# Patient Record
Sex: FEMALE | URBAN - METROPOLITAN AREA
[De-identification: names, ages, dates, MRNs, and addresses within clinical notes are randomized per-mention and may not be internally consistent; named-entity substitution may affect disease eponyms.]

---

## 2017-08-23 ENCOUNTER — INPATIENT (INPATIENT)
Facility: HOSPITAL | Age: 47
LOS: 5 days | Discharge: NURSING, REHAB OR LONG-TERM CARE | DRG: 314 | End: 2017-08-29
Attending: INTERNAL MEDICINE | Admitting: INTERNAL MEDICINE
Payer: COMMERCIAL

## 2017-08-23 VITALS
HEART RATE: 86 BPM | WEIGHT: 200.62 LBS | OXYGEN SATURATION: 94 % | RESPIRATION RATE: 18 BRPM | DIASTOLIC BLOOD PRESSURE: 55 MMHG | HEIGHT: 70 IN | SYSTOLIC BLOOD PRESSURE: 118 MMHG
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
## 2017-08-23 PROBLEM — Z00.00 ENCOUNTER FOR PREVENTIVE HEALTH EXAMINATION: Status: ACTIVE | Noted: 2017-08-23

## 2017-08-24 DIAGNOSIS — I10 ESSENTIAL (PRIMARY) HYPERTENSION: ICD-10-CM

## 2017-08-24 DIAGNOSIS — N18.6 END STAGE RENAL DISEASE: ICD-10-CM

## 2017-08-24 DIAGNOSIS — D64.9 ANEMIA, UNSPECIFIED: ICD-10-CM

## 2017-08-24 DIAGNOSIS — R78.81 BACTEREMIA: ICD-10-CM

## 2017-08-24 LAB
ALBUMIN SERPL ELPH-MCNC: 2.1 G/DL — LOW (ref 3.3–5)
ALP SERPL-CCNC: 285 U/L — HIGH (ref 40–120)
ALT FLD-CCNC: 25 U/L — SIGNIFICANT CHANGE UP (ref 10–45)
ANION GAP SERPL CALC-SCNC: 23 MMOL/L — HIGH (ref 5–17)
ANION GAP SERPL CALC-SCNC: 23 MMOL/L — HIGH (ref 5–17)
APTT BLD: 25.9 SEC — LOW (ref 27.5–37.4)
APTT BLD: 30.3 SEC — SIGNIFICANT CHANGE UP (ref 27.5–37.4)
AST SERPL-CCNC: 26 U/L — SIGNIFICANT CHANGE UP (ref 10–40)
BASOPHILS NFR BLD AUTO: 0.3 % — SIGNIFICANT CHANGE UP (ref 0–2)
BILIRUB SERPL-MCNC: 0.4 MG/DL — SIGNIFICANT CHANGE UP (ref 0.2–1.2)
BLD GP AB SCN SERPL QL: NEGATIVE — SIGNIFICANT CHANGE UP
BUN SERPL-MCNC: 67 MG/DL — HIGH (ref 7–23)
BUN SERPL-MCNC: 77 MG/DL — HIGH (ref 7–23)
CALCIUM SERPL-MCNC: 8.1 MG/DL — LOW (ref 8.4–10.5)
CALCIUM SERPL-MCNC: 8.2 MG/DL — LOW (ref 8.4–10.5)
CHLORIDE SERPL-SCNC: 82 MMOL/L — LOW (ref 96–108)
CHLORIDE SERPL-SCNC: 85 MMOL/L — LOW (ref 96–108)
CHOLEST SERPL-MCNC: 136 MG/DL — SIGNIFICANT CHANGE UP (ref 10–199)
CK MB CFR SERPL CALC: 1.7 NG/ML — SIGNIFICANT CHANGE UP (ref 0–6.7)
CK SERPL-CCNC: 93 U/L — SIGNIFICANT CHANGE UP (ref 25–170)
CO2 SERPL-SCNC: 18 MMOL/L — LOW (ref 22–31)
CO2 SERPL-SCNC: 19 MMOL/L — LOW (ref 22–31)
CREAT SERPL-MCNC: 8 MG/DL — HIGH (ref 0.5–1.3)
CREAT SERPL-MCNC: 9.3 MG/DL — HIGH (ref 0.5–1.3)
EOSINOPHIL NFR BLD AUTO: 0.2 % — SIGNIFICANT CHANGE UP (ref 0–6)
GLUCOSE SERPL-MCNC: 227 MG/DL — HIGH (ref 70–99)
GLUCOSE SERPL-MCNC: 267 MG/DL — HIGH (ref 70–99)
GRAM STN FLD: SIGNIFICANT CHANGE UP
HBA1C BLD-MCNC: 8.2 % — HIGH (ref 4–5.6)
HBV SURFACE AG SER-ACNC: SIGNIFICANT CHANGE UP
HCG SERPL-ACNC: 0.7 MIU/ML — SIGNIFICANT CHANGE UP
HCT VFR BLD CALC: 26 % — LOW (ref 34.5–45)
HCT VFR BLD CALC: 26.6 % — LOW (ref 34.5–45)
HCV AB S/CO SERPL IA: 0.35 S/CO — SIGNIFICANT CHANGE UP
HCV AB SERPL-IMP: SIGNIFICANT CHANGE UP
HDLC SERPL-MCNC: 5 MG/DL — LOW (ref 40–125)
HGB BLD-MCNC: 8.5 G/DL — LOW (ref 11.5–15.5)
HGB BLD-MCNC: 8.7 G/DL — LOW (ref 11.5–15.5)
INR BLD: 1.43 — HIGH (ref 0.88–1.16)
INR BLD: 1.44 — HIGH (ref 0.88–1.16)
LACTATE SERPL-SCNC: 1.1 MMOL/L — SIGNIFICANT CHANGE UP (ref 0.5–2)
LACTATE SERPL-SCNC: 1.2 MMOL/L — SIGNIFICANT CHANGE UP (ref 0.5–2)
LIPID PNL WITH DIRECT LDL SERPL: -10 MG/DL — SIGNIFICANT CHANGE UP
LYMPHOCYTES # BLD AUTO: 5.5 % — LOW (ref 13–44)
MAGNESIUM SERPL-MCNC: 2.2 MG/DL — SIGNIFICANT CHANGE UP (ref 1.6–2.6)
MAGNESIUM SERPL-MCNC: 2.4 MG/DL — SIGNIFICANT CHANGE UP (ref 1.6–2.6)
MCHC RBC-ENTMCNC: 25.6 PG — LOW (ref 27–34)
MCHC RBC-ENTMCNC: 25.8 PG — LOW (ref 27–34)
MCHC RBC-ENTMCNC: 32.7 G/DL — SIGNIFICANT CHANGE UP (ref 32–36)
MCHC RBC-ENTMCNC: 32.7 G/DL — SIGNIFICANT CHANGE UP (ref 32–36)
MCV RBC AUTO: 78.3 FL — LOW (ref 80–100)
MCV RBC AUTO: 78.9 FL — LOW (ref 80–100)
METHOD TYPE: SIGNIFICANT CHANGE UP
MONOCYTES NFR BLD AUTO: 9.5 % — SIGNIFICANT CHANGE UP (ref 2–14)
MRSA SPEC QL CULT: SIGNIFICANT CHANGE UP
NEUTROPHILS NFR BLD AUTO: 84.5 % — HIGH (ref 43–77)
NT-PROBNP SERPL-SCNC: HIGH PG/ML (ref 0–300)
PHOSPHATE SERPL-MCNC: 7 MG/DL — HIGH (ref 2.5–4.5)
PLATELET # BLD AUTO: 186 K/UL — SIGNIFICANT CHANGE UP (ref 150–400)
PLATELET # BLD AUTO: 210 K/UL — SIGNIFICANT CHANGE UP (ref 150–400)
POTASSIUM SERPL-MCNC: 3.5 MMOL/L — SIGNIFICANT CHANGE UP (ref 3.5–5.3)
POTASSIUM SERPL-MCNC: 3.8 MMOL/L — SIGNIFICANT CHANGE UP (ref 3.5–5.3)
POTASSIUM SERPL-SCNC: 3.5 MMOL/L — SIGNIFICANT CHANGE UP (ref 3.5–5.3)
POTASSIUM SERPL-SCNC: 3.8 MMOL/L — SIGNIFICANT CHANGE UP (ref 3.5–5.3)
PROT SERPL-MCNC: 6.9 G/DL — SIGNIFICANT CHANGE UP (ref 6–8.3)
PROTHROM AB SERPL-ACNC: 16 SEC — HIGH (ref 9.8–12.7)
PROTHROM AB SERPL-ACNC: 16.1 SEC — HIGH (ref 9.8–12.7)
RBC # BLD: 3.32 M/UL — LOW (ref 3.8–5.2)
RBC # BLD: 3.37 M/UL — LOW (ref 3.8–5.2)
RBC # FLD: 18.1 % — HIGH (ref 10.3–16.9)
RBC # FLD: 18.2 % — HIGH (ref 10.3–16.9)
RH IG SCN BLD-IMP: NEGATIVE — SIGNIFICANT CHANGE UP
SODIUM SERPL-SCNC: 123 MMOL/L — LOW (ref 135–145)
SODIUM SERPL-SCNC: 127 MMOL/L — LOW (ref 135–145)
TOTAL CHOLESTEROL/HDL RATIO MEASUREMENT: 27.2 RATIO — HIGH (ref 3.3–7.1)
TRIGL SERPL-MCNC: 707 MG/DL — HIGH (ref 10–149)
TROPONIN T SERPL-MCNC: 0.14 NG/ML — CRITICAL HIGH (ref 0–0.01)
TSH SERPL-MCNC: 1.25 UIU/ML — SIGNIFICANT CHANGE UP (ref 0.35–4.94)
WBC # BLD: 18.1 K/UL — HIGH (ref 3.8–10.5)
WBC # BLD: 19.6 K/UL — HIGH (ref 3.8–10.5)
WBC # FLD AUTO: 18.1 K/UL — HIGH (ref 3.8–10.5)
WBC # FLD AUTO: 19.6 K/UL — HIGH (ref 3.8–10.5)

## 2017-08-24 PROCEDURE — 93880 EXTRACRANIAL BILAT STUDY: CPT | Mod: 26

## 2017-08-24 PROCEDURE — 71010: CPT | Mod: 26

## 2017-08-24 PROCEDURE — 99223 1ST HOSP IP/OBS HIGH 75: CPT

## 2017-08-24 PROCEDURE — 93010 ELECTROCARDIOGRAM REPORT: CPT

## 2017-08-24 PROCEDURE — 99223 1ST HOSP IP/OBS HIGH 75: CPT | Mod: GC

## 2017-08-24 PROCEDURE — 36500 INSERTION OF CATHETER VEIN: CPT

## 2017-08-24 PROCEDURE — 70496 CT ANGIOGRAPHY HEAD: CPT | Mod: 26

## 2017-08-24 PROCEDURE — 75573 CT HRT C+ STRUX CGEN HRT DS: CPT | Mod: 26

## 2017-08-24 PROCEDURE — 70498 CT ANGIOGRAPHY NECK: CPT | Mod: 26

## 2017-08-24 PROCEDURE — 93306 TTE W/DOPPLER COMPLETE: CPT | Mod: 26

## 2017-08-24 PROCEDURE — 70450 CT HEAD/BRAIN W/O DYE: CPT | Mod: 26,59

## 2017-08-24 RX ORDER — PANTOPRAZOLE SODIUM 20 MG/1
40 TABLET, DELAYED RELEASE ORAL
Qty: 0 | Refills: 0 | Status: DISCONTINUED | OUTPATIENT
Start: 2017-08-24 | End: 2017-08-29

## 2017-08-24 RX ORDER — INSULIN LISPRO 100/ML
VIAL (ML) SUBCUTANEOUS EVERY 6 HOURS
Qty: 0 | Refills: 0 | Status: DISCONTINUED | OUTPATIENT
Start: 2017-08-24 | End: 2017-08-28

## 2017-08-24 RX ORDER — DEXTROSE 50 % IN WATER 50 %
12.5 SYRINGE (ML) INTRAVENOUS ONCE
Qty: 0 | Refills: 0 | Status: DISCONTINUED | OUTPATIENT
Start: 2017-08-24 | End: 2017-08-28

## 2017-08-24 RX ORDER — VALSARTAN 80 MG/1
320 TABLET ORAL DAILY
Qty: 0 | Refills: 0 | Status: DISCONTINUED | OUTPATIENT
Start: 2017-08-24 | End: 2017-08-29

## 2017-08-24 RX ORDER — GLUCAGON INJECTION, SOLUTION 0.5 MG/.1ML
1 INJECTION, SOLUTION SUBCUTANEOUS ONCE
Qty: 0 | Refills: 0 | Status: DISCONTINUED | OUTPATIENT
Start: 2017-08-24 | End: 2017-08-28

## 2017-08-24 RX ORDER — INSULIN GLARGINE 100 [IU]/ML
12 INJECTION, SOLUTION SUBCUTANEOUS AT BEDTIME
Qty: 0 | Refills: 0 | Status: DISCONTINUED | OUTPATIENT
Start: 2017-08-24 | End: 2017-08-25

## 2017-08-24 RX ORDER — VANCOMYCIN HCL 1 G
1000 VIAL (EA) INTRAVENOUS ONCE
Qty: 0 | Refills: 0 | Status: COMPLETED | OUTPATIENT
Start: 2017-08-25 | End: 2017-08-25

## 2017-08-24 RX ORDER — ASPIRIN/CALCIUM CARB/MAGNESIUM 324 MG
81 TABLET ORAL DAILY
Qty: 0 | Refills: 0 | Status: DISCONTINUED | OUTPATIENT
Start: 2017-08-24 | End: 2017-08-29

## 2017-08-24 RX ORDER — INSULIN GLARGINE 100 [IU]/ML
10 INJECTION, SOLUTION SUBCUTANEOUS AT BEDTIME
Qty: 0 | Refills: 0 | Status: DISCONTINUED | OUTPATIENT
Start: 2017-08-24 | End: 2017-08-24

## 2017-08-24 RX ORDER — METOPROLOL TARTRATE 50 MG
50 TABLET ORAL EVERY 8 HOURS
Qty: 0 | Refills: 0 | Status: DISCONTINUED | OUTPATIENT
Start: 2017-08-24 | End: 2017-08-24

## 2017-08-24 RX ORDER — DEXTROSE 50 % IN WATER 50 %
25 SYRINGE (ML) INTRAVENOUS ONCE
Qty: 0 | Refills: 0 | Status: DISCONTINUED | OUTPATIENT
Start: 2017-08-24 | End: 2017-08-28

## 2017-08-24 RX ORDER — INSULIN LISPRO 100/ML
5 VIAL (ML) SUBCUTANEOUS
Qty: 0 | Refills: 0 | Status: DISCONTINUED | OUTPATIENT
Start: 2017-08-24 | End: 2017-08-28

## 2017-08-24 RX ORDER — METOPROLOL TARTRATE 50 MG
50 TABLET ORAL EVERY 6 HOURS
Qty: 0 | Refills: 0 | Status: DISCONTINUED | OUTPATIENT
Start: 2017-08-24 | End: 2017-08-29

## 2017-08-24 RX ORDER — MORPHINE SULFATE 50 MG/1
2 CAPSULE, EXTENDED RELEASE ORAL ONCE
Qty: 0 | Refills: 0 | Status: DISCONTINUED | OUTPATIENT
Start: 2017-08-24 | End: 2017-08-24

## 2017-08-24 RX ORDER — HEPARIN SODIUM 5000 [USP'U]/ML
1000 INJECTION INTRAVENOUS; SUBCUTANEOUS
Qty: 25000 | Refills: 0 | Status: DISCONTINUED | OUTPATIENT
Start: 2017-08-24 | End: 2017-08-24

## 2017-08-24 RX ORDER — AZTREONAM 2 G
VIAL (EA) INJECTION
Qty: 0 | Refills: 0 | Status: DISCONTINUED | OUTPATIENT
Start: 2017-08-24 | End: 2017-08-24

## 2017-08-24 RX ORDER — SODIUM CHLORIDE 9 MG/ML
1000 INJECTION, SOLUTION INTRAVENOUS
Qty: 0 | Refills: 0 | Status: DISCONTINUED | OUTPATIENT
Start: 2017-08-24 | End: 2017-08-28

## 2017-08-24 RX ORDER — SODIUM CHLORIDE 9 MG/ML
3 INJECTION INTRAMUSCULAR; INTRAVENOUS; SUBCUTANEOUS EVERY 8 HOURS
Qty: 0 | Refills: 0 | Status: DISCONTINUED | OUTPATIENT
Start: 2017-08-24 | End: 2017-08-29

## 2017-08-24 RX ORDER — ACETAMINOPHEN 500 MG
650 TABLET ORAL EVERY 6 HOURS
Qty: 0 | Refills: 0 | Status: DISCONTINUED | OUTPATIENT
Start: 2017-08-24 | End: 2017-08-29

## 2017-08-24 RX ORDER — ATORVASTATIN CALCIUM 80 MG/1
40 TABLET, FILM COATED ORAL AT BEDTIME
Qty: 0 | Refills: 0 | Status: DISCONTINUED | OUTPATIENT
Start: 2017-08-24 | End: 2017-08-29

## 2017-08-24 RX ORDER — HEPARIN SODIUM 5000 [USP'U]/ML
1000 INJECTION INTRAVENOUS; SUBCUTANEOUS
Qty: 25000 | Refills: 0 | Status: DISCONTINUED | OUTPATIENT
Start: 2017-08-24 | End: 2017-08-25

## 2017-08-24 RX ORDER — DEXTROSE 50 % IN WATER 50 %
1 SYRINGE (ML) INTRAVENOUS ONCE
Qty: 0 | Refills: 0 | Status: DISCONTINUED | OUTPATIENT
Start: 2017-08-24 | End: 2017-08-28

## 2017-08-24 RX ORDER — AZTREONAM 2 G
500 VIAL (EA) INJECTION EVERY 8 HOURS
Qty: 0 | Refills: 0 | Status: DISCONTINUED | OUTPATIENT
Start: 2017-08-24 | End: 2017-08-24

## 2017-08-24 RX ORDER — HEPARIN SODIUM 5000 [USP'U]/ML
5000 INJECTION INTRAVENOUS; SUBCUTANEOUS EVERY 8 HOURS
Qty: 0 | Refills: 0 | Status: DISCONTINUED | OUTPATIENT
Start: 2017-08-24 | End: 2017-08-24

## 2017-08-24 RX ADMIN — Medication 650 MILLIGRAM(S): at 23:30

## 2017-08-24 RX ADMIN — SODIUM CHLORIDE 3 MILLILITER(S): 9 INJECTION INTRAMUSCULAR; INTRAVENOUS; SUBCUTANEOUS at 06:54

## 2017-08-24 RX ADMIN — INSULIN GLARGINE 12 UNIT(S): 100 INJECTION, SOLUTION SUBCUTANEOUS at 22:58

## 2017-08-24 RX ADMIN — MORPHINE SULFATE 2 MILLIGRAM(S): 50 CAPSULE, EXTENDED RELEASE ORAL at 09:49

## 2017-08-24 RX ADMIN — Medication 6: at 17:21

## 2017-08-24 RX ADMIN — SODIUM CHLORIDE 3 MILLILITER(S): 9 INJECTION INTRAMUSCULAR; INTRAVENOUS; SUBCUTANEOUS at 22:18

## 2017-08-24 RX ADMIN — Medication 50 MILLIGRAM(S): at 15:26

## 2017-08-24 RX ADMIN — Medication 81 MILLIGRAM(S): at 15:26

## 2017-08-24 RX ADMIN — MORPHINE SULFATE 2 MILLIGRAM(S): 50 CAPSULE, EXTENDED RELEASE ORAL at 08:59

## 2017-08-24 RX ADMIN — Medication 50 MILLIGRAM(S): at 23:00

## 2017-08-24 RX ADMIN — VALSARTAN 320 MILLIGRAM(S): 80 TABLET ORAL at 06:54

## 2017-08-24 RX ADMIN — PANTOPRAZOLE SODIUM 40 MILLIGRAM(S): 20 TABLET, DELAYED RELEASE ORAL at 06:54

## 2017-08-24 RX ADMIN — ATORVASTATIN CALCIUM 40 MILLIGRAM(S): 80 TABLET, FILM COATED ORAL at 22:58

## 2017-08-24 RX ADMIN — SODIUM CHLORIDE 3 MILLILITER(S): 9 INJECTION INTRAMUSCULAR; INTRAVENOUS; SUBCUTANEOUS at 15:29

## 2017-08-24 RX ADMIN — Medication 50 MILLIGRAM(S): at 03:22

## 2017-08-24 RX ADMIN — Medication 4: at 06:53

## 2017-08-24 RX ADMIN — HEPARIN SODIUM 5000 UNIT(S): 5000 INJECTION INTRAVENOUS; SUBCUTANEOUS at 06:54

## 2017-08-24 RX ADMIN — Medication 4: at 02:21

## 2017-08-24 RX ADMIN — Medication 50 MILLIGRAM(S): at 06:54

## 2017-08-24 RX ADMIN — Medication 650 MILLIGRAM(S): at 22:58

## 2017-08-24 NOTE — PROGRESS NOTE ADULT - ASSESSMENT
47y Female with history of HTN, HLD, DM2, ESRD on HD (T/R/S via R femoral HD cath placed on 8/22/17 at OSH) and chronic L foot OM who developed sudden onset lethargy and confusion which prompted her presentation to [facility] on 8/20/17.  Further workup revealed MRSA bacteremia with TTE/ALCIDES showing normal LV size and function, mild MR/TR, moderate pericardial effusion without tamponade and mobile echodensity in the IVC/SVC/RA and aneurysmal dilatation of the interatrial septum with mobile density attached.  R anterior chest wall was removed.  She was subsequently transferred to [facility] on 8/23/17 under the care of Dr. Gee for further evaluation.      Neurovascular: No delirium, pain well controlled on current regimen.  -Lethargic on exam, suspected acute neurologic event.  CT Head on admission suspicious for evolving MCA territory infarction, no hemorrhagic event.  Repeat CTA Head today with no mention of acute CVA, no evidence of mycotic aneurysm or AV malformation.    -Neuro consulted, Dr. Luna.  Per her recommendation, repeat CT Head non-con tomorrow to r/o hemorrhagic conversion of possible embolic event.   -Avoid narcotics.   -C/w PRNs for pain control    Respiratory: 94% on RA  -CXR in AM  -Encourage IS 10x/hour while awake; C+DB; Ambulation  -Monitor SpO2 for respiratory status    Cardiovascular: HD Stable, HR controlled. Hx of HTN, HLD  -C/w Lopressor 50mg q8h, Valsartan 320mg QD, and ASA 81mg QD  -Monitor HR/BP/Tele    GI: Stable, tolerating PO  -GI PPX: Pepcid / Protonix  -    /Renal: BUN/Cr: ___ ; No urinary issues  -Trend AM Cr  -Monitor I/O    ID: Afebrile, Asymptomatic  -No acute issues at this time, continue to monitor for SIRS    Endo: A1C: ___ ; TSH: ____  -C/w ISS for glycemic control  -Trend FS      Heme: H/H Stable  -DVT PPX: HSQ 5000/7500 q8h / q12h and SCDs  -CBC, chem in AM    Dispo: Home when medically ready. 47y Female with history of HTN, HLD, DM2, ESRD on HD (T/R/S via R femoral HD cath placed on 8/22/17 at OSH) and chronic L foot OM who developed sudden onset lethargy and confusion which prompted her presentation to [facility] on 8/20/17.  Further workup revealed MRSA bacteremia with TTE/ALCIDES showing normal LV size and function, mild MR/TR, moderate pericardial effusion without tamponade and mobile echodensity in the IVC/SVC/RA and aneurysmal dilatation of the interatrial septum with mobile density attached.  R anterior chest wall was removed.  She was subsequently transferred to [facility] on 8/23/17 under the care of Dr. Gee for further evaluation.      Neurovascular: No delirium, pain well controlled on current regimen.  -Lethargic on exam, suspected acute neurologic event.  CT Head on admission suspicious for evolving MCA territory infarction, no hemorrhagic event.  Repeat CTA Head today with no mention of acute CVA, no evidence of mycotic aneurysm or AV malformation.    -Neuro consulted, Dr. Luna.  Per her recommendation, repeat CT Head non-con tomorrow to r/o hemorrhagic conversion of possible embolic event.   -Avoid narcotics.   -C/w PRNs for pain control    Respiratory: 94% on RA  -CXR in AM  -Encourage IS 10x/hour while awake; C+DB; Ambulation  -Monitor SpO2 for respiratory status    Cardiovascular: HD Stable, HR controlled. Hx of HTN, HLD; Admitted for RV IE vs. RV Thrombus  -C/w Lopressor 50mg q8h, Valsartan 320mg QD, and ASA 81mg QD  -CT Heart today shows Large thrombus extending from SVG to RA and traverses across the TV, parts appear lobulated and attached to superior aspect of interatrial septum.   -Carotids done, no HD stenosis.   -Possible surgical intervention this admission.   -Monitor HR/BP/Tele    GI: Stable, tolerating PO  -GI PPX: Protonix    /Renal: BUN/Cr: 67/8.00; ESRD on HD (T/R/S via R HD femoral catheter)  -Renal consulted, HD today removing 2L. Continue to appreciate recs.   -Trend AM Cr  -Monitor I/O    ID: Tmax 37.8C, admitted for MRSA Bacteremia  -Dr. Kingston following for ID.  Started on Vancomycin  -Repeat BCx sent today, currently positive for GPC in clusters, f/u specificity and sensitivities.   -Vanco trough prior to 3rd dose.   -WCC 19.6, continue to trend    Endo: A1C: 8.2; TSH: 1.251; hx of DM2  -Endo consult tomorrow  -FS elevated, increased Lantus to 12u, Started 5u premeal, and c/w ISS for glycemic control.  -Trend FS      Heme: H/H Stable  -DVT PPX: Hep gtt at 10u and SCDs  -PTT at 0200, titrate Hep gtt accordingly.   -CBC, chem in AM    Dispo: Home when medically ready.

## 2017-08-24 NOTE — CONSULT NOTE ADULT - ASSESSMENT
This is 47 year old female with PMH stated above. She is on HD recently started 2 months ago. LAst HD on 8/22. We will do HD today for fluid optimization

## 2017-08-24 NOTE — CONSULT NOTE ADULT - SUBJECTIVE AND OBJECTIVE BOX
Vascular Neurology Consultation    HPI:  Unable to obtain detailed medical information from patient and OSH documentation.     Patient is a 47 year old female with history of HTN, HLD, DM II, ESRD on HD (TTS. Last HD unknown. Receives HD via Right Femoral HD catheter placed on 08/22/2017 at [facility] and chronic left foot OM who became lethargic and confused. Patient presented to [facility] on 08/20/2017 with the previously stated complaints and was admitted for evaluation and work up. No mention of neurological work up / imaging. MRSA bacteremia discovered. HD catheter removed and new one inserted. ABX.. TTE / ALCIDES showing Normal LV size and function, no AI or AS, mild MR, mild TR, moderate pericardial effusion without tamponade, mobile echodensity in the tip of the IVC / SVC / right atrium, and aneurysmal interatrial septum with mobile density attached. Patient transferred to [facility] under the care of Dr. Gee for further evaluation and treatment / MGMT.     CT head performed at [facility] showing evolving R MCA ischemic infarct. Therefore, stroke neurology consulted. Pt reports she has had RUE weakness since last Thursday. Has had LLE weakness due to chronic left foot osteomyelitis. Denies headache, nausea, vomiting, slurred speech, recent vision changes, or numbness.     PMH/PSH:  HTN  HLD  DM II  ESRD on HD  Chronic left foot osteomyelitis     Social history:   No ETOH, Non-Smoker, No illicit drug use   Lives at home. Walks with walker.     Family history: no pertinent family history     Review of Systems:  Constitutional: No fever, weight loss or fatigue  Eyes: No eye pain or discharge. Blurry vision, pt states is baseline  ENMT:  No difficulty hearing, tinnitus, vertigo; No sinus or throat pain  Neck: No pain or stiffness  Respiratory: No cough, wheezing, chills or hemoptysis  Cardiovascular: No chest pain, palpitations, shortness of breath, dizziness or leg swelling  Gastrointestinal: No abdominal pain. No nausea, vomiting or hematemesis; No diarrhea or constipation. Nohematochezia.  Genitourinary: No dysuria, frequency, hematuria or incontinence  Neurological: As per HPI  Skin: No itching, burning, rashes or lesions   Endocrine: No heat or cold intolerance; No hair loss  Musculoskeletal: No swelling; No muscle, back or extremity pain  Psychiatric: No depression, anxiety, mood swings or difficulty sleeping  Heme/Lymph: No easy bruising or bleeding gums    MEDICATIONS  (STANDING):  sodium chloride 0.9% lock flush 3 milliLiter(s) IV Push every 8 hours  pantoprazole    Tablet 40 milliGRAM(s) Oral before breakfast  aspirin enteric coated 81 milliGRAM(s) Oral daily  valsartan 320 milliGRAM(s) Oral daily  atorvastatin 40 milliGRAM(s) Oral at bedtime  insulin glargine Injectable (LANTUS) 10 Unit(s) SubCutaneous at bedtime  insulin lispro (HumaLOG) corrective regimen sliding scale   SubCutaneous every 6 hours  metoprolol 50 milliGRAM(s) Oral every 6 hours  heparin  Infusion 1000 Unit(s)/Hr (10 mL/Hr) IV Continuous <Continuous>    MEDICATIONS  (PRN):  dextrose Gel 1 Dose(s) Oral once PRN Blood Glucose LESS THAN 70 milliGRAM(s)/deciliter  glucagon  Injectable 1 milliGRAM(s) IntraMuscular once PRN Glucose LESS THAN 70 milligrams/deciliter  acetaminophen   Tablet. 650 milliGRAM(s) Oral every 6 hours PRN Mild Pain (1 - 3)      Allergies  penicillin (Unknown)  Sular (Unknown)      Vital Signs Last 24 Hrs  T(C): 35.9 (24 Aug 2017 17:50), Max: 37.8 (23 Aug 2017 22:55)  T(F): 96.6 (24 Aug 2017 17:50), Max: 100.1 (23 Aug 2017 22:55)  HR: 77 (24 Aug 2017 17:50) (77 - 90)  BP: 145/76 (24 Aug 2017 17:50) (118/55 - 154/66)  BP(mean): 108 (24 Aug 2017 15:23) (76 - 108)  RR: 18 (24 Aug 2017 17:50) (18 - 18)  SpO2: 94% (24 Aug 2017 17:50) (94% - 96%)    Gen: Sitting up in bed, calm, cooperative, in NAD   Neuro:  Mental status: Awake, alert and oriented x3.  Naming, repetition and comprehension intact.  Attention/concentration intact.  No dysarthria, no aphasia.     Cranial nerves: Pupils equally round and reactive to light, 3 mm, visual fields full, no nystagmus, no ptosis, extraocular muscles intact, V1 through V3 intact bilaterally and symmetric, face symmetric, hearing intact to finger rub, palate elevation symmetric, tongue was midline, sternocleidomastoid/shoulder shrug strength bilaterally 5/5.    Motor: No pronator drift of upper extremities. RUE 4/5 bicep, 4-/5 tricep, 4-/5 deltoid. LUE 4+/5.  strength moderately strong bilaterally. Unable to test RLE because pt was receiving dialysis at the time and access was in R femoral. LLE 3/5, limited due to pain from chronic left foot osteomyelitis   Sensation: Intact and symmetric to light touch.  No neglect.   Coordination: No dysmetria on finger-to-nose. Unable to perform heel to shin.  No clumsiness.  Reflexes: 2+ in upper and lower extremities. Mute toes bilaterally.   Gait: deferred at this time     NIHSS: 2    Labs:                        8.7    18.1  )-----------( 210      ( 24 Aug 2017 18:18 )             26.6     08-24    123<L>  |  82<L>  |  77<H>  ----------------------------<  267<H>  3.8   |  18<L>  |  9.30<H>    Ca    8.2<L>      24 Aug 2017 18:18  Phos  7.0     08-24  Mg     2.4     08-24    TPro  6.9  /  Alb  2.1<L>  /  TBili  0.4  /  DBili  x   /  AST  26  /  ALT  25  /  AlkPhos  285<H>  08-24    PT/INR - ( 24 Aug 2017 18:18 )   PT: 16.0 sec;   INR: 1.43     PTT - ( 24 Aug 2017 18:18 )  PTT:30.3 sec    Cholesterol, Serum: 136 mg/dL (08-24 @ 01:12)  HDL Cholesterol, Serum: 5 mg/dL (08-24 @ 01:12)  Triglycerides, Serum: 707 mg/dL (08-24 @ 01:12)  LDL -10    Hemoglobin A1C, Whole Blood: 8.2 % (08-24 @ 01:11)    Thyroid Stimulating Hormone, Serum: 1.251 uIU/mL (08-24 @ 01:12)      Radiology and Additional Studies:  < from: CT Head No Cont (08.24.17 @ 02:49) >  IMPRESSION: Findings concerning for evolving right MCA territory   infarction. No intracranial hemorrhage. Suggest follow-up and correlation   with MRI.    < from: CT Angio Head w/ IV Cont (08.24.17 @ 11:52) >    IMPRESSION:-  1.  No aneurysm or arteriovenous malformation.   2.  Fine calcifications at the bifurcations of both common carotid   arteries, bilateral vertebral arteries and carotid siphon, otherwise   unremarkable study.    < from: CT Heart Congenital w/ IV Cont (08.24.17 @ 12:34) >  Impression:   1. Motion precludes adequate assessment of coronary arteries.   2. The left main and ostium of the LAD appear to be normal.   3. Calcific plaque noted in proximal LAD and LCX.   4. Remaining portions of the coronary arteries cannot be interpreted due   to motion.   5.  Large thrombus noted extending from the SVC tothe Right atrium, and   traverses across the tricuspid valve. Parts of the thrombus appear to be   lobulated and attached to the superior aspect of the interatrial septum.   6. Please see separate radiology report for non-coronary findings.      Assessment & Plan:  47 year old female with PMH of HTN, HLD, DM II, ESRD on HD, chronic left foot osteomyelitis, presented to [facility] on 8/20/17 with lethargy, confusion, and generalized weakness. MRSA bacteremia discovered. 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     -reviewed CT head images with stroke attending Dr. Luna, R MCA territory stroke evident   -however, neurological exam with more right sided weakness than left side   -CTA head/neck also reviewed with Dr. Luna, who felt there is a questionable R distal M2 segment occlusion   -would repeat CT head w/o contrast tomorrow morning to assess severity of stroke   -likely stroke etiology is from infective endocarditis and LV thrombus  -although there is risk for hemorrhagic conversion of stroke, still agree with anticoagulation at this time given high risk for further embolic strokes  -if acute change in neurological status, would repeat CT head w/o contrast stat to r/o bleed   -would aim for SBP < 160 to decrease risk of hemorrhagic conversion Vascular Neurology Consultation    HPI:  Unable to obtain detailed medical information from patient and OSH documentation.     Patient is a 47 year old female with history of HTN, HLD, DM II, ESRD on HD (TTS. Last HD unknown. Receives HD via Right Femoral HD catheter placed on 08/22/2017 at [facility] and chronic left foot OM who became lethargic and confused. Patient presented to [facility] on 08/20/2017 with the previously stated complaints and was admitted for evaluation and work up. No mention of neurological work up / imaging. MRSA bacteremia discovered. HD catheter removed and new one inserted. ABX.. TTE / ALCIDES showing Normal LV size and function, no AI or AS, mild MR, mild TR, moderate pericardial effusion without tamponade, mobile echodensity in the tip of the IVC / SVC / right atrium, and aneurysmal interatrial septum with mobile density attached. Patient transferred to [facility] under the care of Dr. Gee for further evaluation and treatment / MGMT.     CT head performed at [facility] showing evolving R MCA ischemic infarct. Therefore, stroke neurology consulted. Pt reports she has had RUE weakness since last Thursday. Has had LLE weakness due to chronic left foot osteomyelitis. Denies headache, nausea, vomiting, slurred speech, recent vision changes, or numbness.     PMH/PSH:  HTN  HLD  DM II  ESRD on HD  Chronic left foot osteomyelitis     Social history:   No ETOH, Non-Smoker, No illicit drug use   Lives at home. Walks with walker.     Family history: no pertinent family history     Review of Systems:  Constitutional: No fever, weight loss or fatigue  Eyes: No eye pain or discharge. Blurry vision, pt states is baseline  ENMT:  No difficulty hearing, tinnitus, vertigo; No sinus or throat pain  Neck: No pain or stiffness  Respiratory: No cough, wheezing, chills or hemoptysis  Cardiovascular: No chest pain, palpitations, shortness of breath, dizziness or leg swelling  Gastrointestinal: No abdominal pain. No nausea, vomiting or hematemesis; No diarrhea or constipation. Nohematochezia.  Genitourinary: No dysuria, frequency, hematuria or incontinence  Neurological: As per HPI  Skin: No itching, burning, rashes or lesions   Endocrine: No heat or cold intolerance; No hair loss  Musculoskeletal: No swelling; No muscle, back or extremity pain  Psychiatric: No depression, anxiety, mood swings or difficulty sleeping  Heme/Lymph: No easy bruising or bleeding gums    MEDICATIONS  (STANDING):  sodium chloride 0.9% lock flush 3 milliLiter(s) IV Push every 8 hours  pantoprazole    Tablet 40 milliGRAM(s) Oral before breakfast  aspirin enteric coated 81 milliGRAM(s) Oral daily  valsartan 320 milliGRAM(s) Oral daily  atorvastatin 40 milliGRAM(s) Oral at bedtime  insulin glargine Injectable (LANTUS) 10 Unit(s) SubCutaneous at bedtime  insulin lispro (HumaLOG) corrective regimen sliding scale   SubCutaneous every 6 hours  metoprolol 50 milliGRAM(s) Oral every 6 hours  heparin  Infusion 1000 Unit(s)/Hr (10 mL/Hr) IV Continuous <Continuous>    MEDICATIONS  (PRN):  dextrose Gel 1 Dose(s) Oral once PRN Blood Glucose LESS THAN 70 milliGRAM(s)/deciliter  glucagon  Injectable 1 milliGRAM(s) IntraMuscular once PRN Glucose LESS THAN 70 milligrams/deciliter  acetaminophen   Tablet. 650 milliGRAM(s) Oral every 6 hours PRN Mild Pain (1 - 3)      Allergies  penicillin (Unknown)  Sular (Unknown)      Vital Signs Last 24 Hrs  T(C): 35.9 (24 Aug 2017 17:50), Max: 37.8 (23 Aug 2017 22:55)  T(F): 96.6 (24 Aug 2017 17:50), Max: 100.1 (23 Aug 2017 22:55)  HR: 77 (24 Aug 2017 17:50) (77 - 90)  BP: 145/76 (24 Aug 2017 17:50) (118/55 - 154/66)  BP(mean): 108 (24 Aug 2017 15:23) (76 - 108)  RR: 18 (24 Aug 2017 17:50) (18 - 18)  SpO2: 94% (24 Aug 2017 17:50) (94% - 96%)    Gen: Sitting up in bed, calm, cooperative, in NAD   Neuro:  Mental status: Awake, alert and oriented x3.  Naming, repetition and comprehension intact.  Attention/concentration intact.  No dysarthria, no aphasia.     Cranial nerves: Pupils equally round and reactive to light, 3 mm, visual fields full, no nystagmus, no ptosis, extraocular muscles intact, V1 through V3 intact bilaterally and symmetric, face symmetric, hearing intact to finger rub, palate elevation symmetric, tongue was midline, sternocleidomastoid/shoulder shrug strength bilaterally 5/5.    Motor: No pronator drift of upper extremities. RUE 4/5 bicep, 4-/5 tricep, 4-/5 deltoid. LUE 4+/5.  strength moderately strong bilaterally. Unable to test RLE because pt was receiving dialysis at the time and access was in R femoral. LLE 3/5, limited due to pain from chronic left foot osteomyelitis   Sensation: Intact and symmetric to light touch.  No neglect.   Coordination: No dysmetria on finger-to-nose. Unable to perform heel to shin.  No clumsiness.  Reflexes: 2+ in upper and lower extremities. Mute toes bilaterally.   Gait: deferred at this time     NIHSS: 2    Labs:                        8.7    18.1  )-----------( 210      ( 24 Aug 2017 18:18 )             26.6     08-24    123<L>  |  82<L>  |  77<H>  ----------------------------<  267<H>  3.8   |  18<L>  |  9.30<H>    Ca    8.2<L>      24 Aug 2017 18:18  Phos  7.0     08-24  Mg     2.4     08-24    TPro  6.9  /  Alb  2.1<L>  /  TBili  0.4  /  DBili  x   /  AST  26  /  ALT  25  /  AlkPhos  285<H>  08-24    PT/INR - ( 24 Aug 2017 18:18 )   PT: 16.0 sec;   INR: 1.43     PTT - ( 24 Aug 2017 18:18 )  PTT:30.3 sec    Cholesterol, Serum: 136 mg/dL (08-24 @ 01:12)  HDL Cholesterol, Serum: 5 mg/dL (08-24 @ 01:12)  Triglycerides, Serum: 707 mg/dL (08-24 @ 01:12)  LDL -10    Hemoglobin A1C, Whole Blood: 8.2 % (08-24 @ 01:11)    Thyroid Stimulating Hormone, Serum: 1.251 uIU/mL (08-24 @ 01:12)      Radiology and Additional Studies:  CT Head No Cont (08.24.17 @ 02:49) >  IMPRESSION: Findings concerning for evolving right MCA territory   infarction. No intracranial hemorrhage. Suggest follow-up and correlation   with MRI.    CT Angio Head w/ IV Cont (08.24.17 @ 11:52) >    IMPRESSION:-  1.  No aneurysm or arteriovenous malformation.   2.  Fine calcifications at the bifurcations of both common carotid   arteries, bilateral vertebral arteries and carotid siphon, otherwise   unremarkable study.    CT Heart Congenital w/ IV Cont (08.24.17 @ 12:34) >  Impression:   1. Motion precludes adequate assessment of coronary arteries.   2. The left main and ostium of the LAD appear to be normal.   3. Calcific plaque noted in proximal LAD and LCX.   4. Remaining portions of the coronary arteries cannot be interpreted due   to motion.   5.  Large thrombus noted extending from the SVC tothe Right atrium, and   traverses across the tricuspid valve. Parts of the thrombus appear to be   lobulated and attached to the superior aspect of the interatrial septum.   6. Please see separate radiology report for non-coronary findings.      Assessment & Plan:  47 year old female with PMH of HTN, HLD, DM II, ESRD on HD, chronic left foot osteomyelitis, presented to [facility] on 8/20/17 with lethargy, confusion, and generalized weakness. MRSA bacteremia discovered. Also found to have infective endocarditis vs RV thrombus.     -reviewed CT head images with stroke attending Dr. Luna, R MCA territory stroke evident   -however, neurological exam with more right sided weakness than left side   -CTA head/neck also reviewed with Dr. Luna, who felt there is a questionable R distal M2 segment occlusion   -would repeat CT head w/o contrast tomorrow morning to assess severity of stroke   -likely stroke etiology is from infective endocarditis and RV thrombus  -although there is risk for hemorrhagic conversion of stroke, still agree with anticoagulation at this time given high risk for further embolic strokes  -if acute change in neurological status, would repeat CT head w/o contrast stat to r/o bleed   -would aim for SBP < 160 to decrease risk of hemorrhagic conversion

## 2017-08-24 NOTE — CONSULT NOTE ADULT - SUBJECTIVE AND OBJECTIVE BOX
Patient is a 47 year old female with history of HTN, HLD, DM II, ESRD on HD (TTS. Last HD unknown. Receives HD via Right Femoral HD catheter placed on 08/22/2017 at [facility] and chronic left foot OM. The patient was transferred yesterday from [facility] where she was found to be bacteremic - MRSA and was transferred here for further treatment for possible endocarditis versus thrombus on the left side of the heart. Was found to have right CVA. The renal service is activated for the need of HD. The patient declared that she was recently started HD at [facility] for 2 months, AV fistula constructed on the left arm 1 month ago. She used to have permacath which was removed at [place] most likely after she was found to be bacteremic a new femoral line was placed on the right side - according to her last HD om tuesday this week. Does not endorse any shortness of breath, no chest pain or fever      Patient is a 47y Female admitted for     PAST MEDICAL & SURGICAL HISTORY:      MEDICATIONS  (STANDING):  sodium chloride 0.9% lock flush 3 milliLiter(s) IV Push every 8 hours  pantoprazole    Tablet 40 milliGRAM(s) Oral before breakfast  aspirin enteric coated 81 milliGRAM(s) Oral daily  valsartan 320 milliGRAM(s) Oral daily  metoprolol 50 milliGRAM(s) Oral every 8 hours  atorvastatin 40 milliGRAM(s) Oral at bedtime  insulin glargine Injectable (LANTUS) 10 Unit(s) SubCutaneous at bedtime  insulin lispro (HumaLOG) corrective regimen sliding scale   SubCutaneous every 6 hours  dextrose 5%. 1000 milliLiter(s) (50 mL/Hr) IV Continuous <Continuous>  dextrose 50% Injectable 12.5 Gram(s) IV Push once  dextrose 50% Injectable 25 Gram(s) IV Push once  dextrose 50% Injectable 25 Gram(s) IV Push once  heparin  Infusion 1000 Unit(s)/Hr (10 mL/Hr) IV Continuous <Continuous>    MEDICATIONS  (PRN):  dextrose Gel 1 Dose(s) Oral once PRN Blood Glucose LESS THAN 70 milliGRAM(s)/deciliter  glucagon  Injectable 1 milliGRAM(s) IntraMuscular once PRN Glucose LESS THAN 70 milligrams/deciliter  acetaminophen   Tablet. 650 milliGRAM(s) Oral every 6 hours PRN Mild Pain (1 - 3)      Allergies    penicillin (Unknown)  Sular (Unknown)    Intolerances        SOCIAL HISTORY:    FAMILY HISTORY:      T(C): , Max: 37.8 (08-23-17 @ 22:55)  T(F): , Max: 100.1 (08-23-17 @ 22:55)  HR: 88 (08-24-17 @ 10:21)  BP: 139/61 (08-24-17 @ 10:21)  BP(mean): 94 (08-24-17 @ 10:21)  RR: 18 (08-24-17 @ 10:21)  SpO2: 96% (08-24-17 @ 10:21)  Wt(kg): --    08-23 @ 07:01  -  08-24 @ 07:00  --------------------------------------------------------  IN: 50 mL / OUT: 0 mL / NET: 50 mL      Height (cm): 177.8 (08-24 @ 01:41)  Weight (kg): 91 (08-24 @ 01:41)  BMI (kg/m2): 28.8 (08-24 @ 01:41)  BSA (m2): 2.09 (08-24 @ 01:41)      PHYSICAL exam"   Alert and oriented  No JVD   Normal air entry in the lungs, no wheezing, no crackles, no rales   RRR, normal s1/s2, no murmurs, rubs or gallops   Abdomen - soft, non-tender, non-distended, normal bowel sounds   extremities - mild peripheral edema   HAs a triple lumen in the right femoral side, permacath removed       LABS:                        8.5    19.6  )-----------( 186      ( 24 Aug 2017 01:11 )             26.0     08-24    127<L>  |  85<L>  |  67<H>  ----------------------------<  227<H>  3.5   |  19<L>  |  8.00<H>    Ca    8.1<L>      24 Aug 2017 01:12  Phos  7.0     08-24  Mg     2.2     08-24    TPro  6.9  /  Alb  2.1<L>  /  TBili  0.4  /  DBili  x   /  AST  26  /  ALT  25  /  AlkPhos  285<H>  08-24    Cholesterol, Serum: 136 mg/dL [10 - 199] (08-24 @ 01:12)  Creatine Kinase, Serum: 93 U/L [25 - 170] (08-24 @ 01:12)    PT/INR - ( 24 Aug 2017 01:11 )   PT: 16.1 sec;   INR: 1.44          PTT - ( 24 Aug 2017 01:11 )  PTT:25.9 sec          RADIOLOGY & ADDITIONAL STUDIES:  < from: Xray Chest 1 View AP-PORTABLE IMMEDIATE (08.24.17 @ 01:36) >    INTERPRETATION:  Chest X-Ray dated 8/24/2017 1:36 AM    Indication: Right atrial thrombus. Bacteremia..    Comparison studies: None.    An AP portable view of the chest is taken at suboptimal inspiration.   Heart size is difficult to assess due to the poor inspiration. No   consolidation is visible.      IMPRESSION:  Grossly clear.    < end of copied text > Patient is a 47 year old female with history of HTN, HLD, DM II, ESRD on HD (TTS. Last HD unknown. Receives HD via Right Femoral HD catheter placed on 08/22/2017 at [facility] and chronic left foot OM. The patient was transferred yesterday from [facility] where she was found to be bacteremic - MRSA and was transferred here for further treatment for possible endocarditis versus thrombus on the left side of the heart. Was found to have right CVA. The renal service is activated for the need of HD. The patient declared that she was recently started HD at [facility] for 2 months, AV fistula constructed on the left arm 1 month ago. She used to have permacath which was removed at [place] most likely after she was found to be bacteremic a new femoral line was placed on the right side - according to her last HD om tuesday this week. Does not endorse any shortness of breath, no chest pain or fever       PAST MEDICAL & SURGICAL HISTORY:      MEDICATIONS  (STANDING):  sodium chloride 0.9% lock flush 3 milliLiter(s) IV Push every 8 hours  pantoprazole    Tablet 40 milliGRAM(s) Oral before breakfast  aspirin enteric coated 81 milliGRAM(s) Oral daily  valsartan 320 milliGRAM(s) Oral daily  metoprolol 50 milliGRAM(s) Oral every 8 hours  atorvastatin 40 milliGRAM(s) Oral at bedtime  insulin glargine Injectable (LANTUS) 10 Unit(s) SubCutaneous at bedtime  insulin lispro (HumaLOG) corrective regimen sliding scale   SubCutaneous every 6 hours  dextrose 5%. 1000 milliLiter(s) (50 mL/Hr) IV Continuous <Continuous>  dextrose 50% Injectable 12.5 Gram(s) IV Push once  dextrose 50% Injectable 25 Gram(s) IV Push once  dextrose 50% Injectable 25 Gram(s) IV Push once  heparin  Infusion 1000 Unit(s)/Hr (10 mL/Hr) IV Continuous <Continuous>    MEDICATIONS  (PRN):  dextrose Gel 1 Dose(s) Oral once PRN Blood Glucose LESS THAN 70 milliGRAM(s)/deciliter  glucagon  Injectable 1 milliGRAM(s) IntraMuscular once PRN Glucose LESS THAN 70 milligrams/deciliter  acetaminophen   Tablet. 650 milliGRAM(s) Oral every 6 hours PRN Mild Pain (1 - 3)      Allergies    penicillin (Unknown)  Sular (Unknown)    Intolerances        SOCIAL HISTORY:    FAMILY HISTORY:      T(C): , Max: 37.8 (08-23-17 @ 22:55)  T(F): , Max: 100.1 (08-23-17 @ 22:55)  HR: 88 (08-24-17 @ 10:21)  BP: 139/61 (08-24-17 @ 10:21)  BP(mean): 94 (08-24-17 @ 10:21)  RR: 18 (08-24-17 @ 10:21)  SpO2: 96% (08-24-17 @ 10:21)  Wt(kg): --    08-23 @ 07:01  -  08-24 @ 07:00  --------------------------------------------------------  IN: 50 mL / OUT: 0 mL / NET: 50 mL      Height (cm): 177.8 (08-24 @ 01:41)  Weight (kg): 91 (08-24 @ 01:41)  BMI (kg/m2): 28.8 (08-24 @ 01:41)  BSA (m2): 2.09 (08-24 @ 01:41)      PHYSICAL exam"   Alert and oriented  No JVD   Normal air entry in the lungs, no wheezing, no crackles, no rales   RRR, normal s1/s2, no murmurs, rubs or gallops   Abdomen - soft, non-tender, non-distended, normal bowel sounds   extremities - mild peripheral edema   HAs a triple lumen in the right femoral side, permacath removed       LABS:                        8.5    19.6  )-----------( 186      ( 24 Aug 2017 01:11 )             26.0     08-24    127<L>  |  85<L>  |  67<H>  ----------------------------<  227<H>  3.5   |  19<L>  |  8.00<H>    Ca    8.1<L>      24 Aug 2017 01:12  Phos  7.0     08-24  Mg     2.2     08-24    TPro  6.9  /  Alb  2.1<L>  /  TBili  0.4  /  DBili  x   /  AST  26  /  ALT  25  /  AlkPhos  285<H>  08-24    Cholesterol, Serum: 136 mg/dL [10 - 199] (08-24 @ 01:12)  Creatine Kinase, Serum: 93 U/L [25 - 170] (08-24 @ 01:12)    PT/INR - ( 24 Aug 2017 01:11 )   PT: 16.1 sec;   INR: 1.44          PTT - ( 24 Aug 2017 01:11 )  PTT:25.9 sec          RADIOLOGY & ADDITIONAL STUDIES:  < from: Xray Chest 1 View AP-PORTABLE IMMEDIATE (08.24.17 @ 01:36) >    INTERPRETATION:  Chest X-Ray dated 8/24/2017 1:36 AM    Indication: Right atrial thrombus. Bacteremia..    Comparison studies: None.    An AP portable view of the chest is taken at suboptimal inspiration.   Heart size is difficult to assess due to the poor inspiration. No   consolidation is visible.      IMPRESSION:  Grossly clear.    < end of copied text >

## 2017-08-24 NOTE — H&P ADULT - ASSESSMENT
Patient is a 47 year old female with history of HTN, HLD, DM II, ESRD on HD (TTS. Last HD unknown. Receives HD via Right Femoral HD catheter placed on 08/22/2017 at OSH [facility] and chronic left foot OM who became lethargic and confused. Patient presented to [facility] on 08/20/2017 with the previously stated complaints and was admitted for evaluation and work up. No mention of neurological work up / imaging. MRSA bacteremia discovered. HD catheter removed and new one inserted. ABX.. TTE / ALCIDES showing Normal LV size and function, no AI or AS, mild MR, mild TR, moderate pericardial effusion without tamponade, mobile echodensity in the tip of the IVC / SVC / right atrium, and aneurysmal interatrial septum with mobile density attached. Patient transferred to [facility] under the care of Dr. Gee for further evaluation and treatment / MGMT.     Neurovascular: No delirium. Pain well controlled with current regimen.  -CT head.  -PRN's.  -US Carotids.    Cardiovascular: Hemodynamically stable. HR controlled. IE vs. LV thrombus.   -BP. HR. EKG. TTE. Cardiac Panel. Lipid Panel. BNP.   -ASA. ARB. BBlocker. Statin. Heparin drip.  -Pre-OHS evaluation.   -See ID.    Respiratory: 02 Sat = 98% on RA.  -If on oxygen wean to RA from for O2 Sat > 93%.  -Encourage C+DB and Use of IS 10x / hr while awake.  -CXR.  -PFT's.    GI: Stable.  -NPO after MN.  -PPX.    Renal / : ESRD on HD.  -Needs renal consult.   -Monitor renal function.  -Monitor I/O's.    Endocrine: DM.  -Basal / Bolus when PO diet / SSI  -Endocrine consult.    -A1c.  -TSH.    Hematologic:  -CBC.  -Coagulation Panel.  -T/Sx2.    ID: MRSA bacteremia. Chronic OM. IE?  -ABX. PanCx. ID consult needed. Medical records from OSH needed.   -Temperature.  -CBC.  -Observe for SIRS/Sepsis Syndrome.    Prophylaxis:  -SCD's    Disposition:  -9L for frequent monitoring.

## 2017-08-24 NOTE — H&P ADULT - NSHPPHYSICALEXAM_GEN_ALL_CORE
Appearance: Normal	  HEENT:   Normal oral mucosa, PERRL, EOMI	  Neck: Supple, - JVD; Carotid Bruit   Cardiovascular: Normal S1 S2. No JVD. + murmurs.  Respiratory: Lungs clear to auscultation B/L.	  Gastrointestinal:  Soft, + BS.	  Skin: LLE chronic skin changes. Patient states OM.  Extremities: Normal range of motion, weak. LE warm. RLE with doppler pulses / LLE warm with out pulses.   Vascular: Peripheral pulses palpable 2+ bilaterally.  Neurologic: Non-focal.  Psychiatry: A & O x 3, Lethargic.

## 2017-08-24 NOTE — PROGRESS NOTE ADULT - SUBJECTIVE AND OBJECTIVE BOX
Patient discussed on morning rounds with Dr. Gee     Operation / Date: Admitted on 8/24/17 for RV IE vs. RV Thrombus +MRSA Bacteremia on 8/23/17    SUBJECTIVE ASSESSMENT:  47y Female seen at bedside this AM. No acute events overnight, pt offers no acute complaints at this time. Denies HA, AMS, CP, palpitations, SOB, n/v/d.     Vital Signs Last 24 Hrs  T(C): 35.9 (24 Aug 2017 17:50), Max: 37.8 (23 Aug 2017 22:55)  T(F): 96.6 (24 Aug 2017 17:50), Max: 100.1 (23 Aug 2017 22:55)  HR: 77 (24 Aug 2017 17:50) (77 - 90)  BP: 145/76 (24 Aug 2017 17:50) (118/55 - 154/66)  BP(mean): 108 (24 Aug 2017 15:23) (76 - 108)  RR: 18 (24 Aug 2017 17:50) (18 - 18)  SpO2: 94% (24 Aug 2017 17:50) (94% - 96%)  I&O's Detail    23 Aug 2017 07:01  -  24 Aug 2017 07:00  --------------------------------------------------------  IN:    IV PiggyBack: 50 mL  Total IN: 50 mL    OUT:  Total OUT: 0 mL    Total NET: 50 mL      CHEST TUBE:  No.   LOLLY DRAIN: No.  EPICARDIAL WIRES: No.  TIE DOWNS: No.  COOK: No.    PHYSICAL EXAM:    General: Resting in bed, no acute distress.     Neurological: AAOx3, no AMS or focal deficits.  Appears lethargic.      Cardiovascular: RRR, S1/S2, +Murmur at RUSB    Respiratory: No acute distress on RA.  CTA b/l, no w/r/r.     Gastrointestinal: ND, NBS, non-TTP.    Extremities: LLE with chronic skin changes and violaceous discoloration along posteriomedial margin.  Large area of ulceration along medial L ankle, no purulence or active bleeding.     Vascular: Pulses 2+ in b/l Radial.  Diminished b/l LE pulses.     Incision Sites: N/A    LABS:                        8.7    18.1  )-----------( 210      ( 24 Aug 2017 18:18 )             26.6       COUMADIN:  No.  PT/INR - ( 24 Aug 2017 18:18 )   PT: 16.0 sec;   INR: 1.43          PTT - ( 24 Aug 2017 18:18 )  PTT:30.3 sec    08-24    123<L>  |  82<L>  |  77<H>  ----------------------------<  267<H>  3.8   |  18<L>  |  9.30<H>    Ca    8.2<L>      24 Aug 2017 18:18  Phos  7.0     08-24  Mg     2.4     08-24    TPro  6.9  /  Alb  2.1<L>  /  TBili  0.4  /  DBili  x   /  AST  26  /  ALT  25  /  AlkPhos  285<H>  08-24    MEDICATIONS  (STANDING):  sodium chloride 0.9% lock flush 3 milliLiter(s) IV Push every 8 hours  pantoprazole    Tablet 40 milliGRAM(s) Oral before breakfast  aspirin enteric coated 81 milliGRAM(s) Oral daily  valsartan 320 milliGRAM(s) Oral daily  atorvastatin 40 milliGRAM(s) Oral at bedtime  insulin glargine Injectable (LANTUS) 10 Unit(s) SubCutaneous at bedtime  insulin lispro (HumaLOG) corrective regimen sliding scale   SubCutaneous every 6 hours  dextrose 5%. 1000 milliLiter(s) (50 mL/Hr) IV Continuous <Continuous>  dextrose 50% Injectable 12.5 Gram(s) IV Push once  dextrose 50% Injectable 25 Gram(s) IV Push once  dextrose 50% Injectable 25 Gram(s) IV Push once  metoprolol 50 milliGRAM(s) Oral every 6 hours  heparin  Infusion 1000 Unit(s)/Hr (10 mL/Hr) IV Continuous <Continuous>    MEDICATIONS  (PRN):  dextrose Gel 1 Dose(s) Oral once PRN Blood Glucose LESS THAN 70 milliGRAM(s)/deciliter  glucagon  Injectable 1 milliGRAM(s) IntraMuscular once PRN Glucose LESS THAN 70 milligrams/deciliter  acetaminophen   Tablet. 650 milliGRAM(s) Oral every 6 hours PRN Mild Pain (1 - 3)        RADIOLOGY & ADDITIONAL TESTS:  < from: Xray Chest 1 View AP-PORTABLE IMMEDIATE (08.24.17 @ 01:36) >    An AP portable view of the chest is taken at suboptimal inspiration.   Heart size is difficult to assess due to the poor inspiration. No   consolidation is visible.      IMPRESSION:  Grossly clear.    < end of copied text >    < from: CT Heart Congenital w/ IV Cont (08.24.17 @ 12:34) >  Impression:   1. Motion precludes adequate assessment of coronary arteries.   2. The left main and ostium of the LAD appear to be normal.   3. Calcific plaque noted in proximal LAD and LCX.   4. Remaining portions of the coronary arteries cannot be interpreted due   to motion.   5.  Large thrombus noted extending from the SVC tothe Right atrium, and   traverses across the tricuspid valve. Parts of the thrombus appear to be   lobulated and attached to the superior aspect of the interatrial septum.   6. Please see separate radiology report for non-coronary findings.      < end of copied text >    < from: CT Angio Head w/ IV Cont (08.24.17 @ 11:52) >  1.  No aneurysm or arteriovenous malformation.     2.  Fine calcifications at the bifurcations of both common carotid   arteries, bilateral vertebral arteries and carotid siphon, otherwise   unremarkable study.    < end of copied text >    < from: Echocardiogram w/ Bubble and Doppler (08.24.17 @ 14:42) >  Limited TTE images obtained to assess for a PFO.Injection with agitated   saline   revealed no evidence of right to left shunting.    < end of copied text >    < from: Echocardiogram (08.24.17 @ 12:05) >  Interpretation Summary  The left ventricle is mildly dilated.The left ventricular wall motion is   normal.The left ventricular ejection fraction is 65%.The right ventricle   is   normal in size and function.The left atrial size isnormal.Normal right   atrial   size.Structurally normal aortic valve.There is trace aortic   regurgitation.Structurally normal mitral valve.There is trace mitral   regurgitation.Thickened tricuspid valve leaflets. Irregular, shaggy   appearing   massseen on the atrial side of the tricuspid valve, most consistent with   vegetation. Also, a large linear independently mobile mass is seen in the   right atrium, protrucing through the tricuspid valve in diastole and may   also   be part of the vegetation vs thrombus.  There is mild tricuspid   regurgitation.There is severe pulmonary hypertension.The pulmonary artery   systolic pressure is estimated to be 60 mmHg.Structurally normal pulmonic   valve.No pulmonic regurgitation noted.A small pericardial effusion noted.    < end of copied text >    < from: US Duplex Carotid Arteries Complete, Bilateral (08.24.17 @ 13:59) >    IMPRESSION:  No evidence of hemodynamically significant stenosis in the visualized   internal carotid and common carotid arteries..    Elevated velocity in the left ECA consistent with moderate stenosis.    < end of copied text >

## 2017-08-24 NOTE — CONSULT NOTE ADULT - PROBLEM SELECTOR RECOMMENDATION 9
- we will do HD today for fluid optimization   - she has a femoral line placed in the right groin   - electrolytes - potassium on the lower side   - hyponatremia - most likely from fluid overload   - phsopahte 7.0  - please start on calcium acetate 1 tbal. three times per day  - please put her on renal diet

## 2017-08-24 NOTE — H&P ADULT - NSHPREVIEWOFSYSTEMS_GEN_ALL_CORE
CONSTITUTIONAL: No fevers / chills, sweats, fatigue, weight loss, weight gain  NEUROLOGICAL: No parathesias, seizures, syncope  EYES: + blurry vision (Baseline.), discharge, pain, loss of vision  ENMT: No difficulty hearing, vertigo, dysphagia, epistaxis, recent dental work  CV: No chest pain, palpitations, GRACE, orthopnea  RESPIRATORY:  No wheezing, SOB, cough / sputum, hemoptysis  GI: No current nausea, vomiting, diarrhea, constipation, melena  : No hematuria, dysuria, urgency, incontinence  MUSCULOSKELETAL: Walks with walker. Weak.  SKIN/BREAST: No itching, hair loss, masses  PSYCHIATRY: No depression, anxiety, suicidal ideation  HEMATOLOGY:  No bruising easily, enlarged lymph nodes, tender lymph nodes  ENDOCRINE: No cold intolerance, heat intolerance, polydipsia

## 2017-08-24 NOTE — PROCEDURE NOTE - NSPROCDETAILS_GEN_ALL_CORE
ultrasound guidance/sterile dressing applied/sterile technique, catheter placed/guidewire recovered/lumen(s) aspirated and flushed

## 2017-08-24 NOTE — PROGRESS NOTE ADULT - SUBJECTIVE AND OBJECTIVE BOX
Patient was seen and evaluated on dialysis.   Patient is tolerating the procedure well.   HR: 86 (08-24-17 @ 15:23)  BP: 154/66 (08-24-17 @ 15:23) pusle 65, /67  Continue dialysis:   Dialyzer:  180        QB:      400  QD: 500   Goal UF 2 kg over 3 Hours

## 2017-08-24 NOTE — H&P ADULT - HISTORY OF PRESENT ILLNESS
Unable to obtain detailed medical information from patient and OSH documentation.     Patient is a 47 year old female with history of HTN, HLD, DM II, ESRD on HD (TTS. Last HD unknown. Receives HD via Right Femoral HD catheter placed on 08/22/2017 at [facility] and chronic left foot OM who became lethargic and confused. Patient presented to [facility] on 08/20/2017 with the previously stated complaints and was admitted for evaluation and work up. No mention of neurological work up / imaging. MRSA bacteremia discovered. HD catheter removed and new one inserted. ABX.. TTE / ALCIDES showing Normal LV size and function, no AI or AS, mild MR, mild TR, moderate pericardial effusion without tamponade, mobile echodensity in the tip of the IVC / SVC / right atrium, and aneurysmal interatrial septum with mobile density attached. Patient transferred to [facility] under the care of Dr. Gee for further evaluation and treatment / MGMT.     PMH/PSH:  See HPI    Allergies:  Sulfur Drugs / PCN    SH:  No ETOH, Non-Smoker, No IVRDU  Lives at home. Walks with walker.     FH:  N/A    Home Medications:  Unable to recall.

## 2017-08-25 LAB
ALBUMIN SERPL ELPH-MCNC: 2 G/DL — LOW (ref 3.3–5)
ALP SERPL-CCNC: 294 U/L — HIGH (ref 40–120)
ALT FLD-CCNC: 17 U/L — SIGNIFICANT CHANGE UP (ref 10–45)
ANION GAP SERPL CALC-SCNC: 22 MMOL/L — HIGH (ref 5–17)
APTT BLD: 31.7 SEC — SIGNIFICANT CHANGE UP (ref 27.5–37.4)
APTT BLD: 36.4 SEC — SIGNIFICANT CHANGE UP (ref 27.5–37.4)
APTT BLD: 36.9 SEC — SIGNIFICANT CHANGE UP (ref 27.5–37.4)
APTT BLD: 40.8 SEC — HIGH (ref 27.5–37.4)
AST SERPL-CCNC: 17 U/L — SIGNIFICANT CHANGE UP (ref 10–40)
BILIRUB SERPL-MCNC: 0.4 MG/DL — SIGNIFICANT CHANGE UP (ref 0.2–1.2)
BUN SERPL-MCNC: 42 MG/DL — HIGH (ref 7–23)
CALCIUM SERPL-MCNC: 8.6 MG/DL — SIGNIFICANT CHANGE UP (ref 8.4–10.5)
CHLORIDE SERPL-SCNC: 87 MMOL/L — LOW (ref 96–108)
CO2 SERPL-SCNC: 21 MMOL/L — LOW (ref 22–31)
CREAT SERPL-MCNC: 6.3 MG/DL — HIGH (ref 0.5–1.3)
GLUCOSE SERPL-MCNC: 150 MG/DL — HIGH (ref 70–99)
GRAM STN FLD: SIGNIFICANT CHANGE UP
GRAM STN FLD: SIGNIFICANT CHANGE UP
HBV CORE AB SER-ACNC: SIGNIFICANT CHANGE UP
HBV SURFACE AB SER-ACNC: REACTIVE — SIGNIFICANT CHANGE UP
HCT VFR BLD CALC: 25.4 % — LOW (ref 34.5–45)
HCT VFR BLD CALC: 26.2 % — LOW (ref 34.5–45)
HGB BLD-MCNC: 8.2 G/DL — LOW (ref 11.5–15.5)
HGB BLD-MCNC: 8.5 G/DL — LOW (ref 11.5–15.5)
INR BLD: 1.52 — HIGH (ref 0.88–1.16)
INR BLD: 1.55 — HIGH (ref 0.88–1.16)
MAGNESIUM SERPL-MCNC: 2.3 MG/DL — SIGNIFICANT CHANGE UP (ref 1.6–2.6)
MCHC RBC-ENTMCNC: 25.6 PG — LOW (ref 27–34)
MCHC RBC-ENTMCNC: 25.7 PG — LOW (ref 27–34)
MCHC RBC-ENTMCNC: 32.3 G/DL — SIGNIFICANT CHANGE UP (ref 32–36)
MCHC RBC-ENTMCNC: 32.4 G/DL — SIGNIFICANT CHANGE UP (ref 32–36)
MCV RBC AUTO: 79.2 FL — LOW (ref 80–100)
MCV RBC AUTO: 79.4 FL — LOW (ref 80–100)
PHOSPHATE SERPL-MCNC: 7.6 MG/DL — HIGH (ref 2.5–4.5)
PLATELET # BLD AUTO: 205 K/UL — SIGNIFICANT CHANGE UP (ref 150–400)
PLATELET # BLD AUTO: 219 K/UL — SIGNIFICANT CHANGE UP (ref 150–400)
POTASSIUM SERPL-MCNC: 3.3 MMOL/L — LOW (ref 3.5–5.3)
POTASSIUM SERPL-SCNC: 3.3 MMOL/L — LOW (ref 3.5–5.3)
PROT SERPL-MCNC: 7.2 G/DL — SIGNIFICANT CHANGE UP (ref 6–8.3)
PROTHROM AB SERPL-ACNC: 17 SEC — HIGH (ref 9.8–12.7)
PROTHROM AB SERPL-ACNC: 17.4 SEC — HIGH (ref 9.8–12.7)
RBC # BLD: 3.2 M/UL — LOW (ref 3.8–5.2)
RBC # BLD: 3.31 M/UL — LOW (ref 3.8–5.2)
RBC # FLD: 17.9 % — HIGH (ref 10.3–16.9)
RBC # FLD: 18.1 % — HIGH (ref 10.3–16.9)
SODIUM SERPL-SCNC: 130 MMOL/L — LOW (ref 135–145)
VANCOMYCIN TROUGH SERPL-MCNC: 26.9 UG/ML — CRITICAL HIGH (ref 10–20)
WBC # BLD: 21.5 K/UL — HIGH (ref 3.8–10.5)
WBC # BLD: 21.7 K/UL — HIGH (ref 3.8–10.5)
WBC # FLD AUTO: 21.5 K/UL — HIGH (ref 3.8–10.5)
WBC # FLD AUTO: 21.7 K/UL — HIGH (ref 3.8–10.5)

## 2017-08-25 PROCEDURE — 99233 SBSQ HOSP IP/OBS HIGH 50: CPT

## 2017-08-25 PROCEDURE — 94010 BREATHING CAPACITY TEST: CPT | Mod: 26

## 2017-08-25 PROCEDURE — 99233 SBSQ HOSP IP/OBS HIGH 50: CPT | Mod: GC

## 2017-08-25 PROCEDURE — 71010: CPT | Mod: 26

## 2017-08-25 PROCEDURE — 70450 CT HEAD/BRAIN W/O DYE: CPT | Mod: 26

## 2017-08-25 PROCEDURE — 93010 ELECTROCARDIOGRAM REPORT: CPT

## 2017-08-25 RX ORDER — INSULIN GLARGINE 100 [IU]/ML
18 INJECTION, SOLUTION SUBCUTANEOUS AT BEDTIME
Qty: 0 | Refills: 0 | Status: DISCONTINUED | OUTPATIENT
Start: 2017-08-25 | End: 2017-08-26

## 2017-08-25 RX ORDER — HEPARIN SODIUM 5000 [USP'U]/ML
1400 INJECTION INTRAVENOUS; SUBCUTANEOUS
Qty: 25000 | Refills: 0 | Status: DISCONTINUED | OUTPATIENT
Start: 2017-08-25 | End: 2017-08-26

## 2017-08-25 RX ORDER — METOPROLOL TARTRATE 50 MG
12.5 TABLET ORAL ONCE
Qty: 0 | Refills: 0 | Status: COMPLETED | OUTPATIENT
Start: 2017-08-25 | End: 2017-08-25

## 2017-08-25 RX ORDER — HEPARIN SODIUM 5000 [USP'U]/ML
1200 INJECTION INTRAVENOUS; SUBCUTANEOUS
Qty: 25000 | Refills: 0 | Status: DISCONTINUED | OUTPATIENT
Start: 2017-08-25 | End: 2017-08-25

## 2017-08-25 RX ADMIN — SODIUM CHLORIDE 3 MILLILITER(S): 9 INJECTION INTRAMUSCULAR; INTRAVENOUS; SUBCUTANEOUS at 05:14

## 2017-08-25 RX ADMIN — Medication 250 MILLIGRAM(S): at 05:14

## 2017-08-25 RX ADMIN — Medication 81 MILLIGRAM(S): at 12:00

## 2017-08-25 RX ADMIN — SODIUM CHLORIDE 3 MILLILITER(S): 9 INJECTION INTRAMUSCULAR; INTRAVENOUS; SUBCUTANEOUS at 14:06

## 2017-08-25 RX ADMIN — Medication 6: at 11:59

## 2017-08-25 RX ADMIN — Medication 50 MILLIGRAM(S): at 12:00

## 2017-08-25 RX ADMIN — Medication 5 UNIT(S): at 17:42

## 2017-08-25 RX ADMIN — Medication 650 MILLIGRAM(S): at 07:00

## 2017-08-25 RX ADMIN — Medication 12.5 MILLIGRAM(S): at 21:36

## 2017-08-25 RX ADMIN — Medication 5 UNIT(S): at 11:59

## 2017-08-25 RX ADMIN — Medication 5 UNIT(S): at 07:00

## 2017-08-25 RX ADMIN — Medication 650 MILLIGRAM(S): at 06:23

## 2017-08-25 RX ADMIN — HEPARIN SODIUM 12 UNIT(S)/HR: 5000 INJECTION INTRAVENOUS; SUBCUTANEOUS at 11:58

## 2017-08-25 RX ADMIN — INSULIN GLARGINE 18 UNIT(S): 100 INJECTION, SOLUTION SUBCUTANEOUS at 21:36

## 2017-08-25 RX ADMIN — SODIUM CHLORIDE 3 MILLILITER(S): 9 INJECTION INTRAMUSCULAR; INTRAVENOUS; SUBCUTANEOUS at 21:37

## 2017-08-25 RX ADMIN — Medication 650 MILLIGRAM(S): at 21:00

## 2017-08-25 RX ADMIN — Medication 650 MILLIGRAM(S): at 20:11

## 2017-08-25 RX ADMIN — Medication 10: at 17:42

## 2017-08-25 RX ADMIN — HEPARIN SODIUM 14 UNIT(S)/HR: 5000 INJECTION INTRAVENOUS; SUBCUTANEOUS at 19:47

## 2017-08-25 RX ADMIN — ATORVASTATIN CALCIUM 40 MILLIGRAM(S): 80 TABLET, FILM COATED ORAL at 21:36

## 2017-08-25 RX ADMIN — Medication 8: at 21:36

## 2017-08-25 RX ADMIN — PANTOPRAZOLE SODIUM 40 MILLIGRAM(S): 20 TABLET, DELAYED RELEASE ORAL at 06:23

## 2017-08-25 RX ADMIN — Medication 50 MILLIGRAM(S): at 17:42

## 2017-08-25 RX ADMIN — Medication 50 MILLIGRAM(S): at 06:23

## 2017-08-25 RX ADMIN — VALSARTAN 320 MILLIGRAM(S): 80 TABLET ORAL at 06:23

## 2017-08-25 NOTE — PROGRESS NOTE ADULT - ATTENDING COMMENTS
tolerated dialysis well yesterday.  BP, volume status acceptable- defer dialysis today.  Still with diffuse muscle aches- should check CPK.  Will arrange for repeat dialysis tomorrow tolerated dialysis well yesterday.  Nephrologically stable today; anticipate next HD tomorrow

## 2017-08-25 NOTE — PROGRESS NOTE ADULT - ATTENDING COMMENTS
Patient seen and examined with NP.  Agree with above.  No large right MCA infarct, though still appears to have loss of insular ribbon on right side.  Doesn't match with her symptoms.  Probably toxic encephalopathy related to endocarditis.    Plan as per primary team

## 2017-08-25 NOTE — PROGRESS NOTE ADULT - ASSESSMENT
This is 47 year old female with PMH stated above. She is on HD recently started 2 months ago. LAst HD on 8/22. We will do HD today for fluid optimization This is 47 year old female with PMH stated above. She is on HD recently started 2 months ago. LAst HD on 8/22. Yesterday she was dialyzed with 2.1 liters off.

## 2017-08-25 NOTE — PROGRESS NOTE ADULT - SUBJECTIVE AND OBJECTIVE BOX
Objective and Subjective   The patient is sitting in his chair. Does not endorse any complains today - no shortness of breath, no chest pain. she was dialyzed yesterday - with UF 2.1 liters    Patient is a 47 year old female with history of HTN, HLD, DM II, ESRD on HD (TTS. Last HD unknown. Receives HD via Right Femoral HD catheter placed on 08/22/2017 at [facility] and chronic left foot OM. 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 Dialyzed yesterday with 2.1 liters off. No need for HD for today         Patient seen and examined at bedside.     sodium chloride 0.9% lock flush 3 milliLiter(s) every 8 hours  pantoprazole    Tablet 40 milliGRAM(s) before breakfast  aspirin enteric coated 81 milliGRAM(s) daily  valsartan 320 milliGRAM(s) daily  atorvastatin 40 milliGRAM(s) at bedtime  insulin lispro (HumaLOG) corrective regimen sliding scale   every 6 hours  dextrose 5%. 1000 milliLiter(s) <Continuous>  dextrose Gel 1 Dose(s) once PRN  dextrose 50% Injectable 12.5 Gram(s) once  dextrose 50% Injectable 25 Gram(s) once  dextrose 50% Injectable 25 Gram(s) once  glucagon  Injectable 1 milliGRAM(s) once PRN  acetaminophen   Tablet. 650 milliGRAM(s) every 6 hours PRN  metoprolol 50 milliGRAM(s) every 6 hours  insulin glargine Injectable (LANTUS) 12 Unit(s) at bedtime  insulin lispro Injectable (HumaLOG) 5 Unit(s) three times a day before meals  heparin  Infusion 1200 Unit(s)/Hr <Continuous>      Allergies    penicillin (Unknown)  Sular (Unknown)    Intolerances        T(C): , Max: 36.5 (08-24-17 @ 17:05)  T(F): , Max: 97.7 (08-24-17 @ 17:05)  HR: 82 (08-25-17 @ 12:04)  BP: 159/72 (08-25-17 @ 12:04)  BP(mean): 96 (08-25-17 @ 12:04)  RR: 18 (08-25-17 @ 12:04)  SpO2: 96% (08-25-17 @ 12:04)  Wt(kg): --    08-24 @ 07:01 - 08-25 @ 07:00  --------------------------------------------------------  IN:    heparin Infusion: 80 mL    IV PiggyBack: 250 mL    Oral Fluid: 100 mL  Total IN: 430 mL    OUT:    Other: 2000 mL  Total OUT: 2000 mL    Total NET: -1570 mL      08-25 @ 07:01 - 08-25 @ 14:38  --------------------------------------------------------  IN:    heparin Infusion: 10 mL  Total IN: 10 mL    OUT:  Total OUT: 0 mL    Total NET: 10 mL        PHYSICAL exam  Alert and oriented  No JVD   Normal air entry in the lungs, no wheezing, no crackles, no rales   RRR, normal s1/s2, no murmurs, rubs or gallops   Abdomen - soft, non-tender, non-distended, normal bowel sounds   extremities - mild peripheral edema   HAs a triple lumen in the right femoral side, permacath removed           LABS:                        8.2    21.7  )-----------( 219      ( 25 Aug 2017 06:09 )             25.4     08-25    130<L>  |  87<L>  |  42<H>  ----------------------------<  150<H>  3.3<L>   |  21<L>  |  6.30<H>    Ca    8.6      25 Aug 2017 06:07  Phos  7.6     08-25  Mg     2.3     08-25    TPro  7.2  /  Alb  2.0<L>  /  TBili  0.4  /  DBili  x   /  AST  17  /  ALT  17  /  AlkPhos  294<H>  08-25      PT/INR - ( 25 Aug 2017 06:03 )   PT: 17.0 sec;   INR: 1.52          PTT - ( 25 Aug 2017 06:03 )  PTT:36.9 sec          RADIOLOGY & ADDITIONAL STUDIES:

## 2017-08-25 NOTE — PROGRESS NOTE ADULT - ASSESSMENT
47F with PMH HTN, HLD, DM2, ESRD on HD (T/R/S via R femoral HD cath placed on 8/22/17 at OSH) and chronic L foot OM who developed sudden onset lethargy and confusion which prompted her presentation to [facility] on 8/20/17.  Further workup revealed MRSA bacteremia with echo showing moderate pericardial effusion without tamponade and mobile echodensity in the IVC/SVC/RA and aneurysmal dilatation of the interatrial septum with mobile density attached. She was subsequently transferred to [facility] on 8/23/17 under the care of Dr. Gee for further evaluation. Structural CT of heart showing calcified LAD/ LCx, large thrombus in SVC-RA. Pt undergoing further workup, stable on tele floor on heparin and ABX.     #Neuro:   - weakness/ pain  - CT head x 2 showing no stroke or hemorrhage. cont close monitoring as pt on heparin gtt  - appreciate neuro consult     #Cards:  - cont metoprolol, valsartan, ASA, statin  - SVC/ RA thrombus: cont heparin gtt, increased today for goal PTT 50-80  - ?endocarditis with +MRSA bacteremia: cont IV ABX, changed to vancomycin this AM. Will get vanc level today as pt on HD. Surgical plan pending further workup, cont daily blood cultures  - may need cardiac cath preop due to CT heart showing calcified LAD/ LCx disease     #Renal: cont HD per renal team  - permacath removed 2/2 bacteremia, has shiley in place in groin  - will need new access    #Endo  - h/o DM, on insulin protocol, monitor FBG    #PPX   - GI, PT/OT if tolerates    #Discharge: pending surgical vs medical plans

## 2017-08-25 NOTE — PROGRESS NOTE ADULT - SUBJECTIVE AND OBJECTIVE BOX
Stroke Neurology Follow-Up Visit    Pt seen and examined.   Pt continues to report RUE weakness as well as bilateral lower extremity weakness. States LLE weakness is secondary to chronic left foot osteomyelitis. However, RLE weakness is possibly acute from last week.   Denies headache, nausea, vomiting, slurred speech, recent vision changes, or numbness.     Exam:  T(F): 96.5 (08-25-17 @ 14:16), Max: 97.6 (08-24-17 @ 21:00)  HR: 82 (08-25-17 @ 12:04) (74 - 96)  BP: 159/72 (08-25-17 @ 12:04) (121/67 - 159/72)  RR: 18 (08-25-17 @ 12:04) (17 - 22)  SpO2: 96% (08-25-17 @ 12:04) (94% - 96%)    Gen: Lying in bed, calm, cooperative, in NAD   Neuro:  Mental status: Awake, alert and oriented x3.  Naming, repetition and comprehension intact.  Attention/concentration intact.  No dysarthria, no aphasia.     Cranial nerves: Pupils equally round and reactive to light, 3 mm, visual fields full, no nystagmus, no ptosis, extraocular muscles intact, V1 through V3 intact bilaterally and symmetric, face symmetric, hearing intact to finger rub, palate elevation symmetric, tongue was midline, sternocleidomastoid/shoulder shrug strength bilaterally 5/5.    Motor: Poor effort on exam. Mild RUE drift. RUE 4/5 bicep, 4-/5 tricep, 4-/5 deltoid. LUE 4+/5.  strength moderately strong bilaterally. RLE grossly 2/5. LLE 2/5, limited due to pain from chronic left foot osteomyelitis   Sensation: Intact and symmetric to light touch.  No neglect.   Coordination: No dysmetria on finger-to-nose. Unable to perform heel to shin due to lower extremity weakness.  No clumsiness.  Reflexes: Mute toes bilaterally.   Gait: deferred at this time     MEDICATIONS  (STANDING):  sodium chloride 0.9% lock flush 3 milliLiter(s) IV Push every 8 hours  pantoprazole    Tablet 40 milliGRAM(s) Oral before breakfast  aspirin enteric coated 81 milliGRAM(s) Oral daily  valsartan 320 milliGRAM(s) Oral daily  atorvastatin 40 milliGRAM(s) Oral at bedtime  insulin lispro (HumaLOG) corrective regimen sliding scale   SubCutaneous every 6 hours  dextrose 5%. 1000 milliLiter(s) (50 mL/Hr) IV Continuous <Continuous>  dextrose 50% Injectable 12.5 Gram(s) IV Push once  dextrose 50% Injectable 25 Gram(s) IV Push once  dextrose 50% Injectable 25 Gram(s) IV Push once  metoprolol 50 milliGRAM(s) Oral every 6 hours  insulin glargine Injectable (LANTUS) 12 Unit(s) SubCutaneous at bedtime  insulin lispro Injectable (HumaLOG) 5 Unit(s) SubCutaneous three times a day before meals  heparin  Infusion 1200 Unit(s)/Hr (12 mL/Hr) IV Continuous <Continuous>    MEDICATIONS  (PRN):  dextrose Gel 1 Dose(s) Oral once PRN Blood Glucose LESS THAN 70 milliGRAM(s)/deciliter  glucagon  Injectable 1 milliGRAM(s) IntraMuscular once PRN Glucose LESS THAN 70 milligrams/deciliter  acetaminophen   Tablet. 650 milliGRAM(s) Oral every 6 hours PRN Mild Pain (1 - 3)      Labs:                        8.2    21.7  )-----------( 219      ( 25 Aug 2017 06:09 )             25.4     08-25    130<L>  |  87<L>  |  42<H>  ----------------------------<  150<H>  3.3<L>   |  21<L>  |  6.30<H>    Ca    8.6      25 Aug 2017 06:07  Phos  7.6     08-25  Mg     2.3     08-25    TPro  7.2  /  Alb  2.0<L>  /  TBili  0.4  /  DBili  x   /  AST  17  /  ALT  17  /  AlkPhos  294<H>  08-25    LIVER FUNCTIONS - ( 25 Aug 2017 06:07 )  Alb: 2.0 g/dL / Pro: 7.2 g/dL / ALK PHOS: 294 U/L / ALT: 17 U/L / AST: 17 U/L / GGT: x           PT/INR - ( 25 Aug 2017 06:03 )   PT: 17.0 sec;   INR: 1.52     PTT - ( 25 Aug 2017 16:56 )  PTT:31.7 sec    Cholesterol, Serum: 136 mg/dL (08-24 @ 01:12)  HDL Cholesterol, Serum: 5 mg/dL (08-24 @ 01:12)  Triglycerides, Serum: 707 mg/dL (08-24 @ 01:12)  LDL -10    Hemoglobin A1C, Whole Blood: 8.2 % (08-24 @ 01:11)    Thyroid Stimulating Hormone, Serum: 1.251 uIU/mL (08-24 @ 01:12)      Radiology and Additional Studies:  CT Head No Cont (08.24.17 @ 02:49) >  IMPRESSION: Findings concerning for evolving right MCA territory   infarction. No intracranial hemorrhage. Suggest follow-up and correlation   with MRI.    CT Angio Head w/ IV Cont (08.24.17 @ 11:52) >    IMPRESSION:-  1.  No aneurysm or arteriovenous malformation.   2.  Fine calcifications at the bifurcations of both common carotid   arteries, bilateral vertebral arteries and carotid siphon, otherwise   unremarkable study.    CT Heart Congenital w/ IV Cont (08.24.17 @ 12:34) >  Impression:   1. Motion precludes adequate assessment of coronary arteries.   2. The left main and ostium of the LAD appear to be normal.   3. Calcific plaque noted in proximal LAD and LCX.   4. Remaining portions of the coronary arteries cannot be interpreted due   to motion.   5.  Large thrombus noted extending from the SVC tothe Right atrium, and   traverses across the tricuspid valve. Parts of the thrombus appear to be   lobulated and attached to the superior aspect of the interatrial septum.   6. Please see separate radiology report for non-coronary findings.      < from: CT Head No Cont (08.25.17 @ 09:03) >  IMPRESSION:-  1.  No significant interval change.  2.  No intracranial hemorrhage or mass.   3.  A small hypodense lesion in the left posterior putamen.     Assessment & Plan:  47 year old female with PMH of HTN, HLD, DM II, ESRD on HD, chronic left foot osteomyelitis, presented to [facility] on 8/20/17 with lethargy, confusion, and generalized weakness. Workup revealed MRSA bacteremia with echo showing moderate pericardial effusion without tamponade and mobile echodensity in the IVC/SVC/RA and aneurysmal dilatation of the interatrial septum with mobile density attached. Was then transferred to [facility] on 8/23/17 for further management.      -repeat CT head performed today, images reviewed by stroke attending, Dr. Luna - yesterday's area of evolving R MCA territory stroke no longer evident on today's scan, it is likely that was artifact on 8/24's scan; however, there is still some loss of R insular ribbon which may be suggestive of a small stroke   -although R hemispheric stroke would not be compatible with pt's R sided arm/leg weakness   -would not  at this time regardless, given pt already on anticoagulation   -stroke is still a risk given RV thrombus and infective endocarditis, so would continue with anticoagulation with Heparin gtt at this time   -if acute change in neurological status, would repeat CT head w/o contrast stat to r/o bleed

## 2017-08-25 NOTE — DIETITIAN INITIAL EVALUATION ADULT. - ADHERENCE
reports compliance, but does not elaborate on diet and refuses diet edu despite encouragement; A1c 8.2%

## 2017-08-25 NOTE — DIETITIAN INITIAL EVALUATION ADULT. - OTHER INFO
48y/o F admitted for RV IE vs. RV Thrombus. MRSA  Bacteremia; on contact precautions. Pt seen resting in bed. She reports a decreased appetite now and for the past week PTA. No wt changes noted; # (stable). Breakfast tray noted at bedside with <25% consumed. Denies N/V/D/C, pain, and mechanical issues with po intake. Last BM today. Pt endorses diet compliance PTA for DM, ESRD, and heart healthy, but does not elaborate on diet and refuses diet education despite encouragement. A1c noted at 8.2%. Encouraged intake and recommend Nepro BID to help provide adequate needs. RD to follow per policy and re-attempt education as appropriate.

## 2017-08-25 NOTE — DIETITIAN INITIAL EVALUATION ADULT. - ENERGY NEEDS
IBW 68.2Kg  %%  BMI 28.8    Utilized IBW to calculate needs, pt >120% of IBW. Adjusted for HD. Fluids 500-1000ml + urine output or per team.

## 2017-08-25 NOTE — PROGRESS NOTE ADULT - SUBJECTIVE AND OBJECTIVE BOX
Patient discussed on morning rounds with Dr. Escamilla      Reason for admission: Bactermia w/ RV thrombus vs endocarditis     SUBJECTIVE ASSESSMENT:  Mental status improved today, but pt remains lethargic and weak. C/o pain all over body, more in neck and left shoulder. Stiffness in legs, unable to walk or stand. Repeat CT head done this AM, no hemorrhage or new CVA noted.     Vital Signs Last 24 Hrs  T(C): 35.8 (25 Aug 2017 09:26), Max: 36.5 (24 Aug 2017 17:05)  T(F): 96.5 (25 Aug 2017 09:26), Max: 97.7 (24 Aug 2017 17:05)  HR: 82 (25 Aug 2017 12:04) (74 - 96)  BP: 159/72 (25 Aug 2017 12:04) (121/67 - 159/72)  BP(mean): 96 (25 Aug 2017 12:04) (79 - 108)  RR: 18 (25 Aug 2017 12:04) (17 - 22)  SpO2: 96% (25 Aug 2017 12:04) (94% - 96%)  I&O's Detail    24 Aug 2017 07:01  -  25 Aug 2017 07:00  --------------------------------------------------------  IN:    heparin Infusion: 80 mL    IV PiggyBack: 250 mL    Oral Fluid: 100 mL  Total IN: 430 mL    OUT:    Other: 2000 mL  Total OUT: 2000 mL    Total NET: -1570 mL      25 Aug 2017 07:01  -  25 Aug 2017 12:51  --------------------------------------------------------  IN:    heparin Infusion: 10 mL  Total IN: 10 mL    OUT:  Total OUT: 0 mL    Total NET: 10 mL          CHEST TUBE:  No.  LOLLY DRAIN:  No.  EPICARDIAL WIRES: No.  TIE DOWNS: No.  COOK: No.    PHYSICAL EXAM:    General: NAD, laying flat in bed, appears ill     Neurological: +pain in shoulder, diffuse in rest of body. strength in upper and lower extremities equal b/l, facial nerves in tact.     Cardiovascular: RRR    Respiratory: CTA b/l, unlabored on room air    Gastrointestinal: soft, NTND    Extremities: WWP, L foot w/ midline scar. b/l groin lines intact       Incision Sites: no incisions    LABS:                        8.2    21.7  )-----------( 219      ( 25 Aug 2017 06:09 )             25.4       COUMADIN:  Yes/No. REASON: .    PT/INR - ( 25 Aug 2017 06:03 )   PT: 17.0 sec;   INR: 1.52          PTT - ( 25 Aug 2017 06:03 )  PTT:36.9 sec    08-25    130<L>  |  87<L>  |  42<H>  ----------------------------<  150<H>  3.3<L>   |  21<L>  |  6.30<H>    Ca    8.6      25 Aug 2017 06:07  Phos  7.6     08-25  Mg     2.3     08-25    TPro  7.2  /  Alb  2.0<L>  /  TBili  0.4  /  DBili  x   /  AST  17  /  ALT  17  /  AlkPhos  294<H>  08-25          MEDICATIONS  (STANDING):  sodium chloride 0.9% lock flush 3 milliLiter(s) IV Push every 8 hours  pantoprazole    Tablet 40 milliGRAM(s) Oral before breakfast  aspirin enteric coated 81 milliGRAM(s) Oral daily  valsartan 320 milliGRAM(s) Oral daily  atorvastatin 40 milliGRAM(s) Oral at bedtime  insulin lispro (HumaLOG) corrective regimen sliding scale   SubCutaneous every 6 hours  dextrose 5%. 1000 milliLiter(s) (50 mL/Hr) IV Continuous <Continuous>  dextrose 50% Injectable 12.5 Gram(s) IV Push once  dextrose 50% Injectable 25 Gram(s) IV Push once  dextrose 50% Injectable 25 Gram(s) IV Push once  metoprolol 50 milliGRAM(s) Oral every 6 hours  insulin glargine Injectable (LANTUS) 12 Unit(s) SubCutaneous at bedtime  insulin lispro Injectable (HumaLOG) 5 Unit(s) SubCutaneous three times a day before meals  heparin  Infusion 1200 Unit(s)/Hr (12 mL/Hr) IV Continuous <Continuous>    MEDICATIONS  (PRN):  dextrose Gel 1 Dose(s) Oral once PRN Blood Glucose LESS THAN 70 milliGRAM(s)/deciliter  glucagon  Injectable 1 milliGRAM(s) IntraMuscular once PRN Glucose LESS THAN 70 milligrams/deciliter  acetaminophen   Tablet. 650 milliGRAM(s) Oral every 6 hours PRN Mild Pain (1 - 3)        RADIOLOGY & ADDITIONAL TESTS:     < from: CT Head No Cont (08.25.17 @ 09:03) >  IMPRESSION:-    1.  No significant interval change.    2.  No intracranial hemorrhage or mass.     3.  A small hypodense lesion in the left posterior putamen.     < end of copied text >

## 2017-08-25 NOTE — DIETITIAN INITIAL EVALUATION ADULT. - NUTRITION INTERVENTION
Meals and Snack/Collaboration and Referral of Nutrition Care Meals and Snack/Medical Food Supplements

## 2017-08-25 NOTE — DIETITIAN INITIAL EVALUATION ADULT. - DIET TYPE
consistent carbohydrate (no snacks)/renal replacement pts:no protein restr,no conc K & phos, low sodium/DASH/TLC (sodium and cholesterol restricted diet)

## 2017-08-26 LAB
-  CEFAZOLIN: SIGNIFICANT CHANGE UP
-  CEFAZOLIN: SIGNIFICANT CHANGE UP
-  CLINDAMYCIN: SIGNIFICANT CHANGE UP
-  CLINDAMYCIN: SIGNIFICANT CHANGE UP
-  ERYTHROMYCIN: SIGNIFICANT CHANGE UP
-  ERYTHROMYCIN: SIGNIFICANT CHANGE UP
-  LINEZOLID: SIGNIFICANT CHANGE UP
-  LINEZOLID: SIGNIFICANT CHANGE UP
-  OXACILLIN: SIGNIFICANT CHANGE UP
-  OXACILLIN: SIGNIFICANT CHANGE UP
-  PENICILLIN: SIGNIFICANT CHANGE UP
-  PENICILLIN: SIGNIFICANT CHANGE UP
-  RIFAMPIN: SIGNIFICANT CHANGE UP
-  RIFAMPIN: SIGNIFICANT CHANGE UP
-  TETRACYCLINE: SIGNIFICANT CHANGE UP
-  TETRACYCLINE: SIGNIFICANT CHANGE UP
-  TRIMETHOPRIM/SULFAMETHOXAZOLE: SIGNIFICANT CHANGE UP
-  TRIMETHOPRIM/SULFAMETHOXAZOLE: SIGNIFICANT CHANGE UP
-  VANCOMYCIN: SIGNIFICANT CHANGE UP
ANION GAP SERPL CALC-SCNC: 16 MMOL/L — SIGNIFICANT CHANGE UP (ref 5–17)
ANION GAP SERPL CALC-SCNC: 23 MMOL/L — HIGH (ref 5–17)
APTT BLD: 42.2 SEC — HIGH (ref 27.5–37.4)
APTT BLD: 48.3 SEC — HIGH (ref 27.5–37.4)
BLD GP AB SCN SERPL QL: NEGATIVE — SIGNIFICANT CHANGE UP
BUN SERPL-MCNC: 22 MG/DL — SIGNIFICANT CHANGE UP (ref 7–23)
BUN SERPL-MCNC: 55 MG/DL — HIGH (ref 7–23)
CALCIUM SERPL-MCNC: 7.6 MG/DL — LOW (ref 8.4–10.5)
CALCIUM SERPL-MCNC: 8 MG/DL — LOW (ref 8.4–10.5)
CHLORIDE SERPL-SCNC: 82 MMOL/L — LOW (ref 96–108)
CHLORIDE SERPL-SCNC: 90 MMOL/L — LOW (ref 96–108)
CO2 SERPL-SCNC: 19 MMOL/L — LOW (ref 22–31)
CO2 SERPL-SCNC: 23 MMOL/L — SIGNIFICANT CHANGE UP (ref 22–31)
CREAT SERPL-MCNC: 4.2 MG/DL — HIGH (ref 0.5–1.3)
CREAT SERPL-MCNC: 7.9 MG/DL — HIGH (ref 0.5–1.3)
CULTURE RESULTS: SIGNIFICANT CHANGE UP
GLUCOSE SERPL-MCNC: 183 MG/DL — HIGH (ref 70–99)
GLUCOSE SERPL-MCNC: 313 MG/DL — HIGH (ref 70–99)
GRAM STN FLD: SIGNIFICANT CHANGE UP
GRAM STN FLD: SIGNIFICANT CHANGE UP
HCT VFR BLD CALC: 24.8 % — LOW (ref 34.5–45)
HCT VFR BLD CALC: 27.3 % — LOW (ref 34.5–45)
HGB BLD-MCNC: 7.9 G/DL — LOW (ref 11.5–15.5)
HGB BLD-MCNC: 8.9 G/DL — LOW (ref 11.5–15.5)
INR BLD: 1.37 — HIGH (ref 0.88–1.16)
MAGNESIUM SERPL-MCNC: 2.3 MG/DL — SIGNIFICANT CHANGE UP (ref 1.6–2.6)
MCHC RBC-ENTMCNC: 25.1 PG — LOW (ref 27–34)
MCHC RBC-ENTMCNC: 26.2 PG — LOW (ref 27–34)
MCHC RBC-ENTMCNC: 31.9 G/DL — LOW (ref 32–36)
MCHC RBC-ENTMCNC: 32.6 G/DL — SIGNIFICANT CHANGE UP (ref 32–36)
MCV RBC AUTO: 78.7 FL — LOW (ref 80–100)
MCV RBC AUTO: 80.3 FL — SIGNIFICANT CHANGE UP (ref 80–100)
METHOD TYPE: SIGNIFICANT CHANGE UP
ORGANISM # SPEC MICROSCOPIC CNT: SIGNIFICANT CHANGE UP
PHOSPHATE SERPL-MCNC: 7.5 MG/DL — HIGH (ref 2.5–4.5)
PLATELET # BLD AUTO: 209 K/UL — SIGNIFICANT CHANGE UP (ref 150–400)
PLATELET # BLD AUTO: 230 K/UL — SIGNIFICANT CHANGE UP (ref 150–400)
POTASSIUM SERPL-MCNC: 3.3 MMOL/L — LOW (ref 3.5–5.3)
POTASSIUM SERPL-MCNC: 3.5 MMOL/L — SIGNIFICANT CHANGE UP (ref 3.5–5.3)
POTASSIUM SERPL-SCNC: 3.3 MMOL/L — LOW (ref 3.5–5.3)
POTASSIUM SERPL-SCNC: 3.5 MMOL/L — SIGNIFICANT CHANGE UP (ref 3.5–5.3)
PROTHROM AB SERPL-ACNC: 15.3 SEC — HIGH (ref 9.8–12.7)
RBC # BLD: 3.15 M/UL — LOW (ref 3.8–5.2)
RBC # BLD: 3.4 M/UL — LOW (ref 3.8–5.2)
RBC # FLD: 17.6 % — HIGH (ref 10.3–16.9)
RBC # FLD: 18.4 % — HIGH (ref 10.3–16.9)
RH IG SCN BLD-IMP: NEGATIVE — SIGNIFICANT CHANGE UP
SODIUM SERPL-SCNC: 124 MMOL/L — LOW (ref 135–145)
SODIUM SERPL-SCNC: 129 MMOL/L — LOW (ref 135–145)
SPECIMEN SOURCE: SIGNIFICANT CHANGE UP
VANCOMYCIN TROUGH SERPL-MCNC: 18.3 UG/ML — SIGNIFICANT CHANGE UP (ref 10–20)
VANCOMYCIN TROUGH SERPL-MCNC: 20.5 UG/ML — HIGH (ref 10–20)
WBC # BLD: 24.5 K/UL — HIGH (ref 3.8–10.5)
WBC # BLD: 26.8 K/UL — HIGH (ref 3.8–10.5)
WBC # FLD AUTO: 24.5 K/UL — HIGH (ref 3.8–10.5)
WBC # FLD AUTO: 26.8 K/UL — HIGH (ref 3.8–10.5)

## 2017-08-26 PROCEDURE — 99223 1ST HOSP IP/OBS HIGH 75: CPT | Mod: GC

## 2017-08-26 PROCEDURE — 99233 SBSQ HOSP IP/OBS HIGH 50: CPT

## 2017-08-26 RX ORDER — VANCOMYCIN HCL 1 G
1000 VIAL (EA) INTRAVENOUS ONCE
Qty: 0 | Refills: 0 | Status: DISCONTINUED | OUTPATIENT
Start: 2017-08-26 | End: 2017-08-26

## 2017-08-26 RX ORDER — HEPARIN SODIUM 5000 [USP'U]/ML
1500 INJECTION INTRAVENOUS; SUBCUTANEOUS
Qty: 25000 | Refills: 0 | Status: DISCONTINUED | OUTPATIENT
Start: 2017-08-26 | End: 2017-08-26

## 2017-08-26 RX ORDER — VANCOMYCIN HCL 1 G
750 VIAL (EA) INTRAVENOUS ONCE
Qty: 0 | Refills: 0 | Status: COMPLETED | OUTPATIENT
Start: 2017-08-26 | End: 2017-08-26

## 2017-08-26 RX ORDER — HEPARIN SODIUM 5000 [USP'U]/ML
1600 INJECTION INTRAVENOUS; SUBCUTANEOUS
Qty: 25000 | Refills: 0 | Status: DISCONTINUED | OUTPATIENT
Start: 2017-08-26 | End: 2017-08-27

## 2017-08-26 RX ORDER — INSULIN GLARGINE 100 [IU]/ML
23 INJECTION, SOLUTION SUBCUTANEOUS AT BEDTIME
Qty: 0 | Refills: 0 | Status: DISCONTINUED | OUTPATIENT
Start: 2017-08-26 | End: 2017-08-28

## 2017-08-26 RX ORDER — ACETAMINOPHEN 500 MG
1000 TABLET ORAL ONCE
Qty: 0 | Refills: 0 | Status: COMPLETED | OUTPATIENT
Start: 2017-08-26 | End: 2017-08-26

## 2017-08-26 RX ADMIN — INSULIN GLARGINE 23 UNIT(S): 100 INJECTION, SOLUTION SUBCUTANEOUS at 22:40

## 2017-08-26 RX ADMIN — HEPARIN SODIUM 1500 UNIT(S)/HR: 5000 INJECTION INTRAVENOUS; SUBCUTANEOUS at 01:55

## 2017-08-26 RX ADMIN — Medication 5 UNIT(S): at 15:21

## 2017-08-26 RX ADMIN — Medication 81 MILLIGRAM(S): at 15:22

## 2017-08-26 RX ADMIN — Medication 50 MILLIGRAM(S): at 15:22

## 2017-08-26 RX ADMIN — Medication 250 MILLIGRAM(S): at 15:22

## 2017-08-26 RX ADMIN — Medication 2: at 15:22

## 2017-08-26 RX ADMIN — Medication 8: at 08:31

## 2017-08-26 RX ADMIN — Medication 5 UNIT(S): at 08:30

## 2017-08-26 RX ADMIN — Medication 50 MILLIGRAM(S): at 00:18

## 2017-08-26 RX ADMIN — Medication 2: at 17:39

## 2017-08-26 RX ADMIN — Medication 650 MILLIGRAM(S): at 13:29

## 2017-08-26 RX ADMIN — VALSARTAN 320 MILLIGRAM(S): 80 TABLET ORAL at 08:30

## 2017-08-26 RX ADMIN — Medication 5 UNIT(S): at 17:39

## 2017-08-26 RX ADMIN — Medication 50 MILLIGRAM(S): at 22:43

## 2017-08-26 RX ADMIN — Medication 400 MILLIGRAM(S): at 06:10

## 2017-08-26 RX ADMIN — HEPARIN SODIUM 16 UNIT(S)/HR: 5000 INJECTION INTRAVENOUS; SUBCUTANEOUS at 19:36

## 2017-08-26 RX ADMIN — Medication 650 MILLIGRAM(S): at 12:56

## 2017-08-26 RX ADMIN — Medication 50 MILLIGRAM(S): at 06:10

## 2017-08-26 RX ADMIN — SODIUM CHLORIDE 3 MILLILITER(S): 9 INJECTION INTRAMUSCULAR; INTRAVENOUS; SUBCUTANEOUS at 22:40

## 2017-08-26 RX ADMIN — SODIUM CHLORIDE 3 MILLILITER(S): 9 INJECTION INTRAMUSCULAR; INTRAVENOUS; SUBCUTANEOUS at 15:09

## 2017-08-26 RX ADMIN — SODIUM CHLORIDE 3 MILLILITER(S): 9 INJECTION INTRAMUSCULAR; INTRAVENOUS; SUBCUTANEOUS at 06:10

## 2017-08-26 RX ADMIN — ATORVASTATIN CALCIUM 40 MILLIGRAM(S): 80 TABLET, FILM COATED ORAL at 22:40

## 2017-08-26 RX ADMIN — PANTOPRAZOLE SODIUM 40 MILLIGRAM(S): 20 TABLET, DELAYED RELEASE ORAL at 06:10

## 2017-08-26 NOTE — PROGRESS NOTE ADULT - ASSESSMENT
This is a 47yFemale w/ PMHx ESRD on HD (TThS) with right permacath removed due to MRSA bacteremia and IVC thrombus extending to right atrium. New shiley placed on right femoral vein at [facility] and TLC placed on admission for venous access.    #NEURO: no acute issues  -continue to monitor mental status     #CVS: IVC thrombus extending to right atrium with no valvular involvement  -continue to monitor HR/BP  -c/w heparin gtt  -c/w statin, valsartan, lopressor for HLD, HTN    #PULM: pulmonary congestion  -encourage I/S use and ambulation  -CXR showed pulmonary congestion, negative for acute infiltrates  -f/u CXR tomorrow AM    #GI:  -c/w bowel regimen  -c/w GI PPX: protonix  -c/w diabetic diet    #: ESRD  -continue to monitor renal function and I/Os  -HD today  -appreciate nephrology recommendations    #ENDO: DM2  -hga1c: 8.2%  -TSH:1.2  -blood gulose in 200-300's increased lantus from 18 to 23U qhs  -continue to monitor glucose levels    #HEME: anemia  -c/w DVT ppx: SCDs  -transfused 1U PRBC for symptomatic anemia, f/u post transfusion CBC  -c/w heparin gtt, subtherapeutic this AM, increased and will follow up PTT    #ID: MRSA bacteremia  -febrile tmax 101 this morning, leukocytosis uptrending  -c/w vancomycin pending vancomycin bi-levels pre and post HD today, will redose vanc  -Dr. Kingston following for ID and will appreciate recs  -repeat blood cultures today    Disposition: transfer to medicine vs icu for medical management

## 2017-08-26 NOTE — CONSULT NOTE ADULT - SUBJECTIVE AND OBJECTIVE BOX
47 year old female with history of HTN, HLD, DM II, ESRD on HD T Th, S ICU CONSULT    47 year old female with history of HTN, HLD, DM II, ESRD on HD (T, Th, S), ICU team being consulted for MRSA for patient to be transferred under medicine telemetry. Patient         PMHx -   PSx -   Meds -   Allergies -   FHx -   Sx -       PHYSICAL EXAM   Vital Signs Last 24 Hrs  T(C): 36.5 (26 Aug 2017 10:16), Max: 38.3 (26 Aug 2017 05:27)  T(F): 97.7 (26 Aug 2017 10:16), Max: 101 (26 Aug 2017 05:27)  HR: 83 (26 Aug 2017 10:00) (80 - 99)  BP: 150/62 (26 Aug 2017 09:01) (112/52 - 168/65)  BP(mean): 86 (26 Aug 2017 09:01) (77 - 99)  RR: 18 (26 Aug 2017 10:00) (16 - 20)  SpO2: 96% (26 Aug 2017 10:00) (93% - 96%)      General -   HEENT -   CV -   Resp -   Abdomen -   Extremities -   Skin -       LABS                        7.9    24.5  )-----------( 230      ( 26 Aug 2017 07:15 )             24.8     08-26    124<L>  |  82<L>  |  55<H>  ----------------------------<  313<H>  3.5   |  19<L>  |  7.90<H>    Ca    7.6<L>      26 Aug 2017 07:15  Phos  7.5     08-26  Mg     2.3     08-26    TPro  7.2  /  Alb  2.0<L>  /  TBili  0.4  /  DBili  x   /  AST  17  /  ALT  17  /  AlkPhos  294<H>  08-25    PT/INR - ( 26 Aug 2017 07:15 )   PT: 15.3 sec;   INR: 1.37          PTT - ( 26 Aug 2017 07:15 )  PTT:42.2 sec            IMAGING   CXR -   EKG - ICU CONSULT    47 year old female with history of HTN, HLD, DM II, ESRD on HD (T, Th, S), ICU team being consulted for MRSA for patient to be transferred under medicine telemetry. Patient was initially transferred from OS ([facility]) under CT surgery service at [facility]     PAST MEDICAL & SURGICAL HISTORY:  Hypertension  ESRD  DM2  HLD    MEDICATIONS  (STANDING):  sodium chloride 0.9% lock flush 3 milliLiter(s) IV Push every 8 hours  pantoprazole    Tablet 40 milliGRAM(s) Oral before breakfast  aspirin enteric coated 81 milliGRAM(s) Oral daily  valsartan 320 milliGRAM(s) Oral daily  atorvastatin 40 milliGRAM(s) Oral at bedtime  insulin lispro (HumaLOG) corrective regimen sliding scale   SubCutaneous every 6 hours  dextrose 5%. 1000 milliLiter(s) (50 mL/Hr) IV Continuous <Continuous>  dextrose 50% Injectable 12.5 Gram(s) IV Push once  dextrose 50% Injectable 25 Gram(s) IV Push once  dextrose 50% Injectable 25 Gram(s) IV Push once  metoprolol 50 milliGRAM(s) Oral every 6 hours  insulin lispro Injectable (HumaLOG) 5 Unit(s) SubCutaneous three times a day before meals  heparin  Infusion. 1500 Unit(s)/Hr (15 mL/Hr) IV Continuous <Continuous>  insulin glargine Injectable (LANTUS) 23 Unit(s) SubCutaneous at bedtime    MEDICATIONS  (PRN):  dextrose Gel 1 Dose(s) Oral once PRN Blood Glucose LESS THAN 70 milliGRAM(s)/deciliter  glucagon  Injectable 1 milliGRAM(s) IntraMuscular once PRN Glucose LESS THAN 70 milligrams/deciliter  acetaminophen   Tablet. 650 milliGRAM(s) Oral every 6 hours PRN Mild Pain (1 - 3)    Allergies    penicillin (Unknown)  Sular (Unknown)    Intolerances      PHYSICAL EXAM   Vital Signs Last 24 Hrs  T(C): 36.5 (26 Aug 2017 10:16), Max: 38.3 (26 Aug 2017 05:27)  T(F): 97.7 (26 Aug 2017 10:16), Max: 101 (26 Aug 2017 05:27)  HR: 83 (26 Aug 2017 10:00) (80 - 99)  BP: 150/62 (26 Aug 2017 09:01) (112/52 - 168/65)  BP(mean): 86 (26 Aug 2017 09:01) (77 - 99)  RR: 18 (26 Aug 2017 10:00) (16 - 20)  SpO2: 96% (26 Aug 2017 10:00) (93% - 96%)      General -   HEENT -   CV -   Resp -   Abdomen -   Extremities -   Skin -       LABS                        7.9    24.5  )-----------( 230      ( 26 Aug 2017 07:15 )             24.8     08-26    124<L>  |  82<L>  |  55<H>  ----------------------------<  313<H>  3.5   |  19<L>  |  7.90<H>    Ca    7.6<L>      26 Aug 2017 07:15  Phos  7.5     08-26  Mg     2.3     08-26    TPro  7.2  /  Alb  2.0<L>  /  TBili  0.4  /  DBili  x   /  AST  17  /  ALT  17  /  AlkPhos  294<H>  08-25    PT/INR - ( 26 Aug 2017 07:15 )   PT: 15.3 sec;   INR: 1.37          PTT - ( 26 Aug 2017 07:15 )  PTT:42.2 sec            IMAGING   CXR -   EKG - ICU CONSULT    47 year old female with history of HTN, HLD, DM II, ESRD on HD (T, Th, S), ICU team being consulted for MRSA for patient to be transferred under medicine telemetry. Patient was initially transferred from OS ([facility]) under CT surgery service at [facility] for endocarditis of tricuspid valve and thrombus in right atrium. Other hospital course complication include MRSA bacteremia on Vancomycin     PAST MEDICAL & SURGICAL HISTORY:  Hypertension  ESRD  DM2  HLD    MEDICATIONS  (STANDING):  sodium chloride 0.9% lock flush 3 milliLiter(s) IV Push every 8 hours  pantoprazole    Tablet 40 milliGRAM(s) Oral before breakfast  aspirin enteric coated 81 milliGRAM(s) Oral daily  valsartan 320 milliGRAM(s) Oral daily  atorvastatin 40 milliGRAM(s) Oral at bedtime  insulin lispro (HumaLOG) corrective regimen sliding scale   SubCutaneous every 6 hours  dextrose 5%. 1000 milliLiter(s) (50 mL/Hr) IV Continuous <Continuous>  dextrose 50% Injectable 12.5 Gram(s) IV Push once  dextrose 50% Injectable 25 Gram(s) IV Push once  dextrose 50% Injectable 25 Gram(s) IV Push once  metoprolol 50 milliGRAM(s) Oral every 6 hours  insulin lispro Injectable (HumaLOG) 5 Unit(s) SubCutaneous three times a day before meals  heparin  Infusion. 1500 Unit(s)/Hr (15 mL/Hr) IV Continuous <Continuous>  insulin glargine Injectable (LANTUS) 23 Unit(s) SubCutaneous at bedtime    MEDICATIONS  (PRN):  dextrose Gel 1 Dose(s) Oral once PRN Blood Glucose LESS THAN 70 milliGRAM(s)/deciliter  glucagon  Injectable 1 milliGRAM(s) IntraMuscular once PRN Glucose LESS THAN 70 milligrams/deciliter  acetaminophen   Tablet. 650 milliGRAM(s) Oral every 6 hours PRN Mild Pain (1 - 3)    Allergies    penicillin (Unknown)  Sular (Unknown)    Intolerances      PHYSICAL EXAM   Vital Signs Last 24 Hrs  T(C): 36.5 (26 Aug 2017 10:16), Max: 38.3 (26 Aug 2017 05:27)  T(F): 97.7 (26 Aug 2017 10:16), Max: 101 (26 Aug 2017 05:27)  HR: 83 (26 Aug 2017 10:00) (80 - 99)  BP: 150/62 (26 Aug 2017 09:01) (112/52 - 168/65)  BP(mean): 86 (26 Aug 2017 09:01) (77 - 99)  RR: 18 (26 Aug 2017 10:00) (16 - 20)  SpO2: 96% (26 Aug 2017 10:00) (93% - 96%)      General -   HEENT -   CV -   Resp -   Abdomen -   Extremities -   Skin -       LABS                        7.9    24.5  )-----------( 230      ( 26 Aug 2017 07:15 )             24.8     08-26    124<L>  |  82<L>  |  55<H>  ----------------------------<  313<H>  3.5   |  19<L>  |  7.90<H>    Ca    7.6<L>      26 Aug 2017 07:15  Phos  7.5     08-26  Mg     2.3     08-26    TPro  7.2  /  Alb  2.0<L>  /  TBili  0.4  /  DBili  x   /  AST  17  /  ALT  17  /  AlkPhos  294<H>  08-25    PT/INR - ( 26 Aug 2017 07:15 )   PT: 15.3 sec;   INR: 1.37          PTT - ( 26 Aug 2017 07:15 )  PTT:42.2 sec            IMAGING   CXR -   EKG - ICU CONSULT    47 year old female with history of HTN, HLD, DM II, ESRD on HD (T, Th, S), and ?chronic left osteomyelitis, ICU team being consulted for MRSA for patient to be transferred under medicine telemetry. Patient was initially transferred from OSH ([facility]) under CT surgery service at [facility] for endocarditis of tricuspid valve and thrombus in right atrium. Other hospital course complication include MRSA bacteremia now on Vancomycin, point of entry likely HD catheter in right IJ, that has been removed and patient has a right femoral HD cath. TTE / ALCIDES showing Normal LV size and function, no AI or AS, mild MR, mild TR, moderate pericardial effusion without tamponade, mobile echodensity in the tip of the IVC / SVC / right atrium, and aneurysmal interatrial septum with mobile density attached.  As CT surgery plans no intervention at this time, patient will be transferred to medicine/Tele for further management.   Patient seen at bedside during hemodialysis session and only complains of being cold and subjective chills. No HA or vision changes. No cough, chest pain or SOB.     PAST MEDICAL & SURGICAL HISTORY:  Hypertension  ESRD  DM2  HLD    MEDICATIONS  (STANDING):  sodium chloride 0.9% lock flush 3 milliLiter(s) IV Push every 8 hours  pantoprazole    Tablet 40 milliGRAM(s) Oral before breakfast  aspirin enteric coated 81 milliGRAM(s) Oral daily  valsartan 320 milliGRAM(s) Oral daily  atorvastatin 40 milliGRAM(s) Oral at bedtime  insulin lispro (HumaLOG) corrective regimen sliding scale   SubCutaneous every 6 hours  dextrose 5%. 1000 milliLiter(s) (50 mL/Hr) IV Continuous <Continuous>  dextrose 50% Injectable 12.5 Gram(s) IV Push once  dextrose 50% Injectable 25 Gram(s) IV Push once  dextrose 50% Injectable 25 Gram(s) IV Push once  metoprolol 50 milliGRAM(s) Oral every 6 hours  insulin lispro Injectable (HumaLOG) 5 Unit(s) SubCutaneous three times a day before meals  heparin  Infusion. 1500 Unit(s)/Hr (15 mL/Hr) IV Continuous <Continuous>  insulin glargine Injectable (LANTUS) 23 Unit(s) SubCutaneous at bedtime    MEDICATIONS  (PRN):  dextrose Gel 1 Dose(s) Oral once PRN Blood Glucose LESS THAN 70 milliGRAM(s)/deciliter  glucagon  Injectable 1 milliGRAM(s) IntraMuscular once PRN Glucose LESS THAN 70 milligrams/deciliter  acetaminophen   Tablet. 650 milliGRAM(s) Oral every 6 hours PRN Mild Pain (1 - 3)    Allergies    penicillin (Unknown)  Sular (Unknown)    Intolerances      PHYSICAL EXAM   Vital Signs Last 24 Hrs  T(C): 36.5 (26 Aug 2017 10:16), Max: 38.3 (26 Aug 2017 05:27)  T(F): 97.7 (26 Aug 2017 10:16), Max: 101 (26 Aug 2017 05:27)  HR: 83 (26 Aug 2017 10:00) (80 - 99)  BP: 150/62 (26 Aug 2017 09:01) (112/52 - 168/65)  BP(mean): 86 (26 Aug 2017 09:01) (77 - 99)  RR: 18 (26 Aug 2017 10:00) (16 - 20)  SpO2: 96% (26 Aug 2017 10:00) (93% - 96%)      General - Undergoing hemodialysis, NAD, lying in bed  HEENT - PERRLA, normal sclera  CV - Mildly tachycardic, regular, no murmur appreciated  Resp - bibasilar crackles, no wheezing   Abdomen - Soft, nontender, nondistended  Extremities - early venous stasis changes  Skin - No rash      LABS                        7.9    24.5  )-----------( 230      ( 26 Aug 2017 07:15 )             24.8     08-26    124<L>  |  82<L>  |  55<H>  ----------------------------<  313<H>  3.5   |  19<L>  |  7.90<H>    Ca    7.6<L>      26 Aug 2017 07:15  Phos  7.5     08-26  Mg     2.3     08-26    TPro  7.2  /  Alb  2.0<L>  /  TBili  0.4  /  DBili  x   /  AST  17  /  ALT  17  /  AlkPhos  294<H>  08-25    PT/INR - ( 26 Aug 2017 07:15 )   PT: 15.3 sec;   INR: 1.37          PTT - ( 26 Aug 2017 07:15 )  PTT:42.2 sec      IMAGING     CXR -  8/25  Impression: Limited inspiration. Prominent bronchovascular markings.   Congestive change cannot be excluded    EKG - 8/25  Diagnosis Line Normal sinus rhythm  Incomplete left bundle branch block  Prolonged QT

## 2017-08-26 NOTE — PROVIDER CONTACT NOTE (CHANGE IN STATUS NOTIFICATION) - BACKGROUND
Pt was admitted for + blood culture,with DM, ESRD, on HD 3x a week, with femoral catheter as an access for HD.

## 2017-08-26 NOTE — PROGRESS NOTE ADULT - SUBJECTIVE AND OBJECTIVE BOX
Patient discussed on morning rounds with Dr. Gee    SUBJECTIVE ASSESSMENT:  47y Female with MRSA bacteremia from positive blood cultures since 8/24 c/o weakness since her admission. pt states she can barely walk and feels weak. pt was recently febrile this morning and treated with tylenol. no acute issues overnight. pt denies SOB, dizziness, N/V/C/D, abdominal pain, chest pain.        Vital Signs Last 24 Hrs  T(C): 36.5 (26 Aug 2017 10:16), Max: 38.3 (26 Aug 2017 05:27)  T(F): 97.7 (26 Aug 2017 10:16), Max: 101 (26 Aug 2017 05:27)  HR: 83 (26 Aug 2017 10:00) (80 - 99)  BP: 150/62 (26 Aug 2017 09:01) (112/52 - 168/65)  BP(mean): 86 (26 Aug 2017 09:01) (77 - 99)  RR: 18 (26 Aug 2017 10:00) (16 - 20)  SpO2: 96% (26 Aug 2017 10:00) (93% - 96%)  I&O's Detail    25 Aug 2017 07:01  -  26 Aug 2017 07:00  --------------------------------------------------------  IN:    heparin Infusion: 10 mL    heparin Infusion: 56 mL  Total IN: 66 mL    OUT:  Total OUT: 0 mL    Total NET: 66 mL          CHEST TUBE:  No  LOLLY DRAIN:  No.  EPICARDIAL WIRES: No.  TIE DOWNS: No.  COOK: No.    PHYSICAL EXAM:    General: tired appearing female in NAD    Neurological: A&OX3, no focal deficits    Cardiovascular: RRR, normal S1S2, no murmur appreciated    Respiratory: CTAB, no wheezes/rales/rhonchi    Gastrointestinal: soft, NT/ND    Extremities: 5/5 strength LE, sensation intact, WWP    Vascular: 2+ dorsalis pedis b/l,     Incision Sites: +L femoral TLC, +R shiley catheter    LABS:                        7.9    24.5  )-----------( 230      ( 26 Aug 2017 07:15 )             24.8       COUMADIN:  No    PT/INR - ( 26 Aug 2017 07:15 )   PT: 15.3 sec;   INR: 1.37          PTT - ( 26 Aug 2017 07:15 )  PTT:42.2 sec    08-26    124<L>  |  82<L>  |  55<H>  ----------------------------<  313<H>  3.5   |  19<L>  |  7.90<H>    Ca    7.6<L>      26 Aug 2017 07:15  Phos  7.5     08-26  Mg     2.3     08-26    TPro  7.2  /  Alb  2.0<L>  /  TBili  0.4  /  DBili  x   /  AST  17  /  ALT  17  /  AlkPhos  294<H>  08-25          MEDICATIONS  (STANDING):  sodium chloride 0.9% lock flush 3 milliLiter(s) IV Push every 8 hours  pantoprazole    Tablet 40 milliGRAM(s) Oral before breakfast  aspirin enteric coated 81 milliGRAM(s) Oral daily  valsartan 320 milliGRAM(s) Oral daily  atorvastatin 40 milliGRAM(s) Oral at bedtime  insulin lispro (HumaLOG) corrective regimen sliding scale   SubCutaneous every 6 hours  dextrose 5%. 1000 milliLiter(s) (50 mL/Hr) IV Continuous <Continuous>  dextrose 50% Injectable 12.5 Gram(s) IV Push once  dextrose 50% Injectable 25 Gram(s) IV Push once  dextrose 50% Injectable 25 Gram(s) IV Push once  metoprolol 50 milliGRAM(s) Oral every 6 hours  insulin lispro Injectable (HumaLOG) 5 Unit(s) SubCutaneous three times a day before meals  insulin glargine Injectable (LANTUS) 18 Unit(s) SubCutaneous at bedtime  heparin  Infusion. 1500 Unit(s)/Hr (15 mL/Hr) IV Continuous <Continuous>    MEDICATIONS  (PRN):  dextrose Gel 1 Dose(s) Oral once PRN Blood Glucose LESS THAN 70 milliGRAM(s)/deciliter  glucagon  Injectable 1 milliGRAM(s) IntraMuscular once PRN Glucose LESS THAN 70 milligrams/deciliter  acetaminophen   Tablet. 650 milliGRAM(s) Oral every 6 hours PRN Mild Pain (1 - 3)        RADIOLOGY & ADDITIONAL TESTS:  < from: Xray Chest 1 View AP-PORTABLE IMMEDIATE (08.25.17 @ 20:53) >  EXAM:  XR CHEST 1 VIEW PORT IMMEDIATE                          PROCEDURE DATE:  08/25/2017    INTERPRETATION:  Clinical history: Fever    Limited inspiration. Prominent bronchovascular markings. Congestive   change cannot be excluded. Cardiomegaly .Degenerative changes thoracic   spine.    Impression: Limited inspiration. Prominent bronchovascular markings.   Congestive change cannot be excluded    < end of copied text >

## 2017-08-26 NOTE — PROGRESS NOTE ADULT - ATTENDING COMMENTS
Patient examined, fellow's hx and PE reviewed and confirmed. I find dialyzed today. Hyponatremia. A/P reviewed and confirmed. Continue dialysis. Fluid restrict. See full note

## 2017-08-26 NOTE — PROGRESS NOTE ADULT - ASSESSMENT
This is 47 year old female with PMH stated above. She is on HD recently started 2 months ago. LAst HD on 8/24 - 2.2 liters. we will do Hd today

## 2017-08-26 NOTE — PROVIDER CONTACT NOTE (CHANGE IN STATUS NOTIFICATION) - ASSESSMENT
Pt alert, denies any cp, sob, denies palpitation, dizziness. Pt alert, denies any cp, sob, denies palpitation, dizziness. O2 sat 99%.

## 2017-08-26 NOTE — CONSULT NOTE ADULT - ASSESSMENT
47 year old female with history of HTN, HLD, DM II, ESRD on HD (T, Th, S), ?left foot osteomyelitis transferred from OSH, has MRSA bacteremia 2/2 HD cath (right IJ HD cath removed and new r. femoral HD cath in place), also with mobile echodensity IVC/CVC/right atrium representing thrombus vs vegetations now heparin gtt, and tricuspid valve vegetations, no plan for any intervention by CT surgery, pt now being transferred to medicine/telemetry for further management.    # MRSA bacteremia with vegetations in tricuspid valve: Last blood culture on 8/25 positive. No plan for any intervention by CT surgery.  - Transfer to [facility] for further management.  - Continue with vancomycin, being dosed by level (pt on hemodialysis).  - Monitor blood culture  - Consider ID consult.  - 47 year old female with history of HTN, HLD, DM II, ESRD on HD (T, Th, S), ?left foot osteomyelitis transferred from OSH, has MRSA bacteremia 2/2 HD cath (right IJ HD cath removed and new r. femoral HD cath in place), also with mobile echodensity IVC/CVC/right atrium representing thrombus vs vegetations now heparin gtt, and tricuspid valve vegetations, no plan for any intervention by CT surgery, pt now being transferred to medicine/telemetry for further management.    # MRSA bacteremia with vegetations in tricuspid valve: Last blood culture on 8/25 still positive. No plan for any intervention by CT surgery.  - Transfer to [facility] for further management.  - Continue with vancomycin, being dosed by level (pt on hemodialysis).   - Consider ID consult, (pt listed to have PCN allergy).  - Repeat blood culture until cleared. 47 year old female with history of HTN, HLD, DM II, ESRD on HD (T, Th, S), ?left foot osteomyelitis transferred from OSH, has MRSA bacteremia 2/2 HD cath (right IJ HD cath removed and new r. femoral HD cath in place), also with mobile echodensity IVC/SVC/right atrium representing thrombus vs vegetations now heparin gtt, and tricuspid valve vegetations, no plan for any intervention by CT surgery, pt now being transferred to medicine/telemetry for further management.    # MRSA bacteremia with vegetations in tricuspid valve: Last blood culture on 8/25 still positive. No plan for any intervention by CT surgery.  - Transfer to [facility] for further management.  - Continue with vancomycin, being dosed by level (pt on hemodialysis).   - Consider ID consult, (pt listed to have PCN allergy).  - Repeat blood culture until cleared.    # Thrombus in right atrium and in the tip of the IVC / SVC: 47 year old female with history of HTN, HLD, DM II, ESRD on HD (T, Th, S), ?left foot osteomyelitis transferred from OSH, has MRSA bacteremia 2/2 HD cath (right IJ HD cath removed and new r. femoral HD cath in place), also with mobile echodensity IVC/SVC/right atrium representing thrombus vs vegetations now heparin gtt, and tricuspid valve vegetations, no plan for any intervention by CT surgery, pt now being transferred to medicine/telemetry for further management.    # MRSA bacteremia with vegetations in tricuspid valve: Last blood culture on 8/25 still positive. No plan for any intervention by CT surgery.  - Transfer to [facility] for further management.  - Continue with vancomycin, being dosed by level (pt on hemodialysis).   - Consider ID consult, (pt listed to have PCN allergy).  - Repeat blood culture until cleared.    # Thrombus in right atrium and in the tip of the IVC / SVC: Likely provoked by right IJ HD catheter. 47 year old female with history of HTN, HLD, DM II, ESRD on HD (T, Th, S), ?left foot osteomyelitis transferred from OSH, has MRSA bacteremia 2/2 HD cath (right IJ HD cath removed and new r. femoral HD cath in place), also with mobile echodensity IVC/SVC/right atrium representing thrombus vs vegetations now heparin gtt, and tricuspid valve vegetations, no plan for any intervention by CT surgery, pt now being transferred to medicine/telemetry for further management.    # MRSA bacteremia with vegetations in tricuspid valve: Last blood culture on 8/25 still positive. No plan for any intervention by CT surgery.  - Transfer to [facility] for further management.  - Continue with vancomycin, being dosed by level (pt on hemodialysis).   - Consider ID consult, (pt listed to have PCN allergy).  - Repeat blood culture until cleared.    # Thrombus in right atrium and in the tip of the IVC / SVC: Likely provoked by right IJ HD catheter, that has been removed.  - Continue with heparin gtt, and adjust drip as needed. Goal PTT 60 - 80    Accepted  for transfer to [facility] for continuing medical management

## 2017-08-26 NOTE — PROGRESS NOTE ADULT - SUBJECTIVE AND OBJECTIVE BOX
Patient was seen and evaluated on dialysis.   Patient is tolerating the procedure well.   HR: 83 (08-26-17 @ 10:00)  BP: 150/62 (08-26-17 @ 09:01) pusle 65, /67  Continue dialysis:   Dialyzer:  180        QB: 400        QD: 500  Goal UF 2 liters  over 3 Hours

## 2017-08-27 DIAGNOSIS — R10.13 EPIGASTRIC PAIN: ICD-10-CM

## 2017-08-27 DIAGNOSIS — R41.82 ALTERED MENTAL STATUS, UNSPECIFIED: ICD-10-CM

## 2017-08-27 DIAGNOSIS — Z29.9 ENCOUNTER FOR PROPHYLACTIC MEASURES, UNSPECIFIED: ICD-10-CM

## 2017-08-27 DIAGNOSIS — I82.90 ACUTE EMBOLISM AND THROMBOSIS OF UNSPECIFIED VEIN: ICD-10-CM

## 2017-08-27 DIAGNOSIS — E11.9 TYPE 2 DIABETES MELLITUS WITHOUT COMPLICATIONS: ICD-10-CM

## 2017-08-27 DIAGNOSIS — E78.5 HYPERLIPIDEMIA, UNSPECIFIED: ICD-10-CM

## 2017-08-27 DIAGNOSIS — R53.1 WEAKNESS: ICD-10-CM

## 2017-08-27 DIAGNOSIS — I33.0 ACUTE AND SUBACUTE INFECTIVE ENDOCARDITIS: ICD-10-CM

## 2017-08-27 DIAGNOSIS — R63.8 OTHER SYMPTOMS AND SIGNS CONCERNING FOOD AND FLUID INTAKE: ICD-10-CM

## 2017-08-27 LAB
ALBUMIN SERPL ELPH-MCNC: 2 G/DL — LOW (ref 3.3–5)
ALBUMIN SERPL ELPH-MCNC: 2.2 G/DL — LOW (ref 3.3–5)
ALP SERPL-CCNC: 346 U/L — HIGH (ref 40–120)
ALP SERPL-CCNC: 411 U/L — HIGH (ref 40–120)
ALT FLD-CCNC: 13 U/L — SIGNIFICANT CHANGE UP (ref 10–45)
ALT FLD-CCNC: 14 U/L — SIGNIFICANT CHANGE UP (ref 10–45)
ANION GAP SERPL CALC-SCNC: 19 MMOL/L — HIGH (ref 5–17)
ANION GAP SERPL CALC-SCNC: 19 MMOL/L — HIGH (ref 5–17)
APTT BLD: 41.9 SEC — HIGH (ref 27.5–37.4)
APTT BLD: 52.3 SEC — HIGH (ref 27.5–37.4)
APTT BLD: 52.6 SEC — HIGH (ref 27.5–37.4)
APTT BLD: 54.8 SEC — HIGH (ref 27.5–37.4)
APTT BLD: 55.5 SEC — HIGH (ref 27.5–37.4)
AST SERPL-CCNC: 22 U/L — SIGNIFICANT CHANGE UP (ref 10–40)
AST SERPL-CCNC: 23 U/L — SIGNIFICANT CHANGE UP (ref 10–40)
BASE EXCESS BLDA CALC-SCNC: -1.4 MMOL/L — SIGNIFICANT CHANGE UP (ref -2–3)
BASOPHILS NFR BLD AUTO: 0.2 % — SIGNIFICANT CHANGE UP (ref 0–2)
BILIRUB SERPL-MCNC: 0.6 MG/DL — SIGNIFICANT CHANGE UP (ref 0.2–1.2)
BILIRUB SERPL-MCNC: 0.6 MG/DL — SIGNIFICANT CHANGE UP (ref 0.2–1.2)
BUN SERPL-MCNC: 29 MG/DL — HIGH (ref 7–23)
BUN SERPL-MCNC: 35 MG/DL — HIGH (ref 7–23)
BUN SERPL-MCNC: 36 MG/DL — HIGH (ref 7–23)
CALCIUM SERPL-MCNC: 8 MG/DL — LOW (ref 8.4–10.5)
CALCIUM SERPL-MCNC: 8.3 MG/DL — LOW (ref 8.4–10.5)
CALCIUM SERPL-MCNC: 8.6 MG/DL — SIGNIFICANT CHANGE UP (ref 8.4–10.5)
CHLORIDE SERPL-SCNC: 84 MMOL/L — LOW (ref 96–108)
CHLORIDE SERPL-SCNC: 85 MMOL/L — LOW (ref 96–108)
CHLORIDE SERPL-SCNC: 86 MMOL/L — LOW (ref 96–108)
CK MB CFR SERPL CALC: 1.4 NG/ML — SIGNIFICANT CHANGE UP (ref 0–6.7)
CK SERPL-CCNC: 56 U/L — SIGNIFICANT CHANGE UP (ref 25–170)
CO2 SERPL-SCNC: 20 MMOL/L — LOW (ref 22–31)
CO2 SERPL-SCNC: 21 MMOL/L — LOW (ref 22–31)
CO2 SERPL-SCNC: 21 MMOL/L — LOW (ref 22–31)
CREAT SERPL-MCNC: 5 MG/DL — HIGH (ref 0.5–1.3)
CREAT SERPL-MCNC: 5.5 MG/DL — HIGH (ref 0.5–1.3)
CREAT SERPL-MCNC: 5.7 MG/DL — HIGH (ref 0.5–1.3)
CULTURE RESULTS: SIGNIFICANT CHANGE UP
EOSINOPHIL NFR BLD AUTO: 0.1 % — SIGNIFICANT CHANGE UP (ref 0–6)
GAS PNL BLDA: SIGNIFICANT CHANGE UP
GLUCOSE SERPL-MCNC: 247 MG/DL — HIGH (ref 70–99)
GLUCOSE SERPL-MCNC: 252 MG/DL — HIGH (ref 70–99)
GLUCOSE SERPL-MCNC: 276 MG/DL — HIGH (ref 70–99)
GRAM STN FLD: SIGNIFICANT CHANGE UP
HCO3 BLDA-SCNC: 23 MMOL/L — SIGNIFICANT CHANGE UP (ref 21–28)
HCT VFR BLD CALC: 26.7 % — LOW (ref 34.5–45)
HGB BLD-MCNC: 8.6 G/DL — LOW (ref 11.5–15.5)
INR BLD: 1.44 — HIGH (ref 0.88–1.16)
LACTATE SERPL-SCNC: 1.3 MMOL/L — SIGNIFICANT CHANGE UP (ref 0.5–2)
LACTATE SERPL-SCNC: 2.6 MMOL/L — HIGH (ref 0.5–2)
LIDOCAIN IGE QN: 39 U/L — SIGNIFICANT CHANGE UP (ref 7–60)
LYMPHOCYTES # BLD AUTO: 3.3 % — LOW (ref 13–44)
MAGNESIUM SERPL-MCNC: 2 MG/DL — SIGNIFICANT CHANGE UP (ref 1.6–2.6)
MCHC RBC-ENTMCNC: 26.1 PG — LOW (ref 27–34)
MCHC RBC-ENTMCNC: 32.2 G/DL — SIGNIFICANT CHANGE UP (ref 32–36)
MCV RBC AUTO: 80.9 FL — SIGNIFICANT CHANGE UP (ref 80–100)
MONOCYTES NFR BLD AUTO: 8.3 % — SIGNIFICANT CHANGE UP (ref 2–14)
NEUTROPHILS NFR BLD AUTO: 88.1 % — HIGH (ref 43–77)
ORGANISM # SPEC MICROSCOPIC CNT: SIGNIFICANT CHANGE UP
PCO2 BLDA: 37 MMHG — SIGNIFICANT CHANGE UP (ref 32–45)
PH BLDA: 7.41 — SIGNIFICANT CHANGE UP (ref 7.35–7.45)
PHOSPHATE SERPL-MCNC: 5.8 MG/DL — HIGH (ref 2.5–4.5)
PLATELET # BLD AUTO: 180 K/UL — SIGNIFICANT CHANGE UP (ref 150–400)
PO2 BLDA: 38 MMHG — CRITICAL LOW (ref 83–108)
POTASSIUM SERPL-MCNC: 3.3 MMOL/L — LOW (ref 3.5–5.3)
POTASSIUM SERPL-MCNC: 3.6 MMOL/L — SIGNIFICANT CHANGE UP (ref 3.5–5.3)
POTASSIUM SERPL-MCNC: 3.8 MMOL/L — SIGNIFICANT CHANGE UP (ref 3.5–5.3)
POTASSIUM SERPL-SCNC: 3.3 MMOL/L — LOW (ref 3.5–5.3)
POTASSIUM SERPL-SCNC: 3.6 MMOL/L — SIGNIFICANT CHANGE UP (ref 3.5–5.3)
POTASSIUM SERPL-SCNC: 3.8 MMOL/L — SIGNIFICANT CHANGE UP (ref 3.5–5.3)
PROT SERPL-MCNC: 7.3 G/DL — SIGNIFICANT CHANGE UP (ref 6–8.3)
PROT SERPL-MCNC: 8.4 G/DL — HIGH (ref 6–8.3)
PROTHROM AB SERPL-ACNC: 16.1 SEC — HIGH (ref 9.8–12.7)
RBC # BLD: 3.3 M/UL — LOW (ref 3.8–5.2)
RBC # FLD: 17.3 % — HIGH (ref 10.3–16.9)
SAO2 % BLDA: 68 % — LOW (ref 95–100)
SODIUM SERPL-SCNC: 123 MMOL/L — LOW (ref 135–145)
SODIUM SERPL-SCNC: 125 MMOL/L — LOW (ref 135–145)
SODIUM SERPL-SCNC: 126 MMOL/L — LOW (ref 135–145)
SPECIMEN SOURCE: SIGNIFICANT CHANGE UP
TROPONIN T SERPL-MCNC: 0.09 NG/ML — CRITICAL HIGH (ref 0–0.01)
TROPONIN T SERPL-MCNC: 0.11 NG/ML — CRITICAL HIGH (ref 0–0.01)
WBC # BLD: 24.2 K/UL — HIGH (ref 3.8–10.5)
WBC # FLD AUTO: 24.2 K/UL — HIGH (ref 3.8–10.5)

## 2017-08-27 PROCEDURE — 70450 CT HEAD/BRAIN W/O DYE: CPT | Mod: 26

## 2017-08-27 PROCEDURE — 71010: CPT | Mod: 26

## 2017-08-27 PROCEDURE — 99233 SBSQ HOSP IP/OBS HIGH 50: CPT | Mod: GC

## 2017-08-27 PROCEDURE — 99233 SBSQ HOSP IP/OBS HIGH 50: CPT

## 2017-08-27 RX ORDER — HEPARIN SODIUM 5000 [USP'U]/ML
INJECTION INTRAVENOUS; SUBCUTANEOUS
Qty: 25000 | Refills: 0 | Status: DISCONTINUED | OUTPATIENT
Start: 2017-08-27 | End: 2017-08-28

## 2017-08-27 RX ORDER — LABETALOL HCL 100 MG
100 TABLET ORAL ONCE
Qty: 0 | Refills: 0 | Status: COMPLETED | OUTPATIENT
Start: 2017-08-27 | End: 2017-08-27

## 2017-08-27 RX ORDER — LABETALOL HCL 100 MG
10 TABLET ORAL ONCE
Qty: 0 | Refills: 0 | Status: COMPLETED | OUTPATIENT
Start: 2017-08-27 | End: 2017-08-27

## 2017-08-27 RX ORDER — METOPROLOL TARTRATE 50 MG
25 TABLET ORAL ONCE
Qty: 0 | Refills: 0 | Status: DISCONTINUED | OUTPATIENT
Start: 2017-08-27 | End: 2017-08-27

## 2017-08-27 RX ORDER — SODIUM CHLORIDE 9 MG/ML
250 INJECTION INTRAMUSCULAR; INTRAVENOUS; SUBCUTANEOUS ONCE
Qty: 0 | Refills: 0 | Status: COMPLETED | OUTPATIENT
Start: 2017-08-27 | End: 2017-08-27

## 2017-08-27 RX ORDER — LABETALOL HCL 100 MG
100 TABLET ORAL ONCE
Qty: 0 | Refills: 0 | Status: DISCONTINUED | OUTPATIENT
Start: 2017-08-27 | End: 2017-08-27

## 2017-08-27 RX ORDER — ADENOSINE 3 MG/ML
6 INJECTION INTRAVENOUS ONCE
Qty: 0 | Refills: 0 | Status: COMPLETED | OUTPATIENT
Start: 2017-08-27 | End: 2017-08-27

## 2017-08-27 RX ORDER — POTASSIUM CHLORIDE 20 MEQ
20 PACKET (EA) ORAL ONCE
Qty: 0 | Refills: 0 | Status: COMPLETED | OUTPATIENT
Start: 2017-08-27 | End: 2017-08-27

## 2017-08-27 RX ORDER — ACETAMINOPHEN 500 MG
650 TABLET ORAL ONCE
Qty: 0 | Refills: 0 | Status: COMPLETED | OUTPATIENT
Start: 2017-08-27 | End: 2017-08-27

## 2017-08-27 RX ADMIN — SODIUM CHLORIDE 3000 MILLILITER(S): 9 INJECTION INTRAMUSCULAR; INTRAVENOUS; SUBCUTANEOUS at 16:29

## 2017-08-27 RX ADMIN — Medication 10 MILLIGRAM(S): at 16:21

## 2017-08-27 RX ADMIN — SODIUM CHLORIDE 3 MILLILITER(S): 9 INJECTION INTRAMUSCULAR; INTRAVENOUS; SUBCUTANEOUS at 06:22

## 2017-08-27 RX ADMIN — Medication 100 MILLIGRAM(S): at 17:17

## 2017-08-27 RX ADMIN — Medication 6: at 12:00

## 2017-08-27 RX ADMIN — Medication 100 MILLIEQUIVALENT(S): at 16:29

## 2017-08-27 RX ADMIN — SODIUM CHLORIDE 3 MILLILITER(S): 9 INJECTION INTRAMUSCULAR; INTRAVENOUS; SUBCUTANEOUS at 14:00

## 2017-08-27 RX ADMIN — ATORVASTATIN CALCIUM 40 MILLIGRAM(S): 80 TABLET, FILM COATED ORAL at 21:28

## 2017-08-27 RX ADMIN — Medication 6: at 17:17

## 2017-08-27 RX ADMIN — INSULIN GLARGINE 23 UNIT(S): 100 INJECTION, SOLUTION SUBCUTANEOUS at 21:28

## 2017-08-27 RX ADMIN — Medication 5 UNIT(S): at 07:18

## 2017-08-27 RX ADMIN — Medication 650 MILLIGRAM(S): at 16:30

## 2017-08-27 RX ADMIN — Medication 650 MILLIGRAM(S): at 11:56

## 2017-08-27 RX ADMIN — HEPARIN SODIUM 16 UNIT(S)/HR: 5000 INJECTION INTRAVENOUS; SUBCUTANEOUS at 09:24

## 2017-08-27 RX ADMIN — Medication 81 MILLIGRAM(S): at 11:56

## 2017-08-27 RX ADMIN — PANTOPRAZOLE SODIUM 40 MILLIGRAM(S): 20 TABLET, DELAYED RELEASE ORAL at 06:35

## 2017-08-27 RX ADMIN — Medication 5 UNIT(S): at 17:18

## 2017-08-27 RX ADMIN — Medication 50 MILLIGRAM(S): at 11:56

## 2017-08-27 RX ADMIN — Medication 50 MILLIGRAM(S): at 06:26

## 2017-08-27 RX ADMIN — Medication 5 UNIT(S): at 12:00

## 2017-08-27 RX ADMIN — Medication 4: at 00:23

## 2017-08-27 RX ADMIN — Medication 4: at 06:35

## 2017-08-27 RX ADMIN — SODIUM CHLORIDE 3 MILLILITER(S): 9 INJECTION INTRAMUSCULAR; INTRAVENOUS; SUBCUTANEOUS at 21:33

## 2017-08-27 RX ADMIN — HEPARIN SODIUM 1700 UNIT(S)/HR: 5000 INJECTION INTRAVENOUS; SUBCUTANEOUS at 13:59

## 2017-08-27 RX ADMIN — VALSARTAN 320 MILLIGRAM(S): 80 TABLET ORAL at 07:16

## 2017-08-27 RX ADMIN — Medication 50 MILLIGRAM(S): at 19:05

## 2017-08-27 NOTE — PROGRESS NOTE ADULT - ATTENDING COMMENTS
Patient examined, fellow's hx and PE reviewed and confirmed. I find stable on dialysis, hyponatremia stable. A/P reviewed and confirmed. Continue HD. Follow SNa.  See full note

## 2017-08-27 NOTE — PROGRESS NOTE ADULT - PROBLEM SELECTOR PLAN 5
Patient has baseline hypertension, takes 20 U lantus twice daily, as well as 30 U humalog premeal 3 times daily.  Finger sticks while hospitalized have ranged mid 100s to low 300s.    -- C/w insulin sliding scale   --

## 2017-08-27 NOTE — PROGRESS NOTE ADULT - ASSESSMENT
Ms. Hale is a 47 year-old female with a PMH of HTN, HLD, T2DM, ESRD (on HD T, Th, S), chronic L foot osteomyelitis, who presented to OSH for sepsis 2/2 Permacath infection, transferred to CT surgery service at [facility] with MRSA bacteremia 2/2 tricuspid endocarditis, found to have IVC/SVC/RA thrombus, and worsening motor weakness, deemed not to currently be a surgical candidate, transferred to [facility] for continued medical management.

## 2017-08-27 NOTE — CHART NOTE - NSCHARTNOTEFT_GEN_A_CORE
Housestaff was called to patient's bedside by nursing as the patient was noted to be rigoring with a HR of 130.  Upon assessment, the patient was complaining of feeling very cold, asking for blankets.  She was mentating well, perfusing well.  , /100s.  , rectal temp 100.0.  Satting 100% on 5L NC.  Labs were drawn, EKG done which showed MRSA bacteremia 2/2 an infected HD catheter was transferred from OSH when found to have a postive ALCIDES for tricuspid vegetation.  Patient was transferred to the medicine service after a R atrial thrombus w/ extension into the IVC/SVC was noted, ruling her out as a surgical candidate.  She is on vancomycin dosed w/ HD.  At approximately 2pm on 8/27, she developed SVT w/ HR in 140-150 and was symptomatic (rigors).  RRT was called and she was given labetolol x2 which brought down her HR and blood pressure.  After the event, she was noted to be lethargic, not answering questions appropriately, and altered.  Stroke code was called to evaluate. Housestaff was called to patient's bedside by nursing as the patient was noted to be rigoring with a HR of 130.  Upon assessment, the patient was complaining of feeling very cold, asking for blankets.  She was mentating well, perfusing well.  , /100s.  , rectal temp 100.0.  Satting 100% on 5L NC.  Labs were drawn, EKG done which showed MRSA bacteremia 2/2 an infected HD catheter was transferred from OSH when found to have a postive ALCIDES for tricuspid vegetation.  Patient was transferred to the medicine service after a R atrial thrombus w/ extension into the IVC/SVC was noted, ruling her out as a surgical candidate.  She is on vancomycin dosed w/ HD.  At approximately 2pm on 8/27, she developed SVT w/ HR in 140-150 and was symptomatic (rigors).  RRT was called and she was given labetolol x2 which brought down her HR and blood pressure.  After the event, she was noted to be lethargic, not answering questions appropriately, and altered.  Stroke code was called to evaluate.  patient had SVT with hypertension most likely related to her sepsis. Patient had altered mental statusbut was still able to follow commands. Stroke code was called and will follow Housestaff was called to patient's bedside by nursing as the patient was noted to be rigoring with a HR of 130.  Upon assessment, the patient was complaining of feeling very cold, asking for blankets.  She was mentating well, perfusing well.  , /100s.  , rectal temp 100.0.  Satting 100% on 5L NC.  Labs were drawn, EKG done which showed MRSA bacteremia 2/2 an infected HD catheter was transferred from OSH when found to have a postive ALCIDES for tricuspid vegetation.  Patient was transferred to the medicine service after a R atrial thrombus w/ extension into the IVC/SVC was noted, ruling her out as a surgical candidate.  She is on vancomycin dosed w/ HD.  At approximately 2pm on 8/27, she developed SVT w/ HR in 140-150 and was symptomatic (rigors).  RRT was called and she was given labetolol x2 which brought down her HR and blood pressure.  After the event, she was noted to be lethargic, not answering questions appropriately, and altered.  Stroke code was called to evaluate.  patient had SVT with hypertension most likely related to her sepsis. Patient had altered mental status but was still able to follow commands. Stroke code was called. CT head negative for acute infarction or bleed. Patient's mental status improved after head CT. Persistently tachycardic and given 100 mg labetalol PO once MS improved, heart rate came down to 96.

## 2017-08-27 NOTE — PROGRESS NOTE ADULT - SUBJECTIVE AND OBJECTIVE BOX
[facility] TRANSFER ACCEPT NOTE    HOSPITAL COURSE:  Ms. Hale is a 47 year-old female with a PMH of HTN, HLD, T2DM, ESRD (on HD T, Th, S), chronic L foot osteomyelitis, who presented to OS ([facility]) with four days of increasing malaise, confusion, nausea, and vomiting, in the setting of two missed dialysis sessions.  She was found to be septic, with the likely source being her Permacath.  At OSH, her Permacath was removed and a R femoral Shiley was placed.  She was dialyzed twice.  She was found to have MRSA bacteremia, and she was followed by nephrology and infectious disease.  ALCIDES demonstrated a 'floating vegetation' and she was transferred to [facility] for further management and intervention.        47 year old female with history of HTN, HLD, DM II, ESRD on HD (T, Th, S), and ?chronic left osteomyelitis, ICU team being consulted for MRSA for patient to be transferred under medicine telemetry. Patient was initially transferred from OS ([facility]) under CT surgery service at [facility] for endocarditis of tricuspid valve and thrombus in right atrium. Other hospital course complication include MRSA bacteremia now on Vancomycin, point of entry likely HD catheter in right IJ, that has been removed and patient has a right femoral HD cath. TTE / ALCIDES showing Normal LV size and function, no AI or AS, mild MR, mild TR, moderate pericardial effusion without tamponade, mobile echodensity in the tip of the IVC / SVC / right atrium, and aneurysmal interatrial septum with mobile density attached.  As CT surgery plans no intervention at this time, patient will be transferred to medicine/Tele for further management.   Patient seen at bedside during hemodialysis session and only complains of being cold and subjective chills. No HA or vision changes. No cough, chest pain or SOB.         SUBJECTIVE / INTERVAL HPI: Patient seen and examined at bedside.     VITAL SIGNS:  Vital Signs Last 24 Hrs  T(C): 36.9 (26 Aug 2017 22:30), Max: 39.1 (26 Aug 2017 16:57)  T(F): 98.5 (26 Aug 2017 22:30), Max: 102.4 (26 Aug 2017 16:57)  HR: 104 (27 Aug 2017 00:35) (83 - 113)  BP: 168/75 (27 Aug 2017 00:35) (150/62 - 183/75)  BP(mean): 109 (27 Aug 2017 00:35) (86 - 110)  RR: 20 (27 Aug 2017 00:35) (18 - 20)  SpO2: 100% (27 Aug 2017 00:35) (93% - 100%)    PHYSICAL EXAM:    General: WDWN  HEENT: NC/AT; PERRL, anicteric sclera; MMM  Neck: supple  Cardiovascular: +S1/S2; RRR  Respiratory: CTA B/L; no W/R/R  Gastrointestinal: soft, NT/ND; +BSx4  Extremities: WWP; no edema, clubbing or cyanosis  Vascular: 2+ radial, DP/PT pulses B/L  Neurological: AAOx3; no focal deficits    MEDICATIONS:  MEDICATIONS  (STANDING):  sodium chloride 0.9% lock flush 3 milliLiter(s) IV Push every 8 hours  pantoprazole    Tablet 40 milliGRAM(s) Oral before breakfast  aspirin enteric coated 81 milliGRAM(s) Oral daily  valsartan 320 milliGRAM(s) Oral daily  atorvastatin 40 milliGRAM(s) Oral at bedtime  insulin lispro (HumaLOG) corrective regimen sliding scale   SubCutaneous every 6 hours  dextrose 5%. 1000 milliLiter(s) (50 mL/Hr) IV Continuous <Continuous>  dextrose 50% Injectable 12.5 Gram(s) IV Push once  dextrose 50% Injectable 25 Gram(s) IV Push once  dextrose 50% Injectable 25 Gram(s) IV Push once  metoprolol 50 milliGRAM(s) Oral every 6 hours  insulin lispro Injectable (HumaLOG) 5 Unit(s) SubCutaneous three times a day before meals  insulin glargine Injectable (LANTUS) 23 Unit(s) SubCutaneous at bedtime  heparin  Infusion 1600 Unit(s)/Hr (16 mL/Hr) IV Continuous <Continuous>    MEDICATIONS  (PRN):  dextrose Gel 1 Dose(s) Oral once PRN Blood Glucose LESS THAN 70 milliGRAM(s)/deciliter  glucagon  Injectable 1 milliGRAM(s) IntraMuscular once PRN Glucose LESS THAN 70 milligrams/deciliter  acetaminophen   Tablet. 650 milliGRAM(s) Oral every 6 hours PRN Mild Pain (1 - 3)      ALLERGIES:  Allergies    penicillin (Unknown)  Sular (Unknown)    Intolerances        LABS:                        8.9    26.8  )-----------( 209      ( 26 Aug 2017 16:57 )             27.3     08-26    129<L>  |  90<L>  |  22  ----------------------------<  183<H>  3.3<L>   |  23  |  4.20<H>    Ca    8.0<L>      26 Aug 2017 18:51  Phos  7.5     08-26  Mg     2.3     08-26    TPro  7.2  /  Alb  2.0<L>  /  TBili  0.4  /  DBili  x   /  AST  17  /  ALT  17  /  AlkPhos  294<H>  08-25    PT/INR - ( 26 Aug 2017 07:15 )   PT: 15.3 sec;   INR: 1.37          PTT - ( 26 Aug 2017 18:51 )  PTT:48.3 sec    CAPILLARY BLOOD GLUCOSE  206 (26 Aug 2017 22:30)          RADIOLOGY & ADDITIONAL TESTS: Reviewed.    ASSESSMENT:    PLAN: [facility]ER ACCEPT NOTE    HOSPITAL COURSE:  Ms. Hale is a 47 year-old female with a PMH of HTN, HLD, T2DM, ESRD (on HD T, Th, S), chronic L foot osteomyelitis, who presented to OSH ([facility]) with four days of increasing malaise, confusion, nausea, and vomiting, in the setting of two missed dialysis sessions.  She was found to be septic, with the likely source being her Permacath in her R IJ.  At OSH, her Permacath was removed and a R femoral Shiley was placed.  She was dialyzed twice.  She was found to have MRSA bacteremia, and she was followed by nephrology and infectious disease.  ALCIDES demonstrated a 'floating vegetation' and she was transferred to [facility] for further management and intervention.      Patient was admitted to [facility] under CT surgery service for endocarditis of tricuspid valve. She was continued on vancomycin while valve replacement was considered.  Of note, the patient was also found on admission to [facility] to have mobile echodensity in the tip of the IVC / SVC / right atrium, and aneurysmal interatrial septum with mobile density attached.  The patient was started on a heparin drip for the thrombus.     The patient continued to complain of weakness and malaise at [facility], and was found to have profound weakness of her RLE, LLE, and moderate weakness of her b/l UE.  Vascular neurology was consulted.  An initial CT scan demonstrated possible R-MCA infarct, but repeat scan the next day did not corroborate, suggesting artifact.  Neurology recommended continuing with anticoagulation in setting of large thrombus, but workup did not ultimately suggest vascular cause of the patient's weakness.       CT surgery ultimately planned no surgical intervention and recommended medical management.  The patient was transferred to [facility] for continued management.      SUBJECTIVE / INTERVAL HPI: Patient seen and examined at bedside.     VITAL SIGNS:  Vital Signs Last 24 Hrs  T(C): 36.9 (26 Aug 2017 22:30), Max: 39.1 (26 Aug 2017 16:57)  T(F): 98.5 (26 Aug 2017 22:30), Max: 102.4 (26 Aug 2017 16:57)  HR: 104 (27 Aug 2017 00:35) (83 - 113)  BP: 168/75 (27 Aug 2017 00:35) (150/62 - 183/75)  BP(mean): 109 (27 Aug 2017 00:35) (86 - 110)  RR: 20 (27 Aug 2017 00:35) (18 - 20)  SpO2: 100% (27 Aug 2017 00:35) (93% - 100%)    PHYSICAL EXAM:    General: WDWN  HEENT: NC/AT; PERRL, anicteric sclera; MMM  Neck: supple  Cardiovascular: +S1/S2; RRR  Respiratory: CTA B/L; no W/R/R  Gastrointestinal: soft, NT/ND; +BSx4  Extremities: WWP; no edema, clubbing or cyanosis  Vascular: 2+ radial, DP/PT pulses B/L  Neurological: AAOx3; no focal deficits    MEDICATIONS:  MEDICATIONS  (STANDING):  sodium chloride 0.9% lock flush 3 milliLiter(s) IV Push every 8 hours  pantoprazole    Tablet 40 milliGRAM(s) Oral before breakfast  aspirin enteric coated 81 milliGRAM(s) Oral daily  valsartan 320 milliGRAM(s) Oral daily  atorvastatin 40 milliGRAM(s) Oral at bedtime  insulin lispro (HumaLOG) corrective regimen sliding scale   SubCutaneous every 6 hours  dextrose 5%. 1000 milliLiter(s) (50 mL/Hr) IV Continuous <Continuous>  dextrose 50% Injectable 12.5 Gram(s) IV Push once  dextrose 50% Injectable 25 Gram(s) IV Push once  dextrose 50% Injectable 25 Gram(s) IV Push once  metoprolol 50 milliGRAM(s) Oral every 6 hours  insulin lispro Injectable (HumaLOG) 5 Unit(s) SubCutaneous three times a day before meals  insulin glargine Injectable (LANTUS) 23 Unit(s) SubCutaneous at bedtime  heparin  Infusion 1600 Unit(s)/Hr (16 mL/Hr) IV Continuous <Continuous>    MEDICATIONS  (PRN):  dextrose Gel 1 Dose(s) Oral once PRN Blood Glucose LESS THAN 70 milliGRAM(s)/deciliter  glucagon  Injectable 1 milliGRAM(s) IntraMuscular once PRN Glucose LESS THAN 70 milligrams/deciliter  acetaminophen   Tablet. 650 milliGRAM(s) Oral every 6 hours PRN Mild Pain (1 - 3)      ALLERGIES:  Allergies    penicillin (Unknown)  Sular (Unknown)    Intolerances        LABS:                        8.9    26.8  )-----------( 209      ( 26 Aug 2017 16:57 )             27.3     08-26    129<L>  |  90<L>  |  22  ----------------------------<  183<H>  3.3<L>   |  23  |  4.20<H>    Ca    8.0<L>      26 Aug 2017 18:51  Phos  7.5     08-26  Mg     2.3     08-26    TPro  7.2  /  Alb  2.0<L>  /  TBili  0.4  /  DBili  x   /  AST  17  /  ALT  17  /  AlkPhos  294<H>  08-25    PT/INR - ( 26 Aug 2017 07:15 )   PT: 15.3 sec;   INR: 1.37          PTT - ( 26 Aug 2017 18:51 )  PTT:48.3 sec    CAPILLARY BLOOD GLUCOSE  206 (26 Aug 2017 22:30)          RADIOLOGY & ADDITIONAL TESTS: Reviewed.    ASSESSMENT:    PLAN: [facility]ER ACCEPT NOTE    HOSPITAL COURSE:  Ms. Hale is a 47 year-old female with a PMH of HTN, HLD, T2DM, ESRD (on HD T, Th, S), chronic L foot osteomyelitis, who presented to OSH ([facility]) with four days of increasing malaise, confusion, nausea, and vomiting, in the setting of two missed dialysis sessions.  She was found to be septic, with the likely source being her Permacath in her R IJ.  At OSH, her Permacath was removed and a R femoral Shiley was placed.  She was dialyzed twice.  She was found to have MRSA bacteremia, and she was followed by nephrology and infectious disease.  ALCIDES demonstrated a 'floating vegetation' and she was transferred to [facility] for further management and intervention.      Patient was admitted to [facility] under CT surgery service for endocarditis of tricuspid valve. She was continued on vancomycin while valve replacement was considered.  Of note, the patient was also found on admission to [facility] to have mobile echodensity in the tip of the IVC / SVC / right atrium, and aneurysmal interatrial septum with mobile density attached.  The patient was started on a heparin drip for the thrombus.     The patient continued to complain of weakness and malaise at [facility], and was found to have profound weakness of her RLE, LLE, and moderate weakness of her b/l UE.  Vascular neurology was consulted.  An initial CT scan demonstrated possible R-MCA infarct, but repeat scan the next day did not corroborate, suggesting artifact.  Neurology recommended continuing with anticoagulation in setting of large thrombus, but workup did not ultimately suggest vascular cause of the patient's weakness.       CT surgery ultimately planned no surgical intervention and recommended medical management.  The patient was transferred to [facility] for continued management.      SUBJECTIVE / INTERVAL HPI: Patient seen and examined at bedside. She currently complains mostly of weakness, feeling unwell, and epigastric pain.  She states that her weakness is profound in her lower extremities and moderate in her upper extremities.  She states that she is unable to move her R leg at all.  She cannot rotate her trunk while lying in bed.  Her epigastric pain is of two days' duration, is described as sharp, is always present, but worse with activities that increase intraabdominal pressure, such as coughing, and it does not radiate.  She does not endorse a history of reflux.     On ROS, the patient also admits to feeling feverish, having chills and sweats, photophobia, some shortness of breath, nausea, and constipation. She denies cough, vomiting, diarrhea, dysuria, paresthesias.      VITAL SIGNS:  Vital Signs Last 24 Hrs  T(C): 36.9 (26 Aug 2017 22:30), Max: 39.1 (26 Aug 2017 16:57)  T(F): 98.5 (26 Aug 2017 22:30), Max: 102.4 (26 Aug 2017 16:57)  HR: 104 (27 Aug 2017 00:35) (83 - 113)  BP: 168/75 (27 Aug 2017 00:35) (150/62 - 183/75)  BP(mean): 109 (27 Aug 2017 00:35) (86 - 110)  RR: 20 (27 Aug 2017 00:35) (18 - 20)  SpO2: 100% (27 Aug 2017 00:35) (93% - 100%)    PHYSICAL EXAM:    General: WDWN  HEENT: NC/AT; PERRL, anicteric sclera; MMM  Neck: supple  Cardiovascular: +S1/S2; RRR  Respiratory: CTA B/L; no W/R/R  Gastrointestinal: soft, NT/ND; +BSx4  Extremities: WWP; no edema, clubbing or cyanosis  Vascular: 2+ radial, DP/PT pulses B/L  Neurological: AAOx3; no focal deficits    MEDICATIONS:  MEDICATIONS  (STANDING):  sodium chloride 0.9% lock flush 3 milliLiter(s) IV Push every 8 hours  pantoprazole    Tablet 40 milliGRAM(s) Oral before breakfast  aspirin enteric coated 81 milliGRAM(s) Oral daily  valsartan 320 milliGRAM(s) Oral daily  atorvastatin 40 milliGRAM(s) Oral at bedtime  insulin lispro (HumaLOG) corrective regimen sliding scale   SubCutaneous every 6 hours  dextrose 5%. 1000 milliLiter(s) (50 mL/Hr) IV Continuous <Continuous>  dextrose 50% Injectable 12.5 Gram(s) IV Push once  dextrose 50% Injectable 25 Gram(s) IV Push once  dextrose 50% Injectable 25 Gram(s) IV Push once  metoprolol 50 milliGRAM(s) Oral every 6 hours  insulin lispro Injectable (HumaLOG) 5 Unit(s) SubCutaneous three times a day before meals  insulin glargine Injectable (LANTUS) 23 Unit(s) SubCutaneous at bedtime  heparin  Infusion 1600 Unit(s)/Hr (16 mL/Hr) IV Continuous <Continuous>    MEDICATIONS  (PRN):  dextrose Gel 1 Dose(s) Oral once PRN Blood Glucose LESS THAN 70 milliGRAM(s)/deciliter  glucagon  Injectable 1 milliGRAM(s) IntraMuscular once PRN Glucose LESS THAN 70 milligrams/deciliter  acetaminophen   Tablet. 650 milliGRAM(s) Oral every 6 hours PRN Mild Pain (1 - 3)      ALLERGIES:  Allergies    penicillin (Unknown)  Sular (Unknown)    Intolerances        LABS:                        8.9    26.8  )-----------( 209      ( 26 Aug 2017 16:57 )             27.3     08-26    129<L>  |  90<L>  |  22  ----------------------------<  183<H>  3.3<L>   |  23  |  4.20<H>    Ca    8.0<L>      26 Aug 2017 18:51  Phos  7.5     08-26  Mg     2.3     08-26    TPro  7.2  /  Alb  2.0<L>  /  TBili  0.4  /  DBili  x   /  AST  17  /  ALT  17  /  AlkPhos  294<H>  08-25    PT/INR - ( 26 Aug 2017 07:15 )   PT: 15.3 sec;   INR: 1.37          PTT - ( 26 Aug 2017 18:51 )  PTT:48.3 sec    CAPILLARY BLOOD GLUCOSE  206 (26 Aug 2017 22:30)          RADIOLOGY & ADDITIONAL TESTS: Reviewed.    ASSESSMENT:    PLAN: [facility] TRAN[facility]ER ACCEPT NOTE    HOSPITAL COURSE:  Ms. Hale is a 47 year-old female with a PMH of HTN, HLD, T2DM, ESRD (on HD T, Th, S), chronic L foot osteomyelitis, who presented to OSH ([facility]) with four days of increasing malaise, confusion, nausea, and vomiting, in the setting of two missed dialysis sessions.  She was found to be septic, with the likely source being her Permacath in her R IJ.  At OSH, her Permacath was removed and a R femoral Shiley was placed.  She was dialyzed twice.  She was found to have MRSA bacteremia, and she was followed by nephrology and infectious disease.  ALCIDES demonstrated a 'floating vegetation' and she was transferred to [facility] for further management and intervention.      Patient was admitted to [facility] under CT surgery service for endocarditis of tricuspid valve. She was continued on vancomycin while valve replacement was considered.  Of note, the patient was also found on admission to [facility] to have mobile echodensity in the tip of the IVC / SVC / right atrium, and aneurysmal interatrial septum with mobile density attached.  The patient was started on a heparin drip for the thrombus.     The patient continued to complain of weakness and malaise at [facility], and was found to have profound weakness of her RLE, LLE, and moderate weakness of her b/l UE.  Vascular neurology was consulted.  An initial CT scan demonstrated possible R-MCA infarct, but repeat scan the next day did not corroborate, suggesting artifact.  Neurology recommended continuing with anticoagulation in setting of large thrombus, but workup did not ultimately suggest vascular cause of the patient's weakness.       CT surgery ultimately planned no surgical intervention and recommended medical management.  The patient was transferred to [facility] for continued management.      SUBJECTIVE / INTERVAL HPI: Patient seen and examined at bedside. She currently complains mostly of weakness, feeling unwell, and epigastric pain.  She states that her weakness is profound in her lower extremities and moderate in her upper extremities.  She states that she is unable to move her R leg at all.  She cannot rotate her trunk while lying in bed.  Her epigastric pain is of two days' duration, is described as sharp, is always present, but worse with activities that increase intraabdominal pressure, such as coughing, and it does not radiate.  She does not endorse a history of reflux.     On ROS, the patient also admits to feeling feverish, having chills and sweats, photophobia, some shortness of breath, nausea, and constipation. She denies cough, vomiting, diarrhea, dysuria, paresthesias.      VITAL SIGNS:  Vital Signs Last 24 Hrs  T(C): 36.9 (26 Aug 2017 22:30), Max: 39.1 (26 Aug 2017 16:57)  T(F): 98.5 (26 Aug 2017 22:30), Max: 102.4 (26 Aug 2017 16:57)  HR: 104 (27 Aug 2017 00:35) (83 - 113)  BP: 168/75 (27 Aug 2017 00:35) (150/62 - 183/75)  BP(mean): 109 (27 Aug 2017 00:35) (86 - 110)  RR: 20 (27 Aug 2017 00:35) (18 - 20)  SpO2: 100% (27 Aug 2017 00:35) (93% - 100%)    PHYSICAL EXAM:    General: Tired, slow to respond and provides one-word answers, well-developed, well-nourished, no acute distress, lying in bed.  HEENT: NC/AT; PERRL, anicteric sclera; MMM  Neck: supple  Cardiovascular: Tachycardic rate.  Regular rhythm, no murmurs, gallops, or rubs  Respiratory: Poor inspiratory effort, fair air movement.  Lungs clear to auscultation bilaterally, no wheezes, crackles, rhonchi  Gastrointestinal: Hypoactive bowel sounds.  Soft.  Moderately distended.  Tender to deep palpation in the epigastric/RUQ, no tenderness elsewhere.  Unable to appreciate hepatomegaly.    Extremities: UE WWP, no edema. LE warm, with chronic vascular skin changes distally b/l.  Left foot demonstrates chronic open, dry, nonpurulent linear chasm in the medial aspect, at the site of her chronic osteomyelitis.    Neurological:  --MSE: slow to respond, but alert & oriented  --CN2-12 grossly intact  --Motor - normal bulk and tone throughout.  UE 4/5 b/l.  LLE 3/5, RLE 1/5.  --Sensory - intact to LT in UE & LE  --Reflexes, cerebellum, gait not assessed.    MEDICATIONS:  MEDICATIONS  (STANDING):  sodium chloride 0.9% lock flush 3 milliLiter(s) IV Push every 8 hours  pantoprazole    Tablet 40 milliGRAM(s) Oral before breakfast  aspirin enteric coated 81 milliGRAM(s) Oral daily  valsartan 320 milliGRAM(s) Oral daily  atorvastatin 40 milliGRAM(s) Oral at bedtime  insulin lispro (HumaLOG) corrective regimen sliding scale   SubCutaneous every 6 hours  dextrose 5%. 1000 milliLiter(s) (50 mL/Hr) IV Continuous <Continuous>  dextrose 50% Injectable 12.5 Gram(s) IV Push once  dextrose 50% Injectable 25 Gram(s) IV Push once  dextrose 50% Injectable 25 Gram(s) IV Push once  metoprolol 50 milliGRAM(s) Oral every 6 hours  insulin lispro Injectable (HumaLOG) 5 Unit(s) SubCutaneous three times a day before meals  insulin glargine Injectable (LANTUS) 23 Unit(s) SubCutaneous at bedtime  heparin  Infusion 1600 Unit(s)/Hr (16 mL/Hr) IV Continuous <Continuous>    MEDICATIONS  (PRN):  dextrose Gel 1 Dose(s) Oral once PRN Blood Glucose LESS THAN 70 milliGRAM(s)/deciliter  glucagon  Injectable 1 milliGRAM(s) IntraMuscular once PRN Glucose LESS THAN 70 milligrams/deciliter  acetaminophen   Tablet. 650 milliGRAM(s) Oral every 6 hours PRN Mild Pain (1 - 3)      ALLERGIES:  Allergies    penicillin (Unknown)  Sular (Unknown)    Intolerances        LABS:                        8.9    26.8  )-----------( 209      ( 26 Aug 2017 16:57 )             27.3     08-26    129<L>  |  90<L>  |  22  ----------------------------<  183<H>  3.3<L>   |  23  |  4.20<H>    Ca    8.0<L>      26 Aug 2017 18:51  Phos  7.5     08-26  Mg     2.3     08-26    TPro  7.2  /  Alb  2.0<L>  /  TBili  0.4  /  DBili  x   /  AST  17  /  ALT  17  /  AlkPhos  294<H>  08-25    PT/INR - ( 26 Aug 2017 07:15 )   PT: 15.3 sec;   INR: 1.37          PTT - ( 26 Aug 2017 18:51 )  PTT:48.3 sec    CAPILLARY BLOOD GLUCOSE  206 (26 Aug 2017 22:30)

## 2017-08-27 NOTE — PROGRESS NOTE ADULT - ASSESSMENT
This is 47 year old female with PMH stated above. She is on HD recently started 2 months ago. LAst HD on 8/26 - 1.4 liters.

## 2017-08-27 NOTE — CONSULT NOTE ADULT - PROBLEM SELECTOR RECOMMENDATION 9
NIHSS 3, no acute findings on CT head.  Unlikely CVA/TIA event.  Discussed w/ structural heart -- patient does not have PFO, so unlikely to have embolic stroke.   - Can continue w/ heparin gtt given severity of thrombus  - No further intervention necessary at this time

## 2017-08-27 NOTE — PROGRESS NOTE ADULT - SUBJECTIVE AND OBJECTIVE BOX
Objective and Subjective   The patient is in her chair. Does not endorse any complains today - no shortness of breath, no chest pain. Does not have any nausea, vomiting or confusion. Admits for increased fluid intake     Patient is a 47 year old female with history of HTN, HLD, DM II, ESRD on HD (TTS. Last HD unknown. Receives HD via Right Femoral HD catheter placed on 08/22/2017 at [facility] and chronic left foot OM. 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 Dialyzed on 8/26  with 1.3 liters off.          sodium chloride 0.9% lock flush 3 milliLiter(s) every 8 hours  pantoprazole    Tablet 40 milliGRAM(s) before breakfast  aspirin enteric coated 81 milliGRAM(s) daily  valsartan 320 milliGRAM(s) daily  atorvastatin 40 milliGRAM(s) at bedtime  insulin lispro (HumaLOG) corrective regimen sliding scale   every 6 hours  dextrose 5%. 1000 milliLiter(s) <Continuous>  dextrose Gel 1 Dose(s) once PRN  dextrose 50% Injectable 12.5 Gram(s) once  dextrose 50% Injectable 25 Gram(s) once  dextrose 50% Injectable 25 Gram(s) once  glucagon  Injectable 1 milliGRAM(s) once PRN  acetaminophen   Tablet. 650 milliGRAM(s) every 6 hours PRN  metoprolol 50 milliGRAM(s) every 6 hours  insulin lispro Injectable (HumaLOG) 5 Unit(s) three times a day before meals  insulin glargine Injectable (LANTUS) 23 Unit(s) at bedtime  heparin  Infusion 1600 Unit(s)/Hr <Continuous>      Allergies    penicillin (Unknown)  Sular (Unknown)    Intolerances        T(C): , Max: 39.1 (08-26-17 @ 16:57)  T(F): , Max: 102.4 (08-26-17 @ 16:57)  HR: 96 (08-27-17 @ 09:09)  BP: 164/77 (08-27-17 @ 09:09)  BP(mean): 111 (08-27-17 @ 09:09)  RR: 19 (08-27-17 @ 09:09)  SpO2: 98% (08-27-17 @ 09:09)  Wt(kg): --    08-26 @ 07:01 - 08-27 @ 07:00  --------------------------------------------------------  IN:    heparin Infusion: 80 mL    Other: 1200 mL    Packed Red Blood Cells: 400 mL  Total IN: 1680 mL    OUT:    Other: 2500 mL  Total OUT: 2500 mL    Total NET: -820 mL      08-27 @ 07:01 - 08-27 @ 10:37  --------------------------------------------------------  IN:  Total IN: 0 mL    OUT:    Voided: 100 mL  Total OUT: 100 mL    Total NET: -100 mL          PHYSICAL exam  Alert and oriented  No JVD   Normal air entry in the lungs, no wheezing, no crackles, no rales   RRR, normal s1/s2, no murmurs, rubs or gallops   Abdomen - soft, non-tender, non-distended, normal bowel sounds   extremities - mild peripheral edema   HAs a triple lumen in the right femoral side  Patient seen and examined at bedside.       LABS:                        8.6    24.2  )-----------( 180      ( 27 Aug 2017 05:40 )             26.7     08-27    123<L>  |  84<L>  |  29<H>  ----------------------------<  247<H>  3.6   |  20<L>  |  5.00<H>    Ca    8.0<L>      27 Aug 2017 05:40  Phos  5.8     08-27  Mg     2.0     08-27    TPro  7.3  /  Alb  2.0<L>  /  TBili  0.6  /  DBili  x   /  AST  22  /  ALT  13  /  AlkPhos  346<H>  08-27      PT/INR - ( 26 Aug 2017 07:15 )   PT: 15.3 sec;   INR: 1.37          PTT - ( 27 Aug 2017 05:40 )  PTT:52.6 sec          RADIOLOGY & ADDITIONAL STUDIES:    < from: Xray Chest 1 View AP-PORTABLE IMMEDIATE (08.25.17 @ 20:53) >                   INTERPRETATION:  Clinical history: Fever    Limited inspiration. Prominent bronchovascular markings. Congestive   change cannot be excluded. Cardiomegaly .Degenerative changes thoracic   spine.    Impression: Limited inspiration. Prominent bronchovascular markings.   Congestive change cannot be excluded          < end of copied text >

## 2017-08-27 NOTE — CONSULT NOTE ADULT - SUBJECTIVE AND OBJECTIVE BOX
**STROKE CODE CONSULT NOTE**    Last known well time/Time of onset of symptoms:    HPI:  47F w/ MRSA bacteremia 2/2 an infected HD catheter was transferred from OSH when found to have a postive ALCIDES for tricuspid vegetation.  Patient was transferred to the medicine service after a R atrial thrombus w/ extension into the IVC/SVC was noted, ruling her out as a surgical candidate.  She is on vancomycin dosed w/ HD.  At approximately 2pm on 8/27, she developed SVT w/ HR in 140-150 and was symptomatic (rigors).  RRT was called and she was given labetolol x2 which brought down her HR and blood pressure.  After the event, she was noted to be lethargic, not answering questions appropriately, and altered.  Stroke code was called to evaluate.    PAST MEDICAL & SURGICAL HISTORY:  ESRD on HD  MRSA bacteremia  Type II diabetes  HLD  HTN    FAMILY HISTORY:  Unknown    SOCIAL HISTORY:  Smoking Cesation: N/A    ROS:  Unable to obtain full ROS as patient was not answering questions.  Complained of being cold.  Denied pain.    MEDICATIONS  (STANDING):  sodium chloride 0.9% lock flush 3 milliLiter(s) IV Push every 8 hours  pantoprazole    Tablet 40 milliGRAM(s) Oral before breakfast  aspirin enteric coated 81 milliGRAM(s) Oral daily  valsartan 320 milliGRAM(s) Oral daily  atorvastatin 40 milliGRAM(s) Oral at bedtime  insulin lispro (HumaLOG) corrective regimen sliding scale   SubCutaneous every 6 hours  dextrose 5%. 1000 milliLiter(s) (50 mL/Hr) IV Continuous <Continuous>  dextrose 50% Injectable 12.5 Gram(s) IV Push once  dextrose 50% Injectable 25 Gram(s) IV Push once  dextrose 50% Injectable 25 Gram(s) IV Push once  metoprolol 50 milliGRAM(s) Oral every 6 hours  insulin lispro Injectable (HumaLOG) 5 Unit(s) SubCutaneous three times a day before meals  insulin glargine Injectable (LANTUS) 23 Unit(s) SubCutaneous at bedtime  heparin  Infusion.  Unit(s)/Hr (17 mL/Hr) IV Continuous <Continuous>  acetaminophen   Tablet 650 milliGRAM(s) Oral once  labetalol Injectable 10 milliGRAM(s) IV Push once  potassium chloride  20 mEq/100 mL IVPB 20 milliEquivalent(s) IV Intermittent once  sodium chloride 0.9% Bolus 250 milliLiter(s) IV Bolus once    MEDICATIONS  (PRN):  dextrose Gel 1 Dose(s) Oral once PRN Blood Glucose LESS THAN 70 milliGRAM(s)/deciliter  glucagon  Injectable 1 milliGRAM(s) IntraMuscular once PRN Glucose LESS THAN 70 milligrams/deciliter  acetaminophen   Tablet. 650 milliGRAM(s) Oral every 6 hours PRN Mild Pain (1 - 3)      Allergies    penicillin (Unknown)  Sular (Unknown)    Intolerances        Vital Signs Last 24 Hrs  T(C): 36.9 (27 Aug 2017 15:03), Max: 39.1 (26 Aug 2017 16:57)  T(F): 98.4 (27 Aug 2017 15:03), Max: 102.4 (26 Aug 2017 16:57)  HR: 108 (27 Aug 2017 16:12) (96 - 108)  BP: 171/76 (27 Aug 2017 16:12) (164/77 - 191/84)  BP(mean): 109 (27 Aug 2017 16:12) (109 - 120)  RR: 20 (27 Aug 2017 16:12) (17 - 20)  SpO2: 100% (27 Aug 2017 16:12) (92% - 100%)    PHYSICAL EXAM:  Constitutional: WDWN; NAD  Cardiovascular: RRR, no appreciable murmurs; no carotid bruits  Neurologic:  Mental status: Awake, alert and oriented x3.  Recent and remote memory intact.  Naming, repetition and comprehension intact.  Attention/concentration intact.  No dysarthria, no aphasia.  Fund of knowledge appropriate.    Cranial nerves: Fundoscopic exam demonstrated no abnormalities, pupils equally round and reactive to light, visual fields full, no nystagmus, extraocular muscles intact, V1 through V3 intact bilaterally and symmetric, face symmetric, hearing intact to finger rub, palate elevation symmetric, tongue was midline, sternocleidomastoid/shoulder shrug strength bilaterally 5/5.    Motor:  Normal bulk and tone, strength 5/5 in LUE, 3-4/5 in RUE and B/L LE.   strength 5/5.  Sensation: Intact to light touch, proprioception, vibration, temperature, pinprick.  No neglect.   Coordination: No dysmetria on finger-to-nose and heel-to-shin.  No clumsiness.  Reflexes: 2+ in upper and lower extremities, absent Babinski bilaterally  Gait: Narrow and steady. No ataxia.  Romberg negative    NIHSS: 3    Fingerstick Blood Glucose: CAPILLARY BLOOD GLUCOSE  298 (27 Aug 2017 16:29)     LABS:                      8.6    24.2  )-----------( 180      ( 27 Aug 2017 05:40 )             26.7     08-27    125<L>  |  85<L>  |  36<H>  ----------------------------<  276<H>  3.8   |  21<L>  |  5.70<H>    Ca    8.6      27 Aug 2017 15:30  Phos  5.8     08-27  Mg     2.0     08-27    TPro  8.4<H>  /  Alb  2.2<L>  /  TBili  0.6  /  DBili  x   /  AST  23  /  ALT  14  /  AlkPhos  411<H>  08-27    PT/INR - ( 27 Aug 2017 15:30 )   PT: 16.1 sec;   INR: 1.44          PTT - ( 27 Aug 2017 15:30 )  PTT:55.5 sec  CARDIAC MARKERS ( 27 Aug 2017 15:30 )  x     / 0.11 ng/mL / 56 U/L / x     / 1.4 ng/mL    RADIOLOGY & ADDITIONAL STUDIES:  CT Head:   < from: CT Brain Stroke Protocol (08.27.17 @ 16:08) >  IMPRESSION:    No acute intracranial hemorrhage, mass effect, or CT evidence of acute   transcortical infarction.    The study was performed at 3:57 PM and the above findings were discussed   with Dr. Casiano at 4:19 PM.    < end of copied text >    IV-tPA (Y/N): N                          Bolus time:  Reason IV-tPAnot given: On heparin gtt, unlikely an acute ischemic stroke

## 2017-08-27 NOTE — CONSULT NOTE ADULT - ASSESSMENT
47F w/ MRSA bacteremia (w/ tricuspid vegetation) 2/2 an infected HD catheter and extensive RA thrombus presented w/ AMS after episode of SVT.

## 2017-08-27 NOTE — PROGRESS NOTE ADULT - ATTENDING COMMENTS
Patient seen and examined with house-staff during bedside rounds.  Resident note read, including vitals, physical findings, laboratory data, and radiological reports.   Revisions included below.  Direct personal management at bed side and extensive interpretation of the data.  Plan was outlined and discussed in details with the housestaff.  Decision making of high complexity  the patient is hemodynamically stable. Repeat blood culture still positive She e is scheduled for hemodialysis and we'll discuss electrolytes abnormality with nephrology.  Continue antibiotic and anticoagulation.  Follow with infectious disease

## 2017-08-28 LAB
ANION GAP SERPL CALC-SCNC: 17 MMOL/L — SIGNIFICANT CHANGE UP (ref 5–17)
APTT BLD: 38.9 SEC — HIGH (ref 27.5–37.4)
APTT BLD: 49 SEC — HIGH (ref 27.5–37.4)
APTT BLD: 49.6 SEC — HIGH (ref 27.5–37.4)
APTT BLD: 51.6 SEC — HIGH (ref 27.5–37.4)
BUN SERPL-MCNC: 46 MG/DL — HIGH (ref 7–23)
CALCIUM SERPL-MCNC: 8.1 MG/DL — LOW (ref 8.4–10.5)
CHLORIDE SERPL-SCNC: 86 MMOL/L — LOW (ref 96–108)
CO2 SERPL-SCNC: 20 MMOL/L — LOW (ref 22–31)
CREAT SERPL-MCNC: 6.4 MG/DL — HIGH (ref 0.5–1.3)
CULTURE RESULTS: SIGNIFICANT CHANGE UP
GLUCOSE SERPL-MCNC: 311 MG/DL — HIGH (ref 70–99)
GRAM STN FLD: SIGNIFICANT CHANGE UP
HCT VFR BLD CALC: 23.6 % — LOW (ref 34.5–45)
HGB BLD-MCNC: 7.6 G/DL — LOW (ref 11.5–15.5)
MAGNESIUM SERPL-MCNC: 2.1 MG/DL — SIGNIFICANT CHANGE UP (ref 1.6–2.6)
MCHC RBC-ENTMCNC: 25.8 PG — LOW (ref 27–34)
MCHC RBC-ENTMCNC: 32.2 G/DL — SIGNIFICANT CHANGE UP (ref 32–36)
MCV RBC AUTO: 80 FL — SIGNIFICANT CHANGE UP (ref 80–100)
PHOSPHATE SERPL-MCNC: 7.6 MG/DL — HIGH (ref 2.5–4.5)
PLATELET # BLD AUTO: 194 K/UL — SIGNIFICANT CHANGE UP (ref 150–400)
POTASSIUM SERPL-MCNC: 3.8 MMOL/L — SIGNIFICANT CHANGE UP (ref 3.5–5.3)
POTASSIUM SERPL-SCNC: 3.8 MMOL/L — SIGNIFICANT CHANGE UP (ref 3.5–5.3)
RBC # BLD: 2.95 M/UL — LOW (ref 3.8–5.2)
RBC # FLD: 17.9 % — HIGH (ref 10.3–16.9)
SODIUM SERPL-SCNC: 123 MMOL/L — LOW (ref 135–145)
SPECIMEN SOURCE: SIGNIFICANT CHANGE UP
VANCOMYCIN FLD-MCNC: 26 UG/ML
VANCOMYCIN TROUGH SERPL-MCNC: 25.8 UG/ML — CRITICAL HIGH (ref 10–20)
WBC # BLD: 24.5 K/UL — HIGH (ref 3.8–10.5)
WBC # FLD AUTO: 24.5 K/UL — HIGH (ref 3.8–10.5)

## 2017-08-28 PROCEDURE — 90935 HEMODIALYSIS ONE EVALUATION: CPT | Mod: GC

## 2017-08-28 PROCEDURE — 93010 ELECTROCARDIOGRAM REPORT: CPT

## 2017-08-28 PROCEDURE — 99222 1ST HOSP IP/OBS MODERATE 55: CPT | Mod: GC

## 2017-08-28 PROCEDURE — 99233 SBSQ HOSP IP/OBS HIGH 50: CPT | Mod: GC

## 2017-08-28 PROCEDURE — 71010: CPT | Mod: 26

## 2017-08-28 PROCEDURE — 99232 SBSQ HOSP IP/OBS MODERATE 35: CPT

## 2017-08-28 RX ORDER — LINEZOLID 600 MG/300ML
INJECTION, SOLUTION INTRAVENOUS
Qty: 0 | Refills: 0 | Status: DISCONTINUED | OUTPATIENT
Start: 2017-08-28 | End: 2017-08-29

## 2017-08-28 RX ORDER — GLUCAGON INJECTION, SOLUTION 0.5 MG/.1ML
1 INJECTION, SOLUTION SUBCUTANEOUS ONCE
Qty: 0 | Refills: 0 | Status: DISCONTINUED | OUTPATIENT
Start: 2017-08-28 | End: 2017-08-29

## 2017-08-28 RX ORDER — CALCIUM ACETATE 667 MG
667 TABLET ORAL
Qty: 0 | Refills: 0 | Status: DISCONTINUED | OUTPATIENT
Start: 2017-08-28 | End: 2017-08-29

## 2017-08-28 RX ORDER — HEPARIN SODIUM 5000 [USP'U]/ML
2000 INJECTION INTRAVENOUS; SUBCUTANEOUS
Qty: 25000 | Refills: 0 | Status: DISCONTINUED | OUTPATIENT
Start: 2017-08-28 | End: 2017-08-28

## 2017-08-28 RX ORDER — SODIUM CHLORIDE 9 MG/ML
1000 INJECTION, SOLUTION INTRAVENOUS
Qty: 0 | Refills: 0 | Status: DISCONTINUED | OUTPATIENT
Start: 2017-08-28 | End: 2017-08-29

## 2017-08-28 RX ORDER — DEXTROSE 50 % IN WATER 50 %
12.5 SYRINGE (ML) INTRAVENOUS ONCE
Qty: 0 | Refills: 0 | Status: DISCONTINUED | OUTPATIENT
Start: 2017-08-28 | End: 2017-08-29

## 2017-08-28 RX ORDER — INSULIN GLARGINE 100 [IU]/ML
30 INJECTION, SOLUTION SUBCUTANEOUS AT BEDTIME
Qty: 0 | Refills: 0 | Status: DISCONTINUED | OUTPATIENT
Start: 2017-08-28 | End: 2017-08-29

## 2017-08-28 RX ORDER — LINEZOLID 600 MG/300ML
600 INJECTION, SOLUTION INTRAVENOUS ONCE
Qty: 0 | Refills: 0 | Status: COMPLETED | OUTPATIENT
Start: 2017-08-28 | End: 2017-08-28

## 2017-08-28 RX ORDER — HEPARIN SODIUM 5000 [USP'U]/ML
1900 INJECTION INTRAVENOUS; SUBCUTANEOUS
Qty: 25000 | Refills: 0 | Status: DISCONTINUED | OUTPATIENT
Start: 2017-08-28 | End: 2017-08-28

## 2017-08-28 RX ORDER — DEXTROSE 50 % IN WATER 50 %
25 SYRINGE (ML) INTRAVENOUS ONCE
Qty: 0 | Refills: 0 | Status: DISCONTINUED | OUTPATIENT
Start: 2017-08-28 | End: 2017-08-29

## 2017-08-28 RX ORDER — LINEZOLID 600 MG/300ML
600 INJECTION, SOLUTION INTRAVENOUS EVERY 12 HOURS
Qty: 0 | Refills: 0 | Status: DISCONTINUED | OUTPATIENT
Start: 2017-08-29 | End: 2017-08-29

## 2017-08-28 RX ORDER — HEPARIN SODIUM 5000 [USP'U]/ML
2100 INJECTION INTRAVENOUS; SUBCUTANEOUS
Qty: 25000 | Refills: 0 | Status: DISCONTINUED | OUTPATIENT
Start: 2017-08-28 | End: 2017-08-29

## 2017-08-28 RX ORDER — SODIUM CHLORIDE 9 MG/ML
250 INJECTION INTRAMUSCULAR; INTRAVENOUS; SUBCUTANEOUS ONCE
Qty: 0 | Refills: 0 | Status: COMPLETED | OUTPATIENT
Start: 2017-08-28 | End: 2017-08-28

## 2017-08-28 RX ORDER — INSULIN LISPRO 100/ML
10 VIAL (ML) SUBCUTANEOUS
Qty: 0 | Refills: 0 | Status: DISCONTINUED | OUTPATIENT
Start: 2017-08-28 | End: 2017-08-29

## 2017-08-28 RX ORDER — DEXTROSE 50 % IN WATER 50 %
1 SYRINGE (ML) INTRAVENOUS ONCE
Qty: 0 | Refills: 0 | Status: DISCONTINUED | OUTPATIENT
Start: 2017-08-28 | End: 2017-08-29

## 2017-08-28 RX ORDER — ACETAMINOPHEN 500 MG
650 TABLET ORAL EVERY 6 HOURS
Qty: 0 | Refills: 0 | Status: DISCONTINUED | OUTPATIENT
Start: 2017-08-28 | End: 2017-08-29

## 2017-08-28 RX ORDER — INSULIN LISPRO 100/ML
VIAL (ML) SUBCUTANEOUS
Qty: 0 | Refills: 0 | Status: DISCONTINUED | OUTPATIENT
Start: 2017-08-28 | End: 2017-08-29

## 2017-08-28 RX ADMIN — HEPARIN SODIUM 20 UNIT(S)/HR: 5000 INJECTION INTRAVENOUS; SUBCUTANEOUS at 13:44

## 2017-08-28 RX ADMIN — SODIUM CHLORIDE 1000 MILLILITER(S): 9 INJECTION INTRAMUSCULAR; INTRAVENOUS; SUBCUTANEOUS at 23:53

## 2017-08-28 RX ADMIN — Medication 5 UNIT(S): at 11:58

## 2017-08-28 RX ADMIN — Medication 650 MILLIGRAM(S): at 23:53

## 2017-08-28 RX ADMIN — SODIUM CHLORIDE 3 MILLILITER(S): 9 INJECTION INTRAMUSCULAR; INTRAVENOUS; SUBCUTANEOUS at 21:50

## 2017-08-28 RX ADMIN — HEPARIN SODIUM 1900 UNIT(S)/HR: 5000 INJECTION INTRAVENOUS; SUBCUTANEOUS at 00:34

## 2017-08-28 RX ADMIN — SODIUM CHLORIDE 3 MILLILITER(S): 9 INJECTION INTRAMUSCULAR; INTRAVENOUS; SUBCUTANEOUS at 07:43

## 2017-08-28 RX ADMIN — Medication 50 MILLIGRAM(S): at 20:03

## 2017-08-28 RX ADMIN — SODIUM CHLORIDE 3 MILLILITER(S): 9 INJECTION INTRAMUSCULAR; INTRAVENOUS; SUBCUTANEOUS at 14:00

## 2017-08-28 RX ADMIN — Medication 650 MILLIGRAM(S): at 02:34

## 2017-08-28 RX ADMIN — Medication 8: at 07:42

## 2017-08-28 RX ADMIN — HEPARIN SODIUM 19 UNIT(S)/HR: 5000 INJECTION INTRAVENOUS; SUBCUTANEOUS at 12:04

## 2017-08-28 RX ADMIN — Medication 667 MILLIGRAM(S): at 12:51

## 2017-08-28 RX ADMIN — Medication 50 MILLIGRAM(S): at 07:41

## 2017-08-28 RX ADMIN — VALSARTAN 320 MILLIGRAM(S): 80 TABLET ORAL at 07:42

## 2017-08-28 RX ADMIN — Medication 8: at 00:33

## 2017-08-28 RX ADMIN — Medication 2: at 21:49

## 2017-08-28 RX ADMIN — Medication 50 MILLIGRAM(S): at 12:04

## 2017-08-28 RX ADMIN — HEPARIN SODIUM 21 UNIT(S)/HR: 5000 INJECTION INTRAVENOUS; SUBCUTANEOUS at 21:50

## 2017-08-28 RX ADMIN — Medication 650 MILLIGRAM(S): at 13:22

## 2017-08-28 RX ADMIN — Medication 6: at 12:04

## 2017-08-28 RX ADMIN — PANTOPRAZOLE SODIUM 40 MILLIGRAM(S): 20 TABLET, DELAYED RELEASE ORAL at 07:42

## 2017-08-28 RX ADMIN — Medication 650 MILLIGRAM(S): at 04:10

## 2017-08-28 RX ADMIN — INSULIN GLARGINE 30 UNIT(S): 100 INJECTION, SOLUTION SUBCUTANEOUS at 21:49

## 2017-08-28 RX ADMIN — Medication 81 MILLIGRAM(S): at 12:04

## 2017-08-28 RX ADMIN — ATORVASTATIN CALCIUM 40 MILLIGRAM(S): 80 TABLET, FILM COATED ORAL at 21:48

## 2017-08-28 RX ADMIN — Medication 5 UNIT(S): at 07:44

## 2017-08-28 RX ADMIN — LINEZOLID 300 MILLIGRAM(S): 600 INJECTION, SOLUTION INTRAVENOUS at 12:26

## 2017-08-28 RX ADMIN — Medication 50 MILLIGRAM(S): at 00:33

## 2017-08-28 RX ADMIN — HEPARIN SODIUM 20 UNIT(S)/HR: 5000 INJECTION INTRAVENOUS; SUBCUTANEOUS at 14:50

## 2017-08-28 NOTE — CONSULT NOTE ADULT - SUBJECTIVE AND OBJECTIVE BOX
HPI: 46 yo F w/ PMHx of HTN, HLD, T2DM, ESRD (on HD T, Th, S), chronic L foot osteomyelitis who presented to OSH  with four days of increasing malaise, confusion, nausea, and vomiting. She was found to be septic, with the likely source being her Permacath in her R IJ.  At OSH, her Permacath was removed and a R femoral Shiley was placed. Later cultures confirmed MRSA bacteremia.  A ALCIDES demonstrated a vegetation and she was transferred to [facility] for further management and intervention.  She was continued on vancomycin while valve replacement was considered.  During H admission, patient was also found have mobile echodensity in the tip of the IVC / SVC / right atrium, and aneurysmal interatrial septum with mobile density attached and was started on heparin gtt for the thrombus. CT surgery ultimately planned no surgical intervention 2/2 comorbidities and recommended medical management.  Today, the pt has no complaints.  She specifically denies fevers, rigors, chills, nausea, vomiting, SOB, coughing, abdominal pain, diarrhea, dysuria and L foot pain.     [OBJECTIVE]:    VITAL SIGNS:  T(F): 100.2 (08-28-17 @ 13:39), Max: 101.9 (08-27-17 @ 17:18)  HR: 94 (08-28-17 @ 12:03) (80 - 96)  BP: 179/79 (08-28-17 @ 12:03) (128/61 - 179/79)  BP(mean): 113 (08-28-17 @ 12:03) (88 - 113)  RR: 18 (08-28-17 @ 12:03) (16 - 18)  SpO2: 99% (08-28-17 @ 12:03) (95% - 100%)  Wt(kg): --  CVP(cm H2O): --      08-27 @ 07:01  -  08-28 @ 07:00  --------------------------------------------------------  IN: 702 mL / OUT: 400 mL / NET: 302 mL    08-28 @ 07:01  -  08-28 @ 17:07  --------------------------------------------------------  IN: 155 mL / OUT: 250 mL / NET: -95 mL      CAPILLARY BLOOD GLUCOSE  290 (28 Aug 2017 16:25)    PHYSICAL EXAM:    General: A&Ox 3; NAD  Head: NC/AT  Eyes: PERRL; EOMI; anicteric sclera  Neck: Supple; no JVD  Respiratory: CTA B/L; no wheezes/crackles/rales  Cardiovascular: Regular rhythm/rate; S1/S2; no gallops or murmurs auscultated  Gastrointestinal: Soft; NTND w/out rebound tenderness or guarding  Extremities: Left foot demonstrates medial chronic open, dry, nonpurulent linear chasm in the medial aspect, NTTP, no fluctuance  Skin: No rashes    MEDICATIONS:  MEDICATIONS  (STANDING):  sodium chloride 0.9% lock flush 3 milliLiter(s) IV Push every 8 hours  pantoprazole    Tablet 40 milliGRAM(s) Oral before breakfast  aspirin enteric coated 81 milliGRAM(s) Oral daily  valsartan 320 milliGRAM(s) Oral daily  atorvastatin 40 milliGRAM(s) Oral at bedtime  metoprolol 50 milliGRAM(s) Oral every 6 hours  calcium acetate 667 milliGRAM(s) Oral three times a day with meals  linezolid  IVPB   IV Intermittent   heparin  Infusion 2000 Unit(s)/Hr (20 mL/Hr) IV Continuous <Continuous>  insulin glargine Injectable (LANTUS) 30 Unit(s) SubCutaneous at bedtime  insulin lispro Injectable (HumaLOG) 10 Unit(s) SubCutaneous three times a day before meals  insulin lispro (HumaLOG) corrective regimen sliding scale   SubCutaneous Before meals and at bedtime  dextrose 5%. 1000 milliLiter(s) (50 mL/Hr) IV Continuous <Continuous>  dextrose 50% Injectable 12.5 Gram(s) IV Push once  dextrose 50% Injectable 25 Gram(s) IV Push once  dextrose 50% Injectable 25 Gram(s) IV Push once    MEDICATIONS  (PRN):  acetaminophen   Tablet. 650 milliGRAM(s) Oral every 6 hours PRN Mild Pain (1 - 3)  acetaminophen   Tablet 650 milliGRAM(s) Oral every 6 hours PRN For Temp greater than 38 C (100.4 F)  dextrose Gel 1 Dose(s) Oral once PRN Blood Glucose LESS THAN 70 milliGRAM(s)/deciliter  glucagon  Injectable 1 milliGRAM(s) IntraMuscular once PRN Glucose LESS THAN 70 milligrams/deciliter      ALLERGIES:  Allergies    penicillin (Unknown)  Sular (Unknown)    Intolerances        LABS:                        7.6    24.5  )-----------( 194      ( 28 Aug 2017 06:32 )             23.6     08-28    123<L>  |  86<L>  |  46<H>  ----------------------------<  311<H>  3.8   |  20<L>  |  6.40<H>    Ca    8.1<L>      28 Aug 2017 06:29  Phos  7.6     08-28  Mg     2.1     08-28    TPro  8.4<H>  /  Alb  2.2<L>  /  TBili  0.6  /  DBili  x   /  AST  23  /  ALT  14  /  AlkPhos  411<H>  08-27    PT/INR - ( 27 Aug 2017 15:30 )   PT: 16.1 sec;   INR: 1.44          PTT - ( 28 Aug 2017 12:35 )  PTT:49.0 sec    Microbiology:   Culture - Blood (08.27.17 @ 14:54)    Gram Stain:   Growth in aerobic bottle: Gram Positive Cocci in Clusters  Result called to and read back by_ Gina Saunders RN (7 [facility])  08/28/2017 09:15    Specimen Source: .Blood Blood    Culture Results:   Culture in progress    Culture - Blood (08.27.17 @ 14:54)    Gram Stain:   Growth in aerobic bottle: Gram Positive Cocci in Clusters  Result called to and read back by_ Gina Saunders RN (7 [facility])  08/28/2017 09:15    Specimen Source: .Blood Blood    Culture Results:   Culture in progress    Culture - Blood (08.26.17 @ 13:23)    Gram Stain:   Growth in aerobic bottle: Gram Positive Cocci in Clusters  Result called to and read back by_ DEIRDRE Segal RN (7 [facility]) on  08/27/2017  08:20 am    Specimen Source: .Blood Blood    Culture Results:   Growth in aerobic bottle: Staphylococcus aureus presumptive Methicillin  resistant  Confirmation to follow within 24 hours  Floor previously notified.    Culture - Blood in AM (08.26.17 @ 13:21)    Gram Stain:   Growth in aerobic bottle: Gram Positive Cocci in Clusters  Result called to and read back by_ DEIRDRE Segal RN (7 [facility]) on  08/27/2017  08:20 am    Specimen Source: .Blood Blood-Peripheral    Culture Results:   Growth in aerobic bottle: Staphylococcus aureus presumptive Methicillin  resistant  Confirmation to follow within 24 hours  Floor previously notified.    Culture - Blood (08.24.17 @ 03:21)    -  Cefazolin: R >16    -  Linezolid: S 4    -  Oxacillin: R >2    -  Penicillin: R >8    -  Trimethoprim/Sulfamethoxazole: S <=0.5/9.5    -  Clindamycin: R >4    -  RIF- Rifampin: S <=1    -  Vancomycin: S 1.5    -  Vancomycin: S 2    -  Erythromycin: R >4    -  Tetra/Doxy: S <=4    Gram Stain:   Growth in aerobic bottle: Gram Positive Cocci in Clusters  Result called to and read back by_ AMISH Brasher (9 [facility]) RN on  08/24/2017  14:12 pm  Growth in anaerobic bottle: Gram Positive Cocci in Clusters  Result called to and read back by_ MARC Payan RN(9 [facility]) on  08/26/2017  09:36 am    Specimen Source: .Blood Blood-Peripheral    Organism: Methicillin resistant Staphylococcus aureus    Organism: Methicillin resistant Staphylococcus aureus    Culture Results:   Growth in aerobic and anaerobic bottles: Methicillin resistant  Staphylococcus aureus  Result called to and read back by_ MARC Payan RN ([facility]) on  08/25/2017  10:15am    Organism Identification: Methicillin resistant Staphylococcus aureus  Methicillin resistant Staphylococcus aureus    Method Type: ETEST    Method Type: AUTUMN    RADIOLOGY & ADDITIONAL TESTS: Reviewed. HPI: 48 yo F w/ PMHx of HTN, HLD, T2DM, ESRD (on HD T, Th, S), chronic L foot osteomyelitis who presented to OSH  with four days of increasing malaise, confusion, nausea, and vomiting. She was found to be septic, with the likely source being her Permacath in her R IJ.  At OSH, her Permacath was removed and a R femoral Shiley was placed. Later cultures confirmed MRSA bacteremia.  A ALCIDES demonstrated a vegetation and she was transferred to [facility] for further management and intervention.  She was continued on vancomycin while valve replacement was considered.  During H admission, patient was also found have mobile echodensity in the tip of the IVC / SVC / right atrium, and aneurysmal interatrial septum with mobile density attached and was started on heparin gtt for the thrombus. CT surgery ultimately planned no surgical intervention 2/2 comorbidities and recommended medical management.  Today, the pt has no complaints.  She specifically denies fevers, rigors, chills, nausea, vomiting, SOB, coughing, abdominal pain, diarrhea, dysuria and L foot pain.     [OBJECTIVE]:    VITAL SIGNS:  T(F): 100.2 (08-28-17 @ 13:39), Max: 101.9 (08-27-17 @ 17:18)  HR: 94 (08-28-17 @ 12:03) (80 - 96)  BP: 179/79 (08-28-17 @ 12:03) (128/61 - 179/79)  BP(mean): 113 (08-28-17 @ 12:03) (88 - 113)  RR: 18 (08-28-17 @ 12:03) (16 - 18)  SpO2: 99% (08-28-17 @ 12:03) (95% - 100%)  Wt(kg): --  CVP(cm H2O): --      08-27 @ 07:01  -  08-28 @ 07:00  --------------------------------------------------------  IN: 702 mL / OUT: 400 mL / NET: 302 mL    08-28 @ 07:01  -  08-28 @ 17:07  --------------------------------------------------------  IN: 155 mL / OUT: 250 mL / NET: -95 mL      CAPILLARY BLOOD GLUCOSE  290 (28 Aug 2017 16:25)    PHYSICAL EXAM:    General: A&Ox 3; NAD  Head: NC/AT  Eyes: PERRL; EOMI; anicteric sclera  Neck: Supple; no JVD  Respiratory: CTA B/L; no wheezes/crackles/rales  Cardiovascular: Regular rhythm/rate; S1/S2; no gallops or murmurs auscultated  Gastrointestinal: Soft; NTND w/out rebound tenderness or guarding  Extremities: Left foot demonstrates medial chronic open, dry, nonpurulent linear chasm in the medial aspect, NTTP, no fluctuance  Skin: No rashes    MEDICATIONS:  MEDICATIONS  (STANDING):  sodium chloride 0.9% lock flush 3 milliLiter(s) IV Push every 8 hours  pantoprazole    Tablet 40 milliGRAM(s) Oral before breakfast  aspirin enteric coated 81 milliGRAM(s) Oral daily  valsartan 320 milliGRAM(s) Oral daily  atorvastatin 40 milliGRAM(s) Oral at bedtime  metoprolol 50 milliGRAM(s) Oral every 6 hours  calcium acetate 667 milliGRAM(s) Oral three times a day with meals  linezolid  IVPB   IV Intermittent   heparin  Infusion 2000 Unit(s)/Hr (20 mL/Hr) IV Continuous <Continuous>  insulin glargine Injectable (LANTUS) 30 Unit(s) SubCutaneous at bedtime  insulin lispro Injectable (HumaLOG) 10 Unit(s) SubCutaneous three times a day before meals  insulin lispro (HumaLOG) corrective regimen sliding scale   SubCutaneous Before meals and at bedtime  dextrose 5%. 1000 milliLiter(s) (50 mL/Hr) IV Continuous <Continuous>  dextrose 50% Injectable 12.5 Gram(s) IV Push once  dextrose 50% Injectable 25 Gram(s) IV Push once  dextrose 50% Injectable 25 Gram(s) IV Push once    MEDICATIONS  (PRN):  acetaminophen   Tablet. 650 milliGRAM(s) Oral every 6 hours PRN Mild Pain (1 - 3)  acetaminophen   Tablet 650 milliGRAM(s) Oral every 6 hours PRN For Temp greater than 38 C (100.4 F)  dextrose Gel 1 Dose(s) Oral once PRN Blood Glucose LESS THAN 70 milliGRAM(s)/deciliter  glucagon  Injectable 1 milliGRAM(s) IntraMuscular once PRN Glucose LESS THAN 70 milligrams/deciliter      ALLERGIES:  Allergies    penicillin (Unknown)  sulfa (Unknown)    Intolerances        LABS:                        7.6    24.5  )-----------( 194      ( 28 Aug 2017 06:32 )             23.6     08-28    123<L>  |  86<L>  |  46<H>  ----------------------------<  311<H>  3.8   |  20<L>  |  6.40<H>    Ca    8.1<L>      28 Aug 2017 06:29  Phos  7.6     08-28  Mg     2.1     08-28    TPro  8.4<H>  /  Alb  2.2<L>  /  TBili  0.6  /  DBili  x   /  AST  23  /  ALT  14  /  AlkPhos  411<H>  08-27    PT/INR - ( 27 Aug 2017 15:30 )   PT: 16.1 sec;   INR: 1.44          PTT - ( 28 Aug 2017 12:35 )  PTT:49.0 sec    Microbiology:   Culture - Blood (08.27.17 @ 14:54)    Gram Stain:   Growth in aerobic bottle: Gram Positive Cocci in Clusters  Result called to and read back by_ Gina Saunders RN (7 [facility])  08/28/2017 09:15    Specimen Source: .Blood Blood    Culture Results:   Culture in progress    Culture - Blood (08.27.17 @ 14:54)    Gram Stain:   Growth in aerobic bottle: Gram Positive Cocci in Clusters  Result called to and read back by_ Gina Saunders RN (7 [facility])  08/28/2017 09:15    Specimen Source: .Blood Blood    Culture Results:   Culture in progress    Culture - Blood (08.26.17 @ 13:23)    Gram Stain:   Growth in aerobic bottle: Gram Positive Cocci in Clusters  Result called to and read back by_ DEIRDRE Segal RN (7 [facility]) on  08/27/2017  08:20 am    Specimen Source: .Blood Blood    Culture Results:   Growth in aerobic bottle: Staphylococcus aureus presumptive Methicillin  resistant  Confirmation to follow within 24 hours  Floor previously notified.    Culture - Blood in AM (08.26.17 @ 13:21)    Gram Stain:   Growth in aerobic bottle: Gram Positive Cocci in Clusters  Result called to and read back by_ DEIRDRE Segal RN (7 [facility]) on  08/27/2017  08:20 am    Specimen Source: .Blood Blood-Peripheral    Culture Results:   Growth in aerobic bottle: Staphylococcus aureus presumptive Methicillin  resistant  Confirmation to follow within 24 hours  Floor previously notified.    Culture - Blood (08.24.17 @ 03:21)    -  Cefazolin: R >16    -  Linezolid: S 4    -  Oxacillin: R >2    -  Penicillin: R >8    -  Trimethoprim/Sulfamethoxazole: S <=0.5/9.5    -  Clindamycin: R >4    -  RIF- Rifampin: S <=1    -  Vancomycin: S 1.5    -  Vancomycin: S 2    -  Erythromycin: R >4    -  Tetra/Doxy: S <=4    Gram Stain:   Growth in aerobic bottle: Gram Positive Cocci in Clusters  Result called to and read back by_ AMISH Brasher (9 [facility]) RN on  08/24/2017  14:12 pm  Growth in anaerobic bottle: Gram Positive Cocci in Clusters  Result called to and read back by_ MARC Payan RN(9 [facility]) on  08/26/2017  09:36 am    Specimen Source: .Blood Blood-Peripheral    Organism: Methicillin resistant Staphylococcus aureus    Organism: Methicillin resistant Staphylococcus aureus    Culture Results:   Growth in aerobic and anaerobic bottles: Methicillin resistant  Staphylococcus aureus  Result called to and read back by_ MARC Payan RN ([facility]) on  08/25/2017  10:15am    Organism Identification: Methicillin resistant Staphylococcus aureus  Methicillin resistant Staphylococcus aureus    Method Type: ETEST    Method Type: AUTUMN    RADIOLOGY & ADDITIONAL TESTS: Reviewed.

## 2017-08-28 NOTE — CONSULT NOTE ADULT - CONSULT REASON
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
Assessment of Tachyarrhythmia
ESRD
Right MCA stroke on CT head
Stroke Code
left lower leg ulcer, tro source of bacteremia
MRSA

## 2017-08-28 NOTE — CONSULT NOTE ADULT - ASSESSMENT
A/P: 48 yo female with persistent MRSA bacteremia with possible tricuspid endocarditis and RA Clot now with atrial arrythmia during an rapid response event.     Tachyarrythmia: Likely atrial tachycardia due to "lead-up" to a fast heart rate. Pt also has an inverted P-wave in the rhythm strips prior to the event suggesting an ectopic atrial rhythm. Less likely are the possibility of an atypical AVNRT vs atrial flutter. Given the lead-up to the tachycardia on telemetry, less likely to be flutter. This arrythmia episode was likely due to ongoing hemodynamic changes due to sepsis as pt was febrile and rigoring at the time of the event. Would continue with telemetry monitoring to observe for further arrythmia. Pt was on Metoprolol at home--she believes this was for blood pressure control. Would continue it here given risk of tachycardia from withdrawal of beta blockade. Consider Gallium Scan of the right Foot + CTA of the right leg to see if source of infection is still the right foot. F/U with ID regarding ABx course for the bacteremia.    RA Clot: Continue with anticoagulation with heparin gtt. Please obtain the ALCIDES results from outside hospital if possible vs get ALCIDES done here to better characterize the RA clot before making final decisions on anticoagulation.     ?Tricuspid Vegetation: Please obtain ALCIDES results to better characterize the potential vegetation vs more RA clot and any potential abscess formation around the valve. Per progress note, CT surgery does not recommend surgical intervention at this time.    Case discussed with Dr Long.

## 2017-08-28 NOTE — CONSULT NOTE ADULT - ASSESSMENT
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     Source of infection is unlikely due to the chronic left leg ulcer. (no sign of inflammation or pus)      Cont antibiotic - vancomycin  cont medical plan.  plan discuss with chief resident  vascular will sign off  call for any questions  thanks

## 2017-08-28 NOTE — PROGRESS NOTE ADULT - PROBLEM SELECTOR PLAN 7
Patient has baseline hyperlipidemia.  Triglycerides in 700s.  -- C/w atorvastatin 40 mg PO qhs
Patient has baseline hyperlipidemia.  Triglycerides in 700s.  -- C/w atorvastatin 40 mg PO qhs

## 2017-08-28 NOTE — PROGRESS NOTE ADULT - SUBJECTIVE AND OBJECTIVE BOX
Objective and Subjective   The patient is in her bed. Does not endorse any complains today. No shortness of breath, no chest pain. Yesterday the sodium is on the lower side she was on fluid restrictions for a couple of hours and the sodium trended up. Also yesterday had an episode of SVT to 140's and /110. It broke down 110 and than to normal rate. She was alter and Ct was done without any significant changes compared to the prior one        Patient is a 47 year old female with history of HTN, HLD, DM II, ESRD on HD (TTS. Last HD unknown. Receives HD via Right Femoral HD catheter placed on 08/22/2017 at [facility] and chronic left foot OM. 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 we will do HD todaty - 8/28       Patient seen and examined at bedside.     sodium chloride 0.9% lock flush 3 milliLiter(s) every 8 hours  pantoprazole    Tablet 40 milliGRAM(s) before breakfast  aspirin enteric coated 81 milliGRAM(s) daily  valsartan 320 milliGRAM(s) daily  atorvastatin 40 milliGRAM(s) at bedtime  insulin lispro (HumaLOG) corrective regimen sliding scale   every 6 hours  dextrose 5%. 1000 milliLiter(s) <Continuous>  dextrose Gel 1 Dose(s) once PRN  dextrose 50% Injectable 12.5 Gram(s) once  dextrose 50% Injectable 25 Gram(s) once  dextrose 50% Injectable 25 Gram(s) once  glucagon  Injectable 1 milliGRAM(s) once PRN  acetaminophen   Tablet. 650 milliGRAM(s) every 6 hours PRN  metoprolol 50 milliGRAM(s) every 6 hours  insulin lispro Injectable (HumaLOG) 5 Unit(s) three times a day before meals  insulin glargine Injectable (LANTUS) 23 Unit(s) at bedtime  heparin  Infusion 1900 Unit(s)/Hr <Continuous>      Allergies    penicillin (Unknown)  Sular (Unknown)    Intolerances        T(C): , Max: 38.8 (08-27-17 @ 17:18)  T(F): , Max: 101.9 (08-27-17 @ 17:18)  HR: 82 (08-28-17 @ 08:16)  BP: 148/64 (08-28-17 @ 08:16)  BP(mean): 95 (08-28-17 @ 08:16)  RR: 16 (08-28-17 @ 08:16)  SpO2: 95% (08-28-17 @ 08:16)  Wt(kg): --    08-27 @ 07:01  -  08-28 @ 07:00  --------------------------------------------------------  IN:    heparin  Infusion.: 306 mL    heparin Infusion: 96 mL    Oral Fluid: 300 mL  Total IN: 702 mL    OUT:    Voided: 400 mL  Total OUT: 400 mL    Total NET: 302 mL      PHYSICAL exam  Alert and oriented  No JVD   Normal air entry in the lungs, no wheezing, no crackles, no rales   RRR, normal s1/s2, no murmurs, rubs or gallops   Abdomen - soft, non-tender, non-distended, normal bowel sounds   extremities - mild peripheral edema   HAs a triple lumen in the right femoral side      LABS:                        7.6    24.5  )-----------( 194      ( 28 Aug 2017 06:32 )             23.6     08-28    123<L>  |  86<L>  |  46<H>  ----------------------------<  311<H>  3.8   |  20<L>  |  6.40<H>    Ca    8.1<L>      28 Aug 2017 06:29  Phos  7.6     08-28  Mg     2.1     08-28    TPro  8.4<H>  /  Alb  2.2<L>  /  TBili  0.6  /  DBili  x   /  AST  23  /  ALT  14  /  AlkPhos  411<H>  08-27      PT/INR - ( 27 Aug 2017 15:30 )   PT: 16.1 sec;   INR: 1.44          PTT - ( 28 Aug 2017 06:32 )  PTT:38.9 sec          RADIOLOGY & ADDITIONAL STUDIES:

## 2017-08-28 NOTE — CONSULT NOTE ADULT - SUBJECTIVE AND OBJECTIVE BOX
48 yo female with ESRD on Dialysis without significant Cardiac History presenting with MRSA bacteremia and possible tricuspid valve endocarditis, initially on CT Surgery service, now under Medicine after decision was made not to pursue surgical intervention for the possible valve vegetation. She had a Rapid Response event on 8/27 at 3 PM for fever, rigors, altered mentation and tachycardia to 130s. Tachycardia was treated with Labetalol 10mg IV x2 and 100mg PO as there was concern for Supraventricular Tachycardia. Subsequently pt was in sinus tachycardia which abated after ~1 hour from start of event. Currently she is alert, oriented and in no acute distress. She is currently in sinus rhythm on monitor with HR in the 90s. During event, pt appears to have had a ectopic interatrial rhythm that had a lead up and accelerated to a rate of 140s.    Pt reports that she has a 2 year hx of Osteomyelitis in Left Foot with MRSA bacteremia s/p long term tx with Vancomycin and 4 surgical procedures, although unclear what the procedures were, albeit none were bone debridements. Patient states that she is followed by a podiatrist as outpt but has been told repeatedly that she needs to have amputation of her left foot as infection unlikely to be cured by medications. She denies being febrile or any hospitalizations with sepsis over the last year. She presented to [facility] with fever and rigors 1 day prior to admission and then was transferred to our hospital for possible surgical management of tricuspid endocarditis. On TTE here pt has a possible vegetation on the Tricuspid Valve and a RA clot that traverses the valve during diastole. Pt had an HD catheter placed on her right IJ 2.5 months ago and 1 month ago it was replaced due to clotting. This catheter was removed at outside hospital and replaced with a Right Femoral arterial line. Also had a ALCIDES at [facility] but results were not available at time of consult.    ICU Vital Signs Last 24 Hrs  T(C): 37.9 (28 Aug 2017 13:39), Max: 38.8 (27 Aug 2017 17:18)  T(F): 100.2 (28 Aug 2017 13:39), Max: 101.9 (27 Aug 2017 17:18)  HR: 94 (28 Aug 2017 12:03) (80 - 108)  BP: 179/79 (28 Aug 2017 12:03) (128/61 - 179/79)  BP(mean): 113 (28 Aug 2017 12:03) (88 - 113)  ABP: --  ABP(mean): --  RR: 18 (28 Aug 2017 12:03) (16 - 20)  SpO2: 99% (28 Aug 2017 12:03) (95% - 100%)    Gen: Awake, alert, cooperative. She appears anxious about her condition and worried that she will lose her foot  HEENT: QUENTIN  Chest: crackles at the bases  CVS: S1, S2 of normal intensity; No distinct murmur appreciated. RRR,   Abd: Soft, NT, ND, BS present; Surgical scars are visible  Ext: Trace edema b/l    Labs:                        7.6    24.5  )-----------( 194      ( 28 Aug 2017 06:32 )             23.6     08-28    123<L>  |  86<L>  |  46<H>  ----------------------------<  311<H>  3.8   |  20<L>  |  6.40<H>    Ca    8.1<L>      28 Aug 2017 06:29  Phos  7.6     08-28  Mg     2.1     08-28    TPro  8.4<H>  /  Alb  2.2<L>  /  TBili  0.6  /  DBili  x   /  AST  23  /  ALT  14  /  AlkPhos  411<H>  08-27    < from: Echocardiogram (08.24.17 @ 12:05) >  The left ventricle is mildly dilated. The left ventricular wall motion is normal. The left ventricular ejection fraction is 65%.The right ventricle is normal in size and function. The left atrial size is normal. Normal right  atrial size. Structurally normal aortic valve. There is trace aortic regurgitation. Structurally normal mitral valve. There is trace mitral regurgitation. Thickened tricuspid valve leaflets. Irregular, shaggy appearing mass on the atrial side of the tricuspid valve, most consistent with vegetation. Also, a large linear independently mobile mass is seen in the right atrium, protruding through the tricuspid valve in diastole and may also be part of the vegetation vs thrombus.  There is mild tricuspid regurgitation. There is severe pulmonary hypertension. The pulmonary artery systolic pressure is estimated to be 60 mmHg. Structurally normal pulmonic valve.    < from: CT Heart Congenital w/ IV Cont (08.24.17 @ 12:34) >  Evaluation of the lung parenchyma demonstrates multiple bilateral pulmonary nodules with representative lesions within the right upper lobe measuring 8 mm and within the atelectatic left lower lobe measuring1.8 cm. Coronary evaluation was not interpretable due to motion.

## 2017-08-28 NOTE — PROGRESS NOTE ADULT - ATTENDING COMMENTS
Patient seen and examined with NP.  Agree with above.  Patient is neurologically better than yesterday.  Her mental status is essentially back to previous baseline.  No major stroke or TIA.  Treat endocarditis as per primary team

## 2017-08-28 NOTE — PROGRESS NOTE ADULT - ATTENDING COMMENTS
seen and evaluated while on dialysis   tolerating the procedure well  continue full treatment as prescribed

## 2017-08-28 NOTE — PROGRESS NOTE ADULT - ASSESSMENT
Ms. 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  Rapid response called on 8/27 for episode of SVT w/'s and stroke code called after AMS, patient now back to baseline with no stroke.

## 2017-08-28 NOTE — CONSULT NOTE ADULT - ATTENDING COMMENTS
Patient seen and examined with NP.  Agree with above.  Patient's neurological exam doesn't correlate with possible early changes on CT Head.  Would repeat CT Head in AM to confirm stroke.  Agree with anticoagulation for PTT 50-80 but repeat CT Head if she has altered mental status or new neurological deficit.
Briefly, 46 yo female with DM, ESRD who was on HD via R chest PC, who presented to OSH with fever, chills, malaise, found to have high-grade MRSA BSI s/p removal of PC on 8/20. She also has extensive septic thrombus in the IVC, SVC, RA and likely tricuspid valve IE. The fact that she is persistently bacteremic raises two concerns:  1. source control has not been addressed--the extensive thrombi in her great veins and RA are most definitely infected with MRSA--would revisit role of thrombectomy/thrombolysis with vascular/CT surgery  2. high-grade bacteremia despite being on appropriately dosed vancomycin raises the concern for hetero-resistant vancomycin-intermediate staph aureus (hVISA)--vanco AUTUMN =2 (1.5 by E test) of bcx isolate from 8/24; the micro lab is unable to perform confirmatory testing for hVISA  - f/u surveillance bcx--would check daily for now until (if) clears  - vascular/CT surgery re-evaluation for thrombectomy/thrombolysis  - d/c vancomycin and start linezolid 600mg IV q12h
Patient seen and examined with house-staff during bedside rounds.  Resident note read, including vitals, physical findings, laboratory data, and radiological reports.   Revisions included below.  Direct personal management at bed side and extensive interpretation of the data.  Plan was outlined and discussed in details with the housestaff.  Decision making of high complexity  I discussed case with the resident.  Continue her treatment for endocarditis and thromboembolic disease.  patient is on IV antibiotic  and IV heparin. Follow with nephrology regarding dialysis
for dialysis today.

## 2017-08-28 NOTE — PROGRESS NOTE ADULT - SUBJECTIVE AND OBJECTIVE BOX
Patient was seen and evaluated on dialysis.   Patient is tolerating the procedure well.   HR: 86 (08-28-17 @ 16:55)  BP: 121/58 (08-28-17 @ 16:55) pusle 65, /67  Continue dialysis:   Dialyzer:  180     QB: 400        QD: 500   Goal UF 2 liters over 4 Hours

## 2017-08-28 NOTE — CONSULT NOTE ADULT - ASSESSMENT
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    1. Discontinue vancomycin, start linezolid 600 mg IV q 12 h  2. Will f/u surveillance cultures, will likely positive as source control cannot be established  3. Will f/u HVISA from 8/24 cultures

## 2017-08-28 NOTE — PROGRESS NOTE ADULT - SUBJECTIVE AND OBJECTIVE BOX
O/N Events:Lactate fell to <2, troponin downtrending.  8 PM and 2 AM PTT therapeutic, ordered for 5:30  Subjective: Patient seen and examined at bedside. Complaining of right hip pain worse with movement, limiting ROM. Denies nausea/vomiting, abdominal pain, CP, palpitations.     VITALS  Vital Signs Last 24 Hrs  T(C): 37.1 (28 Aug 2017 09:05), Max: 38.8 (27 Aug 2017 17:18)  T(F): 98.7 (28 Aug 2017 09:05), Max: 101.9 (27 Aug 2017 17:18)  HR: 94 (28 Aug 2017 12:03) (80 - 108)  BP: 179/79 (28 Aug 2017 12:03) (128/61 - 179/79)  BP(mean): 113 (28 Aug 2017 12:03) (88 - 113)  RR: 18 (28 Aug 2017 12:03) (16 - 20)  SpO2: 99% (28 Aug 2017 12:03) (95% - 100%)    I&O's Summary    27 Aug 2017 07:01  -  28 Aug 2017 07:00  --------------------------------------------------------  IN: 702 mL / OUT: 400 mL / NET: 302 mL        CAPILLARY BLOOD GLUCOSE  270 (28 Aug 2017 11:46)  325 (28 Aug 2017 07:12)  304 (27 Aug 2017 21:19)  273 (27 Aug 2017 16:45)  298 (27 Aug 2017 16:29)  298 (27 Aug 2017 15:02)          PHYSICAL EXAM  General: A&Ox 3; NAD  Head: NC/AT;   Eyes: PERRL; EOMI; anicteric sclera  Neck: Supple; no JVD  Respiratory: CTA B/L; no wheezes/crackles/rales auscultated w/ good air movement  Cardiovascular: Regular rhythm/rate; S1/S2; no gallops or murmurs auscultated  Gastrointestinal: Soft; NTND w/out rebound tenderness or guarding; Surgical scars from cholecystectomy, oophorectomy, appendectomy visible. bowel sounds normal  Extremities: trace b/l edema   Neurological:  CNII-XII grossly intact; decreased strength RLE as per patient 2/2 hip pain.     MEDICATIONS  (STANDING):  sodium chloride 0.9% lock flush 3 milliLiter(s) IV Push every 8 hours  pantoprazole    Tablet 40 milliGRAM(s) Oral before breakfast  aspirin enteric coated 81 milliGRAM(s) Oral daily  valsartan 320 milliGRAM(s) Oral daily  atorvastatin 40 milliGRAM(s) Oral at bedtime  insulin lispro (HumaLOG) corrective regimen sliding scale   SubCutaneous every 6 hours  dextrose 5%. 1000 milliLiter(s) (50 mL/Hr) IV Continuous <Continuous>  dextrose 50% Injectable 12.5 Gram(s) IV Push once  dextrose 50% Injectable 25 Gram(s) IV Push once  dextrose 50% Injectable 25 Gram(s) IV Push once  metoprolol 50 milliGRAM(s) Oral every 6 hours  insulin lispro Injectable (HumaLOG) 5 Unit(s) SubCutaneous three times a day before meals  insulin glargine Injectable (LANTUS) 23 Unit(s) SubCutaneous at bedtime  heparin  Infusion 1900 Unit(s)/Hr (19 mL/Hr) IV Continuous <Continuous>  calcium acetate 667 milliGRAM(s) Oral three times a day with meals  linezolid  IVPB   IV Intermittent     MEDICATIONS  (PRN):  dextrose Gel 1 Dose(s) Oral once PRN Blood Glucose LESS THAN 70 milliGRAM(s)/deciliter  glucagon  Injectable 1 milliGRAM(s) IntraMuscular once PRN Glucose LESS THAN 70 milligrams/deciliter  acetaminophen   Tablet. 650 milliGRAM(s) Oral every 6 hours PRN Mild Pain (1 - 3)  acetaminophen   Tablet 650 milliGRAM(s) Oral every 6 hours PRN For Temp greater than 38 C (100.4 F)      LABS                        7.6    24.5  )-----------( 194      ( 28 Aug 2017 06:32 )             23.6     08-28    123<L>  |  86<L>  |  46<H>  ----------------------------<  311<H>  3.8   |  20<L>  |  6.40<H>    Ca    8.1<L>      28 Aug 2017 06:29  Phos  7.6     08-28  Mg     2.1     08-28    TPro  8.4<H>  /  Alb  2.2<L>  /  TBili  0.6  /  DBili  x   /  AST  23  /  ALT  14  /  AlkPhos  411<H>  08-27    LIVER FUNCTIONS - ( 27 Aug 2017 15:30 )  Alb: 2.2 g/dL / Pro: 8.4 g/dL / ALK PHOS: 411 U/L / ALT: 14 U/L / AST: 23 U/L / GGT: x           PT/INR - ( 27 Aug 2017 15:30 )   PT: 16.1 sec;   INR: 1.44          PTT - ( 28 Aug 2017 12:35 )  PTT:49.0 sec    CARDIAC MARKERS ( 27 Aug 2017 22:42 )  x     / 0.09 ng/mL / x     / x     / x      CARDIAC MARKERS ( 27 Aug 2017 15:30 )  x     / 0.11 ng/mL / 56 U/L / x     / 1.4 ng/mL        IMAGING/EKG/ETC: Reviewed

## 2017-08-28 NOTE — PROGRESS NOTE ADULT - SUBJECTIVE AND OBJECTIVE BOX
Patient discussed on morning rounds with Dr. Gee    Echocardiogram was reviewed by Dr. Gee. He is aware of the tricuspid valve vegetation and septic emboli to the pulmonary vessels. Given patient co-morbidities, Dr. Gee would like to attempt medical management of endocarditis with antibiotics at this time. Her persistent bacteremia is also noted. On echocardiogram, the clot appears to be old blood and after consultation with Dr. Tay an angiovac for clot evaluation is not indicated.     Vanessa Gomez PA-C

## 2017-08-28 NOTE — PROGRESS NOTE ADULT - ATTENDING COMMENTS
Pt remains with positive blood cultures and no plans for intervention by CTS. will obtain ALCIDES from [place] and ID consult called. Ct chest/Abd/Pelvis ordered to r/o embolic disease or any foci of infection and ortho f/u for prior osteo foot. continue abx per ID and repeat cutures today. Renal to evaluate for HD. continue anticoagulation and f/u PTT. Pt remains with positive blood cultures and no plans for intervention by CTS. will obtain ALCIDES from [place] and ID consult called. Ct chest/Abd/Pelvis ordered to r/o embolic disease or any foci of infection and ortho f/u for prior osteo foot. continue abx per ID and repeat cutures today. Renal to evaluate for HD. continue anticoagulation and f/u PTT.  I spoke with Dr. Gee regarding  tricuspid veg and persistent bacteremia . He feels this may be related to lung lesions which she had on presentation.  He does not feel pt is a surgical candidate due to risks of procedure and pt comorbidities. Will f/u with ID and case management plans to continue care at [place]. will copy chart in entirety in anticipation of  transfer back to [place] .

## 2017-08-28 NOTE — PROGRESS NOTE ADULT - PROBLEM SELECTOR PLAN 8
Patient endorses epigastric abdominal pain on physical exam.  In setting of hypertriglyceridemia, consider triglyceride-induced pancreatitis.  -- F/u lipase
Patient endorses epigastric abdominal pain on physical exam.  In setting of hypertriglyceridemia, consider triglyceride-induced pancreatitis.  -- F/u lipase

## 2017-08-28 NOTE — PROVIDER CONTACT NOTE (CRITICAL VALUE NOTIFICATION) - TEST AND RESULT REPORTED:
Vancomycin random 26
Gram positive cocci and clusters in aerobic bottle from 8/26 and gram positive cocci and clusters in anaerobic bottle 8/25

## 2017-08-28 NOTE — CONSULT NOTE ADULT - SUBJECTIVE AND OBJECTIVE BOX
46 yo F w/ PMHx of HTN, HLD, T2DM, ESRD (on HD T, Th, S), chronic L foot osteomyelitis who presented to OSH  with four days of increasing malaise, confusion, nausea, and vomiting. She was found to be septic, with the likely source being her Permacath in her R IJ.  At OSH, her Permacath was removed and a R femoral Shiley was placed. Later cultures confirmed MRSA bacteremia.  A ALCIDES demonstrated a vegetation and she was transferred to [facility] for further management and intervention.  She was continued on vancomycin while valve replacement was considered.  During H admission, patient was also found have mobile echodensity in the tip of the IVC / SVC / right atrium, and aneurysmal interatrial septum with mobile density attached and was started on heparin gtt for the thrombus. CT surgery ultimately planned no surgical intervention 2/2 comorbidities and recommended medical management.  Today, the pt has no complaints.  She specifically denies fevers, rigors, chills, nausea, vomiting, SOB, coughing, abdominal pain, diarrhea, dysuria and L foot pain.        Physical exam of the left lower limb    linear ulcer extending from the medial malleolus up to the space in between first and second toe.  dry and no pus/discharge  not erythematous  no sign of infection, non tender.  biphasic DP and PT signal.      aspirin enteric coated 81 milliGRAM(s) Oral daily  valsartan 320 milliGRAM(s) Oral daily  metoprolol 50 milliGRAM(s) Oral every 6 hours  linezolid  IVPB   IV Intermittent   heparin  Infusion 2000 Unit(s)/Hr IV Continuous <Continuous>      Vital Signs Last 24 Hrs  T(C): 37.9 (28 Aug 2017 13:39), Max: 37.9 (28 Aug 2017 13:39)  T(F): 100.2 (28 Aug 2017 13:39), Max: 100.2 (28 Aug 2017 13:39)  HR: 86 (28 Aug 2017 16:55) (80 - 96)  BP: 121/58 (28 Aug 2017 16:55) (121/58 - 179/79)  BP(mean): 113 (28 Aug 2017 12:03) (88 - 113)  RR: 19 (28 Aug 2017 16:55) (16 - 19)  SpO2: 92% (28 Aug 2017 16:55) (92% - 100%)  I&O's Detail    27 Aug 2017 07:01  -  28 Aug 2017 07:00  --------------------------------------------------------  IN:    heparin  Infusion.: 306 mL    heparin Infusion: 96 mL    Oral Fluid: 300 mL  Total IN: 702 mL    OUT:    Voided: 400 mL  Total OUT: 400 mL    Total NET: 302 mL      28 Aug 2017 07:01  -  28 Aug 2017 17:32  --------------------------------------------------------  IN:    heparin Infusion: 155 mL  Total IN: 155 mL    OUT:    Voided: 250 mL  Total OUT: 250 mL    Total NET: -95 mL      LABS:                        7.6    24.5  )-----------( 194      ( 28 Aug 2017 06:32 )             23.6     08-28    123<L>  |  86<L>  |  46<H>  ----------------------------<  311<H>  3.8   |  20<L>  |  6.40<H>    Ca    8.1<L>      28 Aug 2017 06:29  Phos  7.6     08-28  Mg     2.1     08-28    TPro  8.4<H>  /  Alb  2.2<L>  /  TBili  0.6  /  DBili  x   /  AST  23  /  ALT  14  /  AlkPhos  411<H>  08-27    PT/INR - ( 27 Aug 2017 15:30 )   PT: 16.1 sec;   INR: 1.44          PTT - ( 28 Aug 2017 12:35 )  PTT:49.0 sec      RADIOLOGY & ADDITIONAL STUDIES:

## 2017-08-28 NOTE — PROGRESS NOTE ADULT - PROBLEM SELECTOR PLAN 9
#Fluids - Free water restrict   #Electrolytes - Patient is on dialysis, continue to monitor chemistries  #Diet - Renal restrictions diet
#Fluids - Patient tolerating PO, no IVF currently  #Electrolytes - Patient is on dialysis, continue to monitor chemistries  #Diet - Renal restrictions diet

## 2017-08-28 NOTE — PROGRESS NOTE ADULT - SUBJECTIVE AND OBJECTIVE BOX
Stroke Neurology Follow-Up Visit    Stroke code called yesterday afternoon for altered mental status, was noted to be lethargic and not answering questions appropriately. RRT was called prior for SVT w/ HR in 140's-150's, along with rigors, s/p Labetalol x2.     Pt seen and examined this morning.   Reports feeling better compared to yesterday. Limited recollection of RRT and stroke code yesterday, just remembers feeling "very cold" throughout the event.   Continues to have bilateral lower extremity weakness (right worse than left).   Currently denies headache, nausea, vomiting, slurred speech, difficulty word finding, new weakness, or new numbness.     Exam:  T(F): 98.7 (08-28-17 @ 09:05), Max: 101.9 (08-27-17 @ 17:18)  HR: 82 (08-28-17 @ 08:16) (80 - 108)  BP: 148/64 (08-28-17 @ 08:16) (128/61 - 171/76)  RR: 16 (08-28-17 @ 08:16) (16 - 20)  SpO2: 95% (08-28-17 @ 08:16) (95% - 100%)    General: lying in bed, calm, cooperative, in NAD   Neurologic:  Mental status: Awake, alert and oriented x3 to self, hospital, and August.  Naming and repetition intact.  Attention/concentration intact.  No dysarthria, no aphasia.      Cranial nerves: Pupils equally round and reactive to light, 3 mm, visual fields full, no nystagmus, no ptosis, extraocular muscles intact, V1 through V3 intact bilaterally and symmetric, face symmetric, hearing intact to finger rub, palate elevation symmetric, tongue was midline, sternocleidomastoid/shoulder shrug strength bilaterally 5/5.    Motor: No upper extremity drift. RUE 4/5 bicep, 4/5 tricep, 4-/5 deltoid. LUE 4+/5.  strength moderately strong bilaterally. RLE grossly 2/5. LLE grossly 3/5.   Sensation: Intact and symmetric to light touch.  No neglect.   Coordination: No dysmetria on finger-to-nose.  No clumsiness.  Reflexes: Mute toes bilaterally.   Gait: deferred at this time     MEDICATIONS  (STANDING):  sodium chloride 0.9% lock flush 3 milliLiter(s) IV Push every 8 hours  pantoprazole    Tablet 40 milliGRAM(s) Oral before breakfast  aspirin enteric coated 81 milliGRAM(s) Oral daily  valsartan 320 milliGRAM(s) Oral daily  atorvastatin 40 milliGRAM(s) Oral at bedtime  insulin lispro (HumaLOG) corrective regimen sliding scale   SubCutaneous every 6 hours  metoprolol 50 milliGRAM(s) Oral every 6 hours  insulin lispro Injectable (HumaLOG) 5 Unit(s) SubCutaneous three times a day before meals  insulin glargine Injectable (LANTUS) 23 Unit(s) SubCutaneous at bedtime  heparin  Infusion 1900 Unit(s)/Hr (19 mL/Hr) IV Continuous <Continuous>    MEDICATIONS  (PRN):  dextrose Gel 1 Dose(s) Oral once PRN Blood Glucose LESS THAN 70 milliGRAM(s)/deciliter  glucagon  Injectable 1 milliGRAM(s) IntraMuscular once PRN Glucose LESS THAN 70 milligrams/deciliter  acetaminophen   Tablet. 650 milliGRAM(s) Oral every 6 hours PRN Mild Pain (1 - 3)      Labs:                        7.6    24.5  )-----------( 194      ( 28 Aug 2017 06:32 )             23.6     08-28    123<L>  |  86<L>  |  46<H>  ----------------------------<  311<H>  3.8   |  20<L>  |  6.40<H>    Ca    8.1<L>      28 Aug 2017 06:29  Phos  7.6     08-28  Mg     2.1     08-28    TPro  8.4<H>  /  Alb  2.2<L>  /  TBili  0.6  /  DBili  x   /  AST  23  /  ALT  14  /  AlkPhos  411<H>  08-27    LIVER FUNCTIONS - ( 27 Aug 2017 15:30 )  Alb: 2.2 g/dL / Pro: 8.4 g/dL / ALK PHOS: 411 U/L / ALT: 14 U/L / AST: 23 U/L / GGT: x           PT/INR - ( 27 Aug 2017 15:30 )   PT: 16.1 sec;   INR: 1.44     PTT - ( 28 Aug 2017 06:32 )  PTT:38.9 sec    Cholesterol, Serum: 136 mg/dL  HDL Cholesterol, Serum: 5 mg/dL  Triglycerides, Serum: 707 mg/dL  Thyroid Stimulating Hormone, Serum: 1.251 uIU/mL  Hemoglobin A1C, Whole Blood: 8.2 %      Radiology:  CT Head No Cont (08.24.17 @ 02:49) >  IMPRESSION: Findings concerning for evolving right MCA territory   infarction. No intracranial hemorrhage. Suggest follow-up and correlation   with MRI.    CT Angio Head w/ IV Cont (08.24.17 @ 11:52) >  IMPRESSION:-  1.  No aneurysm or arteriovenous malformation.   2.  Fine calcifications at the bifurcations of both common carotid   arteries, bilateral vertebral arteries and carotid siphon, otherwise   unremarkable study.    CT Heart Congenital w/ IV Cont (08.24.17 @ 12:34) >  Impression:   1. Motion precludes adequate assessment of coronary arteries.   2. The left main and ostium of the LAD appear to be normal.   3. Calcific plaque noted in proximal LAD and LCX.   4. Remaining portions of the coronary arteries cannot be interpreted due   to motion.   5.  Large thrombus noted extending from the SVC tothe Right atrium, and   traverses across the tricuspid valve. Parts of the thrombus appear to be   lobulated and attached to the superior aspect of the interatrial septum.   6. Please see separate radiology report for non-coronary findings.      < from: CT Head No Cont (08.25.17 @ 09:03) >  IMPRESSION:-  1.  No significant interval change.  2.  No intracranial hemorrhage or mass.   3.  A small hypodense lesion in the left posterior putamen.     < from: CT Brain Stroke Protocol (08.27.17 @ 16:08) >  IMPRESSION:    No acute intracranial hemorrhage, mass effect, or CT evidence of acute   transcortical infarction.    Assessment & Plan:  47 year old female with PMH of HTN, HLD, DM II, ESRD on HD, chronic left foot osteomyelitis, presented initially to [facility] on 8/20/17 with lethargy, confusion, and generalized weakness. Workup revealed MRSA bacteremia with echo showing moderate pericardial effusion without tamponade and mobile echodensity in the IVC/SVC/RA and aneurysmal dilatation of the interatrial septum with mobile density attached. Was then transferred to [facility] on 8/23/17 for further management.      -repeat CT head performed yesterday during stroke code; again noted to have left external capsule and posterior left putamen ischemic infarct, likely chronic. No hemorrhage. No acute changes compared to prior scan.   -neurologically stable today, mental status appears back to baseline; likely yesterday's episode of AMS secondary to SVT and sepsis   -MRI contraindicated 2/2 to pacemaker   -no further imaging warranted at this time   -stroke is still a risk given RV thrombus and infective endocarditis, so would continue with anticoagulation with Heparin gtt

## 2017-08-28 NOTE — CONSULT NOTE ADULT - CONSULT REQUESTED DATE/TIME
26-Aug-2017 12:49
24-Aug-2017 14:53
24-Aug-2017 17:30
27-Aug-2017 16:36
28-Aug-2017 15:48
28-Aug-2017 17:22
28-Aug-2017

## 2017-08-28 NOTE — PROGRESS NOTE ADULT - PROBLEM SELECTOR PLAN 6
Patient has baseline hypertension.  SBP while hospitalized has been 160s - 180s.    -- C/w metoprolol 50 mg PO q6h  -- C/w valsartan 320 mg PO daily   -- Monitor vitals regularly
Patient has baseline hypertension.  SBP while hospitalized has been 160s - 180s.    -- C/w metoprolol 50 mg PO q6h  -- C/w valsartan 320 mg PO daily   -- Monitor vitals regularly

## 2017-08-28 NOTE — PROGRESS NOTE ADULT - ASSESSMENT
This is 47 year old female with PMH stated above. She is on HD recently started 2 months ago. We will do HD today

## 2017-08-28 NOTE — PROGRESS NOTE ADULT - PROBLEM SELECTOR PLAN 10
#DVT prophylaxis - patient on heparin gtt  #Code status - Full code  #Disposition - Pending further workup, currently on 7 [place]
#DVT prophylaxis - patient on heparin gtt  #Code status - Full code  #Disposition - Pending further workup, currently on 7 Lachman

## 2017-08-28 NOTE — PROGRESS NOTE ADULT - PROBLEM SELECTOR PLAN 5
Patient has baseline hypertension, takes 20 U lantus twice daily, as well as 30 U humalog premeal 3 times daily.  Finger sticks while hospitalized have ranged mid 100s to low 300s.    -- C/w insulin sliding scale   --lantus 23 units beditme  -- humalog 5 units subq tid w/meals Patient has baseline hypertension, takes 20 U lantus twice daily, as well as 30 U humalog premeal 3 times daily.  Finger sticks while hospitalized have  been elevated, changes insulin regimen today to reflect required coverage over past 24 hours. Last three , 270, 325 requiring 6 units, 8 units. 8 units.   -- C/w insulin sliding scale   --lantus increased to 30 units bedtime  -- humalog increased to 10 units subq tid w/meals

## 2017-08-29 ENCOUNTER — TRANSCRIPTION ENCOUNTER (OUTPATIENT)
Age: 47
End: 2017-08-29

## 2017-08-29 VITALS
RESPIRATION RATE: 19 BRPM | SYSTOLIC BLOOD PRESSURE: 127 MMHG | HEART RATE: 88 BPM | OXYGEN SATURATION: 93 % | DIASTOLIC BLOOD PRESSURE: 60 MMHG

## 2017-08-29 LAB
-  CEFAZOLIN: SIGNIFICANT CHANGE UP
-  CEFAZOLIN: SIGNIFICANT CHANGE UP
-  CLINDAMYCIN: SIGNIFICANT CHANGE UP
-  CLINDAMYCIN: SIGNIFICANT CHANGE UP
-  ERYTHROMYCIN: SIGNIFICANT CHANGE UP
-  ERYTHROMYCIN: SIGNIFICANT CHANGE UP
-  LINEZOLID: SIGNIFICANT CHANGE UP
-  LINEZOLID: SIGNIFICANT CHANGE UP
-  OXACILLIN: SIGNIFICANT CHANGE UP
-  OXACILLIN: SIGNIFICANT CHANGE UP
-  PENICILLIN: SIGNIFICANT CHANGE UP
-  PENICILLIN: SIGNIFICANT CHANGE UP
-  RIFAMPIN: SIGNIFICANT CHANGE UP
-  RIFAMPIN: SIGNIFICANT CHANGE UP
-  TETRACYCLINE: SIGNIFICANT CHANGE UP
-  TETRACYCLINE: SIGNIFICANT CHANGE UP
-  TRIMETHOPRIM/SULFAMETHOXAZOLE: SIGNIFICANT CHANGE UP
-  TRIMETHOPRIM/SULFAMETHOXAZOLE: SIGNIFICANT CHANGE UP
-  VANCOMYCIN: SIGNIFICANT CHANGE UP
-  VANCOMYCIN: SIGNIFICANT CHANGE UP
ANION GAP SERPL CALC-SCNC: 16 MMOL/L — SIGNIFICANT CHANGE UP (ref 5–17)
APTT BLD: 37.9 SEC — HIGH (ref 27.5–37.4)
APTT BLD: 69.9 SEC — HIGH (ref 27.5–37.4)
BUN SERPL-MCNC: 22 MG/DL — SIGNIFICANT CHANGE UP (ref 7–23)
CALCIUM SERPL-MCNC: 7.7 MG/DL — LOW (ref 8.4–10.5)
CHLORIDE SERPL-SCNC: 90 MMOL/L — LOW (ref 96–108)
CO2 SERPL-SCNC: 22 MMOL/L — SIGNIFICANT CHANGE UP (ref 22–31)
CREAT SERPL-MCNC: 3.8 MG/DL — HIGH (ref 0.5–1.3)
GLUCOSE SERPL-MCNC: 183 MG/DL — HIGH (ref 70–99)
GRAM STN FLD: SIGNIFICANT CHANGE UP
GRAM STN FLD: SIGNIFICANT CHANGE UP
HCT VFR BLD CALC: 23.7 % — LOW (ref 34.5–45)
HGB BLD-MCNC: 7.6 G/DL — LOW (ref 11.5–15.5)
MAGNESIUM SERPL-MCNC: 1.8 MG/DL — SIGNIFICANT CHANGE UP (ref 1.6–2.6)
MCHC RBC-ENTMCNC: 26 PG — LOW (ref 27–34)
MCHC RBC-ENTMCNC: 32.1 G/DL — SIGNIFICANT CHANGE UP (ref 32–36)
MCV RBC AUTO: 81.2 FL — SIGNIFICANT CHANGE UP (ref 80–100)
METHOD TYPE: SIGNIFICANT CHANGE UP
PLATELET # BLD AUTO: 195 K/UL — SIGNIFICANT CHANGE UP (ref 150–400)
POTASSIUM SERPL-MCNC: 3.6 MMOL/L — SIGNIFICANT CHANGE UP (ref 3.5–5.3)
POTASSIUM SERPL-SCNC: 3.6 MMOL/L — SIGNIFICANT CHANGE UP (ref 3.5–5.3)
RBC # BLD: 2.92 M/UL — LOW (ref 3.8–5.2)
RBC # FLD: 17.1 % — HIGH (ref 10.3–16.9)
SODIUM SERPL-SCNC: 128 MMOL/L — LOW (ref 135–145)
WBC # BLD: 39.1 K/UL — HIGH (ref 3.8–10.5)
WBC # FLD AUTO: 39.1 K/UL — HIGH (ref 3.8–10.5)

## 2017-08-29 PROCEDURE — 82553 CREATINE MB FRACTION: CPT

## 2017-08-29 PROCEDURE — 70498 CT ANGIOGRAPHY NECK: CPT

## 2017-08-29 PROCEDURE — 71045 X-RAY EXAM CHEST 1 VIEW: CPT

## 2017-08-29 PROCEDURE — 87186 SC STD MICRODIL/AGAR DIL: CPT

## 2017-08-29 PROCEDURE — 83036 HEMOGLOBIN GLYCOSYLATED A1C: CPT

## 2017-08-29 PROCEDURE — 36430 TRANSFUSION BLD/BLD COMPNT: CPT

## 2017-08-29 PROCEDURE — 86900 BLOOD TYPING SEROLOGIC ABO: CPT

## 2017-08-29 PROCEDURE — 86803 HEPATITIS C AB TEST: CPT

## 2017-08-29 PROCEDURE — 94150 VITAL CAPACITY TEST: CPT

## 2017-08-29 PROCEDURE — 70450 CT HEAD/BRAIN W/O DYE: CPT

## 2017-08-29 PROCEDURE — 80053 COMPREHEN METABOLIC PANEL: CPT

## 2017-08-29 PROCEDURE — 84702 CHORIONIC GONADOTROPIN TEST: CPT

## 2017-08-29 PROCEDURE — 85025 COMPLETE CBC W/AUTO DIFF WBC: CPT

## 2017-08-29 PROCEDURE — P9016: CPT

## 2017-08-29 PROCEDURE — 84443 ASSAY THYROID STIM HORMONE: CPT

## 2017-08-29 PROCEDURE — 84100 ASSAY OF PHOSPHORUS: CPT

## 2017-08-29 PROCEDURE — 86706 HEP B SURFACE ANTIBODY: CPT

## 2017-08-29 PROCEDURE — 86704 HEP B CORE ANTIBODY TOTAL: CPT

## 2017-08-29 PROCEDURE — 75573 CT HRT C+ STRUX CGEN HRT DS: CPT

## 2017-08-29 PROCEDURE — 86923 COMPATIBILITY TEST ELECTRIC: CPT

## 2017-08-29 PROCEDURE — 82803 BLOOD GASES ANY COMBINATION: CPT

## 2017-08-29 PROCEDURE — 80061 LIPID PANEL: CPT

## 2017-08-29 PROCEDURE — 87040 BLOOD CULTURE FOR BACTERIA: CPT

## 2017-08-29 PROCEDURE — 93005 ELECTROCARDIOGRAM TRACING: CPT

## 2017-08-29 PROCEDURE — 93880 EXTRACRANIAL BILAT STUDY: CPT

## 2017-08-29 PROCEDURE — 80048 BASIC METABOLIC PNL TOTAL CA: CPT

## 2017-08-29 PROCEDURE — 82550 ASSAY OF CK (CPK): CPT

## 2017-08-29 PROCEDURE — 86901 BLOOD TYPING SEROLOGIC RH(D): CPT

## 2017-08-29 PROCEDURE — 87150 DNA/RNA AMPLIFIED PROBE: CPT

## 2017-08-29 PROCEDURE — 87340 HEPATITIS B SURFACE AG IA: CPT

## 2017-08-29 PROCEDURE — 90935 HEMODIALYSIS ONE EVALUATION: CPT | Mod: GC

## 2017-08-29 PROCEDURE — 83880 ASSAY OF NATRIURETIC PEPTIDE: CPT

## 2017-08-29 PROCEDURE — 90935 HEMODIALYSIS ONE EVALUATION: CPT

## 2017-08-29 PROCEDURE — 83690 ASSAY OF LIPASE: CPT

## 2017-08-29 PROCEDURE — 70496 CT ANGIOGRAPHY HEAD: CPT

## 2017-08-29 PROCEDURE — 85027 COMPLETE CBC AUTOMATED: CPT

## 2017-08-29 PROCEDURE — 83605 ASSAY OF LACTIC ACID: CPT

## 2017-08-29 PROCEDURE — 36415 COLL VENOUS BLD VENIPUNCTURE: CPT

## 2017-08-29 PROCEDURE — 85730 THROMBOPLASTIN TIME PARTIAL: CPT

## 2017-08-29 PROCEDURE — 86850 RBC ANTIBODY SCREEN: CPT

## 2017-08-29 PROCEDURE — 99232 SBSQ HOSP IP/OBS MODERATE 35: CPT | Mod: GC

## 2017-08-29 PROCEDURE — 84484 ASSAY OF TROPONIN QUANT: CPT

## 2017-08-29 PROCEDURE — 93306 TTE W/DOPPLER COMPLETE: CPT

## 2017-08-29 PROCEDURE — 83735 ASSAY OF MAGNESIUM: CPT

## 2017-08-29 PROCEDURE — 85610 PROTHROMBIN TIME: CPT

## 2017-08-29 PROCEDURE — 80202 ASSAY OF VANCOMYCIN: CPT

## 2017-08-29 RX ORDER — AZTREONAM 2 G
500 VIAL (EA) INJECTION
Qty: 0 | Refills: 0 | COMMUNITY

## 2017-08-29 RX ORDER — PANTOPRAZOLE SODIUM 20 MG/1
1 TABLET, DELAYED RELEASE ORAL
Qty: 0 | Refills: 0 | COMMUNITY
Start: 2017-08-29

## 2017-08-29 RX ORDER — VANCOMYCIN HCL 1 G
1 VIAL (EA) INTRAVENOUS
Qty: 0 | Refills: 0 | COMMUNITY

## 2017-08-29 RX ORDER — CEFTAROLINE FOSAMIL 600 MG/20ML
200 POWDER, FOR SOLUTION INTRAVENOUS EVERY 12 HOURS
Qty: 0 | Refills: 0 | Status: DISCONTINUED | OUTPATIENT
Start: 2017-08-29 | End: 2017-08-29

## 2017-08-29 RX ORDER — HEPARIN SODIUM 5000 [USP'U]/ML
2100 INJECTION INTRAVENOUS; SUBCUTANEOUS
Qty: 25000 | Refills: 0 | Status: DISCONTINUED | OUTPATIENT
Start: 2017-08-29 | End: 2017-08-29

## 2017-08-29 RX ORDER — SODIUM CHLORIDE 9 MG/ML
250 INJECTION INTRAMUSCULAR; INTRAVENOUS; SUBCUTANEOUS ONCE
Qty: 0 | Refills: 0 | Status: COMPLETED | OUTPATIENT
Start: 2017-08-29 | End: 2017-08-29

## 2017-08-29 RX ORDER — LINEZOLID 600 MG/300ML
0 INJECTION, SOLUTION INTRAVENOUS
Qty: 0 | Refills: 0 | COMMUNITY
Start: 2017-08-29

## 2017-08-29 RX ORDER — CALCIUM ACETATE 667 MG
0 TABLET ORAL
Qty: 0 | Refills: 0 | COMMUNITY
Start: 2017-08-29

## 2017-08-29 RX ORDER — ACETAMINOPHEN 500 MG
2 TABLET ORAL
Qty: 0 | Refills: 0 | COMMUNITY
Start: 2017-08-29

## 2017-08-29 RX ORDER — ACETAMINOPHEN 500 MG
650 TABLET ORAL ONCE
Qty: 0 | Refills: 0 | Status: COMPLETED | OUTPATIENT
Start: 2017-08-29 | End: 2017-08-29

## 2017-08-29 RX ORDER — GENTAMICIN SULFATE 40 MG/ML
200 VIAL (ML) INJECTION
Qty: 0 | Refills: 0 | COMMUNITY

## 2017-08-29 RX ORDER — CEFTAROLINE FOSAMIL 600 MG/20ML
0 POWDER, FOR SOLUTION INTRAVENOUS
Qty: 0 | Refills: 0 | COMMUNITY
Start: 2017-08-29

## 2017-08-29 RX ORDER — NYSTATIN 500MM UNIT
1 POWDER (EA) MISCELLANEOUS
Qty: 0 | Refills: 0 | COMMUNITY

## 2017-08-29 RX ORDER — HEPARIN SODIUM 5000 [USP'U]/ML
0 INJECTION INTRAVENOUS; SUBCUTANEOUS
Qty: 0 | Refills: 0 | COMMUNITY
Start: 2017-08-29

## 2017-08-29 RX ADMIN — Medication 10 UNIT(S): at 16:38

## 2017-08-29 RX ADMIN — Medication 50 MILLIGRAM(S): at 18:35

## 2017-08-29 RX ADMIN — HEPARIN SODIUM 21 UNIT(S)/HR: 5000 INJECTION INTRAVENOUS; SUBCUTANEOUS at 18:35

## 2017-08-29 RX ADMIN — HEPARIN SODIUM 21 UNIT(S)/HR: 5000 INJECTION INTRAVENOUS; SUBCUTANEOUS at 03:29

## 2017-08-29 RX ADMIN — LINEZOLID 300 MILLIGRAM(S): 600 INJECTION, SOLUTION INTRAVENOUS at 06:08

## 2017-08-29 RX ADMIN — Medication 50 MILLIGRAM(S): at 01:02

## 2017-08-29 RX ADMIN — Medication 81 MILLIGRAM(S): at 11:44

## 2017-08-29 RX ADMIN — LINEZOLID 300 MILLIGRAM(S): 600 INJECTION, SOLUTION INTRAVENOUS at 17:23

## 2017-08-29 RX ADMIN — Medication 10 UNIT(S): at 11:45

## 2017-08-29 RX ADMIN — Medication 2: at 16:38

## 2017-08-29 RX ADMIN — Medication 667 MILLIGRAM(S): at 11:44

## 2017-08-29 RX ADMIN — Medication 667 MILLIGRAM(S): at 18:35

## 2017-08-29 RX ADMIN — Medication 2: at 06:08

## 2017-08-29 RX ADMIN — SODIUM CHLORIDE 1000 MILLILITER(S): 9 INJECTION INTRAMUSCULAR; INTRAVENOUS; SUBCUTANEOUS at 01:03

## 2017-08-29 RX ADMIN — Medication 650 MILLIGRAM(S): at 11:10

## 2017-08-29 RX ADMIN — CEFTAROLINE FOSAMIL 50 MILLIGRAM(S): 600 POWDER, FOR SOLUTION INTRAVENOUS at 18:35

## 2017-08-29 RX ADMIN — Medication 50 MILLIGRAM(S): at 11:45

## 2017-08-29 RX ADMIN — Medication 650 MILLIGRAM(S): at 16:53

## 2017-08-29 RX ADMIN — Medication 650 MILLIGRAM(S): at 16:23

## 2017-08-29 RX ADMIN — Medication 650 MILLIGRAM(S): at 10:13

## 2017-08-29 RX ADMIN — SODIUM CHLORIDE 3 MILLILITER(S): 9 INJECTION INTRAMUSCULAR; INTRAVENOUS; SUBCUTANEOUS at 06:08

## 2017-08-29 RX ADMIN — Medication 6: at 11:44

## 2017-08-29 RX ADMIN — SODIUM CHLORIDE 3 MILLILITER(S): 9 INJECTION INTRAMUSCULAR; INTRAVENOUS; SUBCUTANEOUS at 13:54

## 2017-08-29 NOTE — DISCHARGE NOTE ADULT - ADDITIONAL INSTRUCTIONS
You will be transferred back to [facility] for continued medical management. You will be transferred back to [facility] for continued medical management as per your request.

## 2017-08-29 NOTE — DISCHARGE NOTE ADULT - SECONDARY DIAGNOSIS.
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

## 2017-08-29 NOTE — PROGRESS NOTE ADULT - PROBLEM SELECTOR PLAN 1
- she was dialyzed on 8/24  with 2.1 liters off   - nHD today - 8/26   - electrolytes - potassium on the lower side 3.3  - hyponatremia - most likely from fluid overload   - phsopahte 7.6  - please start on calcium acetate 1 tbal. three times per day  - please place the patient on renal diet
- she was dialyzed on 8/24  with 2.1 liters off   - today - 8/28 planned fro HD for 2 liters off   - we will do With 4K bath   - hyponatremia - most likely from fluid overload - encourage fluid restrictions   - phsopahte 7.6  - please start on calcium acetate 1 tbal. three times per day  - please place the patient on renal diet
- she was dialyzed on 8/26  with1.3 liters off   - electrolytes - potassium on the lower side 3.6  - hyponatremia - most likely from fluid overload -123 mmol/l - no symptoms, fluid restriction over the day and to repeat the sodium   - phsopahte 5.8  - please start on calcium acetate 1 tbal. three times per day  - please place the patient on renal diet
- she was dialyzed on 8/28 with 2.5 liters off   - today - 8/29 planned fro HD for 2 liters off   - we will do With 4K bath   - hyponatremia - most likely from fluid overload - encourage fluid restrictions   - phsopahte 7.6  - please start on calcium acetate 1 tbal. three times per day  - please place the patient on renal diet
Patient originally found to be bacteremic secondary to Permacath infection.  Subsequently found to have MRSA-positive blood cultures.  TTE/ALCIDES demonstrate vegetation on tricuspid valve: "Thickened tricuspid valve leaflets. Irregular, shaggy appearing mass seen on the atrial side of the tricuspid valve, most consistent with vegetation."  -- CT surgery with no intervention planned   -- ID recs linezolid (good lung penetration) given possible septic emboli to the lungs (8/28-). D/dwayne vancomycin (8/28)  -- CT abd/pelvis w/IV contrast necessary to assess for septic emboli.  -- CT chest to assess for septic emboli   -- Infectious disease following, continue to appreciate recs.  --Cardiology consult to assess for involvement of cardiac conduction system or development of abscess in the setting of episode of SVT 8/27. F/u recs  -ALCIDES
Patient originally found to be bacteremic secondary to Permacath infection.  Subsequently found to have MRSA-positive blood cultures.  TTE/ALCIDES demonstrate vegetation on tricuspid valve: "Thickened tricuspid valve leaflets. Irregular, shaggy appearing mass seen on the atrial side of the tricuspid valve, most consistent with vegetation."  -- CT surgery continues to follow, appreciate recommendations  -- C/w vancomycin: as patient is on HD, get level and dose appropriately at each dialysis, Tues, Thurs, Sat.  -- Infectious disease, Dr. Kingston, continues to follow, appreciate recommendations
- she was dialyzed yesterday with 2.1 liters off   - no need of HD for today   - electrolytes - potassium on the lower side 3.3  - hyponatremia - most likely from fluid overload   - phsopahte 7.6  - please start on calcium acetate 1 tbal. three times per day  - please place the patient on renal diet

## 2017-08-29 NOTE — PROGRESS NOTE ADULT - ASSESSMENT
48 yo F w/ PMH of HTN, HLD, T2DM, ESRD (on HD T, Th, S), chronic L foot osteomyelitis, who presented to OSH for sepsis 2/2 Permacath infection s/p removal on 8/20, transferred to [facility] with MRSA bacteremia 2/2 tricuspid endocarditis, also w/ extensive septic thrombus in the IVC, SVC, RA and likely tricuspid valve IE, not a surgical candidate.     1. C/w linezolid 600 mg IV q 12 h  2. Will f/u surveillance cultures, will likely positive as source control cannot be established 48 yo F w/ PMH of HTN, HLD, T2DM, ESRD (on HD T, Th, S), chronic L foot osteomyelitis, who presented to OSH for sepsis 2/2 Permacath infection s/p removal on 8/20, transferred to [facility] with MRSA bacteremia 2/2 tricuspid endocarditis, also w/ extensive septic thrombus in the IVC, SVC, RA and likely tricuspid valve IE, not a surgical candidate.     1. C/w linezolid 600 mg IV q 12 h  2. Will f/u surveillance cultures, will likely positive as source control cannot be established  3. If continues to spike fevers, will consider adding Ceftaroline for bacteriocidal effect 48 yo F w/ PMH of HTN, HLD, T2DM, ESRD (on HD T, Th, S), chronic L foot osteomyelitis, who presented to OSH for sepsis 2/2 Permacath infection s/p removal on 8/20, transferred to [facility] with MRSA bacteremia 2/2 tricuspid endocarditis, also w/ extensive septic thrombus in the IVC, SVC, RA and likely tricuspid valve IE, not a surgical candidate.     1. C/w linezolid 600 mg IV q 12 h  2. Will f/u surveillance cultures, will likely positive as source control cannot be established  3. If continues to spike fevers, will consider adding Ceftaroline for bactericidal effect

## 2017-08-29 NOTE — DISCHARGE NOTE ADULT - HOSPITAL COURSE
Patient transferred from [facility] to cardiothoracic surgery service 9[facility] on 8/23. Was started on vancomycin with dialysis dosing (8/23-8/28) and a L femoral TLC placed. CT heart was performed, showed: calcific plaque in pLAD and LCx.  Large thrombus extending from SVC to RA, traverses across Tricuspid valve.  (Parts of thrombus apprear lobulated and attached to superior aspect of interatrial septum). Echo showed: No evidence of R to L shunting via bubble doppler; EF 65%; Thickened tricuspid leaflets with mass seen on atrial side concerning for vegetatino. Mild TR, severe pulm HTN with PA pressure 60mmHg and small pericardial effusion. Blood cultures were drawn which came back positive for MRSA and patient was started on heparin drip for her intracardiac thrombus. Patient was transferred to the medical step down unit for further management and closer monitoring. Patient was continued on heparin gtt, and treated with vancomycin for persistent MRSA bacteremia. On 8/27 patient went into SVT w/ HR to 140-150, was noted to be rigoring.  Was febrile and, tylenol administered.  .  Labetolol 10IVP x2 given.  Noted to be altered, stroke code called.  No acute hemorrhage/ischemia.  Returned to baseline. Cardio was consulted for this episode and thought to be 2/2 to intracardiac infection. No further arrythmias developed.  As per ID, discontinued vanc and started linezolid on 8/28. CT surgery contacted, no intervention planned. Vascular consulted for LLE chronic osteomyelitis, no intervention. Switched abx from vanc to linezolid on 8/28 as per ID recs. Patient’s blood cultures have been persistently positive for MRSA and still needs source control. Patient would benefit from gallium scan to show foci of infection. Possible sources now include new L femoral central line vs R femoral HD cath vs chronic left foot oste vs thrombophlebitis. Patient with evidence of pulm nodules on CT, likely 2/2 septic emboli.  Patient received HD throughout her hospital course as necessary, last 8/28 concluding at 8:45 PM after 2.5 L were taken off.  Will be transferred to [facility] for further medical management and source control. Patient transferred from [facility] to cardiothoracic surgery service [facility] on 8/23. Was started on vancomycin with dialysis dosing (8/23-8/28) and a L femoral TLC placed. CT heart was performed, showed: calcific plaque in pLAD and LCx.  Large thrombus extending from SVC to RA, traverses across Tricuspid valve.  (Parts of thrombus apprear lobulated and attached to superior aspect of interatrial septum). Echo showed: No evidence of R to L shunting via bubble doppler; EF 65%; Thickened tricuspid leaflets with mass seen on atrial side concerning for vegetatino. Mild TR, severe pulm HTN with PA pressure 60mmHg and small pericardial effusion. Blood cultures were drawn which came back positive for MRSA and patient was started on heparin drip for her intracardiac thrombus. Patient was transferred to the medical step down unit for further management and closer monitoring. Patient was continued on heparin gtt, and treated with vancomycin for persistent MRSA bacteremia. On 8/27 patient went into SVT w/ HR to 140-150, was noted to be rigoring.  Was febrile and, tylenol administered.  .  Labetolol 10IVP x2 given.  Noted to be altered, stroke code called.  No acute hemorrhage/ischemia.  Returned to baseline. Cardio was consulted for this episode and thought to be 2/2 to intracardiac infection. No further arrythmias developed.  As per ID, discontinued vanc and started linezolid on 8/28. CT surgery contacted, no intervention planned. Vascular consulted for LLE chronic osteomyelitis, no intervention. Switched abx from vanc to linezolid on 8/28 as per ID recs. ID recommended adding on ceftaraline for enhanced MRSA coverage on 8/29. Patient’s blood cultures have been persistently positive for MRSA and still needs source control. Patient would benefit from gallium scan to show foci of infection. Possible sources now includ L femoral central line (removed 8/29 before transport) vs R femoral HD cath vs chronic left foot osteo vs thrombophlebitis. Patient with evidence of pulm nodules on CT, likely 2/2 septic emboli.  Patient received HD throughout her hospital course as necessary, last 8/29.  Patient uncomfortable at [facility] since it is so far from her home and difficult for her family members to commute for visits. Requested transfer back [facility], will undergo ambulance transport back to [place] for further medical management and source control as per patient's request.

## 2017-08-29 NOTE — DISCHARGE NOTE ADULT - CARE PLAN
Principal Discharge DX:	Bacteremia  Goal:	Continue care at [facility].  Instructions for follow-up, activity and diet:	You were transferred to [facility] after it was discovered that you had an infected vegetation in your heart and a large clot in your heart. The plan was for you to be evaluated by our surgical team. However, after careful evaluation it was decided that surgical intervention is not in your best interest at this time. Instead, you were transferred to the internal medicine service for medical management of your infection. While under the medical service, you continued to grow MRSA bacteria in your blood cultures despite appropriate antibiotic therapy. It is our thought that your primary source of infection needs to be isolated and removed. We recommend that you be transferred back to [facility] to be closer to your family while you receive continuous medical care since no surgical intervention is required.  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  Instructions for follow-up, activity and diet:	You were transferred to [facility] after it was discovered that you had an infected vegetation in your heart and a large clot in your heart. The plan was for you to be evaluated by our surgical team. However, after careful evaluation it was decided that surgical intervention is not in your best interest at this time. Instead, you were transferred to the internal medicine service for medical management of your infection. While under the medical service, you continued to grow MRSA bacteria in your blood cultures despite appropriate antibiotic therapy. It is our thought that your primary source of infection needs to be isolated and removed. We recommend that you be transferred back to [facility] to be closer to your family while you receive continuous medical care since no surgical intervention is required.  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  Instructions for follow-up, activity and diet:	You were transferred to [facility] after it was discovered that you had an infected vegetation in your heart and a large clot in your heart. The plan was for you to be evaluated by our surgical team. However, after careful evaluation it was decided that surgical intervention is not in your best interest at this time. Instead, you were transferred to the internal medicine service for medical management of your infection. While under the medical service, you continued to grow MRSA bacteria in your blood cultures despite appropriate antibiotic therapy. It is our thought that your primary source of infection needs to be isolated and removed. We recommend that you be transferred back to [facility] to be closer to your family while you receive continuous medical care since no surgical intervention is required.  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  Instructions for follow-up, activity and diet:	You were transferred to [facility] after it was discovered that you had an infected vegetation in your heart and a large clot in your heart. The plan was for you to be evaluated by our surgical team. However, after careful evaluation it was decided that surgical intervention is not in your best interest at this time. Instead, you were transferred to the internal medicine service for medical management of your infection. While under the medical service, you continued to grow MRSA bacteria in your blood cultures despite appropriate antibiotic therapy. It is our thought that your primary source of infection needs to be isolated and removed. We recommend that you be transferred back to [facility] to be closer to your family while you receive continuous medical care since no surgical intervention is required.  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

## 2017-08-29 NOTE — DISCHARGE NOTE ADULT - PROVIDER TOKENS
FREE:[LAST:[[facility]],FIRST:[[facility]],PHONE:[(   )    -],FAX:[(   )    -]]

## 2017-08-29 NOTE — PROGRESS NOTE ADULT - PROBLEM SELECTOR PLAN 2
- not a good candidate for EPO recent stroke
Patient found to have a large thrombus extending in SVC, IVC, and RA.  Echo states there is  "a large linear independently mobile mass is seen in the right atrium, protruding through the tricuspid valve in diastole and may also be part of the vegetation vs thrombus."  Etiology of thrombus may have been patient's prior R IJ catheter.  -- On heparin gtt at 20 cc/hr  -- Follow PTT and adjust rate according to nomogram, goal of 50 - 80.  -- Consider vascular consult for possible recs.
Patient found to have a large thrombus extending in SVC, IVC, and RA.  Echo states there is  "a large linear independently mobile mass is seen in the right atrium, protruding through the tricuspid valve in diastole and may also be part of the vegetation vs thrombus."  Etiology of thrombus may have been patient's prior R IJ catheter.  -- On heparin gtt at 16 cc/hr  -- Follow PTT and adjust rate according to nomogram, goal of 50 - 80.
- not a good candidate for EPO recent stroke

## 2017-08-29 NOTE — PROGRESS NOTE ADULT - PROBLEM SELECTOR PLAN 3
- MRSA   - last blood cultures from 8/25 positive   - continue with vancomycin
- MRSA   - last blood cultures from 8/27 - positive   - continue with vancomycin
- MRSA   - last blood cultures from 8/27 - positive   - on linezolid today
- MRSA endocarditis   - not a surgical candidate   - last blood cultures from 8/26 positive   - continue with vancomycin
Etiology of patient's weakness unclear.  Neurology following. LE weakness on R appears to be a fairly acute event, as pt. endorses only previously having LLE weakness from her osteomyelitis.  Stroke is high on differential, though repeat CT did not corroborate evidence of initial possible R-MCA, which was inconsistent with symptoms anyway.  Other possible etiology is patietnt's renal dysfunction, missed dialysis sessions, though with resumption of dialysis symptoms have not improved.    -- Neurology following, does not believe this is neurologic in etiology   -- C/w dialysis, Leonela Bowman, Sat.    -- Trend neuro exam
- MRSA   - last blood cultures from 8/24 positive   - continue with vancomycin
Etiology of patient's weakness unclear.  Neurology following. LE weakness on R appears to be a fairly acute event, as pt. endorses only previously having LLE weakness from her osteomyelitis.  Stroke is high on differential, though repeat CT did not corroborate evidence of initial possible R-MCA, which was inconsistent with symptoms anyway.  Other possible etiology is patietnt's renal dysfunction, missed dialysis sessions, though with resumption of dialysis symptoms have not improved.    -- Neurology continues to follow, appreciate recommendations   -- C/w dialysis, Leonela Bowman, Sat.    -- Trend neuro exam

## 2017-08-29 NOTE — DISCHARGE NOTE ADULT - CONDITIONS AT DISCHARGE
Patient stable for discharge as was requesting transfer back to [facility]. No plan per CTS for surgery at this time and plan of care ie antibiotics and further diagnostic w/u ie Gallium could be performed at [facility].

## 2017-08-29 NOTE — PROGRESS NOTE ADULT - ATTENDING COMMENTS
Refractory high-grade MRSA bacteremia with extensive septic thrombi in SVC, IVC, RA and likely tricuspid valve IE. Per my discussion with CTS, patient is not a candidate for thrombectomy. Failed vancomycin. Continue linezolid 600mg IV q12h and add ceftaroline 200mg IV q12h (renally adjusted dose) (off-label) as salvage combination therapy for refractory MRSA BSI. Continue to check daily surveillance bcx until clears.

## 2017-08-29 NOTE — DISCHARGE NOTE ADULT - MEDICATION SUMMARY - MEDICATIONS TO STOP TAKING
I will STOP taking the medications listed below when I get home from the hospital:    aztreonam 1 g/50 mL intravenous solution  -- 500 milligram(s) intravenous every 8 hours    vancomycin 1 g intravenous injection  -- 1 gram(s) intravenous prn after HD    gentamicin 100 mg/100 mL-NaCl 0.9% intravenous solution  -- 200 milligram(s) intravenous once a day after HD only.

## 2017-08-29 NOTE — PROGRESS NOTE ADULT - SUBJECTIVE AND OBJECTIVE BOX
INTERVAL HPI/OVERNIGHT EVENTS: Pt spiked to an oral T of 103 overnight.  While febrile, pts BP was 225/95 w/ HR in 130's.  Fever, BP and HR improved w/ tylenol and 500 cc bolus.  Pt reports rigoring during episode.     SUBJECTIVE: Patient seen and examined at bedside. Pt says she is very tired this morning. Presently, denies nausea, vomiting, subjective fevers, chills, diarrhea and dysuria.     [OBJECTIVE]:    VITAL SIGNS:  T(F): 97.5 (08-29-17 @ 09:49), Max: 103 (08-29-17 @ 00:15)  HR: 96 (08-29-17 @ 08:47) (84 - 136)  BP: 127/60 (08-29-17 @ 08:47) (120/52 - 225/95)  BP(mean): 86 (08-29-17 @ 08:47) (75 - 138)  RR: 18 (08-29-17 @ 08:47) (18 - 20)  SpO2: 96% (08-29-17 @ 08:47) (91% - 100%)  Wt(kg): --  CVP(cm H2O): --      08-28 @ 07:01  -  08-29 @ 07:00  --------------------------------------------------------  IN: 1386 mL / OUT: 2750 mL / NET: -1364 mL      CAPILLARY BLOOD GLUCOSE  255 (29 Aug 2017 11:47)      PHYSICAL EXAM:    General: A&Ox 3; NAD  Head: NC/AT  Eyes: PERRL; EOMI; anicteric sclera  Neck: Supple; no JVD  Respiratory: CTA B/L; no wheezes/crackles/rales  Cardiovascular: Regular rhythm/rate; S1/S2; no gallops or murmurs auscultated  Gastrointestinal: Soft; NTND w/out rebound tenderness or guarding  Extremities: Left foot demonstrates medial chronic open, dry, nonpurulent linear chasm in the medial aspect, NTTP, no fluctuance  Skin: No rashes    MEDICATIONS:  MEDICATIONS  (STANDING):  sodium chloride 0.9% lock flush 3 milliLiter(s) IV Push every 8 hours  pantoprazole    Tablet 40 milliGRAM(s) Oral before breakfast  aspirin enteric coated 81 milliGRAM(s) Oral daily  valsartan 320 milliGRAM(s) Oral daily  atorvastatin 40 milliGRAM(s) Oral at bedtime  metoprolol 50 milliGRAM(s) Oral every 6 hours  calcium acetate 667 milliGRAM(s) Oral three times a day with meals  linezolid  IVPB   IV Intermittent   linezolid  IVPB 600 milliGRAM(s) IV Intermittent every 12 hours  insulin glargine Injectable (LANTUS) 30 Unit(s) SubCutaneous at bedtime  insulin lispro Injectable (HumaLOG) 10 Unit(s) SubCutaneous three times a day before meals  insulin lispro (HumaLOG) corrective regimen sliding scale   SubCutaneous Before meals and at bedtime  dextrose 5%. 1000 milliLiter(s) (50 mL/Hr) IV Continuous <Continuous>  dextrose 50% Injectable 12.5 Gram(s) IV Push once  dextrose 50% Injectable 25 Gram(s) IV Push once  dextrose 50% Injectable 25 Gram(s) IV Push once  heparin  Infusion 2100 Unit(s)/Hr (21 mL/Hr) IV Continuous <Continuous>    MEDICATIONS  (PRN):  acetaminophen   Tablet. 650 milliGRAM(s) Oral every 6 hours PRN Mild Pain (1 - 3)  acetaminophen   Tablet 650 milliGRAM(s) Oral every 6 hours PRN For Temp greater than 38 C (100.4 F)  dextrose Gel 1 Dose(s) Oral once PRN Blood Glucose LESS THAN 70 milliGRAM(s)/deciliter  glucagon  Injectable 1 milliGRAM(s) IntraMuscular once PRN Glucose LESS THAN 70 milligrams/deciliter      ALLERGIES:  Allergies    penicillin (Unknown)  Sular (Unknown)    Intolerances        LABS:                        7.6    39.1  )-----------( 195      ( 29 Aug 2017 06:06 )             23.7     08-29    128<L>  |  90<L>  |  22  ----------------------------<  183<H>  3.6   |  22  |  3.80<H>    Ca    7.7<L>      29 Aug 2017 06:06  Phos  7.6     08-28  Mg     1.8     08-29    TPro  8.4<H>  /  Alb  2.2<L>  /  TBili  0.6  /  DBili  x   /  AST  23  /  ALT  14  /  AlkPhos  411<H>  08-27    PT/INR - ( 27 Aug 2017 15:30 )   PT: 16.1 sec;   INR: 1.44          PTT - ( 29 Aug 2017 10:53 )  PTT:69.9 sec    Microbiology:   Culture - Blood (08.28.17 @ 19:38)    Specimen Source: .Blood Blood    Culture Results:   No growth at 12 hours    Culture - Blood (08.27.17 @ 14:54)    Gram Stain:   Growth in aerobic bottle: Gram Positive Cocci in Clusters  Result called to and read back by_ Gina Saunders RN (7 [facility])  08/28/2017 09:15    Specimen Source: .Blood Blood    Culture Results:   Culture in progress    Culture - Blood (08.27.17 @ 14:54)    Gram Stain:   Growth in aerobic bottle: Gram Positive Cocci in Clusters  Result called to and read back by_ Gina Saunders RN (7 [facility])  08/28/2017 09:15    Specimen Source: .Blood Blood    Culture Results:   Culture in progress    Culture - Blood (08.24.17 @ 03:21)    -  Cefazolin: R >16    -  Linezolid: S 4    -  Oxacillin: R >2    -  Penicillin: R >8    -  Trimethoprim/Sulfamethoxazole: S <=0.5/9.5    -  Clindamycin: R >4    -  RIF- Rifampin: S <=1    -  Vancomycin: S 1.5    -  Vancomycin: S 2    -  Erythromycin: R >4    -  Tetra/Doxy: S <=4    Gram Stain:   Growth in aerobic bottle: Gram Positive Cocci in Clusters  Result called to and read back by_ AMISH Brasher (9 [facility]) RN on  08/24/2017  14:12 pm  Growth in anaerobic bottle: Gram Positive Cocci in Clusters  Result called to and read back by_ MARC Payan RN(9 [facility]) on  08/26/2017  09:36 am    Specimen Source: .Blood Blood-Peripheral    Organism: Methicillin resistant Staphylococcus aureus    Organism: Methicillin resistant Staphylococcus aureus    Culture Results:   Growth in aerobic and anaerobic bottles: Methicillin resistant  Staphylococcus aureus  Result called to and read back by_ MARC Payan RN (9[facility]) on  08/25/2017  10:15am    Organism Identification: Methicillin resistant Staphylococcus aureus  Methicillin resistant Staphylococcus aureus    Method Type: ETEST    Method Type: AUTUMN    RADIOLOGY & ADDITIONAL TESTS: Reviewed.

## 2017-08-29 NOTE — DISCHARGE NOTE ADULT - CARE PROVIDER_API CALL
[facility], [facility]  Phone: (   )    -  Fax: (   )    -

## 2017-08-29 NOTE — PROGRESS NOTE ADULT - ASSESSMENT
This is 47 year old female with PMH stated above. She is on HD recently started 2 months ago. We will do HD today   Please consider removing the catheter after the HD

## 2017-08-29 NOTE — PROGRESS NOTE ADULT - PROBLEM SELECTOR PROBLEM 1
ESRD (end stage renal disease)
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

## 2017-08-29 NOTE — PROGRESS NOTE ADULT - PROBLEM SELECTOR PROBLEM 4
Hypertension
ESRD (end stage renal disease)
Hypertension
ESRD (end stage renal disease)

## 2017-08-29 NOTE — PROGRESS NOTE ADULT - PROVIDER SPECIALTY LIST ADULT
CT Surgery
Infectious Disease
Internal Medicine
Nephrology
Neurology
Neurology
Internal Medicine

## 2017-08-29 NOTE — DISCHARGE NOTE ADULT - PATIENT PORTAL LINK FT
“You can access the [facility] Patient Portal, offered by [facility], by registering with the following website: [email or web address]”

## 2017-08-29 NOTE — PROGRESS NOTE ADULT - SUBJECTIVE AND OBJECTIVE BOX
Objective and Subjective   The patient is in her bed. Does not endorse any complains today. She was dialyzed yesterday with 2.5 liters off. Today no chest pain, no shortness of breath, no fever       Patient is a 47 year old female with history of HTN, HLD, DM II, ESRD on HD (TTS. Last HD unknown. Receives HD via Right Femoral HD catheter placed on 08/22/2017 at [facility] and chronic left foot OM. 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 LAst hD done on 7/28 - 2.5 luiters off     Patient seen and examined at bedside.     sodium chloride 0.9% lock flush 3 milliLiter(s) every 8 hours  pantoprazole    Tablet 40 milliGRAM(s) before breakfast  aspirin enteric coated 81 milliGRAM(s) daily  valsartan 320 milliGRAM(s) daily  atorvastatin 40 milliGRAM(s) at bedtime  acetaminophen   Tablet. 650 milliGRAM(s) every 6 hours PRN  metoprolol 50 milliGRAM(s) every 6 hours  calcium acetate 667 milliGRAM(s) three times a day with meals  linezolid  IVPB     linezolid  IVPB 600 milliGRAM(s) every 12 hours  acetaminophen   Tablet 650 milliGRAM(s) every 6 hours PRN  insulin glargine Injectable (LANTUS) 30 Unit(s) at bedtime  insulin lispro Injectable (HumaLOG) 10 Unit(s) three times a day before meals  insulin lispro (HumaLOG) corrective regimen sliding scale   Before meals and at bedtime  dextrose 5%. 1000 milliLiter(s) <Continuous>  dextrose Gel 1 Dose(s) once PRN  dextrose 50% Injectable 12.5 Gram(s) once  dextrose 50% Injectable 25 Gram(s) once  dextrose 50% Injectable 25 Gram(s) once  glucagon  Injectable 1 milliGRAM(s) once PRN  heparin  Infusion 2100 Unit(s)/Hr <Continuous>      Allergies    penicillin (Unknown)  Sular (Unknown)    Intolerances        T(C): , Max: 39.4 (08-29-17 @ 00:15)  T(F): , Max: 103 (08-29-17 @ 00:15)  HR: 86 (08-29-17 @ 13:00)  BP: 129/31 (08-29-17 @ 13:00)  BP(mean): 88 (08-29-17 @ 13:00)  RR: 20 (08-29-17 @ 13:00)  SpO2: 93% (08-29-17 @ 13:00)  Wt(kg): --    08-28 @ 07:01  -  08-29 @ 07:00  --------------------------------------------------------  IN:    heparin Infusion: 235 mL    heparin Infusion: 231 mL    IV PiggyBack: 300 mL    Oral Fluid: 120 mL    Sodium Chloride 0.9% IV Bolus: 500 mL  Total IN: 1386 mL    OUT:    Other: 2500 mL    Voided: 250 mL  Total OUT: 2750 mL    Total NET: -1364 mL          PHYSICAL exam  Alert and oriented  No JVD   Normal air entry in the lungs, no wheezing, no crackles, no rales   RRR, normal s1/s2, no murmurs, rubs or gallops   Abdomen - soft, non-tender, non-distended, normal bowel sounds   extremities - mild peripheral edema   HAs a triple lumen in the right femoral side        LABS:                        7.6    39.1  )-----------( 195      ( 29 Aug 2017 06:06 )             23.7     08-29    128<L>  |  90<L>  |  22  ----------------------------<  183<H>  3.6   |  22  |  3.80<H>    Ca    7.7<L>      29 Aug 2017 06:06  Phos  7.6     08-28  Mg     1.8     08-29    TPro  8.4<H>  /  Alb  2.2<L>  /  TBili  0.6  /  DBili  x   /  AST  23  /  ALT  14  /  AlkPhos  411<H>  08-27      PT/INR - ( 27 Aug 2017 15:30 )   PT: 16.1 sec;   INR: 1.44          PTT - ( 29 Aug 2017 10:53 )  PTT:69.9 sec          RADIOLOGY & ADDITIONAL STUDIES:

## 2017-08-29 NOTE — DISCHARGE NOTE ADULT - PLAN OF CARE
Continue care at [facility]. You were transferred to [facility] after it was discovered that you had an infected vegetation in your heart and a large clot in your heart. The plan was for you to be evaluated by our surgical team. However, after careful evaluation it was decided that surgical intervention is not in your best interest at this time. Instead, you were transferred to the internal medicine service for medical management of your infection. While under the medical service, you continued to grow MRSA bacteria in your blood cultures despite appropriate antibiotic therapy. It is our thought that your primary source of infection needs to be isolated and removed. We recommend that you be transferred back to [facility] to be closer to your family while you receive continuous medical care since no surgical intervention is required.

## 2017-08-29 NOTE — PROGRESS NOTE ADULT - SUBJECTIVE AND OBJECTIVE BOX
Patient was seen and evaluated on dialysis.   Patient is tolerating the procedure well.   HR: 83 (08-29-17 @ 13:45)  BP: 125/60 (08-29-17 @ 13:45) pusle 65, /67  Continue dialysis:   Dialyzer:  180        QB:  400      QD: 500  Goal UF 2 kg over 3 Hours

## 2017-08-29 NOTE — DISCHARGE NOTE ADULT - MEDICATION SUMMARY - MEDICATIONS TO TAKE
I will START or STAY ON the medications listed below when I get home from the hospital:    aspirin 81 mg oral tablet  -- 1 tab(s) by mouth once a day  -- Indication: For Essential hypertension    acetaminophen 325 mg oral tablet  -- 2 tab(s) by mouth every 6 hours, As needed, Mild Pain (1 - 3)  -- Indication: For Bacteremia    acetaminophen 325 mg oral tablet  -- 2 tab(s) by mouth every 6 hours, As needed, For Temp greater than 38 C (100.4 F)  -- Indication: For Bacteremia    valsartan 320 mg oral tablet  -- 1 tab(s) by mouth once a day  -- Indication: For Hypertension    heparin  -- heparin drip at a rate of 21 cc/hour   -- Indication: For RA THROMBUS    gabapentin 300 mg oral tablet  -- 300 milligram(s) by mouth once a day (at bedtime)  -- Indication: For left lower extremit osteomyelitis     insulin glargine 100 units/mL subcutaneous solution  -- 30 unit(s) subcutaneous once a day (at bedtime)  -- Indication: For Diabetes    insulin lispro 100 units/mL subcutaneous solution  -- 15 unit(s) subcutaneous 3 times a day (before meals)  -- Indication: For Diabetes    simvastatin 40 mg oral tablet  -- 1 tab(s) by mouth once a day (at bedtime)  -- Indication: For Hyperlipidemia    metoprolol tartrate 100 mg oral tablet  -- 1 tab(s) by mouth 2 times a day  -- Indication: For Hypertension    ceftaroline  --   ceftaraline 200 mg IV q12h  -- Indication: For 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

## 2017-08-29 NOTE — PROGRESS NOTE ADULT - ATTENDING COMMENTS
for dialysis today.  Blood cultures remain positive.  If still not clearing bacteremia within next day or so- will need to dc femoral vein catheter and place in and out FVC (femoral0 with each dialysis  Aseptic appearing- and overall looks and feels better than she did on admission

## 2017-08-29 NOTE — PROGRESS NOTE ADULT - PROBLEM SELECTOR PLAN 4
- she is on Metoprolol 50 mg every 6 hours   valsartan 320 mg daily
- she is on Metoprolol 50 mg every 6 hours   valsartan 320 mg daily  - consider amlodipine 5 mg with option to go to 10 mg if the BP is not controlled
- she is on Metoprolol 50 mg every 6 hours   valsartan 320 mg daily  - consider amlodipine 5 mg with option to go to 10 mg if the BP is not controlled
- she is on Metoprolol 50 mg every 6 hours   valsartan 320 mg daily  - please consider adding amlodipine 5 mg to her BP medications
Patient has ESRD, on HD Tues, Thurs, Sat.  Presented to OSH after missing two dialysis sessions, has resumed her regular schedule while inpatient.  Patient states she still makes urine twice daily at baseline.  Nephrology is following  -- Nephrology continues to follow, appreciate recommendations.  -- For HD 8/28. Will get CT w/IV contrast, nephrology states this is ok   -- Follow BMPs.  -- Hyponatremic 123, free water restriction.
- she is on Metoprolol 50 mg every 6 hours   valsartan 320 mg daily
Patient has ESRD, on HD Tues, Thurs, Sat.  Presented to OSH after missing two dialysis sessions, has resumed her regular schedule while inpatient.  Patient states she still makes urine twice daily at baseline.  Nephrology is following  -- Nephrology continues to follow, appreciate recommendations.  -- Follow BMPs.

## 2017-08-30 LAB
CULTURE RESULTS: SIGNIFICANT CHANGE UP
ORGANISM # SPEC MICROSCOPIC CNT: SIGNIFICANT CHANGE UP
SPECIMEN SOURCE: SIGNIFICANT CHANGE UP

## 2017-08-31 LAB
-  CEFAZOLIN: SIGNIFICANT CHANGE UP
-  CLINDAMYCIN: SIGNIFICANT CHANGE UP
-  ERYTHROMYCIN: SIGNIFICANT CHANGE UP
-  LINEZOLID: SIGNIFICANT CHANGE UP
-  OXACILLIN: SIGNIFICANT CHANGE UP
-  PENICILLIN: SIGNIFICANT CHANGE UP
-  RIFAMPIN: SIGNIFICANT CHANGE UP
-  TETRACYCLINE: SIGNIFICANT CHANGE UP
-  TRIMETHOPRIM/SULFAMETHOXAZOLE: SIGNIFICANT CHANGE UP
-  VANCOMYCIN: SIGNIFICANT CHANGE UP
-  VANCOMYCIN: SIGNIFICANT CHANGE UP
CULTURE RESULTS: SIGNIFICANT CHANGE UP
CULTURE RESULTS: SIGNIFICANT CHANGE UP
METHOD TYPE: SIGNIFICANT CHANGE UP
METHOD TYPE: SIGNIFICANT CHANGE UP
SPECIMEN SOURCE: SIGNIFICANT CHANGE UP
SPECIMEN SOURCE: SIGNIFICANT CHANGE UP

## 2017-09-01 LAB — GRAM STN FLD: SIGNIFICANT CHANGE UP

## 2017-09-12 DIAGNOSIS — Z79.4 LONG TERM (CURRENT) USE OF INSULIN: ICD-10-CM

## 2017-09-12 DIAGNOSIS — I07.1 RHEUMATIC TRICUSPID INSUFFICIENCY: ICD-10-CM

## 2017-09-12 DIAGNOSIS — D64.9 ANEMIA, UNSPECIFIED: ICD-10-CM

## 2017-09-12 DIAGNOSIS — R10.13 EPIGASTRIC PAIN: ICD-10-CM

## 2017-09-12 DIAGNOSIS — I76 SEPTIC ARTERIAL EMBOLISM: ICD-10-CM

## 2017-09-12 DIAGNOSIS — I31.3 PERICARDIAL EFFUSION (NONINFLAMMATORY): ICD-10-CM

## 2017-09-12 DIAGNOSIS — I26.90 SEPTIC PULMONARY EMBOLISM WITHOUT ACUTE COR PULMONALE: ICD-10-CM

## 2017-09-12 DIAGNOSIS — E78.2 MIXED HYPERLIPIDEMIA: ICD-10-CM

## 2017-09-12 DIAGNOSIS — I27.2 OTHER SECONDARY PULMONARY HYPERTENSION: ICD-10-CM

## 2017-09-12 DIAGNOSIS — Z79.82 LONG TERM (CURRENT) USE OF ASPIRIN: ICD-10-CM

## 2017-09-12 DIAGNOSIS — N18.6 END STAGE RENAL DISEASE: ICD-10-CM

## 2017-09-12 DIAGNOSIS — Z99.2 DEPENDENCE ON RENAL DIALYSIS: ICD-10-CM

## 2017-09-12 DIAGNOSIS — T82.7XXA INFECTION AND INFLAMMATORY REACTION DUE TO OTHER CARDIAC AND VASCULAR DEVICES, IMPLANTS AND GRAFTS, INITIAL ENCOUNTER: ICD-10-CM

## 2017-09-12 DIAGNOSIS — I82.210 ACUTE EMBOLISM AND THROMBOSIS OF SUPERIOR VENA CAVA: ICD-10-CM

## 2017-09-12 DIAGNOSIS — E11.22 TYPE 2 DIABETES MELLITUS WITH DIABETIC CHRONIC KIDNEY DISEASE: ICD-10-CM

## 2017-09-12 DIAGNOSIS — R41.82 ALTERED MENTAL STATUS, UNSPECIFIED: ICD-10-CM

## 2017-09-12 DIAGNOSIS — I12.0 HYPERTENSIVE CHRONIC KIDNEY DISEASE WITH STAGE 5 CHRONIC KIDNEY DISEASE OR END STAGE RENAL DISEASE: ICD-10-CM

## 2017-09-12 DIAGNOSIS — I07.9 RHEUMATIC TRICUSPID VALVE DISEASE, UNSPECIFIED: ICD-10-CM

## 2017-09-12 DIAGNOSIS — I47.1 SUPRAVENTRICULAR TACHYCARDIA: ICD-10-CM

## 2017-09-12 DIAGNOSIS — Z88.2 ALLERGY STATUS TO SULFONAMIDES: ICD-10-CM

## 2017-09-12 DIAGNOSIS — Z88.0 ALLERGY STATUS TO PENICILLIN: ICD-10-CM

## 2017-09-12 DIAGNOSIS — M86.672 OTHER CHRONIC OSTEOMYELITIS, LEFT ANKLE AND FOOT: ICD-10-CM

## 2017-09-12 DIAGNOSIS — A41.02 SEPSIS DUE TO METHICILLIN RESISTANT STAPHYLOCOCCUS AUREUS: ICD-10-CM

## 2017-09-13 ENCOUNTER — INPATIENT (INPATIENT)
Facility: HOSPITAL | Age: 47
LOS: 26 days | Discharge: HOME HEALTH | DRG: 216 | End: 2017-10-10
Attending: THORACIC SURGERY (CARDIOTHORACIC VASCULAR SURGERY) | Admitting: THORACIC SURGERY (CARDIOTHORACIC VASCULAR SURGERY)
Payer: COMMERCIAL

## 2017-09-13 VITALS
HEART RATE: 112 BPM | OXYGEN SATURATION: 98 % | DIASTOLIC BLOOD PRESSURE: 72 MMHG | SYSTOLIC BLOOD PRESSURE: 158 MMHG | RESPIRATION RATE: 20 BRPM | WEIGHT: 191.8 LBS | HEIGHT: 70 IN | TEMPERATURE: 102 F

## 2017-09-13 LAB
ALBUMIN SERPL ELPH-MCNC: 1.9 G/DL — LOW (ref 3.3–5)
ALP SERPL-CCNC: 264 U/L — HIGH (ref 40–120)
ALT FLD-CCNC: <5 U/L — LOW (ref 10–45)
ANION GAP SERPL CALC-SCNC: 15 MMOL/L — SIGNIFICANT CHANGE UP (ref 5–17)
APTT BLD: 44.8 SEC — HIGH (ref 27.5–37.4)
AST SERPL-CCNC: 8 U/L — LOW (ref 10–40)
BASOPHILS NFR BLD AUTO: 0.3 % — SIGNIFICANT CHANGE UP (ref 0–2)
BILIRUB SERPL-MCNC: 0.4 MG/DL — SIGNIFICANT CHANGE UP (ref 0.2–1.2)
BUN SERPL-MCNC: 22 MG/DL — SIGNIFICANT CHANGE UP (ref 7–23)
CALCIUM SERPL-MCNC: 8.2 MG/DL — LOW (ref 8.4–10.5)
CHLORIDE SERPL-SCNC: 89 MMOL/L — LOW (ref 96–108)
CHOLEST SERPL-MCNC: 70 MG/DL — SIGNIFICANT CHANGE UP (ref 10–199)
CK MB CFR SERPL CALC: <1 NG/ML — SIGNIFICANT CHANGE UP (ref 0–6.7)
CK SERPL-CCNC: 15 U/L — LOW (ref 25–170)
CO2 SERPL-SCNC: 23 MMOL/L — SIGNIFICANT CHANGE UP (ref 22–31)
CREAT SERPL-MCNC: 3.6 MG/DL — HIGH (ref 0.5–1.3)
EOSINOPHIL NFR BLD AUTO: 0.9 % — SIGNIFICANT CHANGE UP (ref 0–6)
GLUCOSE SERPL-MCNC: 234 MG/DL — HIGH (ref 70–99)
HBA1C BLD-MCNC: 8.2 % — HIGH (ref 4–5.6)
HCT VFR BLD CALC: 26.3 % — LOW (ref 34.5–45)
HDLC SERPL-MCNC: 12 MG/DL — LOW (ref 40–125)
HGB BLD-MCNC: 7.9 G/DL — LOW (ref 11.5–15.5)
INR BLD: 5.62 — CRITICAL HIGH (ref 0.88–1.16)
LIPID PNL WITH DIRECT LDL SERPL: 30 MG/DL — SIGNIFICANT CHANGE UP
LYMPHOCYTES # BLD AUTO: 5 % — LOW (ref 13–44)
MAGNESIUM SERPL-MCNC: 1.8 MG/DL — SIGNIFICANT CHANGE UP (ref 1.6–2.6)
MCHC RBC-ENTMCNC: 25.6 PG — LOW (ref 27–34)
MCHC RBC-ENTMCNC: 30 G/DL — LOW (ref 32–36)
MCV RBC AUTO: 85.1 FL — SIGNIFICANT CHANGE UP (ref 80–100)
MONOCYTES NFR BLD AUTO: 6.5 % — SIGNIFICANT CHANGE UP (ref 2–14)
NEUTROPHILS NFR BLD AUTO: 87.3 % — HIGH (ref 43–77)
NT-PROBNP SERPL-SCNC: 8143 PG/ML — HIGH (ref 0–300)
PHOSPHATE SERPL-MCNC: 4.2 MG/DL — SIGNIFICANT CHANGE UP (ref 2.5–4.5)
PLATELET # BLD AUTO: 295 K/UL — SIGNIFICANT CHANGE UP (ref 150–400)
POTASSIUM SERPL-MCNC: 4.4 MMOL/L — SIGNIFICANT CHANGE UP (ref 3.5–5.3)
POTASSIUM SERPL-SCNC: 4.4 MMOL/L — SIGNIFICANT CHANGE UP (ref 3.5–5.3)
PROT SERPL-MCNC: 7.8 G/DL — SIGNIFICANT CHANGE UP (ref 6–8.3)
PROTHROM AB SERPL-ACNC: 64.6 SEC — HIGH (ref 9.8–12.7)
RBC # BLD: 3.09 M/UL — LOW (ref 3.8–5.2)
RBC # FLD: 16.5 % — SIGNIFICANT CHANGE UP (ref 10.3–16.9)
SODIUM SERPL-SCNC: 127 MMOL/L — LOW (ref 135–145)
TOTAL CHOLESTEROL/HDL RATIO MEASUREMENT: 5.8 RATIO — SIGNIFICANT CHANGE UP (ref 3.3–7.1)
TRIGL SERPL-MCNC: 140 MG/DL — SIGNIFICANT CHANGE UP (ref 10–149)
TROPONIN T SERPL-MCNC: 0.14 NG/ML — CRITICAL HIGH (ref 0–0.01)
WBC # BLD: 17.9 K/UL — HIGH (ref 3.8–10.5)
WBC # FLD AUTO: 17.9 K/UL — HIGH (ref 3.8–10.5)

## 2017-09-13 PROCEDURE — 71010: CPT | Mod: 26

## 2017-09-13 PROCEDURE — 99223 1ST HOSP IP/OBS HIGH 75: CPT

## 2017-09-13 RX ORDER — IMIPENEM AND CILASTATIN 250; 250 MG/100ML; MG/100ML
500 INJECTION, POWDER, FOR SOLUTION INTRAVENOUS EVERY 12 HOURS
Qty: 0 | Refills: 0 | Status: DISCONTINUED | OUTPATIENT
Start: 2017-09-13 | End: 2017-09-14

## 2017-09-13 RX ORDER — DEXTROSE 50 % IN WATER 50 %
25 SYRINGE (ML) INTRAVENOUS ONCE
Qty: 0 | Refills: 0 | Status: DISCONTINUED | OUTPATIENT
Start: 2017-09-13 | End: 2017-10-02

## 2017-09-13 RX ORDER — GABAPENTIN 400 MG/1
600 CAPSULE ORAL DAILY
Qty: 0 | Refills: 0 | Status: DISCONTINUED | OUTPATIENT
Start: 2017-09-13 | End: 2017-09-16

## 2017-09-13 RX ORDER — GLUCAGON INJECTION, SOLUTION 0.5 MG/.1ML
1 INJECTION, SOLUTION SUBCUTANEOUS ONCE
Qty: 0 | Refills: 0 | Status: DISCONTINUED | OUTPATIENT
Start: 2017-09-13 | End: 2017-10-02

## 2017-09-13 RX ORDER — DAPTOMYCIN 500 MG/10ML
1000 INJECTION, POWDER, LYOPHILIZED, FOR SOLUTION INTRAVENOUS
Qty: 0 | Refills: 0 | Status: DISCONTINUED | OUTPATIENT
Start: 2017-09-13 | End: 2017-09-14

## 2017-09-13 RX ORDER — SODIUM CHLORIDE 9 MG/ML
3 INJECTION INTRAMUSCULAR; INTRAVENOUS; SUBCUTANEOUS EVERY 8 HOURS
Qty: 0 | Refills: 0 | Status: DISCONTINUED | OUTPATIENT
Start: 2017-09-13 | End: 2017-10-09

## 2017-09-13 RX ORDER — SODIUM CHLORIDE 9 MG/ML
1000 INJECTION, SOLUTION INTRAVENOUS
Qty: 0 | Refills: 0 | Status: DISCONTINUED | OUTPATIENT
Start: 2017-09-13 | End: 2017-10-02

## 2017-09-13 RX ORDER — PANTOPRAZOLE SODIUM 20 MG/1
40 TABLET, DELAYED RELEASE ORAL
Qty: 0 | Refills: 0 | Status: DISCONTINUED | OUTPATIENT
Start: 2017-09-13 | End: 2017-10-02

## 2017-09-13 RX ORDER — AMLODIPINE BESYLATE 2.5 MG/1
2.5 TABLET ORAL DAILY
Qty: 0 | Refills: 0 | Status: DISCONTINUED | OUTPATIENT
Start: 2017-09-13 | End: 2017-09-15

## 2017-09-13 RX ORDER — DEXTROSE 50 % IN WATER 50 %
12.5 SYRINGE (ML) INTRAVENOUS ONCE
Qty: 0 | Refills: 0 | Status: DISCONTINUED | OUTPATIENT
Start: 2017-09-13 | End: 2017-10-02

## 2017-09-13 RX ORDER — INSULIN GLARGINE 100 [IU]/ML
25 INJECTION, SOLUTION SUBCUTANEOUS AT BEDTIME
Qty: 0 | Refills: 0 | Status: DISCONTINUED | OUTPATIENT
Start: 2017-09-13 | End: 2017-09-14

## 2017-09-13 RX ORDER — INSULIN LISPRO 100/ML
7 VIAL (ML) SUBCUTANEOUS
Qty: 0 | Refills: 0 | Status: DISCONTINUED | OUTPATIENT
Start: 2017-09-13 | End: 2017-09-15

## 2017-09-13 RX ORDER — ASPIRIN/CALCIUM CARB/MAGNESIUM 324 MG
81 TABLET ORAL DAILY
Qty: 0 | Refills: 0 | Status: DISCONTINUED | OUTPATIENT
Start: 2017-09-13 | End: 2017-10-10

## 2017-09-13 RX ORDER — POLYETHYLENE GLYCOL 3350 17 G/17G
17 POWDER, FOR SOLUTION ORAL DAILY
Qty: 0 | Refills: 0 | Status: DISCONTINUED | OUTPATIENT
Start: 2017-09-13 | End: 2017-10-10

## 2017-09-13 RX ORDER — VALSARTAN 80 MG/1
160 TABLET ORAL DAILY
Qty: 0 | Refills: 0 | Status: DISCONTINUED | OUTPATIENT
Start: 2017-09-13 | End: 2017-09-23

## 2017-09-13 RX ORDER — METOPROLOL TARTRATE 50 MG
50 TABLET ORAL EVERY 8 HOURS
Qty: 0 | Refills: 0 | Status: DISCONTINUED | OUTPATIENT
Start: 2017-09-13 | End: 2017-09-16

## 2017-09-13 RX ORDER — DEXTROSE 50 % IN WATER 50 %
1 SYRINGE (ML) INTRAVENOUS ONCE
Qty: 0 | Refills: 0 | Status: DISCONTINUED | OUTPATIENT
Start: 2017-09-13 | End: 2017-10-02

## 2017-09-13 RX ORDER — SIMVASTATIN 20 MG/1
20 TABLET, FILM COATED ORAL AT BEDTIME
Qty: 0 | Refills: 0 | Status: DISCONTINUED | OUTPATIENT
Start: 2017-09-13 | End: 2017-10-10

## 2017-09-13 RX ORDER — INSULIN LISPRO 100/ML
VIAL (ML) SUBCUTANEOUS
Qty: 0 | Refills: 0 | Status: DISCONTINUED | OUTPATIENT
Start: 2017-09-13 | End: 2017-10-02

## 2017-09-13 RX ADMIN — SODIUM CHLORIDE 3 MILLILITER(S): 9 INJECTION INTRAMUSCULAR; INTRAVENOUS; SUBCUTANEOUS at 22:46

## 2017-09-13 NOTE — PATIENT PROFILE ADULT. - LANGUAGE ASSISTANCE NEEDED
patient can understand english/No-Patient/Caregiver offered and refused free interpretation services.

## 2017-09-13 NOTE — H&P ADULT - NSHPPHYSICALEXAM_GEN_ALL_CORE
Appearance: Normal	  HEENT:   Normal oral mucosa, PERRL, EOMI	  Neck: Supple, - JVD; Carotid Bruit   Cardiovascular: Normal S1 S2. No JVD. + murmur.  Respiratory: Lungs clear to auscultation B/L.	  Gastrointestinal:  Soft, + BS.	  Skin: LLE chronic skin changes. Patient states OM.  Extremities: Normal range of motion, weak. LE warm. RLE with doppler pulses / LLE warm with out pulses.   Vascular: Peripheral pulses palpable 2+ bilaterally.  Neurologic: Non-focal.  Psychiatry: A & O x 3.

## 2017-09-13 NOTE — H&P ADULT - HISTORY OF PRESENT ILLNESS
Patient is a 47 year old female with history of HTN, HLD, DM II, ESRD on HD (MWF. Last HD 09/13/2017. Receives HD via Right Femoral HD catheter placed on 09/13/2017 at [facility] and chronic left foot OM who is being transferred to [facility] from [facility] with known TV IE (MRSA – Bacteremia.) who has failed medical MGMT with aggressive antibiotics (Last Blood Cultures < 24 Hours = MRSA. TTE / ALCIDES completed 09/12/2017 and 09/13/2017 with questionable worsening / enlarging TV vegetation.). Patient here at [facility] to be evaluated for surgical intervention. Of note patient was seen, managed, and evaluated for surgical intervention in 08/2017 with Dr. Gee. At that time the decision to pursue medical therapy was made. Patient with limited to no information from [facility] and is a poor historian.     Studies (Recent.)  CT Head without Contrast on 08/24/2017: Findings concerning for evolving right MCA territory   infarction. No intracranial hemorrhage.    CTA Head and Neck on 08/24/2017: No aneurysm or arteriovenous malformation. Fine calcifications at the bifurcations of both common carotid  arteries, bilateral vertebral arteries and carotid siphon, otherwise unremarkable study.    CT Cardiac Structure on 08/24/2017: Large intraluminal thrombus within the SVC extending into the right  Atrium. Findings of right heart failure. Bilateral pulmonary nodules associated with mediastinal and hilar adenopathy, likely metastatic. Moderate partially visualized splenomegaly. Possible thoracic osseous blastic metastasis. Small-to-moderate pericardial effusion.    TTE on 08/24/2017: The left ventricle is mildly dilated. The left ventricular wall motion is normal. The left ventricular ejection fraction is 65%.The right ventricle is normal in size and function. The left atrial size is normal. Normal right atrial size. Structurally normal aortic valve. There is trace aortic regurgitation. Structurally normal mitral valve. There is trace mitral regurgitation. Thickened tricuspid valve leaflets. Irregular, shaggy appearing mass seen on the atrial side of the tricuspid valve, most consistent with vegetation. Also, a large linear independently mobile mass is seen in the right atrium, protruding through the tricuspid valve in diastole and may also be part of the vegetation vs. thrombus.  There is mild tricuspid regurgitation. There is severe pulmonary hypertension. The pulmonary artery systolic pressure is estimated to be 60 mmHg. Structurally normal pulmonic valve. No pulmonic regurgitation noted. A small pericardial effusion noted. Bubble study showing no evidence of right to left shunting.    US Duplex Carotids on 08/24/2017: No evidence of hemodynamically significant stenosis in the visualized  internal carotid and common carotid arteries. Elevated velocity in the left ECA consistent with moderate stenosis.    CT Head without Contrast on 08/25/2017: No significant interval change. No intracranial hemorrhage or mass. A small hypodense lesion in the left posterior putamen.    CT Brain Stroke on 08/27/2017:   No acute intracranial hemorrhage, mass effect, or CT evidence of acute transcortical infarction.    CXR on 08/28/2017: Possible evolving consolidation medial aspect right lung base.    EKG on 08/28/2017: Diagnosis Line Unusual P axis, possible ectopic atrial rhythm. Low voltage QRS.    Right IJ TLC placed 09/12/2017 as per Patient.     PMH/PSH:  See HPI    Allergies:  Sulfur Drugs / PCN    SH:  No ETOH, Non-Smoker, No IVRDU  Lives at home. Walks with walker.     FH:  N/A    Medications:  See below.

## 2017-09-13 NOTE — H&P ADULT - ASSESSMENT
Patient is a 47 year old female with history of HTN, HLD, DM II, ESRD on HD (MWF. Last HD 09/13/2017. Receives HD via Right Femoral HD catheter placed on 09/13/2017 at OSH - [facility].), and chronic left foot OM who is being transferred to [facility] from [facility] with known TV IE (MRSA – Bacteremia.) who has failed medical MGMT with aggressive antibiotics (Last Blood Cultures < 24 Hours = MRSA. TTE / ALCIDES completed 09/12/2017 and 09/13/2017 with questionable worsening / enlarging TV vegetation.). Patient here at [facility] to be evaluated for surgical intervention. Of note patient was seen, managed, and evaluated for surgical intervention in 08/2017 with Dr. Gee. At that time the decision to pursue medical therapy was made. Patient with limited to no information from [facility] and is a poor historian.    Neurovascular: No delirium. Pain well controlled with current regimen.  -PRN's.    Cardiovascular: Hemodynamically stable. HR controlled. TV IE see ID Section.   -BP. HR. EKG. Cardiac Panel. Lipid Panel. BNP.       Respiratory: 02 Sat = 98% on RA.  -If on oxygen wean to RA from for O2 Sat > 93%.  -Encourage C+DB and Use of IS 10x / hr while awake.  -CXR.    GI: Stable.  -PPX.  -PO Diet.    Renal / : ESRD on HD.  -Renal contacted. Will see patient in AM.   -Monitor renal function.  -Monitor I/O's.    Endocrine: DM.     -A1c.  -TSH.  -Basal Bolus SSI.    Hematologic:  -CBC.  -Coagulation Panel.  -T/Sx2.    ID: TV IE. MRSA Bacteremia. Failing aggressive antibiotic treatment.).  -PanCulture.  -C/W Daptomycin and  Imipenem/Cilastatin for now. ID needs to be consulted. Will f/u recommendations.   -Evaluation for surgical intervention.   -TTE/ALCIDES CD from OSH in chart to be reviewed.   -Temperature.  -CBC.  -Observe for SIRS/Sepsis Syndrome.    Prophylaxis:  -DVT prophylaxis.  -SCD's    Disposition:  -9L for frequent monitoring. Patient is a 47 year old female with history of HTN, HLD, DM II, ESRD on HD (MWF. Last HD 09/13/2017. Receives HD via Right Femoral HD catheter placed on 09/13/2017 at OSH - [facility].), and chronic left foot OM who is being transferred to [facility] from [facility] with known TV IE (MRSA – Bacteremia.) who has failed medical MGMT with aggressive antibiotics (Last Blood Cultures < 24 Hours = MRSA. TTE / ALCIDES completed 09/12/2017 and 09/13/2017 with questionable worsening / enlarging TV vegetation.). Patient here at [facility] to be evaluated for surgical intervention. Of note patient was seen, managed, and evaluated for surgical intervention in 08/2017 with Dr. Gee. At that time the decision to pursue medical therapy was made. Patient with limited to no information from [facility] and is a poor historian.    Neurovascular: No delirium. Pain well controlled with current regimen.  -PRN's.    Cardiovascular: Hemodynamically stable. HR controlled. TV IE see ID Section.   -BP. HR. EKG. Cardiac Panel. Lipid Panel. BNP.       Respiratory: 02 Sat = 98% on RA.  -If on oxygen wean to RA from for O2 Sat > 93%.  -Encourage C+DB and Use of IS 10x / hr while awake.  -CXR.    GI: Stable.  -PPX.  -PO Diet.    Renal / : ESRD on HD.  -Renal contacted. Will see patient in AM.   -Monitor renal function.  -Monitor I/O's.    Endocrine: DM.     -A1c.  -TSH.  -Basal Bolus SSI.    Hematologic:  -CBC.  -Coagulation Panel.  -T/Sx2.    ID: TV IE. MRSA Bacteremia. Failing aggressive antibiotic treatment.).  -PanCulture.  -C/W Rifampin, Daptomycin and  Imipenem/Cilastatin for now. ID needs to be consulted. Will f/u recommendations.   -Evaluation for surgical intervention.   -TTE/ALCIDES CD from OSH in chart to be reviewed.   -Temperature.  -CBC.  -Observe for SIRS/Sepsis Syndrome.    Prophylaxis:  -DVT prophylaxis.  -SCD's    Disposition:  -9L for frequent monitoring. Patient is a 47 year old female with history of HTN, HLD, DM II, ESRD on HD (MWF. Last HD 09/13/2017. Receives HD via Right Femoral HD catheter placed on 09/13/2017 at OSH - [facility].), and chronic left foot OM who is being transferred to [facility] from [facility] with known TV IE (MRSA – Bacteremia.) who has failed medical MGMT with aggressive antibiotics (Last Blood Cultures < 24 Hours = MRSA. TTE / ALCIDES completed 09/12/2017 and 09/13/2017 with questionable worsening / enlarging TV vegetation.). Patient here at [facility] to be evaluated for surgical intervention. Of note patient was seen, managed, and evaluated for surgical intervention in 08/2017 with Dr. Gee. At that time the decision to pursue medical therapy was made. Patient with limited to no information from [facility] and is a poor historian.    Neurovascular: No delirium. Pain well controlled with current regimen.  -PRN's.    Cardiovascular: Hemodynamically stable. HR controlled. TV IE see ID Section.  SVC/IVC Thrombus (Will consider AC pending coagulation panel.).   -BP. HR. EKG. Cardiac Panel. Lipid Panel. BNP.       Respiratory: 02 Sat = 98% on RA.  -If on oxygen wean to RA from for O2 Sat > 93%.  -Encourage C+DB and Use of IS 10x / hr while awake.  -CXR.    GI: Stable.  -PPX.  -PO Diet.    Renal / : ESRD on HD.  -Renal contacted. Will see patient in AM.   -Monitor renal function.  -Monitor I/O's.    Endocrine: DM.     -A1c.  -TSH.  -Basal Bolus SSI.    Hematologic:  -CBC.  -Coagulation Panel.  -T/Sx2.    ID: TV IE. MRSA Bacteremia. Failing aggressive antibiotic treatment.).  -PanCulture.  -C/W Rifampin, Daptomycin and  Imipenem/Cilastatin for now. ID needs to be consulted. Will f/u recommendations.   -Evaluation for surgical intervention.   -TTE/ALCIDES CD from OSH in chart to be reviewed.   -Temperature.  -CBC.  -Observe for SIRS/Sepsis Syndrome.    Prophylaxis:  -DVT prophylaxis.  -SCD's    Disposition:  -9L for frequent monitoring. Patient is a 47 year old female with history of HTN, HLD, DM II, ESRD on HD (MWF. Last HD 09/13/2017. Receives HD via Right Femoral HD catheter placed on 09/13/2017 at OSH - [facility].), and chronic left foot OM who is being transferred to [facility] from [facility] with known TV IE (MRSA – Bacteremia.) who has failed medical MGMT with aggressive antibiotics (Last Blood Cultures < 24 Hours = MRSA. TTE / ALCIDES completed 09/12/2017 and 09/13/2017 with questionable worsening / enlarging TV vegetation.). Patient here at [facility] to be evaluated for surgical intervention. Of note patient was seen, managed, and evaluated for surgical intervention in 08/2017 with Dr. Gee. At that time the decision to pursue medical therapy was made. Patient with limited to no information from [facility] and is a poor historian.    Neurovascular: No delirium. Pain well controlled with current regimen.  -PRN's.    Cardiovascular: Hemodynamically stable. HR controlled. TV IE see ID Section.  SVC/IVC Thrombus (Will consider AC pending coagulation panel.).   -BP. HR. EKG. Cardiac Panel. Lipid Panel. BNP.       Respiratory: 02 Sat = 98% on RA.  -If on oxygen wean to RA from for O2 Sat > 93%.  -Encourage C+DB and Use of IS 10x / hr while awake.  -CXR.    GI: Stable.  -PPX.  -PO Diet.    Renal / : ESRD on HD.  -Renal contacted. Will see patient in AM.   -Monitor renal function.  -Monitor I/O's.    Endocrine: DM.     -A1c.  -TSH.  -Basal Bolus SSI.    Hematologic:  -CBC.  -Coagulation Panel.  -T/Sx2.    ID: TV IE. MRSA Bacteremia. Failing aggressive antibiotic treatment.). Cause ?; HD Catheter? LE OM? Other?  -PanCulture.  -C/W Rifampin, Daptomycin and  Imipenem/Cilastatin for now. ID needs to be consulted. Will f/u recommendations.   -Evaluation for surgical intervention.   -TTE/ALCIDES CD from OSH in chart to be reviewed.   -Temperature.  -CBC.  -Observe for SIRS/Sepsis Syndrome.  -Consider Vascular Surgery Consult for OM of LLE.     Prophylaxis:  -DVT prophylaxis.  -SCD's    Disposition:  -9L for frequent monitoring.

## 2017-09-14 DIAGNOSIS — N18.6 END STAGE RENAL DISEASE: ICD-10-CM

## 2017-09-14 DIAGNOSIS — I38 ENDOCARDITIS, VALVE UNSPECIFIED: ICD-10-CM

## 2017-09-14 DIAGNOSIS — D64.9 ANEMIA, UNSPECIFIED: ICD-10-CM

## 2017-09-14 DIAGNOSIS — I10 ESSENTIAL (PRIMARY) HYPERTENSION: ICD-10-CM

## 2017-09-14 LAB
ALBUMIN SERPL ELPH-MCNC: 1.6 G/DL — LOW (ref 3.3–5)
ALP SERPL-CCNC: 236 U/L — HIGH (ref 40–120)
ALT FLD-CCNC: <5 U/L — LOW (ref 10–45)
ANION GAP SERPL CALC-SCNC: 13 MMOL/L — SIGNIFICANT CHANGE UP (ref 5–17)
APTT BLD: 36.1 SEC — SIGNIFICANT CHANGE UP (ref 27.5–37.4)
APTT BLD: 42.5 SEC — HIGH (ref 27.5–37.4)
APTT BLD: 51.9 SEC — HIGH (ref 27.5–37.4)
AST SERPL-CCNC: 7 U/L — LOW (ref 10–40)
BILIRUB SERPL-MCNC: 0.3 MG/DL — SIGNIFICANT CHANGE UP (ref 0.2–1.2)
BLD GP AB SCN SERPL QL: NEGATIVE — SIGNIFICANT CHANGE UP
BUN SERPL-MCNC: 25 MG/DL — HIGH (ref 7–23)
CALCIUM SERPL-MCNC: 8.4 MG/DL — SIGNIFICANT CHANGE UP (ref 8.4–10.5)
CHLORIDE SERPL-SCNC: 89 MMOL/L — LOW (ref 96–108)
CK MB CFR SERPL CALC: <1 NG/ML — SIGNIFICANT CHANGE UP (ref 0–6.7)
CK SERPL-CCNC: 13 U/L — LOW (ref 25–170)
CO2 SERPL-SCNC: 24 MMOL/L — SIGNIFICANT CHANGE UP (ref 22–31)
CREAT SERPL-MCNC: 4.1 MG/DL — HIGH (ref 0.5–1.3)
GLUCOSE SERPL-MCNC: 228 MG/DL — HIGH (ref 70–99)
HCT VFR BLD CALC: 25.5 % — LOW (ref 34.5–45)
HCT VFR BLD CALC: 27.4 % — LOW (ref 34.5–45)
HGB BLD-MCNC: 7.6 G/DL — LOW (ref 11.5–15.5)
HGB BLD-MCNC: 8.3 G/DL — LOW (ref 11.5–15.5)
INR BLD: 2.29 — HIGH (ref 0.88–1.16)
INR BLD: 3.68 — HIGH (ref 0.88–1.16)
INR BLD: 7 — CRITICAL HIGH (ref 0.88–1.16)
MAGNESIUM SERPL-MCNC: 2 MG/DL — SIGNIFICANT CHANGE UP (ref 1.6–2.6)
MCHC RBC-ENTMCNC: 25.4 PG — LOW (ref 27–34)
MCHC RBC-ENTMCNC: 25.8 PG — LOW (ref 27–34)
MCHC RBC-ENTMCNC: 29.8 G/DL — LOW (ref 32–36)
MCHC RBC-ENTMCNC: 30.3 G/DL — LOW (ref 32–36)
MCV RBC AUTO: 85.1 FL — SIGNIFICANT CHANGE UP (ref 80–100)
MCV RBC AUTO: 85.3 FL — SIGNIFICANT CHANGE UP (ref 80–100)
PHOSPHATE SERPL-MCNC: 5.1 MG/DL — HIGH (ref 2.5–4.5)
PLATELET # BLD AUTO: 290 K/UL — SIGNIFICANT CHANGE UP (ref 150–400)
PLATELET # BLD AUTO: 306 K/UL — SIGNIFICANT CHANGE UP (ref 150–400)
POTASSIUM SERPL-MCNC: 4.3 MMOL/L — SIGNIFICANT CHANGE UP (ref 3.5–5.3)
POTASSIUM SERPL-SCNC: 4.3 MMOL/L — SIGNIFICANT CHANGE UP (ref 3.5–5.3)
PROT SERPL-MCNC: 7.4 G/DL — SIGNIFICANT CHANGE UP (ref 6–8.3)
PROTHROM AB SERPL-ACNC: 25.9 SEC — HIGH (ref 9.8–12.7)
PROTHROM AB SERPL-ACNC: 41.9 SEC — HIGH (ref 9.8–12.7)
PROTHROM AB SERPL-ACNC: 80.9 SEC — HIGH (ref 9.8–12.7)
RBC # BLD: 2.99 M/UL — LOW (ref 3.8–5.2)
RBC # BLD: 3.22 M/UL — LOW (ref 3.8–5.2)
RBC # FLD: 16.6 % — SIGNIFICANT CHANGE UP (ref 10.3–16.9)
RBC # FLD: 16.6 % — SIGNIFICANT CHANGE UP (ref 10.3–16.9)
RH IG SCN BLD-IMP: NEGATIVE — SIGNIFICANT CHANGE UP
SODIUM SERPL-SCNC: 126 MMOL/L — LOW (ref 135–145)
TROPONIN T SERPL-MCNC: 0.12 NG/ML — CRITICAL HIGH (ref 0–0.01)
TROPONIN T SERPL-MCNC: 0.15 NG/ML — CRITICAL HIGH (ref 0–0.01)
TSH SERPL-MCNC: 2.45 UIU/ML — SIGNIFICANT CHANGE UP (ref 0.35–4.94)
WBC # BLD: 18 K/UL — HIGH (ref 3.8–10.5)
WBC # BLD: 18.1 K/UL — HIGH (ref 3.8–10.5)
WBC # FLD AUTO: 18 K/UL — HIGH (ref 3.8–10.5)
WBC # FLD AUTO: 18.1 K/UL — HIGH (ref 3.8–10.5)

## 2017-09-14 PROCEDURE — 93010 ELECTROCARDIOGRAM REPORT: CPT

## 2017-09-14 PROCEDURE — 73630 X-RAY EXAM OF FOOT: CPT | Mod: 26,LT

## 2017-09-14 PROCEDURE — 71250 CT THORAX DX C-: CPT | Mod: 26

## 2017-09-14 PROCEDURE — 99222 1ST HOSP IP/OBS MODERATE 55: CPT | Mod: GC

## 2017-09-14 RX ORDER — HEPARIN SODIUM 5000 [USP'U]/ML
1000 INJECTION INTRAVENOUS; SUBCUTANEOUS
Qty: 25000 | Refills: 0 | Status: DISCONTINUED | OUTPATIENT
Start: 2017-09-14 | End: 2017-09-14

## 2017-09-14 RX ORDER — PHYTONADIONE (VIT K1) 5 MG
5 TABLET ORAL ONCE
Qty: 0 | Refills: 0 | Status: COMPLETED | OUTPATIENT
Start: 2017-09-14 | End: 2017-09-14

## 2017-09-14 RX ORDER — DAPTOMYCIN 500 MG/10ML
700 INJECTION, POWDER, LYOPHILIZED, FOR SOLUTION INTRAVENOUS
Qty: 0 | Refills: 0 | Status: DISCONTINUED | OUTPATIENT
Start: 2017-09-16 | End: 2017-10-10

## 2017-09-14 RX ORDER — ERYTHROPOIETIN 10000 [IU]/ML
10000 INJECTION, SOLUTION INTRAVENOUS; SUBCUTANEOUS ONCE
Qty: 0 | Refills: 0 | Status: COMPLETED | OUTPATIENT
Start: 2017-09-14 | End: 2017-09-14

## 2017-09-14 RX ORDER — IMIPENEM AND CILASTATIN 250; 250 MG/100ML; MG/100ML
500 INJECTION, POWDER, FOR SOLUTION INTRAVENOUS ONCE
Qty: 0 | Refills: 0 | Status: COMPLETED | OUTPATIENT
Start: 2017-09-15 | End: 2017-09-15

## 2017-09-14 RX ORDER — IMIPENEM AND CILASTATIN 250; 250 MG/100ML; MG/100ML
500 INJECTION, POWDER, FOR SOLUTION INTRAVENOUS EVERY 12 HOURS
Qty: 0 | Refills: 0 | Status: COMPLETED | OUTPATIENT
Start: 2017-09-14 | End: 2017-09-14

## 2017-09-14 RX ORDER — DAPTOMYCIN 500 MG/10ML
1000 INJECTION, POWDER, LYOPHILIZED, FOR SOLUTION INTRAVENOUS ONCE
Qty: 0 | Refills: 0 | Status: COMPLETED | OUTPATIENT
Start: 2017-09-14 | End: 2017-09-14

## 2017-09-14 RX ORDER — HEPARIN SODIUM 5000 [USP'U]/ML
1000 INJECTION INTRAVENOUS; SUBCUTANEOUS
Qty: 25000 | Refills: 0 | Status: DISCONTINUED | OUTPATIENT
Start: 2017-09-14 | End: 2017-09-15

## 2017-09-14 RX ORDER — INSULIN GLARGINE 100 [IU]/ML
15 INJECTION, SOLUTION SUBCUTANEOUS AT BEDTIME
Qty: 0 | Refills: 0 | Status: DISCONTINUED | OUTPATIENT
Start: 2017-09-14 | End: 2017-09-15

## 2017-09-14 RX ORDER — IMIPENEM AND CILASTATIN 250; 250 MG/100ML; MG/100ML
500 INJECTION, POWDER, FOR SOLUTION INTRAVENOUS EVERY 12 HOURS
Qty: 0 | Refills: 0 | Status: DISCONTINUED | OUTPATIENT
Start: 2017-09-15 | End: 2017-09-15

## 2017-09-14 RX ORDER — ACETAMINOPHEN 500 MG
650 TABLET ORAL EVERY 6 HOURS
Qty: 0 | Refills: 0 | Status: DISCONTINUED | OUTPATIENT
Start: 2017-09-14 | End: 2017-10-10

## 2017-09-14 RX ADMIN — Medication 7 UNIT(S): at 07:06

## 2017-09-14 RX ADMIN — Medication 50 MILLIGRAM(S): at 18:05

## 2017-09-14 RX ADMIN — AMLODIPINE BESYLATE 2.5 MILLIGRAM(S): 2.5 TABLET ORAL at 05:57

## 2017-09-14 RX ADMIN — Medication 2: at 12:08

## 2017-09-14 RX ADMIN — GABAPENTIN 600 MILLIGRAM(S): 400 CAPSULE ORAL at 12:05

## 2017-09-14 RX ADMIN — INSULIN GLARGINE 15 UNIT(S): 100 INJECTION, SOLUTION SUBCUTANEOUS at 21:38

## 2017-09-14 RX ADMIN — POLYETHYLENE GLYCOL 3350 17 GRAM(S): 17 POWDER, FOR SOLUTION ORAL at 12:09

## 2017-09-14 RX ADMIN — Medication 101 MILLIGRAM(S): at 18:07

## 2017-09-14 RX ADMIN — Medication 650 MILLIGRAM(S): at 02:00

## 2017-09-14 RX ADMIN — SODIUM CHLORIDE 3 MILLILITER(S): 9 INJECTION INTRAMUSCULAR; INTRAVENOUS; SUBCUTANEOUS at 21:34

## 2017-09-14 RX ADMIN — Medication 650 MILLIGRAM(S): at 01:00

## 2017-09-14 RX ADMIN — Medication 81 MILLIGRAM(S): at 12:09

## 2017-09-14 RX ADMIN — Medication: at 00:59

## 2017-09-14 RX ADMIN — Medication 7 UNIT(S): at 12:07

## 2017-09-14 RX ADMIN — PANTOPRAZOLE SODIUM 40 MILLIGRAM(S): 20 TABLET, DELAYED RELEASE ORAL at 05:57

## 2017-09-14 RX ADMIN — Medication 50 MILLIGRAM(S): at 07:06

## 2017-09-14 RX ADMIN — SODIUM CHLORIDE 3 MILLILITER(S): 9 INJECTION INTRAMUSCULAR; INTRAVENOUS; SUBCUTANEOUS at 05:58

## 2017-09-14 RX ADMIN — IMIPENEM AND CILASTATIN 100 MILLIGRAM(S): 250; 250 INJECTION, POWDER, FOR SOLUTION INTRAVENOUS at 05:57

## 2017-09-14 RX ADMIN — Medication 4: at 07:06

## 2017-09-14 RX ADMIN — ERYTHROPOIETIN 10000 UNIT(S): 10000 INJECTION, SOLUTION INTRAVENOUS; SUBCUTANEOUS at 14:42

## 2017-09-14 RX ADMIN — IMIPENEM AND CILASTATIN 100 MILLIGRAM(S): 250; 250 INJECTION, POWDER, FOR SOLUTION INTRAVENOUS at 18:07

## 2017-09-14 RX ADMIN — Medication 6: at 21:38

## 2017-09-14 RX ADMIN — VALSARTAN 160 MILLIGRAM(S): 80 TABLET ORAL at 05:57

## 2017-09-14 RX ADMIN — SIMVASTATIN 20 MILLIGRAM(S): 20 TABLET, FILM COATED ORAL at 21:38

## 2017-09-14 RX ADMIN — Medication 7 UNIT(S): at 18:07

## 2017-09-14 RX ADMIN — DAPTOMYCIN 140 MILLIGRAM(S): 500 INJECTION, POWDER, LYOPHILIZED, FOR SOLUTION INTRAVENOUS at 18:06

## 2017-09-14 RX ADMIN — SODIUM CHLORIDE 3 MILLILITER(S): 9 INJECTION INTRAMUSCULAR; INTRAVENOUS; SUBCUTANEOUS at 12:10

## 2017-09-14 RX ADMIN — Medication 50 MILLIGRAM(S): at 00:59

## 2017-09-14 NOTE — CONSULT NOTE ADULT - PROBLEM SELECTOR RECOMMENDATION 2
- hemoglobin - 7.6   - most likely due to the infection and underlying Renal failure   - we will do EPO with HD today

## 2017-09-14 NOTE — CONSULT NOTE ADULT - PROBLEM SELECTOR RECOMMENDATION 9
- her usual scheduled is m,W, F   - she was dialyzed yesterday - 9/13 at [facility]   - the femoral line exchange on 9/13   - has an av fistula on the left   - we will do HD for fluid status optimization today   - sodium 126  most likely fluid related ( we will do UF today)  - potassium - 4.3   - phosphate acceptable   - calcium acceptable   - renal diet   - in and outs please

## 2017-09-14 NOTE — CONSULT NOTE ADULT - PROBLEM SELECTOR RECOMMENDATION 3
- please obtain blood culture ( last one from outside facility - documented to be positive for Staph. aureus - MRSA)  - on daptomycin   - if the the vancomycin is the drug of choice please follow the level

## 2017-09-14 NOTE — DIETITIAN INITIAL EVALUATION ADULT. - ENERGY NEEDS
ABW (9/13) 87KG, IBW: 68.2kg  %IBW:  128% BMI:  27.5; IBW utilized for needs 2/2 pt> 120% of IBW; needs adjusted for HD tx, unintentional wt loss/ skin break; Fluid needs per MD

## 2017-09-14 NOTE — PROGRESS NOTE ADULT - SUBJECTIVE AND OBJECTIVE BOX
Patient was seen and evaluated on dialysis.   Patient is tolerating the procedure well.   HR: 94 (09-14-17 @ 14:15)  BP: 149/64 (09-14-17 @ 14:15) pusle 65, /67  Continue dialysis:   Dialyzer:  180      QB:  400      QD: 500  Goal UF 1 kg  over 3 Hours

## 2017-09-14 NOTE — CONSULT NOTE ADULT - SUBJECTIVE AND OBJECTIVE BOX
HPI: 48 yo F w/ PMH of ESRD and DM2 transferred from [place] for evaluation of valvular surgery in the setting of persistent bacteremia. Patient has been diabetic for 15 years. She was on Levemir 25 units BID with HUmalog 30 units w/ meals. She has been regularly checking her FS but when she does check them its usually in the high 100s. She has had diabetic retinopathy, neuropathy and her ESRD is likely 2/2 diabetes. She has had osteomyelitis of her L foot. She is not consistent with her meals. She does not eat 3 meals per day. She eats rice and bread and does not always maintain a diabetic diet. She has not been eating well in the hospital. She will pick and choose between her food. She claims to not have eaten today.       PMH & Surgical Hx:  Hypertension  Endocarditis  Anemia  End stage renal disease  DM2      FH:  DM: none    SH:  No ETOH, Non-Smoker, No IVRDU  Lives at home. Walks with walker.       Current Meds:  sodium chloride 0.9% lock flush 3 milliLiter(s) IV Push every 8 hours  aspirin enteric coated 81 milliGRAM(s) Oral daily  metoprolol 50 milliGRAM(s) Oral every 8 hours  amLODIPine   Tablet 2.5 milliGRAM(s) Oral daily  valsartan 160 milliGRAM(s) Oral daily  simvastatin 20 milliGRAM(s) Oral at bedtime  insulin lispro Injectable (HumaLOG) 7 Unit(s) SubCutaneous three times a day before meals  insulin lispro (HumaLOG) corrective regimen sliding scale   SubCutaneous Before meals and at bedtime  dextrose 5%. 1000 milliLiter(s) IV Continuous <Continuous>  dextrose Gel 1 Dose(s) Oral once PRN  dextrose 50% Injectable 12.5 Gram(s) IV Push once  dextrose 50% Injectable 25 Gram(s) IV Push once  dextrose 50% Injectable 25 Gram(s) IV Push once  glucagon  Injectable 1 milliGRAM(s) IntraMuscular once PRN  polyethylene glycol 3350 17 Gram(s) Oral daily  pantoprazole    Tablet 40 milliGRAM(s) Oral before breakfast  gabapentin 600 milliGRAM(s) Oral daily  rifampin 150 milliGRAM(s) Oral daily  imipenem/cilastatin  IVPB 500 milliGRAM(s) IV Intermittent every 12 hours  acetaminophen    Suspension. 650 milliGRAM(s) Oral every 6 hours PRN  heparin  Infusion 1000 Unit(s)/Hr IV Continuous <Continuous>  insulin glargine Injectable (LANTUS) 15 Unit(s) SubCutaneous at bedtime      Allergies:  penicillin (Unknown)  Sular (Unknown)      ROS:  Denies the following except as indicated.    General: weight loss/weight gain, decreased appetite, fatigue  Eyes: Blurry vision, double vision, visual changes  ENT: Throat pain, changes in voice,   CV: palpitations, SOB, CP, cough  GI: NVD, difficulty swallowing, abdominal pain  : polyuria, dysuria  Endo: abnormal menses, temperature intolerance, decreased libido  MSK: weakness, joint pain  Skin: rash, dryness, diaphoresis  Heme: Easy bruising,bleeding  Neuro: HA, dizziness, lightheadedness, numbness tingling  Psych: Anxiety, Depression    Vital Signs Last 24 Hrs  T(C): 36.5 (14 Sep 2017 17:15), Max: 38.8 (13 Sep 2017 20:00)  T(F): 97.7 (14 Sep 2017 17:15), Max: 101.9 (13 Sep 2017 20:00)  HR: 100 (14 Sep 2017 17:15) (84 - 112)  BP: 168/83 (14 Sep 2017 17:15) (95/58 - 168/83)  BP(mean): 93 (14 Sep 2017 13:25) (68 - 102)  RR: 18 (14 Sep 2017 17:15) (17 - 20)  SpO2: 97% (14 Sep 2017 17:15) (94% - 99%)  Height (cm): 177.8 (09-13 @ 20:00)  Weight (kg): 87 (09-13 @ 20:00)  BMI (kg/m2): 27.5 (09-13 @ 20:00)      Constitutional: wn/wd in NAD.   HEENT: NCAT, MMM, OP clear, EOMI, , no proptosis or lid retraction  Respiratory: poor BS at lower bases  Cardiovascular: regular rhythm, normal S1 and S2, no audible murmurs, no peripheral edema  GI: soft, NT/ND, no masses/HSM appreciated.  Skin: no visible rashes/lesions  Psychiatric: AAO x 3, normal affect/mood.  Ext: radial pulses intact, DP pulses intact, extremities warm, no cyanosis, clubbing or edema, L foot with old scar and poor healing      LABS:                        7.6    18.1  )-----------( 290      ( 14 Sep 2017 05:03 )             25.5     09-14    126<L>  |  89<L>  |  25<H>  ----------------------------<  228<H>  4.3   |  24  |  4.10<H>    Ca    8.4      14 Sep 2017 05:01  Phos  5.1     09-14  Mg     2.0     09-14    TPro  7.4  /  Alb  1.6<L>  /  TBili  0.3  /  DBili  x   /  AST  7<L>  /  ALT  <5<L>  /  AlkPhos  236<H>  09-14    PT/INR - ( 14 Sep 2017 14:33 )   PT: 41.9 sec;   INR: 3.68          PTT - ( 14 Sep 2017 14:33 )  PTT:42.5 sec    Hemoglobin A1C, Whole Blood: 8.2 (09-13 @ 22:51)  Hemoglobin A1C, Whole Blood: 8.2 (08-24 @ 01:11)    Thyroid Stimulating Hormone, Serum: 2.447 (09-13 @ 22:51)  Thyroid Stimulating Hormone, Serum: 1.251 (08-24 @ 01:12)      RADIOLOGY & ADDITIONAL STUDIES:    CAPILLARY BLOOD GLUCOSE  144 (14 Sep 2017 17:09)  263 (14 Sep 2017 11:36)  226 (14 Sep 2017 05:13)  243 (14 Sep 2017 01:00)  183 (13 Sep 2017 20:00)      A/P:47y Female with DM2 admitted with persistent MRSA bacteremia with 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    1.  DM 2-uncontrolled, complicated- Patient has a very poor diabetic history and has multiple diabetic complications. Her A1C is likely higher given that patient is on procrit and has high cell turnover. Patient was not controlled at home and was on higher doses on insulin at home however based on her FS today likely does not require as much insulin. She did not receive the lantus last night which explains the hyperglycemia today however it is not as high as expected given she was not any basal insulin. Being in ESRD also prolongs the effects of insulin as the kidney plays a role in clearance and gluconeogenesis. Patient likely will be NPO tonight and david for cath and will need less insulin then she has been on. Therefore:   Please start lantus 15 units at night.  Please continue lispro 7 units before each meal.  Please continue lispro moderate dose sliding scale four times daily with meals and at bedtime    Pt's fingerstick glucose goal is 120-180     Will continue to monitor     Pt can follow up at discharge with [facility] by calling  to make an appointment.   Will discuss case with Dr. Ross  and update primary team

## 2017-09-14 NOTE — DIETITIAN INITIAL EVALUATION ADULT. - NS AS NUTRI INTERV ED CONTENT3
PT WAS NOT VERY RECEPIVE TO EDU AT THIS TIME; WOULD REINFORCE WHEN PT IS WILLING AND MORE RECEPTIVE/Nutrition relationship to health/disease/Recommended modifications/Purpose of the nutrition education

## 2017-09-14 NOTE — CONSULT NOTE ADULT - SUBJECTIVE AND OBJECTIVE BOX
Vascular Attending:        HPI:  Patient is a 47 year old female with history of HTN, HLD, DM II, ESRD on HD (MWF last HD 09/13/2017. Receives HD via Right Femoral HD catheter placed on 09/13/2017 at [facility] (previously dialyzed via Right chest permcath), and chronic left foot OM (3 years, previous 4 surgeries) who was transferred to [facility] from [facility] with known TV IE (MRSA – Bacteremia.) who has failed medical management with aggressive antibiotics (Last Blood Cultures < 24 Hours = MRSA. TTE / ALCIDES completed 09/12/2017 and 09/13/2017 with questionable worsening / enlarging TV vegetation.). Patient here at [facility] to be evaluated for surgical intervention. Of note patient was seen, managed, and evaluated for surgical intervention in 08/2017 with Dr. Gee. At that time the decision to pursue medical therapy was made.  Vascular surgery consulted for left foot evaluation as the source. Patient states she has originally developed osteomyelitis 3 years ago and since has been treated with Vanco/Dapto for 1.5 years. Since then she has been off ABX for 1/1.5 years and receives weekly wound care by her podiatrist at [facility] with good wound healing and no pain as per patient. Denies claudication/rest pain symptoms, denies pain in ankle.     Of note, patient recently admitted for same diagnoses and vascular evaluation was performed on 8/28/17 where the source of the foot for bacteremia was ruled out. At the time source of infection was believed to be the perm catheter which at that time was replaced.     PMH/PSH:  HTN, HLD, T2DM, ESRD (on HD T, Th, S), chronic L foot osteomyelitis (ankle surgery x4)    SH:  No ETOH, Non-Smoker, No IVRDU  Lives at home. Walks with walker/wheelchair    REVIEW OF SYSTEMS    See HPI	    MEDICATIONS  (STANDING):  sodium chloride 0.9% lock flush 3 milliLiter(s) IV Push every 8 hours  aspirin enteric coated 81 milliGRAM(s) Oral daily  metoprolol 50 milliGRAM(s) Oral every 8 hours  amLODIPine   Tablet 2.5 milliGRAM(s) Oral daily  valsartan 160 milliGRAM(s) Oral daily  simvastatin 20 milliGRAM(s) Oral at bedtime  insulin glargine Injectable (LANTUS) 25 Unit(s) SubCutaneous at bedtime  insulin lispro Injectable (HumaLOG) 7 Unit(s) SubCutaneous three times a day before meals  insulin lispro (HumaLOG) corrective regimen sliding scale   SubCutaneous Before meals and at bedtime  dextrose 5%. 1000 milliLiter(s) (50 mL/Hr) IV Continuous <Continuous>  dextrose 50% Injectable 12.5 Gram(s) IV Push once  dextrose 50% Injectable 25 Gram(s) IV Push once  dextrose 50% Injectable 25 Gram(s) IV Push once  imipenem/cilastatin  IVPB 500 milliGRAM(s) IV Intermittent every 12 hours  polyethylene glycol 3350 17 Gram(s) Oral daily  pantoprazole    Tablet 40 milliGRAM(s) Oral before breakfast  gabapentin 600 milliGRAM(s) Oral daily  rifampin 150 milliGRAM(s) Oral daily  DAPTOmycin IVPB 1000 milliGRAM(s) IV Intermittent <User Schedule>  imipenem/cilastatin  IVPB 500 milliGRAM(s) IV Intermittent every 12 hours  epoetin fransisca Injectable 98596 Unit(s) IV Push once    MEDICATIONS  (PRN):  dextrose Gel 1 Dose(s) Oral once PRN Blood Glucose LESS THAN 70 milliGRAM(s)/deciliter  glucagon  Injectable 1 milliGRAM(s) IntraMuscular once PRN Glucose LESS THAN 70 milligrams/deciliter  acetaminophen    Suspension. 650 milliGRAM(s) Oral every 6 hours PRN Mild Pain (1 - 3)      Allergies  penicillin (Unknown)  Sular (Unknown)        Vital Signs Last 24 Hrs  T(C): 36.1 (14 Sep 2017 10:22), Max: 38.8 (13 Sep 2017 20:00)  T(F): 96.9 (14 Sep 2017 10:22), Max: 101.9 (13 Sep 2017 20:00)  HR: 90 (14 Sep 2017 09:00) (84 - 112)  BP: 127/65 (14 Sep 2017 09:00) (95/58 - 158/72)  BP(mean): 81 (14 Sep 2017 09:00) (68 - 102)  RR: 18 (14 Sep 2017 09:00) (18 - 20)  SpO2: 97% (14 Sep 2017 09:00) (95% - 99%)    PHYSICAL EXAM:      Constitutional: AAOx3, NAD    Respiratory: CTA BL    Cardiovascular: RRR S1 S2    Extremities:  +linear superficial fissure over scar extending from medial malleolus to mid dorsum of the foot. No surrounding erythema/edema/warmth, no drainage, no tracking, no fluctuance.   Foot warm 1+DP/PT pulses. Motors/sensory at baseline with mild decrease in ROM in the ankle (chronically). Calf soft, nontender.       LABS:                        7.6    18.1  )-----------( 290      ( 14 Sep 2017 05:03 )             25.5     09-14    126<L>  |  89<L>  |  25<H>  ----------------------------<  228<H>  4.3   |  24  |  4.10<H>    Ca    8.4      14 Sep 2017 05:01  Phos  5.1     09-14  Mg     2.0     09-14    TPro  7.4  /  Alb  1.6<L>  /  TBili  0.3  /  DBili  x   /  AST  7<L>  /  ALT  <5<L>  /  AlkPhos  236<H>  09-14    PT/INR - ( 14 Sep 2017 05:01 )   PT: 80.9 sec;   INR: 7.00          PTT - ( 14 Sep 2017 05:01 )  PTT:51.9 sec

## 2017-09-14 NOTE — DIETITIAN INITIAL EVALUATION ADULT. - ETIOLOGY
rt pt unable to meet increased needs per reduces persistent appetite RT pt is not compliant with diet restrictions appropriate for her medical status

## 2017-09-14 NOTE — PROGRESS NOTE ADULT - ASSESSMENT
A/P: 47 year old female with PMHx HTN, HLD, DM2, ESRD on HD (MWF, last HD 9/13/17, Right Femoral HD catheter placed on 09/13/2017 at [facility] and chronic left foot OM who was transferred to [facility] from [facility] on 9/13/17 with known TV IE (MRSA – Bacteremia.) who has failed medical management with aggressive antibiotics (Last Blood Cultures < 24 Hours + MRSA. ALCIDES on 9/12/17 and 9/13/17 with enlarging TV vegetation.). Patient here at [facility] to be evaluated for surgical intervention. Of note patient was seen, managed, and evaluated for surgical intervention in 08/2017 with Dr. Gee. At that time the decision to pursue medical therapy was made. Today patient received 2U FFP, prior to HD, 1 UPRBC while receiving HD.  CT chest non ordered to r/o septic emboli.  Cardiac cath planned for tomorrow with Dr Arredondo.  Vascular consulted for chronic L foot OM, recs to continue abx.  Renal consulted for ESRD and HD. Abx recs pending Dr. Kingston.       Neurovascular: No delirium.   -Continue tylenol 650mg PO q6 PRN pain, gabapentin 600mg PO daily    Cardiovascular: Hemodynamically stable. HR controlled.  -Endocarditis with MRSA bacteremia- continue imipenm/cislastatin 500mg IV q12, Daptomycin 1000mg q1wk, and rifampin 150mg PO daily.    -RA thrombus- INR 7.00- holding coumadin   - +troponin 0.15, f/u troponin   -Cath tomorrow with Dr. Arredondo   -Hx HTN- Continue metoprolol 50mg PO q8, valsartan 160mg PO daily, and amlodipine 2.5mg PO daily  -Hx HLD- continue simvastatin 20mg QHS  -monitor HR/BP    Respiratory: 02 Sat = 99% on RA.  -Encourage ambulation and Use of IS 10x / hr while awake.  -CXR: +infiltrates in RLL  -CT scan noncon today after HD to r/o septic emboli  -f/u AM CXR    GI: Stable.  -continue pantoprazole 40mg PO daily for GI PPX.  -Continue PO Diet.    Renal / : BUN/Cr 25/4.10  -ESRD on HD (MWF)- HD today, renal following   -Monitor I/O's.    Endocrine:  A1C 8.2 TSH 2.447  -Hx DM2- continue Lantus 25U sq QHS, Humalog 7U sq before meals, and ISS  -, 226, montior FS    Hematologic: H&H 7.6/25.5 INR 7.00  -Holding coumadin due to elevated INR  -2 FFP prior HD, and 1 UPRBC while getting HD  -f/u PT INR prior to HD  -monitor H&H      ID: afebrile, WBC 18.1  -Endocarditis with MRSA bacteremia- continue imipenm/cislastatin 500mg IV q12, Daptomycin 1000mg q1wk, and rifampin 150mg PO daily.    -Daptomyxin x 1 dose post HD   -Pending recs from Dr. Kingston  -Vascular consulted for L foot chronic OM- recs to continue Abx  -Observe for SIRS/Sepsis Syndrome.    Prophylaxis:  -DVT prophylaxis with 5000 SubQ Heparin q8h.  -SCD's    Disposition:  -OR next week A/P: 47 year old female with PMHx HTN, HLD, DM2, ESRD on HD (MWF, last HD 9/13/17, Right Femoral HD catheter), and chronic left foot OM who was transferred to [facility] from [facility] on 9/13/17 with known TV IE (MRSA Bacteremia.) who has failed medical management with aggressive antibiotics. Patient transferred to [facility] to be evaluated for surgical intervention. Of note patient was seen, managed, and evaluated for surgical intervention in 08/2017 with Dr. Gee. At that time the decision to pursue medical therapy was made. Today patient received 2U FFP, prior to HD, 1 UPRBC while receiving HD.  CT chest non ordered to r/o septic emboli.  Cardiac cath planned for tomorrow with Dr Arredondo.  Vascular consulted for chronic L foot OM, recs to continue abx.  Renal consulted for ESRD and HD. Abx recs pending Dr. Kingston.       Neurovascular: No delirium.   -Last admission CT Head noncon 8/24/17- findings concerning evolving right MCA infarct.  CT head non con 8/25/17: No significant interval change. A small hypodense lesion in the left posterior putamen. CT head non con 8/27/17: No acute intracranial hemorrhage, mass effect, or CT evidence of acute transcortical infarction. As per Dr. Escamilla, no CT head as of now.  -Continue tylenol 650mg PO q6 PRN pain, gabapentin 600mg PO daily    Cardiovascular: Hemodynamically stable. HR controlled.  -Endocarditis with MRSA bacteremia- continue imipenim/cislastatin 500mg IV q12, Daptomycin 1000mg q1wk, and rifampin 150mg PO daily.    - TTE / ALCIDES completed 09/12/2017 and 09/13/2017 with questionable worsening / enlarging TV vegetation  -RA thrombus- INR 7.00- holding coumadin   - +troponin 0.15, f/u troponin   -Cath tomorrow with Dr. Arredondo   -Hx HTN- Continue metoprolol 50mg PO q8, valsartan 160mg PO daily, and amlodipine 2.5mg PO daily  -Hx HLD- continue simvastatin 20mg QHS  -monitor HR/BP    Respiratory: 02 Sat = 99% on RA.  -Encourage ambulation and Use of IS 10x / hr while awake.  -CXR: +infiltrates in RLL  -CT scan noncon today after HD to r/o septic emboli  -f/u AM CXR    GI: Stable.  -continue pantoprazole 40mg PO daily for GI PPX.  -Continue PO Diet.    Renal / : BUN/Cr 25/4.10  -ESRD on HD (MWF)- HD today, renal following   -Right Femoral HD catheter placed on 09/13/2017 at [facility]  -Monitor I/O's.    Endocrine:  A1C 8.2 TSH 2.447  -Hx DM2- continue Lantus 25U sq QHS, Humalog 7U sq before meals, and ISS  -, 226, montior FS    Hematologic: H&H 7.6/25.5 INR 7.00  -Holding coumadin due to elevated INR  -2 FFP prior HD, and 1 UPRBC while getting HD  -f/u PT INR prior to HD  -monitor H&H      ID: afebrile, WBC 18.1  -Endocarditis with MRSA bacteremia- continue imipenm/cislastatin 500mg IV q12, Daptomycin 1000mg q1wk, and rifampin 150mg PO daily.    -Daptomyxin x 1 dose post HD   -Blood cx 9/14/17 no growth at 12 hours- f/u blood cx in AM  -Pending recs from Dr. Kingston  -Vascular consulted for L foot chronic OM- recs to continue Abx  -Observe for SIRS/Sepsis Syndrome.    Prophylaxis:  -DVT prophylaxis with 5000 SubQ Heparin q8h.  -SCD's    Disposition:  -OR next week A/P: 47 year old female with PMHx HTN, HLD, DM2, ESRD on HD (MWF, last HD 9/13/17, Right Femoral HD catheter), and chronic left foot OM who was transferred to [facility] from [facility] on 9/13/17 with known TV IE (MRSA Bacteremia.) who has failed medical management with aggressive antibiotics. Patient transferred to [facility] to be evaluated for surgical intervention. Of note patient was seen, managed, and evaluated for surgical intervention in 08/2017 with Dr. Gee. At that time the decision to pursue medical therapy was made. Today patient received 2U FFP, prior to HD, 1 UPRBC while receiving HD.  CT chest non ordered to r/o septic emboli.  Cardiac cath planned for tomorrow with Dr Arredondo.  Vascular consulted for chronic L foot OM, recs to continue abx.  Renal consulted for ESRD and HD. Abx recs pending Dr. Kingston.       Neurovascular: No delirium.   -Last admission CT Head noncon 8/24/17- findings concerning evolving right MCA infarct.  CT head non con 8/25/17: No significant interval change. A small hypodense lesion in the left posterior putamen. CT head non con 8/27/17: No acute intracranial hemorrhage, mass effect, or CT evidence of acute transcortical infarction. Neurology was following, and did not believe there was any acute infarct at that time.  As per Dr. Ecsamilla, no CT head as of now.  -Continue tylenol 650mg PO q6 PRN pain, gabapentin 600mg PO daily    Cardiovascular: Hemodynamically stable. HR controlled.  -Endocarditis with MRSA bacteremia- continue imipenim/cislastatin 500mg IV q12, Daptomycin 1000mg q1wk, and rifampin 150mg PO daily.    - TTE / ALCIDES completed 09/12/2017 and 09/13/2017 with questionable worsening / enlarging TV vegetation  -RA thrombus- INR 7.00- holding coumadin   - +troponin 0.15, f/u troponin   -Cath tomorrow with Dr. Arredondo   -Hx HTN- Continue metoprolol 50mg PO q8, valsartan 160mg PO daily, and amlodipine 2.5mg PO daily  -Hx HLD- continue simvastatin 20mg QHS  -monitor HR/BP    Respiratory: 02 Sat = 99% on RA.  -Encourage ambulation and Use of IS 10x / hr while awake.  -CXR: +infiltrates in RLL  -CT scan noncon today after HD to r/o septic emboli  -f/u AM CXR    GI: Stable.  -continue pantoprazole 40mg PO daily for GI PPX.  -Continue PO Diet.    Renal / : BUN/Cr 25/4.10  -ESRD on HD (MWF)- HD today, renal following   -Right Femoral HD catheter placed on 09/13/2017 at [facility]  -Monitor I/O's.    Endocrine:  A1C 8.2 TSH 2.447  -Hx DM2- continue Lantus 25U sq QHS, Humalog 7U sq before meals, and ISS  -, 226, montior FS    Hematologic: H&H 7.6/25.5 INR 7.00  -Holding coumadin due to elevated INR  -2 FFP prior HD, and 1 UPRBC while getting HD  -f/u PT INR prior to HD  -monitor H&H      ID: afebrile, WBC 18.1  -Endocarditis with MRSA bacteremia- continue imipenm/cislastatin 500mg IV q12, Daptomycin 1000mg q1wk, and rifampin 150mg PO daily.    -Daptomyxin x 1 dose post HD   -Blood cx 9/14/17 no growth at 12 hours- f/u blood cx in AM  -Pending recs from Dr. Kingston  -Vascular consulted for L foot chronic OM- recs to continue Abx  -Observe for SIRS/Sepsis Syndrome.    Prophylaxis:  -DVT prophylaxis with 5000 SubQ Heparin q8h.  -SCD's    Disposition:  -OR next week A/P: 47 year old female with PMHx HTN, HLD, DM2, ESRD on HD (MWF, last HD 9/13/17, Right Femoral HD catheter), and chronic left foot OM who was transferred to [facility] from [facility] on 9/13/17 with known TV IE (MRSA Bacteremia.) who has failed medical management with aggressive antibiotics. Patient transferred to [facility] to be evaluated for surgical intervention. Of note patient was seen, managed, and evaluated for surgical intervention in 08/2017 with Dr. Gee. At that time the decision to pursue medical therapy was made. Today patient received 2U FFP, prior to HD, 1 UPRBC while receiving HD.  CT chest non ordered to r/o septic emboli.  Cardiac cath planned for tomorrow with Dr Arredondo.  Vascular consulted for chronic L foot OM, recs to continue abx.  Renal consulted for ESRD and HD. Abx recs pending Dr. Kingston.       Neurovascular: No delirium.   -Last admission CT Head noncon 8/24/17- findings concerning evolving right MCA infarct.  CT head non con 8/25/17: No significant interval change. A small hypodense lesion in the left posterior putamen. CT head non con 8/27/17: No acute intracranial hemorrhage, mass effect, or CT evidence of acute transcortical infarction. Neurology was following, and did not believe there was any acute infarct at that time.    -Continue tylenol 650mg PO q6 PRN pain,  -Pt c/o LLE numbness and tingling since [facility] admission- continue  gabapentin 600mg PO daily, will try to get records from [facility]    Cardiovascular: Hemodynamically stable. HR controlled.  -Endocarditis with MRSA bacteremia- continue imipenim/cislastatin 500mg IV q12, Daptomycin 1000mg q1wk, and rifampin 150mg PO daily.    - TTE / ALCIDES completed 09/12/2017 and 09/13/2017 with worsening / enlarging TV vegetation  -RA thrombus- INR 7.00- holding heparin  - +troponin 0.15, f/u troponin 0.12.  -Cath tomorrow with Dr. Arredondo   -Hx HTN- Continue metoprolol 50mg PO q8, valsartan 160mg PO daily, and amlodipine 2.5mg PO daily  -Hx HLD- continue simvastatin 20mg QHS  -monitor HR/BP    Respiratory: 02 Sat = 99% on RA.  -Encourage ambulation and Use of IS 10x / hr while awake.  -CXR: +infiltrates in RLL  -CT scan noncon today after HD to r/o septic emboli  -f/u AM CXR    GI: Stable.  -continue pantoprazole 40mg PO daily for GI PPX.  -Continue PO Diet.  -NPO at midnight    Renal / : BUN/Cr 25/4.10  -ESRD on HD (MWF)- HD today, renal following   -Right Femoral HD catheter placed on 09/13/2017 at [facility]  -Monitor I/O's.    Endocrine:  A1C 8.2 TSH 2.447  -Hx DM2- Endo consulted, patient did not get lantus last night, will give lantus tonight and observe FS tomorrow to adjust. Continue Lantus 25U sq QHS, Humalog 7U sq before meals, and ISS  -, 226, montior FS    Hematologic: H&H 7.6/25.5 INR 7.00, f/u INR after 3.68  -Heparin gtt 10cc/hr and VitK 5mg IV s/p HD, f/u PTT 4 hours after heparin to reassess if more FFP needed prior to cath tomorrow  -2 FFP prior HD, and 1 UPRBC while getting HD  -monitor H&H      ID: afebrile, WBC 18.1  -Endocarditis with MRSA bacteremia- continue imipenm/cislastatin 500mg IV q12, Daptomycin 1000mg q1wk, and rifampin 150mg PO daily.    -Daptomyxin x 1 dose post HD   -Blood cx 9/14/17 no growth at 12 hours- f/u blood cx in AM  -Pending recs from Dr. Kingston  -Vascular consulted for L foot chronic OM- recs to continue Abx  -Observe for SIRS/Sepsis Syndrome.    Prophylaxis:  -hep gtt  -SCD's    Disposition:  -OR next week A/P: 47 year old female with PMHx HTN, HLD, DM2, ESRD on HD (MWF, last HD 9/13/17, Right Femoral HD catheter), and chronic left foot OM who was transferred to [facility] from [facility] on 9/13/17 with known TV IE (MRSA Bacteremia.) who has failed medical management with aggressive antibiotics. Patient transferred to [facility] to be evaluated for surgical intervention. Of note patient was seen, managed, and evaluated for surgical intervention in 08/2017 with Dr. Gee. At that time the decision to pursue medical therapy was made. Today patient received 2U FFP, prior to HD, 1 UPRBC while receiving HD.  CT chest non ordered to r/o septic emboli.  Cardiac cath planned for tomorrow with Dr Arredondo.  Vascular consulted for chronic L foot OM, recs to continue abx.  Renal consulted for ESRD and HD. Abx recs pending Dr. Kingston.       Neurovascular: No delirium.   -Last admission CT Head noncon 8/24/17- findings concerning evolving right MCA infarct.  CT head non con 8/25/17: No significant interval change. A small hypodense lesion in the left posterior putamen. CT head non con 8/27/17: No acute intracranial hemorrhage, mass effect, or CT evidence of acute transcortical infarction. Neurology was following, and did not believe there was any acute infarct at that time.    -Continue tylenol 650mg PO q6 PRN pain,  -Pt c/o LLE numbness and tingling since [facility] admission- continue  gabapentin 600mg PO daily, will try to get records from [facility]    Cardiovascular: Hemodynamically stable. HR controlled.  -Endocarditis with MRSA bacteremia- continue imipenim/cislastatin 500mg IV q12, Daptomycin 1000mg q1wk, and rifampin 150mg PO daily.    - TTE / ALCIDES completed 09/12/2017 and 09/13/2017 with worsening / enlarging TV vegetation  -RA thrombus- INR 7.00- on heparin  - +troponin 0.15, f/u troponin 0.12.  -Cath tomorrow with Dr. Arredondo   -Hx HTN- Continue metoprolol 50mg PO q8, valsartan 160mg PO daily, and amlodipine 2.5mg PO daily  -Hx HLD- continue simvastatin 20mg QHS  -monitor HR/BP    Respiratory: 02 Sat = 99% on RA.  -Encourage ambulation and Use of IS 10x / hr while awake.  -CXR: +infiltrates in RLL  -CT scan noncon today after HD to r/o septic emboli  -f/u AM CXR    GI: Stable.  -continue pantoprazole 40mg PO daily for GI PPX.  -Continue PO Diet.  -NPO at midnight    Renal / : BUN/Cr 25/4.10  -ESRD on HD (MWF)- HD today, renal following   -Right Femoral HD catheter placed on 09/13/2017 at [facility]  -Monitor I/O's.    Endocrine:  A1C 8.2 TSH 2.447  -Hx DM2- Endo consulted, patient did not get lantus last night, will give lantus tonight and observe FS tomorrow to adjust. Continue Lantus 25U sq QHS, Humalog 7U sq before meals, and ISS  -, 226, montior FS    Hematologic: H&H 7.6/25.5 INR 7.00, f/u INR after 3.68  -Heparin gtt 10cc/hr and VitK 5mg IV s/p HD, f/u PTT 4 hours after heparin to reassess if more FFP needed prior to cath tomorrow  -2 FFP prior HD, and 1 UPRBC while getting HD  -monitor H&H      ID: afebrile, WBC 18.1  -Endocarditis with MRSA bacteremia- continue imipenm/cislastatin 500mg IV q12, Daptomycin 1000mg q1wk, and rifampin 150mg PO daily.    -Daptomyxin x 1 dose post HD   -Blood cx 9/14/17 no growth at 12 hours- f/u blood cx in AM  -Pending recs from Dr. Kingston  -Vascular consulted for L foot chronic OM- recs to continue Abx  -Observe for SIRS/Sepsis Syndrome.    Prophylaxis:  -hep gtt  -SCD's    Disposition:  -OR next week A/P: 47 year old female with PMHx HTN, HLD, DM2, ESRD on HD (MWF, last HD 9/13/17, Right Femoral HD catheter), and chronic left foot OM who was transferred to [facility] from [facility] on 9/13/17 with known TV IE (MRSA Bacteremia.) who has failed medical management with aggressive antibiotics. Patient transferred to [facility] to be evaluated for surgical intervention. Of note patient was seen, managed, and evaluated for surgical intervention in 08/2017 with Dr. Gee. At that time the decision to pursue medical therapy was made. Today patient received 2U FFP, prior to HD, 1 UPRBC while receiving HD.  CT chest non ordered to r/o septic emboli.  Cardiac cath planned for tomorrow with Dr Arredondo.  Vascular consulted for chronic L foot OM, recs to continue abx.  Renal consulted for ESRD and HD. Abx recs pending Dr. Kingston.       Neurovascular: No delirium.   -Last admission CT Head noncon 8/24/17- findings concerning evolving right MCA infarct.  CT head non con 8/25/17: No significant interval change. A small hypodense lesion in the left posterior putamen. CT head non con 8/27/17: No acute intracranial hemorrhage, mass effect, or CT evidence of acute transcortical infarction. Neurology was following, and did not believe there was any acute infarct at that time.    -Continue tylenol 650mg PO q6 PRN pain,  -Pt c/o LLE numbness and tingling since [facility] admission- continue  gabapentin 600mg PO daily, will try to get records from [facility]    Cardiovascular: Hemodynamically stable. HR controlled.  -Endocarditis with MRSA bacteremia- continue imipenim/cislastatin 500mg IV q12, Daptomycin 1000mg q1wk, and rifampin 150mg PO daily.    - TTE / ALCIDES completed 09/12/2017 and 09/13/2017 with worsening / enlarging TV vegetation  -RA thrombus- INR 7.00- on heparin  - +troponin 0.15, f/u troponin 0.12.  -Cath tomorrow with Dr. Arredondo   -Hx HTN- Continue metoprolol 50mg PO q8, valsartan 160mg PO daily, and amlodipine 2.5mg PO daily  -Hx HLD- continue simvastatin 20mg QHS  -monitor HR/BP    Respiratory: 02 Sat = 99% on RA.  -Encourage ambulation and Use of IS 10x / hr while awake.  -CXR: +infiltrates in RLL  -CT scan noncon today after HD to r/o septic emboli  -f/u AM CXR    GI: Stable.  -continue pantoprazole 40mg PO daily for GI PPX.  -Continue PO Diet.  -NPO at midnight    Renal / : BUN/Cr 25/4.10  -ESRD on HD (MWF)- HD today, renal following   -Right Femoral HD catheter placed on 09/13/2017 at [facility]  -Monitor I/O's.    Endocrine:  A1C 8.2 TSH 2.447  -Hx DM2- Endo consulted, patient did not get lantus last night, will give lantus tonight and observe FS tomorrow to adjust. Continue Lantus 15U sq QHS, Humalog 7U sq before meals, and ISS  -, 226, montior FS    Hematologic: H&H 7.6/25.5 INR 7.00, f/u INR 3.68  -Heparin gtt 10cc/hr and VitK 5mg IV s/p HD, f/u PTT 4 hours after heparin to reassess if more FFP needed prior to cath tomorrow  -2 FFP prior HD, and 1 UPRBC while getting HD  -monitor H&H      ID: afebrile, WBC 18.1  -Endocarditis with MRSA bacteremia- continue imipenm/cislastatin 500mg IV q12, Daptomycin 1000mg q1wk, and rifampin 150mg PO daily.    -Daptomyxin x 1 dose post HD per   -Blood cx 9/14/17 no growth at 12 hours- f/u blood cx in AM  -Pending recs from Dr. Kingston  -Vascular consulted for L foot chronic OM- recs to continue Abx  -Observe for SIRS/Sepsis Syndrome.    Prophylaxis:  -hep gtt  -SCD's    Disposition:  -OR next week

## 2017-09-14 NOTE — DIETITIAN INITIAL EVALUATION ADULT. - NS AS NUTRI DX KNOWLEDGE BELIEFS2
Limited adherence to nutrition - related recommendations/Food - and nutrition - related knowledge deficit

## 2017-09-14 NOTE — DIETITIAN INITIAL EVALUATION ADULT. - SIGNS/SYMPTOMS
AEB pt w/ significant/unintentional 48# (9%) wt loss x 1 mo; PU stage 2 (L buttock and sacrum) AEB pt reports not following Renal restrictions and A1C (9/13) 8.2

## 2017-09-14 NOTE — CONSULT NOTE ADULT - SUBJECTIVE AND OBJECTIVE BOX
Patient is a 47 year old female with history of HTN, HLD, DM II, ESRD on HD (MWF. Last HD 09/13/2017. Receives HD via Right Femoral HD catheter placed on 09/13/2017 at [facility] the patient is transferred from [facility] for possible cardiothoracic surgical intervention for infective endocarditis She was recently 2 weeks ago here again for evaluation and treatment for that. At that moment the medical treatment is chosen. No improvement in her medical condition in the past 2 weeks and she was transferred back here. The renal service is activated again for the need for HD. Her last HD session yesterday at [facility] - according to the chart the right femoral was exchange yesterday, she consistently had catheter in the right groin for the past 3 weeks exchange intermittently. Today she does not have any shortness of breath, no chest pain, no fever, occasional chills. She has been on HD for 3 months - [facility]. Most likely her kidney failure is due to diabetic nephropathy      Patient is a 47y Female admitted for     PAST MEDICAL & SURGICAL HISTORY:      MEDICATIONS  (STANDING):  sodium chloride 0.9% lock flush 3 milliLiter(s) IV Push every 8 hours  aspirin enteric coated 81 milliGRAM(s) Oral daily  metoprolol 50 milliGRAM(s) Oral every 8 hours  amLODIPine   Tablet 2.5 milliGRAM(s) Oral daily  valsartan 160 milliGRAM(s) Oral daily  simvastatin 20 milliGRAM(s) Oral at bedtime  insulin glargine Injectable (LANTUS) 25 Unit(s) SubCutaneous at bedtime  insulin lispro Injectable (HumaLOG) 7 Unit(s) SubCutaneous three times a day before meals  insulin lispro (HumaLOG) corrective regimen sliding scale   SubCutaneous Before meals and at bedtime  dextrose 5%. 1000 milliLiter(s) (50 mL/Hr) IV Continuous <Continuous>  dextrose 50% Injectable 12.5 Gram(s) IV Push once  dextrose 50% Injectable 25 Gram(s) IV Push once  dextrose 50% Injectable 25 Gram(s) IV Push once  imipenem/cilastatin  IVPB 500 milliGRAM(s) IV Intermittent every 12 hours  polyethylene glycol 3350 17 Gram(s) Oral daily  pantoprazole    Tablet 40 milliGRAM(s) Oral before breakfast  gabapentin 600 milliGRAM(s) Oral daily  rifampin 150 milliGRAM(s) Oral daily  DAPTOmycin IVPB 1000 milliGRAM(s) IV Intermittent <User Schedule>  imipenem/cilastatin  IVPB 500 milliGRAM(s) IV Intermittent every 12 hours  epoetin fransisca Injectable 04898 Unit(s) IV Push once    MEDICATIONS  (PRN):  dextrose Gel 1 Dose(s) Oral once PRN Blood Glucose LESS THAN 70 milliGRAM(s)/deciliter  glucagon  Injectable 1 milliGRAM(s) IntraMuscular once PRN Glucose LESS THAN 70 milligrams/deciliter  acetaminophen    Suspension. 650 milliGRAM(s) Oral every 6 hours PRN Mild Pain (1 - 3)      Allergies    penicillin (Unknown)  Sular (Unknown)    Intolerances        SOCIAL HISTORY:    FAMILY HISTORY:      T(C): , Max: 38.8 (09-13-17 @ 20:00)  T(F): , Max: 101.9 (09-13-17 @ 20:00)  HR: 90 (09-14-17 @ 09:00)  BP: 127/65 (09-14-17 @ 09:00)  BP(mean): 81 (09-14-17 @ 09:00)  RR: 18 (09-14-17 @ 09:00)  SpO2: 97% (09-14-17 @ 09:00)  Wt(kg): --    09-13 @ 07:01  -  09-14 @ 07:00  --------------------------------------------------------  IN: 150 mL / OUT: 100 mL / NET: 50 mL      Height (cm): 177.8 (09-13 @ 20:00)  Weight (kg): 87 (09-13 @ 20:00)  BMI (kg/m2): 27.5 (09-13 @ 20:00)  BSA (m2): 2.05 (09-13 @ 20:00)    Physical exam:   Alert and oriented  No JVD  No respiratory distress   Normal air entry in the lungs, no wheezing, no crackles, no rales   RRR, normal s1/s2, no mumurs, rubs or gallops  Abdomen - soft, non-tender, non-distended, normal bowel sounds   Extremities - mild peripheral edema   HAs a line in the right groin   Has a central line in the left IJ     LABS:                        7.6    18.1  )-----------( 290      ( 14 Sep 2017 05:03 )             25.5     09-14    126<L>  |  89<L>  |  25<H>  ----------------------------<  228<H>  4.3   |  24  |  4.10<H>    Ca    8.4      14 Sep 2017 05:01  Phos  5.1     09-14  Mg     2.0     09-14    TPro  7.4  /  Alb  1.6<L>  /  TBili  0.3  /  DBili  x   /  AST  7<L>  /  ALT  <5<L>  /  AlkPhos  236<H>  09-14    Creatine Kinase, Serum: 13 U/L <L> [25 - 170] (09-14 @ 05:01)  Hemoglobin A1C, Whole Blood: 8.2 % <H> [4.0 - 5.6] (09-13 @ 22:51)    PT/INR - ( 14 Sep 2017 05:01 )   PT: 80.9 sec;   INR: 7.00          PTT - ( 14 Sep 2017 05:01 )  PTT:51.9 sec          RADIOLOGY & ADDITIONAL STUDIES:    < from: Xray Chest 1 View AP-PORTABLE IMMEDIATE (09.13.17 @ 22:03) >  CLINICAL INFORMATION:  Follow Up..    PRIORS: Chest radiograph dated 8/28/2017.    FINDINGS: Single frontal view of the chest demonstrates interval   placement of a right internal jugular venous catheter with tip in the   atriocaval junction. There is been interval development of patchy areas   of consolidation in the right lower lobe suspicious for an infiltrative   process. No pneumothorax. No large pleural effusions. Heart size is   stable. The visualized osseous structures are intact.    Impression: Interval development of patchy areas of consolidation in the   right lower lobe suspicious for an infiltrative process.    < end of copied text >

## 2017-09-14 NOTE — PROGRESS NOTE ADULT - SUBJECTIVE AND OBJECTIVE BOX
Patient discussed on morning rounds with Dr. Escamilla    Operation / Date: TV IE, MRSA bacteremia admitted 9/13/17    SUBJECTIVE ASSESSMENT:  47y Female seen and examined, sitting in chair.  Patient reports feeling a little weak for the past few weeks. She also reports that she has not ambulated since being in the hospital. She is tolerating PO diet,    She denies fever, dizziness, chest pain/pressure, shortness of breath, abdominal pain, N/V/D/C,        Vital Signs Last 24 Hrs  T(C): 35.9 (14 Sep 2017 14:01), Max: 38.8 (13 Sep 2017 20:00)  T(F): 96.6 (14 Sep 2017 14:01), Max: 101.9 (13 Sep 2017 20:00)  HR: 90 (14 Sep 2017 09:00) (84 - 112)  BP: 127/65 (14 Sep 2017 09:00) (95/58 - 158/72)  BP(mean): 81 (14 Sep 2017 09:00) (68 - 102)  RR: 18 (14 Sep 2017 09:00) (18 - 20)  SpO2: 97% (14 Sep 2017 09:00) (95% - 99%)  I&O's Detail    13 Sep 2017 07:01  -  14 Sep 2017 07:00  --------------------------------------------------------  IN:    IV PiggyBack: 100 mL    Oral Fluid: 50 mL  Total IN: 150 mL    OUT:    Voided: 100 mL  Total OUT: 100 mL    Total NET: 50 mL          CHEST TUBE:  Yes/No. AIR LEAKS: Yes/No. Suction / H2O SEAL.   LOLLY DRAIN:  Yes/No.  EPICARDIAL WIRES: Yes/No.  TIE DOWNS: Yes/No.  COOK: Yes/No.    PHYSICAL EXAM:    General:     Neurological:    Cardiovascular:    Respiratory:    Gastrointestinal:    Extremities:    Vascular:    Incision Sites:    LABS:                        7.6    18.1  )-----------( 290      ( 14 Sep 2017 05:03 )             25.5       COUMADIN:  Yes/No. REASON: .    PT/INR - ( 14 Sep 2017 05:01 )   PT: 80.9 sec;   INR: 7.00          PTT - ( 14 Sep 2017 05:01 )  PTT:51.9 sec    09-14    126<L>  |  89<L>  |  25<H>  ----------------------------<  228<H>  4.3   |  24  |  4.10<H>    Ca    8.4      14 Sep 2017 05:01  Phos  5.1     09-14  Mg     2.0     09-14    TPro  7.4  /  Alb  1.6<L>  /  TBili  0.3  /  DBili  x   /  AST  7<L>  /  ALT  <5<L>  /  AlkPhos  236<H>  09-14          MEDICATIONS  (STANDING):  sodium chloride 0.9% lock flush 3 milliLiter(s) IV Push every 8 hours  aspirin enteric coated 81 milliGRAM(s) Oral daily  metoprolol 50 milliGRAM(s) Oral every 8 hours  amLODIPine   Tablet 2.5 milliGRAM(s) Oral daily  valsartan 160 milliGRAM(s) Oral daily  simvastatin 20 milliGRAM(s) Oral at bedtime  insulin glargine Injectable (LANTUS) 25 Unit(s) SubCutaneous at bedtime  insulin lispro Injectable (HumaLOG) 7 Unit(s) SubCutaneous three times a day before meals  insulin lispro (HumaLOG) corrective regimen sliding scale   SubCutaneous Before meals and at bedtime  dextrose 5%. 1000 milliLiter(s) (50 mL/Hr) IV Continuous <Continuous>  dextrose 50% Injectable 12.5 Gram(s) IV Push once  dextrose 50% Injectable 25 Gram(s) IV Push once  dextrose 50% Injectable 25 Gram(s) IV Push once  imipenem/cilastatin  IVPB 500 milliGRAM(s) IV Intermittent every 12 hours  polyethylene glycol 3350 17 Gram(s) Oral daily  pantoprazole    Tablet 40 milliGRAM(s) Oral before breakfast  gabapentin 600 milliGRAM(s) Oral daily  rifampin 150 milliGRAM(s) Oral daily  DAPTOmycin IVPB 1000 milliGRAM(s) IV Intermittent <User Schedule>  imipenem/cilastatin  IVPB 500 milliGRAM(s) IV Intermittent every 12 hours  epoetin fransisca Injectable 83533 Unit(s) IV Push once    MEDICATIONS  (PRN):  dextrose Gel 1 Dose(s) Oral once PRN Blood Glucose LESS THAN 70 milliGRAM(s)/deciliter  glucagon  Injectable 1 milliGRAM(s) IntraMuscular once PRN Glucose LESS THAN 70 milligrams/deciliter  acetaminophen    Suspension. 650 milliGRAM(s) Oral every 6 hours PRN Mild Pain (1 - 3)        RADIOLOGY & ADDITIONAL TESTS: Patient discussed on morning rounds with Dr. Escamilla    Operation / Date: TV IE, MRSA bacteremia admitted 9/13/17    SUBJECTIVE ASSESSMENT:  47y Female seen and examined, sitting in chair.  Patient reports feeling a little weak for the past few weeks. She also reports that she has not ambulated since being in the hospital. She is tolerating PO diet and having normal BMs.  She denies fever, dizziness, chest pain/pressure, shortness of breath, abdominal pain, N/V/D/C,        Vital Signs Last 24 Hrs  T(C): 35.9 (14 Sep 2017 14:01), Max: 38.8 (13 Sep 2017 20:00)  T(F): 96.6 (14 Sep 2017 14:01), Max: 101.9 (13 Sep 2017 20:00)  HR: 90 (14 Sep 2017 09:00) (84 - 112)  BP: 127/65 (14 Sep 2017 09:00) (95/58 - 158/72)  BP(mean): 81 (14 Sep 2017 09:00) (68 - 102)  RR: 18 (14 Sep 2017 09:00) (18 - 20)  SpO2: 97% (14 Sep 2017 09:00) (95% - 99%)  I&O's Detail    13 Sep 2017 07:01  -  14 Sep 2017 07:00  --------------------------------------------------------  IN:    IV PiggyBack: 100 mL    Oral Fluid: 50 mL  Total IN: 150 mL    OUT:    Voided: 100 mL  Total OUT: 100 mL    Total NET: 50 mL          CHEST TUBE:  No.  LOLLY DRAIN:  No.  EPICARDIAL WIRES: No.  TIE DOWNS: No.  COOK: No.    PHYSICAL EXAM:    General: Laying comfortably in bed, no acute distress    Neurological: awake alert and oriented, no neurologic deficit     Cardiovascular: S1S2, RRR, no murmurs heard at this time    Respiratory: +crackles bilateral bases, no wheeze/rhonchi    Gastrointestinal: +BS, soft nontender nondistended    Extremities: Warm and well perfused, no edema, no calf tenderness    Vascular: 2+ R DP and 1+ L DP, 2+ radial pulses bilaterally     Incision Sites: L foot: + ~4 in fissure on L medial ankle, no pain, tenderness, drainage. R femoral HD cath- CDI. R IJ- CDI    LABS:                        7.6    18.1  )-----------( 290      ( 14 Sep 2017 05:03 )             25.5       COUMADIN:  holding coumadin for INR 7.00    PT/INR - ( 14 Sep 2017 05:01 )   PT: 80.9 sec;   INR: 7.00          PTT - ( 14 Sep 2017 05:01 )  PTT:51.9 sec    09-14    126<L>  |  89<L>  |  25<H>  ----------------------------<  228<H>  4.3   |  24  |  4.10<H>    Ca    8.4      14 Sep 2017 05:01  Phos  5.1     09-14  Mg     2.0     09-14    TPro  7.4  /  Alb  1.6<L>  /  TBili  0.3  /  DBili  x   /  AST  7<L>  /  ALT  <5<L>  /  AlkPhos  236<H>  09-14          MEDICATIONS  (STANDING):  sodium chloride 0.9% lock flush 3 milliLiter(s) IV Push every 8 hours  aspirin enteric coated 81 milliGRAM(s) Oral daily  metoprolol 50 milliGRAM(s) Oral every 8 hours  amLODIPine   Tablet 2.5 milliGRAM(s) Oral daily  valsartan 160 milliGRAM(s) Oral daily  simvastatin 20 milliGRAM(s) Oral at bedtime  insulin glargine Injectable (LANTUS) 25 Unit(s) SubCutaneous at bedtime  insulin lispro Injectable (HumaLOG) 7 Unit(s) SubCutaneous three times a day before meals  insulin lispro (HumaLOG) corrective regimen sliding scale   SubCutaneous Before meals and at bedtime  dextrose 5%. 1000 milliLiter(s) (50 mL/Hr) IV Continuous <Continuous>  dextrose 50% Injectable 12.5 Gram(s) IV Push once  dextrose 50% Injectable 25 Gram(s) IV Push once  dextrose 50% Injectable 25 Gram(s) IV Push once  imipenem/cilastatin  IVPB 500 milliGRAM(s) IV Intermittent every 12 hours  polyethylene glycol 3350 17 Gram(s) Oral daily  pantoprazole    Tablet 40 milliGRAM(s) Oral before breakfast  gabapentin 600 milliGRAM(s) Oral daily  rifampin 150 milliGRAM(s) Oral daily  DAPTOmycin IVPB 1000 milliGRAM(s) IV Intermittent <User Schedule>  imipenem/cilastatin  IVPB 500 milliGRAM(s) IV Intermittent every 12 hours  epoetin fransisca Injectable 24586 Unit(s) IV Push once    MEDICATIONS  (PRN):  dextrose Gel 1 Dose(s) Oral once PRN Blood Glucose LESS THAN 70 milliGRAM(s)/deciliter  glucagon  Injectable 1 milliGRAM(s) IntraMuscular once PRN Glucose LESS THAN 70 milligrams/deciliter  acetaminophen    Suspension. 650 milliGRAM(s) Oral every 6 hours PRN Mild Pain (1 - 3)        RADIOLOGY & ADDITIONAL TESTS:  CXR < from: Xray Chest 1 View AP-PORTABLE IMMEDIATE (09.13.17 @ 22:03) >   Single frontal view of the chest demonstrates interval   placement of a right internal jugular venous catheter with tip in the   atriocaval junction. There is been interval development of patchy areas   of consolidation in the right lower lobe suspicious for an infiltrative   process. No pneumothorax. No large pleural effusions. Heart size is   stable. The visualized osseous structures are intact.    Impression: Interval development of patchy areas of consolidation in the   right lower lobe suspicious for an infiltrative process.    < end of copied text > Patient discussed on morning rounds with Dr. Francine RIVERA IE, MRSA bacteremia admitted 9/13/17    SUBJECTIVE ASSESSMENT:  47y Female seen and examined, sitting in chair.  Patient reports feeling a little weak for the past few weeks. She also reports that she has not ambulated since being in the hospital. She is tolerating PO diet and having normal BMs.  She denies fever, dizziness, chest pain/pressure, shortness of breath, abdominal pain, N/V/D/C,        Vital Signs Last 24 Hrs  T(C): 35.9 (14 Sep 2017 14:01), Max: 38.8 (13 Sep 2017 20:00)  T(F): 96.6 (14 Sep 2017 14:01), Max: 101.9 (13 Sep 2017 20:00)  HR: 90 (14 Sep 2017 09:00) (84 - 112)  BP: 127/65 (14 Sep 2017 09:00) (95/58 - 158/72)  BP(mean): 81 (14 Sep 2017 09:00) (68 - 102)  RR: 18 (14 Sep 2017 09:00) (18 - 20)  SpO2: 97% (14 Sep 2017 09:00) (95% - 99%)  I&O's Detail    13 Sep 2017 07:01  -  14 Sep 2017 07:00  --------------------------------------------------------  IN:    IV PiggyBack: 100 mL    Oral Fluid: 50 mL  Total IN: 150 mL    OUT:    Voided: 100 mL  Total OUT: 100 mL    Total NET: 50 mL          CHEST TUBE:  No.  LOLLY DRAIN:  No.  EPICARDIAL WIRES: No.  TIE DOWNS: No.  COOK: No.    PHYSICAL EXAM:    General: Laying comfortably in bed, no acute distress    Neurological: awake alert and oriented, no neurologic deficit     Cardiovascular: S1S2, RRR, no murmurs heard at this time    Respiratory: +crackles bilateral bases, no wheeze/rhonchi    Gastrointestinal: +BS, soft nontender nondistended    Extremities: Warm and well perfused, no edema, no calf tenderness    Vascular: 2+ R DP and 1+ L DP, 2+ radial pulses bilaterally     Incision Sites: L foot: + fissure on L medial ankle, no pain, tenderness, drainage. R femoral HD cath- CDI. R IJ- CDI    LABS:                        7.6    18.1  )-----------( 290      ( 14 Sep 2017 05:03 )             25.5       COUMADIN:  No. Was taking heparin at [facility]. Holding due to INR of 7    PT/INR - ( 14 Sep 2017 05:01 )   PT: 80.9 sec;   INR: 7.00          PTT - ( 14 Sep 2017 05:01 )  PTT:51.9 sec    09-14    126<L>  |  89<L>  |  25<H>  ----------------------------<  228<H>  4.3   |  24  |  4.10<H>    Ca    8.4      14 Sep 2017 05:01  Phos  5.1     09-14  Mg     2.0     09-14    TPro  7.4  /  Alb  1.6<L>  /  TBili  0.3  /  DBili  x   /  AST  7<L>  /  ALT  <5<L>  /  AlkPhos  236<H>  09-14          MEDICATIONS  (STANDING):  sodium chloride 0.9% lock flush 3 milliLiter(s) IV Push every 8 hours  aspirin enteric coated 81 milliGRAM(s) Oral daily  metoprolol 50 milliGRAM(s) Oral every 8 hours  amLODIPine   Tablet 2.5 milliGRAM(s) Oral daily  valsartan 160 milliGRAM(s) Oral daily  simvastatin 20 milliGRAM(s) Oral at bedtime  insulin glargine Injectable (LANTUS) 25 Unit(s) SubCutaneous at bedtime  insulin lispro Injectable (HumaLOG) 7 Unit(s) SubCutaneous three times a day before meals  insulin lispro (HumaLOG) corrective regimen sliding scale   SubCutaneous Before meals and at bedtime  dextrose 5%. 1000 milliLiter(s) (50 mL/Hr) IV Continuous <Continuous>  dextrose 50% Injectable 12.5 Gram(s) IV Push once  dextrose 50% Injectable 25 Gram(s) IV Push once  dextrose 50% Injectable 25 Gram(s) IV Push once  imipenem/cilastatin  IVPB 500 milliGRAM(s) IV Intermittent every 12 hours  polyethylene glycol 3350 17 Gram(s) Oral daily  pantoprazole    Tablet 40 milliGRAM(s) Oral before breakfast  gabapentin 600 milliGRAM(s) Oral daily  rifampin 150 milliGRAM(s) Oral daily  DAPTOmycin IVPB 1000 milliGRAM(s) IV Intermittent <User Schedule>  imipenem/cilastatin  IVPB 500 milliGRAM(s) IV Intermittent every 12 hours  epoetin fransisca Injectable 02841 Unit(s) IV Push once    MEDICATIONS  (PRN):  dextrose Gel 1 Dose(s) Oral once PRN Blood Glucose LESS THAN 70 milliGRAM(s)/deciliter  glucagon  Injectable 1 milliGRAM(s) IntraMuscular once PRN Glucose LESS THAN 70 milligrams/deciliter  acetaminophen    Suspension. 650 milliGRAM(s) Oral every 6 hours PRN Mild Pain (1 - 3)        RADIOLOGY & ADDITIONAL TESTS:  CXR < from: Xray Chest 1 View AP-PORTABLE IMMEDIATE (09.13.17 @ 22:03) >   Single frontal view of the chest demonstrates interval   placement of a right internal jugular venous catheter with tip in the   atriocaval junction. There is been interval development of patchy areas   of consolidation in the right lower lobe suspicious for an infiltrative   process. No pneumothorax. No large pleural effusions. Heart size is   stable. The visualized osseous structures are intact.    Impression: Interval development of patchy areas of consolidation in the   right lower lobe suspicious for an infiltrative process.    < end of copied text > Patient discussed on morning rounds with Dr. Francine RIVERA IE, MRSA bacteremia admitted 9/13/17    SUBJECTIVE ASSESSMENT:  47y Female seen and examined, sitting in chair.  Patient reports feeling a little weak for the past few weeks. She also reports that she has not ambulated since being in the hospital. She is tolerating PO diet and having normal BMs.  She denies fever, dizziness, chest pain/pressure, shortness of breath, abdominal pain, N/V/D/C,        Vital Signs Last 24 Hrs  T(C): 35.9 (14 Sep 2017 14:01), Max: 38.8 (13 Sep 2017 20:00)  T(F): 96.6 (14 Sep 2017 14:01), Max: 101.9 (13 Sep 2017 20:00)  HR: 90 (14 Sep 2017 09:00) (84 - 112)  BP: 127/65 (14 Sep 2017 09:00) (95/58 - 158/72)  BP(mean): 81 (14 Sep 2017 09:00) (68 - 102)  RR: 18 (14 Sep 2017 09:00) (18 - 20)  SpO2: 97% (14 Sep 2017 09:00) (95% - 99%)  I&O's Detail    13 Sep 2017 07:01  -  14 Sep 2017 07:00  --------------------------------------------------------  IN:    IV PiggyBack: 100 mL    Oral Fluid: 50 mL  Total IN: 150 mL    OUT:    Voided: 100 mL  Total OUT: 100 mL    Total NET: 50 mL          CHEST TUBE:  No.  LOLLY DRAIN:  No.  EPICARDIAL WIRES: No.  TIE DOWNS: No.  COOK: No.    PHYSICAL EXAM:    General: Laying comfortably in bed, no acute distress    Neurological: awake alert and oriented, CN grossly intact, +LUE weakness 4/5. RUE 5/5. B/L LE 5/5    Cardiovascular: S1S2, RRR, no murmurs heard at this time    Respiratory: +crackles bilateral bases, no wheeze/rhonchi    Gastrointestinal: +BS, soft nontender nondistended    Extremities: Warm and well perfused, no edema, no calf tenderness    Vascular: 2+ R DP and 1+ L DP, 2+ radial pulses bilaterally     Incision Sites: L foot: + fissure on L medial ankle, no pain, tenderness, drainage. R femoral HD cath- CDI. R IJ- CDI    LABS:                        7.6    18.1  )-----------( 290      ( 14 Sep 2017 05:03 )             25.5       COUMADIN:  No. Was taking heparin at [facility]. Holding due to INR of 7    PT/INR - ( 14 Sep 2017 05:01 )   PT: 80.9 sec;   INR: 7.00          PTT - ( 14 Sep 2017 05:01 )  PTT:51.9 sec    09-14    126<L>  |  89<L>  |  25<H>  ----------------------------<  228<H>  4.3   |  24  |  4.10<H>    Ca    8.4      14 Sep 2017 05:01  Phos  5.1     09-14  Mg     2.0     09-14    TPro  7.4  /  Alb  1.6<L>  /  TBili  0.3  /  DBili  x   /  AST  7<L>  /  ALT  <5<L>  /  AlkPhos  236<H>  09-14          MEDICATIONS  (STANDING):  sodium chloride 0.9% lock flush 3 milliLiter(s) IV Push every 8 hours  aspirin enteric coated 81 milliGRAM(s) Oral daily  metoprolol 50 milliGRAM(s) Oral every 8 hours  amLODIPine   Tablet 2.5 milliGRAM(s) Oral daily  valsartan 160 milliGRAM(s) Oral daily  simvastatin 20 milliGRAM(s) Oral at bedtime  insulin glargine Injectable (LANTUS) 25 Unit(s) SubCutaneous at bedtime  insulin lispro Injectable (HumaLOG) 7 Unit(s) SubCutaneous three times a day before meals  insulin lispro (HumaLOG) corrective regimen sliding scale   SubCutaneous Before meals and at bedtime  dextrose 5%. 1000 milliLiter(s) (50 mL/Hr) IV Continuous <Continuous>  dextrose 50% Injectable 12.5 Gram(s) IV Push once  dextrose 50% Injectable 25 Gram(s) IV Push once  dextrose 50% Injectable 25 Gram(s) IV Push once  imipenem/cilastatin  IVPB 500 milliGRAM(s) IV Intermittent every 12 hours  polyethylene glycol 3350 17 Gram(s) Oral daily  pantoprazole    Tablet 40 milliGRAM(s) Oral before breakfast  gabapentin 600 milliGRAM(s) Oral daily  rifampin 150 milliGRAM(s) Oral daily  DAPTOmycin IVPB 1000 milliGRAM(s) IV Intermittent <User Schedule>  imipenem/cilastatin  IVPB 500 milliGRAM(s) IV Intermittent every 12 hours  epoetin fransisca Injectable 93213 Unit(s) IV Push once    MEDICATIONS  (PRN):  dextrose Gel 1 Dose(s) Oral once PRN Blood Glucose LESS THAN 70 milliGRAM(s)/deciliter  glucagon  Injectable 1 milliGRAM(s) IntraMuscular once PRN Glucose LESS THAN 70 milligrams/deciliter  acetaminophen    Suspension. 650 milliGRAM(s) Oral every 6 hours PRN Mild Pain (1 - 3)        RADIOLOGY & ADDITIONAL TESTS:  CXR < from: Xray Chest 1 View AP-PORTABLE IMMEDIATE (09.13.17 @ 22:03) >   Single frontal view of the chest demonstrates interval   placement of a right internal jugular venous catheter with tip in the   atriocaval junction. There is been interval development of patchy areas   of consolidation in the right lower lobe suspicious for an infiltrative   process. No pneumothorax. No large pleural effusions. Heart size is   stable. The visualized osseous structures are intact.    Impression: Interval development of patchy areas of consolidation in the   right lower lobe suspicious for an infiltrative process.    < end of copied text > Patient discussed on morning rounds with Dr. Francine RIVERA IE, MRSA bacteremia admitted 9/13/17    SUBJECTIVE ASSESSMENT:  47y Female seen and examined, sitting in chair.  Patient reports feeling a little weak for the past few weeks. She also reports that she has not ambulated since being in the hospital. She is tolerating PO diet and having normal BMs.  She denies fever, dizziness, chest pain/pressure, shortness of breath, abdominal pain, N/V/D/C,        Vital Signs Last 24 Hrs  T(C): 35.9 (14 Sep 2017 14:01), Max: 38.8 (13 Sep 2017 20:00)  T(F): 96.6 (14 Sep 2017 14:01), Max: 101.9 (13 Sep 2017 20:00)  HR: 90 (14 Sep 2017 09:00) (84 - 112)  BP: 127/65 (14 Sep 2017 09:00) (95/58 - 158/72)  BP(mean): 81 (14 Sep 2017 09:00) (68 - 102)  RR: 18 (14 Sep 2017 09:00) (18 - 20)  SpO2: 97% (14 Sep 2017 09:00) (95% - 99%)  I&O's Detail    13 Sep 2017 07:01  -  14 Sep 2017 07:00  --------------------------------------------------------  IN:    IV PiggyBack: 100 mL    Oral Fluid: 50 mL  Total IN: 150 mL    OUT:    Voided: 100 mL  Total OUT: 100 mL    Total NET: 50 mL          CHEST TUBE:  No.  LOLLY DRAIN:  No.  EPICARDIAL WIRES: No.  TIE DOWNS: No.  COOK: No.    PHYSICAL EXAM:    General: Laying comfortably in bed, no acute distress    Neurological: awake alert and oriented, CN grossly intact, +LUE weakness 4/5. RUE 5/5. B/L LE 5/5    Cardiovascular: S1S2, RRR, no murmurs heard at this time    Respiratory: +crackles bilateral bases, no wheeze/rhonchi    Gastrointestinal: +BS, soft nontender nondistended    Extremities: Warm and well perfused, no edema, no calf tenderness    Vascular: 2+ R DP and 1+ L DP, 2+ radial pulses bilaterally     Incision Sites: L foot: + chronic fissure on L medial ankle, no pain, tenderness, drainage. R femoral HD cath- CDI. R IJ- CDI    LABS:                        7.6    18.1  )-----------( 290      ( 14 Sep 2017 05:03 )             25.5       COUMADIN:  No. Was taking heparin at [facility]. Holding due to INR of 7    PT/INR - ( 14 Sep 2017 05:01 )   PT: 80.9 sec;   INR: 7.00          PTT - ( 14 Sep 2017 05:01 )  PTT:51.9 sec    09-14    126<L>  |  89<L>  |  25<H>  ----------------------------<  228<H>  4.3   |  24  |  4.10<H>    Ca    8.4      14 Sep 2017 05:01  Phos  5.1     09-14  Mg     2.0     09-14    TPro  7.4  /  Alb  1.6<L>  /  TBili  0.3  /  DBili  x   /  AST  7<L>  /  ALT  <5<L>  /  AlkPhos  236<H>  09-14          MEDICATIONS  (STANDING):  sodium chloride 0.9% lock flush 3 milliLiter(s) IV Push every 8 hours  aspirin enteric coated 81 milliGRAM(s) Oral daily  metoprolol 50 milliGRAM(s) Oral every 8 hours  amLODIPine   Tablet 2.5 milliGRAM(s) Oral daily  valsartan 160 milliGRAM(s) Oral daily  simvastatin 20 milliGRAM(s) Oral at bedtime  insulin glargine Injectable (LANTUS) 25 Unit(s) SubCutaneous at bedtime  insulin lispro Injectable (HumaLOG) 7 Unit(s) SubCutaneous three times a day before meals  insulin lispro (HumaLOG) corrective regimen sliding scale   SubCutaneous Before meals and at bedtime  dextrose 5%. 1000 milliLiter(s) (50 mL/Hr) IV Continuous <Continuous>  dextrose 50% Injectable 12.5 Gram(s) IV Push once  dextrose 50% Injectable 25 Gram(s) IV Push once  dextrose 50% Injectable 25 Gram(s) IV Push once  imipenem/cilastatin  IVPB 500 milliGRAM(s) IV Intermittent every 12 hours  polyethylene glycol 3350 17 Gram(s) Oral daily  pantoprazole    Tablet 40 milliGRAM(s) Oral before breakfast  gabapentin 600 milliGRAM(s) Oral daily  rifampin 150 milliGRAM(s) Oral daily  DAPTOmycin IVPB 1000 milliGRAM(s) IV Intermittent <User Schedule>  imipenem/cilastatin  IVPB 500 milliGRAM(s) IV Intermittent every 12 hours  epoetin fransisca Injectable 93803 Unit(s) IV Push once    MEDICATIONS  (PRN):  dextrose Gel 1 Dose(s) Oral once PRN Blood Glucose LESS THAN 70 milliGRAM(s)/deciliter  glucagon  Injectable 1 milliGRAM(s) IntraMuscular once PRN Glucose LESS THAN 70 milligrams/deciliter  acetaminophen    Suspension. 650 milliGRAM(s) Oral every 6 hours PRN Mild Pain (1 - 3)        RADIOLOGY & ADDITIONAL TESTS:  CXR < from: Xray Chest 1 View AP-PORTABLE IMMEDIATE (09.13.17 @ 22:03) >   Single frontal view of the chest demonstrates interval   placement of a right internal jugular venous catheter with tip in the   atriocaval junction. There is been interval development of patchy areas   of consolidation in the right lower lobe suspicious for an infiltrative   process. No pneumothorax. No large pleural effusions. Heart size is   stable. The visualized osseous structures are intact.    Impression: Interval development of patchy areas of consolidation in the   right lower lobe suspicious for an infiltrative process.    < end of copied text >

## 2017-09-14 NOTE — DIETITIAN INITIAL EVALUATION ADULT. - DIET TYPE
DASH/TLC (sodium and cholesterol restricted diet)/renal replacement pts:no protein restr,no conc K & phos, low sodium/consistent carbohydrate renal with no snacks

## 2017-09-14 NOTE — CONSULT NOTE ADULT - ATTENDING COMMENTS
Pt seen on rounds with Dr. Yoon this afternoon.  Long history of insulin-requiring DM though pt says that she has type 2 disease.  Glycemic control is even poorer than the A1c level would suggest, since the A1c is artifactually lowered by Procrit therapy usually given to ESRD pts.  Some of hyperglycemia today is due to lack of Lantus insulin last night, but achieving control will be complicated by pt's poor PO intake.  Will start back on Lantus tonight but cautiously, and will hold off increasing her pre-meal dose.  Should also order enteral supplements (Nepro) but I have warned pt that these need to be taken at mealtime (when her Humalog is given), and only if she eats less than half her hospital tray.  DM has been complicated by significant retinopathy (pt has likely had vitreous hemorrhage and vitrectomy in the past) and by peripheral neuropathy (appears to have Charcot deformity L ankle (and previous osteo/surgeries in that area)

## 2017-09-14 NOTE — CONSULT NOTE ADULT - ASSESSMENT
This is 47 year old female with PMH stated above. The renal service is activated for the need of HD. She will be dialyzed today for fluid optimization after receiving FFP.

## 2017-09-14 NOTE — DIETITIAN INITIAL EVALUATION ADULT. - NS AS NUTRI INTERV MEALS SNACK
Other (specify)/REC: CST CHO/Renal restrictions; encourage po intake w/ meals and snacks; provide pt's food preferences.

## 2017-09-14 NOTE — DIETITIAN INITIAL EVALUATION ADULT. - OTHER INFO
47 year old female with history of HTN, HLD, DM II, ESRD on HD (MWF. Last HD 09/13/2017) Transferred to [facility] from [facility] with known TV IE (MRSA – Bacteremia.) who has failed medical MGMT with aggressive antibiotics; here to be evaluated for surgical intervention. Pt denies N/V/D/C; last BM yesterday; w/ NKFA; pt reports decreased appetite and unintentional wt loss of 48# x 1 mo; not really following diet restrictions for HD tx and DM2; curent po intake <50% w/ meals; pt is encouraged to make meal/food choices to have a better appetite/po intake also rec to consume Nepro TID pt agrees; id/w MD; also her mom brings homemade food; education on current diet restrictions provided (A1C 8.2); Pt did nopt seem very receptive to edu; Skin: PU stage 2 to sacrum and L buttock; Wound at L foot.

## 2017-09-14 NOTE — CONSULT NOTE ADULT - ASSESSMENT
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   Left foot chronic OM, s/p 1.5 yr treatment, currently wound healing well with no signs of infection, not likely to be the cause of bacteremia.  -Recommend continuing IV ABX and follow up on ID recs  -Daily dry gauze over left foot  -If cannot rule out other sources of infection, can consider Gallium scan     Case d/w chief resident on call

## 2017-09-15 LAB
ANION GAP SERPL CALC-SCNC: 12 MMOL/L — SIGNIFICANT CHANGE UP (ref 5–17)
APTT BLD: 33.2 SEC — SIGNIFICANT CHANGE UP (ref 27.5–37.4)
APTT BLD: 35.6 SEC — SIGNIFICANT CHANGE UP (ref 27.5–37.4)
BUN SERPL-MCNC: 16 MG/DL — SIGNIFICANT CHANGE UP (ref 7–23)
CALCIUM SERPL-MCNC: 8.3 MG/DL — LOW (ref 8.4–10.5)
CHLORIDE SERPL-SCNC: 91 MMOL/L — LOW (ref 96–108)
CO2 SERPL-SCNC: 26 MMOL/L — SIGNIFICANT CHANGE UP (ref 22–31)
CREAT SERPL-MCNC: 3.08 MG/DL — HIGH (ref 0.5–1.3)
GLUCOSE SERPL-MCNC: 227 MG/DL — HIGH (ref 70–99)
HCT VFR BLD CALC: 25.9 % — LOW (ref 34.5–45)
HGB BLD-MCNC: 8 G/DL — LOW (ref 11.5–15.5)
INR BLD: 1.5 — HIGH (ref 0.88–1.16)
INR BLD: 1.58 — HIGH (ref 0.88–1.16)
MAGNESIUM SERPL-MCNC: 1.9 MG/DL — SIGNIFICANT CHANGE UP (ref 1.6–2.6)
MCHC RBC-ENTMCNC: 26.1 PG — LOW (ref 27–34)
MCHC RBC-ENTMCNC: 30.9 G/DL — LOW (ref 32–36)
MCV RBC AUTO: 84.6 FL — SIGNIFICANT CHANGE UP (ref 80–100)
PLATELET # BLD AUTO: 327 K/UL — SIGNIFICANT CHANGE UP (ref 150–400)
POTASSIUM SERPL-MCNC: 3.9 MMOL/L — SIGNIFICANT CHANGE UP (ref 3.5–5.3)
POTASSIUM SERPL-SCNC: 3.9 MMOL/L — SIGNIFICANT CHANGE UP (ref 3.5–5.3)
PROTHROM AB SERPL-ACNC: 16.8 SEC — HIGH (ref 9.8–12.7)
PROTHROM AB SERPL-ACNC: 17.7 SEC — HIGH (ref 9.8–12.7)
RBC # BLD: 3.06 M/UL — LOW (ref 3.8–5.2)
RBC # FLD: 16.8 % — SIGNIFICANT CHANGE UP (ref 10.3–16.9)
SODIUM SERPL-SCNC: 129 MMOL/L — LOW (ref 135–145)
WBC # BLD: 16.3 K/UL — HIGH (ref 3.8–10.5)
WBC # FLD AUTO: 16.3 K/UL — HIGH (ref 3.8–10.5)

## 2017-09-15 PROCEDURE — 93458 L HRT ARTERY/VENTRICLE ANGIO: CPT | Mod: 26

## 2017-09-15 PROCEDURE — 78807: CPT | Mod: 26

## 2017-09-15 PROCEDURE — 99232 SBSQ HOSP IP/OBS MODERATE 35: CPT | Mod: GC

## 2017-09-15 PROCEDURE — 78320: CPT | Mod: 26

## 2017-09-15 PROCEDURE — 71010: CPT | Mod: 26

## 2017-09-15 PROCEDURE — 93571 IV DOP VEL&/PRESS C FLO 1ST: CPT | Mod: 26,RC

## 2017-09-15 PROCEDURE — 99232 SBSQ HOSP IP/OBS MODERATE 35: CPT

## 2017-09-15 RX ORDER — HEPARIN SODIUM 5000 [USP'U]/ML
1400 INJECTION INTRAVENOUS; SUBCUTANEOUS
Qty: 25000 | Refills: 0 | Status: DISCONTINUED | OUTPATIENT
Start: 2017-09-15 | End: 2017-09-15

## 2017-09-15 RX ORDER — ERYTHROPOIETIN 10000 [IU]/ML
10000 INJECTION, SOLUTION INTRAVENOUS; SUBCUTANEOUS ONCE
Qty: 0 | Refills: 0 | Status: COMPLETED | OUTPATIENT
Start: 2017-09-15 | End: 2017-09-15

## 2017-09-15 RX ORDER — AMLODIPINE BESYLATE 2.5 MG/1
10 TABLET ORAL DAILY
Qty: 0 | Refills: 0 | Status: DISCONTINUED | OUTPATIENT
Start: 2017-09-15 | End: 2017-10-03

## 2017-09-15 RX ORDER — INSULIN DETEMIR 100/ML (3)
25 INSULIN PEN (ML) SUBCUTANEOUS AT BEDTIME
Qty: 0 | Refills: 0 | Status: DISCONTINUED | OUTPATIENT
Start: 2017-09-15 | End: 2017-09-15

## 2017-09-15 RX ORDER — INSULIN LISPRO 100/ML
10 VIAL (ML) SUBCUTANEOUS
Qty: 0 | Refills: 0 | Status: DISCONTINUED | OUTPATIENT
Start: 2017-09-15 | End: 2017-09-16

## 2017-09-15 RX ORDER — DAPTOMYCIN 500 MG/10ML
700 INJECTION, POWDER, LYOPHILIZED, FOR SOLUTION INTRAVENOUS ONCE
Qty: 0 | Refills: 0 | Status: COMPLETED | OUTPATIENT
Start: 2017-09-15 | End: 2017-09-16

## 2017-09-15 RX ORDER — CEFTAROLINE FOSAMIL 600 MG/20ML
200 POWDER, FOR SOLUTION INTRAVENOUS EVERY 12 HOURS
Qty: 0 | Refills: 0 | Status: DISCONTINUED | OUTPATIENT
Start: 2017-09-15 | End: 2017-10-01

## 2017-09-15 RX ORDER — HEPARIN SODIUM 5000 [USP'U]/ML
1400 INJECTION INTRAVENOUS; SUBCUTANEOUS
Qty: 25000 | Refills: 0 | Status: DISCONTINUED | OUTPATIENT
Start: 2017-09-15 | End: 2017-09-16

## 2017-09-15 RX ORDER — HEPARIN SODIUM 5000 [USP'U]/ML
14 INJECTION INTRAVENOUS; SUBCUTANEOUS
Qty: 25000 | Refills: 0 | Status: DISCONTINUED | OUTPATIENT
Start: 2017-09-15 | End: 2017-09-15

## 2017-09-15 RX ORDER — ONDANSETRON 8 MG/1
4 TABLET, FILM COATED ORAL ONCE
Qty: 0 | Refills: 0 | Status: COMPLETED | OUTPATIENT
Start: 2017-09-15 | End: 2017-09-15

## 2017-09-15 RX ORDER — INSULIN GLARGINE 100 [IU]/ML
25 INJECTION, SOLUTION SUBCUTANEOUS AT BEDTIME
Qty: 0 | Refills: 0 | Status: DISCONTINUED | OUTPATIENT
Start: 2017-09-15 | End: 2017-09-16

## 2017-09-15 RX ORDER — AMLODIPINE BESYLATE 2.5 MG/1
10 TABLET ORAL ONCE
Qty: 0 | Refills: 0 | Status: COMPLETED | OUTPATIENT
Start: 2017-09-15 | End: 2017-09-15

## 2017-09-15 RX ADMIN — AMLODIPINE BESYLATE 10 MILLIGRAM(S): 2.5 TABLET ORAL at 22:14

## 2017-09-15 RX ADMIN — AMLODIPINE BESYLATE 2.5 MILLIGRAM(S): 2.5 TABLET ORAL at 05:33

## 2017-09-15 RX ADMIN — Medication 50 MILLIGRAM(S): at 23:08

## 2017-09-15 RX ADMIN — Medication 10 UNIT(S): at 17:37

## 2017-09-15 RX ADMIN — ERYTHROPOIETIN 10000 UNIT(S): 10000 INJECTION, SOLUTION INTRAVENOUS; SUBCUTANEOUS at 19:12

## 2017-09-15 RX ADMIN — Medication 50 MILLIGRAM(S): at 00:24

## 2017-09-15 RX ADMIN — INSULIN GLARGINE 25 UNIT(S): 100 INJECTION, SOLUTION SUBCUTANEOUS at 22:14

## 2017-09-15 RX ADMIN — Medication 10 UNIT(S): at 15:01

## 2017-09-15 RX ADMIN — SODIUM CHLORIDE 3 MILLILITER(S): 9 INJECTION INTRAMUSCULAR; INTRAVENOUS; SUBCUTANEOUS at 22:14

## 2017-09-15 RX ADMIN — PANTOPRAZOLE SODIUM 40 MILLIGRAM(S): 20 TABLET, DELAYED RELEASE ORAL at 07:29

## 2017-09-15 RX ADMIN — ONDANSETRON 4 MILLIGRAM(S): 8 TABLET, FILM COATED ORAL at 06:26

## 2017-09-15 RX ADMIN — SODIUM CHLORIDE 3 MILLILITER(S): 9 INJECTION INTRAMUSCULAR; INTRAVENOUS; SUBCUTANEOUS at 15:01

## 2017-09-15 RX ADMIN — SIMVASTATIN 20 MILLIGRAM(S): 20 TABLET, FILM COATED ORAL at 22:14

## 2017-09-15 RX ADMIN — Medication 50 MILLIGRAM(S): at 17:35

## 2017-09-15 RX ADMIN — IMIPENEM AND CILASTATIN 100 MILLIGRAM(S): 250; 250 INJECTION, POWDER, FOR SOLUTION INTRAVENOUS at 05:38

## 2017-09-15 RX ADMIN — CEFTAROLINE FOSAMIL 50 MILLIGRAM(S): 600 POWDER, FOR SOLUTION INTRAVENOUS at 15:00

## 2017-09-15 RX ADMIN — Medication 81 MILLIGRAM(S): at 09:40

## 2017-09-15 RX ADMIN — VALSARTAN 160 MILLIGRAM(S): 80 TABLET ORAL at 05:34

## 2017-09-15 RX ADMIN — Medication 4: at 17:32

## 2017-09-15 RX ADMIN — Medication 4: at 12:12

## 2017-09-15 RX ADMIN — SODIUM CHLORIDE 3 MILLILITER(S): 9 INJECTION INTRAMUSCULAR; INTRAVENOUS; SUBCUTANEOUS at 05:27

## 2017-09-15 RX ADMIN — GABAPENTIN 600 MILLIGRAM(S): 400 CAPSULE ORAL at 14:59

## 2017-09-15 RX ADMIN — Medication 50 MILLIGRAM(S): at 07:30

## 2017-09-15 NOTE — PROGRESS NOTE ADULT - ASSESSMENT
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 Left foot chronic OM, s/p 1.5 yr treatment, currently wound healing well with no signs of infection, not likely to be the cause of bacteremia.    -Recommend continuing IV ABX as per ID recs  -Daily dry gauze over left foot  -Gallium scan ordered, will follow up on result

## 2017-09-15 NOTE — PROGRESS NOTE ADULT - SUBJECTIVE AND OBJECTIVE BOX
Interventional Cardiology PA Precath Note      Pt w  Pt is 47 y o f usual risk factors, ESRD on HD came initially with syncope was found in ALCIDES enlarging TV vegetation , on CT found large intraluminal thrombus with SVC extending into the right atrium, has MRSA bacteremia on contact isolation on Dapto, Imipenem and Rifampin, had low grade fever overnight blood culture neg  cleared by ID Dr Kingston now temp is 99 WBC count 16 RLL septic emboli on CXR, chronic osteo on left foot anticipating diagnostic left cardiac cath prior to excision of atrial thrombus and tricuspid vegetation. PT on ASA/Hep. Pt is precathed/consented.    OF note: had stroke 3 months ago, neurologically stable.    INR was 7, received yesterday 7 unuts FFP and one unit of blood 5 vit K given.  And INR is 1.5 today  hgb 8.0  renal is following, trop 1.1 , ECG: NSR @85 bpm with incomplete LBBB        ALL: NKDA, NKFA. Denies shellfish/Contrast dye allergy.  SocHX: Denies EtoH/TOB/IVDU  FHx:   MEDS:   sodium chloride 0.9% lock flush 3 milliLiter(s) IV Push every 8 hours  aspirin enteric coated 81 milliGRAM(s) Oral daily  metoprolol 50 milliGRAM(s) Oral every 8 hours  amLODIPine   Tablet 2.5 milliGRAM(s) Oral daily  valsartan 160 milliGRAM(s) Oral daily  simvastatin 20 milliGRAM(s) Oral at bedtime  insulin lispro Injectable (HumaLOG) 7 Unit(s) SubCutaneous three times a day before meals  insulin lispro (HumaLOG) corrective regimen sliding scale   SubCutaneous Before meals and at bedtime  dextrose 5%. 1000 milliLiter(s) IV Continuous <Continuous>  dextrose Gel 1 Dose(s) Oral once PRN  dextrose 50% Injectable 12.5 Gram(s) IV Push once  dextrose 50% Injectable 25 Gram(s) IV Push once  dextrose 50% Injectable 25 Gram(s) IV Push once  glucagon  Injectable 1 milliGRAM(s) IntraMuscular once PRN  polyethylene glycol 3350 17 Gram(s) Oral daily  pantoprazole    Tablet 40 milliGRAM(s) Oral before breakfast  gabapentin 600 milliGRAM(s) Oral daily  rifampin 150 milliGRAM(s) Oral daily  acetaminophen    Suspension. 650 milliGRAM(s) Oral every 6 hours PRN  heparin  Infusion 1000 Unit(s)/Hr IV Continuous <Continuous>  insulin glargine Injectable (LANTUS) 15 Unit(s) SubCutaneous at bedtime  epoetin fransisca Injectable 84121 Unit(s) IV Push once    T(C): 37.3 (09-15-17 @ 05:00), Max: 38.1 (09-15-17 @ 00:45)  HR: 95 (09-15-17 @ 05:00) (90 - 103)  BP: 169/84 (09-15-17 @ 05:00) (127/65 - 175/78)  RR: 18 (09-15-17 @ 05:00) (16 - 20)  SpO2: 94% (09-15-17 @ 05:00) (93% - 99%)  Wt(kg): --  HEENT: NCAT, EOMI, PERRLA  NECK: No JVD, No carotid bruits B/L, +2 Carotid pulses B/L  PULM:  CTA B/L No W/R/R  CARD: RRR, +S1, +S2, No M/R/G  ABD: ND, +BS, NT, no masses    RECTAL: Guaiac negative/positive   NEURO: A & O x 3, right sided weakness   Extremities:  +linear superficial fissure over scar extending from medial malleolus to mid dorsum of the foot. No surrounding erythema/edema/warmth, no drainage, no tracking, no fluctuance.   Foot warm 1+DP/PT pulses. Motors/sensory at baseline with mild decrease in ROM in the ankle (chronically). Calf soft, nontender.     PULSES:	B	    R	      FEM         	                                            DP                          PT  Right		   1+             +1                                 No     Bruit	+1                      +1  Left		    +1              +1                               No     Bruit            +1                     +1                                8.0    16.3  )-----------( 327      ( 15 Sep 2017 05:46 )             25.9     09-15    129<L>  |  91<L>  |  16  ----------------------------<  227<H>  3.9   |  26  |  3.08<H>    Ca    8.3<L>      15 Sep 2017 05:46  Phos  5.1     09-14  Mg     1.9     09-15    TPro  7.4  /  Alb  1.6<L>  /  TBili  0.3  /  DBili  x   /  AST  7<L>  /  ALT  <5<L>  /  AlkPhos  236<H>  09-14    CARDIAC MARKERS ( 14 Sep 2017 14:33 )  x     / 0.12 ng/mL / x     / x     / x      CARDIAC MARKERS ( 14 Sep 2017 05:01 )  x     / 0.15 ng/mL / 13 U/L / x     / <1.0 ng/mL  CARDIAC MARKERS ( 13 Sep 2017 22:51 )  x     / 0.14 ng/mL / 15 U/L / x     / <1.0 ng/mL          EKG:					  ASA _III_				Mallampati class: _III________	              A/P:          Risks & benefits of procedure and sedation and risks and benefits for the alternative therapy have been explained to the patient in layman’s terms including but not limited to: allergic reaction, bleeding, infection, arrhythmia, respiratory compromise, renal and vascular compromise, limb damage, MI, CVA, emergent CABG/Vascular Surgery and death. Informed consent obtained and in chart. Interventional Cardiology PA Precath Note    47 y o f usual risk factors, ESRD on HD came initially with syncope was found in ALCIDES enlarging TV vegetation , on CT found large intraluminal thrombus with SVC extending into the right atrium, has MRSA bacteremia on contact isolation on Dapto, Imipenem and Rifampin, had low grade fever overnight blood culture neg  cleared by ID Dr Kingston now temp is 99 WBC count 16 RLL septic emboli on CXR, chronic osteo on left foot anticipating diagnostic left cardiac cath prior to excision of atrial thrombus and tricuspid vegetation. PT on ASA/Hep. Pt is precathed/consented.    OF note: had stroke 3 months ago, neurologically stable.    INR was 7, received yesterday 7 unuts FFP and one unit of blood 5 vit K given.  And INR is 1.5 today  hgb 8.0  renal is following, trop 1.1 , ECG: NSR @85 bpm with incomplete LBBB        ALL: NKDA, NKFA. Denies shellfish/Contrast dye allergy.  SocHX: Denies EtoH/TOB/IVDU  FHx:   MEDS:   sodium chloride 0.9% lock flush 3 milliLiter(s) IV Push every 8 hours  aspirin enteric coated 81 milliGRAM(s) Oral daily  metoprolol 50 milliGRAM(s) Oral every 8 hours  amLODIPine   Tablet 2.5 milliGRAM(s) Oral daily  valsartan 160 milliGRAM(s) Oral daily  simvastatin 20 milliGRAM(s) Oral at bedtime  insulin lispro Injectable (HumaLOG) 7 Unit(s) SubCutaneous three times a day before meals  insulin lispro (HumaLOG) corrective regimen sliding scale   SubCutaneous Before meals and at bedtime  dextrose 5%. 1000 milliLiter(s) IV Continuous <Continuous>  dextrose Gel 1 Dose(s) Oral once PRN  dextrose 50% Injectable 12.5 Gram(s) IV Push once  dextrose 50% Injectable 25 Gram(s) IV Push once  dextrose 50% Injectable 25 Gram(s) IV Push once  glucagon  Injectable 1 milliGRAM(s) IntraMuscular once PRN  polyethylene glycol 3350 17 Gram(s) Oral daily  pantoprazole    Tablet 40 milliGRAM(s) Oral before breakfast  gabapentin 600 milliGRAM(s) Oral daily  rifampin 150 milliGRAM(s) Oral daily  acetaminophen    Suspension. 650 milliGRAM(s) Oral every 6 hours PRN  heparin  Infusion 1000 Unit(s)/Hr IV Continuous <Continuous>  insulin glargine Injectable (LANTUS) 15 Unit(s) SubCutaneous at bedtime  epoetin fransisca Injectable 75196 Unit(s) IV Push once    T(C): 37.3 (09-15-17 @ 05:00), Max: 38.1 (09-15-17 @ 00:45)  HR: 95 (09-15-17 @ 05:00) (90 - 103)  BP: 169/84 (09-15-17 @ 05:00) (127/65 - 175/78)  RR: 18 (09-15-17 @ 05:00) (16 - 20)  SpO2: 94% (09-15-17 @ 05:00) (93% - 99%)  Wt(kg): --  HEENT: NCAT, EOMI, PERRLA  NECK: No JVD, No carotid bruits B/L, +2 Carotid pulses B/L  PULM:  CTA B/L No W/R/R  CARD: RRR, +S1, +S2, No M/R/G  ABD: ND, +BS, NT, no masses    RECTAL: Guaiac negative/positive   NEURO: A & O x 3, right sided weakness   Extremities:  +linear superficial fissure over scar extending from medial malleolus to mid dorsum of the foot. No surrounding erythema/edema/warmth, no drainage, no tracking, no fluctuance.   Foot warm 1+DP/PT pulses. Motors/sensory at baseline with mild decrease in ROM in the ankle (chronically). Calf soft, nontender.     PULSES:	B	    R	      FEM         	                                            DP                          PT  Right		   1+             +1                                 No     Bruit	+1                      +1  Left		    +1              +1                               No     Bruit            +1                     +1                                8.0    16.3  )-----------( 327      ( 15 Sep 2017 05:46 )             25.9     09-15    129<L>  |  91<L>  |  16  ----------------------------<  227<H>  3.9   |  26  |  3.08<H>    Ca    8.3<L>      15 Sep 2017 05:46  Phos  5.1     09-14  Mg     1.9     09-15    TPro  7.4  /  Alb  1.6<L>  /  TBili  0.3  /  DBili  x   /  AST  7<L>  /  ALT  <5<L>  /  AlkPhos  236<H>  09-14    CARDIAC MARKERS ( 14 Sep 2017 14:33 )  x     / 0.12 ng/mL / x     / x     / x      CARDIAC MARKERS ( 14 Sep 2017 05:01 )  x     / 0.15 ng/mL / 13 U/L / x     / <1.0 ng/mL  CARDIAC MARKERS ( 13 Sep 2017 22:51 )  x     / 0.14 ng/mL / 15 U/L / x     / <1.0 ng/mL          EKG:					  ASA _III_				Mallampati class: _III________	              A/P:          Risks & benefits of procedure and sedation and risks and benefits for the alternative therapy have been explained to the patient in layman’s terms including but not limited to: allergic reaction, bleeding, infection, arrhythmia, respiratory compromise, renal and vascular compromise, limb damage, MI, CVA, emergent CABG/Vascular Surgery and death. Informed consent obtained and in chart.

## 2017-09-15 NOTE — PROGRESS NOTE ADULT - SUBJECTIVE AND OBJECTIVE BOX
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 (13 Sep 2017 22:57)    Patient seen and examined at bedside. Patient has not been eating today due to dialysis and Gallium scan.       Pt reports the following symptoms:    CONSTITUTIONAL:  Negative fever or chills, feels well, good appetite  EYES:  Negative  blurry vision or double vision  CARDIOVASCULAR:  Negative for chest pain or palpitations  RESPIRATORY:  Negative for cough, wheezing, or SOB   GASTROINTESTINAL:  Negative for nausea, vomiting, diarrhea, constipation, or abdominal pain  GENITOURINARY:  Negative frequency, urgency or dysuria  NEUROLOGIC:  No headache, confusion, dizziness, lightheadedness    MEDICATIONS  (STANDING):  sodium chloride 0.9% lock flush 3 milliLiter(s) IV Push every 8 hours  aspirin enteric coated 81 milliGRAM(s) Oral daily  metoprolol 50 milliGRAM(s) Oral every 8 hours  amLODIPine   Tablet 2.5 milliGRAM(s) Oral daily  valsartan 160 milliGRAM(s) Oral daily  simvastatin 20 milliGRAM(s) Oral at bedtime  insulin lispro Injectable (HumaLOG) 7 Unit(s) SubCutaneous three times a day before meals  insulin lispro (HumaLOG) corrective regimen sliding scale   SubCutaneous Before meals and at bedtime  dextrose 5%. 1000 milliLiter(s) (50 mL/Hr) IV Continuous <Continuous>  dextrose 50% Injectable 12.5 Gram(s) IV Push once  dextrose 50% Injectable 25 Gram(s) IV Push once  dextrose 50% Injectable 25 Gram(s) IV Push once  polyethylene glycol 3350 17 Gram(s) Oral daily  pantoprazole    Tablet 40 milliGRAM(s) Oral before breakfast  gabapentin 600 milliGRAM(s) Oral daily  insulin glargine Injectable (LANTUS) 15 Unit(s) SubCutaneous at bedtime  epoetin fransisca Injectable 30531 Unit(s) IV Push once  rifampin 600 milliGRAM(s) Oral daily  heparin  Infusion 14 Unit(s)/Hr (0.14 mL/Hr) IV Continuous <Continuous>  Ceftaroline Fosamil IVPB 200 milliGRAM(s) IV Intermittent every 12 hours    MEDICATIONS  (PRN):  dextrose Gel 1 Dose(s) Oral once PRN Blood Glucose LESS THAN 70 milliGRAM(s)/deciliter  glucagon  Injectable 1 milliGRAM(s) IntraMuscular once PRN Glucose LESS THAN 70 milligrams/deciliter  acetaminophen    Suspension. 650 milliGRAM(s) Oral every 6 hours PRN Mild Pain (1 - 3)      PHYSICAL EXAM  Vital Signs Last 24 Hrs  T(C): 37 (15 Sep 2017 09:53), Max: 38.1 (15 Sep 2017 00:45)  T(F): 98.6 (15 Sep 2017 09:53), Max: 100.5 (15 Sep 2017 00:45)  HR: 92 (15 Sep 2017 09:00) (92 - 103)  BP: 165/79 (15 Sep 2017 09:00) (149/64 - 175/78)  BP(mean): 106 (15 Sep 2017 09:00) (93 - 122)  RR: 16 (15 Sep 2017 09:00) (16 - 20)  SpO2: 92% (15 Sep 2017 09:00) (92% - 99%)    Constitutional: wn/wd in NAD.   HEENT: NCAT, MMM, OP clear, EOMI, , no proptosis or lid retraction  Respiratory: poor BS at lower bases  Cardiovascular: regular rhythm, normal S1 and S2, no audible murmurs, no peripheral edema  GI: soft, NT/ND, no masses/HSM appreciated.  Skin: no visible rashes/lesions  Psychiatric: AAO x 3, normal affect/mood.  Ext: radial pulses intact, DP pulses intact, extremities warm, no cyanosis, clubbing or edema, L foot with old scar and poor healing    LABS:                        8.0    16.3  )-----------( 327      ( 15 Sep 2017 05:46 )             25.9     09-15    129<L>  |  91<L>  |  16  ----------------------------<  227<H>  3.9   |  26  |  3.08<H>    Ca    8.3<L>      15 Sep 2017 05:46  Phos  5.1     09-14  Mg     1.9     09-15    TPro  7.4  /  Alb  1.6<L>  /  TBili  0.3  /  DBili  x   /  AST  7<L>  /  ALT  <5<L>  /  AlkPhos  236<H>  09-14    PT/INR - ( 15 Sep 2017 05:46 )   PT: 17.7 sec;   INR: 1.58          PTT - ( 15 Sep 2017 05:46 )  PTT:35.6 sec    Thyroid Stimulating Hormone, Serum: 2.447 uIU/mL (09-13 @ 22:51)  Thyroid Stimulating Hormone, Serum: 1.251 uIU/mL (08-24 @ 01:12)      HbA1C: 8.2 % (09-13 @ 22:51)  8.2 % (08-24 @ 01:11)    CAPILLARY BLOOD GLUCOSE  211 (15 Sep 2017 11:56)  216 (15 Sep 2017 05:00)  283 (14 Sep 2017 21:01)  144 (14 Sep 2017 17:09)    A/P:47y Female with DM2 admitted with persistent MRSA bacteremia with 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    1.  DM 2-uncontrolled, complicated- Patient has a very poor diabetic history and has multiple diabetic complications. Her A1C is likely higher given that patient is on procrit and has high cell turnover. Please inc to lantus 20 units at night.  Please inc lispro to 10 units before each meal.  Please continue lispro moderate dose sliding scale four times daily with meals and at bedtime    Pt's fingerstick glucose goal is 120-180     Will continue to monitor     Pt can follow up at discharge with [facility] by calling  to make an appointment.   Will discuss case with Dr. Ross  and update primary team INTERVAL HPI/OVERNIGHT EVENTS:    Patient is a 47y old  Female who presents with a chief complaint of TV IE / MRSA Bacteremia. (13 Sep 2017 22:57)    Patient seen and examined at bedside. Patient has not been eating today due to dialysis and Gallium scan.       Pt reports the following symptoms:    CONSTITUTIONAL:  Negative fever or chills, feels well, good appetite  EYES:  Negative  blurry vision or double vision  CARDIOVASCULAR:  Negative for chest pain or palpitations  RESPIRATORY:  Negative for cough, wheezing, or SOB   GASTROINTESTINAL:  Negative for nausea, vomiting, diarrhea, constipation, or abdominal pain  GENITOURINARY:  Negative frequency, urgency or dysuria  NEUROLOGIC:  No headache, confusion, dizziness, lightheadedness    MEDICATIONS  (STANDING):  sodium chloride 0.9% lock flush 3 milliLiter(s) IV Push every 8 hours  aspirin enteric coated 81 milliGRAM(s) Oral daily  metoprolol 50 milliGRAM(s) Oral every 8 hours  amLODIPine   Tablet 2.5 milliGRAM(s) Oral daily  valsartan 160 milliGRAM(s) Oral daily  simvastatin 20 milliGRAM(s) Oral at bedtime  insulin lispro Injectable (HumaLOG) 7 Unit(s) SubCutaneous three times a day before meals  insulin lispro (HumaLOG) corrective regimen sliding scale   SubCutaneous Before meals and at bedtime  dextrose 5%. 1000 milliLiter(s) (50 mL/Hr) IV Continuous <Continuous>  dextrose 50% Injectable 12.5 Gram(s) IV Push once  dextrose 50% Injectable 25 Gram(s) IV Push once  dextrose 50% Injectable 25 Gram(s) IV Push once  polyethylene glycol 3350 17 Gram(s) Oral daily  pantoprazole    Tablet 40 milliGRAM(s) Oral before breakfast  gabapentin 600 milliGRAM(s) Oral daily  insulin glargine Injectable (LANTUS) 15 Unit(s) SubCutaneous at bedtime  epoetin fransisca Injectable 22435 Unit(s) IV Push once  rifampin 600 milliGRAM(s) Oral daily  heparin  Infusion 14 Unit(s)/Hr (0.14 mL/Hr) IV Continuous <Continuous>  Ceftaroline Fosamil IVPB 200 milliGRAM(s) IV Intermittent every 12 hours    MEDICATIONS  (PRN):  dextrose Gel 1 Dose(s) Oral once PRN Blood Glucose LESS THAN 70 milliGRAM(s)/deciliter  glucagon  Injectable 1 milliGRAM(s) IntraMuscular once PRN Glucose LESS THAN 70 milligrams/deciliter  acetaminophen    Suspension. 650 milliGRAM(s) Oral every 6 hours PRN Mild Pain (1 - 3)      PHYSICAL EXAM  Vital Signs Last 24 Hrs  T(C): 37 (15 Sep 2017 09:53), Max: 38.1 (15 Sep 2017 00:45)  T(F): 98.6 (15 Sep 2017 09:53), Max: 100.5 (15 Sep 2017 00:45)  HR: 92 (15 Sep 2017 09:00) (92 - 103)  BP: 165/79 (15 Sep 2017 09:00) (149/64 - 175/78)  BP(mean): 106 (15 Sep 2017 09:00) (93 - 122)  RR: 16 (15 Sep 2017 09:00) (16 - 20)  SpO2: 92% (15 Sep 2017 09:00) (92% - 99%)    Constitutional: wn/wd in NAD.   HEENT: NCAT, MMM, OP clear, EOMI, , no proptosis or lid retraction  Respiratory: poor BS at lower bases  Cardiovascular: regular rhythm, normal S1 and S2, no audible murmurs, no peripheral edema  GI: soft, NT/ND, no masses/HSM appreciated.  Skin: no visible rashes/lesions  Psychiatric: AAO x 3, normal affect/mood.  Ext: radial pulses intact, DP pulses intact, extremities warm, no cyanosis, clubbing or edema, L foot with old scar and poor healing    LABS:                        8.0    16.3  )-----------( 327      ( 15 Sep 2017 05:46 )             25.9     09-15    129<L>  |  91<L>  |  16  ----------------------------<  227<H>  3.9   |  26  |  3.08<H>    Ca    8.3<L>      15 Sep 2017 05:46  Phos  5.1     09-14  Mg     1.9     09-15    TPro  7.4  /  Alb  1.6<L>  /  TBili  0.3  /  DBili  x   /  AST  7<L>  /  ALT  <5<L>  /  AlkPhos  236<H>  09-14    PT/INR - ( 15 Sep 2017 05:46 )   PT: 17.7 sec;   INR: 1.58          PTT - ( 15 Sep 2017 05:46 )  PTT:35.6 sec    Thyroid Stimulating Hormone, Serum: 2.447 uIU/mL (09-13 @ 22:51)  Thyroid Stimulating Hormone, Serum: 1.251 uIU/mL (08-24 @ 01:12)      HbA1C: 8.2 % (09-13 @ 22:51)  8.2 % (08-24 @ 01:11)    CAPILLARY BLOOD GLUCOSE  211 (15 Sep 2017 11:56)  216 (15 Sep 2017 05:00)  283 (14 Sep 2017 21:01)  144 (14 Sep 2017 17:09)    A/P:47y Female with DM2 admitted with persistent MRSA bacteremia with 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    1.  DM 2-uncontrolled, complicated- Patient has a very poor diabetic history and has multiple diabetic complications. Her A1C is likely higher given that patient is on procrit and has high cell turnover. Patient has been counseled on her poor diet choices. Her BF is going to bring her dinner and she has been taking apple juice on and off. She will likely be difficult to control while inpatient.  Please inc to lantus 25 units at night.  Please inc lispro to 10 units before each meal.  Please continue lispro moderate dose sliding scale four times daily with meals and at bedtime    Pt's fingerstick glucose goal is 120-180     Will continue to monitor     Pt can follow up at discharge with [facility] by calling  to make an appointment.   Will discuss case with Dr. Ross  and update primary team

## 2017-09-15 NOTE — PROGRESS NOTE ADULT - ASSESSMENT
47F with PMH HTN, HLD, DM2, ESRD on HD, and chronic left foot OM who was recently admitted for surgical evaluation of tricuspid valve endocarditis (attempted to treat with ABX therapy) c/b MCA infarct and septic emboli, with large IVC/ RA thrombus (on heparin gtt) who is transferred back to [facility] from [facility] on 9/13 for re-evaluation due to failed medical management. At time of transferred     Today patient received 2U FFP, prior to HD, 1 UPRBC while receiving HD.  CT chest non ordered to r/o septic emboli.  Cardiac cath planned for tomorrow with Dr Arredondo.  Vascular consulted for chronic L foot OM, recs to continue abx.  Renal consulted for ESRD and HD. Abx recs pending Dr. Kingston. 47F with PMH HTN, HLD, DM2, ESRD on HD, and chronic left foot OM who was recently admitted for surgical evaluation of tricuspid valve endocarditis (attempted to treat with ABX therapy) c/b MCA infarct and septic emboli, with large SVC/ RA thrombus (on heparin gtt) who is transferred back to [facility] from [facility] on 9/13 for re-evaluation due to failed medical management. At time of transfer, pt noted to have supra therapeutic INR, treated with FFP and now back on heparin gtt. Preop workup showing 75% m-dRCA disease. Ortho/ Vascular teams consulted for chronic osteomyelitis and possible surgical intervention. Stable on 9L.     #Neuro: h/o ?MCA CVA  - head CT on 8/24 showing MCA infarct, but repeat on 8/25 inconclusive, possibly artifact. Head CT after stroke code also not showing acute infarct. Pt has been on heparin gtt w/o issue >2 weeks. Cont frequent neuro checks     #Cards:   - TV endocarditis with septic emboli and SVC/ RA clot: Dr. Kingston following, cont rifampin, daptomycin. switched to ceftaroline for better lung coverage on 9/15. cont heparin gtt, increased for low PTT  - cont ASA, metoprolol, amlodipine (increased for HTN), statin  - CC on 9/15 showing m-dRCA 75% stenosis, on appropriate medical therapy    #Osteomyelitis  - s/p multiple surgeries in the past     #DM: A1c 8.2  - appreciate endocrine consult, cont to adjust insulin pending FBG 47F with PMH HTN, HLD, DM2, ESRD on HD, and chronic left foot OM who was recently admitted for surgical evaluation of tricuspid valve endocarditis (attempted to treat with ABX therapy) c/b MCA infarct and septic emboli, with large SVC/ RA thrombus (on heparin gtt) who is transferred back to [facility] from [facility] on 9/13 for re-evaluation due to failed medical management. At time of transfer, pt noted to have supra therapeutic INR, treated with FFP and now back on heparin gtt. Preop workup showing 75% m-dRCA disease. Ortho/ Vascular teams consulted for chronic osteomyelitis and possible surgical intervention. Stable on 9L.     #Neuro: h/o ?MCA CVA  - head CT on 8/24 showing MCA infarct, but repeat on 8/25 inconclusive, possibly artifact. Head CT after stroke code also not showing acute infarct. Pt has been on heparin gtt w/o issue >2 weeks. Cont frequent neuro checks     #Cards:   - TV endocarditis with septic emboli and SVC/ RA clot: Dr. Kingston following, cont rifampin, daptomycin. switched to ceftaroline for better lung coverage on 9/15. cont heparin gtt, increased for low PTT. blood cultures negative during this admission, last + 8/28  - cont ASA, metoprolol, amlodipine (increased for HTN), valsartan, statin  - CC on 9/15 showing m-dRCA 75% stenosis, on appropriate medical therapy    #Osteomyelitis  - s/p multiple surgeries in the past, ortho/ vascular consulted for possible surgical intervention. Gallium scan pending today    #Pulm:  - SVC/ RA clot on heparin gtt  - CT chest showing possible septic emboli, lung nodules     #DM: A1c 8.2  - appreciate endocrine consult, cont to adjust insulin pending FBG    #Renal: ESRD on HD  - has AV fistula, will ask vascular if mature to start using  - groin line in place 2/2 infected permacath    #Psych consult for depression    #dispo: OR for TVR, CABGx1 next week pending decision from ortho/ vascular team   - hold valsartan 24-48h prior to surgery   - GI PPX

## 2017-09-15 NOTE — PROGRESS NOTE ADULT - PROBLEM SELECTOR PLAN 2
hemoglobin - 8.0  - most likely due to the infection and underlying Renal failure   - we will do EPO with HD today.

## 2017-09-15 NOTE — PROGRESS NOTE ADULT - SUBJECTIVE AND OBJECTIVE BOX
Pt well known to me.  Admitted with complicated MRSA bacteremia, Right sided endocarditis.  Pt with persistent endocarditis with septic emboli to lungs and possible bone involvement.  Pt currently being evaluated for surgical intervention.  Plan is for LHC to day for pre-op optimization.  Pt with low grade temp overnight.  OK to proceed with LHC from ID perspective.

## 2017-09-15 NOTE — PROGRESS NOTE ADULT - SUBJECTIVE AND OBJECTIVE BOX
Pt had a low grade temp with Tmax 100.5 overnight, otherwise no acute events. Pt is without complaints this morning. Scheduled for diagnostic [facility] today.     rifampin 600  Ceftaroline Fosamil IVPB 200  aspirin enteric coated 81  metoprolol 50  valsartan 160  rifampin 600  Ceftaroline Fosamil IVPB 200  amLODIPine   Tablet 10  heparin  Infusion 1400      Allergies    penicillin (Unknown)  Sular (Unknown)    Intolerances        Vital Signs Last 24 Hrs  T(C): 37 (15 Sep 2017 14:50), Max: 38.1 (15 Sep 2017 00:45)  T(F): 98.6 (15 Sep 2017 14:50), Max: 100.5 (15 Sep 2017 00:45)  HR: 98 (15 Sep 2017 14:50) (92 - 103)  BP: 169/79 (15 Sep 2017 14:50) (162/74 - 175/78)  BP(mean): 115 (15 Sep 2017 14:50) (102 - 122)  RR: 14 (15 Sep 2017 14:50) (14 - 20)  SpO2: 94% (15 Sep 2017 14:50) (92% - 99%)  I&O's Summary    14 Sep 2017 07:01  -  15 Sep 2017 07:00  --------------------------------------------------------  IN: 1376 mL / OUT: 1900 mL / NET: -524 mL    15 Sep 2017 07:01  -  15 Sep 2017 16:50  --------------------------------------------------------  IN: 148 mL / OUT: 0 mL / NET: 148 mL        Physical Exam:  - NAD, alert, interactive, comfortable, non-toxic   - non-labored breathing  - Extremities: +linear superficial fissure over scar extending from medial malleolus to mid dorsum of the foot. No surrounding erythema/edema/warmth, no drainage, no tracking, no fluctuance. Foot warm 1+DP/PT pulses. Motors/sensory at baseline with mild decrease in ROM in the ankle (chronically). Calf soft, nontender.       LABS:                        8.0    16.3  )-----------( 327      ( 15 Sep 2017 05:46 )             25.9     09-15    129<L>  |  91<L>  |  16  ----------------------------<  227<H>  3.9   |  26  |  3.08<H>    Ca    8.3<L>      15 Sep 2017 05:46  Phos  5.1     09-14  Mg     1.9     09-15    TPro  7.4  /  Alb  1.6<L>  /  TBili  0.3  /  DBili  x   /  AST  7<L>  /  ALT  <5<L>  /  AlkPhos  236<H>  09-14    PT/INR - ( 15 Sep 2017 05:46 )   PT: 17.7 sec;   INR: 1.58          PTT - ( 15 Sep 2017 05:46 )  PTT:35.6 sec    Radiology and Additional Studies: Gallium scan pending

## 2017-09-15 NOTE — PROGRESS NOTE ADULT - SUBJECTIVE AND OBJECTIVE BOX
Patient discussed on morning rounds with Dr. Escamilla    Operation / Date: preop evaluation for tricuspid valve endocarditis    SUBJECTIVE ASSESSMENT:  No acute events overnight. Pt denies any SOB/ chest pain/ fever/ chills. Tmax 100.5 this AM.   kept NPO o/n for cardiac cath this AM, also for gallium scan of foot today.       Vital Signs Last 24 Hrs  T(C): 37 (15 Sep 2017 09:53), Max: 38.1 (15 Sep 2017 00:45)  T(F): 98.6 (15 Sep 2017 09:53), Max: 100.5 (15 Sep 2017 00:45)  HR: 92 (15 Sep 2017 09:00) (92 - 103)  BP: 165/79 (15 Sep 2017 09:00) (149/64 - 175/78)  BP(mean): 106 (15 Sep 2017 09:00) (93 - 122)  RR: 16 (15 Sep 2017 09:00) (16 - 20)  SpO2: 92% (15 Sep 2017 09:00) (92% - 99%)  I&O's Detail    14 Sep 2017 07:01  -  15 Sep 2017 07:00  --------------------------------------------------------  IN:    FFP (Fresh Frozen Plasma): 250 mL    heparin Infusion: 146 mL    IV PiggyBack: 350 mL    Oral Fluid: 630 mL  Total IN: 1376 mL    OUT:    Other: 1800 mL    Voided: 100 mL  Total OUT: 1900 mL    Total NET: -524 mL      15 Sep 2017 07:01  -  15 Sep 2017 12:13  --------------------------------------------------------  IN:    heparin Infusion: 12 mL  Total IN: 12 mL    OUT:  Total OUT: 0 mL    Total NET: 12 mL          CHEST TUBE:  No  EPICARDIAL WIRES: No.  TIE DOWNS: No.  COOK: No.    PHYSICAL EXAM:    General: NAD, laying flat in bed, appears chronically ill    Neurological: AAOx3, no pain    Cardiovascular: RRR    Respiratory: CTA b/l, unlabored on room air while laying flat     Gastrointestinal: soft, NTND, +BS. Obese    Extremities: WWP. L foot with healed scar, multiple incisions and chronic vasculitis noted    Vascular: +DP/PT pulses b/l     Incision Sites: no chest incisions    LABS:                        8.0    16.3  )-----------( 327      ( 15 Sep 2017 05:46 )             25.9       COUMADIN:  Yes/No. REASON: .    PT/INR - ( 15 Sep 2017 05:46 )   PT: 17.7 sec;   INR: 1.58          PTT - ( 15 Sep 2017 05:46 )  PTT:35.6 sec    09-15    129<L>  |  91<L>  |  16  ----------------------------<  227<H>  3.9   |  26  |  3.08<H>    Ca    8.3<L>      15 Sep 2017 05:46  Phos  5.1     09-14  Mg     1.9     09-15    TPro  7.4  /  Alb  1.6<L>  /  TBili  0.3  /  DBili  x   /  AST  7<L>  /  ALT  <5<L>  /  AlkPhos  236<H>  09-14          MEDICATIONS  (STANDING):  sodium chloride 0.9% lock flush 3 milliLiter(s) IV Push every 8 hours  aspirin enteric coated 81 milliGRAM(s) Oral daily  metoprolol 50 milliGRAM(s) Oral every 8 hours  amLODIPine   Tablet 2.5 milliGRAM(s) Oral daily  valsartan 160 milliGRAM(s) Oral daily  simvastatin 20 milliGRAM(s) Oral at bedtime  insulin lispro Injectable (HumaLOG) 7 Unit(s) SubCutaneous three times a day before meals  insulin lispro (HumaLOG) corrective regimen sliding scale   SubCutaneous Before meals and at bedtime  dextrose 5%. 1000 milliLiter(s) (50 mL/Hr) IV Continuous <Continuous>  dextrose 50% Injectable 12.5 Gram(s) IV Push once  dextrose 50% Injectable 25 Gram(s) IV Push once  dextrose 50% Injectable 25 Gram(s) IV Push once  polyethylene glycol 3350 17 Gram(s) Oral daily  pantoprazole    Tablet 40 milliGRAM(s) Oral before breakfast  gabapentin 600 milliGRAM(s) Oral daily  insulin glargine Injectable (LANTUS) 15 Unit(s) SubCutaneous at bedtime  epoetin fransisca Injectable 61392 Unit(s) IV Push once  rifampin 600 milliGRAM(s) Oral daily  heparin  Infusion 14 Unit(s)/Hr (0.14 mL/Hr) IV Continuous <Continuous>  Ceftaroline Fosamil IVPB 200 milliGRAM(s) IV Intermittent every 12 hours    MEDICATIONS  (PRN):  dextrose Gel 1 Dose(s) Oral once PRN Blood Glucose LESS THAN 70 milliGRAM(s)/deciliter  glucagon  Injectable 1 milliGRAM(s) IntraMuscular once PRN Glucose LESS THAN 70 milligrams/deciliter  acetaminophen    Suspension. 650 milliGRAM(s) Oral every 6 hours PRN Mild Pain (1 - 3)        RADIOLOGY & ADDITIONAL TESTS:    < from: Xray Chest 1 View AP -PORTABLE-Routine (09.15.17 @ 07:38) >    INTERPRETATION:  CLINICAL INDICATION: 47-year-old with endocarditis.      FINDINGS: Portable view of the chest is compared to 9/13/2017 and  demonstrates a right-sided central venous catheter with the tip at the   cavoatrial junction. There is top normal heart size. No interval change   in patchy alveolar consistent masslike consolidation within the right   upper lobe and right lower lobe with a discrete nodule within the right   lower lobe measuring 3.2 cm. Persistent small layering left pleural   effusion. No interval change in mild central pulmonary edema.      < from: Xray Foot AP + Lateral + Oblique, Left (09.14.17 @ 15:23) >  INTERPRETATION:  X-rays of the LEFT FOOT dated 9/14/2017 3:23 PM    Indication: End-stage renal disease. Diabetes. Chronic osteomyelitis left   foot.    Comparison studies: None.    3 views of the left foot are submitted. These views demonstrate   postsurgical changes with amputation of the proximal end of the second   metatarsal. The proximal end of the first metatarsal is also absent, most  likely secondary to surgical resection. There is extensive sclerosis in   the hindfoot and midfoot, likely secondary to the patient's known chronic   osteomyelitis and neuropathic joint. On the lateral view, marked pes   planus is evident. The talus appears to be impacted into the calcaneus.   Soft tissue swelling is evident. An ankle effusion is noted.       < from: CT Chest No Cont (09.14.17 @ 22:52) >  IMPRESSION:  1.  Several peripheral as well as bronchovascular areas of consolidation   and demonstrating air bronchograms identified bilaterally. Abbreviated   differential includes multifocal pneumonia however in view of the history   differential diagnosis must extend to include septic emboli. Atypical   pneumonic etiologies in view the bronchovascular distribution must   include organizing pneumonia. Short interval follow-up to demonstrate   resolution is recommended.  2.  Small pericardial effusion. Small bilateral pleural effusions.  3.  Moderate mediastinal lymphadenopathy measuring up to 1.4 cm.  4.  Several discrete solid nodules are identified measuring up to 8 mm.   According to the 2017 Fleischner Society guidelines, in a low-risk   individual a follow-up CT at 3 -- 6 months is recommended. Subsequently,   a CT at 18 -- 24 months can be considered.

## 2017-09-15 NOTE — PROGRESS NOTE ADULT - SUBJECTIVE AND OBJECTIVE BOX
Patient was seen and evaluated on dialysis.   Patient is tolerating the procedure well.   HR: 98 (09-15-17 @ 14:50)  BP: 169/79 (09-15-17 @ 14:50) pusle 65, /67  Continue dialysis:   Dialyzer:  180     QB: 400       QD: 500   Goal UF 2.5 over  180 minutes

## 2017-09-15 NOTE — CONSULT NOTE ADULT - SUBJECTIVE AND OBJECTIVE BOX
Orthopedic PA       HPI:  Patient is a 47 year old female with history of HTN, HLD, DM II, ESRD on HD (MWF. Last HD 09/13/2017 and scheduled for one today 09/15/2017. Receives HD via Right Femoral HD catheter placed on 09/13/2017 at [facility].), and chronic left foot OM who is being transferred to [facility] from [facility] with known TV IE (MRSA – Bacteremia.) who has failed medical MGMT with aggressive antibiotics (Last Blood Cultures < 24 Hours = MRSA. TTE / ALCIDES completed 09/12/2017 and 09/13/2017 with questionable worsening / enlarging TV vegetation.). Patient here at [facility] to be evaluated for surgical intervention. Of note patient was seen, managed, and evaluated for surgical intervention in 08/2017 with Dr. eGe. At that time the decision to pursue medical therapy was made. She is to scheduled for a cardiac catherization tomorrow to evaluated her vegetative growth at the tricuspid valve.    Orthopedically, the patient notes history of multiple surgeries (4?) to her left foot secondary to infections at [facility], the last being approximately 3 years ago.  She also being treated with IV Abx Vanco/Dapto for 1.5 years, the last treatment being 1.5 years ago.  She has weekly wound care to her left foot at [facility] by a podiatrist.  She uses a wheelchair or walker for outdoors and does not use ambulatory aids indoors.  Currently, she states her left foot "feels fine" and has no pain.    Studies (Recent.)  CXR on 08/28/2017: Possible evolving consolidation medial aspect right lung base.    PMH/PSH:  See HPI    Allergies:  Sulfur Drugs / PCN    SH:  No ETOH, Non-Smoker, No IVRDU  Lives at home. Walks with walker.     FH:  N/A    Medications:        PAST MEDICAL & SURGICAL HISTORY:      Vital Signs Last 24 Hrs  T(C): 37.3 (15 Sep 2017 05:00), Max: 38.1 (15 Sep 2017 00:45)  T(F): 99.1 (15 Sep 2017 05:00), Max: 100.5 (15 Sep 2017 00:45)  HR: 95 (15 Sep 2017 05:00) (90 - 103)  BP: 169/84 (15 Sep 2017 05:00) (127/65 - 175/78)  BP(mean): 119 (15 Sep 2017 05:00) (81 - 122)  RR: 18 (15 Sep 2017 05:00) (16 - 20)  SpO2: 94% (15 Sep 2017 05:00) (93% - 99%)    PHYSICAL EXAM:      Orthopedic:    Bialteral lower extremities  both SCD's and protective bunny boots    Left foot no obvious deformity  Pes planus  Left foot cool, no erythema  Non tender to squeeze/ palpation  callus plantar surface at metatarsal heads but skin intact  Medial foot with non draining wound but areas of scant weeping; no pus/no odor/ no erythema       LABS:                        8.0    16.3  )-----------( 327      ( 15 Sep 2017 05:46 )             25.9     09-15    129<L>  |  91<L>  |  16  ----------------------------<  227<H>  3.9   |  26  |  3.08<H>    Ca    8.3<L>      15 Sep 2017 05:46  Phos  5.1     09-14  Mg     1.9     09-15    TPro  7.4  /  Alb  1.6<L>  /  TBili  0.3  /  DBili  x   /  AST  7<L>  /  ALT  <5<L>  /  AlkPhos  236<H>  09-14    PT/INR - ( 15 Sep 2017 05:46 )   PT: 17.7 sec;   INR: 1.58          PTT - ( 15 Sep 2017 05:46 )  PTT:35.6 sec      RADIOLOGY & ADDITIONAL STUDIES    Left Foot Radiograph 09/14/2017  Severe sclerosis left mid and hind foot  Collapse of subtalar joint  Probable surgical resection of proximal 1st and 2nd metatarsal    IMPRESSION/PLAN:    Charcot left foot rule out osteomyelitis  SPECT CT Scan with Gallium when possible considering medical and cardiothoracic issues  Dr. Bee will speak with Dr Goldstein(nuclear medicine) to discuss results once available

## 2017-09-15 NOTE — PROGRESS NOTE ADULT - ATTENDING COMMENTS
Pt seen with Dr. Yoon on rounds this afternoon.  Elevated glucoses with pt NPO for much of the last 12 hours indicates need for increased Lantus.  Was previously on doses as high as 40 units but would be reluctant to go back to this dose immediately. Suspect that pre-meal Humalog will be very difficult to regulate due to pt's fairly regular intake of food brought in from outside, and her tendency to drink juice between meals.

## 2017-09-15 NOTE — PROGRESS NOTE ADULT - SUBJECTIVE AND OBJECTIVE BOX
Interventional Cardiology Radial band Removal Note    s/p Heparin 			    Pt reports some tenderness at radial site.  VSS.    Right Radial access site Radar Hemoband in place, _no____ hematoma, _no__bleed  Radial pulse: +1 (baseline); tenderness above radial access to minimal touch    Hemostasis achieved with manual release of hemoband.    __no__  Vasovagal reaction.    Meds given: none    Right Radial access site  _no____ hematoma, _no_____ bleed  Radial pulse: 1+    A/P:  s/p Dx Coronary Angiogram  -	continue to monitor  -	OOB as tolerated if ok by primary team  -          Tylenol PRN for pain  -	Post Procedure Instructions given  -          Call [phone] if any access site issues.

## 2017-09-15 NOTE — PROGRESS NOTE ADULT - SUBJECTIVE AND OBJECTIVE BOX
Objective and subjective   The patient stable overnight. This morning does not endorse any complains, no fever, no chest pain, no shortness of breath.  Yesterday was dialyzed no compliactions during HD.     Patient is a 47 year old female with history of HTN, HLD, DM II, ESRD on HD (MWF. Last HD 09/13/2017. Receives HD via Right Femoral HD catheter placed on 09/13/2017 at [facility] 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 The renal follows the case for the need of HD. Last HD done on 9/14 - 1.8 liters off       Patient seen and examined at bedside.     sodium chloride 0.9% lock flush 3 milliLiter(s) every 8 hours  aspirin enteric coated 81 milliGRAM(s) daily  metoprolol 50 milliGRAM(s) every 8 hours  amLODIPine   Tablet 2.5 milliGRAM(s) daily  valsartan 160 milliGRAM(s) daily  simvastatin 20 milliGRAM(s) at bedtime  insulin lispro Injectable (HumaLOG) 7 Unit(s) three times a day before meals  insulin lispro (HumaLOG) corrective regimen sliding scale   Before meals and at bedtime  dextrose 5%. 1000 milliLiter(s) <Continuous>  dextrose Gel 1 Dose(s) once PRN  dextrose 50% Injectable 12.5 Gram(s) once  dextrose 50% Injectable 25 Gram(s) once  dextrose 50% Injectable 25 Gram(s) once  glucagon  Injectable 1 milliGRAM(s) once PRN  polyethylene glycol 3350 17 Gram(s) daily  pantoprazole    Tablet 40 milliGRAM(s) before breakfast  gabapentin 600 milliGRAM(s) daily  rifampin 150 milliGRAM(s) daily  acetaminophen    Suspension. 650 milliGRAM(s) every 6 hours PRN  insulin glargine Injectable (LANTUS) 15 Unit(s) at bedtime  epoetin fransisca Injectable 73587 Unit(s) once      Allergies    penicillin (Unknown)  Sular (Unknown)    Intolerances        T(C): , Max: 38.1 (09-15-17 @ 00:45)  T(F): , Max: 100.5 (09-15-17 @ 00:45)  HR: 95 (09-15-17 @ 05:00)  BP: 169/84 (09-15-17 @ 05:00)  BP(mean): 119 (09-15-17 @ 05:00)  RR: 18 (09-15-17 @ 05:00)  SpO2: 94% (09-15-17 @ 05:00)  Wt(kg): --    09-14 @ 07:01  -  09-15 @ 07:00  --------------------------------------------------------  IN:    FFP (Fresh Frozen Plasma): 250 mL    heparin Infusion: 146 mL    IV PiggyBack: 350 mL    Oral Fluid: 630 mL  Total IN: 1376 mL    OUT:    Other: 1800 mL    Voided: 100 mL  Total OUT: 1900 mL    Total NET: -524 mL      09-15 @ 07:01  -  09-15 @ 09:22  --------------------------------------------------------  IN:    heparin Infusion: 12 mL  Total IN: 12 mL    OUT:  Total OUT: 0 mL    Total NET: 12 mL      Physical exam:   Alert and oriented  No JVD  No respiratory distress   Normal air entry in the lungs, no wheezing, no crackles, no rales   RRR, normal s1/s2, no mumurs, rubs or gallops  Abdomen - soft, non-tender, non-distended, normal bowel sounds   Extremities - mild peripheral edema   HAs a line in the right groin   Has a central line in the left IJ       LABS:                        8.0    16.3  )-----------( 327      ( 15 Sep 2017 05:46 )             25.9     09-15    129<L>  |  91<L>  |  16  ----------------------------<  227<H>  3.9   |  26  |  3.08<H>    Ca    8.3<L>      15 Sep 2017 05:46  Phos  5.1     09-14  Mg     1.9     09-15    TPro  7.4  /  Alb  1.6<L>  /  TBili  0.3  /  DBili  x   /  AST  7<L>  /  ALT  <5<L>  /  AlkPhos  236<H>  09-14      PT/INR - ( 15 Sep 2017 05:46 )   PT: 17.7 sec;   INR: 1.58          PTT - ( 15 Sep 2017 05:46 )  PTT:35.6 sec          RADIOLOGY & ADDITIONAL STUDIES:    < from: Xray Chest 1 View AP -PORTABLE-Routine (09.15.17 @ 07:38) >  INTERPRETATION:  CLINICAL INDICATION: 47-year-old with endocarditis.      FINDINGS: Portable view of the chest is compared to 9/13/2017 and  demonstrates a right-sided central venous catheter with the tip at the   cavoatrial junction. There is top normal heart size. No interval change   in patchy alveolar consistent masslike consolidation within the right   upper lobe and right lower lobe with a discrete nodule within the right   lower lobe measuring 3.2 cm. Persistent small layering left pleural   effusion. No interval change in mild central pulmonary edema.            "Thank you for the opportunity to participate in the care of this   patient."    < end of copied text >

## 2017-09-15 NOTE — PROGRESS NOTE ADULT - ASSESSMENT
This is 47 year old female with PMH stated above. The renal service is activated for the need of HD. She was dialyzed yesterday with 1.8 liters off

## 2017-09-15 NOTE — PROGRESS NOTE ADULT - SUBJECTIVE AND OBJECTIVE BOX
Reason for consult : right sided endocarditis     46yo with h/o ESRD on HD via right subclavian HD cath, admitted last month (8/20/2017) with AMS, fevers diagnosed with MRSA bacteremia.  Echo for endocarditis workup showed SVC/RV mobile thrombus.  She was transferred to [facility] on 8/24 for surgical evaluation.  At that time workup was consistent with catheter related infected thrombus plan was for medial management with anticoagulation and antibiotics.      Pt was treated with  linezolid initially which was transitioned to Daptomycin for vanc intermediate MRSA(VISA) at [place].  She has been in [facility] since discharge from here 8/28 for management of infection while awaiting cultures to clear.  From notes she has been treated with high dose daptomycin.  Despite appropriate antibiotic  pt has been having persistent fevers and leukocytosis.  Recent ECHO (9/12) showed worsening TV disease/vegetation and progression of septic emboli to lungs.  She was re-admitted to [facility] for surgical evaluation  TV endocarditis for concern of medical failure    Problems  1. MRSA bacteremia/Right sided endocarditis  2. ESRD on HD (right Fem HD [facility] on 9/13)  3. chronic osteo of left foot    Plan Reason for consult : right sided endocarditis     46yo with h/o ESRD on HD via right subclavian HD cath, admitted last month (8/20/2017) with AMS, fevers diagnosed with MRSA bacteremia.  Echo for endocarditis workup showed SVC/RV mobile thrombus.  She was transferred to [facility] on 8/24 for surgical evaluation.  At that time workup was consistent with catheter related infected thrombus plan was for medial management with anticoagulation and antibiotics.      Pt was treated with  linezolid initially which was transitioned to Daptomycin for vanc intermediate MRSA(VISA) at [facility].  She has been in [facility] since discharge from here 8/28 for management of infection while awaiting cultures to clear.  From notes she has been treated with high dose daptomycin.  Despite appropriate antibiotic  pt has been having persistent fevers and leukocytosis.  Recent ECHO (9/12) showed worsening TV disease/vegetation and progression of septic emboli to lungs.  She was re-admitted to [facility] for surgical evaluation  TV endocarditis for concern of medical failure.  Of note called [facility] microlab, pt's last blood culture was drawn on 9/8 which grew GPC in cluster today (time to positivity several days).      Problems  1. MRSA bacteremia/Right sided endocarditis  2. ESRD on HD (right Fem HD [facility] on 9/13)  3. Chronic osteo of left foot    Plan   -- patient being evaluated for surgical intervention for endocarditis.  Plan is for continued medical management.    -- continue daptomycin 8mg/kg Q48 hours and add ceftaroline for lung coverage (pt with multiple pulmonary septic emboli)  -- SVC/RA thrombus was so large it is not surprising she took so long to clear her bacteremia.    -- will follow up repeat blood cultures.  -- ideally it would be prudent to have catheter free day if lines are not needed  -- continue to follow blood cultures to document clearance    will follow along.

## 2017-09-15 NOTE — PROGRESS NOTE ADULT - ATTENDING COMMENTS
Patient examined, fellow's hx and PE reviewed and confirmed. I find stable on dialysis. Afebrile. A/P reviewed and confirmed. Continue dialysis, antibiotics. See full note

## 2017-09-16 DIAGNOSIS — E87.1 HYPO-OSMOLALITY AND HYPONATREMIA: ICD-10-CM

## 2017-09-16 LAB
ANION GAP SERPL CALC-SCNC: 14 MMOL/L — SIGNIFICANT CHANGE UP (ref 5–17)
APTT BLD: 39.9 SEC — HIGH (ref 27.5–37.4)
BLD GP AB SCN SERPL QL: NEGATIVE — SIGNIFICANT CHANGE UP
BUN SERPL-MCNC: 17 MG/DL — SIGNIFICANT CHANGE UP (ref 7–23)
CALCIUM SERPL-MCNC: 8.4 MG/DL — SIGNIFICANT CHANGE UP (ref 8.4–10.5)
CHLORIDE SERPL-SCNC: 90 MMOL/L — LOW (ref 96–108)
CK SERPL-CCNC: 15 U/L — LOW (ref 25–170)
CO2 SERPL-SCNC: 26 MMOL/L — SIGNIFICANT CHANGE UP (ref 22–31)
CREAT SERPL-MCNC: 2.73 MG/DL — HIGH (ref 0.5–1.3)
GLUCOSE SERPL-MCNC: 264 MG/DL — HIGH (ref 70–99)
HCT VFR BLD CALC: 27.8 % — LOW (ref 34.5–45)
HGB BLD-MCNC: 8.3 G/DL — LOW (ref 11.5–15.5)
INR BLD: 1.61 — HIGH (ref 0.88–1.16)
MCHC RBC-ENTMCNC: 25.5 PG — LOW (ref 27–34)
MCHC RBC-ENTMCNC: 29.9 G/DL — LOW (ref 32–36)
MCV RBC AUTO: 85.5 FL — SIGNIFICANT CHANGE UP (ref 80–100)
PLATELET # BLD AUTO: 314 K/UL — SIGNIFICANT CHANGE UP (ref 150–400)
POTASSIUM SERPL-MCNC: 3.8 MMOL/L — SIGNIFICANT CHANGE UP (ref 3.5–5.3)
POTASSIUM SERPL-SCNC: 3.8 MMOL/L — SIGNIFICANT CHANGE UP (ref 3.5–5.3)
PROTHROM AB SERPL-ACNC: 18 SEC — HIGH (ref 9.8–12.7)
RBC # BLD: 3.25 M/UL — LOW (ref 3.8–5.2)
RBC # FLD: 17 % — HIGH (ref 10.3–16.9)
RH IG SCN BLD-IMP: NEGATIVE — SIGNIFICANT CHANGE UP
SODIUM SERPL-SCNC: 130 MMOL/L — LOW (ref 135–145)
WBC # BLD: 15.6 K/UL — HIGH (ref 3.8–10.5)
WBC # FLD AUTO: 15.6 K/UL — HIGH (ref 3.8–10.5)

## 2017-09-16 PROCEDURE — 99291 CRITICAL CARE FIRST HOUR: CPT | Mod: 25

## 2017-09-16 PROCEDURE — 76937 US GUIDE VASCULAR ACCESS: CPT | Mod: 26

## 2017-09-16 PROCEDURE — 99221 1ST HOSP IP/OBS SF/LOW 40: CPT

## 2017-09-16 PROCEDURE — 71010: CPT | Mod: 26

## 2017-09-16 PROCEDURE — 99292 CRITICAL CARE ADDL 30 MIN: CPT | Mod: 25

## 2017-09-16 PROCEDURE — 99232 SBSQ HOSP IP/OBS MODERATE 35: CPT | Mod: GC

## 2017-09-16 PROCEDURE — 36556 INSERT NON-TUNNEL CV CATH: CPT

## 2017-09-16 RX ORDER — GABAPENTIN 400 MG/1
100 CAPSULE ORAL
Qty: 0 | Refills: 0 | Status: DISCONTINUED | OUTPATIENT
Start: 2017-09-16 | End: 2017-10-05

## 2017-09-16 RX ORDER — MIDAZOLAM HYDROCHLORIDE 1 MG/ML
4 INJECTION, SOLUTION INTRAMUSCULAR; INTRAVENOUS ONCE
Qty: 0 | Refills: 0 | Status: DISCONTINUED | OUTPATIENT
Start: 2017-09-16 | End: 2017-09-16

## 2017-09-16 RX ORDER — INSULIN LISPRO 100/ML
13 VIAL (ML) SUBCUTANEOUS
Qty: 0 | Refills: 0 | Status: DISCONTINUED | OUTPATIENT
Start: 2017-09-16 | End: 2017-09-23

## 2017-09-16 RX ORDER — METOPROLOL TARTRATE 50 MG
50 TABLET ORAL EVERY 6 HOURS
Qty: 0 | Refills: 0 | Status: DISCONTINUED | OUTPATIENT
Start: 2017-09-16 | End: 2017-09-23

## 2017-09-16 RX ORDER — INSULIN GLARGINE 100 [IU]/ML
25 INJECTION, SOLUTION SUBCUTANEOUS AT BEDTIME
Qty: 0 | Refills: 0 | Status: DISCONTINUED | OUTPATIENT
Start: 2017-09-16 | End: 2017-09-18

## 2017-09-16 RX ORDER — LIDOCAINE HCL 20 MG/ML
50 VIAL (ML) INJECTION ONCE
Qty: 0 | Refills: 0 | Status: COMPLETED | OUTPATIENT
Start: 2017-09-16 | End: 2017-09-16

## 2017-09-16 RX ORDER — FENTANYL CITRATE 50 UG/ML
100 INJECTION INTRAVENOUS ONCE
Qty: 0 | Refills: 0 | Status: DISCONTINUED | OUTPATIENT
Start: 2017-09-16 | End: 2017-09-16

## 2017-09-16 RX ORDER — HEPARIN SODIUM 5000 [USP'U]/ML
1600 INJECTION INTRAVENOUS; SUBCUTANEOUS
Qty: 25000 | Refills: 0 | Status: DISCONTINUED | OUTPATIENT
Start: 2017-09-16 | End: 2017-09-16

## 2017-09-16 RX ORDER — GABAPENTIN 400 MG/1
300 CAPSULE ORAL AT BEDTIME
Qty: 0 | Refills: 0 | Status: DISCONTINUED | OUTPATIENT
Start: 2017-09-16 | End: 2017-10-10

## 2017-09-16 RX ORDER — INSULIN GLARGINE 100 [IU]/ML
35 INJECTION, SOLUTION SUBCUTANEOUS AT BEDTIME
Qty: 0 | Refills: 0 | Status: DISCONTINUED | OUTPATIENT
Start: 2017-09-16 | End: 2017-09-16

## 2017-09-16 RX ORDER — HEPARIN SODIUM 5000 [USP'U]/ML
INJECTION INTRAVENOUS; SUBCUTANEOUS
Qty: 25000 | Refills: 0 | Status: DISCONTINUED | OUTPATIENT
Start: 2017-09-16 | End: 2017-09-17

## 2017-09-16 RX ADMIN — GABAPENTIN 600 MILLIGRAM(S): 400 CAPSULE ORAL at 11:54

## 2017-09-16 RX ADMIN — CEFTAROLINE FOSAMIL 50 MILLIGRAM(S): 600 POWDER, FOR SOLUTION INTRAVENOUS at 05:30

## 2017-09-16 RX ADMIN — Medication 50 MILLIGRAM(S): at 18:55

## 2017-09-16 RX ADMIN — Medication 13 UNIT(S): at 18:55

## 2017-09-16 RX ADMIN — CEFTAROLINE FOSAMIL 50 MILLIGRAM(S): 600 POWDER, FOR SOLUTION INTRAVENOUS at 22:26

## 2017-09-16 RX ADMIN — Medication 10 UNIT(S): at 11:52

## 2017-09-16 RX ADMIN — SIMVASTATIN 20 MILLIGRAM(S): 20 TABLET, FILM COATED ORAL at 22:28

## 2017-09-16 RX ADMIN — Medication 650 MILLIGRAM(S): at 12:24

## 2017-09-16 RX ADMIN — FENTANYL CITRATE 100 MICROGRAM(S): 50 INJECTION INTRAVENOUS at 18:38

## 2017-09-16 RX ADMIN — AMLODIPINE BESYLATE 10 MILLIGRAM(S): 2.5 TABLET ORAL at 06:13

## 2017-09-16 RX ADMIN — Medication 4: at 06:13

## 2017-09-16 RX ADMIN — Medication 50 MILLIGRAM(S): at 23:14

## 2017-09-16 RX ADMIN — Medication 2: at 22:27

## 2017-09-16 RX ADMIN — Medication 81 MILLIGRAM(S): at 11:54

## 2017-09-16 RX ADMIN — Medication 4: at 18:59

## 2017-09-16 RX ADMIN — SODIUM CHLORIDE 3 MILLILITER(S): 9 INJECTION INTRAMUSCULAR; INTRAVENOUS; SUBCUTANEOUS at 13:16

## 2017-09-16 RX ADMIN — DAPTOMYCIN 128 MILLIGRAM(S): 500 INJECTION, POWDER, LYOPHILIZED, FOR SOLUTION INTRAVENOUS at 10:51

## 2017-09-16 RX ADMIN — Medication 8: at 11:54

## 2017-09-16 RX ADMIN — Medication 50 MILLIGRAM(S): at 11:54

## 2017-09-16 RX ADMIN — SODIUM CHLORIDE 3 MILLILITER(S): 9 INJECTION INTRAMUSCULAR; INTRAVENOUS; SUBCUTANEOUS at 07:52

## 2017-09-16 RX ADMIN — INSULIN GLARGINE 25 UNIT(S): 100 INJECTION, SOLUTION SUBCUTANEOUS at 22:28

## 2017-09-16 RX ADMIN — Medication 50 MILLILITER(S): at 18:41

## 2017-09-16 RX ADMIN — Medication 10 UNIT(S): at 06:14

## 2017-09-16 RX ADMIN — GABAPENTIN 300 MILLIGRAM(S): 400 CAPSULE ORAL at 22:28

## 2017-09-16 RX ADMIN — DAPTOMYCIN 128 MILLIGRAM(S): 500 INJECTION, POWDER, LYOPHILIZED, FOR SOLUTION INTRAVENOUS at 01:16

## 2017-09-16 RX ADMIN — SODIUM CHLORIDE 3 MILLILITER(S): 9 INJECTION INTRAMUSCULAR; INTRAVENOUS; SUBCUTANEOUS at 22:39

## 2017-09-16 RX ADMIN — HEPARIN SODIUM 1600 UNIT(S)/HR: 5000 INJECTION INTRAVENOUS; SUBCUTANEOUS at 23:54

## 2017-09-16 RX ADMIN — MIDAZOLAM HYDROCHLORIDE 4 MILLIGRAM(S): 1 INJECTION, SOLUTION INTRAMUSCULAR; INTRAVENOUS at 18:35

## 2017-09-16 RX ADMIN — VALSARTAN 160 MILLIGRAM(S): 80 TABLET ORAL at 06:13

## 2017-09-16 RX ADMIN — Medication 50 MILLIGRAM(S): at 07:52

## 2017-09-16 RX ADMIN — PANTOPRAZOLE SODIUM 40 MILLIGRAM(S): 20 TABLET, DELAYED RELEASE ORAL at 06:13

## 2017-09-16 NOTE — BEHAVIORAL HEALTH ASSESSMENT NOTE - DESCRIPTION
MRSA, stroke, hypertension, hyperlipidemia, diabetes, end-stage renal disease; endocarditis and L foot osteomyelitis

## 2017-09-16 NOTE — PROGRESS NOTE ADULT - PROBLEM SELECTOR PLAN 3
- under treatment with Daptomycin as per primary team hemoglobin - 8.3 stable at present likely due to CKD/ESRD.  - we will do EPO with HD treatment

## 2017-09-16 NOTE — PROGRESS NOTE ADULT - PROBLEM SELECTOR PLAN 2
hemoglobin - 8.3 stable at present likely due to CKD/ESRD.  - we will do EPO with HD treatment Hyponatremia with na level 130 at present.  Advised free water restriction to <1L/day.  Monitor BMP.

## 2017-09-16 NOTE — PROGRESS NOTE ADULT - SUBJECTIVE AND OBJECTIVE BOX
Patient is a 47y Female seen and evaluated at bedside. Patient feels fine at present. Denies any chest pain, shortness of breath, palpitations, dizziness at present.    sodium chloride 0.9% lock flush 3 every 8 hours  aspirin enteric coated 81 daily  valsartan 160 daily  simvastatin 20 at bedtime  insulin lispro (HumaLOG) corrective regimen sliding scale  Before meals and at bedtime  dextrose 5%. 1000 <Continuous>  dextrose Gel 1 once PRN  dextrose 50% Injectable 12.5 once  dextrose 50% Injectable 25 once  dextrose 50% Injectable 25 once  glucagon  Injectable 1 once PRN  polyethylene glycol 3350 17 daily  pantoprazole    Tablet 40 before breakfast  acetaminophen    Suspension. 650 every 6 hours PRN  DAPTOmycin IVPB 700 every 48 hours  rifampin 600 daily  Ceftaroline Fosamil IVPB 200 every 12 hours  amLODIPine   Tablet 10 daily  metoprolol 50 every 6 hours  gabapentin 100 two times a day  gabapentin 300 at bedtime  insulin lispro Injectable (HumaLOG) 13 three times a day before meals  insulin glargine Injectable (LANTUS) 25 at bedtime      Allergies    penicillin (Unknown)  Sular (Unknown)    Intolerances        T(C): , Max: 38.2 (09-15-17 @ 22:00)  T(F): , Max: 100.8 (09-15-17 @ 22:00)  HR: 96 (09-16-17 @ 11:57)  BP: 159/77 (09-16-17 @ 11:57)  BP(mean): 107 (09-16-17 @ 11:57)  RR: 19 (09-16-17 @ 11:57)  SpO2: 99% (09-16-17 @ 11:57)  Wt(kg): --    09-15 @ 07:01  -  09-16 @ 07:00  --------------------------------------------------------  IN: 532 mL / OUT: 2750 mL / NET: -2218 mL    09-16 @ 07:01  -  09-16 @ 15:31  --------------------------------------------------------  IN: 696 mL / OUT: 250 mL / NET: 446 mL          Review of Systems:    CONSTITUTIONAL: No fever or chills  RESPIRATORY: No cough, wheezing, chills or hemoptysis; No shortness of breath  CARDIOVASCULAR: No chest pain, palpitations, dizziness, or leg swelling  GASTROINTESTINAL: No abdominal or epigastric pain. No nausea, vomiting, or hematemesis; No diarrhea or constipation. No melena or hematochezia.  GENITOURINARY: No dysuria, frequency, hematuria, or incontinence  NEUROLOGICAL: No headaches, memory loss, loss of strength, numbness, or tremors  SKIN: No new rash or lesions  MUSCULOSKELETAL: No joint pain or swelling; No muscle, back, or extremity pain, no leg edema      PHYSICAL EXAM:  GENERAL: NAD, well-developed, well nourished, alert, awake, no acute distress at present  HEAD:  Atraumatic, Normocephalic,   EYES: EOMI, QUENTIN, conjunctiva and sclera clear  Oral cavity: Oral mucosa dry and pink  NECK: Neck supple, No JVD, no palpable thyromegaly  CHEST/LUNG: Clear to auscultation bilaterally; No wheeze, no rales, no crepitations  HEART: Regular rate and rhythm. ALBARO II/VI at LPSB, No gallop, no rub   ABDOMEN: Soft, Nontender, Nondistended; Bowel sounds present, no guarding, no rigidity, no rebound tenderness, No flank or CVA angle tenderness  EXTREMITIES:  No clubbing, cyanosis, or edema, no calf tenderness  Neurology: AAOx3, no focal neurological deficit. Motor 5/5 all 4 extremities. Able to comprehend and answers all questions appropriately.    SKIN: No rashes or lesions          ACCESS:     LABS:                        8.3    15.6  )-----------( 314      ( 16 Sep 2017 07:11 )             27.8     09-16    130<L>  |  90<L>  |  17  ----------------------------<  264<H>  3.8   |  26  |  2.73<H>    Ca    8.4      16 Sep 2017 07:10  Mg     1.9     09-15        PT/INR - ( 16 Sep 2017 07:11 )   PT: 18.0 sec;   INR: 1.61          PTT - ( 16 Sep 2017 07:11 )  PTT:39.9 sec          RADIOLOGY & ADDITIONAL STUDIES:    < from: Xray Chest 1 View AP -PORTABLE-Routine (09.15.17 @ 07:38) >    EXAM:  XR CHEST 1 VIEW PORT ROUTINE                          PROCEDURE DATE:  09/15/2017                     INTERPRETATION:  CLINICAL INDICATION: 47-year-old with endocarditis.      FINDINGS: Portable view of the chest is compared to 9/13/2017 and  demonstrates a right-sided central venous catheter with the tip at the   cavoatrial junction. There is top normal heart size. No interval change   in patchy alveolar consistent masslike consolidation within the right   upper lobe and right lower lobe with a discrete nodule within the right   lower lobe measuring 3.2 cm. Persistent small layering left pleural   effusion. No interval change in mild central pulmonary edema.            "Thank you for the opportunity to participate in the care of this   patient."        SAMIA GREY M.D., ATTENDING RADIOLOGIST  This document has been electronically signed. Sep 15 2017  9:10AM                  < end of copied text > Patient is a 47y Female seen and evaluated at bedside. Patient feels fine at present. Denies any chest pain, shortness of breath, palpitations, dizziness at present.    sodium chloride 0.9% lock flush 3 every 8 hours  aspirin enteric coated 81 daily  valsartan 160 daily  simvastatin 20 at bedtime  insulin lispro (HumaLOG) corrective regimen sliding scale  Before meals and at bedtime  dextrose 5%. 1000 <Continuous>  dextrose Gel 1 once PRN  dextrose 50% Injectable 12.5 once  dextrose 50% Injectable 25 once  dextrose 50% Injectable 25 once  glucagon  Injectable 1 once PRN  polyethylene glycol 3350 17 daily  pantoprazole    Tablet 40 before breakfast  acetaminophen    Suspension. 650 every 6 hours PRN  DAPTOmycin IVPB 700 every 48 hours  rifampin 600 daily  Ceftaroline Fosamil IVPB 200 every 12 hours  amLODIPine   Tablet 10 daily  metoprolol 50 every 6 hours  gabapentin 100 two times a day  gabapentin 300 at bedtime  insulin lispro Injectable (HumaLOG) 13 three times a day before meals  insulin glargine Injectable (LANTUS) 25 at bedtime      Allergies    penicillin (Unknown)  Sular (Unknown)    Intolerances        T(C): , Max: 38.2 (09-15-17 @ 22:00)  T(F): , Max: 100.8 (09-15-17 @ 22:00)  HR: 96 (09-16-17 @ 11:57)  BP: 159/77 (09-16-17 @ 11:57)  BP(mean): 107 (09-16-17 @ 11:57)  RR: 19 (09-16-17 @ 11:57)  SpO2: 99% (09-16-17 @ 11:57)  Wt(kg): --    09-15 @ 07:01  -  09-16 @ 07:00  --------------------------------------------------------  IN: 532 mL / OUT: 2750 mL / NET: -2218 mL    09-16 @ 07:01  -  09-16 @ 15:31  --------------------------------------------------------  IN: 696 mL / OUT: 250 mL / NET: 446 mL          Review of Systems:    CONSTITUTIONAL: No fever or chills  RESPIRATORY: No cough, wheezing, chills or hemoptysis; No shortness of breath  CARDIOVASCULAR: No chest pain, palpitations, dizziness, or leg swelling  GASTROINTESTINAL: No abdominal or epigastric pain. No nausea, vomiting, or hematemesis; No diarrhea or constipation. No melena or hematochezia.  GENITOURINARY: No dysuria, frequency, hematuria, or incontinence  NEUROLOGICAL: No headaches, memory loss, loss of strength, numbness, or tremors  SKIN: No new rash or lesions  MUSCULOSKELETAL: No joint pain or swelling; No muscle, back, or extremity pain, no leg edema      PHYSICAL EXAM:  GENERAL: NAD, well-developed, well nourished, alert, awake, no acute distress at present  HEAD:  Atraumatic, Normocephalic,   EYES: EOMI, QUENTIN, conjunctiva and sclera clear  Oral cavity: Oral mucosa dry and pink  NECK: Neck supple, No JVD, no palpable thyromegaly  CHEST/LUNG: Clear to auscultation bilaterally; No wheeze, no rales, no crepitations  HEART: Regular rate and rhythm. ALBARO II/VI at LPSB, No gallop, no rub   ABDOMEN: Soft, Nontender, Nondistended; Bowel sounds present, no guarding, no rigidity, no rebound tenderness, No flank or CVA angle tenderness  EXTREMITIES:  No clubbing, cyanosis, or edema, no calf tenderness-- rt groin PC  Neurology: AAOx3, no focal neurological deficit. Motor 5/5 all 4 extremities. Able to comprehend and answers all questions appropriately.    SKIN: No rashes or lesions          ACCESS:     LABS:                        8.3    15.6  )-----------( 314      ( 16 Sep 2017 07:11 )             27.8     09-16    130<L>  |  90<L>  |  17  ----------------------------<  264<H>  3.8   |  26  |  2.73<H>    Ca    8.4      16 Sep 2017 07:10  Mg     1.9     09-15        PT/INR - ( 16 Sep 2017 07:11 )   PT: 18.0 sec;   INR: 1.61          PTT - ( 16 Sep 2017 07:11 )  PTT:39.9 sec          RADIOLOGY & ADDITIONAL STUDIES:    < from: Xray Chest 1 View AP -PORTABLE-Routine (09.15.17 @ 07:38) >    EXAM:  XR CHEST 1 VIEW PORT ROUTINE                          PROCEDURE DATE:  09/15/2017                     INTERPRETATION:  CLINICAL INDICATION: 47-year-old with endocarditis.      FINDINGS: Portable view of the chest is compared to 9/13/2017 and  demonstrates a right-sided central venous catheter with the tip at the   cavoatrial junction. There is top normal heart size. No interval change   in patchy alveolar consistent masslike consolidation within the right   upper lobe and right lower lobe with a discrete nodule within the right   lower lobe measuring 3.2 cm. Persistent small layering left pleural   effusion. No interval change in mild central pulmonary edema.            "Thank you for the opportunity to participate in the care of this   patient."        SAMIA GREY M.D., ATTENDING RADIOLOGIST  This document has been electronically signed. Sep 15 2017  9:10AM                  < end of copied text >

## 2017-09-16 NOTE — BEHAVIORAL HEALTH ASSESSMENT NOTE - NSBHREFERDETAILS_PSY_A_CORE_FT
Pt asked to speak to a psychiatrist. Pt has MRSA bacteremia; pt has endocarditis and osteomyelitis of the right foot. She may be scheduled for heart surgery next week.

## 2017-09-16 NOTE — PROGRESS NOTE ADULT - ASSESSMENT
47F with PMH HTN, HLD, DM2, ESRD on HD, and chronic left foot OM who was recently admitted for surgical evaluation of tricuspid valve endocarditis (attempted to treat with ABX therapy) c/b MCA infarct and septic emboli, with large SVC/ RA thrombus (on heparin gtt) who is transferred back to [facility] from [facility] on 9/13 for re-evaluation due to failed medical management. At time of transfer, pt noted to have supra therapeutic INR, treated with FFP and now back on heparin gtt. Preop workup showing 75% m-dRCA disease. Ortho/ Vascular teams consulted for chronic osteomyelitis and possible surgical intervention. Stable on 9L.     #Neuro: h/o ?MCA CVA  - head CT on 8/24 showing MCA infarct, but repeat on 8/25 inconclusive, possibly artifact. Head CT after stroke code also not showing acute infarct. Pt has been on heparin gtt w/o issue >2 weeks. Cont frequent neuro checks     #Cards:   - TV endocarditis with septic emboli and SVC/ RA clot: Dr. Kingston following, cont rifampin, daptomycin, and ceftaroline.  -  cont heparin gtt, held in AM for line changes   - blood cultures negative during this admission, last + 8/28  - cont ASA, metoprolol, amlodipine (increased for HTN), valsartan, statin  - CC on 9/15 showing m-dRCA 75% stenosis, on appropriate medical therapy    #Osteomyelitis  - s/p multiple surgeries in the past, ortho/ vascular consulted for possible surgical intervention. Gallium scan performed yesterday, results pending    #Pulm:  - SVC/ RA clot on heparin gtt  - CT chest showing possible septic emboli, lung nodules     #DM: A1c 8.2  - appreciate endocrine consult, cont to adjust insulin pending FBG    #Renal: ESRD on HD  - has AV fistula, awaiting vascular evaluation of when it is mature to use  - groin line in place 2/2 infected permacath, will change lines today    #Psych consulted for depression    #dispo: OR for TVR, CABGx1 next week pending decision from ortho/ vascular team   - hold valsartan 24-48h prior to surgery   - GI PPX

## 2017-09-16 NOTE — PROGRESS NOTE ADULT - SUBJECTIVE AND OBJECTIVE BOX
Patient discussed on morning rounds with Dr. Boateng     Operation / Date:  pre-op for TVR    SUBJECTIVE ASSESSMENT:    Pt denies any chest pain, dyspnea, SOB, subjective fevers/chills, nausea or vomitting.  She has no other present concerns.    Vital Signs Last 24 Hrs  T(C): 37.1 (16 Sep 2017 09:30), Max: 38.2 (15 Sep 2017 22:00)  T(F): 98.7 (16 Sep 2017 09:30), Max: 100.8 (15 Sep 2017 22:00)  HR: 92 (16 Sep 2017 08:58) (92 - 113)  BP: 168/84 (16 Sep 2017 08:58) (168/84 - 181/78)  BP(mean): 132 (16 Sep 2017 08:58) (114 - 132)  RR: 20 (16 Sep 2017 08:58) (14 - 20)  SpO2: 95% (16 Sep 2017 08:58) (94% - 97%)  I&O's Detail    15 Sep 2017 07:01  -  16 Sep 2017 07:00  --------------------------------------------------------  IN:    heparin Infusion: 12 mL    heparin Infusion: 82 mL    heparin Infusion: 112 mL    heparin Infusion: 16 mL    IV PiggyBack: 150 mL    Oral Fluid: 160 mL  Total IN: 532 mL    OUT:    Other: 2600 mL    Voided: 150 mL  Total OUT: 2750 mL    Total NET: -2218 mL      16 Sep 2017 07:01  -  16 Sep 2017 11:37  --------------------------------------------------------  IN:    heparin Infusion: 16 mL    Oral Fluid: 200 mL  Total IN: 216 mL    OUT:    Voided: 250 mL  Total OUT: 250 mL    Total NET: -34 mL          CHEST TUBE:  No.  LOLLY DRAIN:  No.  EPICARDIAL WIRES: No.  TIE DOWNS: No.  COOK: No.    PHYSICAL EXAM:    General: NAD    Neurological: A&Ox3    Cardiovascular: S1S2 RRR, no appreciable murmur    Respiratory: CTA b/l no W/R/R    Gastrointestinal: soft NT/ND +BS    Extremities: warm, no edema    Vascular: R IJ TLC, Right femoral Shiley. L AVF with palpable thrill    Incision Sites: none    LABS:                        8.3    15.6  )-----------( 314      ( 16 Sep 2017 07:11 )             27.8       COUMADIN:  Yes/No. REASON: .    PT/INR - ( 16 Sep 2017 07:11 )   PT: 18.0 sec;   INR: 1.61          PTT - ( 16 Sep 2017 07:11 )  PTT:39.9 sec    09-16    130<L>  |  90<L>  |  17  ----------------------------<  264<H>  3.8   |  26  |  2.73<H>    Ca    8.4      16 Sep 2017 07:10  Mg     1.9     09-15            MEDICATIONS  (STANDING):  sodium chloride 0.9% lock flush 3 milliLiter(s) IV Push every 8 hours  aspirin enteric coated 81 milliGRAM(s) Oral daily  valsartan 160 milliGRAM(s) Oral daily  simvastatin 20 milliGRAM(s) Oral at bedtime  insulin lispro (HumaLOG) corrective regimen sliding scale   SubCutaneous Before meals and at bedtime  dextrose 5%. 1000 milliLiter(s) (50 mL/Hr) IV Continuous <Continuous>  dextrose 50% Injectable 12.5 Gram(s) IV Push once  dextrose 50% Injectable 25 Gram(s) IV Push once  dextrose 50% Injectable 25 Gram(s) IV Push once  polyethylene glycol 3350 17 Gram(s) Oral daily  pantoprazole    Tablet 40 milliGRAM(s) Oral before breakfast  gabapentin 600 milliGRAM(s) Oral daily  DAPTOmycin IVPB 700 milliGRAM(s) IV Intermittent every 48 hours  rifampin 600 milliGRAM(s) Oral daily  Ceftaroline Fosamil IVPB 200 milliGRAM(s) IV Intermittent every 12 hours  amLODIPine   Tablet 10 milliGRAM(s) Oral daily  insulin lispro Injectable (HumaLOG) 10 Unit(s) SubCutaneous three times a day before meals  metoprolol 50 milliGRAM(s) Oral every 6 hours  insulin glargine Injectable (LANTUS) 35 Unit(s) SubCutaneous at bedtime    MEDICATIONS  (PRN):  dextrose Gel 1 Dose(s) Oral once PRN Blood Glucose LESS THAN 70 milliGRAM(s)/deciliter  glucagon  Injectable 1 milliGRAM(s) IntraMuscular once PRN Glucose LESS THAN 70 milligrams/deciliter  acetaminophen    Suspension. 650 milliGRAM(s) Oral every 6 hours PRN Mild Pain (1 - 3)

## 2017-09-16 NOTE — BEHAVIORAL HEALTH ASSESSMENT NOTE - ADDITIONAL DETAILS / COMMENTS
Pt wants relief for anxiety and for her physical pain    Recommend give anti-anxiety med that is safe for her considering the end-stage renal condition:  She is already on Gabapentin 300 mg po qHS; I recommend add it Gabapentin 100 mg po BID to help relieve her anxiety, as long as this is safe for her heart and renal condtion.

## 2017-09-16 NOTE — PROGRESS NOTE ADULT - PROBLEM SELECTOR PLAN 4
Blood pressure ranging from 150 to 170's range at present.  Continue Antihypertensive medications with Valsartan, Metoprolol, Norvasc for now  Adjust dose to optimal BP control for now. - under treatment with Daptomycin as per primary team

## 2017-09-16 NOTE — PROGRESS NOTE ADULT - ATTENDING COMMENTS
pt seen with dr monahan. postprandial glucose are elevated. will increase lispro to 14 units. glucose high in am despite increase glargine to 25 units but pt had nepro at hs without insulin. pt no longer wants to have nepro. will f/u fs and adjust as needed

## 2017-09-16 NOTE — PROGRESS NOTE ADULT - SUBJECTIVE AND OBJECTIVE BOX
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 (13 Sep 2017 22:57)      Pt reports the following symptoms:    CONSTITUTIONAL:  Negative fever or chills, feels well, good appetite  EYES:  Negative  blurry vision or double vision  CARDIOVASCULAR:  Negative for chest pain or palpitations  RESPIRATORY:  Negative for cough, wheezing, or SOB   GASTROINTESTINAL:  Negative for nausea, vomiting, diarrhea, constipation, or abdominal pain  GENITOURINARY:  Negative frequency, urgency or dysuria  NEUROLOGIC:  No headache, confusion, dizziness, lightheadedness    MEDICATIONS  (STANDING):  sodium chloride 0.9% lock flush 3 milliLiter(s) IV Push every 8 hours  aspirin enteric coated 81 milliGRAM(s) Oral daily  valsartan 160 milliGRAM(s) Oral daily  simvastatin 20 milliGRAM(s) Oral at bedtime  insulin lispro (HumaLOG) corrective regimen sliding scale   SubCutaneous Before meals and at bedtime  dextrose 5%. 1000 milliLiter(s) (50 mL/Hr) IV Continuous <Continuous>  dextrose 50% Injectable 12.5 Gram(s) IV Push once  dextrose 50% Injectable 25 Gram(s) IV Push once  dextrose 50% Injectable 25 Gram(s) IV Push once  polyethylene glycol 3350 17 Gram(s) Oral daily  pantoprazole    Tablet 40 milliGRAM(s) Oral before breakfast  gabapentin 600 milliGRAM(s) Oral daily  DAPTOmycin IVPB 700 milliGRAM(s) IV Intermittent every 48 hours  rifampin 600 milliGRAM(s) Oral daily  Ceftaroline Fosamil IVPB 200 milliGRAM(s) IV Intermittent every 12 hours  amLODIPine   Tablet 10 milliGRAM(s) Oral daily  insulin lispro Injectable (HumaLOG) 10 Unit(s) SubCutaneous three times a day before meals  insulin glargine Injectable (LANTUS) 25 Unit(s) SubCutaneous at bedtime  metoprolol 50 milliGRAM(s) Oral every 6 hours    MEDICATIONS  (PRN):  dextrose Gel 1 Dose(s) Oral once PRN Blood Glucose LESS THAN 70 milliGRAM(s)/deciliter  glucagon  Injectable 1 milliGRAM(s) IntraMuscular once PRN Glucose LESS THAN 70 milligrams/deciliter  acetaminophen    Suspension. 650 milliGRAM(s) Oral every 6 hours PRN Mild Pain (1 - 3)      PHYSICAL EXAM  Vital Signs Last 24 Hrs  T(C): 37.1 (16 Sep 2017 09:30), Max: 38.2 (15 Sep 2017 22:00)  T(F): 98.7 (16 Sep 2017 09:30), Max: 100.8 (15 Sep 2017 22:00)  HR: 92 (16 Sep 2017 08:58) (92 - 113)  BP: 168/84 (16 Sep 2017 08:58) (168/84 - 181/78)  BP(mean): 132 (16 Sep 2017 08:58) (114 - 132)  RR: 20 (16 Sep 2017 08:58) (14 - 20)  SpO2: 95% (16 Sep 2017 08:58) (94% - 97%)    Constitutional: wn/wd in NAD.   HEENT: NCAT, MMM, OP clear, EOMI, no proptosis or lid retraction  Neck: no thyromegaly or palpable thyroid nodules   Respiratory: lungs CTAB.  Cardiovascular: regular rhythm, normal S1 and S2, no audible murmurs, no peripheral edema  GI: soft, NT/ND, no masses/HSM appreciated.  Neurology: no tremors, DTR 2+  Skin: no visible rashes/lesions  Psychiatric: AAO x 3, normal affect/mood.    LABS:                        8.3    15.6  )-----------( 314      ( 16 Sep 2017 07:11 )             27.8     09-16    130<L>  |  90<L>  |  17  ----------------------------<  264<H>  3.8   |  26  |  2.73<H>    Ca    8.4      16 Sep 2017 07:10  Mg     1.9     09-15      PT/INR - ( 16 Sep 2017 07:11 )   PT: 18.0 sec;   INR: 1.61          PTT - ( 16 Sep 2017 07:11 )  PTT:39.9 sec    Thyroid Stimulating Hormone, Serum: 2.447 uIU/mL (09-13 @ 22:51)  Thyroid Stimulating Hormone, Serum: 1.251 uIU/mL (08-24 @ 01:12)      HbA1C: 8.2 % (09-13 @ 22:51)  8.2 % (08-24 @ 01:11)    CAPILLARY BLOOD GLUCOSE  229 (16 Sep 2017 05:00)  116 (15 Sep 2017 22:00)  219 (15 Sep 2017 15:50)  211 (15 Sep 2017 11:56)      Insulin Sliding Scale requirements X 24 Hours:    RADIOLOGY & ADDITIONAL TESTS:    A/P: 47y Female with history of DM type II presenting for       1.  DM -     Please continue           units lantus at bedtime  / in the morning and        units lispro with meals and lispro moderate / low dose sliding scale 4 times daily with meals and at bedtime.  Please continue consistent carbohydrate diet.      Goal FSG is   Will continue to monitor   For discharge, pt can continue    Pt can follow up at discharge with [facility] by calling  to make an appointment.   Will discuss case with     and update primary team INTERVAL HPI/OVERNIGHT EVENTS:    Patient is a 47y old  Female who presents with a chief complaint of TV IE / MRSA Bacteremia now s/p cardiac cath showing CAD and   gallium scan awaiting CABG and TVR    Pt reports the following symptoms:    CONSTITUTIONAL:  Negative fever or chills, feels well, good appetite  EYES:  Negative  blurry vision or double vision  CARDIOVASCULAR:  Negative for chest pain or palpitations  RESPIRATORY:  Negative for cough, wheezing, or SOB   GASTROINTESTINAL:  Negative for nausea, vomiting, diarrhea, constipation, or abdominal pain  GENITOURINARY:  Negative frequency, urgency or dysuria  NEUROLOGIC:  No headache, confusion, dizziness, lightheadedness    MEDICATIONS  (STANDING):  sodium chloride 0.9% lock flush 3 milliLiter(s) IV Push every 8 hours  aspirin enteric coated 81 milliGRAM(s) Oral daily  valsartan 160 milliGRAM(s) Oral daily  simvastatin 20 milliGRAM(s) Oral at bedtime  insulin lispro (HumaLOG) corrective regimen sliding scale   SubCutaneous Before meals and at bedtime  dextrose 5%. 1000 milliLiter(s) (50 mL/Hr) IV Continuous <Continuous>  dextrose 50% Injectable 12.5 Gram(s) IV Push once  dextrose 50% Injectable 25 Gram(s) IV Push once  dextrose 50% Injectable 25 Gram(s) IV Push once  polyethylene glycol 3350 17 Gram(s) Oral daily  pantoprazole    Tablet 40 milliGRAM(s) Oral before breakfast  gabapentin 600 milliGRAM(s) Oral daily  DAPTOmycin IVPB 700 milliGRAM(s) IV Intermittent every 48 hours  rifampin 600 milliGRAM(s) Oral daily  Ceftaroline Fosamil IVPB 200 milliGRAM(s) IV Intermittent every 12 hours  amLODIPine   Tablet 10 milliGRAM(s) Oral daily  insulin lispro Injectable (HumaLOG) 10 Unit(s) SubCutaneous three times a day before meals  insulin glargine Injectable (LANTUS) 25 Unit(s) SubCutaneous at bedtime  metoprolol 50 milliGRAM(s) Oral every 6 hours    MEDICATIONS  (PRN):  dextrose Gel 1 Dose(s) Oral once PRN Blood Glucose LESS THAN 70 milliGRAM(s)/deciliter  glucagon  Injectable 1 milliGRAM(s) IntraMuscular once PRN Glucose LESS THAN 70 milligrams/deciliter  acetaminophen    Suspension. 650 milliGRAM(s) Oral every 6 hours PRN Mild Pain (1 - 3)      PHYSICAL EXAM  Vital Signs Last 24 Hrs  T(C): 37.1 (16 Sep 2017 09:30), Max: 38.2 (15 Sep 2017 22:00)  T(F): 98.7 (16 Sep 2017 09:30), Max: 100.8 (15 Sep 2017 22:00)  HR: 92 (16 Sep 2017 08:58) (92 - 113)  BP: 168/84 (16 Sep 2017 08:58) (168/84 - 181/78)  BP(mean): 132 (16 Sep 2017 08:58) (114 - 132)  RR: 20 (16 Sep 2017 08:58) (14 - 20)  SpO2: 95% (16 Sep 2017 08:58) (94% - 97%)    Constitutional: wn/wd in NAD.   HEENT: NCAT, MMM, OP clear, EOMI, no proptosis or lid retraction  Neck: no thyromegaly or palpable thyroid nodules   Respiratory: lungs CTAB.  Cardiovascular: regular rhythm, normal S1 and S2, no audible murmurs, no peripheral edema  GI: soft, NT/ND, no masses/HSM appreciated.  Neurology: no tremors, DTR 2+  Skin: no visible rashes/lesions  Psychiatric: AAO x 3, normal affect/mood.    LABS:                        8.3    15.6  )-----------( 314      ( 16 Sep 2017 07:11 )             27.8     09-16    130<L>  |  90<L>  |  17  ----------------------------<  264<H>  3.8   |  26  |  2.73<H>    Ca    8.4      16 Sep 2017 07:10  Mg     1.9     09-15      PT/INR - ( 16 Sep 2017 07:11 )   PT: 18.0 sec;   INR: 1.61          PTT - ( 16 Sep 2017 07:11 )  PTT:39.9 sec    Thyroid Stimulating Hormone, Serum: 2.447 uIU/mL (09-13 @ 22:51)  Thyroid Stimulating Hormone, Serum: 1.251 uIU/mL (08-24 @ 01:12)      HbA1C: 8.2 % (09-13 @ 22:51)  8.2 % (08-24 @ 01:11)    CAPILLARY BLOOD GLUCOSE  229 (16 Sep 2017 05:00)  116 (15 Sep 2017 22:00)  219 (15 Sep 2017 15:50)  211 (15 Sep 2017 11:56)    A/P: 47y Female with history of DM type II presenting for management of infective endocarditis, now awaiting operative intervention with improving, but continued hyperglycemia.     1.  DM - type 2, uncontrolled but not severe, complicated, likely exacerbated by diet.   Can continue 25 units lantus at night, and increase lispro to 13 units with meals.  Please continue moderate dose sliding scale 4 times daily with meals and at bedtime.  Please continue consistent carbohydrate diet.      Goal FSG is 140-180  Will continue to monitor   For discharge, pt can continue a basal bolus insulin regimen with doses determined by clinical course.     Pt can follow up at discharge with [facility] by calling  to make an appointment.   Will discuss case with Dr. Mon  and update primary team

## 2017-09-16 NOTE — BEHAVIORAL HEALTH ASSESSMENT NOTE - HPI (INCLUDE ILLNESS QUALITY, SEVERITY, DURATION, TIMING, CONTEXT, MODIFYING FACTORS, ASSOCIATED SIGNS AND SYMPTOMS)
The pt is a 48 yo single  female with history of MRSA bacteremia now, endocarditis, osteomyelitis of the left foot, diabetes, renal failure/on dialysis.  She was told she may have heart surgery next week. The pt states she does not have any psych history except for going to speak with a psychiatrist about two years ago ,when she was told she might need to have her left foot amputated and that her left foot was "deformed". She states she went to see a psychiatrist for three sessions. No medication was prescribed. She saw a therapist "but didn't like it and did not go back."   She denies ever being on any psych meds; denies psych admissions or psych ER visits; denies any suicidal/homcidal attempts ever; "I'd never do that. It would hurt my mother. She gave me everything." Denies any use of alcohol or street drugs.  She denies feeling depressed. She states she is "very worried about the surgery, if she will survive it, how her mother will handle that if pt passes. Also very worried about the fact that for three weeks prior to admission she was unable to walk and had bad lower back pain and shoulder/neck pain." She states she had a stroke and her right hand is "still weak."  She states she used to smoke a pack a day but stopped three and a half years ago.  Pt is on SSD for her multiple medical conditions. She is single, has a boyfriend, no children.  O/MSE; Mood-anxious, worried; Affect-intense, approp. Denies SI/HI/no psychosis of any kind. Thought process-coherent, no loose associations, logical. Thought content-no delusions, hallucinations; "scared about her health and possible surgery next week."

## 2017-09-16 NOTE — PROGRESS NOTE ADULT - ASSESSMENT
This is 47 year old female with PMH stated above. The renal service is activated for the need of HD.

## 2017-09-16 NOTE — BEHAVIORAL HEALTH ASSESSMENT NOTE - AXIS III
MRSA bacteremia, endocarditis, osteomyelitis of left foot; HTN, hyperlipidemia, DM, s/p stroke that left her right hand weak; end-stage renal failure; on dialysis

## 2017-09-16 NOTE — PROGRESS NOTE ADULT - SUBJECTIVE AND OBJECTIVE BOX
CTICU  CRITICAL  CARE  attending     Hand off received 					   Pertinent clinical, laboratory, radiographic, hemodynamic, echocardiographic, respiratory data, microbiologic data and chart were reviewed and analyzed frequently throughout the course of the day and night  Patient seen and examined with CTS/ SH attending at bedside  Pt is a 47y , Female, HEALTH ISSUES - PROBLEM Dx:  Hyponatremia: Hyponatremia  Hypertension: Hypertension  Endocarditis: Endocarditis  Anemia: Anemia  End stage renal disease: End stage renal disease      , FAMILY HISTORY:  PAST MEDICAL & SURGICAL HISTORY:    Patient is a 47y old  Female who presents with a chief complaint of TV IE / MRSA Bacteremia. (13 Sep 2017 22:57)      14 system review was unremarkable  acute changes include acute respiratory failure  Vital signs, hemodynamic and respiratory parameters were reviewed from the bedside nursing flowsheet.  ICU Vital Signs Last 24 Hrs  T(C): 38.1 (16 Sep 2017 17:07), Max: 38.2 (15 Sep 2017 22:00)  T(F): 100.5 (16 Sep 2017 17:07), Max: 100.8 (15 Sep 2017 22:00)  HR: 102 (16 Sep 2017 18:00) (92 - 113)  BP: 172/83 (16 Sep 2017 18:00) (159/77 - 183/85)  BP(mean): 106 (16 Sep 2017 18:00) (106 - 132)  ABP: --  ABP(mean): --  RR: 20 (16 Sep 2017 16:36) (18 - 20)  SpO2: 93% (16 Sep 2017 18:00) (93% - 99%)    Adult Advanced Hemodynamics Last 24 Hrs  CVP(mm Hg): --  CVP(cm H2O): --  CO: --  CI: --  PA: --  PA(mean): --  PCWP: --  SVR: --  SVRI: --  PVR: --  PVRI: --,     Intake and output was reviewed and the fluid balance was calculated  Daily     Daily Weight in k.7 (16 Sep 2017 05:31)  I&O's Summary    15 Sep 2017 07:01  -  16 Sep 2017 07:00  --------------------------------------------------------  IN: 532 mL / OUT: 2750 mL / NET: -2218 mL    16 Sep 2017 07:01  -  16 Sep 2017 20:43  --------------------------------------------------------  IN: 696 mL / OUT: 250 mL / NET: 446 mL        All lines and drain sites were assessed  Glycemic trend was reviewedCAPILLARY BLOOD GLUCOSE  211 (16 Sep 2017 16:04)        No acute change in mental status  Auscultation of the chest reveals equal bs  Abdomen is soft  Extremities are warm and well perfused  Wounds appear clean and unremarkable  Antibiotics are periop    labs  CBC Full  -  ( 16 Sep 2017 07:11 )  WBC Count : 15.6 K/uL  Hemoglobin : 8.3 g/dL  Hematocrit : 27.8 %  Platelet Count - Automated : 314 K/uL  Mean Cell Volume : 85.5 fL  Mean Cell Hemoglobin : 25.5 pg  Mean Cell Hemoglobin Concentration : 29.9 g/dL  Auto Neutrophil # : x  Auto Lymphocyte # : x  Auto Monocyte # : x  Auto Eosinophil # : x  Auto Basophil # : x  Auto Neutrophil % : x  Auto Lymphocyte % : x  Auto Monocyte % : x  Auto Eosinophil % : x  Auto Basophil % : x    09-16    130<L>  |  90<L>  |  17  ----------------------------<  264<H>  3.8   |  26  |  2.73<H>    Ca    8.4      16 Sep 2017 07:10  Mg     1.9     -15      PT/INR - ( 16 Sep 2017 07:11 )   PT: 18.0 sec;   INR: 1.61          PTT - ( 16 Sep 2017 07:11 )  PTT:39.9 sec  The current medications were reviewed   MEDICATIONS  (STANDING):  sodium chloride 0.9% lock flush 3 milliLiter(s) IV Push every 8 hours  aspirin enteric coated 81 milliGRAM(s) Oral daily  valsartan 160 milliGRAM(s) Oral daily  simvastatin 20 milliGRAM(s) Oral at bedtime  insulin lispro (HumaLOG) corrective regimen sliding scale   SubCutaneous Before meals and at bedtime  dextrose 5%. 1000 milliLiter(s) (50 mL/Hr) IV Continuous <Continuous>  dextrose 50% Injectable 12.5 Gram(s) IV Push once  dextrose 50% Injectable 25 Gram(s) IV Push once  dextrose 50% Injectable 25 Gram(s) IV Push once  polyethylene glycol 3350 17 Gram(s) Oral daily  pantoprazole    Tablet 40 milliGRAM(s) Oral before breakfast  DAPTOmycin IVPB 700 milliGRAM(s) IV Intermittent every 48 hours  rifampin 600 milliGRAM(s) Oral daily  Ceftaroline Fosamil IVPB 200 milliGRAM(s) IV Intermittent every 12 hours  amLODIPine   Tablet 10 milliGRAM(s) Oral daily  metoprolol 50 milliGRAM(s) Oral every 6 hours  gabapentin 100 milliGRAM(s) Oral two times a day  gabapentin 300 milliGRAM(s) Oral at bedtime  insulin lispro Injectable (HumaLOG) 13 Unit(s) SubCutaneous three times a day before meals  insulin glargine Injectable (LANTUS) 25 Unit(s) SubCutaneous at bedtime  heparin  Infusion.  Unit(s)/Hr (16 mL/Hr) IV Continuous <Continuous>    MEDICATIONS  (PRN):  dextrose Gel 1 Dose(s) Oral once PRN Blood Glucose LESS THAN 70 milliGRAM(s)/deciliter  glucagon  Injectable 1 milliGRAM(s) IntraMuscular once PRN Glucose LESS THAN 70 milligrams/deciliter  acetaminophen    Suspension. 650 milliGRAM(s) Oral every 6 hours PRN Mild Pain (1 - 3)       PROBLEM LIST/ ASSESSMENT:  HEALTH ISSUES - PROBLEM Dx:  Hyponatremia: Hyponatremia  Hypertension: Hypertension  Endocarditis: Endocarditis  Anemia: Anemia  End stage renal disease: End stage renal disease      ,   Patient is a 47y old  Female who presents with a chief complaint of TV IE / MRSA Bacteremia. (13 Sep 2017 22:57)     for surg eval  acute changes include acute respiratory failure    My plan includes :  close hemodynamic, ventilatory and drain monitoring and management per post op routine    Monitor for arrhythmias and monitor parameters for organ perfusion  monitor neurologic status  Head of the bed should remain elevated to 45 deg .   chest PT and IS will be encouraged  monitor adequacy of oxygenation and ventilation and attempt to wean oxygen  Nutritional goals will be met using po eventually , ensure adequate caloric intake and montior the same  Stress ulcer and VTE prophylaxis will be achieved    Glycemic control is satisfactory  Electrolytes have been repleted as necessary and wound care has been carried out. Pain control has been achieved.   agressive physical therapy and early mobility and ambulation goals will be met   The family was updated about the course and plan  CRITICAL CARE TIME SPENT in evaluation and management, reassessments, review and interpretation of labs and x-rays, ventilator and hemodynamic management, formulating a plan and coordinating care: ___90____ MIN.  Time does not include procedural time.  CTICU ATTENDING     					    Farhat Aguillon MD

## 2017-09-17 LAB
APTT BLD: 46 SEC — HIGH (ref 27.5–37.4)
APTT BLD: 55.9 SEC — HIGH (ref 27.5–37.4)
HCT VFR BLD CALC: 26.2 % — LOW (ref 34.5–45)
HCT VFR BLD CALC: 28.1 % — LOW (ref 34.5–45)
HGB BLD-MCNC: 7.9 G/DL — LOW (ref 11.5–15.5)
HGB BLD-MCNC: 8.4 G/DL — LOW (ref 11.5–15.5)
MCHC RBC-ENTMCNC: 25.5 PG — LOW (ref 27–34)
MCHC RBC-ENTMCNC: 25.5 PG — LOW (ref 27–34)
MCHC RBC-ENTMCNC: 29.9 G/DL — LOW (ref 32–36)
MCHC RBC-ENTMCNC: 30.2 G/DL — LOW (ref 32–36)
MCV RBC AUTO: 84.5 FL — SIGNIFICANT CHANGE UP (ref 80–100)
MCV RBC AUTO: 85.2 FL — SIGNIFICANT CHANGE UP (ref 80–100)
PLATELET # BLD AUTO: 276 K/UL — SIGNIFICANT CHANGE UP (ref 150–400)
PLATELET # BLD AUTO: 317 K/UL — SIGNIFICANT CHANGE UP (ref 150–400)
RBC # BLD: 3.1 M/UL — LOW (ref 3.8–5.2)
RBC # BLD: 3.3 M/UL — LOW (ref 3.8–5.2)
RBC # FLD: 16.9 % — SIGNIFICANT CHANGE UP (ref 10.3–16.9)
RBC # FLD: 17 % — HIGH (ref 10.3–16.9)
WBC # BLD: 16.4 K/UL — HIGH (ref 3.8–10.5)
WBC # BLD: 17 K/UL — HIGH (ref 3.8–10.5)
WBC # FLD AUTO: 16.4 K/UL — HIGH (ref 3.8–10.5)
WBC # FLD AUTO: 17 K/UL — HIGH (ref 3.8–10.5)

## 2017-09-17 PROCEDURE — 70450 CT HEAD/BRAIN W/O DYE: CPT | Mod: 26

## 2017-09-17 PROCEDURE — 99231 SBSQ HOSP IP/OBS SF/LOW 25: CPT | Mod: GC

## 2017-09-17 PROCEDURE — 99223 1ST HOSP IP/OBS HIGH 75: CPT

## 2017-09-17 RX ORDER — HEPARIN SODIUM 5000 [USP'U]/ML
1800 INJECTION INTRAVENOUS; SUBCUTANEOUS
Qty: 25000 | Refills: 0 | Status: DISCONTINUED | OUTPATIENT
Start: 2017-09-17 | End: 2017-09-19

## 2017-09-17 RX ADMIN — GABAPENTIN 300 MILLIGRAM(S): 400 CAPSULE ORAL at 22:07

## 2017-09-17 RX ADMIN — HEPARIN SODIUM 1800 UNIT(S)/HR: 5000 INJECTION INTRAVENOUS; SUBCUTANEOUS at 11:09

## 2017-09-17 RX ADMIN — POLYETHYLENE GLYCOL 3350 17 GRAM(S): 17 POWDER, FOR SOLUTION ORAL at 11:16

## 2017-09-17 RX ADMIN — Medication 50 MILLIGRAM(S): at 18:21

## 2017-09-17 RX ADMIN — CEFTAROLINE FOSAMIL 50 MILLIGRAM(S): 600 POWDER, FOR SOLUTION INTRAVENOUS at 22:07

## 2017-09-17 RX ADMIN — AMLODIPINE BESYLATE 10 MILLIGRAM(S): 2.5 TABLET ORAL at 07:43

## 2017-09-17 RX ADMIN — SODIUM CHLORIDE 3 MILLILITER(S): 9 INJECTION INTRAMUSCULAR; INTRAVENOUS; SUBCUTANEOUS at 07:29

## 2017-09-17 RX ADMIN — Medication 2: at 22:07

## 2017-09-17 RX ADMIN — SIMVASTATIN 20 MILLIGRAM(S): 20 TABLET, FILM COATED ORAL at 22:07

## 2017-09-17 RX ADMIN — Medication 2: at 07:42

## 2017-09-17 RX ADMIN — Medication 4: at 11:24

## 2017-09-17 RX ADMIN — GABAPENTIN 100 MILLIGRAM(S): 400 CAPSULE ORAL at 07:44

## 2017-09-17 RX ADMIN — Medication 13 UNIT(S): at 11:23

## 2017-09-17 RX ADMIN — Medication 81 MILLIGRAM(S): at 11:25

## 2017-09-17 RX ADMIN — PANTOPRAZOLE SODIUM 40 MILLIGRAM(S): 20 TABLET, DELAYED RELEASE ORAL at 07:43

## 2017-09-17 RX ADMIN — Medication 50 MILLIGRAM(S): at 07:43

## 2017-09-17 RX ADMIN — SODIUM CHLORIDE 3 MILLILITER(S): 9 INJECTION INTRAMUSCULAR; INTRAVENOUS; SUBCUTANEOUS at 22:21

## 2017-09-17 RX ADMIN — Medication 50 MILLIGRAM(S): at 11:16

## 2017-09-17 RX ADMIN — VALSARTAN 160 MILLIGRAM(S): 80 TABLET ORAL at 07:48

## 2017-09-17 RX ADMIN — Medication 1 MILLIGRAM(S): at 09:58

## 2017-09-17 RX ADMIN — Medication 13 UNIT(S): at 18:21

## 2017-09-17 RX ADMIN — GABAPENTIN 100 MILLIGRAM(S): 400 CAPSULE ORAL at 18:21

## 2017-09-17 RX ADMIN — SODIUM CHLORIDE 3 MILLILITER(S): 9 INJECTION INTRAMUSCULAR; INTRAVENOUS; SUBCUTANEOUS at 14:22

## 2017-09-17 RX ADMIN — Medication 13 UNIT(S): at 07:45

## 2017-09-17 RX ADMIN — INSULIN GLARGINE 25 UNIT(S): 100 INJECTION, SOLUTION SUBCUTANEOUS at 22:07

## 2017-09-17 RX ADMIN — CEFTAROLINE FOSAMIL 50 MILLIGRAM(S): 600 POWDER, FOR SOLUTION INTRAVENOUS at 09:01

## 2017-09-17 NOTE — PHYSICAL THERAPY INITIAL EVALUATION ADULT - IMPAIRMENTS FOUND, PT EVAL
gait, locomotion, and balance/muscle strength/poor safety awareness/ROM/sensory integrity/aerobic capacity/endurance/neuromotor development and sensory integration

## 2017-09-17 NOTE — PROGRESS NOTE ADULT - SUBJECTIVE AND OBJECTIVE BOX
Patient discussed on morning rounds with Dr. Boateng     Operation / Date: none, pre-op TVR    SUBJECTIVE ASSESSMENT:    Pt denies chest pain, dyspnea/SOB but does c/o left thigh pain and left leg weakness for the past month.  She has had generalized weakness/deconditioning since admission, but this morning she also reported left leg weakness and "electric" pain down the leg.    Vital Signs Last 24 Hrs  T(C): 36.9 (17 Sep 2017 04:55), Max: 38.3 (16 Sep 2017 21:01)  T(F): 98.5 (17 Sep 2017 04:55), Max: 100.9 (16 Sep 2017 21:01)  HR: 96 (17 Sep 2017 05:00) (96 - 102)  BP: 166/84 (17 Sep 2017 05:00) (159/77 - 183/85)  BP(mean): 126 (17 Sep 2017 05:00) (106 - 126)  RR: 14 (17 Sep 2017 05:00) (14 - 20)  SpO2: 98% (17 Sep 2017 05:00) (93% - 99%)  I&O's Detail    16 Sep 2017 07:01  -  17 Sep 2017 07:00  --------------------------------------------------------  IN:    heparin  Infusion.: 26 mL    heparin Infusion: 16 mL    IV PiggyBack: 50 mL    Oral Fluid: 855 mL  Total IN: 947 mL    OUT:    Voided: 250 mL  Total OUT: 250 mL    Total NET: 697 mL          CHEST TUBE:  Yes/No. AIR LEAKS: Yes/No. Suction / H2O SEAL.   LOLLY DRAIN:  Yes/No.  EPICARDIAL WIRES: Yes/No.  TIE DOWNS: Yes/No.  COOK: Yes/No.    PHYSICAL EXAM:    General:     Neurological:    Cardiovascular:    Respiratory:    Gastrointestinal:    Extremities:    Vascular:    Incision Sites:    LABS:                        7.9    16.4  )-----------( 276      ( 17 Sep 2017 07:31 )             26.2       COUMADIN:  Yes/No. REASON: .    PT/INR - ( 16 Sep 2017 07:11 )   PT: 18.0 sec;   INR: 1.61          PTT - ( 16 Sep 2017 07:11 )  PTT:39.9 sec    09-16    130<L>  |  90<L>  |  17  ----------------------------<  264<H>  3.8   |  26  |  2.73<H>    Ca    8.4      16 Sep 2017 07:10            MEDICATIONS  (STANDING):  sodium chloride 0.9% lock flush 3 milliLiter(s) IV Push every 8 hours  aspirin enteric coated 81 milliGRAM(s) Oral daily  valsartan 160 milliGRAM(s) Oral daily  simvastatin 20 milliGRAM(s) Oral at bedtime  insulin lispro (HumaLOG) corrective regimen sliding scale   SubCutaneous Before meals and at bedtime  dextrose 5%. 1000 milliLiter(s) (50 mL/Hr) IV Continuous <Continuous>  dextrose 50% Injectable 12.5 Gram(s) IV Push once  dextrose 50% Injectable 25 Gram(s) IV Push once  dextrose 50% Injectable 25 Gram(s) IV Push once  polyethylene glycol 3350 17 Gram(s) Oral daily  pantoprazole    Tablet 40 milliGRAM(s) Oral before breakfast  DAPTOmycin IVPB 700 milliGRAM(s) IV Intermittent every 48 hours  rifampin 600 milliGRAM(s) Oral daily  Ceftaroline Fosamil IVPB 200 milliGRAM(s) IV Intermittent every 12 hours  amLODIPine   Tablet 10 milliGRAM(s) Oral daily  metoprolol 50 milliGRAM(s) Oral every 6 hours  gabapentin 100 milliGRAM(s) Oral two times a day  gabapentin 300 milliGRAM(s) Oral at bedtime  insulin lispro Injectable (HumaLOG) 13 Unit(s) SubCutaneous three times a day before meals  insulin glargine Injectable (LANTUS) 25 Unit(s) SubCutaneous at bedtime  heparin  Infusion.  Unit(s)/Hr (16 mL/Hr) IV Continuous <Continuous>    MEDICATIONS  (PRN):  dextrose Gel 1 Dose(s) Oral once PRN Blood Glucose LESS THAN 70 milliGRAM(s)/deciliter  glucagon  Injectable 1 milliGRAM(s) IntraMuscular once PRN Glucose LESS THAN 70 milligrams/deciliter  acetaminophen    Suspension. 650 milliGRAM(s) Oral every 6 hours PRN Mild Pain (1 - 3)        RADIOLOGY & ADDITIONAL TESTS: Patient discussed on morning rounds with Dr. Boateng     Operation / Date: none, pre-op TVR    SUBJECTIVE ASSESSMENT:    Pt denies chest pain, dyspnea/SOB but does c/o left thigh pain and left leg weakness for the past month.  She has had generalized weakness/deconditioning since admission, but this morning she also reported left leg weakness and "electric" pain down the leg.    Vital Signs Last 24 Hrs  T(C): 36.9 (17 Sep 2017 04:55), Max: 38.3 (16 Sep 2017 21:01)  T(F): 98.5 (17 Sep 2017 04:55), Max: 100.9 (16 Sep 2017 21:01)  HR: 96 (17 Sep 2017 05:00) (96 - 102)  BP: 166/84 (17 Sep 2017 05:00) (159/77 - 183/85)  BP(mean): 126 (17 Sep 2017 05:00) (106 - 126)  RR: 14 (17 Sep 2017 05:00) (14 - 20)  SpO2: 98% (17 Sep 2017 05:00) (93% - 99%)  I&O's Detail    16 Sep 2017 07:01  -  17 Sep 2017 07:00  --------------------------------------------------------  IN:    heparin  Infusion.: 26 mL    heparin Infusion: 16 mL    IV PiggyBack: 50 mL    Oral Fluid: 855 mL  Total IN: 947 mL    OUT:    Voided: 250 mL  Total OUT: 250 mL    Total NET: 697 mL          CHEST TUBE:  No.   LOLLY DRAIN: No.  EPICARDIAL WIRES: No.  TIE DOWNS: No.  COOK: No.    PHYSICAL EXAM:    General: NAD    Neurological: A&Ox3, able to lift both legs against gravity, but weaker on left    Cardiovascular: S1S2 RRR    Respiratory: CTA b/l by anterior auscultation    Gastrointestinal: soft NT/ND +BS    Extremities: no signif edema    Vascular: warm and well perfused, R shiley site with dressing C/D/I, no heamtoma/ecchymosis    Incision Sites: none    LABS:                        7.9    16.4  )-----------( 276      ( 17 Sep 2017 07:31 )             26.2       COUMADIN:  No    PT/INR - ( 16 Sep 2017 07:11 )   PT: 18.0 sec;   INR: 1.61          PTT - ( 16 Sep 2017 07:11 )  PTT:39.9 sec    09-16    130<L>  |  90<L>  |  17  ----------------------------<  264<H>  3.8   |  26  |  2.73<H>    Ca    8.4      16 Sep 2017 07:10            MEDICATIONS  (STANDING):  sodium chloride 0.9% lock flush 3 milliLiter(s) IV Push every 8 hours  aspirin enteric coated 81 milliGRAM(s) Oral daily  valsartan 160 milliGRAM(s) Oral daily  simvastatin 20 milliGRAM(s) Oral at bedtime  insulin lispro (HumaLOG) corrective regimen sliding scale   SubCutaneous Before meals and at bedtime  dextrose 5%. 1000 milliLiter(s) (50 mL/Hr) IV Continuous <Continuous>  dextrose 50% Injectable 12.5 Gram(s) IV Push once  dextrose 50% Injectable 25 Gram(s) IV Push once  dextrose 50% Injectable 25 Gram(s) IV Push once  polyethylene glycol 3350 17 Gram(s) Oral daily  pantoprazole    Tablet 40 milliGRAM(s) Oral before breakfast  DAPTOmycin IVPB 700 milliGRAM(s) IV Intermittent every 48 hours  rifampin 600 milliGRAM(s) Oral daily  Ceftaroline Fosamil IVPB 200 milliGRAM(s) IV Intermittent every 12 hours  amLODIPine   Tablet 10 milliGRAM(s) Oral daily  metoprolol 50 milliGRAM(s) Oral every 6 hours  gabapentin 100 milliGRAM(s) Oral two times a day  gabapentin 300 milliGRAM(s) Oral at bedtime  insulin lispro Injectable (HumaLOG) 13 Unit(s) SubCutaneous three times a day before meals  insulin glargine Injectable (LANTUS) 25 Unit(s) SubCutaneous at bedtime  heparin  Infusion.  Unit(s)/Hr (16 mL/Hr) IV Continuous <Continuous>    MEDICATIONS  (PRN):  dextrose Gel 1 Dose(s) Oral once PRN Blood Glucose LESS THAN 70 milliGRAM(s)/deciliter  glucagon  Injectable 1 milliGRAM(s) IntraMuscular once PRN Glucose LESS THAN 70 milligrams/deciliter  acetaminophen    Suspension. 650 milliGRAM(s) Oral every 6 hours PRN Mild Pain (1 - 3)        RADIOLOGY & ADDITIONAL TESTS:

## 2017-09-17 NOTE — PROGRESS NOTE ADULT - PROBLEM SELECTOR PLAN 5
Blood pressure ranging from 150 to 170's range at present.  Continue Antihypertensive medications with Valsartan, Metoprolol, Norvasc for now  Adjust dose to optimal BP control for now.
Blood pressure ranging from 150 to 170's range at present.  Continue Antihypertensive medications with Valsartan, Metoprolol, Norvasc for now  Adjust dose to optimal BP control for now.

## 2017-09-17 NOTE — PROGRESS NOTE ADULT - PROBLEM SELECTOR PLAN 2
Hyponatremia with na level 130 at present.  Advised free water restriction to <1L/day.  Monitor BMP.

## 2017-09-17 NOTE — PHYSICAL THERAPY INITIAL EVALUATION ADULT - PERTINENT HX OF CURRENT PROBLEM, REHAB EVAL
Patient is a 47 year old female with history of HTN, HLD, DM II, ESRD on HD (MWF. Last HD 09/13/2017. Receives HD via Right Femoral HD catheter placed on 09/13/2017 at [facility].), and chronic left foot OM who is being transferred to [facility] from [facility] with known TV IE (MRSA – Bacteremia.) who has failed medical MGMT with aggressive antibiotics.  Patient sent to [facility] for surgical evaluation.

## 2017-09-17 NOTE — PROGRESS NOTE ADULT - ATTENDING COMMENTS
glucose still a little high. taking mostly food from outside. will increase glargine to 28 units. increase lispro to 16 units. will have rd see in am for food preferences.

## 2017-09-17 NOTE — PHYSICAL THERAPY INITIAL EVALUATION ADULT - RANGE OF MOTION EXAMINATION, REHAB EVAL
Left UE ROM was WFL (within functional limits)/Right UE ROM was WFL (within functional limits)/Right LE ROM was WFL (within functional limits)/Left knee and hip WFL, Left ankle decreased ROM as patient with charcot foot

## 2017-09-17 NOTE — CONSULT NOTE ADULT - SUBJECTIVE AND OBJECTIVE BOX
Vascular Neurology Consultation    HPI:  Patient is a 47 year old female with history of HTN, HLD, DM II, ESRD on HD and chronic left foot OM with MRSA bacteremia / endocarditis diagnosed in August.  She was previously transferred from [facility] but deemed poor surgical candidate and was being treated medically but transferred back to [facility] for further evaluation.      She now complains of bilateral leg weakness since she hasn't walked in the month since she was diagnosed with endocarditis.  She especially can't move her left leg.  Her left leg had weakness from before due to chronic left foot osteomyelitis.  No other recent changes to her legs.  No trauma.  Some numbness in her legs.  No new speech or visual complaints.    Note: There was some concern for stroke on last admission but not seen on repeat cerebral imaging so determined that not stroke.    PMH/PSH:  HTN  HLD  DM II  ESRD on HD  Chronic left foot osteomyelitis     Social history:   No ETOH, Non-Smoker, No illicit drug use   Lives at home. Walks with walker.     Family history: no pertinent family history     Review of Systems:  Constitutional: No fever, weight loss or fatigue  Eyes: No eye pain or discharge. Blurry vision, pt states is baseline  ENMT:  No difficulty hearing, tinnitus, vertigo; No sinus or throat pain  Neck: No pain or stiffness  Respiratory: No cough, wheezing, chills or hemoptysis  Cardiovascular: No chest pain, palpitations, shortness of breath, dizziness or leg swelling  Gastrointestinal: No abdominal pain. No nausea, vomiting or hematemesis; No diarrhea or constipation. Nohematochezia.  Genitourinary: No dysuria, frequency, hematuria or incontinence  Neurological: As per HPI  Skin: No itching, burning, rashes or lesions   Endocrine: No heat or cold intolerance; No hair loss  Musculoskeletal: No swelling; No muscle, back or extremity pain  Psychiatric: No depression, anxiety, mood swings or difficulty sleeping  Heme/Lymph: No easy bruising or bleeding gums    MEDICATIONS  (STANDING):  sodium chloride 0.9% lock flush 3 milliLiter(s) IV Push every 8 hours  aspirin enteric coated 81 milliGRAM(s) Oral daily  valsartan 160 milliGRAM(s) Oral daily  simvastatin 20 milliGRAM(s) Oral at bedtime  insulin lispro (HumaLOG) corrective regimen sliding scale   SubCutaneous Before meals and at bedtime  polyethylene glycol 3350 17 Gram(s) Oral daily  pantoprazole    Tablet 40 milliGRAM(s) Oral before breakfast  DAPTOmycin IVPB 700 milliGRAM(s) IV Intermittent every 48 hours  rifampin 600 milliGRAM(s) Oral daily  Ceftaroline Fosamil IVPB 200 milliGRAM(s) IV Intermittent every 12 hours  amLODIPine   Tablet 10 milliGRAM(s) Oral daily  metoprolol 50 milliGRAM(s) Oral every 6 hours  gabapentin 100 milliGRAM(s) Oral two times a day  gabapentin 300 milliGRAM(s) Oral at bedtime  insulin lispro Injectable (HumaLOG) 13 Unit(s) SubCutaneous three times a day before meals  insulin glargine Injectable (LANTUS) 25 Unit(s) SubCutaneous at bedtime  heparin  Infusion. 1800 Unit(s)/Hr (18 mL/Hr) IV Continuous <Continuous>    MEDICATIONS  (PRN):  acetaminophen    Suspension. 650 milliGRAM(s) Oral every 6 hours PRN Mild Pain (1 - 3)    Allergies  penicillin (Unknown)  Sular (Unknown)    Vital Signs Last 24 Hrs  T(C): 37.6 (17 Sep 2017 22:06), Max: 38.1 (17 Sep 2017 16:06)  T(F): 99.7 (17 Sep 2017 22:06), Max: 100.5 (17 Sep 2017 16:06)  HR: 98 (17 Sep 2017 21:00) (92 - 106)  BP: 162/86 (17 Sep 2017 21:00) (142/85 - 166/84)  BP(mean): 111 (17 Sep 2017 21:00) (102 - 126)  RR: 17 (17 Sep 2017 21:00) (14 - 17)  SpO2: 98% (17 Sep 2017 21:00) (96% - 98%)    Gen: Sitting at the edge of the bed, NAD   Back: no major tenderness on palpation along her spinal processes  Neuro:  Mental status: Awake, alert and oriented x3.  Naming, repetition and comprehension intact - better in English too.  Attention/concentration intact.  No dysarthria, Able provide history for the past couple of weeks - more awake and interactive than last visit.  Cranial nerves: Pupils equally round and reactive to light, 3 mm, visual fields full, no nystagmus, no ptosis, extraocular muscles intact, V1 through V3 intact bilaterally and symmetric, face symmetric, palate elevation symmetric, tongue was midline, sternocleidomastoid/ shoulder shrug strength bilaterally 5/5.    Motor: No pronator drift of upper extremities. UE at least 4-/5, able lift right LE for at least 3+/5, unable to raise her left LE - only 2/5 but strong adductors bilaterally   Sensation: Intact and symmetric to light touch in upper extremities.  Decreased light touch and pinprick in circumferential around whole left thigh and along lateral left lower leg but intact in left foot even in area of osteomyelitis.   Coordination: No dysmetria on finger-to-nose bilaterally  Reflexes: symmetric in all extremities. Mute toes bilaterally.   Gait: deferred at this time - She is about to work with physical therapy and needs multiperson assistance    Labs:                        8.4    17.0  )-----------( 317      ( 17 Sep 2017 20:50 )             28.1   09-16    130<L>  |  90<L>  |  17  ----------------------------<  264<H>  3.8   |  26  |  2.73<H>    Ca    8.4      16 Sep 2017 07:10    Lipid Profile (09.13.17 @ 22:51)    HDL/Total Cholesterol Ratio Measurement: 5.8 RATIO    Cholesterol, Serum: 70 mg/dL    Triglycerides, Serum: 140 mg/dL    HDL Cholesterol, Serum: 12 mg/dL    Direct LDL: 30    Previous lipid profile (8/24/2017):   Cholesterol, Serum: 136 mg/dL, HDL: 5 mg/dL, Triglycerides: 707 mg/dL, LDL -10  Previous hemoglobin A1C, Whole Blood: 8.2 % (08-24 @ 01:11)    Radiology and Additional Studies:  CT Head No Cont (09.17.17 @ 10:13) - No intracranial hemorrhage or acute transcortical infarct.   Bilateral orbital proptosis and clinical correlations recommended.      Assessment & Plan:  47 year old female with PMH of HTN, HLD, DM II, ESRD on HD, chronic left foot osteomyelitis, transferred again from [facility] for further workup for endocarditis.  Now complaining of left leg weakness.    - Unclear why not able to lift her left leg. Is it related to her brain or spine or hip?  No specific stroke on CT Head performed earlier today.  No specific hip or back pain.  - Why does she have left leg numbness in circumferential distribution that is less likely to be peripheral or spinal?    Recommend starting with MRI Brain without meena to rule out stroke.  Then consider further testing depending on the result.  - Start without meena since she has CKD.  Continue treatment of underlying medical conditions.  Continue treating as per CTS team.

## 2017-09-17 NOTE — PHYSICAL THERAPY INITIAL EVALUATION ADULT - ADDITIONAL COMMENTS
Patient stated coming from a facility prior to this admission. She states that she has not been out of the bed for 1 month.

## 2017-09-17 NOTE — PROGRESS NOTE ADULT - SUBJECTIVE AND OBJECTIVE BOX
Patient is a 47y Female seen and evaluated at bedside. Patient feels fine at present. Denies any new complaints at present lying in bed in no acute distress at present.    sodium chloride 0.9% lock flush 3 every 8 hours  aspirin enteric coated 81 daily  valsartan 160 daily  simvastatin 20 at bedtime  insulin lispro (HumaLOG) corrective regimen sliding scale  Before meals and at bedtime  dextrose 5%. 1000 <Continuous>  dextrose Gel 1 once PRN  dextrose 50% Injectable 12.5 once  dextrose 50% Injectable 25 once  dextrose 50% Injectable 25 once  glucagon  Injectable 1 once PRN  polyethylene glycol 3350 17 daily  pantoprazole    Tablet 40 before breakfast  acetaminophen    Suspension. 650 every 6 hours PRN  DAPTOmycin IVPB 700 every 48 hours  rifampin 600 daily  Ceftaroline Fosamil IVPB 200 every 12 hours  amLODIPine   Tablet 10 daily  metoprolol 50 every 6 hours  gabapentin 100 two times a day  gabapentin 300 at bedtime  insulin lispro Injectable (HumaLOG) 13 three times a day before meals  insulin glargine Injectable (LANTUS) 25 at bedtime  heparin  Infusion. 1800 <Continuous>      Allergies    penicillin (Unknown)  Sular (Unknown)    Intolerances        T(C): , Max: 38.3 (09-16-17 @ 21:01)  T(F): , Max: 100.9 (09-16-17 @ 21:01)  HR: 99 (09-17-17 @ 14:00)  BP: 149/77 (09-17-17 @ 14:00)  BP(mean): 102 (09-17-17 @ 14:00)  RR: 16 (09-17-17 @ 14:00)  SpO2: 97% (09-17-17 @ 14:00)  Wt(kg): --    09-16 @ 07:01  -  09-17 @ 07:00  --------------------------------------------------------  IN: 947 mL / OUT: 250 mL / NET: 697 mL    09-17 @ 07:01  -  09-17 @ 16:01  --------------------------------------------------------  IN: 600 mL / OUT: 400 mL / NET: 200 mL          Review of Systems:    CONSTITUTIONAL: No fever or chills  RESPIRATORY: No cough, wheezing, chills or hemoptysis; No shortness of breath  CARDIOVASCULAR: No chest pain, palpitations, dizziness, or leg swelling  GASTROINTESTINAL: No abdominal or epigastric pain. No nausea, vomiting, or hematemesis; No diarrhea or constipation  GENITOURINARY: No dysuria, frequency, hematuria, or incontinence  NEUROLOGICAL: No headaches, memory loss, loss of strength, numbness, or tremors  SKIN: No new rash or lesions  MUSCULOSKELETAL: No joint pain or swelling; No muscle, back, or extremity pain, no leg edema      PHYSICAL EXAM:  GENERAL: NAD, well-developed, well nourished, alert, awake, no acute distress at present  HEAD:  Atraumatic, Normocephalic,   EYES: EOMI, QUENTIN, conjunctiva and sclera clear  Oral cavity: Oral mucosa dry and pink, no oral lesions or ulcers  NECK: Neck supple, No JVD, no palpable thyromegaly  CHEST/LUNG: Clear to auscultation bilaterally; No wheeze, no rales, no crepitations  HEART: Regular rate and rhythm. ALBARO II/VI at LPSB, No gallop, no rub   ABDOMEN: Soft, Nontender, Nondistended; Bowel sounds present, no guarding, no rigidity, no rebound tenderness, No flank or CVA angle tenderness  EXTREMITIES:  No clubbing, cyanosis, or edema, no calf tenderness  Neurology: AAOx3, no focal neurological deficit. Motor 5/5 all 4 extremities. Able to comprehend and answers all questions appropriately.    SKIN: No rashes or lesions          ACCESS:     LABS:                        7.9    16.4  )-----------( 276      ( 17 Sep 2017 07:31 )             26.2     09-16    130<L>  |  90<L>  |  17  ----------------------------<  264<H>  3.8   |  26  |  2.73<H>    Ca    8.4      16 Sep 2017 07:10        PT/INR - ( 16 Sep 2017 07:11 )   PT: 18.0 sec;   INR: 1.61          PTT - ( 17 Sep 2017 09:29 )  PTT:46.0 sec          RADIOLOGY & ADDITIONAL STUDIES:  < from: CT Head No Cont (09.17.17 @ 10:13) >    EXAM:  CT BRAIN                          PROCEDURE DATE:  09/17/2017                     INTERPRETATION:  PROCEDURE: CT brain without intravenous contrast    INDICATION: Left leg weakness; endocarditis    TECHNIQUE: Multiple axial images were obtained at 5 mm intervals from the   skull base to the vertex. Sagittal and coronal reformatted images were   obtained from the axial data set. The images were reviewed in brain and   bone windows.    COMPARISON: CT's brain 8/27/2017 and 8/25/2017.    FINDINGS: The CT examination demonstrates the ventricles, cisternal   spaces, and cortical sulci to be within normal limits. There is no   midline shift or extra axial collections. There is mild adjacent   hypodensity within the periventricular white matter which is nonspecific   and may represent the sequela of small vessel ischemic disease in this   patient. The gray white differentiation appears within normal limits.   There is no intracranial hemorrhage or acute transcortical infarct. The   bony windows demonstrates no fractures. The visualized paranasal sinuses   are within normal limits. The mastoid air cells are well aerated.    Evaluation of the orbits demonstrates bilateral orbital proptosis which   was present on the prior study. The visualized extraocular muscles are   not enlarged. Clinical correlation is recommended.     IMPRESSION: No intracranial hemorrhage or acute transcortical infarct.   Bilateral orbital proptosis and clinical correlations recommended.              "Thank you for the opportunity to participate in the care of this   patient."        TRISTIN REYES M.D., ATTENDING RADIOLOGIST  This document has been electronically signed. Sep 17 2017 10:52AM                  < end of copied text >

## 2017-09-17 NOTE — PROGRESS NOTE ADULT - ASSESSMENT
47F with PMH HTN, HLD, DM2, ESRD on HD, and chronic left foot OM who was recently admitted for surgical evaluation of tricuspid valve endocarditis (attempted to treat with ABX therapy) c/b MCA infarct and septic emboli, with large SVC/ RA thrombus (on heparin gtt) who is transferred back to [facility] from [facility] on 9/13 for re-evaluation due to failed medical management. At time of transfer, pt noted to have supra therapeutic INR, treated with FFP and now back on heparin gtt. Preop workup showing 75% m-dRCA disease. Ortho/ Vascular teams consulted for chronic osteomyelitis and possible surgical intervention. Stable on 9L.     #Neuro: h/o ?MCA CVA, pt reporting weakness in L leg  - head CT on 8/24 showing MCA infarct, but repeat on 8/25 inconclusive, possibly artifact. Head CT after stroke code also not showing acute infarct. Pt has been on heparin gtt w/o issue >2 weeks.   - repeat CT head today  - Dr. Luna reconsulted for neurology    #Cards:   - TV endocarditis with septic emboli and SVC/ RA clot: Dr. Kingston following, cont rifampin, daptomycin, and ceftaroline.   - blood cultures negative during this admission, Escobar reports latest blood cultures on 9/13/17 positive for MRSA  - cont ASA, metoprolol, amlodipine (increased for HTN), valsartan, statin  - CC on 9/15 showing m-dRCA 75% stenosis, on appropriate medical therapy    #Osteomyelitis  - s/p multiple surgeries in the past, ortho/ vascular consulted for possible surgical intervention. Gallium scan performed Friday, results pending    #Pulm:  - SVC/ RA clot on heparin gtt  - CT chest showing possible septic emboli, lung nodules     #DM: A1c 8.2  - appreciate endocrine consult, cont to adjust insulin pending FBG    #Renal: ESRD on HD  - has AV fistula, awaiting vascular evaluation of when it is mature to use  - groin line removed yesterday, replaced in Left IJ    #Psych consulted for depression  - appreciated recs, gabapentin dosed per psych recs    #Vasc: c/o left thigh pain   - check US LE to r/o DVT   - c/w heparin gtt    #dispo: OR for TVR, CABGx1 next week pending decision from ortho/ vascular team   - hold valsartan 24-48h prior to surgery   - GI PPX

## 2017-09-17 NOTE — PROGRESS NOTE ADULT - SUBJECTIVE AND OBJECTIVE BOX
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 (13 Sep 2017 22:57)      Pt reports the following symptoms:    CONSTITUTIONAL:  Negative fever or chills, feels well, good appetite  EYES:  Negative  blurry vision or double vision  CARDIOVASCULAR:  Negative for chest pain or palpitations  RESPIRATORY:  Negative for cough, wheezing, or SOB   GASTROINTESTINAL:  Negative for nausea, vomiting, diarrhea, constipation, or abdominal pain  GENITOURINARY:  Negative frequency, urgency or dysuria  NEUROLOGIC:  No headache, confusion, dizziness, lightheadedness    MEDICATIONS  (STANDING):  sodium chloride 0.9% lock flush 3 milliLiter(s) IV Push every 8 hours  aspirin enteric coated 81 milliGRAM(s) Oral daily  valsartan 160 milliGRAM(s) Oral daily  simvastatin 20 milliGRAM(s) Oral at bedtime  insulin lispro (HumaLOG) corrective regimen sliding scale   SubCutaneous Before meals and at bedtime  dextrose 5%. 1000 milliLiter(s) (50 mL/Hr) IV Continuous <Continuous>  dextrose 50% Injectable 12.5 Gram(s) IV Push once  dextrose 50% Injectable 25 Gram(s) IV Push once  dextrose 50% Injectable 25 Gram(s) IV Push once  polyethylene glycol 3350 17 Gram(s) Oral daily  pantoprazole    Tablet 40 milliGRAM(s) Oral before breakfast  DAPTOmycin IVPB 700 milliGRAM(s) IV Intermittent every 48 hours  rifampin 600 milliGRAM(s) Oral daily  Ceftaroline Fosamil IVPB 200 milliGRAM(s) IV Intermittent every 12 hours  amLODIPine   Tablet 10 milliGRAM(s) Oral daily  metoprolol 50 milliGRAM(s) Oral every 6 hours  gabapentin 100 milliGRAM(s) Oral two times a day  gabapentin 300 milliGRAM(s) Oral at bedtime  insulin lispro Injectable (HumaLOG) 13 Unit(s) SubCutaneous three times a day before meals  insulin glargine Injectable (LANTUS) 25 Unit(s) SubCutaneous at bedtime  heparin  Infusion. 1800 Unit(s)/Hr (18 mL/Hr) IV Continuous <Continuous>    MEDICATIONS  (PRN):  dextrose Gel 1 Dose(s) Oral once PRN Blood Glucose LESS THAN 70 milliGRAM(s)/deciliter  glucagon  Injectable 1 milliGRAM(s) IntraMuscular once PRN Glucose LESS THAN 70 milligrams/deciliter  acetaminophen    Suspension. 650 milliGRAM(s) Oral every 6 hours PRN Mild Pain (1 - 3)      PHYSICAL EXAM  Vital Signs Last 24 Hrs  T(C): 36.9 (17 Sep 2017 04:55), Max: 38.3 (16 Sep 2017 21:01)  T(F): 98.5 (17 Sep 2017 04:55), Max: 100.9 (16 Sep 2017 21:01)  HR: 92 (17 Sep 2017 09:00) (92 - 102)  BP: 159/83 (17 Sep 2017 09:00) (159/83 - 183/85)  BP(mean): 110 (17 Sep 2017 09:00) (106 - 126)  RR: 16 (17 Sep 2017 09:00) (14 - 20)  SpO2: 96% (17 Sep 2017 09:00) (93% - 98%)    Constitutional: wn/wd in NAD.   HEENT: NCAT, MMM, OP clear, EOMI, no proptosis or lid retraction  Neck: no thyromegaly or palpable thyroid nodules   Respiratory: lungs CTAB.  Cardiovascular: regular rhythm, normal S1 and S2, no audible murmurs, no peripheral edema  GI: soft, NT/ND, no masses/HSM appreciated.  Neurology: no tremors, DTR 2+  Skin: no visible rashes/lesions  Psychiatric: AAO x 3, normal affect/mood.    LABS:                        7.9    16.4  )-----------( 276      ( 17 Sep 2017 07:31 )             26.2     09-16    130<L>  |  90<L>  |  17  ----------------------------<  264<H>  3.8   |  26  |  2.73<H>    Ca    8.4      16 Sep 2017 07:10      PT/INR - ( 16 Sep 2017 07:11 )   PT: 18.0 sec;   INR: 1.61          PTT - ( 17 Sep 2017 09:29 )  PTT:46.0 sec    Thyroid Stimulating Hormone, Serum: 2.447 uIU/mL (09-13 @ 22:51)  Thyroid Stimulating Hormone, Serum: 1.251 uIU/mL (08-24 @ 01:12)      HbA1C: 8.2 % (09-13 @ 22:51)  8.2 % (08-24 @ 01:11)    CAPILLARY BLOOD GLUCOSE  208 (17 Sep 2017 11:21)  159 (17 Sep 2017 04:55)  188 (16 Sep 2017 21:01)  211 (16 Sep 2017 16:04)      Insulin Sliding Scale requirements X 24 Hours:    RADIOLOGY & ADDITIONAL TESTS:    A/P: 47y Female with history of DM type II presenting for       1.  DM -     Please continue           units lantus at bedtime  / in the morning and        units lispro with meals and lispro moderate / low dose sliding scale 4 times daily with meals and at bedtime.  Please continue consistent carbohydrate diet.      Goal FSG is   Will continue to monitor   For discharge, pt can continue    Pt can follow up at discharge with [facility] by calling  to make an appointment.   Will discuss case with     and update primary team INTERVAL HPI/OVERNIGHT EVENTS:    Patient is a 47y old  Female who presents with a chief complaint of TV IE / MRSA Bacteremia awaiting further surgical evaluation and management.   pt reports eating outside food, does not like diet offered by hospital, has good appetite.   no acute complaints.     Pt reports the following symptoms:    CONSTITUTIONAL:  Negative fever or chills, feels well, good appetite  EYES:  Negative  blurry vision or double vision  CARDIOVASCULAR:  Negative for chest pain or palpitations  RESPIRATORY:  Negative for cough, wheezing, or SOB   GASTROINTESTINAL:  Negative for nausea, vomiting, diarrhea, constipation, or abdominal pain  GENITOURINARY:  Negative frequency, urgency or dysuria  NEUROLOGIC:  No headache, confusion, dizziness, lightheadedness    MEDICATIONS  (STANDING):  sodium chloride 0.9% lock flush 3 milliLiter(s) IV Push every 8 hours  aspirin enteric coated 81 milliGRAM(s) Oral daily  valsartan 160 milliGRAM(s) Oral daily  simvastatin 20 milliGRAM(s) Oral at bedtime  insulin lispro (HumaLOG) corrective regimen sliding scale   SubCutaneous Before meals and at bedtime  dextrose 5%. 1000 milliLiter(s) (50 mL/Hr) IV Continuous <Continuous>  dextrose 50% Injectable 12.5 Gram(s) IV Push once  dextrose 50% Injectable 25 Gram(s) IV Push once  dextrose 50% Injectable 25 Gram(s) IV Push once  polyethylene glycol 3350 17 Gram(s) Oral daily  pantoprazole    Tablet 40 milliGRAM(s) Oral before breakfast  DAPTOmycin IVPB 700 milliGRAM(s) IV Intermittent every 48 hours  rifampin 600 milliGRAM(s) Oral daily  Ceftaroline Fosamil IVPB 200 milliGRAM(s) IV Intermittent every 12 hours  amLODIPine   Tablet 10 milliGRAM(s) Oral daily  metoprolol 50 milliGRAM(s) Oral every 6 hours  gabapentin 100 milliGRAM(s) Oral two times a day  gabapentin 300 milliGRAM(s) Oral at bedtime  insulin lispro Injectable (HumaLOG) 13 Unit(s) SubCutaneous three times a day before meals  insulin glargine Injectable (LANTUS) 25 Unit(s) SubCutaneous at bedtime  heparin  Infusion. 1800 Unit(s)/Hr (18 mL/Hr) IV Continuous <Continuous>    MEDICATIONS  (PRN):  dextrose Gel 1 Dose(s) Oral once PRN Blood Glucose LESS THAN 70 milliGRAM(s)/deciliter  glucagon  Injectable 1 milliGRAM(s) IntraMuscular once PRN Glucose LESS THAN 70 milligrams/deciliter  acetaminophen    Suspension. 650 milliGRAM(s) Oral every 6 hours PRN Mild Pain (1 - 3)      PHYSICAL EXAM  Vital Signs Last 24 Hrs  T(C): 36.9 (17 Sep 2017 04:55), Max: 38.3 (16 Sep 2017 21:01)  T(F): 98.5 (17 Sep 2017 04:55), Max: 100.9 (16 Sep 2017 21:01)  HR: 92 (17 Sep 2017 09:00) (92 - 102)  BP: 159/83 (17 Sep 2017 09:00) (159/83 - 183/85)  BP(mean): 110 (17 Sep 2017 09:00) (106 - 126)  RR: 16 (17 Sep 2017 09:00) (14 - 20)  SpO2: 96% (17 Sep 2017 09:00) (93% - 98%)    Constitutional: wn/wd in NAD.   HEENT: NCAT, MMM, OP clear, EOMI, no proptosis or lid retraction  Neck: no thyromegaly or palpable thyroid nodules   Respiratory: lungs CTAB.  Cardiovascular: regular rhythm, normal S1 and S2, no audible murmurs, no peripheral edema  GI: soft, NT/ND, no masses/HSM appreciated.  Neurology: no tremors, DTR 2+  Skin: no visible rashes/lesions  Psychiatric: AAO x 3, normal affect/mood.    LABS:                        7.9    16.4  )-----------( 276      ( 17 Sep 2017 07:31 )             26.2     09-16    130<L>  |  90<L>  |  17  ----------------------------<  264<H>  3.8   |  26  |  2.73<H>    Ca    8.4      16 Sep 2017 07:10      PT/INR - ( 16 Sep 2017 07:11 )   PT: 18.0 sec;   INR: 1.61          PTT - ( 17 Sep 2017 09:29 )  PTT:46.0 sec    Thyroid Stimulating Hormone, Serum: 2.447 uIU/mL (09-13 @ 22:51)  Thyroid Stimulating Hormone, Serum: 1.251 uIU/mL (08-24 @ 01:12)      HbA1C: 8.2 % (09-13 @ 22:51)  8.2 % (08-24 @ 01:11)    CAPILLARY BLOOD GLUCOSE  208 (17 Sep 2017 11:21)  159 (17 Sep 2017 04:55)  188 (16 Sep 2017 21:01)  211 (16 Sep 2017 16:04)      A/P: 47y Female with history of DM type II presenting for management of infective endocarditis, with continued hyperglycemia in setting of dietary non compliance  1.  DM - type 2, uncontrolled but not severe, complicated.     Please continue    28       units lantus at bedtime and      16  units lispro with meals and lispro moderate dose sliding scale 4 times daily with meals and at bedtime.  Please continue consistent carbohydrate diet.      Goal FSG is 140-180  Will continue to monitor   For discharge, pt can continue an insulin regimen with doses pending clinical course.     Pt can follow up at discharge with [facility] by calling  to make an appointment.   Will discuss case with Dr. Mon    and update primary team

## 2017-09-18 DIAGNOSIS — F43.22 ADJUSTMENT DISORDER WITH ANXIETY: ICD-10-CM

## 2017-09-18 LAB
ANION GAP SERPL CALC-SCNC: 13 MMOL/L — SIGNIFICANT CHANGE UP (ref 5–17)
ANION GAP SERPL CALC-SCNC: 13 MMOL/L — SIGNIFICANT CHANGE UP (ref 5–17)
APTT BLD: 44.5 SEC — HIGH (ref 27.5–37.4)
APTT BLD: 92.1 SEC — HIGH (ref 27.5–37.4)
BUN SERPL-MCNC: 16 MG/DL — SIGNIFICANT CHANGE UP (ref 7–23)
BUN SERPL-MCNC: 34 MG/DL — HIGH (ref 7–23)
CALCIUM SERPL-MCNC: 8.3 MG/DL — LOW (ref 8.4–10.5)
CALCIUM SERPL-MCNC: 8.9 MG/DL — SIGNIFICANT CHANGE UP (ref 8.4–10.5)
CHLORIDE SERPL-SCNC: 89 MMOL/L — LOW (ref 96–108)
CHLORIDE SERPL-SCNC: 97 MMOL/L — SIGNIFICANT CHANGE UP (ref 96–108)
CO2 SERPL-SCNC: 23 MMOL/L — SIGNIFICANT CHANGE UP (ref 22–31)
CO2 SERPL-SCNC: 24 MMOL/L — SIGNIFICANT CHANGE UP (ref 22–31)
CREAT SERPL-MCNC: 2.77 MG/DL — HIGH (ref 0.5–1.3)
CREAT SERPL-MCNC: 5.03 MG/DL — HIGH (ref 0.5–1.3)
GLUCOSE SERPL-MCNC: 174 MG/DL — HIGH (ref 70–99)
GLUCOSE SERPL-MCNC: 91 MG/DL — SIGNIFICANT CHANGE UP (ref 70–99)
HCT VFR BLD CALC: 25.1 % — LOW (ref 34.5–45)
HCT VFR BLD CALC: 32.2 % — LOW (ref 34.5–45)
HGB BLD-MCNC: 10 G/DL — LOW (ref 11.5–15.5)
HGB BLD-MCNC: 7.7 G/DL — LOW (ref 11.5–15.5)
MAGNESIUM SERPL-MCNC: 2 MG/DL — SIGNIFICANT CHANGE UP (ref 1.6–2.6)
MCHC RBC-ENTMCNC: 25.8 PG — LOW (ref 27–34)
MCHC RBC-ENTMCNC: 25.8 PG — LOW (ref 27–34)
MCHC RBC-ENTMCNC: 30.7 G/DL — LOW (ref 32–36)
MCHC RBC-ENTMCNC: 31.1 G/DL — LOW (ref 32–36)
MCV RBC AUTO: 83.2 FL — SIGNIFICANT CHANGE UP (ref 80–100)
MCV RBC AUTO: 83.9 FL — SIGNIFICANT CHANGE UP (ref 80–100)
PLATELET # BLD AUTO: 290 K/UL — SIGNIFICANT CHANGE UP (ref 150–400)
PLATELET # BLD AUTO: 300 K/UL — SIGNIFICANT CHANGE UP (ref 150–400)
POTASSIUM SERPL-MCNC: 4 MMOL/L — SIGNIFICANT CHANGE UP (ref 3.5–5.3)
POTASSIUM SERPL-MCNC: 4.2 MMOL/L — SIGNIFICANT CHANGE UP (ref 3.5–5.3)
POTASSIUM SERPL-SCNC: 4 MMOL/L — SIGNIFICANT CHANGE UP (ref 3.5–5.3)
POTASSIUM SERPL-SCNC: 4.2 MMOL/L — SIGNIFICANT CHANGE UP (ref 3.5–5.3)
RBC # BLD: 2.99 M/UL — LOW (ref 3.8–5.2)
RBC # BLD: 3.87 M/UL — SIGNIFICANT CHANGE UP (ref 3.8–5.2)
RBC # FLD: 16.7 % — SIGNIFICANT CHANGE UP (ref 10.3–16.9)
RBC # FLD: 17.3 % — HIGH (ref 10.3–16.9)
SODIUM SERPL-SCNC: 125 MMOL/L — LOW (ref 135–145)
SODIUM SERPL-SCNC: 134 MMOL/L — LOW (ref 135–145)
WBC # BLD: 13.1 K/UL — HIGH (ref 3.8–10.5)
WBC # BLD: 14.2 K/UL — HIGH (ref 3.8–10.5)
WBC # FLD AUTO: 13.1 K/UL — HIGH (ref 3.8–10.5)
WBC # FLD AUTO: 14.2 K/UL — HIGH (ref 3.8–10.5)

## 2017-09-18 PROCEDURE — 99233 SBSQ HOSP IP/OBS HIGH 50: CPT

## 2017-09-18 PROCEDURE — 99232 SBSQ HOSP IP/OBS MODERATE 35: CPT | Mod: GC

## 2017-09-18 PROCEDURE — 99232 SBSQ HOSP IP/OBS MODERATE 35: CPT

## 2017-09-18 PROCEDURE — 71010: CPT | Mod: 26

## 2017-09-18 RX ORDER — METOCLOPRAMIDE HCL 10 MG
10 TABLET ORAL ONCE
Qty: 0 | Refills: 0 | Status: COMPLETED | OUTPATIENT
Start: 2017-09-18 | End: 2017-09-18

## 2017-09-18 RX ORDER — ERYTHROPOIETIN 10000 [IU]/ML
10000 INJECTION, SOLUTION INTRAVENOUS; SUBCUTANEOUS ONCE
Qty: 0 | Refills: 0 | Status: COMPLETED | OUTPATIENT
Start: 2017-09-18 | End: 2017-09-18

## 2017-09-18 RX ORDER — ASPIRIN/CALCIUM CARB/MAGNESIUM 324 MG
325 TABLET ORAL DAILY
Qty: 0 | Refills: 0 | Status: DISCONTINUED | OUTPATIENT
Start: 2017-09-18 | End: 2017-09-18

## 2017-09-18 RX ORDER — PANTOPRAZOLE SODIUM 20 MG/1
8 TABLET, DELAYED RELEASE ORAL
Qty: 80 | Refills: 0 | Status: DISCONTINUED | OUTPATIENT
Start: 2017-09-18 | End: 2017-09-18

## 2017-09-18 RX ORDER — TRAMADOL HYDROCHLORIDE 50 MG/1
25 TABLET ORAL ONCE
Qty: 0 | Refills: 0 | Status: DISCONTINUED | OUTPATIENT
Start: 2017-09-18 | End: 2017-09-18

## 2017-09-18 RX ORDER — INSULIN GLARGINE 100 [IU]/ML
30 INJECTION, SOLUTION SUBCUTANEOUS AT BEDTIME
Qty: 0 | Refills: 0 | Status: DISCONTINUED | OUTPATIENT
Start: 2017-09-18 | End: 2017-09-20

## 2017-09-18 RX ORDER — ACETAMINOPHEN 500 MG
1000 TABLET ORAL ONCE
Qty: 0 | Refills: 0 | Status: COMPLETED | OUTPATIENT
Start: 2017-09-18 | End: 2017-09-18

## 2017-09-18 RX ORDER — LIDOCAINE 4 G/100G
1 CREAM TOPICAL ONCE
Qty: 0 | Refills: 0 | Status: COMPLETED | OUTPATIENT
Start: 2017-09-18 | End: 2017-09-18

## 2017-09-18 RX ADMIN — Medication 4: at 23:00

## 2017-09-18 RX ADMIN — ERYTHROPOIETIN 10000 UNIT(S): 10000 INJECTION, SOLUTION INTRAVENOUS; SUBCUTANEOUS at 13:49

## 2017-09-18 RX ADMIN — Medication 650 MILLIGRAM(S): at 10:30

## 2017-09-18 RX ADMIN — AMLODIPINE BESYLATE 10 MILLIGRAM(S): 2.5 TABLET ORAL at 06:36

## 2017-09-18 RX ADMIN — PANTOPRAZOLE SODIUM 40 MILLIGRAM(S): 20 TABLET, DELAYED RELEASE ORAL at 06:36

## 2017-09-18 RX ADMIN — VALSARTAN 160 MILLIGRAM(S): 80 TABLET ORAL at 06:36

## 2017-09-18 RX ADMIN — GABAPENTIN 300 MILLIGRAM(S): 400 CAPSULE ORAL at 22:51

## 2017-09-18 RX ADMIN — LIDOCAINE 1 PATCH: 4 CREAM TOPICAL at 22:43

## 2017-09-18 RX ADMIN — SODIUM CHLORIDE 3 MILLILITER(S): 9 INJECTION INTRAMUSCULAR; INTRAVENOUS; SUBCUTANEOUS at 15:24

## 2017-09-18 RX ADMIN — SIMVASTATIN 20 MILLIGRAM(S): 20 TABLET, FILM COATED ORAL at 22:51

## 2017-09-18 RX ADMIN — SODIUM CHLORIDE 3 MILLILITER(S): 9 INJECTION INTRAMUSCULAR; INTRAVENOUS; SUBCUTANEOUS at 06:43

## 2017-09-18 RX ADMIN — Medication 50 MILLIGRAM(S): at 06:36

## 2017-09-18 RX ADMIN — Medication 13 UNIT(S): at 06:36

## 2017-09-18 RX ADMIN — GABAPENTIN 100 MILLIGRAM(S): 400 CAPSULE ORAL at 06:37

## 2017-09-18 RX ADMIN — Medication 50 MILLIGRAM(S): at 12:00

## 2017-09-18 RX ADMIN — LIDOCAINE 1 PATCH: 4 CREAM TOPICAL at 10:30

## 2017-09-18 RX ADMIN — POLYETHYLENE GLYCOL 3350 17 GRAM(S): 17 POWDER, FOR SOLUTION ORAL at 11:57

## 2017-09-18 RX ADMIN — Medication 50 MILLIGRAM(S): at 17:27

## 2017-09-18 RX ADMIN — DAPTOMYCIN 128 MILLIGRAM(S): 500 INJECTION, POWDER, LYOPHILIZED, FOR SOLUTION INTRAVENOUS at 17:28

## 2017-09-18 RX ADMIN — Medication 2: at 11:59

## 2017-09-18 RX ADMIN — CEFTAROLINE FOSAMIL 50 MILLIGRAM(S): 600 POWDER, FOR SOLUTION INTRAVENOUS at 15:37

## 2017-09-18 RX ADMIN — Medication 2: at 06:36

## 2017-09-18 RX ADMIN — Medication 50 MILLIGRAM(S): at 00:34

## 2017-09-18 RX ADMIN — Medication 81 MILLIGRAM(S): at 12:00

## 2017-09-18 RX ADMIN — GABAPENTIN 100 MILLIGRAM(S): 400 CAPSULE ORAL at 17:27

## 2017-09-18 RX ADMIN — SODIUM CHLORIDE 3 MILLILITER(S): 9 INJECTION INTRAMUSCULAR; INTRAVENOUS; SUBCUTANEOUS at 22:51

## 2017-09-18 RX ADMIN — HEPARIN SODIUM 2000 UNIT(S)/HR: 5000 INJECTION INTRAVENOUS; SUBCUTANEOUS at 10:24

## 2017-09-18 RX ADMIN — Medication 13 UNIT(S): at 11:59

## 2017-09-18 RX ADMIN — Medication 50 MILLIGRAM(S): at 23:46

## 2017-09-18 RX ADMIN — INSULIN GLARGINE 30 UNIT(S): 100 INJECTION, SOLUTION SUBCUTANEOUS at 22:50

## 2017-09-18 RX ADMIN — TRAMADOL HYDROCHLORIDE 25 MILLIGRAM(S): 50 TABLET ORAL at 12:45

## 2017-09-18 RX ADMIN — TRAMADOL HYDROCHLORIDE 25 MILLIGRAM(S): 50 TABLET ORAL at 13:30

## 2017-09-18 RX ADMIN — Medication 650 MILLIGRAM(S): at 10:04

## 2017-09-18 NOTE — PROGRESS NOTE ADULT - SUBJECTIVE AND OBJECTIVE BOX
Stroke Neurology Follow-Up Visit    Pt seen and examined. Post-HD today.   Pt reports fatigue post-HD, reluctant to participate in full exam and questioning.   Mild headache at this time, but no associate vision changes, nausea, vomiting, or photophobia.   Reports continued bilateral lower extremity weakness, with LLE worse than RLE. This has been unchanged since last month when she was first diagnosed with endocarditis.   Also continues to have LLE numbness, but would not answer further questions in regards to characterizing numbness and comparisons to what it was yesterday. Pt reports she wanted to be left alone.      Exam:  T(F): 100.5 (09-18-17 @ 16:24), Max: 100.5 (09-18-17 @ 16:24)  HR: 95 (09-18-17 @ 15:00) (91 - 106)  BP: 140/74 (09-18-17 @ 15:00) (122/65 - 162/86)  RR: 18 (09-18-17 @ 15:00) (16 - 18)  SpO2: 97% (09-18-17 @ 15:00) (93% - 98%)    Gen: Lying in bed, calm, reluctant with exam, NAD   Neuro:  Mental status: Awake, alert and oriented x3. Able to follow commands, but reluctant at times. Naming and repetition intact-no aphasia.  Attention/concentration intact.  No dysarthria, speech is fluent and spontaneous   Cranial nerves: Pupils equally round and reactive to light, 3 mm, visual fields full, no nystagmus, no ptosis, extraocular muscles intact, V1 through V3 intact bilaterally and symmetric, no facial droop, palate elevation symmetric, tongue was midline, sternocleidomastoid/ shoulder shrug strength bilaterally 5/5.    Motor: No pronator drift of upper extremities. RUE 4+/5. LUE 4+/5. RLE 3/5 hip flexion/extension, 3/5 knee flexion/extension. LLE-pt states she is unable to perform hip flexion; after much encouragement was able to perform knee flexion, so at least 2/5; adductor 2/5, wiggled her toes to command.   Sensation: Intact and symmetric to light touch in upper extremities.  Decreased light touch and pinprick in circumferential around whole left thigh and along lateral left lower leg but intact in left foot even in area of osteomyelitis.   Coordination: pt declined to participate with finger to nose testing   Reflexes: symmetric in all extremities. Mute toes bilaterally.   Gait: deferred at this time     MEDICATIONS  (STANDING):  sodium chloride 0.9% lock flush 3 milliLiter(s) IV Push every 8 hours  aspirin enteric coated 81 milliGRAM(s) Oral daily  valsartan 160 milliGRAM(s) Oral daily  simvastatin 20 milliGRAM(s) Oral at bedtime  insulin lispro (HumaLOG) corrective regimen sliding scale   SubCutaneous every 6 hours  polyethylene glycol 3350 17 Gram(s) Oral daily  pantoprazole    Tablet 40 milliGRAM(s) Oral before breakfast  DAPTOmycin IVPB 700 milliGRAM(s) IV Intermittent every 48 hours  rifampin 600 milliGRAM(s) Oral daily  Ceftaroline Fosamil IVPB 200 milliGRAM(s) IV Intermittent every 12 hours  amLODIPine   Tablet 10 milliGRAM(s) Oral daily  metoprolol 50 milliGRAM(s) Oral every 6 hours  gabapentin 100 milliGRAM(s) Oral two times a day  gabapentin 300 milliGRAM(s) Oral at bedtime  insulin lispro Injectable (HumaLOG) 13 Unit(s) SubCutaneous three times a day before meals  heparin  Infusion. 1800 Unit(s)/Hr (18 mL/Hr) IV Continuous <Continuous>  insulin glargine Injectable (LANTUS) 30 Unit(s) SubCutaneous at bedtime    MEDICATIONS  (PRN):  dextrose Gel 1 Dose(s) Oral once PRN Blood Glucose LESS THAN 70 milliGRAM(s)/deciliter  glucagon  Injectable 1 milliGRAM(s) IntraMuscular once PRN Glucose LESS THAN 70 milligrams/deciliter  acetaminophen    Suspension. 650 milliGRAM(s) Oral every 6 hours PRN Mild Pain (1 - 3)      Labs:                        10.0   13.1  )-----------( 290      ( 18 Sep 2017 16:12 )             32.2     09-18    134<L>  |  97  |  16  ----------------------------<  91  4.0   |  24  |  2.77<H>    Ca    8.9      18 Sep 2017 16:12  Mg     2.0     09-18    PTT - ( 18 Sep 2017 06:33 )  PTT:44.5 sec    Hemoglobin A1C, Whole Blood: 8.2 %  Cholesterol, Serum: 70 mg/dL  HDL Cholesterol, Serum: 12 mg/dL  Triglycerides, Serum: 140 mg/dL  LDL 30  Thyroid Stimulating Hormone, Serum: 2.447 uIU/mL    Radiology:    < from: CT Head No Cont (09.17.17 @ 10:13) >  IMPRESSION: No intracranial hemorrhage or acute transcortical infarct.   Bilateral orbital proptosis and clinical correlations recommended.    Assessment & Plan:  47 year old female with PMH of HTN, HLD, DM II, ESRD on HD, chronic left foot osteomyelitis, transferred again from [facility] for further workup for endocarditis. Of note, pt did have reports of bilateral lower extremity weakness on last admission, documented as 2/5 of RLE and LLE. Imaging from previous admission not suggestive of stroke.     -CT head repeated on this admission - again, without evidence of acute stroke   -pending MRI head w/o meena to rule out small stroke that can be difficult to  on CT head   -pending MRI cervical/thoracic/lumbar spine to rule out spinal involvement   -continue treatment of underlying conditions

## 2017-09-18 NOTE — PROGRESS NOTE ADULT - SUBJECTIVE AND OBJECTIVE BOX
Objective and Subjective   The patient in her bed. DOes not endorse any complains, no shortness of breath, no chest pain, no fever. The femoral line removed yesterday and new catheter placed on the left IJ. she is due to HD for today     Patient is a 47 year old female with history of HTN, HLD, DM II, ESRD on HD (MWF. Last HD 09/13/2017. Receives HD via Right Femoral HD catheter placed on 09/13/2017 at [facility] 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 The renal follows the case for the need of HD. Last HD done on 9/15 - 2.6 liters off      Patient is a 47y Female admitted for     PAST MEDICAL & SURGICAL HISTORY:      MEDICATIONS  (STANDING):  sodium chloride 0.9% lock flush 3 milliLiter(s) IV Push every 8 hours  aspirin enteric coated 81 milliGRAM(s) Oral daily  valsartan 160 milliGRAM(s) Oral daily  simvastatin 20 milliGRAM(s) Oral at bedtime  insulin lispro (HumaLOG) corrective regimen sliding scale   SubCutaneous Before meals and at bedtime  dextrose 5%. 1000 milliLiter(s) (50 mL/Hr) IV Continuous <Continuous>  dextrose 50% Injectable 12.5 Gram(s) IV Push once  dextrose 50% Injectable 25 Gram(s) IV Push once  dextrose 50% Injectable 25 Gram(s) IV Push once  polyethylene glycol 3350 17 Gram(s) Oral daily  pantoprazole    Tablet 40 milliGRAM(s) Oral before breakfast  DAPTOmycin IVPB 700 milliGRAM(s) IV Intermittent every 48 hours  rifampin 600 milliGRAM(s) Oral daily  Ceftaroline Fosamil IVPB 200 milliGRAM(s) IV Intermittent every 12 hours  amLODIPine   Tablet 10 milliGRAM(s) Oral daily  metoprolol 50 milliGRAM(s) Oral every 6 hours  gabapentin 100 milliGRAM(s) Oral two times a day  gabapentin 300 milliGRAM(s) Oral at bedtime  insulin lispro Injectable (HumaLOG) 13 Unit(s) SubCutaneous three times a day before meals  insulin glargine Injectable (LANTUS) 25 Unit(s) SubCutaneous at bedtime  heparin  Infusion. 1800 Unit(s)/Hr (18 mL/Hr) IV Continuous <Continuous>  epoetin fransisca Injectable 37622 Unit(s) IV Push once  pantoprazole Infusion 8 mG/Hr (10 mL/Hr) IV Continuous <Continuous>    MEDICATIONS  (PRN):  dextrose Gel 1 Dose(s) Oral once PRN Blood Glucose LESS THAN 70 milliGRAM(s)/deciliter  glucagon  Injectable 1 milliGRAM(s) IntraMuscular once PRN Glucose LESS THAN 70 milligrams/deciliter  acetaminophen    Suspension. 650 milliGRAM(s) Oral every 6 hours PRN Mild Pain (1 - 3)      Allergies    penicillin (Unknown)  Sular (Unknown)    Intolerances        SOCIAL HISTORY:    FAMILY HISTORY:      T(C): , Max: 38.1 (09-17-17 @ 16:06)  T(F): , Max: 100.5 (09-17-17 @ 16:06)  HR: 98 (09-18-17 @ 09:00)  BP: 132/73 (09-18-17 @ 09:00)  BP(mean): 87 (09-18-17 @ 09:00)  RR: 18 (09-18-17 @ 09:00)  SpO2: 95% (09-18-17 @ 09:00)  Wt(kg): --    09-17 @ 07:01  -  09-18 @ 07:00  --------------------------------------------------------  IN: 1018 mL / OUT: 400 mL / NET: 618 mL      Alert and oriented  No JVD  HAs a catheter in the left IJ   No respiratory distress   Normal air entry in the lungs, no wheezing, no crackles, no rales   RRR, normal s1/s2, no mumurs, rubs or gallops  Abdomen - soft, non-tender, non-distended, normal bowel sounds   Extremities - mild peripheral edema   HAs a line in the right groin       LABS:                        7.7    14.2  )-----------( 300      ( 18 Sep 2017 06:33 )             25.1     09-18    125<L>  |  89<L>  |  34<H>  ----------------------------<  174<H>  4.2   |  23  |  5.03<H>    Ca    8.3<L>      18 Sep 2017 06:33  Mg     2.0     09-18        PTT - ( 18 Sep 2017 06:33 )  PTT:44.5 sec          RADIOLOGY & ADDITIONAL STUDIES:  < from: Xray Chest 1 View AP-PORTABLE IMMEDIATE (09.16.17 @ 18:42) >  IMPRESSION:  1.  Interval removal of a right-sided central line. Interval placement of   a left-sided central line with its tip projecting over the superior   cavoatrial junction.  2.  Unchanged patchy airspace consolidation within the right upper and   lower lung zones.  3.  Persistent left pleural effusion.    < end of copied text >

## 2017-09-18 NOTE — CONSULT NOTE ADULT - SUBJECTIVE AND OBJECTIVE BOX
HPI:  47 year old female with PMHx significant for HTN, HLD, DM II, ESRD on HD (MWF. Last HD 09/13/2017. Receives HD via Right Femoral HD catheter placed on 09/13/2017 at [facility].), and chronic left foot OM transferred to [facility]  on 9/13 from [facility] with known TV IE (MRSA – Bacteremia.) who has failed recent medical MGMT with aggressive antibiotics (Last Blood Cultures < 24 Hours = MRSA. TTE / ALCIDES completed 09/12/2017 and 09/13/2017 with questionable worsening / enlarging TV vegetation.). Patient here at [facility] to be evaluated for surgical intervention. Of note patient was seen, managed, and evaluated for surgical intervention in 08/2017 with Dr. Gee. At that time the decision to pursue medical therapy was made.    Patient with reported episode of bright red emesis early AM of 9/18. Patient notes that she drank some cranberry/karla juice around 5 am and spit up the juice shortly after lying down. Patient reports that shortly after drinking the juice, she coughed multiple times and spit up material that looked appeared like the juice she had earlier. Minimal decrease in Hgb (8.4 --> 7.9) this AM. ROS negative for dizziness, chest pain, shortness of breath, nausea/vomiting, diarrhea, melena/hematochezia, hematuria. Remainder of ROS negative.     GI consulted for suspicion of possible GI bleed in the setting of Heparin gtt.     Allergies:  Sulfur Drugs - feels like "bugs crawling all over my body"  PCN - hives    Medications:  MEDICATIONS  (STANDING):  sodium chloride 0.9% lock flush 3 milliLiter(s) IV Push every 8 hours  aspirin enteric coated 81 milliGRAM(s) Oral daily  valsartan 160 milliGRAM(s) Oral daily  simvastatin 20 milliGRAM(s) Oral at bedtime  insulin lispro (HumaLOG) corrective regimen sliding scale   SubCutaneous Before meals and at bedtime  dextrose 5%. 1000 milliLiter(s) (50 mL/Hr) IV Continuous <Continuous>  dextrose 50% Injectable 12.5 Gram(s) IV Push once  dextrose 50% Injectable 25 Gram(s) IV Push once  dextrose 50% Injectable 25 Gram(s) IV Push once  polyethylene glycol 3350 17 Gram(s) Oral daily  pantoprazole    Tablet 40 milliGRAM(s) Oral before breakfast  DAPTOmycin IVPB 700 milliGRAM(s) IV Intermittent every 48 hours  rifampin 600 milliGRAM(s) Oral daily  Ceftaroline Fosamil IVPB 200 milliGRAM(s) IV Intermittent every 12 hours  amLODIPine   Tablet 10 milliGRAM(s) Oral daily  metoprolol 50 milliGRAM(s) Oral every 6 hours  gabapentin 100 milliGRAM(s) Oral two times a day  gabapentin 300 milliGRAM(s) Oral at bedtime  insulin lispro Injectable (HumaLOG) 13 Unit(s) SubCutaneous three times a day before meals  insulin glargine Injectable (LANTUS) 25 Unit(s) SubCutaneous at bedtime  heparin  Infusion. 1800 Unit(s)/Hr (18 mL/Hr) IV Continuous <Continuous>  epoetin fransisca Injectable 15092 Unit(s) IV Push once    MEDICATIONS  (PRN):  dextrose Gel 1 Dose(s) Oral once PRN Blood Glucose LESS THAN 70 milliGRAM(s)/deciliter  glucagon  Injectable 1 milliGRAM(s) IntraMuscular once PRN Glucose LESS THAN 70 milligrams/deciliter  acetaminophen    Suspension. 650 milliGRAM(s) Oral every 6 hours PRN Mild Pain (1 - 3)    PAST MEDICAL & SURGICAL HISTORY:  HTN  HLD  DM II  ESRD on HD (MWF)  Chronic left foot OM    FAMILY HISTORY:   Family history of Alzheimer's Disease  No family history of esophageal, gastric, hepatic or colon cancer    SOCIAL HISTORY:  [place]: Former smoker (1 pack x 35 years; quit 3.5 years ago)  Alcohol: denies   Illicit Drugs: denies    REVIEW OF SYSTEMS:  As per HPI    Vital Signs Last 24 Hrs  T(C): 37.7 (18 Sep 2017 09:00), Max: 38.1 (17 Sep 2017 16:06)  T(F): 99.8 (18 Sep 2017 09:00), Max: 100.5 (17 Sep 2017 16:06)  HR: 98 (18 Sep 2017 09:00) (96 - 106)  BP: 132/73 (18 Sep 2017 09:00) (132/73 - 162/86)  BP(mean): 87 (18 Sep 2017 09:00) (87 - 111)  RR: 18 (18 Sep 2017 09:00) (16 - 18)  SpO2: 95% (18 Sep 2017 09:00) (93% - 98%)    09-17 @ 07:01  -  09-18 @ 07:00  --------------------------------------------------------  IN: 1018 mL / OUT: 400 mL / NET: 618 mL        PHYSICAL EXAM:    General: Well developed; well nourished; in no acute distress, lying comfortably in bed  HEENT: MMM, conjunctiva and sclera clear, no visible blood in oropharynx  CV: +S1S2, RRR  Lungs: CTA b/l, no w/r/r appreciated on auscultation  Gastrointestinal: Soft, non-tender non-distended; Normal bowel sounds; No rebound or guarding; midline palpable lower abdominal mass (patient notes history of large uterine fibroid); midline vertical healed scar below umbilicus from prior surgery  Extremities: Normal range of motion, No clubbing, cyanosis or edema, 2+ pulses (UE & LE b/l)  Neurological: Alert and oriented x3  Skin: Warm and dry. No obvious rash    LABS:                        7.7    14.2  )-----------( 300      ( 18 Sep 2017 06:33 )             25.1     09-18    125<L>  |  89<L>  |  34<H>  ----------------------------<  174<H>  4.2   |  23  |  5.03<H>    Ca    8.3<L>      18 Sep 2017 06:33  Mg     2.0     09-18          RADIOLOGY & ADDITIONAL STUDIES: HPI:  47 year old female with PMHx significant for HTN, HLD, DM II, ESRD on HD (MWF. Last HD 09/13/2017. Receives HD via Right Femoral HD catheter placed on 09/13/2017 at [facility].), and chronic left foot OM transferred to [facility]  on 9/13 from [facility] with known TV IE (MRSA – Bacteremia.) who has failed recent medical MGMT with aggressive antibiotics (Last Blood Cultures < 24 Hours = MRSA. TTE / ALCIDES completed 09/12/2017 and 09/13/2017 with questionable worsening / enlarging TV vegetation.). Patient here at [facility] to be evaluated for surgical intervention. Of note patient was seen, managed, and evaluated for surgical intervention in 08/2017 with Dr. Gee. At that time the decision to pursue medical therapy was made.    Patient with reported episode of bright red emesis early AM of 9/18. Patient notes that she drank some cranberry/karla juice around 5 am and spit up the juice shortly after lying down. Patient reports that shortly after drinking the juice, she coughed multiple times and spit up material that appeared like the juice she had earlier. Minimal decrease in Hgb (8.4 --> 7.9) this AM. Heparin gtt temporarily held for possible suspicion of GI bleed. Patient also takes daily ASA 81 mg at home for her heart for which she has taken for 5+ years now, denies NSAID use given ESRD. ROS negative for dizziness, chest pain, shortness of breath, nausea/vomiting, diarrhea, melena/hematochezia, hematuria. Remainder of ROS negative.     GI consulted for suspicion of possible GI bleed in the setting of Heparin gtt.     Allergies:  Sulfur Drugs - feels like "bugs crawling all over my body"  PCN - hives    Medications:  MEDICATIONS  (STANDING):  sodium chloride 0.9% lock flush 3 milliLiter(s) IV Push every 8 hours  aspirin enteric coated 81 milliGRAM(s) Oral daily  valsartan 160 milliGRAM(s) Oral daily  simvastatin 20 milliGRAM(s) Oral at bedtime  insulin lispro (HumaLOG) corrective regimen sliding scale   SubCutaneous Before meals and at bedtime  dextrose 5%. 1000 milliLiter(s) (50 mL/Hr) IV Continuous <Continuous>  dextrose 50% Injectable 12.5 Gram(s) IV Push once  dextrose 50% Injectable 25 Gram(s) IV Push once  dextrose 50% Injectable 25 Gram(s) IV Push once  polyethylene glycol 3350 17 Gram(s) Oral daily  pantoprazole    Tablet 40 milliGRAM(s) Oral before breakfast  DAPTOmycin IVPB 700 milliGRAM(s) IV Intermittent every 48 hours  rifampin 600 milliGRAM(s) Oral daily  Ceftaroline Fosamil IVPB 200 milliGRAM(s) IV Intermittent every 12 hours  amLODIPine   Tablet 10 milliGRAM(s) Oral daily  metoprolol 50 milliGRAM(s) Oral every 6 hours  gabapentin 100 milliGRAM(s) Oral two times a day  gabapentin 300 milliGRAM(s) Oral at bedtime  insulin lispro Injectable (HumaLOG) 13 Unit(s) SubCutaneous three times a day before meals  insulin glargine Injectable (LANTUS) 25 Unit(s) SubCutaneous at bedtime  heparin  Infusion. 1800 Unit(s)/Hr (18 mL/Hr) IV Continuous <Continuous>  epoetin fransisca Injectable 44671 Unit(s) IV Push once    MEDICATIONS  (PRN):  dextrose Gel 1 Dose(s) Oral once PRN Blood Glucose LESS THAN 70 milliGRAM(s)/deciliter  glucagon  Injectable 1 milliGRAM(s) IntraMuscular once PRN Glucose LESS THAN 70 milligrams/deciliter  acetaminophen    Suspension. 650 milliGRAM(s) Oral every 6 hours PRN Mild Pain (1 - 3)    PAST MEDICAL & SURGICAL HISTORY:  HTN  HLD  DM II  ESRD on HD (MWF)  Chronic left foot OM    FAMILY HISTORY:   Family history of Alzheimer's Disease  No family history of esophageal, gastric, hepatic or colon cancer    SOCIAL HISTORY:  [place]: Former smoker (1 pack x 35 years; quit 3.5 years ago)  Alcohol: denies   Illicit Drugs: denies    REVIEW OF SYSTEMS:  As per HPI    Vital Signs Last 24 Hrs  T(C): 37.7 (18 Sep 2017 09:00), Max: 38.1 (17 Sep 2017 16:06)  T(F): 99.8 (18 Sep 2017 09:00), Max: 100.5 (17 Sep 2017 16:06)  HR: 98 (18 Sep 2017 09:00) (96 - 106)  BP: 132/73 (18 Sep 2017 09:00) (132/73 - 162/86)  BP(mean): 87 (18 Sep 2017 09:00) (87 - 111)  RR: 18 (18 Sep 2017 09:00) (16 - 18)  SpO2: 95% (18 Sep 2017 09:00) (93% - 98%)    09-17 @ 07:01  -  09-18 @ 07:00  --------------------------------------------------------  IN: 1018 mL / OUT: 400 mL / NET: 618 mL        PHYSICAL EXAM:    General: Well developed; well nourished; in no acute distress, lying comfortably in bed  HEENT: MMM, conjunctiva and sclera clear, no visible blood in oropharynx  CV: +S1S2, RRR  Lungs: CTA b/l, no w/r/r appreciated on auscultation  Gastrointestinal: Soft, non-tender non-distended; Normal bowel sounds; No rebound or guarding; midline palpable lower abdominal mass (patient notes history of large uterine fibroid); midline vertical healed scar below umbilicus from prior surgery  Extremities: Normal range of motion, No clubbing, cyanosis or edema, 2+ pulses (UE & LE b/l)  Neurological: Alert and oriented x3  Skin: Warm and dry. No obvious rash    LABS:                        7.7    14.2  )-----------( 300      ( 18 Sep 2017 06:33 )             25.1     09-18    125<L>  |  89<L>  |  34<H>  ----------------------------<  174<H>  4.2   |  23  |  5.03<H>    Ca    8.3<L>      18 Sep 2017 06:33  Mg     2.0     09-18          RADIOLOGY & ADDITIONAL STUDIES:

## 2017-09-18 NOTE — CHART NOTE - NSCHARTNOTEFT_GEN_A_CORE
Admitting Diagnosis:   Patient is a 47y old  Female who presents with a chief complaint of TV IE / MRSA Bacteremia. (13 Sep 2017 22:57)      PAST MEDICAL & SURGICAL HISTORY:  PMH:  HTN, HLD, DM II, ESRD on HD (MWF), and chronic left foot OM     Current Nutrition Order:  Diet, NPO (09-18-17 @ 08:47)    PO Intake:   NPO at present    GI Issues:   Denies N/V/D/C    Pain:  Denies current     Skin Integrity:  L foot DM ulcer, IAD buttock, L buttock stage 2 PU    Labs:   09-18    125<L>  |  89<L>  |  34<H>  ----------------------------<  174<H>  4.2   |  23  |  5.03<H>    Ca    8.3<L>      18 Sep 2017 06:33  Mg     2.0     09-18      CAPILLARY BLOOD GLUCOSE  182 (18 Sep 2017 06:22)  195 (17 Sep 2017 21:12)  136 (17 Sep 2017 16:06)          Medications:  MEDICATIONS  (STANDING):  sodium chloride 0.9% lock flush 3 milliLiter(s) IV Push every 8 hours  aspirin enteric coated 81 milliGRAM(s) Oral daily  valsartan 160 milliGRAM(s) Oral daily  simvastatin 20 milliGRAM(s) Oral at bedtime  insulin lispro (HumaLOG) corrective regimen sliding scale   SubCutaneous Before meals and at bedtime  dextrose 5%. 1000 milliLiter(s) (50 mL/Hr) IV Continuous <Continuous>  dextrose 50% Injectable 12.5 Gram(s) IV Push once  dextrose 50% Injectable 25 Gram(s) IV Push once  dextrose 50% Injectable 25 Gram(s) IV Push once  polyethylene glycol 3350 17 Gram(s) Oral daily  pantoprazole    Tablet 40 milliGRAM(s) Oral before breakfast  DAPTOmycin IVPB 700 milliGRAM(s) IV Intermittent every 48 hours  rifampin 600 milliGRAM(s) Oral daily  Ceftaroline Fosamil IVPB 200 milliGRAM(s) IV Intermittent every 12 hours  amLODIPine   Tablet 10 milliGRAM(s) Oral daily  metoprolol 50 milliGRAM(s) Oral every 6 hours  gabapentin 100 milliGRAM(s) Oral two times a day  gabapentin 300 milliGRAM(s) Oral at bedtime  insulin lispro Injectable (HumaLOG) 13 Unit(s) SubCutaneous three times a day before meals  insulin glargine Injectable (LANTUS) 25 Unit(s) SubCutaneous at bedtime  heparin  Infusion. 1800 Unit(s)/Hr (18 mL/Hr) IV Continuous <Continuous>  epoetin fransisca Injectable 01280 Unit(s) IV Push once  traMADol 25 milliGRAM(s) Oral once    MEDICATIONS  (PRN):  dextrose Gel 1 Dose(s) Oral once PRN Blood Glucose LESS THAN 70 milliGRAM(s)/deciliter  glucagon  Injectable 1 milliGRAM(s) IntraMuscular once PRN Glucose LESS THAN 70 milligrams/deciliter  acetaminophen    Suspension. 650 milliGRAM(s) Oral every 6 hours PRN Mild Pain (1 - 3)      Weight:  87kg (9/13)--> 89.5kg (9/17)    Weight Change:   Wt changes likely 2/2 fluid shifts with HD; continue to trend dry wts    Estimated energy needs:   IBW 68.2kg used for EER and adjusted for HD and PU; possible pre-op.   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 Pt for possible OR for TVR and CABG pending gallium scan result. She is seen in bed receiving HD. Pt reports prior good intake without complaints. Denies GI distress, pain, and mechanical issues. Attempted diet reinforcement, but pt refusing at time of f/u. Will continue to follow per policy and reattempt [facility].     Previous Nutrition Diagnosis:  Food - and nutrition - related knowledge deficit; Limited adherence to nutrition - related recommendations RT pt is not compliant with diet restrictions appropriate for her medical status AEB pt reports not following Renal restrictions and A1C (9/13) 8.2    Active [X]  Resolved [   ]    If resolved, new PES:   Pt to recall 3x components to renal, DM diet    Goal:  Pt to demonstrate diet compliance and consume >75% of EER    Recommendations:  As medically appropriate order renal, CST CHO, Dash/TLC diet.  Add Nepro BID if intake is poor.  Monitor lytes and replete prn.   Trend dry wts.    Education:   Pt-refused    Risk Level: High [X] Moderate [   ] Low [   ]

## 2017-09-18 NOTE — CONSULT NOTE ADULT - PROBLEM SELECTOR RECOMMENDATION 9
Patient with extensive cardiac history with recent diagnosis of Tricuspid endocarditis (MRSA bacteremia, refractory to aggressive abxs). Hospital course c/b recent MCA infarct and septic emboli, with large SVC/ RA thrombus (on heparin gtt), now with reported bright red emesis this AM with minimal decrease in Hgb (8.4 --> 7.7). Heparin gtt held for suspicion of possible GI bleed. HD stable, History and exam consistent with regurgitation of cranberry/karla juice that patient drank shortly prior to spitting up bright red material.  -Patient hemodynamically stable, patient with anemia in the setting of ESRD, H/H stable  -Continue to trend H/H; recommend repeat CBC at 2 pm.  -Can resume Heparin gtt from GI perspective; will defer to CT surgery recommendations  -If clinical suspicion still high for possible GI bleed, can consider adding daily PPI  -Discussed with the patient the possibility of requiring an EGD if patient does show any signs of bleeding or if there is any acute drop in Hgb. Patient does not endorse any bleeding at this time and would not like to pursue any intervention at this time.   -If patient does show any signs of HD instability and/or any active signs of bleeding (i.e. hematemesis, hematochezia, melena), will re-consider an EGD at that time to r/o GI bleed.

## 2017-09-18 NOTE — PROGRESS NOTE ADULT - ATTENDING COMMENTS
Pt seen with Dr. Yoon on rounds this afternoon.  Glucoses have been surprisingly good (almost all < 200) despite her somewhat erratic PO intake and the meals brought in from outside.    Plans for OR still uncertain.  Given the somewhat above-target AM fingersticks, will increase the Lantus dose slightly.  Do not want to increase the pre-meal Humalog at this point given the uncertainty regarding the pt's intake of the hospital food

## 2017-09-18 NOTE — PROGRESS NOTE ADULT - SUBJECTIVE AND OBJECTIVE BOX
Patient was seen and evaluated on dialysis.   Patient is tolerating the procedure well.   HR: 98 (09-18-17 @ 09:00)  BP: 132/73 (09-18-17 @ 09:00) pusle 65, /67  Continue dialysis:   Dialyzer:  180      QB:  400      QD: 500   Goal UF 3 liters off over 240 Hours     The patient is stable at HD, has a left IJ catheter

## 2017-09-18 NOTE — PROGRESS NOTE ADULT - ASSESSMENT
This is 47 year old female with PMH stated above. The renal service is activated for the need of HD. She was dialyzed on 9/15 - 2.6 liters off. She will be dialyzed again today - her regular HD

## 2017-09-18 NOTE — PROGRESS NOTE ADULT - ASSESSMENT
A/P: 47 year old female with PMHx HTN, HLD, DM2, ESRD on HD (MWF, last HD 9/13/17, Right Femoral HD catheter), and chronic left foot OM who was transferred to [facility] from [facility] on 9/13/17 with known TV IE (MRSA Bacteremia.) who has failed medical management with aggressive antibiotics. Patient transferred to [facility] to be evaluated for surgical intervention. Of note patient was seen, managed, and evaluated for surgical intervention in 08/2017 with Dr. Gee. At that time the decision to pursue medical therapy was made. Patient received 2U FFP, prior to HD, 1 UPRBC while receiving HD for elevator INR  .  Cardiac cath revealed + RAC lesion.  Vascular and ortho consulted for chronic L foot OM, recs to continue abx and awaiting gallium scan for possible intervention.  Renal consulted for ESRD and HD. Neuro consulted for new left lower extremity weakness MRI brain and spine pending to rule out stroke not seen on CT scan and seeing of infection into the spine. Will get Gallium scan of entire body tomorrow.       #Neuro: h/o MCA and putamen CVA, pt reporting weakness in L leg, Neuro consulted.   - head CT on 8/24 showing MCA infarct, but repeat on 8/25 inconclusive, possibly artifact. Head CT after stroke code also not showing acute infarct. Pt has been on heparin gtt w/o issue >2 weeks.   - MRI of brain and spinal cord ordered to rule out infarct and seeding of infection into the spine.   - Dr. Luna consulted for neurology    #Cards:   - TV endocarditis with septic emboli and SVC/ RA clot: Dr. Kingston following, cont rifampin, daptomycin, and ceftaroline.   - blood cultures negative during this admission, Escobar reports latest blood cultures on 9/13/17 positive for MRSA   - Repeat blood cultures sent today   - cont ASA, metoprolol, amlodipine (increased for HTN), valsartan, statin  - cardiac cath  on 9/15 showing m-dRCA 75% stenosis, on appropriate medical therapy will get CABG once patient is cleared for surgery     #Osteomyelitis  - s/p multiple surgeries in the past, ortho/ vascular consulted for possible surgical intervention. Gallium scan of bilateral feet performed Friday with inconclusive results. Will get whole body scan in am   - Ortho following follow up recs     #Pulm:  - SVC/ RA clot on heparin gtt  - CT chest showing possible septic emboli, lung nodules   - PTT subtherapeutic today but heparin was held this morning for hemetemsis (pt had red jello) restarted. Follow up PTT. 4pm clotted.     #DM: A1c 8.2  - appreciate endocrine consult, cont to adjust insulin pending FBG  - currently on 30 unit Lantus in evening and 13 Units with meals.     #Renal: ESRD on HD: Left IJ permacath placed on saturday 9/16/17  - HD today.   - has AV fistula, awaiting vascular evaluation of when it is mature to use  - 3 L off.     #Psych consulted for depression  - appreciated recs, gabapentin dosed per psych recs    #Vasc: c/o left thigh pain   - check US LE to r/o DVT   - c/w heparin gtt    #dispo: OR for TVR, CABGx1 next week pending decision from ortho/ vascular team   - hold valsartan 24-48h prior to surgery   - GI PPX

## 2017-09-18 NOTE — PROGRESS NOTE ADULT - PROBLEM SELECTOR PLAN 2
hemoglobin - 7.7   - most likely due to the infection and underlying Renal failure   - we will do EPO with HD today.

## 2017-09-18 NOTE — PROGRESS NOTE ADULT - SUBJECTIVE AND OBJECTIVE BOX
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 (13 Sep 2017 22:57)    Patient denies any complaints. She had rice krispies for breakfast.    Pt reports the following symptoms:    CONSTITUTIONAL:  Negative fever or chills, feels well, good appetite  EYES:  Negative  blurry vision or double vision  CARDIOVASCULAR:  Negative for chest pain or palpitations  RESPIRATORY:  Negative for cough, wheezing, or SOB   GASTROINTESTINAL:  Negative for nausea, vomiting, diarrhea, constipation, or abdominal pain  GENITOURINARY:  Negative frequency, urgency or dysuria  NEUROLOGIC:  No headache, confusion, dizziness, lightheadedness    MEDICATIONS  (STANDING):  sodium chloride 0.9% lock flush 3 milliLiter(s) IV Push every 8 hours  aspirin enteric coated 81 milliGRAM(s) Oral daily  valsartan 160 milliGRAM(s) Oral daily  simvastatin 20 milliGRAM(s) Oral at bedtime  insulin lispro (HumaLOG) corrective regimen sliding scale   SubCutaneous Before meals and at bedtime  dextrose 5%. 1000 milliLiter(s) (50 mL/Hr) IV Continuous <Continuous>  dextrose 50% Injectable 12.5 Gram(s) IV Push once  dextrose 50% Injectable 25 Gram(s) IV Push once  dextrose 50% Injectable 25 Gram(s) IV Push once  polyethylene glycol 3350 17 Gram(s) Oral daily  pantoprazole    Tablet 40 milliGRAM(s) Oral before breakfast  DAPTOmycin IVPB 700 milliGRAM(s) IV Intermittent every 48 hours  rifampin 600 milliGRAM(s) Oral daily  Ceftaroline Fosamil IVPB 200 milliGRAM(s) IV Intermittent every 12 hours  amLODIPine   Tablet 10 milliGRAM(s) Oral daily  metoprolol 50 milliGRAM(s) Oral every 6 hours  gabapentin 100 milliGRAM(s) Oral two times a day  gabapentin 300 milliGRAM(s) Oral at bedtime  insulin lispro Injectable (HumaLOG) 13 Unit(s) SubCutaneous three times a day before meals  insulin glargine Injectable (LANTUS) 25 Unit(s) SubCutaneous at bedtime  heparin  Infusion. 1800 Unit(s)/Hr (18 mL/Hr) IV Continuous <Continuous>  epoetin fransisca Injectable 77677 Unit(s) IV Push once  traMADol 25 milliGRAM(s) Oral once    MEDICATIONS  (PRN):  dextrose Gel 1 Dose(s) Oral once PRN Blood Glucose LESS THAN 70 milliGRAM(s)/deciliter  glucagon  Injectable 1 milliGRAM(s) IntraMuscular once PRN Glucose LESS THAN 70 milligrams/deciliter  acetaminophen    Suspension. 650 milliGRAM(s) Oral every 6 hours PRN Mild Pain (1 - 3)      PHYSICAL EXAM  Vital Signs Last 24 Hrs  T(C): 37.7 (18 Sep 2017 09:00), Max: 38.1 (17 Sep 2017 16:06)  T(F): 99.8 (18 Sep 2017 09:00), Max: 100.5 (17 Sep 2017 16:06)  HR: 98 (18 Sep 2017 09:00) (96 - 106)  BP: 132/73 (18 Sep 2017 09:00) (132/73 - 162/86)  BP(mean): 87 (18 Sep 2017 09:00) (87 - 111)  RR: 18 (18 Sep 2017 09:00) (16 - 18)  SpO2: 95% (18 Sep 2017 09:00) (93% - 98%)    Constitutional: wn/wd in NAD.   HEENT: NCAT, MMM  Respiratory: poor BS at lower bases  Cardiovascular: regular rhythm, normal S1 and S2, no audible murmurs, no peripheral edema  GI: soft, NT/ND, no masses/HSM appreciated.  Skin: no visible rashes/lesions  Psychiatric: AAO x 3, normal affect/mood.  Ext: radial pulses intact, DP pulses intact, extremities warm, no cyanosis, clubbing or edema, L foot with old scar and poor healing  LABS:                        7.7    14.2  )-----------( 300      ( 18 Sep 2017 06:33 )             25.1     09-18    125<L>  |  89<L>  |  34<H>  ----------------------------<  174<H>  4.2   |  23  |  5.03<H>    Ca    8.3<L>      18 Sep 2017 06:33  Mg     2.0     09-18      PTT - ( 18 Sep 2017 06:33 )  PTT:44.5 sec    Thyroid Stimulating Hormone, Serum: 2.447 uIU/mL (09-13 @ 22:51)  Thyroid Stimulating Hormone, Serum: 1.251 uIU/mL (08-24 @ 01:12)      HbA1C: 8.2 % (09-13 @ 22:51)  8.2 % (08-24 @ 01:11)    CAPILLARY BLOOD GLUCOSE  182 (18 Sep 2017 06:22)  195 (17 Sep 2017 21:12)  136 (17 Sep 2017 16:06)      A/P:47y Female with DM2 admitted with persistent MRSA bacteremia with 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    1.  DM 2-uncontrolled, complicated- Patient has a very poor diabetic history and has multiple diabetic complications. Her A1C is likely higher given that patient is on procrit and has high cell turnover. Patient has been counseled on her poor diet choices. Her BF is going to bring her dinner: rice and beans which apparently he brings frequently. Patient has been counseled to be consistent with her meals. She will likely be difficult to control while inpatient due to her access to outside food and non-compliance with a diabetic diet.  Please cont lantus 25 units at night.  Please cont lispro 13 units before each meal.  Please continue lispro moderate dose sliding scale four times daily with meals and at bedtime    Pt's fingerstick glucose goal is 120-180     Will continue to monitor     Pt can follow up at discharge with [facility] by calling  to make an appointment.   Will discuss case with Dr. Ross  and update primary team INTERVAL HPI/OVERNIGHT EVENTS:    Patient is a 47y old  Female who presents with a chief complaint of TV IE / MRSA Bacteremia. (13 Sep 2017 22:57)    Patient denies any complaints. She had rice krispies for breakfast.    Pt reports the following symptoms:    CONSTITUTIONAL:  Negative fever or chills, feels well, good appetite  EYES:  Negative  blurry vision or double vision  CARDIOVASCULAR:  Negative for chest pain or palpitations  RESPIRATORY:  Negative for cough, wheezing, or SOB   GASTROINTESTINAL:  Negative for nausea, vomiting, diarrhea, constipation, or abdominal pain  GENITOURINARY:  Negative frequency, urgency or dysuria  NEUROLOGIC:  No headache, confusion, dizziness, lightheadedness    MEDICATIONS  (STANDING):  sodium chloride 0.9% lock flush 3 milliLiter(s) IV Push every 8 hours  aspirin enteric coated 81 milliGRAM(s) Oral daily  valsartan 160 milliGRAM(s) Oral daily  simvastatin 20 milliGRAM(s) Oral at bedtime  insulin lispro (HumaLOG) corrective regimen sliding scale   SubCutaneous Before meals and at bedtime  dextrose 5%. 1000 milliLiter(s) (50 mL/Hr) IV Continuous <Continuous>  dextrose 50% Injectable 12.5 Gram(s) IV Push once  dextrose 50% Injectable 25 Gram(s) IV Push once  dextrose 50% Injectable 25 Gram(s) IV Push once  polyethylene glycol 3350 17 Gram(s) Oral daily  pantoprazole    Tablet 40 milliGRAM(s) Oral before breakfast  DAPTOmycin IVPB 700 milliGRAM(s) IV Intermittent every 48 hours  rifampin 600 milliGRAM(s) Oral daily  Ceftaroline Fosamil IVPB 200 milliGRAM(s) IV Intermittent every 12 hours  amLODIPine   Tablet 10 milliGRAM(s) Oral daily  metoprolol 50 milliGRAM(s) Oral every 6 hours  gabapentin 100 milliGRAM(s) Oral two times a day  gabapentin 300 milliGRAM(s) Oral at bedtime  insulin lispro Injectable (HumaLOG) 13 Unit(s) SubCutaneous three times a day before meals  insulin glargine Injectable (LANTUS) 25 Unit(s) SubCutaneous at bedtime  heparin  Infusion. 1800 Unit(s)/Hr (18 mL/Hr) IV Continuous <Continuous>  epoetin fransisca Injectable 24294 Unit(s) IV Push once  traMADol 25 milliGRAM(s) Oral once    MEDICATIONS  (PRN):  dextrose Gel 1 Dose(s) Oral once PRN Blood Glucose LESS THAN 70 milliGRAM(s)/deciliter  glucagon  Injectable 1 milliGRAM(s) IntraMuscular once PRN Glucose LESS THAN 70 milligrams/deciliter  acetaminophen    Suspension. 650 milliGRAM(s) Oral every 6 hours PRN Mild Pain (1 - 3)      PHYSICAL EXAM  Vital Signs Last 24 Hrs  T(C): 37.7 (18 Sep 2017 09:00), Max: 38.1 (17 Sep 2017 16:06)  T(F): 99.8 (18 Sep 2017 09:00), Max: 100.5 (17 Sep 2017 16:06)  HR: 98 (18 Sep 2017 09:00) (96 - 106)  BP: 132/73 (18 Sep 2017 09:00) (132/73 - 162/86)  BP(mean): 87 (18 Sep 2017 09:00) (87 - 111)  RR: 18 (18 Sep 2017 09:00) (16 - 18)  SpO2: 95% (18 Sep 2017 09:00) (93% - 98%)    Constitutional: wn/wd in NAD.   HEENT: NCAT, MMM  Respiratory: poor BS at lower bases  Cardiovascular: regular rhythm, normal S1 and S2, no audible murmurs, no peripheral edema  GI: soft, NT/ND, no masses/HSM appreciated.  Skin: no visible rashes/lesions  Psychiatric: AAO x 3, normal affect/mood.  Ext: radial pulses intact, DP pulses intact, extremities warm, no cyanosis, clubbing or edema, L foot with old scar and poor healing  LABS:                        7.7    14.2  )-----------( 300      ( 18 Sep 2017 06:33 )             25.1     09-18    125<L>  |  89<L>  |  34<H>  ----------------------------<  174<H>  4.2   |  23  |  5.03<H>    Ca    8.3<L>      18 Sep 2017 06:33  Mg     2.0     09-18      PTT - ( 18 Sep 2017 06:33 )  PTT:44.5 sec    Thyroid Stimulating Hormone, Serum: 2.447 uIU/mL (09-13 @ 22:51)  Thyroid Stimulating Hormone, Serum: 1.251 uIU/mL (08-24 @ 01:12)      HbA1C: 8.2 % (09-13 @ 22:51)  8.2 % (08-24 @ 01:11)    CAPILLARY BLOOD GLUCOSE  182 (18 Sep 2017 06:22)  195 (17 Sep 2017 21:12)  136 (17 Sep 2017 16:06)      A/P:47y Female with DM2 admitted with persistent MRSA bacteremia with 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    1.  DM 2-uncontrolled, complicated- Patient has a very poor diabetic history and has multiple diabetic complications. Her A1C is likely higher given that patient is on procrit and has high cell turnover. Patient has been counseled on her poor diet choices. Her BF is going to bring her dinner: rice and beans which apparently he brings frequently. Patient has been counseled to be consistent with her meals. She will likely be difficult to control while inpatient due to her access to outside food and non-compliance with a diabetic diet.  Please inc lantus to 30 units at night.  Please cont lispro 13 units before each meal.  Please continue lispro moderate dose sliding scale four times daily with meals and at bedtime    Pt's fingerstick glucose goal is 120-180     Will continue to monitor     Pt can follow up at discharge with [facility] by calling  to make an appointment.   Will discuss case with Dr. Ross  and update primary team

## 2017-09-18 NOTE — PROGRESS NOTE ADULT - ASSESSMENT
no L foot pain, no fevers/chills overnight, no nausea, no pre-syncope    L foot wound appears non-infected, skin epithelialized well    f/u gallium scan result today    per cardiac team, OR tomorrow for TVR and CABG 46 yo F with tricuspid valve endocarditis, MRSA bacteremia, and CAD. The question is if tricuspid valve infection is from previous HD line vs L foot, given h/o chronic L foot osteomyelitis. L foot wound appears non-infected on physical exam.    Recs:  f/u gallium scan result today; no operative plan until then  continue IV antibiotics  per cardiac team, possible OR for TVR and CABG pending gallium scan result  x [phone] with questions

## 2017-09-18 NOTE — PROGRESS NOTE ADULT - SUBJECTIVE AND OBJECTIVE BOX
Subjective:  Pt seen/examined at bedside, currently getting dialyzed in room via L IJ HD line. No L foot pain, no numbness, no weakness. No fevers/chills overnight, no nausea, no pre-syncope, no CP, no SOA.   She says her foot is beautiful and that nothing is wrong with it.    MEDICATIONS  (STANDING):  sodium chloride 0.9% lock flush 3 milliLiter(s) IV Push every 8 hours  aspirin enteric coated 81 milliGRAM(s) Oral daily  valsartan 160 milliGRAM(s) Oral daily  simvastatin 20 milliGRAM(s) Oral at bedtime  insulin lispro (HumaLOG) corrective regimen sliding scale   SubCutaneous Before meals and at bedtime  dextrose 5%. 1000 milliLiter(s) (50 mL/Hr) IV Continuous <Continuous>  dextrose 50% Injectable 12.5 Gram(s) IV Push once  dextrose 50% Injectable 25 Gram(s) IV Push once  dextrose 50% Injectable 25 Gram(s) IV Push once  polyethylene glycol 3350 17 Gram(s) Oral daily  pantoprazole    Tablet 40 milliGRAM(s) Oral before breakfast  DAPTOmycin IVPB 700 milliGRAM(s) IV Intermittent every 48 hours  rifampin 600 milliGRAM(s) Oral daily  Ceftaroline Fosamil IVPB 200 milliGRAM(s) IV Intermittent every 12 hours  amLODIPine   Tablet 10 milliGRAM(s) Oral daily  metoprolol 50 milliGRAM(s) Oral every 6 hours  gabapentin 100 milliGRAM(s) Oral two times a day  gabapentin 300 milliGRAM(s) Oral at bedtime  insulin lispro Injectable (HumaLOG) 13 Unit(s) SubCutaneous three times a day before meals  insulin glargine Injectable (LANTUS) 25 Unit(s) SubCutaneous at bedtime  heparin  Infusion. 1800 Unit(s)/Hr (18 mL/Hr) IV Continuous <Continuous>  epoetin fransisca Injectable 15120 Unit(s) IV Push once  traMADol 25 milliGRAM(s) Oral once    Allergies    penicillin (Unknown)  Sular (Unknown)        Vital Signs Last 24 Hrs  T(C): 37.2 (18 Sep 2017 10:05), Max: 38.1 (17 Sep 2017 16:06)  T(F): 98.9 (18 Sep 2017 10:05), Max: 100.5 (17 Sep 2017 16:06)  HR: 98 (18 Sep 2017 10:05) (96 - 106)  BP: 137/73 (18 Sep 2017 10:05) (132/73 - 162/86)  BP(mean): 87 (18 Sep 2017 09:00) (87 - 111)  RR: 18 (18 Sep 2017 10:05) (16 - 18)  SpO2: 97% (18 Sep 2017 10:05) (93% - 98%)    I&O's Summary    17 Sep 2017 07:01  -  18 Sep 2017 07:00  --------------------------------------------------------  IN: 1018 mL / OUT: 400 mL / NET: 618 mL        Physical Exam:  General: NAD, resting comfortably  Pulmonary: normal resp effort  Cardiovascular: NSR  Extremities: WWP, normal strength, no clubbing/cyanosis/edema. L medial foot scar well healed, epithelialized well, no surrounding erythema, no drainage, no pus, no fluctuance, no tenderness.  Neuro: A/O x 3, no focal deficits, sensation normal  Pulses: biphasic L PT/DP signals    Lines/drains/tubes: L IJ HD Catheter    LABS:                        7.7    14.2  )-----------( 300      ( 18 Sep 2017 06:33 )             25.1     09-18    125<L>  |  89<L>  |  34<H>  ----------------------------<  174<H>  4.2   |  23  |  5.03<H>    Ca    8.3<L>      18 Sep 2017 06:33  Mg     2.0     09-18      PTT - ( 18 Sep 2017 06:33 )  PTT:44.5 sec      CAPILLARY BLOOD GLUCOSE  182 (18 Sep 2017 06:22)  195 (17 Sep 2017 21:12)  136 (17 Sep 2017 16:06)

## 2017-09-18 NOTE — PROGRESS NOTE BEHAVIORAL HEALTH - OTHER
unable to assess states she is unable to walk for three weeks; c/o low back pain and shoulder/neck pain

## 2017-09-18 NOTE — PROGRESS NOTE ADULT - ATTENDING COMMENTS
Patient examined, fellow's hx and PE reviewed and confirmed. I find hyponatremia, stable on dialysis. A/P reviewed and confirmed. Fluid restrict. Continue dialysis. See full note

## 2017-09-18 NOTE — CONSULT NOTE ADULT - ASSESSMENT
47 year old female PMHx significant for HTN, HLD, DM II, ESRD on HD (MWF. 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 GI consulted for suspicion of possible upper GI bleed.

## 2017-09-18 NOTE — PROGRESS NOTE ADULT - SUBJECTIVE AND OBJECTIVE BOX
Patient discussed on morning rounds with Dr. Escamilla     Operation / Date:  pre-op TVR CABG     SUBJECTIVE ASSESSMENT:  47y Female reports right shoulder pain this am. States that the pain has been going on for about 3 days. States that although she has full range of motion the pain is extruciating whenevery she lifts her right arm. Also still complaining of decreased sensation to the left inner thigh and legs with shooting pain to from the medial to lateral aspect of the thigh .         Vital Signs Last 24 Hrs  T(C): 32.2 (18 Sep 2017 19:01), Max: 38.1 (18 Sep 2017 16:24)  T(F): 90 (18 Sep 2017 19:01), Max: 100.5 (18 Sep 2017 16:24)  HR: 95 (18 Sep 2017 15:00) (91 - 100)  BP: 140/74 (18 Sep 2017 15:00) (122/65 - 162/86)  BP(mean): 84 (18 Sep 2017 13:00) (84 - 111)  RR: 18 (18 Sep 2017 15:00) (17 - 18)  SpO2: 97% (18 Sep 2017 15:00) (93% - 98%)  I&O's Detail    17 Sep 2017 07:01  -  18 Sep 2017 07:00  --------------------------------------------------------  IN:    heparin  Infusion.: 268 mL    IV PiggyBack: 50 mL    Oral Fluid: 700 mL  Total IN: 1018 mL    OUT:    Voided: 400 mL  Total OUT: 400 mL    Total NET: 618 mL      18 Sep 2017 07:01  -  18 Sep 2017 19:30  --------------------------------------------------------  IN:    heparin  Infusion.: 126 mL    IV PiggyBack: 50 mL    Other: 1600 mL    Packed Red Blood Cells: 400 mL  Total IN: 2176 mL    OUT:    Other: 4600 mL  Total OUT: 4600 mL    Total NET: -2424 mL      PHYSICAL EXAM:    General:  AOx3 in distress due to pain. Tangential thought process. Confused but reoriented.     Neurological: Loss of sensation to medial aspect of left thigh and leg. unable to wiggle left toes. 2/5 strength in left leg. EOMS intact. PERRLA. No nystagmus. No facial droop. No incontinence. no saddle anesthsia.     Cardiovascular: RRR distance heart sounds no audible murmur. No friction rubs.     Respiratory: scattered rhonchi. No wheeze or rales.     Gastrointestinal: non tender non distended. active bowel sounds     Extremities: tenderness but no warm over tight shoulder and scapula. Trace lower extremity edema. Keloid scaring of left medial aspect of foot.     Vascular: 1+ DP pulses bilaterally. 2+ radial     Lines: Left IJ permacath.     LABS:                        10.0   13.1  )-----------( 290      ( 18 Sep 2017 16:12 )             32.2       COUMADIN:  no heparin drip .    PTT - ( 18 Sep 2017 06:33 )  PTT:44.5 sec    09-18    134<L>  |  97  |  16  ----------------------------<  91  4.0   |  24  |  2.77<H>    Ca    8.9      18 Sep 2017 16:12  Mg     2.0     09-18            MEDICATIONS  (STANDING):  sodium chloride 0.9% lock flush 3 milliLiter(s) IV Push every 8 hours  aspirin enteric coated 81 milliGRAM(s) Oral daily  valsartan 160 milliGRAM(s) Oral daily  simvastatin 20 milliGRAM(s) Oral at bedtime  insulin lispro (HumaLOG) corrective regimen sliding scale   SubCutaneous every 6 hours  dextrose 5%. 1000 milliLiter(s) (50 mL/Hr) IV Continuous <Continuous>  dextrose 50% Injectable 12.5 Gram(s) IV Push once  dextrose 50% Injectable 25 Gram(s) IV Push once  dextrose 50% Injectable 25 Gram(s) IV Push once  polyethylene glycol 3350 17 Gram(s) Oral daily  pantoprazole    Tablet 40 milliGRAM(s) Oral before breakfast  DAPTOmycin IVPB 700 milliGRAM(s) IV Intermittent every 48 hours  rifampin 600 milliGRAM(s) Oral daily  Ceftaroline Fosamil IVPB 200 milliGRAM(s) IV Intermittent every 12 hours  amLODIPine   Tablet 10 milliGRAM(s) Oral daily  metoprolol 50 milliGRAM(s) Oral every 6 hours  gabapentin 100 milliGRAM(s) Oral two times a day  gabapentin 300 milliGRAM(s) Oral at bedtime  insulin lispro Injectable (HumaLOG) 13 Unit(s) SubCutaneous three times a day before meals  heparin  Infusion. 1800 Unit(s)/Hr (18 mL/Hr) IV Continuous <Continuous>  insulin glargine Injectable (LANTUS) 30 Unit(s) SubCutaneous at bedtime    MEDICATIONS  (PRN):  dextrose Gel 1 Dose(s) Oral once PRN Blood Glucose LESS THAN 70 milliGRAM(s)/deciliter  glucagon  Injectable 1 milliGRAM(s) IntraMuscular once PRN Glucose LESS THAN 70 milligrams/deciliter  acetaminophen    Suspension. 650 milliGRAM(s) Oral every 6 hours PRN Mild Pain (1 - 3)        < from: Xray Chest 1 View AP-PORTABLE IMMEDIATE (09.18.17 @ 16:06) >  INTERPRETATION:  Chest X-Ray dated 9/18/2017 4:06 PM    Indication: follow up    Comparison studies: Chest dated 9/16/2017    An AP portable view of the chest is taken at suboptimal inspiration.   Cardiomegaly is again noted. Patchy airspace opacity is seen at both lung   bases without change. Minimal bilateral pleural effusion is likely   present.      IMPRESSION:  No significant change.      < end of copied text >  RADIOLOGY & ADDITIONAL TESTS:

## 2017-09-19 LAB
ANION GAP SERPL CALC-SCNC: 13 MMOL/L — SIGNIFICANT CHANGE UP (ref 5–17)
APTT BLD: 27.4 SEC — LOW (ref 27.5–37.4)
APTT BLD: 56.3 SEC — HIGH (ref 27.5–37.4)
BUN SERPL-MCNC: 23 MG/DL — SIGNIFICANT CHANGE UP (ref 7–23)
CALCIUM SERPL-MCNC: 8.7 MG/DL — SIGNIFICANT CHANGE UP (ref 8.4–10.5)
CHLORIDE SERPL-SCNC: 94 MMOL/L — LOW (ref 96–108)
CO2 SERPL-SCNC: 22 MMOL/L — SIGNIFICANT CHANGE UP (ref 22–31)
CREAT SERPL-MCNC: 3.61 MG/DL — HIGH (ref 0.5–1.3)
CULTURE RESULTS: SIGNIFICANT CHANGE UP
CULTURE RESULTS: SIGNIFICANT CHANGE UP
GLUCOSE SERPL-MCNC: 205 MG/DL — HIGH (ref 70–99)
HCT VFR BLD CALC: 26.5 % — LOW (ref 34.5–45)
HGB BLD-MCNC: 8.4 G/DL — LOW (ref 11.5–15.5)
INR BLD: 1.82 — HIGH (ref 0.88–1.16)
INR BLD: 2.07 — HIGH (ref 0.88–1.16)
MAGNESIUM SERPL-MCNC: 2 MG/DL — SIGNIFICANT CHANGE UP (ref 1.6–2.6)
MCHC RBC-ENTMCNC: 26.4 PG — LOW (ref 27–34)
MCHC RBC-ENTMCNC: 31.7 G/DL — LOW (ref 32–36)
MCV RBC AUTO: 83.3 FL — SIGNIFICANT CHANGE UP (ref 80–100)
PHOSPHATE SERPL-MCNC: 4.5 MG/DL — SIGNIFICANT CHANGE UP (ref 2.5–4.5)
PLATELET # BLD AUTO: 247 K/UL — SIGNIFICANT CHANGE UP (ref 150–400)
POTASSIUM SERPL-MCNC: 4 MMOL/L — SIGNIFICANT CHANGE UP (ref 3.5–5.3)
POTASSIUM SERPL-SCNC: 4 MMOL/L — SIGNIFICANT CHANGE UP (ref 3.5–5.3)
PROTHROM AB SERPL-ACNC: 20.4 SEC — HIGH (ref 9.8–12.7)
PROTHROM AB SERPL-ACNC: 23.3 SEC — HIGH (ref 9.8–12.7)
RBC # BLD: 3.18 M/UL — LOW (ref 3.8–5.2)
RBC # FLD: 17 % — HIGH (ref 10.3–16.9)
SODIUM SERPL-SCNC: 129 MMOL/L — LOW (ref 135–145)
SPECIMEN SOURCE: SIGNIFICANT CHANGE UP
SPECIMEN SOURCE: SIGNIFICANT CHANGE UP
WBC # BLD: 19.9 K/UL — HIGH (ref 3.8–10.5)
WBC # FLD AUTO: 19.9 K/UL — HIGH (ref 3.8–10.5)

## 2017-09-19 PROCEDURE — 72146 MRI CHEST SPINE W/O DYE: CPT | Mod: 26

## 2017-09-19 PROCEDURE — 73030 X-RAY EXAM OF SHOULDER: CPT | Mod: 26,RT

## 2017-09-19 PROCEDURE — 72148 MRI LUMBAR SPINE W/O DYE: CPT | Mod: 26

## 2017-09-19 PROCEDURE — 94010 BREATHING CAPACITY TEST: CPT | Mod: 26

## 2017-09-19 PROCEDURE — 70551 MRI BRAIN STEM W/O DYE: CPT | Mod: 26

## 2017-09-19 PROCEDURE — 78806: CPT | Mod: 26

## 2017-09-19 PROCEDURE — 99233 SBSQ HOSP IP/OBS HIGH 50: CPT

## 2017-09-19 PROCEDURE — 99232 SBSQ HOSP IP/OBS MODERATE 35: CPT | Mod: GC

## 2017-09-19 PROCEDURE — 72141 MRI NECK SPINE W/O DYE: CPT | Mod: 26

## 2017-09-19 PROCEDURE — 93970 EXTREMITY STUDY: CPT | Mod: 26

## 2017-09-19 PROCEDURE — 71010: CPT | Mod: 26

## 2017-09-19 RX ADMIN — SODIUM CHLORIDE 3 MILLILITER(S): 9 INJECTION INTRAMUSCULAR; INTRAVENOUS; SUBCUTANEOUS at 23:33

## 2017-09-19 RX ADMIN — Medication 2: at 08:17

## 2017-09-19 RX ADMIN — Medication 13 UNIT(S): at 11:39

## 2017-09-19 RX ADMIN — Medication 4: at 17:33

## 2017-09-19 RX ADMIN — Medication 50 MILLIGRAM(S): at 13:00

## 2017-09-19 RX ADMIN — AMLODIPINE BESYLATE 10 MILLIGRAM(S): 2.5 TABLET ORAL at 06:36

## 2017-09-19 RX ADMIN — CEFTAROLINE FOSAMIL 50 MILLIGRAM(S): 600 POWDER, FOR SOLUTION INTRAVENOUS at 03:04

## 2017-09-19 RX ADMIN — Medication 13 UNIT(S): at 17:33

## 2017-09-19 RX ADMIN — GABAPENTIN 100 MILLIGRAM(S): 400 CAPSULE ORAL at 17:47

## 2017-09-19 RX ADMIN — SODIUM CHLORIDE 3 MILLILITER(S): 9 INJECTION INTRAMUSCULAR; INTRAVENOUS; SUBCUTANEOUS at 14:27

## 2017-09-19 RX ADMIN — Medication 4: at 11:38

## 2017-09-19 RX ADMIN — SODIUM CHLORIDE 3 MILLILITER(S): 9 INJECTION INTRAMUSCULAR; INTRAVENOUS; SUBCUTANEOUS at 06:33

## 2017-09-19 RX ADMIN — VALSARTAN 160 MILLIGRAM(S): 80 TABLET ORAL at 13:49

## 2017-09-19 RX ADMIN — GABAPENTIN 100 MILLIGRAM(S): 400 CAPSULE ORAL at 06:36

## 2017-09-19 RX ADMIN — Medication 81 MILLIGRAM(S): at 13:48

## 2017-09-19 RX ADMIN — PANTOPRAZOLE SODIUM 40 MILLIGRAM(S): 20 TABLET, DELAYED RELEASE ORAL at 06:36

## 2017-09-19 RX ADMIN — CEFTAROLINE FOSAMIL 50 MILLIGRAM(S): 600 POWDER, FOR SOLUTION INTRAVENOUS at 14:54

## 2017-09-19 RX ADMIN — Medication 50 MILLIGRAM(S): at 17:48

## 2017-09-19 RX ADMIN — Medication 50 MILLIGRAM(S): at 06:36

## 2017-09-19 NOTE — PROGRESS NOTE ADULT - ATTENDING COMMENTS
Pt seen with Dr. Yoon on rounds this afternoon.  Glucoses have been somewhat higher, but PO intake remains inconsistent, and meal schedule disrupted by procedures.  Appetite nonetheless seems overall improved.  May need a higher dose of Lantus, but would like to observe overnight glucoses for another day

## 2017-09-19 NOTE — PROGRESS NOTE ADULT - SUBJECTIVE AND OBJECTIVE BOX
Objective and Subjective   The patient in her bed. DOes not endorse any complains, no shortness of breath, no chest pain, no fever. Started to cry spontaneously - offered   a specialist to be called about that refused in this moment any help.      Patient is a 47 year old female with history of HTN, HLD, DM II, ESRD on HD (MWF. Last HD 09/13/2017. Receives HD via Right Femoral HD catheter placed on 09/13/2017 at [facility] 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 The renal follows the case for the need of HD. Last HD done on 9/18 - 3.0 liters off      Patient seen and examined at bedside.     sodium chloride 0.9% lock flush 3 milliLiter(s) every 8 hours  aspirin enteric coated 81 milliGRAM(s) daily  valsartan 160 milliGRAM(s) daily  simvastatin 20 milliGRAM(s) at bedtime  insulin lispro (HumaLOG) corrective regimen sliding scale   every 6 hours  dextrose 5%. 1000 milliLiter(s) <Continuous>  dextrose Gel 1 Dose(s) once PRN  dextrose 50% Injectable 12.5 Gram(s) once  dextrose 50% Injectable 25 Gram(s) once  dextrose 50% Injectable 25 Gram(s) once  glucagon  Injectable 1 milliGRAM(s) once PRN  polyethylene glycol 3350 17 Gram(s) daily  pantoprazole    Tablet 40 milliGRAM(s) before breakfast  acetaminophen    Suspension. 650 milliGRAM(s) every 6 hours PRN  DAPTOmycin IVPB 700 milliGRAM(s) every 48 hours  rifampin 600 milliGRAM(s) daily  Ceftaroline Fosamil IVPB 200 milliGRAM(s) every 12 hours  amLODIPine   Tablet 10 milliGRAM(s) daily  metoprolol 50 milliGRAM(s) every 6 hours  gabapentin 100 milliGRAM(s) two times a day  gabapentin 300 milliGRAM(s) at bedtime  insulin lispro Injectable (HumaLOG) 13 Unit(s) three times a day before meals  heparin  Infusion. 1800 Unit(s)/Hr <Continuous>  insulin glargine Injectable (LANTUS) 30 Unit(s) at bedtime      Allergies    penicillin (Unknown)  Sular (Unknown)    Intolerances        T(C): , Max: 38.7 (09-18-17 @ 21:45)  T(F): , Max: 101.7 (09-18-17 @ 21:45)  HR: 84 (09-19-17 @ 09:32)  BP: 128/68 (09-19-17 @ 09:32)  BP(mean): 89 (09-19-17 @ 09:32)  RR: 18 (09-19-17 @ 09:32)  SpO2: 98% (09-19-17 @ 09:32)  Wt(kg): --    09-18 @ 07:01  -  09-19 @ 07:00  --------------------------------------------------------  IN:    heparin  Infusion.: 353 mL    IV PiggyBack: 100 mL    Other: 1600 mL    Packed Red Blood Cells: 400 mL  Total IN: 2453 mL    OUT:    Other: 4600 mL    Voided: 100 mL  Total OUT: 4700 mL    Total NET: -2247 mL      09-19 @ 07:01  -  09-19 @ 10:16  --------------------------------------------------------  IN:    heparin  Infusion.: 54 mL  Total IN: 54 mL    OUT:  Total OUT: 0 mL    Total NET: 54 mL        Alert and oriented  No JVD  HAs a catheter in the left IJ   No respiratory distress   Normal air entry in the lungs, no wheezing, no crackles, no rales   RRR, normal s1/s2, no mumurs, rubs or gallops  Abdomen - soft, non-tender, non-distended, normal bowel sounds   Extremities - mild peripheral edema   The line in the right groined removed         LABS:                        8.4    19.9  )-----------( 247      ( 19 Sep 2017 06:00 )             26.5     09-19    129<L>  |  94<L>  |  23  ----------------------------<  205<H>  4.0   |  22  |  3.61<H>    Ca    8.7      19 Sep 2017 06:00  Phos  4.5     09-19  Mg     2.0     09-19        PT/INR - ( 19 Sep 2017 05:43 )   PT: 20.4 sec;   INR: 1.82          PTT - ( 19 Sep 2017 05:43 )  PTT:27.4 sec          RADIOLOGY & ADDITIONAL STUDIES:    < from: Xray Chest 1 View AP -PORTABLE-Routine (09.19.17 @ 05:13) >      INTERPRETATION:    Portable AP Radiograph dated 9/19/2017 5:13 AM    CLINICAL INFORMATION: 47 years, Female, followup    PRIOR STUDIES: September 18, 2017    FINDINGS: Left internal jugular vein dialysis catheter is again noted to   be in place, unchanged. Patchy bibasilar airspace opacities are again   noted, slightly increased at the right base. Heart size is stable.    IMPRESSION:  Patchy bibasilar airspace opacities, slightly increased on the right.      < end of copied text >

## 2017-09-19 NOTE — PROGRESS NOTE ADULT - ASSESSMENT
This is 47 year old female with PMH stated above. The renal service is activated for the need of HD. She was dialyzed on 9/18 - 3.0 liters off. No need of HD for today

## 2017-09-19 NOTE — PROGRESS NOTE ADULT - ASSESSMENT
A/P: 47 year old female with PMHc HTN, HLD, ESRD on HD (MWF, L IJ HD cath), chronic L foot OM transferred from [facility] on 9/13/17 with TV endocarditis and MRSA bacteremia.  Ortho and vascular consulted for OM, recs to continue abx and await results from whole body gallium scan today for plan.  Renal following for ESRD on HD (last HD 9/18/17). Neurology following for LLE weakness MRI brain pending to r/o stroke, MRI spine pending to r/o infection.      Neurovascular: No delirium.  -Hx MCA and putamen CVA, Neuro following for LLE weakness, MRI brain pending. MRI spine pending to r/o infection.  -Dr. Luna consulted for neurology    Cardiovascular: Hemodynamically stable. HR controlled.  -BP. HR. EKG. TTE. Cardiac Panel. Lipid Panel. BNP.   -ASA. Plavix. BBlocker. Statin.      Respiratory: 02 Sat = 98% on RA.  -If on oxygen wean to RA from for O2 Sat > 93%.  -Encourage C+DB and Use of IS 10x / hr while awake.  -CXR.    GI: Stable.  -NPO after MN.  -PPX.  -PO Diet.    Renal / :  -Monitor renal function.  -Monitor I/O's.    Endocrine:    -A1c.  -TSH.    Hematologic:  -CBC.  -Coagulation Panel.    ID:  -Tempature.  -CBC.  -Observe for SIRS/Sepsis Syndrome.    Prophylaxis:  -DVT prophylaxis with 5000 SubQ Heparin q8h.  -SCD's    Disposition:  -ICU for frequent monitoring. A/P: 47 year old female with PMHc HTN, HLD, ESRD on HD (MWF, L IJ HD cath), chronic L foot OM transferred from [facility] on 9/13/17 with TV endocarditis and MRSA bacteremia.  Ortho and vascular consulted for OM, recs to continue abx and await results from whole body gallium scan today for plan.  Renal following for ESRD on HD (last HD 9/18/17). Neurology following for LLE weakness MRI brain pending to r/o stroke, MRI spine pending to r/o infection.      Neurovascular: No delirium.  -Hx MCA and putamen CVA, Neuro following for LLE weakness, MRI brain pending. MRI spine pending to r/o infection.  -Dr. Luna following for neurology  -Continue gabapentin and acetaminophen for pain    Cardiovascular: Hemodynamically stable. HR controlled.  -TV endocarditis with MRSA bactermia- continue ceftaroline, daptomycin, rifampin.  Dr. Kingston following.   -SVC thrombus- continue heparin 1800U, and ASA, f/u PTT  -Blood cultures negative, repeat blood cx today   -hx HTN- continue amlodipine 10mg daily metoprolol 50mg q6, valsartan 160mg daily  -Hx HLD- continue simvastatin 20mg QHS  -monitor BP/HR     Respiratory: 02 Sat = 98% on RA.  -SVC thrombus- continue heparin drip, f/u PTT  -CXR - bibasilar opacities, increased on right, f/u am CXR  -b/l LE duplex today- no DVT  -monitor SaO2    GI: Stable.  -GI following for blood tinged emesis 9/18/17- following recs  -continue PO diet  -continue pantoprazole for Gi PPX  -continue miralax for bowel regimen    Renal / : BUN/Cr 23/3.61  -ESRD on HD- last HD 9/18/17, L IJ permacath placed 9/16/17, renal following  -Monitor renal function.  -Monitor I/O's.    Endocrine:  A1c 8.2 TSH 1.251  -Hx DM2- endo following, continue Lantus 30U QHS, Humalog 13U premeals and ISS  -, 176, 230    Hematologic: H&H 8.4/26.5  -f/u CBC, T&S  -Continue Heparin drip- f/u PTT       ID: afebrile WBC 19.9  -MRSA bacteremia- continue ceftraoline, daptomycin, and rifampin- Dr. Kingston following  -chronic L foot OM- vascular and ortho following, pending whole body gallium scan today for plan   -MRI spine to r/o seeding  -blood cx this admission negative- patient refused blood cx last night- Blood cx today  -Observe for SIRS/Sepsis Syndrome.    Prophylaxis:  -on heparin drip  -SCD's    Disposition:  -OR for CABG/TVR when medically ready A/P: 47 year old female with PMHc HTN, HLD, ESRD on HD (MWF, L IJ HD cath), chronic L foot OM transferred from [facility] on 9/13/17 with TV endocarditis and MRSA bacteremia.  Ortho and vascular consulted for OM, recs to continue abx and await results from whole body gallium scan today for plan.  Renal following for ESRD on HD (last HD 9/18/17). Neurology following for LLE weakness MRI brain pending to r/o stroke, MRI spine pending to r/o infection.      Neurovascular: No delirium.  -Hx MCA and putamen CVA, Neuro following for LLE weakness, MRI brain pending. MRI spine pending to r/o infection.  -Dr. Luna following for neurology  -Continue gabapentin and acetaminophen for pain    Cardiovascular: Hemodynamically stable. HR controlled.  -TV endocarditis with MRSA bactermia- continue ceftaroline, daptomycin, rifampin.  Dr. Kingston following.   -SVC thrombus- continue heparin 1800U, and ASA, f/u PTT  -Blood cultures negative, repeat blood cx today   -hx HTN- continue amlodipine 10mg daily metoprolol 50mg q6, valsartan 160mg daily  -Hx HLD- continue simvastatin 20mg QHS  -monitor BP/HR     Respiratory: 02 Sat = 98% on RA.  -SVC thrombus- continue heparin drip, f/u PTT  -CXR - bibasilar opacities, increased on right, f/u am CXR  -b/l LE duplex today- no DVT  -monitor SaO2    GI: Stable.  -GI following for blood tinged emesis 9/18/17- following recs  -continue PO diet  -continue pantoprazole for Gi PPX  -continue miralax for bowel regimen    Renal / : BUN/Cr 23/3.61  -ESRD on HD- last HD 9/18/17, L IJ temporary HD cath placed 9/16/17, renal following  -Monitor renal function.  -Monitor I/O's.    Endocrine:  A1c 8.2 TSH 1.251  -Hx DM2- endo following, continue Lantus 30U QHS, Humalog 13U premeals and ISS  -, 176, 230    Hematologic: H&H 8.4/26.5  -f/u CBC, T&S  -Continue Heparin drip PTT last night 27.4, f/u PTT       ID:  101.7 last night, afebrile by this AM, WBC 19.9  -MRSA bacteremia- continue ceftraoline, daptomycin, and rifampin- Dr. Kingston following  -chronic L foot OM- vascular and ortho following, pending whole body gallium scan today for plan   -MRI spine to r/o seeding  -blood cx this admission negative- patient refused blood cx last night- Blood cx today  -Observe for SIRS/Sepsis Syndrome.    Prophylaxis:  -on heparin drip  -SCD's    Disposition:  -OR for CABG/TVR when medically ready A/P: 47 year old female with PMHc HTN, HLD, ESRD on HD (MWF, L IJ HD cath), chronic L foot OM transferred from [facility] on 9/13/17 with TV endocarditis and MRSA bacteremia.  Ortho and vascular consulted for OM, recs to continue abx and await results from whole body gallium scan today for plan.  Renal following for ESRD on HD (last HD 9/18/17). Neurology following for LLE weakness MRI brain pending to r/o stroke, MRI spine pending to r/o infection.      Neurovascular: No delirium.  -Hx MCA and putamen CVA, Neuro following for LLE weakness, MRI brain pending. MRI spine pending to r/o infection.  -Dr. Luna following for neurology  -Continue gabapentin and acetaminophen for pain    Cardiovascular: Hemodynamically stable. HR controlled.  -TV endocarditis with MRSA bactermia- continue ceftaroline, daptomycin, rifampin.  Dr. Kingston following.   -SVC thrombus- continue heparin 1800U, and ASA, f/u PTT  -Blood cultures negative, repeat blood cx today   -hx HTN- continue amlodipine 10mg daily metoprolol 50mg q6, valsartan 160mg daily  -Hx HLD- continue simvastatin 20mg QHS  -monitor BP/HR     Respiratory: 02 Sat = 98% on RA.  -SVC thrombus- continue heparin drip, f/u PTT  -CXR - bibasilar opacities, increased on right, f/u am CXR  -b/l LE duplex today- no DVT  -monitor SaO2    GI: Stable.  -GI following for blood tinged emesis 9/18/17- following recs  -continue PO diet  -continue pantoprazole for Gi PPX  -continue miralax for bowel regimen    Renal / : BUN/Cr 23/3.61  -ESRD on HD- last HD 9/18/17, L IJ temporary HD cath placed 9/16/17, renal following  -Monitor renal function.  -Monitor I/O's.    Endocrine:  A1c 8.2 TSH 1.251  -Hx DM2- endo following, continue Lantus 30U QHS, Humalog 13U premeals and ISS  -, 176, 230    Hematologic: H&H 8.4/26.5  -f/u CBC, T&S  -PTT this AM 27.4 however according to sign out heparin was held for about 20min prior to drawing since it was drawn from heparin infusion line.   f/u PTT from fresh stick just obtained.       ID:  101.7 last night, afebrile by this AM, WBC 19.9  -MRSA bacteremia- continue ceftraoline, daptomycin, and rifampin- Dr. Kingston following  -chronic L foot OM- vascular and ortho following, pending whole body gallium scan today for plan   -MRI spine to r/o seeding  -blood cx this admission negative- patient refused blood cx last night- Blood cx today  -Observe for SIRS/Sepsis Syndrome.    Prophylaxis:  -on heparin drip  -SCD's    Disposition:  -OR for CABG/TVR when medically ready A/P: 47 year old female with PMHc HTN, HLD, ESRD on HD (MWF, L IJ HD cath), chronic L foot OM transferred from [facility] on 9/13/17 with TV endocarditis, MRSA bacteremia, and SVC thrombus.  Ortho and vascular consulted for OM, recs to continue abx and await results from whole body gallium scan today for plan.  Renal following for ESRD on HD (last HD 9/18/17). Neurology following for LLE weakness MRI brain pending to r/o stroke, MRI spine pending to r/o infection.      Neurovascular: No delirium.  -Hx MCA and putamen CVA, Neuro following for LLE weakness, MRI brain pending. MRI spine pending to r/o infection.  -Dr. Luna following for neurology  -Continue gabapentin and acetaminophen for pain    Cardiovascular: Hemodynamically stable. HR controlled.  -TV endocarditis with MRSA bactermia- continue ceftaroline, daptomycin, rifampin.  Dr. Kingston following.   -SVC thrombus- continue heparin 1800U, and ASA, f/u PTT  -Blood cultures negative, repeat blood cx today   -hx HTN- continue amlodipine 10mg daily metoprolol 50mg q6, valsartan 160mg daily  -Hx HLD- continue simvastatin 20mg QHS  -monitor BP/HR     Respiratory: 02 Sat = 98% on RA.  -SVC thrombus- continue heparin drip, f/u PTT  -CXR - bibasilar opacities, increased on right, f/u am CXR  -b/l LE duplex today- no DVT  -monitor SaO2    GI: Stable.  -GI following for blood tinged emesis 9/18/17- following recs  -continue PO diet  -continue pantoprazole for Gi PPX  -continue miralax for bowel regimen    Renal / : BUN/Cr 23/3.61  -ESRD on HD- last HD 9/18/17, L IJ temporary HD cath placed 9/16/17, renal following  -Monitor renal function.  -Monitor I/O's.    Endocrine:  A1c 8.2 TSH 1.251  -Hx DM2- endo following, continue Lantus 30U QHS, Humalog 13U premeals and ISS  -, 176, 230    Hematologic: H&H 8.4/26.5  -f/u CBC, T&S  -PTT this AM 27.4 however according to sign out heparin was held for about 20min prior to drawing since it was drawn from heparin infusion line.   f/u PTT from fresh stick just obtained.       ID:  101.7 last night, afebrile by this AM, WBC 19.9  -MRSA bacteremia- continue ceftraoline, daptomycin, and rifampin- Dr. Kingston following  -chronic L foot OM- vascular and ortho following, pending whole body gallium scan today for plan   -MRI spine to r/o seeding  -blood cx this admission negative- patient refused blood cx last night- Blood cx today  -Observe for SIRS/Sepsis Syndrome.    Prophylaxis:  -on heparin drip  -SCD's    Disposition:  -OR for CABG/TVR when medically ready

## 2017-09-19 NOTE — PROGRESS NOTE ADULT - SUBJECTIVE AND OBJECTIVE BOX
Patient discussed on morning rounds with Dr. Escamilla    MRSA bactermia/ chronic L foot OM/ SVC thrombus, pre-op TAVR/CABG next week    SUBJECTIVE ASSESSMENT:  47y Female seen and examined, laying in bed. Today patient reports that her should pain has gotten better after receiving pain medication last night.  She still reports LLE weakness.  Patient was OOB to chair yesterday, but still has not ambulated since admitted.  She is having normal BMs and tolerating PO diet.  She denies fever, chest pain, SOB, abdominal pain, N/V/D/C.          Vital Signs Last 24 Hrs  T(C): 36.2 (19 Sep 2017 09:00), Max: 38.7 (18 Sep 2017 21:45)  T(F): 97.2 (19 Sep 2017 09:00), Max: 101.7 (18 Sep 2017 21:45)  HR: 84 (19 Sep 2017 09:32) (80 - 122)  BP: 128/68 (19 Sep 2017 09:32) (122/65 - 178/68)  BP(mean): 89 (19 Sep 2017 09:32) (80 - 102)  RR: 18 (19 Sep 2017 09:32) (16 - 21)  SpO2: 98% (19 Sep 2017 09:32) (93% - 98%)  I&O's Detail    18 Sep 2017 07:01  -  19 Sep 2017 07:00  --------------------------------------------------------  IN:    heparin  Infusion.: 353 mL    IV PiggyBack: 100 mL    Other: 1600 mL    Packed Red Blood Cells: 400 mL  Total IN: 2453 mL    OUT:    Other: 4600 mL    Voided: 100 mL  Total OUT: 4700 mL    Total NET: -2247 mL      19 Sep 2017 07:01  -  19 Sep 2017 12:33  --------------------------------------------------------  IN:    heparin  Infusion.: 54 mL  Total IN: 54 mL    OUT:  Total OUT: 0 mL    Total NET: 54 mL    COOK: No.    PHYSICAL EXAM:    General: Laying in bed, no acute distress.    Neurological:    Cardiovascular:    Respiratory:    Gastrointestinal:    Extremities:    Vascular:    Incision Sites:    LABS:                        8.4    19.9  )-----------( 247      ( 19 Sep 2017 06:00 )             26.5       COUMADIN:  Yes/No. REASON: .    PT/INR - ( 19 Sep 2017 05:43 )   PT: 20.4 sec;   INR: 1.82          PTT - ( 19 Sep 2017 05:43 )  PTT:27.4 sec    09-19    129<L>  |  94<L>  |  23  ----------------------------<  205<H>  4.0   |  22  |  3.61<H>    Ca    8.7      19 Sep 2017 06:00  Phos  4.5     09-19  Mg     2.0     09-19            MEDICATIONS  (STANDING):  sodium chloride 0.9% lock flush 3 milliLiter(s) IV Push every 8 hours  aspirin enteric coated 81 milliGRAM(s) Oral daily  valsartan 160 milliGRAM(s) Oral daily  simvastatin 20 milliGRAM(s) Oral at bedtime  insulin lispro (HumaLOG) corrective regimen sliding scale   SubCutaneous every 6 hours  dextrose 5%. 1000 milliLiter(s) (50 mL/Hr) IV Continuous <Continuous>  dextrose 50% Injectable 12.5 Gram(s) IV Push once  dextrose 50% Injectable 25 Gram(s) IV Push once  dextrose 50% Injectable 25 Gram(s) IV Push once  polyethylene glycol 3350 17 Gram(s) Oral daily  pantoprazole    Tablet 40 milliGRAM(s) Oral before breakfast  DAPTOmycin IVPB 700 milliGRAM(s) IV Intermittent every 48 hours  rifampin 600 milliGRAM(s) Oral daily  Ceftaroline Fosamil IVPB 200 milliGRAM(s) IV Intermittent every 12 hours  amLODIPine   Tablet 10 milliGRAM(s) Oral daily  metoprolol 50 milliGRAM(s) Oral every 6 hours  gabapentin 100 milliGRAM(s) Oral two times a day  gabapentin 300 milliGRAM(s) Oral at bedtime  insulin lispro Injectable (HumaLOG) 13 Unit(s) SubCutaneous three times a day before meals  heparin  Infusion. 1800 Unit(s)/Hr (18 mL/Hr) IV Continuous <Continuous>  insulin glargine Injectable (LANTUS) 30 Unit(s) SubCutaneous at bedtime    MEDICATIONS  (PRN):  dextrose Gel 1 Dose(s) Oral once PRN Blood Glucose LESS THAN 70 milliGRAM(s)/deciliter  glucagon  Injectable 1 milliGRAM(s) IntraMuscular once PRN Glucose LESS THAN 70 milligrams/deciliter  acetaminophen    Suspension. 650 milliGRAM(s) Oral every 6 hours PRN Mild Pain (1 - 3)        RADIOLOGY & ADDITIONAL TESTS: Patient discussed on morning rounds with Dr. Escamilla    MRSA bactermia/ chronic L foot OM/ SVC thrombus, pre-op TAVR/CABG next week    SUBJECTIVE ASSESSMENT:  47y Female seen and examined, laying in bed. Today patient reports that her should pain has gotten better after receiving pain medication last night.  She still reports LLE weakness.  Patient was OOB to chair yesterday, but still has not ambulated since admitted.  She is having normal BMs and tolerating PO diet.  She denies fever, chest pain, SOB, abdominal pain, N/V/D/C.          Vital Signs Last 24 Hrs  T(C): 36.2 (19 Sep 2017 09:00), Max: 38.7 (18 Sep 2017 21:45)  T(F): 97.2 (19 Sep 2017 09:00), Max: 101.7 (18 Sep 2017 21:45)  HR: 84 (19 Sep 2017 09:32) (80 - 122)  BP: 128/68 (19 Sep 2017 09:32) (122/65 - 178/68)  BP(mean): 89 (19 Sep 2017 09:32) (80 - 102)  RR: 18 (19 Sep 2017 09:32) (16 - 21)  SpO2: 98% (19 Sep 2017 09:32) (93% - 98%)  I&O's Detail    18 Sep 2017 07:01  -  19 Sep 2017 07:00  --------------------------------------------------------  IN:    heparin  Infusion.: 353 mL    IV PiggyBack: 100 mL    Other: 1600 mL    Packed Red Blood Cells: 400 mL  Total IN: 2453 mL    OUT:    Other: 4600 mL    Voided: 100 mL  Total OUT: 4700 mL    Total NET: -2247 mL      19 Sep 2017 07:01  -  19 Sep 2017 12:33  --------------------------------------------------------  IN:    heparin  Infusion.: 54 mL  Total IN: 54 mL    OUT:  Total OUT: 0 mL    Total NET: 54 mL    COOK: No.    PHYSICAL EXAM:    General: Laying in bed, no acute distress.    Neurological: CN grossly intact, LLE 3/5 strength, RLE 5/5, bilateral UE 5/5.      Cardiovascular: S1S2, RRR, no murmurs heard at this time    Respiratory: +coarse breathsounds throughout.  No wheeze/rhonchi/rales    Gastrointestinal:+BS, soft nontender nondistended    Extremities: Warm and well perfused, 1+ bilateral edema, no calf tenderness. L foot: +chronic fissure, no tenderness, erythema, drainage.     Vascular: 1+ L DP, 2+ R DP, 2+ radial pulses bilaterally.    Incision Sites: L IJ: +permacath, no erythema, tenderness, discharge.    LABS:                        8.4    19.9  )-----------( 247      ( 19 Sep 2017 06:00 )             26.5       COUMADIN:  No, heparin for SVC thrombus.    PT/INR - ( 19 Sep 2017 05:43 )   PT: 20.4 sec;   INR: 1.82          PTT - ( 19 Sep 2017 05:43 )  PTT:27.4 sec    09-19    129<L>  |  94<L>  |  23  ----------------------------<  205<H>  4.0   |  22  |  3.61<H>    Ca    8.7      19 Sep 2017 06:00  Phos  4.5     09-19  Mg     2.0     09-19            MEDICATIONS  (STANDING):  sodium chloride 0.9% lock flush 3 milliLiter(s) IV Push every 8 hours  aspirin enteric coated 81 milliGRAM(s) Oral daily  valsartan 160 milliGRAM(s) Oral daily  simvastatin 20 milliGRAM(s) Oral at bedtime  insulin lispro (HumaLOG) corrective regimen sliding scale   SubCutaneous every 6 hours  dextrose 5%. 1000 milliLiter(s) (50 mL/Hr) IV Continuous <Continuous>  dextrose 50% Injectable 12.5 Gram(s) IV Push once  dextrose 50% Injectable 25 Gram(s) IV Push once  dextrose 50% Injectable 25 Gram(s) IV Push once  polyethylene glycol 3350 17 Gram(s) Oral daily  pantoprazole    Tablet 40 milliGRAM(s) Oral before breakfast  DAPTOmycin IVPB 700 milliGRAM(s) IV Intermittent every 48 hours  rifampin 600 milliGRAM(s) Oral daily  Ceftaroline Fosamil IVPB 200 milliGRAM(s) IV Intermittent every 12 hours  amLODIPine   Tablet 10 milliGRAM(s) Oral daily  metoprolol 50 milliGRAM(s) Oral every 6 hours  gabapentin 100 milliGRAM(s) Oral two times a day  gabapentin 300 milliGRAM(s) Oral at bedtime  insulin lispro Injectable (HumaLOG) 13 Unit(s) SubCutaneous three times a day before meals  heparin  Infusion. 1800 Unit(s)/Hr (18 mL/Hr) IV Continuous <Continuous>  insulin glargine Injectable (LANTUS) 30 Unit(s) SubCutaneous at bedtime    MEDICATIONS  (PRN):  dextrose Gel 1 Dose(s) Oral once PRN Blood Glucose LESS THAN 70 milliGRAM(s)/deciliter  glucagon  Injectable 1 milliGRAM(s) IntraMuscular once PRN Glucose LESS THAN 70 milligrams/deciliter  acetaminophen    Suspension. 650 milliGRAM(s) Oral every 6 hours PRN Mild Pain (1 - 3)        RADIOLOGY & ADDITIONAL TESTS:  CXR: 9/19/17:< from: Xray Shoulder 2 Views, Right (09.19.17 @ 10:05) >   External rotation, internal rotation, scapular Y viewof the   right shoulder demonstrates no fracture or dislocation. Visualized right   lung is clear.    < end of copied text > Patient discussed on morning rounds with Dr. Escamilla    MRSA bactermia/ chronic L foot OM/ SVC thrombus, pre-op TAVR/CABG next week    SUBJECTIVE ASSESSMENT:  47y Female seen and examined, laying in bed. Today patient reports that her should pain has gotten better after receiving pain medication last night.  She still reports LLE weakness.  Patient was OOB to chair yesterday, but still has not ambulated since admitted.  She is having normal BMs and tolerating PO diet.  She denies fever, chest pain, SOB, abdominal pain, N/V/D/C.          Vital Signs Last 24 Hrs  T(C): 36.2 (19 Sep 2017 09:00), Max: 38.7 (18 Sep 2017 21:45)  T(F): 97.2 (19 Sep 2017 09:00), Max: 101.7 (18 Sep 2017 21:45)  HR: 84 (19 Sep 2017 09:32) (80 - 122)  BP: 128/68 (19 Sep 2017 09:32) (122/65 - 178/68)  BP(mean): 89 (19 Sep 2017 09:32) (80 - 102)  RR: 18 (19 Sep 2017 09:32) (16 - 21)  SpO2: 98% (19 Sep 2017 09:32) (93% - 98%)  I&O's Detail    18 Sep 2017 07:01  -  19 Sep 2017 07:00  --------------------------------------------------------  IN:    heparin  Infusion.: 353 mL    IV PiggyBack: 100 mL    Other: 1600 mL    Packed Red Blood Cells: 400 mL  Total IN: 2453 mL    OUT:    Other: 4600 mL    Voided: 100 mL  Total OUT: 4700 mL    Total NET: -2247 mL      19 Sep 2017 07:01  -  19 Sep 2017 12:33  --------------------------------------------------------  IN:    heparin  Infusion.: 54 mL  Total IN: 54 mL    OUT:  Total OUT: 0 mL    Total NET: 54 mL    COOK: No.    PHYSICAL EXAM:    General: Laying in bed, no acute distress.    Neurological: CN grossly intact, LLE 3/5 strength, RLE 5/5, bilateral UE 5/5.    Cardiovascular: S1S2, RRR, no murmurs heard at this time    Respiratory: +coarse breathsounds throughout.  No wheeze/rhonchi/rales    Gastrointestinal:+BS, soft nontender nondistended    Extremities: Warm and well perfused, 1+ bilateral edema, no calf tenderness. L foot: +chronic fissure, no tenderness, erythema, drainage.      Vascular: 1+ L DP, 2+ R DP, 2+ radial pulses bilaterally.    Incision Sites: L IJ: +permacath, no erythema, tenderness, discharge.    LABS:                        8.4    19.9  )-----------( 247      ( 19 Sep 2017 06:00 )             26.5       COUMADIN:  No, heparin for SVC thrombus.    PT/INR - ( 19 Sep 2017 05:43 )   PT: 20.4 sec;   INR: 1.82          PTT - ( 19 Sep 2017 05:43 )  PTT:27.4 sec    09-19    129<L>  |  94<L>  |  23  ----------------------------<  205<H>  4.0   |  22  |  3.61<H>    Ca    8.7      19 Sep 2017 06:00  Phos  4.5     09-19  Mg     2.0     09-19            MEDICATIONS  (STANDING):  sodium chloride 0.9% lock flush 3 milliLiter(s) IV Push every 8 hours  aspirin enteric coated 81 milliGRAM(s) Oral daily  valsartan 160 milliGRAM(s) Oral daily  simvastatin 20 milliGRAM(s) Oral at bedtime  insulin lispro (HumaLOG) corrective regimen sliding scale   SubCutaneous every 6 hours  dextrose 5%. 1000 milliLiter(s) (50 mL/Hr) IV Continuous <Continuous>  dextrose 50% Injectable 12.5 Gram(s) IV Push once  dextrose 50% Injectable 25 Gram(s) IV Push once  dextrose 50% Injectable 25 Gram(s) IV Push once  polyethylene glycol 3350 17 Gram(s) Oral daily  pantoprazole    Tablet 40 milliGRAM(s) Oral before breakfast  DAPTOmycin IVPB 700 milliGRAM(s) IV Intermittent every 48 hours  rifampin 600 milliGRAM(s) Oral daily  Ceftaroline Fosamil IVPB 200 milliGRAM(s) IV Intermittent every 12 hours  amLODIPine   Tablet 10 milliGRAM(s) Oral daily  metoprolol 50 milliGRAM(s) Oral every 6 hours  gabapentin 100 milliGRAM(s) Oral two times a day  gabapentin 300 milliGRAM(s) Oral at bedtime  insulin lispro Injectable (HumaLOG) 13 Unit(s) SubCutaneous three times a day before meals  heparin  Infusion. 1800 Unit(s)/Hr (18 mL/Hr) IV Continuous <Continuous>  insulin glargine Injectable (LANTUS) 30 Unit(s) SubCutaneous at bedtime    MEDICATIONS  (PRN):  dextrose Gel 1 Dose(s) Oral once PRN Blood Glucose LESS THAN 70 milliGRAM(s)/deciliter  glucagon  Injectable 1 milliGRAM(s) IntraMuscular once PRN Glucose LESS THAN 70 milligrams/deciliter  acetaminophen    Suspension. 650 milliGRAM(s) Oral every 6 hours PRN Mild Pain (1 - 3)        RADIOLOGY & ADDITIONAL TESTS:  CXR:   < from: Xray Chest 1 View AP -PORTABLE-Routine (09.19.17 @ 05:13) >   Left internal jugular vein dialysis catheter is again noted to   be in place, unchanged. Patchy bibasilar airspace opacities are again   noted, slightly increased at the right base. Heart size is stable.    IMPRESSION:  Patchy bibasilar airspace opacities, slightly increased on the right.    < end of copied text > Patient discussed on morning rounds with Dr. Escamilla    MRSA bactermia/ chronic L foot OM/ SVC thrombus, pre-op TAVR/CABG next week    SUBJECTIVE ASSESSMENT:  47y Female seen and examined, laying in bed. Today patient reports that her should pain has gotten better after receiving pain medication last night.  She also reports chills last night. She still reports LLE weakness.  Patient was OOB to chair yesterday, but still has not ambulated since admitted.  She is having normal BMs and tolerating PO diet.  She denies fever, chest pain, SOB, abdominal pain, N/V/D/C.          Vital Signs Last 24 Hrs  T(C): 36.2 (19 Sep 2017 09:00), Max: 38.7 (18 Sep 2017 21:45)  T(F): 97.2 (19 Sep 2017 09:00), Max: 101.7 (18 Sep 2017 21:45)  HR: 84 (19 Sep 2017 09:32) (80 - 122)  BP: 128/68 (19 Sep 2017 09:32) (122/65 - 178/68)  BP(mean): 89 (19 Sep 2017 09:32) (80 - 102)  RR: 18 (19 Sep 2017 09:32) (16 - 21)  SpO2: 98% (19 Sep 2017 09:32) (93% - 98%)  I&O's Detail    18 Sep 2017 07:01  -  19 Sep 2017 07:00  --------------------------------------------------------  IN:    heparin  Infusion.: 353 mL    IV PiggyBack: 100 mL    Other: 1600 mL    Packed Red Blood Cells: 400 mL  Total IN: 2453 mL    OUT:    Other: 4600 mL    Voided: 100 mL  Total OUT: 4700 mL    Total NET: -2247 mL      19 Sep 2017 07:01  -  19 Sep 2017 12:33  --------------------------------------------------------  IN:    heparin  Infusion.: 54 mL  Total IN: 54 mL    OUT:  Total OUT: 0 mL    Total NET: 54 mL    COOK: No.    PHYSICAL EXAM:    General: Laying in bed, no acute distress.    Neurological: CN grossly intact, LLE 3/5 strength, RLE 5/5, bilateral UE 5/5.    Cardiovascular: S1S2, RRR, no murmurs heard at this time    Respiratory: decreased breath sounds at R base.  No wheeze/rhonchi/rales    Gastrointestinal:+BS, soft nontender nondistended    Extremities: Warm and well perfused, 1+ bilateral edema, no calf tenderness. L foot: +chronic fissure, no tenderness, erythema, drainage.      Vascular: 1+ L DP, 2+ R DP, 2+ radial pulses bilaterally.    Incision Sites: L IJ: +temporary HD catheter, no erythema, tenderness, discharge.    LABS:                        8.4    19.9  )-----------( 247      ( 19 Sep 2017 06:00 )             26.5       COUMADIN:  No, heparin for SVC thrombus.    PT/INR - ( 19 Sep 2017 05:43 )   PT: 20.4 sec;   INR: 1.82          PTT - ( 19 Sep 2017 05:43 )  PTT:27.4 sec    09-19    129<L>  |  94<L>  |  23  ----------------------------<  205<H>  4.0   |  22  |  3.61<H>    Ca    8.7      19 Sep 2017 06:00  Phos  4.5     09-19  Mg     2.0     09-19            MEDICATIONS  (STANDING):  sodium chloride 0.9% lock flush 3 milliLiter(s) IV Push every 8 hours  aspirin enteric coated 81 milliGRAM(s) Oral daily  valsartan 160 milliGRAM(s) Oral daily  simvastatin 20 milliGRAM(s) Oral at bedtime  insulin lispro (HumaLOG) corrective regimen sliding scale   SubCutaneous every 6 hours  dextrose 5%. 1000 milliLiter(s) (50 mL/Hr) IV Continuous <Continuous>  dextrose 50% Injectable 12.5 Gram(s) IV Push once  dextrose 50% Injectable 25 Gram(s) IV Push once  dextrose 50% Injectable 25 Gram(s) IV Push once  polyethylene glycol 3350 17 Gram(s) Oral daily  pantoprazole    Tablet 40 milliGRAM(s) Oral before breakfast  DAPTOmycin IVPB 700 milliGRAM(s) IV Intermittent every 48 hours  rifampin 600 milliGRAM(s) Oral daily  Ceftaroline Fosamil IVPB 200 milliGRAM(s) IV Intermittent every 12 hours  amLODIPine   Tablet 10 milliGRAM(s) Oral daily  metoprolol 50 milliGRAM(s) Oral every 6 hours  gabapentin 100 milliGRAM(s) Oral two times a day  gabapentin 300 milliGRAM(s) Oral at bedtime  insulin lispro Injectable (HumaLOG) 13 Unit(s) SubCutaneous three times a day before meals  heparin  Infusion. 1800 Unit(s)/Hr (18 mL/Hr) IV Continuous <Continuous>  insulin glargine Injectable (LANTUS) 30 Unit(s) SubCutaneous at bedtime    MEDICATIONS  (PRN):  dextrose Gel 1 Dose(s) Oral once PRN Blood Glucose LESS THAN 70 milliGRAM(s)/deciliter  glucagon  Injectable 1 milliGRAM(s) IntraMuscular once PRN Glucose LESS THAN 70 milligrams/deciliter  acetaminophen    Suspension. 650 milliGRAM(s) Oral every 6 hours PRN Mild Pain (1 - 3)        RADIOLOGY & ADDITIONAL TESTS:  CXR:   < from: Xray Chest 1 View AP -PORTABLE-Routine (09.19.17 @ 05:13) >   Left internal jugular vein dialysis catheter is again noted to   be in place, unchanged. Patchy bibasilar airspace opacities are again   noted, slightly increased at the right base. Heart size is stable.    IMPRESSION:  Patchy bibasilar airspace opacities, slightly increased on the right.    < end of copied text >

## 2017-09-19 NOTE — PROGRESS NOTE ADULT - SUBJECTIVE AND OBJECTIVE BOX
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 (13 Sep 2017 22:57)    Patient denies any complaints. She was eating hamburger. There is diet cranberry juice at bedside.    Pt reports the following symptoms:    CONSTITUTIONAL:  Negative fever or chills, feels well, good appetite  EYES:  Negative  blurry vision or double vision  CARDIOVASCULAR:  Negative for chest pain or palpitations  RESPIRATORY:  Negative for cough, wheezing, or SOB   GASTROINTESTINAL:  Negative for nausea, vomiting, diarrhea, constipation, or abdominal pain  GENITOURINARY:  Negative frequency, urgency or dysuria  NEUROLOGIC:  No headache, confusion, dizziness, lightheadedness    MEDICATIONS  (STANDING):  sodium chloride 0.9% lock flush 3 milliLiter(s) IV Push every 8 hours  aspirin enteric coated 81 milliGRAM(s) Oral daily  valsartan 160 milliGRAM(s) Oral daily  simvastatin 20 milliGRAM(s) Oral at bedtime  insulin lispro (HumaLOG) corrective regimen sliding scale   SubCutaneous Before meals and at bedtime  dextrose 5%. 1000 milliLiter(s) (50 mL/Hr) IV Continuous <Continuous>  dextrose 50% Injectable 12.5 Gram(s) IV Push once  dextrose 50% Injectable 25 Gram(s) IV Push once  dextrose 50% Injectable 25 Gram(s) IV Push once  polyethylene glycol 3350 17 Gram(s) Oral daily  pantoprazole    Tablet 40 milliGRAM(s) Oral before breakfast  DAPTOmycin IVPB 700 milliGRAM(s) IV Intermittent every 48 hours  rifampin 600 milliGRAM(s) Oral daily  Ceftaroline Fosamil IVPB 200 milliGRAM(s) IV Intermittent every 12 hours  amLODIPine   Tablet 10 milliGRAM(s) Oral daily  metoprolol 50 milliGRAM(s) Oral every 6 hours  gabapentin 100 milliGRAM(s) Oral two times a day  gabapentin 300 milliGRAM(s) Oral at bedtime  insulin lispro Injectable (HumaLOG) 13 Unit(s) SubCutaneous three times a day before meals  insulin glargine Injectable (LANTUS) 30 Unit(s) SubCutaneous at bedtime    MEDICATIONS  (PRN):  dextrose Gel 1 Dose(s) Oral once PRN Blood Glucose LESS THAN 70 milliGRAM(s)/deciliter  glucagon  Injectable 1 milliGRAM(s) IntraMuscular once PRN Glucose LESS THAN 70 milligrams/deciliter  acetaminophen    Suspension. 650 milliGRAM(s) Oral every 6 hours PRN Mild Pain (1 - 3)      PHYSICAL EXAM  Vital Signs Last 24 Hrs  T(C): 36.6 (19 Sep 2017 14:00), Max: 38.7 (18 Sep 2017 21:45)  T(F): 97.8 (19 Sep 2017 14:00), Max: 101.7 (18 Sep 2017 21:45)  HR: 84 (19 Sep 2017 13:22) (80 - 122)  BP: 138/74 (19 Sep 2017 13:22) (126/72 - 178/68)  BP(mean): 92 (19 Sep 2017 13:22) (80 - 102)  RR: 18 (19 Sep 2017 13:22) (16 - 21)  SpO2: 97% (19 Sep 2017 13:22) (93% - 98%)    Constitutional: wn/wd in NAD.   HEENT: NCAT, MMM  Respiratory: poor BS at lower bases  Cardiovascular: regular rhythm, normal S1 and S2, no audible murmurs, no peripheral edema  GI: soft, NT/ND, no masses/HSM appreciated.  Skin: no visible rashes/lesions  Psychiatric: AAO x 3, normal affect/mood.  Ext: radial pulses intact, DP pulses intact, extremities warm, no cyanosis, clubbing or edema, L foot with old scar and poor healing    LABS:                        8.4    19.9  )-----------( 247      ( 19 Sep 2017 06:00 )             26.5     09-19    129<L>  |  94<L>  |  23  ----------------------------<  205<H>  4.0   |  22  |  3.61<H>    Ca    8.7      19 Sep 2017 06:00  Phos  4.5     09-19  Mg     2.0     09-19      PT/INR - ( 19 Sep 2017 15:32 )   PT: 23.3 sec;   INR: 2.07          PTT - ( 19 Sep 2017 15:32 )  PTT:56.3 sec    Thyroid Stimulating Hormone, Serum: 2.447 uIU/mL (09-13 @ 22:51)  Thyroid Stimulating Hormone, Serum: 1.251 uIU/mL (08-24 @ 01:12)      HbA1C: 8.2 % (09-13 @ 22:51)  8.2 % (08-24 @ 01:11)    CAPILLARY BLOOD GLUCOSE  225 (19 Sep 2017 16:35)  234 (19 Sep 2017 11:20)  176 (19 Sep 2017 05:50)  230 (18 Sep 2017 21:45)      A/P:47y Female with DM2 admitted with persistent MRSA bacteremia with 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    1.  DM 2-uncontrolled, complicated- Patient has a very poor diabetic history and has multiple diabetic complications. Her A1C is likely higher given that patient is on procrit and has high cell turnover. Patient has been counseled on her poor diet choices and to be consistent with her meals. She will likely be difficult to control while inpatient due to her access to outside food and non-compliance with a diabetic diet.  Please cont lantus 30 units at night.  Please cont lispro 13 units before each meal.  Please continue lispro moderate dose sliding scale four times daily with meals and at bedtime    Pt's fingerstick glucose goal is 120-180     Will continue to monitor     Pt can follow up at discharge with [facility] by calling  to make an appointment.   Will discuss case with Dr. Ross  and update primary team

## 2017-09-19 NOTE — PROGRESS NOTE ADULT - ATTENDING COMMENTS
Patient examined, fellow's hx and PE reviewed and confirmed. I find stable on dialysis. Intermittent fevers. A/P reviewed and confirmed. Continue dialysis. Renall dose abx. See full note

## 2017-09-19 NOTE — PROGRESS NOTE ADULT - PROBLEM SELECTOR PLAN 2
hemoglobin - 8.4  - most likely due to the infection and underlying Renal failure   - we will do EPO with HD today.

## 2017-09-19 NOTE — PROGRESS NOTE ADULT - SUBJECTIVE AND OBJECTIVE BOX
Pt seen and examined at bedside. Patient with no additional episodes of bright red emesis. H/H stable with most recent Hgb 8.4 (AM).     REVIEW OF SYSTEMS:  Constitutional: No fever, weight loss or fatigue  Cardiovascular: No chest pain, palpitations, dizziness or leg swelling  Gastrointestinal: No abdominal or epigastric pain. No nausea, vomiting or hematemesis; No diarrhea or constipation. No melena or hematochezia.  Skin: No itching, burning, rashes or lesions     Allergies:  penicillin - urticaria  Sulfur (Unknown)    MEDICATIONS:  MEDICATIONS  (STANDING):  sodium chloride 0.9% lock flush 3 milliLiter(s) IV Push every 8 hours  aspirin enteric coated 81 milliGRAM(s) Oral daily  valsartan 160 milliGRAM(s) Oral daily  simvastatin 20 milliGRAM(s) Oral at bedtime  insulin lispro (HumaLOG) corrective regimen sliding scale   SubCutaneous every 6 hours  dextrose 5%. 1000 milliLiter(s) (50 mL/Hr) IV Continuous <Continuous>  dextrose 50% Injectable 12.5 Gram(s) IV Push once  dextrose 50% Injectable 25 Gram(s) IV Push once  dextrose 50% Injectable 25 Gram(s) IV Push once  polyethylene glycol 3350 17 Gram(s) Oral daily  pantoprazole    Tablet 40 milliGRAM(s) Oral before breakfast  DAPTOmycin IVPB 700 milliGRAM(s) IV Intermittent every 48 hours  rifampin 600 milliGRAM(s) Oral daily  Ceftaroline Fosamil IVPB 200 milliGRAM(s) IV Intermittent every 12 hours  amLODIPine   Tablet 10 milliGRAM(s) Oral daily  metoprolol 50 milliGRAM(s) Oral every 6 hours  gabapentin 100 milliGRAM(s) Oral two times a day  gabapentin 300 milliGRAM(s) Oral at bedtime  insulin lispro Injectable (HumaLOG) 13 Unit(s) SubCutaneous three times a day before meals  heparin  Infusion. 1800 Unit(s)/Hr (18 mL/Hr) IV Continuous <Continuous>  insulin glargine Injectable (LANTUS) 30 Unit(s) SubCutaneous at bedtime    MEDICATIONS  (PRN):  dextrose Gel 1 Dose(s) Oral once PRN Blood Glucose LESS THAN 70 milliGRAM(s)/deciliter  glucagon  Injectable 1 milliGRAM(s) IntraMuscular once PRN Glucose LESS THAN 70 milligrams/deciliter  acetaminophen    Suspension. 650 milliGRAM(s) Oral every 6 hours PRN Mild Pain (1 - 3)      Vital Signs Last 24 Hrs  T(C): 36.2 (19 Sep 2017 09:00), Max: 38.7 (18 Sep 2017 21:45)  T(F): 97.2 (19 Sep 2017 09:00), Max: 101.7 (18 Sep 2017 21:45)  HR: 84 (19 Sep 2017 09:32) (80 - 122)  BP: 128/68 (19 Sep 2017 09:32) (122/65 - 178/68)  BP(mean): 89 (19 Sep 2017 09:32) (80 - 102)  RR: 18 (19 Sep 2017 09:32) (16 - 21)  SpO2: 98% (19 Sep 2017 09:32) (93% - 98%)    09-18 @ 07:01  -  09-19 @ 07:00  --------------------------------------------------------  IN: 2453 mL / OUT: 4700 mL / NET: -2247 mL    09-19 @ 07:01  -  09-19 @ 10:31  --------------------------------------------------------  IN: 54 mL / OUT: 0 mL / NET: 54 mL        PHYSICAL EXAM:    General: Well developed; well nourished; in no acute distress  HEENT: MMM, conjunctiva and sclera clear  Gastrointestinal: Soft non-tender non-distended; Normal bowel sounds; No hepatosplenomegaly. No rebound or guarding  Skin: Warm and dry. No obvious rash    LABS:  CBC Full  -  ( 19 Sep 2017 06:00 )  WBC Count : 19.9 K/uL  Hemoglobin : 8.4 g/dL  Hematocrit : 26.5 %  Platelet Count - Automated : 247 K/uL  Mean Cell Volume : 83.3 fL  Mean Cell Hemoglobin : 26.4 pg  Mean Cell Hemoglobin Concentration : 31.7 g/dL  Auto Neutrophil # : x  Auto Lymphocyte # : x  Auto Monocyte # : x  Auto Eosinophil # : x  Auto Basophil # : x  Auto Neutrophil % : x  Auto Lymphocyte % : x  Auto Monocyte % : x  Auto Eosinophil % : x  Auto Basophil % : x    09-19    129<L>  |  94<L>  |  23  ----------------------------<  205<H>  4.0   |  22  |  3.61<H>    Ca    8.7      19 Sep 2017 06:00  Phos  4.5     09-19  Mg     2.0     09-19      PT/INR - ( 19 Sep 2017 05:43 )   PT: 20.4 sec;   INR: 1.82          PTT - ( 19 Sep 2017 05:43 )  PTT:27.4 sec                  RADIOLOGY & ADDITIONAL STUDIES: Pt seen and examined at bedside. Patient with no additional episodes of bright red emesis. H/H stable with most recent Hgb 8.4 (AM).     REVIEW OF SYSTEMS:  Constitutional: No fever, weight loss or fatigue  Cardiovascular: No chest pain, palpitations, dizziness or leg swelling  Gastrointestinal: No abdominal or epigastric pain. No nausea, vomiting or hematemesis; No diarrhea or constipation. No melena or hematochezia.  Skin: No itching, burning, rashes or lesions     Allergies:  penicillin - urticaria  Sulfur (Unknown)    MEDICATIONS:  MEDICATIONS  (STANDING):  sodium chloride 0.9% lock flush 3 milliLiter(s) IV Push every 8 hours  aspirin enteric coated 81 milliGRAM(s) Oral daily  valsartan 160 milliGRAM(s) Oral daily  simvastatin 20 milliGRAM(s) Oral at bedtime  insulin lispro (HumaLOG) corrective regimen sliding scale   SubCutaneous every 6 hours  dextrose 5%. 1000 milliLiter(s) (50 mL/Hr) IV Continuous <Continuous>  dextrose 50% Injectable 12.5 Gram(s) IV Push once  dextrose 50% Injectable 25 Gram(s) IV Push once  dextrose 50% Injectable 25 Gram(s) IV Push once  polyethylene glycol 3350 17 Gram(s) Oral daily  pantoprazole    Tablet 40 milliGRAM(s) Oral before breakfast  DAPTOmycin IVPB 700 milliGRAM(s) IV Intermittent every 48 hours  rifampin 600 milliGRAM(s) Oral daily  Ceftaroline Fosamil IVPB 200 milliGRAM(s) IV Intermittent every 12 hours  amLODIPine   Tablet 10 milliGRAM(s) Oral daily  metoprolol 50 milliGRAM(s) Oral every 6 hours  gabapentin 100 milliGRAM(s) Oral two times a day  gabapentin 300 milliGRAM(s) Oral at bedtime  insulin lispro Injectable (HumaLOG) 13 Unit(s) SubCutaneous three times a day before meals  heparin  Infusion. 1800 Unit(s)/Hr (18 mL/Hr) IV Continuous <Continuous>  insulin glargine Injectable (LANTUS) 30 Unit(s) SubCutaneous at bedtime    MEDICATIONS  (PRN):  dextrose Gel 1 Dose(s) Oral once PRN Blood Glucose LESS THAN 70 milliGRAM(s)/deciliter  glucagon  Injectable 1 milliGRAM(s) IntraMuscular once PRN Glucose LESS THAN 70 milligrams/deciliter  acetaminophen    Suspension. 650 milliGRAM(s) Oral every 6 hours PRN Mild Pain (1 - 3)      Vital Signs Last 24 Hrs  T(C): 36.2 (19 Sep 2017 09:00), Max: 38.7 (18 Sep 2017 21:45)  T(F): 97.2 (19 Sep 2017 09:00), Max: 101.7 (18 Sep 2017 21:45)  HR: 84 (19 Sep 2017 09:32) (80 - 122)  BP: 128/68 (19 Sep 2017 09:32) (122/65 - 178/68)  BP(mean): 89 (19 Sep 2017 09:32) (80 - 102)  RR: 18 (19 Sep 2017 09:32) (16 - 21)  SpO2: 98% (19 Sep 2017 09:32) (93% - 98%)    09-18 @ 07:01  -  09-19 @ 07:00  --------------------------------------------------------  IN: 2453 mL / OUT: 4700 mL / NET: -2247 mL    09-19 @ 07:01  -  09-19 @ 10:31  --------------------------------------------------------  IN: 54 mL / OUT: 0 mL / NET: 54 mL        PHYSICAL EXAM:  General: Well developed; well nourished; in no acute distress, lying comfortably in bed  HEENT: MMM, no oral sores, no conjunctival pallor, sclera anicteric  CV: +S1S2, RRR, no m/r/g appreciated on auscultation  Lungs: CTA b/l, no w/r/r  Gastrointestinal: Soft non-tender non-distended; Normal bowel sounds; No hepatosplenomegaly. No rebound or guarding  Skin: Warm and dry. No obvious rash    LABS:  CBC Full  -  ( 19 Sep 2017 06:00 )  WBC Count : 19.9 K/uL  Hemoglobin : 8.4 g/dL  Hematocrit : 26.5 %  Platelet Count - Automated : 247 K/uL  Mean Cell Volume : 83.3 fL  Mean Cell Hemoglobin : 26.4 pg  Mean Cell Hemoglobin Concentration : 31.7 g/dL  Auto Neutrophil # : x  Auto Lymphocyte # : x  Auto Monocyte # : x  Auto Eosinophil # : x  Auto Basophil # : x  Auto Neutrophil % : x  Auto Lymphocyte % : x  Auto Monocyte % : x  Auto Eosinophil % : x  Auto Basophil % : x    09-19    129<L>  |  94<L>  |  23  ----------------------------<  205<H>  4.0   |  22  |  3.61<H>    Ca    8.7      19 Sep 2017 06:00  Phos  4.5     09-19  Mg     2.0     09-19      PT/INR - ( 19 Sep 2017 05:43 )   PT: 20.4 sec;   INR: 1.82          PTT - ( 19 Sep 2017 05:43 )  PTT:27.4 sec                  RADIOLOGY & ADDITIONAL STUDIES:

## 2017-09-19 NOTE — PROGRESS NOTE ADULT - ASSESSMENT
47 year old female PMHx significant for HTN, HLD, DM II, ESRD on HD (MWF. 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 Emesis 2/2 to regurgitation of fruit juice (cranberry/karla) and unlikely 2/2 to upper GI bleed.    #Bright red emesis 2/2 to regurgitation  Patient with extensive cardiac history with recent diagnosis of Tricuspid endocarditis (MRSA bacteremia, refractory to aggressive abxs). Hospital course c/b recent MCA infarct and septic emboli, with large SVC/ RA thrombus (on heparin gtt), now with reported bright red emesis this AM with minimal decrease in Hgb (8.4 --> 7.7). Heparin gtt held for suspicion of possible GI bleed. HD stable, History and exam consistent with regurgitation of cranberry/karla juice that patient drank shortly prior to spitting up bright red material. No additional episodes of bright red emesis.   -Patient hemodynamically stable, patient with normocytic anemia in the setting of ESRD, H/H stable at 8.5 in AM. Continue to monitor.   -Can resume Heparin gtt from GI perspective; will defer to CT surgery recommendations  -c/w daily PPI  -Patient HD stable and showing no signs or symptoms indicative of active GI bleed (i.e. hematemesis, hematochezia, melena). If patient does show any signs of HD instability and/or any active signs of bleeding (i.e. hematemesis, hematochezia, melena), will re-consider an EGD at that time to r/o GI bleed. 47 year old female PMHx significant for HTN, HLD, DM II, ESRD on HD (MWF. 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 Emesis 2/2 to regurgitation of fruit juice (cranberry/karla) and unlikely attributable to upper GI bleed.    #Bright red emesis 2/2 to regurgitation  Patient with extensive cardiac history with recent diagnosis of Tricuspid endocarditis (MRSA bacteremia, refractory to aggressive abxs). Hospital course c/b recent MCA infarct and septic emboli, with large SVC/ RA thrombus (on heparin gtt), now with reported bright red emesis this AM with minimal decrease in Hgb (8.4 --> 7.7). Heparin gtt held for suspicion of possible GI bleed. HD stable, History and exam consistent with regurgitation of cranberry/karla juice that patient drank shortly prior to spitting up bright red material. No additional episodes of bright red emesis.   -Patient hemodynamically stable, patient with normocytic anemia in the setting of ESRD, H/H stable at 8.5 in AM. Continue to monitor.   -c/w Heparin gtt for the treatment of SVC/RA thrombus  -GI will sign off as patient HD stable, with improvement in H/H, showing no signs or symptoms indicative of active GI bleed (i.e. hematemesis, hematochezia, melena). If patient does show any signs of HD instability and/or any active signs of bleeding, please re-consult with GI and will re-consider an EGD at that time to r/o GI bleed.

## 2017-09-20 LAB
ANION GAP SERPL CALC-SCNC: 13 MMOL/L — SIGNIFICANT CHANGE UP (ref 5–17)
APPEARANCE UR: SIGNIFICANT CHANGE UP
APTT BLD: 38.4 SEC — HIGH (ref 27.5–37.4)
APTT BLD: 40.1 SEC — HIGH (ref 27.5–37.4)
BACTERIA # UR AUTO: PRESENT /HPF
BILIRUB UR-MCNC: NEGATIVE — SIGNIFICANT CHANGE UP
BLD GP AB SCN SERPL QL: NEGATIVE — SIGNIFICANT CHANGE UP
BUN SERPL-MCNC: 29 MG/DL — HIGH (ref 7–23)
CALCIUM SERPL-MCNC: 8.2 MG/DL — LOW (ref 8.4–10.5)
CHLORIDE SERPL-SCNC: 93 MMOL/L — LOW (ref 96–108)
CO2 SERPL-SCNC: 21 MMOL/L — LOW (ref 22–31)
COLOR SPEC: YELLOW — SIGNIFICANT CHANGE UP
CREAT SERPL-MCNC: 4.42 MG/DL — HIGH (ref 0.5–1.3)
DIFF PNL FLD: (no result)
EPI CELLS # UR: (no result) /HPF
GLUCOSE SERPL-MCNC: 207 MG/DL — HIGH (ref 70–99)
GLUCOSE UR QL: 250
HCT VFR BLD CALC: 24.4 % — LOW (ref 34.5–45)
HGB BLD-MCNC: 7.6 G/DL — LOW (ref 11.5–15.5)
INR BLD: 1.51 — HIGH (ref 0.88–1.16)
INR BLD: 1.97 — HIGH (ref 0.88–1.16)
KETONES UR-MCNC: NEGATIVE — SIGNIFICANT CHANGE UP
LEUKOCYTE ESTERASE UR-ACNC: (no result)
MAGNESIUM SERPL-MCNC: 1.9 MG/DL — SIGNIFICANT CHANGE UP (ref 1.6–2.6)
MCHC RBC-ENTMCNC: 26 PG — LOW (ref 27–34)
MCHC RBC-ENTMCNC: 31.1 G/DL — LOW (ref 32–36)
MCV RBC AUTO: 83.6 FL — SIGNIFICANT CHANGE UP (ref 80–100)
NITRITE UR-MCNC: NEGATIVE — SIGNIFICANT CHANGE UP
PH UR: 8 — SIGNIFICANT CHANGE UP (ref 5–8)
PLATELET # BLD AUTO: 289 K/UL — SIGNIFICANT CHANGE UP (ref 150–400)
POTASSIUM SERPL-MCNC: 4.1 MMOL/L — SIGNIFICANT CHANGE UP (ref 3.5–5.3)
POTASSIUM SERPL-SCNC: 4.1 MMOL/L — SIGNIFICANT CHANGE UP (ref 3.5–5.3)
PROT UR-MCNC: >=300 MG/DL
PROTHROM AB SERPL-ACNC: 16.9 SEC — HIGH (ref 9.8–12.7)
PROTHROM AB SERPL-ACNC: 22.2 SEC — HIGH (ref 9.8–12.7)
RBC # BLD: 2.92 M/UL — LOW (ref 3.8–5.2)
RBC # FLD: 17.1 % — HIGH (ref 10.3–16.9)
RBC CASTS # UR COMP ASSIST: < 5 /HPF — SIGNIFICANT CHANGE UP
RH IG SCN BLD-IMP: NEGATIVE — SIGNIFICANT CHANGE UP
SODIUM SERPL-SCNC: 127 MMOL/L — LOW (ref 135–145)
SP GR SPEC: 1.02 — SIGNIFICANT CHANGE UP (ref 1–1.03)
UROBILINOGEN FLD QL: 0.2 E.U./DL — SIGNIFICANT CHANGE UP
WBC # BLD: 13.6 K/UL — HIGH (ref 3.8–10.5)
WBC # FLD AUTO: 13.6 K/UL — HIGH (ref 3.8–10.5)
WBC UR QL: (no result) /HPF

## 2017-09-20 PROCEDURE — 99233 SBSQ HOSP IP/OBS HIGH 50: CPT

## 2017-09-20 PROCEDURE — 71010: CPT | Mod: 26

## 2017-09-20 PROCEDURE — 93306 TTE W/DOPPLER COMPLETE: CPT | Mod: 26

## 2017-09-20 PROCEDURE — 99232 SBSQ HOSP IP/OBS MODERATE 35: CPT | Mod: GC

## 2017-09-20 PROCEDURE — 90935 HEMODIALYSIS ONE EVALUATION: CPT

## 2017-09-20 RX ORDER — INSULIN GLARGINE 100 [IU]/ML
35 INJECTION, SOLUTION SUBCUTANEOUS AT BEDTIME
Qty: 0 | Refills: 0 | Status: DISCONTINUED | OUTPATIENT
Start: 2017-09-20 | End: 2017-09-23

## 2017-09-20 RX ORDER — MAGNESIUM OXIDE 400 MG ORAL TABLET 241.3 MG
400 TABLET ORAL ONCE
Qty: 0 | Refills: 0 | Status: COMPLETED | OUTPATIENT
Start: 2017-09-20 | End: 2017-09-20

## 2017-09-20 RX ORDER — SERTRALINE 25 MG/1
25 TABLET, FILM COATED ORAL DAILY
Qty: 0 | Refills: 0 | Status: DISCONTINUED | OUTPATIENT
Start: 2017-09-20 | End: 2017-10-10

## 2017-09-20 RX ORDER — ERYTHROPOIETIN 10000 [IU]/ML
10000 INJECTION, SOLUTION INTRAVENOUS; SUBCUTANEOUS ONCE
Qty: 0 | Refills: 0 | Status: COMPLETED | OUTPATIENT
Start: 2017-09-20 | End: 2017-09-20

## 2017-09-20 RX ORDER — PHYTONADIONE (VIT K1) 5 MG
5 TABLET ORAL ONCE
Qty: 0 | Refills: 0 | Status: DISCONTINUED | OUTPATIENT
Start: 2017-09-20 | End: 2017-09-20

## 2017-09-20 RX ORDER — HEPARIN SODIUM 5000 [USP'U]/ML
1800 INJECTION INTRAVENOUS; SUBCUTANEOUS
Qty: 25000 | Refills: 0 | Status: DISCONTINUED | OUTPATIENT
Start: 2017-09-20 | End: 2017-09-20

## 2017-09-20 RX ORDER — NYSTATIN CREAM 100000 [USP'U]/G
1 CREAM TOPICAL ONCE
Qty: 0 | Refills: 0 | Status: COMPLETED | OUTPATIENT
Start: 2017-09-20 | End: 2017-09-20

## 2017-09-20 RX ORDER — OXYCODONE AND ACETAMINOPHEN 5; 325 MG/1; MG/1
1 TABLET ORAL ONCE
Qty: 0 | Refills: 0 | Status: DISCONTINUED | OUTPATIENT
Start: 2017-09-20 | End: 2017-09-20

## 2017-09-20 RX ORDER — PHYTONADIONE (VIT K1) 5 MG
5 TABLET ORAL ONCE
Qty: 0 | Refills: 0 | Status: COMPLETED | OUTPATIENT
Start: 2017-09-20 | End: 2017-09-20

## 2017-09-20 RX ORDER — HEPARIN SODIUM 5000 [USP'U]/ML
2000 INJECTION INTRAVENOUS; SUBCUTANEOUS
Qty: 25000 | Refills: 0 | Status: DISCONTINUED | OUTPATIENT
Start: 2017-09-20 | End: 2017-09-21

## 2017-09-20 RX ORDER — TRAZODONE HCL 50 MG
25 TABLET ORAL AT BEDTIME
Qty: 0 | Refills: 0 | Status: DISCONTINUED | OUTPATIENT
Start: 2017-09-20 | End: 2017-10-10

## 2017-09-20 RX ADMIN — Medication 4: at 07:37

## 2017-09-20 RX ADMIN — CEFTAROLINE FOSAMIL 50 MILLIGRAM(S): 600 POWDER, FOR SOLUTION INTRAVENOUS at 19:09

## 2017-09-20 RX ADMIN — CEFTAROLINE FOSAMIL 50 MILLIGRAM(S): 600 POWDER, FOR SOLUTION INTRAVENOUS at 04:00

## 2017-09-20 RX ADMIN — SIMVASTATIN 20 MILLIGRAM(S): 20 TABLET, FILM COATED ORAL at 21:25

## 2017-09-20 RX ADMIN — Medication 13 UNIT(S): at 07:37

## 2017-09-20 RX ADMIN — Medication 50 MILLIGRAM(S): at 11:44

## 2017-09-20 RX ADMIN — VALSARTAN 160 MILLIGRAM(S): 80 TABLET ORAL at 06:08

## 2017-09-20 RX ADMIN — PANTOPRAZOLE SODIUM 40 MILLIGRAM(S): 20 TABLET, DELAYED RELEASE ORAL at 06:08

## 2017-09-20 RX ADMIN — Medication 50 MILLIGRAM(S): at 18:35

## 2017-09-20 RX ADMIN — OXYCODONE AND ACETAMINOPHEN 1 TABLET(S): 5; 325 TABLET ORAL at 02:50

## 2017-09-20 RX ADMIN — DAPTOMYCIN 128 MILLIGRAM(S): 500 INJECTION, POWDER, LYOPHILIZED, FOR SOLUTION INTRAVENOUS at 18:34

## 2017-09-20 RX ADMIN — GABAPENTIN 100 MILLIGRAM(S): 400 CAPSULE ORAL at 06:08

## 2017-09-20 RX ADMIN — Medication 81 MILLIGRAM(S): at 09:04

## 2017-09-20 RX ADMIN — SODIUM CHLORIDE 3 MILLILITER(S): 9 INJECTION INTRAMUSCULAR; INTRAVENOUS; SUBCUTANEOUS at 15:38

## 2017-09-20 RX ADMIN — Medication 650 MILLIGRAM(S): at 04:22

## 2017-09-20 RX ADMIN — NYSTATIN CREAM 1 APPLICATION(S): 100000 CREAM TOPICAL at 18:38

## 2017-09-20 RX ADMIN — Medication 25 MILLIGRAM(S): at 21:26

## 2017-09-20 RX ADMIN — ERYTHROPOIETIN 10000 UNIT(S): 10000 INJECTION, SOLUTION INTRAVENOUS; SUBCUTANEOUS at 14:12

## 2017-09-20 RX ADMIN — SIMVASTATIN 20 MILLIGRAM(S): 20 TABLET, FILM COATED ORAL at 00:16

## 2017-09-20 RX ADMIN — GABAPENTIN 300 MILLIGRAM(S): 400 CAPSULE ORAL at 00:15

## 2017-09-20 RX ADMIN — MAGNESIUM OXIDE 400 MG ORAL TABLET 400 MILLIGRAM(S): 241.3 TABLET ORAL at 07:59

## 2017-09-20 RX ADMIN — SODIUM CHLORIDE 3 MILLILITER(S): 9 INJECTION INTRAMUSCULAR; INTRAVENOUS; SUBCUTANEOUS at 06:08

## 2017-09-20 RX ADMIN — OXYCODONE AND ACETAMINOPHEN 1 TABLET(S): 5; 325 TABLET ORAL at 02:40

## 2017-09-20 RX ADMIN — Medication 50 MILLIGRAM(S): at 00:15

## 2017-09-20 RX ADMIN — HEPARIN SODIUM 20 UNIT(S)/HR: 5000 INJECTION INTRAVENOUS; SUBCUTANEOUS at 22:10

## 2017-09-20 RX ADMIN — OXYCODONE AND ACETAMINOPHEN 1 TABLET(S): 5; 325 TABLET ORAL at 21:25

## 2017-09-20 RX ADMIN — GABAPENTIN 100 MILLIGRAM(S): 400 CAPSULE ORAL at 18:37

## 2017-09-20 RX ADMIN — Medication 4: at 00:15

## 2017-09-20 RX ADMIN — AMLODIPINE BESYLATE 10 MILLIGRAM(S): 2.5 TABLET ORAL at 06:08

## 2017-09-20 RX ADMIN — Medication 101 MILLIGRAM(S): at 11:03

## 2017-09-20 RX ADMIN — Medication 13 UNIT(S): at 18:25

## 2017-09-20 RX ADMIN — INSULIN GLARGINE 30 UNIT(S): 100 INJECTION, SOLUTION SUBCUTANEOUS at 00:16

## 2017-09-20 RX ADMIN — Medication 50 MILLIGRAM(S): at 06:08

## 2017-09-20 RX ADMIN — INSULIN GLARGINE 35 UNIT(S): 100 INJECTION, SOLUTION SUBCUTANEOUS at 21:25

## 2017-09-20 RX ADMIN — GABAPENTIN 300 MILLIGRAM(S): 400 CAPSULE ORAL at 21:26

## 2017-09-20 RX ADMIN — Medication 650 MILLIGRAM(S): at 04:40

## 2017-09-20 RX ADMIN — Medication 13 UNIT(S): at 11:16

## 2017-09-20 NOTE — PROGRESS NOTE ADULT - SUBJECTIVE AND OBJECTIVE BOX
Patient was seen and evaluated on dialysis.   Patient is tolerating the procedure well.   HR: 92 (09-20-17 @ 09:25)  BP: 137/73 (09-20-17 @ 09:25) pusle 65, /67  Continue dialysis:   Dialyzer:  180    QB:      300  QD: 500  Goal UF 3 liters  over 3 Hours

## 2017-09-20 NOTE — PROGRESS NOTE ADULT - SUBJECTIVE AND OBJECTIVE BOX
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 (13 Sep 2017 22:57)    Patient had outside food last night.     Pt reports the following symptoms:    CONSTITUTIONAL:  Negative fever or chills, feels well, good appetite  EYES:  Negative  blurry vision or double vision  CARDIOVASCULAR:  Negative for chest pain or palpitations  RESPIRATORY:  Negative for cough, wheezing, or SOB   GASTROINTESTINAL:  Negative for nausea, vomiting, diarrhea, constipation, or abdominal pain  GENITOURINARY:  Negative frequency, urgency or dysuria  NEUROLOGIC:  No headache, confusion, dizziness, lightheadedness    MEDICATIONS  (STANDING):  sodium chloride 0.9% lock flush 3 milliLiter(s) IV Push every 8 hours  aspirin enteric coated 81 milliGRAM(s) Oral daily  valsartan 160 milliGRAM(s) Oral daily  simvastatin 20 milliGRAM(s) Oral at bedtime  insulin lispro (HumaLOG) corrective regimen sliding scale   SubCutaneous Before meals and at bedtime  dextrose 5%. 1000 milliLiter(s) (50 mL/Hr) IV Continuous <Continuous>  dextrose 50% Injectable 12.5 Gram(s) IV Push once  dextrose 50% Injectable 25 Gram(s) IV Push once  dextrose 50% Injectable 25 Gram(s) IV Push once  polyethylene glycol 3350 17 Gram(s) Oral daily  pantoprazole    Tablet 40 milliGRAM(s) Oral before breakfast  DAPTOmycin IVPB 700 milliGRAM(s) IV Intermittent every 48 hours  rifampin 600 milliGRAM(s) Oral daily  Ceftaroline Fosamil IVPB 200 milliGRAM(s) IV Intermittent every 12 hours  amLODIPine   Tablet 10 milliGRAM(s) Oral daily  metoprolol 50 milliGRAM(s) Oral every 6 hours  gabapentin 100 milliGRAM(s) Oral two times a day  gabapentin 300 milliGRAM(s) Oral at bedtime  insulin lispro Injectable (HumaLOG) 13 Unit(s) SubCutaneous three times a day before meals  insulin glargine Injectable (LANTUS) 30 Unit(s) SubCutaneous at bedtime  heparin  Infusion 1800 Unit(s)/Hr (18 mL/Hr) IV Continuous <Continuous>  nystatin Cream 1 Application(s) Topical once  sertraline 25 milliGRAM(s) Oral daily  traZODone 25 milliGRAM(s) Oral at bedtime    MEDICATIONS  (PRN):  dextrose Gel 1 Dose(s) Oral once PRN Blood Glucose LESS THAN 70 milliGRAM(s)/deciliter  glucagon  Injectable 1 milliGRAM(s) IntraMuscular once PRN Glucose LESS THAN 70 milligrams/deciliter  acetaminophen    Suspension. 650 milliGRAM(s) Oral every 6 hours PRN Mild Pain (1 - 3)      PHYSICAL EXAM  Vital Signs Last 24 Hrs  T(C): 36.6 (20 Sep 2017 14:13), Max: 37.2 (20 Sep 2017 05:14)  T(F): 97.8 (20 Sep 2017 14:13), Max: 98.9 (20 Sep 2017 05:14)  HR: 96 (20 Sep 2017 12:50) (84 - 96)  BP: 182/79 (20 Sep 2017 12:50) (136/84 - 182/79)  BP(mean): 95 (20 Sep 2017 09:25) (95 - 120)  RR: 17 (20 Sep 2017 12:50) (16 - 20)  SpO2: 98% (20 Sep 2017 12:50) (94% - 98%)    Constitutional: wn/wd in NAD.   HEENT: NCAT, MMM  Respiratory: poor BS at lower bases  Cardiovascular: regular rhythm, normal S1 and S2, no audible murmurs, no peripheral edema  GI: soft, NT/ND, no masses/HSM appreciated.  Skin: no visible rashes/lesions  Psychiatric: AAO x 3, normal affect/mood.  Ext: radial pulses intact, DP pulses intact, extremities warm, no cyanosis, clubbing or edema, L foot with old scar and poor healing    LABS:                        7.6    13.6  )-----------( 289      ( 20 Sep 2017 06:07 )             24.4     09-20    127<L>  |  93<L>  |  29<H>  ----------------------------<  207<H>  4.1   |  21<L>  |  4.42<H>    Ca    8.2<L>      20 Sep 2017 06:05  Phos  4.5     09-19  Mg     1.9     09-20      PT/INR - ( 20 Sep 2017 06:07 )   PT: 22.2 sec;   INR: 1.97          PTT - ( 20 Sep 2017 06:07 )  PTT:38.4 sec    Thyroid Stimulating Hormone, Serum: 2.447 uIU/mL (09-13 @ 22:51)  Thyroid Stimulating Hormone, Serum: 1.251 uIU/mL (08-24 @ 01:12)      HbA1C: 8.2 % (09-13 @ 22:51)  8.2 % (08-24 @ 01:11)    CAPILLARY BLOOD GLUCOSE  150 (20 Sep 2017 09:39)  212 (20 Sep 2017 05:14)  207 (19 Sep 2017 21:34)  225 (19 Sep 2017 16:35)    A/P:47y Female with DM2 admitted with persistent MRSA bacteremia with 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    1.  DM 2-uncontrolled, complicated- Patient has a very poor diabetic history and has multiple diabetic complications. Her A1C is likely higher given that patient is on procrit and has high cell turnover. Patient has been counseled on her poor diet choices and to be consistent with her meals. She will likely be difficult to control while inpatient due to her access to outside food and non-compliance with a diabetic diet.  Please cont lantus 30 units at night.  Please cont lispro 13 units before each meal.  Please continue lispro moderate dose sliding scale four times daily with meals and at bedtime    Pt's fingerstick glucose goal is 120-180     Will continue to monitor     Pt can follow up at discharge with [facility] by calling  to make an appointment.   Will discuss case with Dr. Ross  and update primary team INTERVAL HPI/OVERNIGHT EVENTS:    Patient is a 47y old  Female who presents with a chief complaint of TV IE / MRSA Bacteremia. (13 Sep 2017 22:57)    Patient had outside food last night.     Pt reports the following symptoms:    CONSTITUTIONAL:  Negative fever or chills, feels well, good appetite  EYES:  Negative  blurry vision or double vision  CARDIOVASCULAR:  Negative for chest pain or palpitations  RESPIRATORY:  Negative for cough, wheezing, or SOB   GASTROINTESTINAL:  Negative for nausea, vomiting, diarrhea, constipation, or abdominal pain  GENITOURINARY:  Negative frequency, urgency or dysuria  NEUROLOGIC:  No headache, confusion, dizziness, lightheadedness    MEDICATIONS  (STANDING):  sodium chloride 0.9% lock flush 3 milliLiter(s) IV Push every 8 hours  aspirin enteric coated 81 milliGRAM(s) Oral daily  valsartan 160 milliGRAM(s) Oral daily  simvastatin 20 milliGRAM(s) Oral at bedtime  insulin lispro (HumaLOG) corrective regimen sliding scale   SubCutaneous Before meals and at bedtime  dextrose 5%. 1000 milliLiter(s) (50 mL/Hr) IV Continuous <Continuous>  dextrose 50% Injectable 12.5 Gram(s) IV Push once  dextrose 50% Injectable 25 Gram(s) IV Push once  dextrose 50% Injectable 25 Gram(s) IV Push once  polyethylene glycol 3350 17 Gram(s) Oral daily  pantoprazole    Tablet 40 milliGRAM(s) Oral before breakfast  DAPTOmycin IVPB 700 milliGRAM(s) IV Intermittent every 48 hours  rifampin 600 milliGRAM(s) Oral daily  Ceftaroline Fosamil IVPB 200 milliGRAM(s) IV Intermittent every 12 hours  amLODIPine   Tablet 10 milliGRAM(s) Oral daily  metoprolol 50 milliGRAM(s) Oral every 6 hours  gabapentin 100 milliGRAM(s) Oral two times a day  gabapentin 300 milliGRAM(s) Oral at bedtime  insulin lispro Injectable (HumaLOG) 13 Unit(s) SubCutaneous three times a day before meals  insulin glargine Injectable (LANTUS) 30 Unit(s) SubCutaneous at bedtime  heparin  Infusion 1800 Unit(s)/Hr (18 mL/Hr) IV Continuous <Continuous>  nystatin Cream 1 Application(s) Topical once  sertraline 25 milliGRAM(s) Oral daily  traZODone 25 milliGRAM(s) Oral at bedtime    MEDICATIONS  (PRN):  dextrose Gel 1 Dose(s) Oral once PRN Blood Glucose LESS THAN 70 milliGRAM(s)/deciliter  glucagon  Injectable 1 milliGRAM(s) IntraMuscular once PRN Glucose LESS THAN 70 milligrams/deciliter  acetaminophen    Suspension. 650 milliGRAM(s) Oral every 6 hours PRN Mild Pain (1 - 3)      PHYSICAL EXAM  Vital Signs Last 24 Hrs  T(C): 36.6 (20 Sep 2017 14:13), Max: 37.2 (20 Sep 2017 05:14)  T(F): 97.8 (20 Sep 2017 14:13), Max: 98.9 (20 Sep 2017 05:14)  HR: 96 (20 Sep 2017 12:50) (84 - 96)  BP: 182/79 (20 Sep 2017 12:50) (136/84 - 182/79)  BP(mean): 95 (20 Sep 2017 09:25) (95 - 120)  RR: 17 (20 Sep 2017 12:50) (16 - 20)  SpO2: 98% (20 Sep 2017 12:50) (94% - 98%)    Constitutional: wn/wd in NAD.   HEENT: NCAT, MMM  Respiratory: poor BS at lower bases  Cardiovascular: regular rhythm, normal S1 and S2, no audible murmurs, no peripheral edema  GI: soft, NT/ND, no masses/HSM appreciated.  Skin: no visible rashes/lesions  Psychiatric: AAO x 3, normal affect/mood.  Ext: radial pulses intact, DP pulses intact, extremities warm, no cyanosis, clubbing or edema, L foot with old scar and poor healing    LABS:                        7.6    13.6  )-----------( 289      ( 20 Sep 2017 06:07 )             24.4     09-20    127<L>  |  93<L>  |  29<H>  ----------------------------<  207<H>  4.1   |  21<L>  |  4.42<H>    Ca    8.2<L>      20 Sep 2017 06:05  Phos  4.5     09-19  Mg     1.9     09-20      PT/INR - ( 20 Sep 2017 06:07 )   PT: 22.2 sec;   INR: 1.97          PTT - ( 20 Sep 2017 06:07 )  PTT:38.4 sec    Thyroid Stimulating Hormone, Serum: 2.447 uIU/mL (09-13 @ 22:51)  Thyroid Stimulating Hormone, Serum: 1.251 uIU/mL (08-24 @ 01:12)      HbA1C: 8.2 % (09-13 @ 22:51)  8.2 % (08-24 @ 01:11)    CAPILLARY BLOOD GLUCOSE  150 (20 Sep 2017 09:39)  212 (20 Sep 2017 05:14)  207 (19 Sep 2017 21:34)  225 (19 Sep 2017 16:35)    A/P:47y Female with DM2 admitted with persistent MRSA bacteremia with 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    1.  DM 2-uncontrolled, complicated- Patient has a very poor diabetic history and has multiple diabetic complications. Her A1C is likely higher given that patient is on procrit and has high cell turnover. Patient has been counseled on her poor diet choices and to be consistent with her meals. She will likely be difficult to control while inpatient due to her access to outside food and non-compliance with a diabetic diet.  Please inc lantus to 35 units at night.  Please cont lispro 13 units before each meal.  Please continue lispro moderate dose sliding scale four times daily with meals and at bedtime    Pt's fingerstick glucose goal is 120-180     Will continue to monitor     Pt can follow up at discharge with [facility] by calling  to make an appointment.   Will discuss case with Dr. Ross  and update primary team

## 2017-09-20 NOTE — PROGRESS NOTE ADULT - SUBJECTIVE AND OBJECTIVE BOX
Patient discussed on morning rounds with Dr. Dr. Escamilla    MRSA bactermia/ chronic L foot OM/ SVC thrombus, pre-op TAVR/CABG next week    SUBJECTIVE ASSESSMENT:  47y Female seen  and examined , laying bed. Today patient reports pain in her L thigh, similar to the past few days, that is improved by her pain medication. She does not report pain in her R shoulder today.  She has not ambulated since admission.  She is having normal BMs and tolerating PO diet. She denies fever, chills, chest pain, SOB,  abdominal pan, N/V/D/C.        Vital Signs Last 24 Hrs  T(C): 36.8 (20 Sep 2017 09:39), Max: 37.2 (20 Sep 2017 05:14)  T(F): 98.3 (20 Sep 2017 09:39), Max: 98.9 (20 Sep 2017 05:14)  HR: 92 (20 Sep 2017 09:25) (84 - 96)  BP: 137/73 (20 Sep 2017 09:25) (136/84 - 164/78)  BP(mean): 95 (20 Sep 2017 09:25) (92 - 120)  RR: 20 (20 Sep 2017 04:40) (16 - 20)  SpO2: 95% (20 Sep 2017 09:25) (94% - 97%)  I&O's Detail    19 Sep 2017 07:01  -  20 Sep 2017 07:00  --------------------------------------------------------  IN:    heparin  Infusion.: 144 mL    IV PiggyBack: 100 mL    Oral Fluid: 200 mL  Total IN: 444 mL    OUT:    Voided: 300 mL  Total OUT: 300 mL    Total NET: 144 mL      20 Sep 2017 07:01  -  20 Sep 2017 12:10  --------------------------------------------------------  IN:    IV PiggyBack: 50 mL  Total IN: 50 mL    OUT:  Total OUT: 0 mL    Total NET: 50 mL        PHYSICAL EXAM:    General: Laying comfortably in bed, no acute distresss    Neurological: awake alert and oriented, CN grossly intact, 5/5 strength in all extremities, except LLE 4/5    Cardiovascular: S1S2, RRR, no murmurs heard at this time     Respiratory: Decreased breath sounds at R base, no wheeze/rhonchi/rales    Gastrointestinal: +BS, soft nontender nondistended    Extremities: Warm and well perfused, 1+ bilateral edema, L foot: +chronic fissure, no tenderness, erythema, drainage.      Vascular: 1+ L DP, 2+ R DP, 2+ radial pulses bilaterally.    Incision Sites: L IJ: +temporary HD catheter, no erythema, tenderness, discharge.      LABS:                        7.6    13.6  )-----------( 289      ( 20 Sep 2017 06:07 )             24.4       COUMADIN:  No, heparin    PT/INR - ( 20 Sep 2017 06:07 )   PT: 22.2 sec;   INR: 1.97          PTT - ( 20 Sep 2017 06:07 )  PTT:38.4 sec    09-20    127<L>  |  93<L>  |  29<H>  ----------------------------<  207<H>  4.1   |  21<L>  |  4.42<H>    Ca    8.2<L>      20 Sep 2017 06:05  Phos  4.5     09-19  Mg     1.9     09-20            MEDICATIONS  (STANDING):  sodium chloride 0.9% lock flush 3 milliLiter(s) IV Push every 8 hours  aspirin enteric coated 81 milliGRAM(s) Oral daily  valsartan 160 milliGRAM(s) Oral daily  simvastatin 20 milliGRAM(s) Oral at bedtime  insulin lispro (HumaLOG) corrective regimen sliding scale   SubCutaneous Before meals and at bedtime  dextrose 5%. 1000 milliLiter(s) (50 mL/Hr) IV Continuous <Continuous>  dextrose 50% Injectable 12.5 Gram(s) IV Push once  dextrose 50% Injectable 25 Gram(s) IV Push once  dextrose 50% Injectable 25 Gram(s) IV Push once  polyethylene glycol 3350 17 Gram(s) Oral daily  pantoprazole    Tablet 40 milliGRAM(s) Oral before breakfast  DAPTOmycin IVPB 700 milliGRAM(s) IV Intermittent every 48 hours  rifampin 600 milliGRAM(s) Oral daily  Ceftaroline Fosamil IVPB 200 milliGRAM(s) IV Intermittent every 12 hours  amLODIPine   Tablet 10 milliGRAM(s) Oral daily  metoprolol 50 milliGRAM(s) Oral every 6 hours  gabapentin 100 milliGRAM(s) Oral two times a day  gabapentin 300 milliGRAM(s) Oral at bedtime  insulin lispro Injectable (HumaLOG) 13 Unit(s) SubCutaneous three times a day before meals  insulin glargine Injectable (LANTUS) 30 Unit(s) SubCutaneous at bedtime  epoetin fransisca Injectable 65655 Unit(s) IV Push once  heparin  Infusion 1800 Unit(s)/Hr (18 mL/Hr) IV Continuous <Continuous>  nystatin Cream 1 Application(s) Topical once    MEDICATIONS  (PRN):  dextrose Gel 1 Dose(s) Oral once PRN Blood Glucose LESS THAN 70 milliGRAM(s)/deciliter  glucagon  Injectable 1 milliGRAM(s) IntraMuscular once PRN Glucose LESS THAN 70 milligrams/deciliter  acetaminophen    Suspension. 650 milliGRAM(s) Oral every 6 hours PRN Mild Pain (1 - 3)        RADIOLOGY & ADDITIONAL TESTS:    CXR  < from: Xray Chest 1 View AP -PORTABLE-Routine (09.20.17 @ 05:09) >  : Portable view of the chest is compared to 9/19/2017 and  demonstrates a left-sided central venous catheter with the tip in the   SVC. The heart is magnified. Low lung volumes. Mild central pulmonary   venous congestion. No consolidation. No pleural effusion. No interval   change.    < end of copied text >      MRI brain  < from: MRI Head w/o Cont (09.19.17 @ 22:45) >  IMPRESSION: Mild cortical volume loss. Minimum small vessel ischemic and   lacunar disease. No acuteischemia. Minimum right mastoid disease.   Bilateral orbital proptosis and clinical correlation is recommended.     < end of copied text >    MRI spine  < from: MRI Lumbar Spine w/o Cont (09.19.17 @ 22:45) >  IMPRESSION: Findings suspicious for osteomyelitis at the C5/6 level with   prevertebral fluid/edema and small epidural soft tissue component. No   cord compression at this time. Additional discitis osteomyelitis at the   T7/8 level without prevertebral or epidural component. Extensive abnormal   marrow which may be secondary to renal osteodystrophy.    < end of copied text > Patient discussed on morning rounds with Dr. Dr. Escamilla    MRSA bactermia/ chronic L foot OM/ SVC thrombus, pre-op TAVR/CABG next week    SUBJECTIVE ASSESSMENT:  47y Female seen  and examined , laying bed. Today patient reports pain in her L thigh, similar to the past few days, that is improved by her pain medication. She does not report pain in her R shoulder today.  She has not ambulated since admission, PT attempted to work with her yesterday but patient refused.  She is having normal BMs and tolerating PO diet. She denies fever, chills, chest pain, SOB,  abdominal pan, N/V/D/C.        Vital Signs Last 24 Hrs  T(C): 36.8 (20 Sep 2017 09:39), Max: 37.2 (20 Sep 2017 05:14)  T(F): 98.3 (20 Sep 2017 09:39), Max: 98.9 (20 Sep 2017 05:14)  HR: 92 (20 Sep 2017 09:25) (84 - 96)  BP: 137/73 (20 Sep 2017 09:25) (136/84 - 164/78)  BP(mean): 95 (20 Sep 2017 09:25) (92 - 120)  RR: 20 (20 Sep 2017 04:40) (16 - 20)  SpO2: 95% (20 Sep 2017 09:25) (94% - 97%)  I&O's Detail    19 Sep 2017 07:01  -  20 Sep 2017 07:00  --------------------------------------------------------  IN:    heparin  Infusion.: 144 mL    IV PiggyBack: 100 mL    Oral Fluid: 200 mL  Total IN: 444 mL    OUT:    Voided: 300 mL  Total OUT: 300 mL    Total NET: 144 mL      20 Sep 2017 07:01  -  20 Sep 2017 12:10  --------------------------------------------------------  IN:    IV PiggyBack: 50 mL  Total IN: 50 mL    OUT:  Total OUT: 0 mL    Total NET: 50 mL        PHYSICAL EXAM:    General: Laying comfortably in bed, no acute distresss    Neurological: awake alert and oriented, CN grossly intact, 5/5 strength in all extremities, except LLE 4/5    Cardiovascular: S1S2, RRR, no murmurs heard at this time     Respiratory: Decreased breath sounds at R base, no wheeze/rhonchi/rales    Gastrointestinal: +BS, soft nontender nondistended    Extremities: Warm and well perfused, 1+ bilateral edema, L foot: +chronic fissure adjacent to the medial malleolus, sensation intact, cannot wiggle toes but + dorsi/plantar flex no tenderness, erythema, drainage    Vascular: 1+ L DP, 2+ R DP, 2+ radial pulses bilaterally.    Incision Sites: L IJ: +temporary HD catheter, no erythema, tenderness, discharge.      LABS:                        7.6    13.6  )-----------( 289      ( 20 Sep 2017 06:07 )             24.4       COUMADIN:  No, heparin    PT/INR - ( 20 Sep 2017 06:07 )   PT: 22.2 sec;   INR: 1.97          PTT - ( 20 Sep 2017 06:07 )  PTT:38.4 sec    09-20    127<L>  |  93<L>  |  29<H>  ----------------------------<  207<H>  4.1   |  21<L>  |  4.42<H>    Ca    8.2<L>      20 Sep 2017 06:05  Phos  4.5     09-19  Mg     1.9     09-20            MEDICATIONS  (STANDING):  sodium chloride 0.9% lock flush 3 milliLiter(s) IV Push every 8 hours  aspirin enteric coated 81 milliGRAM(s) Oral daily  valsartan 160 milliGRAM(s) Oral daily  simvastatin 20 milliGRAM(s) Oral at bedtime  insulin lispro (HumaLOG) corrective regimen sliding scale   SubCutaneous Before meals and at bedtime  dextrose 5%. 1000 milliLiter(s) (50 mL/Hr) IV Continuous <Continuous>  dextrose 50% Injectable 12.5 Gram(s) IV Push once  dextrose 50% Injectable 25 Gram(s) IV Push once  dextrose 50% Injectable 25 Gram(s) IV Push once  polyethylene glycol 3350 17 Gram(s) Oral daily  pantoprazole    Tablet 40 milliGRAM(s) Oral before breakfast  DAPTOmycin IVPB 700 milliGRAM(s) IV Intermittent every 48 hours  rifampin 600 milliGRAM(s) Oral daily  Ceftaroline Fosamil IVPB 200 milliGRAM(s) IV Intermittent every 12 hours  amLODIPine   Tablet 10 milliGRAM(s) Oral daily  metoprolol 50 milliGRAM(s) Oral every 6 hours  gabapentin 100 milliGRAM(s) Oral two times a day  gabapentin 300 milliGRAM(s) Oral at bedtime  insulin lispro Injectable (HumaLOG) 13 Unit(s) SubCutaneous three times a day before meals  insulin glargine Injectable (LANTUS) 30 Unit(s) SubCutaneous at bedtime  epoetin fransisca Injectable 19629 Unit(s) IV Push once  heparin  Infusion 1800 Unit(s)/Hr (18 mL/Hr) IV Continuous <Continuous>  nystatin Cream 1 Application(s) Topical once    MEDICATIONS  (PRN):  dextrose Gel 1 Dose(s) Oral once PRN Blood Glucose LESS THAN 70 milliGRAM(s)/deciliter  glucagon  Injectable 1 milliGRAM(s) IntraMuscular once PRN Glucose LESS THAN 70 milligrams/deciliter  acetaminophen    Suspension. 650 milliGRAM(s) Oral every 6 hours PRN Mild Pain (1 - 3)        RADIOLOGY & ADDITIONAL TESTS:    CXR  < from: Xray Chest 1 View AP -PORTABLE-Routine (09.20.17 @ 05:09) >  : Portable view of the chest is compared to 9/19/2017 and  demonstrates a left-sided central venous catheter with the tip in the   SVC. The heart is magnified. Low lung volumes. Mild central pulmonary   venous congestion. No consolidation. No pleural effusion. No interval   change.    < end of copied text >      MRI brain  < from: MRI Head w/o Cont (09.19.17 @ 22:45) >  IMPRESSION: Mild cortical volume loss. Minimum small vessel ischemic and   lacunar disease. No acuteischemia. Minimum right mastoid disease.   Bilateral orbital proptosis and clinical correlation is recommended.     < end of copied text >    MRI spine  < from: MRI Lumbar Spine w/o Cont (09.19.17 @ 22:45) >  IMPRESSION: Findings suspicious for osteomyelitis at the C5/6 level with   prevertebral fluid/edema and small epidural soft tissue component. No   cord compression at this time. Additional discitis osteomyelitis at the   T7/8 level without prevertebral or epidural component. Extensive abnormal   marrow which may be secondary to renal osteodystrophy.    < end of copied text >

## 2017-09-20 NOTE — PROGRESS NOTE ADULT - ASSESSMENT
This is 47 year old female with PMH stated above. The renal service is activated for the need of HD. She was dialyzed on 9/18 - 3.0 liters off. We will do HD today

## 2017-09-20 NOTE — CONSULT NOTE ADULT - SUBJECTIVE AND OBJECTIVE BOX
47 year old female with history of HTN, HLD, DM II, ESRD on HD and chronic left foot OM with MRSA bacteremia / endocarditis diagnosed in August. Ortho initially consulted for foot OM, but no intervention required. However patient reported upper back/shoulder pain for 1 month and MRI C through L spine eventually obtained and revealed an epidural abscess, osteomyelitis at C5/6, and discitis at T7/8. She reports LE weakness for the past month, as well as fevers. Dr. Kuo called directly for surgical intervention.    Vital Signs Last 24 Hrs  T(C): 37.6 (20 Sep 2017 22:12), Max: 37.6 (20 Sep 2017 22:12)  T(F): 99.7 (20 Sep 2017 22:12), Max: 99.7 (20 Sep 2017 22:12)  HR: 92 (20 Sep 2017 21:00) (80 - 96)  BP: 169/71 (20 Sep 2017 22:00) (120/67 - 182/79)  BP(mean): 98 (20 Sep 2017 22:00) (95 - 121)  RR: 20 (20 Sep 2017 20:00) (16 - 20)  SpO2: 96% (20 Sep 2017 20:00) (94% - 98%)    NAD, lying in bed comfortably  Neck: midline posterior TTP, decreased ROM 2/2 pain  Motor: 5/5 b/l C5-T1, 4/5 R L2-S1, 3/5 L L2-S1  Sensory: SILT b/l UE, LE  Pulses: wwp, pulses palpable                          7.6    13.6  )-----------( 289      ( 20 Sep 2017 06:07 )             24.4     < from: MRI Cervical Spine w/o Cont (09.19.17 @ 22:45) >  IMPRESSION: Findings suspicious for osteomyelitis at the C5/6 level with   prevertebral fluid/edema and small epidural soft tissue component. No   cord compression at this time. Additional discitis osteomyelitis at the   T7/8 level without prevertebral or epidural component. Extensive abnormal   marrow which may be secondary to renal osteodystrophy.    < end of copied text >      A/P: 47F with findings as above, indicated for ortho spine surgery    -Plan for OR Friday 9/22 with Dr. Kuo  -would appreciate medical clearance note from primary team  -WBAT, pain control, cont w/ IV abx  -NPO@MN and hold AC, Thurs 9/21  -Case discussed w/ Dr. Kuo

## 2017-09-20 NOTE — PROGRESS NOTE ADULT - SUBJECTIVE AND OBJECTIVE BOX
Objective and Subjective   The patient in her bed. DOes not endorse any complains, no shortness of breath, no chest pain, no fever. Eating her breakfast in the bed.      Patient is a 47 year old female with history of HTN, HLD, DM II, ESRD on HD (MWF. Last HD 09/13/2017. Receives HD via Right Femoral HD catheter placed on 09/13/2017 at [facility] 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 The renal follows the case for the need of HD. Last HD done on 9/18 - 3.0 liters off           Patient seen and examined at bedside.     sodium chloride 0.9% lock flush 3 milliLiter(s) every 8 hours  aspirin enteric coated 81 milliGRAM(s) daily  valsartan 160 milliGRAM(s) daily  simvastatin 20 milliGRAM(s) at bedtime  insulin lispro (HumaLOG) corrective regimen sliding scale   Before meals and at bedtime  dextrose 5%. 1000 milliLiter(s) <Continuous>  dextrose Gel 1 Dose(s) once PRN  dextrose 50% Injectable 12.5 Gram(s) once  dextrose 50% Injectable 25 Gram(s) once  dextrose 50% Injectable 25 Gram(s) once  glucagon  Injectable 1 milliGRAM(s) once PRN  polyethylene glycol 3350 17 Gram(s) daily  pantoprazole    Tablet 40 milliGRAM(s) before breakfast  acetaminophen    Suspension. 650 milliGRAM(s) every 6 hours PRN  DAPTOmycin IVPB 700 milliGRAM(s) every 48 hours  rifampin 600 milliGRAM(s) daily  Ceftaroline Fosamil IVPB 200 milliGRAM(s) every 12 hours  amLODIPine   Tablet 10 milliGRAM(s) daily  metoprolol 50 milliGRAM(s) every 6 hours  gabapentin 100 milliGRAM(s) two times a day  gabapentin 300 milliGRAM(s) at bedtime  insulin lispro Injectable (HumaLOG) 13 Unit(s) three times a day before meals  insulin glargine Injectable (LANTUS) 30 Unit(s) at bedtime  epoetin fransisca Injectable 88161 Unit(s) once  heparin  Infusion 1800 Unit(s)/Hr <Continuous>  nystatin Cream 1 Application(s) once  phytonadione  IVPB 5 milliGRAM(s) once      Allergies    penicillin (Unknown)  Sular (Unknown)    Intolerances        T(C): , Max: 37.2 (09-20-17 @ 05:14)  T(F): , Max: 98.9 (09-20-17 @ 05:14)  HR: 92 (09-20-17 @ 09:25)  BP: 137/73 (09-20-17 @ 09:25)  BP(mean): 95 (09-20-17 @ 09:25)  RR: 20 (09-20-17 @ 04:40)  SpO2: 95% (09-20-17 @ 09:25)  Wt(kg): --    09-19 @ 07:01  -  09-20 @ 07:00  --------------------------------------------------------  IN:    heparin  Infusion.: 144 mL    IV PiggyBack: 100 mL    Oral Fluid: 200 mL  Total IN: 444 mL    OUT:    Voided: 300 mL  Total OUT: 300 mL    Total NET: 144 mL        Alert and oriented  No JVD  HAs a catheter in the left IJ   No respiratory distress   Normal air entry in the lungs, no wheezing, no crackles, no rales   RRR, normal s1/s2, no mumurs, rubs or gallops  Abdomen - soft, non-tender, non-distended, normal bowel sounds   Extremities - mild peripheral edema   The line in the right groined removed         LABS:                        7.6    13.6  )-----------( 289      ( 20 Sep 2017 06:07 )             24.4     09-20    127<L>  |  93<L>  |  29<H>  ----------------------------<  207<H>  4.1   |  21<L>  |  4.42<H>    Ca    8.2<L>      20 Sep 2017 06:05  Phos  4.5     09-19  Mg     1.9     09-20        PT/INR - ( 20 Sep 2017 06:07 )   PT: 22.2 sec;   INR: 1.97          PTT - ( 20 Sep 2017 06:07 )  PTT:38.4 sec          RADIOLOGY & ADDITIONAL STUDIES:    < from: Xray Chest 1 View AP -PORTABLE-Routine (09.20.17 @ 05:09) >      INTERPRETATION:  CLINICAL INDICATION: 47-year-old with endocarditis.      FINDINGS: Portable view of the chest is compared to 9/19/2017 and  demonstrates a left-sided central venous catheter with the tip in the   SVC. The heart is magnified. Low lung volumes. Mild central pulmonary   venous congestion. No consolidation. No pleural effusion. No interval   change.            "Thank you for the opportunity to participate in the care of this   patient."    < end of copied text >

## 2017-09-20 NOTE — PROGRESS NOTE ADULT - ASSESSMENT
47 year old female with PMHc HTN, HLD, ESRD on HD (MWF, L IJ HD cath), chronic L foot OM transferred from [facility] on 9/13/17 with TV endocarditis, MRSA bacteremia, and SVC thrombus.  Ortho and vascular consulted for OM, recs to continue abx and await results from whole body gallium scan today for plan.  Renal following for ESRD on HD (last HD 9/18/17). Neurology following for LLE weakness MRI brain pending to r/o stroke, MRI spine pending to r/o infection.      Neurovascular: No delirium.  -Hx MCA and putamen CVA, Neuro following for LLE weakness,   -MRI brain minimum small vessel ischemic and lacunar disease, no acute ischemia.  -MRI spine- OM C5/6 with prevertebral fluid/edema and small epidural soft tissue component.  Discitis OM T7/8 without prevertebral or epidural fluid component.   -Dr. Luna following for neurology  -Continue gabapentin and acetaminophen for pain    Cardiovascular: Hemodynamically stable. HR controlled.  -TV endocarditis with MRSA bactermia- continue ceftaroline, daptomycin, rifampin.  Dr. Kingston following. Blood cultures 9/19/17- negative  -SVC thrombus- continue heparin 1800U, and ASA, f/u PTT  -TTE pending  -hx HTN- continue amlodipine 10mg daily metoprolol 50mg q6, valsartan 160mg daily  -Hx HLD- continue simvastatin 20mg QHS  -monitor BP/HR     Respiratory: 02 Sat = 95% on RA.  -SVC thrombus- continue heparin drip, f/u PTT  -CXR -mild pulm congestion, no pleural effusion/consolidation, f/u am CXR  -b/l LE duplex 9/19/17- no DVT  -monitor SaO2    GI: Stable.  -GI following for blood tinged emesis 9/18/17- following recs  -continue PO diet  -continue pantoprazole for Gi PPX  -continue miralax for bowel regimen    Renal / : BUN/Cr 23/3.61  -ESRD on HD- HD today, L IJ temporary HD cath placed 9/16/17, renal following  -Monitor renal function.  -U/A pending for pre-op  -Monitor I/O's.    Endocrine:  A1c 8.2 TSH 1.251  -Hx DM2- endo following, continue Lantus 30U QHS, Humalog 13U premeals and ISS  - ,212    Hematologic: H&H 7.6/24.4  -f/u AM CBC  -PTT this AM 38.4/INR 1/97, heparin was stopped yesterday due to INR of 2.  Today, Vitamin K 5mg IV given, and heparin restarted prior to HD.  f/u PTT @16:00, 1 UPRBC to be given with HD.       ID:  afebrile, WBC 12.6  -MRSA bacteremia- continue ceftraoline, daptomycin, and rifampin- Dr. Kingston following  -chronic L foot OM- vascular and ortho following, pending plan from gallium scan results  -MRI spine- OM C5/6 with prevertebral fluid/edema and small epidural soft tissue component.  Discitis OM T7/8 without prevertebral or epidural fluid component.   -Blood cx 9/19/17, negative  -Observe for SIRS/Sepsis Syndrome.    Prophylaxis:  -on heparin drip  -SCD's    Disposition:  -plan for OR CABG/TVR friday 47 year old female with PMHc HTN, HLD, ESRD on HD (MWF, L IJ HD cath), chronic L foot OM transferred [facility]17 with TV endocarditis, MRSA bacteremia, and SVC thrombus.  Ortho and vascular consulted for OM, recs to continue abx and await results from whole body gallium scan today for plan.  Renal following for ESRD on HD (last HD 9/18/17). Neurology following for LLE weakness MRI brain pending to r/o stroke, MRI spine pending to r/o infection.      Neurovascular: No delirium.  -Hx MCA and putamen CVA, Neuro following for LLE weakness,   -MRI brain minimum small vessel ischemic and lacunar disease, no acute ischemia.  -MRI spine- OM C5/6 with prevertebral fluid/edema and small epidural soft tissue component.  Discitis OM T7/8 without prevertebral or epidural fluid component.   -Dr. Lnua following for neurology  -Continue gabapentin and acetaminophen for pain    Cardiovascular: Hemodynamically stable. HR controlled.  -TV endocarditis with MRSA bactermia- continue ceftaroline, daptomycin, rifampin.  Dr. Kingston following. Blood cultures 9/19/17- negative  -SVC thrombus- continue heparin 1800U, and ASA, f/u PTT  -TTE pending  -hx HTN- continue amlodipine 10mg daily metoprolol 50mg q6, valsartan 160mg daily  -Hx HLD- continue simvastatin 20mg QHS  -monitor BP/HR     Respiratory: 02 Sat = 95% on RA.  -SVC thrombus- continue heparin drip, f/u PTT  -CXR -mild pulm congestion, no pleural effusion/consolidation, f/u am CXR  -b/l LE duplex 9/19/17- no DVT  -Continue to encourage patient to work with PT/OT  -monitor SaO2    GI: Stable.  -GI following for blood tinged emesis 9/18/17- following recs  -continue PO diet  -continue pantoprazole for Gi PPX  -continue miralax for bowel regimen    Renal / : BUN/Cr 23/3.61  -ESRD on HD- HD today, L IJ temporary HD cath placed 9/16/17, renal following  -Monitor renal function.  -U/A pending for pre-op  -Monitor I/O's.    Endocrine:  A1c 8.2 TSH 1.251  -Hx DM2- endo following, continue Lantus 30U QHS, Humalog 13U premeals and ISS  - ,212    Hematologic: H&H 7.6/24.4  -f/u AM CBC  -PTT this AM 38.4/INR 1/97, heparin was stopped yesterday due to INR of 2.  Today, Vitamin K 5mg IV given, and heparin restarted prior to HD.  f/u PTT @16:00, 1 UPRBC to be given with HD.       ID:  afebrile, WBC 12.6  -MRSA bacteremia- continue ceftraoline, daptomycin, and rifampin- Dr. Kingston following  -chronic L foot OM- vascular and ortho following, pending plan from gallium scan results  -MRI spine- OM C5/6 with prevertebral fluid/edema and small epidural soft tissue component.  Discitis OM T7/8 without prevertebral or epidural fluid component.   -Blood cx 9/19/17, negative  -Observe for SIRS/Sepsis Syndrome.    Prophylaxis:  -on heparin drip  -SCD's    Disposition:  -plan for OR CABG/TVR friday

## 2017-09-20 NOTE — CHART NOTE - NSCHARTNOTEFT_GEN_A_CORE
Admitting Diagnosis:   Patient is a 47y old  Female who presents with a chief complaint of TV IE / MRSA Bacteremia. (13 Sep 2017 22:57)      PAST MEDICAL & SURGICAL HISTORY:  PMH:  HTN, HLD, DM II, ESRD on HD (MWF), and chronic left foot OM     Current Nutrition Order:  Renal, CST CHO diet    PO Intake:   Pt reports ~25% intake of trays, but family bringing in food from home which of note is typically non-compliant with restrictions.     GI Issues:   Denies N/V/D/C  Last BM today    Pain:  Denies current     Skin Integrity:  L foot DM ulcer, IAD buttock, L buttock stage 2 PU      Labs:   09-20    127<L>  |  93<L>  |  29<H>  ----------------------------<  207<H>  4.1   |  21<L>  |  4.42<H>    Ca    8.2<L>      20 Sep 2017 06:05  Phos  4.5     09-19  Mg     1.9     09-20      CAPILLARY BLOOD GLUCOSE  150 (20 Sep 2017 09:39)  212 (20 Sep 2017 05:14)  207 (19 Sep 2017 21:34)  225 (19 Sep 2017 16:35)          Medications:  MEDICATIONS  (STANDING):  sodium chloride 0.9% lock flush 3 milliLiter(s) IV Push every 8 hours  aspirin enteric coated 81 milliGRAM(s) Oral daily  valsartan 160 milliGRAM(s) Oral daily  simvastatin 20 milliGRAM(s) Oral at bedtime  insulin lispro (HumaLOG) corrective regimen sliding scale   SubCutaneous Before meals and at bedtime  dextrose 5%. 1000 milliLiter(s) (50 mL/Hr) IV Continuous <Continuous>  dextrose 50% Injectable 12.5 Gram(s) IV Push once  dextrose 50% Injectable 25 Gram(s) IV Push once  dextrose 50% Injectable 25 Gram(s) IV Push once  polyethylene glycol 3350 17 Gram(s) Oral daily  pantoprazole    Tablet 40 milliGRAM(s) Oral before breakfast  DAPTOmycin IVPB 700 milliGRAM(s) IV Intermittent every 48 hours  rifampin 600 milliGRAM(s) Oral daily  Ceftaroline Fosamil IVPB 200 milliGRAM(s) IV Intermittent every 12 hours  amLODIPine   Tablet 10 milliGRAM(s) Oral daily  metoprolol 50 milliGRAM(s) Oral every 6 hours  gabapentin 100 milliGRAM(s) Oral two times a day  gabapentin 300 milliGRAM(s) Oral at bedtime  insulin lispro Injectable (HumaLOG) 13 Unit(s) SubCutaneous three times a day before meals  insulin glargine Injectable (LANTUS) 30 Unit(s) SubCutaneous at bedtime  epoetin fransisca Injectable 19316 Unit(s) IV Push once  heparin  Infusion 1800 Unit(s)/Hr (18 mL/Hr) IV Continuous <Continuous>  nystatin Cream 1 Application(s) Topical once    MEDICATIONS  (PRN):  dextrose Gel 1 Dose(s) Oral once PRN Blood Glucose LESS THAN 70 milliGRAM(s)/deciliter  glucagon  Injectable 1 milliGRAM(s) IntraMuscular once PRN Glucose LESS THAN 70 milligrams/deciliter  acetaminophen    Suspension. 650 milliGRAM(s) Oral every 6 hours PRN Mild Pain (1 - 3)      Weight:  87kg (9/13)--> 89.5kg (9/17)    Weight Change:   Wt changes likely 2/2 fluid shifts with HD; continue to trend dry wts    Estimated energy needs:   IBW 68.2kg used for EER and adjusted for HD and PU; possible pre-op.   30-35kcal/kg (2046-2387kcal), 1.4-1.6g/kg (95-109g), fluids per MD    Subjective:   46y/o F with tricuspid valve endocarditis, MRSA bacteremia, and CAD. Pt pre-op for possible OR next week for TAVR/CABG. Pt seen resting in bed. Pt now reporting poor intake of ~25%, but family also brining in food from home. Pt endorses that food brought in is not always diet compliant and eats whatever she wants. Discussed the importance of diet compliance and reinforced diet education; pt does not appear to be receptive to edu. Pt states that she follows with RD at HD clinic. Pt with uncontrolled DM; endocrine following. Discussed ONS to help improve nutrient intake; pt reports not tolerating Nepro 2/2 D. Recommend trial of glucerna TID and will monitor tolerance; pt agreeable. Will follow.     Previous Nutrition Diagnosis:  Food - and nutrition - related knowledge deficit; Limited adherence to nutrition - related recommendations RT pt is not compliant with diet restrictions appropriate for her medical status AEB pt reports not following Renal restrictions and A1C (9/13) 8.2    Active [X]  Resolved [   ]    If resolved, new PES:   Pt to recall 3x components to renal, DM diet    Goal:  Pt to demonstrate diet compliance and consume >75% of EER    Recommendations:  Continue CST CHO, renal diet.  Add glucerna TID.  Monitor lytes and replete prn.   Trend dry wts.    Education:   Encouraged intake and ONS; Reinforced DM, Renal Diet    Risk Level: High [X] Moderate [   ] Low [   ].

## 2017-09-21 LAB
ANION GAP SERPL CALC-SCNC: 12 MMOL/L — SIGNIFICANT CHANGE UP (ref 5–17)
ANION GAP SERPL CALC-SCNC: 14 MMOL/L — SIGNIFICANT CHANGE UP (ref 5–17)
APTT BLD: 50.6 SEC — HIGH (ref 27.5–37.4)
APTT BLD: 59 SEC — HIGH (ref 27.5–37.4)
BUN SERPL-MCNC: 17 MG/DL — SIGNIFICANT CHANGE UP (ref 7–23)
BUN SERPL-MCNC: 18 MG/DL — SIGNIFICANT CHANGE UP (ref 7–23)
CALCIUM SERPL-MCNC: 8.3 MG/DL — LOW (ref 8.4–10.5)
CALCIUM SERPL-MCNC: 8.7 MG/DL — SIGNIFICANT CHANGE UP (ref 8.4–10.5)
CHLORIDE SERPL-SCNC: 94 MMOL/L — LOW (ref 96–108)
CHLORIDE SERPL-SCNC: 95 MMOL/L — LOW (ref 96–108)
CK SERPL-CCNC: 18 U/L — LOW (ref 25–170)
CO2 SERPL-SCNC: 20 MMOL/L — LOW (ref 22–31)
CO2 SERPL-SCNC: 24 MMOL/L — SIGNIFICANT CHANGE UP (ref 22–31)
CREAT SERPL-MCNC: 3.08 MG/DL — HIGH (ref 0.5–1.3)
CREAT SERPL-MCNC: 3.33 MG/DL — HIGH (ref 0.5–1.3)
EXTRA BLUE TOP TUBE: SIGNIFICANT CHANGE UP
GLUCOSE SERPL-MCNC: 102 MG/DL — HIGH (ref 70–99)
GLUCOSE SERPL-MCNC: 125 MG/DL — HIGH (ref 70–99)
HCT VFR BLD CALC: 27.1 % — LOW (ref 34.5–45)
HCT VFR BLD CALC: 30 % — LOW (ref 34.5–45)
HGB BLD-MCNC: 8.5 G/DL — LOW (ref 11.5–15.5)
HGB BLD-MCNC: 9.4 G/DL — LOW (ref 11.5–15.5)
INR BLD: 1.39 — HIGH (ref 0.88–1.16)
INR BLD: 1.43 — HIGH (ref 0.88–1.16)
MAGNESIUM SERPL-MCNC: 1.8 MG/DL — SIGNIFICANT CHANGE UP (ref 1.6–2.6)
MAGNESIUM SERPL-MCNC: 1.9 MG/DL — SIGNIFICANT CHANGE UP (ref 1.6–2.6)
MCHC RBC-ENTMCNC: 25.8 PG — LOW (ref 27–34)
MCHC RBC-ENTMCNC: 26.4 PG — LOW (ref 27–34)
MCHC RBC-ENTMCNC: 31.3 G/DL — LOW (ref 32–36)
MCHC RBC-ENTMCNC: 31.4 G/DL — LOW (ref 32–36)
MCV RBC AUTO: 82.1 FL — SIGNIFICANT CHANGE UP (ref 80–100)
MCV RBC AUTO: 84.3 FL — SIGNIFICANT CHANGE UP (ref 80–100)
PLATELET # BLD AUTO: 318 K/UL — SIGNIFICANT CHANGE UP (ref 150–400)
PLATELET # BLD AUTO: 320 K/UL — SIGNIFICANT CHANGE UP (ref 150–400)
POTASSIUM SERPL-MCNC: 4 MMOL/L — SIGNIFICANT CHANGE UP (ref 3.5–5.3)
POTASSIUM SERPL-MCNC: 4.2 MMOL/L — SIGNIFICANT CHANGE UP (ref 3.5–5.3)
POTASSIUM SERPL-SCNC: 4 MMOL/L — SIGNIFICANT CHANGE UP (ref 3.5–5.3)
POTASSIUM SERPL-SCNC: 4.2 MMOL/L — SIGNIFICANT CHANGE UP (ref 3.5–5.3)
PROTHROM AB SERPL-ACNC: 15.5 SEC — HIGH (ref 9.8–12.7)
PROTHROM AB SERPL-ACNC: 16 SEC — HIGH (ref 9.8–12.7)
RBC # BLD: 3.3 M/UL — LOW (ref 3.8–5.2)
RBC # BLD: 3.56 M/UL — LOW (ref 3.8–5.2)
RBC # FLD: 17.1 % — HIGH (ref 10.3–16.9)
RBC # FLD: 17.5 % — HIGH (ref 10.3–16.9)
SODIUM SERPL-SCNC: 129 MMOL/L — LOW (ref 135–145)
SODIUM SERPL-SCNC: 130 MMOL/L — LOW (ref 135–145)
WBC # BLD: 11.9 K/UL — HIGH (ref 3.8–10.5)
WBC # BLD: 14.6 K/UL — HIGH (ref 3.8–10.5)
WBC # FLD AUTO: 11.9 K/UL — HIGH (ref 3.8–10.5)
WBC # FLD AUTO: 14.6 K/UL — HIGH (ref 3.8–10.5)

## 2017-09-21 PROCEDURE — 99232 SBSQ HOSP IP/OBS MODERATE 35: CPT | Mod: GC

## 2017-09-21 PROCEDURE — 99233 SBSQ HOSP IP/OBS HIGH 50: CPT

## 2017-09-21 RX ORDER — OXYCODONE AND ACETAMINOPHEN 5; 325 MG/1; MG/1
1 TABLET ORAL ONCE
Qty: 0 | Refills: 0 | Status: DISCONTINUED | OUTPATIENT
Start: 2017-09-21 | End: 2017-09-21

## 2017-09-21 RX ORDER — DEXTROSE 50 % IN WATER 50 %
12.5 SYRINGE (ML) INTRAVENOUS ONCE
Qty: 0 | Refills: 0 | Status: COMPLETED | OUTPATIENT
Start: 2017-09-21 | End: 2017-09-22

## 2017-09-21 RX ORDER — DEXTROSE 50 % IN WATER 50 %
12.5 SYRINGE (ML) INTRAVENOUS ONCE
Qty: 0 | Refills: 0 | Status: COMPLETED | OUTPATIENT
Start: 2017-09-21 | End: 2017-09-21

## 2017-09-21 RX ORDER — INSULIN GLARGINE 100 [IU]/ML
30 INJECTION, SOLUTION SUBCUTANEOUS ONCE
Qty: 0 | Refills: 0 | Status: COMPLETED | OUTPATIENT
Start: 2017-09-21 | End: 2017-09-21

## 2017-09-21 RX ORDER — INSULIN GLARGINE 100 [IU]/ML
15 INJECTION, SOLUTION SUBCUTANEOUS AT BEDTIME
Qty: 0 | Refills: 0 | Status: COMPLETED | OUTPATIENT
Start: 2017-09-21 | End: 2017-09-21

## 2017-09-21 RX ORDER — HEPARIN SODIUM 5000 [USP'U]/ML
2100 INJECTION INTRAVENOUS; SUBCUTANEOUS
Qty: 25000 | Refills: 0 | Status: DISCONTINUED | OUTPATIENT
Start: 2017-09-21 | End: 2017-09-22

## 2017-09-21 RX ADMIN — SIMVASTATIN 20 MILLIGRAM(S): 20 TABLET, FILM COATED ORAL at 22:47

## 2017-09-21 RX ADMIN — Medication 2: at 14:12

## 2017-09-21 RX ADMIN — CEFTAROLINE FOSAMIL 50 MILLIGRAM(S): 600 POWDER, FOR SOLUTION INTRAVENOUS at 02:15

## 2017-09-21 RX ADMIN — VALSARTAN 160 MILLIGRAM(S): 80 TABLET ORAL at 05:41

## 2017-09-21 RX ADMIN — OXYCODONE AND ACETAMINOPHEN 1 TABLET(S): 5; 325 TABLET ORAL at 10:43

## 2017-09-21 RX ADMIN — OXYCODONE AND ACETAMINOPHEN 1 TABLET(S): 5; 325 TABLET ORAL at 22:28

## 2017-09-21 RX ADMIN — SODIUM CHLORIDE 3 MILLILITER(S): 9 INJECTION INTRAMUSCULAR; INTRAVENOUS; SUBCUTANEOUS at 05:35

## 2017-09-21 RX ADMIN — Medication 50 MILLIGRAM(S): at 02:14

## 2017-09-21 RX ADMIN — GABAPENTIN 100 MILLIGRAM(S): 400 CAPSULE ORAL at 05:41

## 2017-09-21 RX ADMIN — GABAPENTIN 100 MILLIGRAM(S): 400 CAPSULE ORAL at 18:58

## 2017-09-21 RX ADMIN — Medication 13 UNIT(S): at 18:58

## 2017-09-21 RX ADMIN — SERTRALINE 25 MILLIGRAM(S): 25 TABLET, FILM COATED ORAL at 14:13

## 2017-09-21 RX ADMIN — SODIUM CHLORIDE 3 MILLILITER(S): 9 INJECTION INTRAMUSCULAR; INTRAVENOUS; SUBCUTANEOUS at 02:10

## 2017-09-21 RX ADMIN — CEFTAROLINE FOSAMIL 50 MILLIGRAM(S): 600 POWDER, FOR SOLUTION INTRAVENOUS at 18:50

## 2017-09-21 RX ADMIN — SODIUM CHLORIDE 3 MILLILITER(S): 9 INJECTION INTRAMUSCULAR; INTRAVENOUS; SUBCUTANEOUS at 14:23

## 2017-09-21 RX ADMIN — OXYCODONE AND ACETAMINOPHEN 1 TABLET(S): 5; 325 TABLET ORAL at 18:50

## 2017-09-21 RX ADMIN — Medication 25 MILLIGRAM(S): at 22:47

## 2017-09-21 RX ADMIN — OXYCODONE AND ACETAMINOPHEN 1 TABLET(S): 5; 325 TABLET ORAL at 20:01

## 2017-09-21 RX ADMIN — Medication 13 UNIT(S): at 07:48

## 2017-09-21 RX ADMIN — GABAPENTIN 300 MILLIGRAM(S): 400 CAPSULE ORAL at 22:55

## 2017-09-21 RX ADMIN — Medication 13 UNIT(S): at 14:12

## 2017-09-21 RX ADMIN — AMLODIPINE BESYLATE 10 MILLIGRAM(S): 2.5 TABLET ORAL at 05:41

## 2017-09-21 RX ADMIN — OXYCODONE AND ACETAMINOPHEN 1 TABLET(S): 5; 325 TABLET ORAL at 21:28

## 2017-09-21 RX ADMIN — SODIUM CHLORIDE 3 MILLILITER(S): 9 INJECTION INTRAMUSCULAR; INTRAVENOUS; SUBCUTANEOUS at 22:55

## 2017-09-21 RX ADMIN — OXYCODONE AND ACETAMINOPHEN 1 TABLET(S): 5; 325 TABLET ORAL at 07:25

## 2017-09-21 RX ADMIN — PANTOPRAZOLE SODIUM 40 MILLIGRAM(S): 20 TABLET, DELAYED RELEASE ORAL at 07:49

## 2017-09-21 RX ADMIN — Medication 50 MILLIGRAM(S): at 05:41

## 2017-09-21 RX ADMIN — Medication 81 MILLIGRAM(S): at 14:14

## 2017-09-21 RX ADMIN — Medication 50 MILLIGRAM(S): at 18:50

## 2017-09-21 RX ADMIN — Medication 50 MILLIGRAM(S): at 14:13

## 2017-09-21 NOTE — PROGRESS NOTE ADULT - ATTENDING COMMENTS
Patient examined, fellow's hx and PE reviewed and confirmed. I find stable on dialysis. AF. A/P reviewed and confirmed. Continue HD. Continue abx. See full note

## 2017-09-21 NOTE — PROGRESS NOTE ADULT - ASSESSMENT
A/P: 47 year old female with PMHx HTN, HLD, DM2, ESRD on HD (MWF, last HD 9/13/17, Right Femoral HD catheter), and chronic left foot OM who was transferred to [facility] from [facility] on 9/13/17 with known TV IE (MRSA Bacteremia.) who has failed medical management with aggressive antibiotics. Patient transferred to [facility] to be evaluated for surgical intervention. Of note patient was seen, managed, and evaluated for surgical intervention in 08/2017 with Dr. Gee. At that time the decision to pursue medical therapy was made. Patient received 2U FFP, prior to HD, 1 UPRBC while receiving HD for elevator INR  .  Cardiac cath revealed + RAC lesion.  Vascular and ortho consulted for chronic L foot OM, Given new lower extremity neuro deficit. MRI of brain and spine were performed that revealed C5/6 osteomyelitis with and overlying abscess as well as T7/8 osteomyletis without obscess. Patient is now pre-op for discectomy/copectomy with fusion.  Renal following for ESRD and HD patient last dialyzed yesterday with 2.5 L removed. WIll get next dialysis after surgery tomorrow.       #Neuro: h/o MCA and putamen CVA, pt reporting weakness in L leg, Neuro consulted. Found to have osteomyletis of the cervical C5/6and thoracic spine T7/8 with abscess formation at C5/6. Pre-op for copectomy and disectiomy of C5/6 tomorrow with fusion.   - pain management with Tylenol and gabapentin.  - Dr. Luna following from neurology  - NPO after midnight for orthopedic surgery. Ortho to consent.   - continue Zoloft     #Cards:  HR and BP well controlled.   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 Kingston following, cont rifampin, daptomycin, and ceftaroline.   - blood cultures negative during this admission, Escobar reports latest blood cultures on 9/13/17 positive for MRSA   - Repeat blood cultures sent on 9/18/17. negative for 1 day.   - cont ASA, metoprolol, amlodipine (increased for HTN), valsartan, statin  - cardiac cath  on 9/15 showing m-dRCA 75% stenosis, on appropriate medical therapy will get CABG once patient is cleared for surgery     #Pulm: 98% on RA today.   - CT chest showing possible septic emboli, lung nodules- continue Heparin drip.   - Continue Is.   - Lungs clear on chest Xray from yesterday.     #Endocrine: history of DM: A1c 8 Finger sticks well controlled today.   - appreciate endocrine consult, cont to adjust insulin pending FBG  - currently on 35 unit Lantus in evening and 13 Units with meals.     #Renal: ESRD on HD: Left IJ permacath placed on saturday 9/16/17  - HD yesterday. 2.5L off.   - Next HD tomorrow after Dialysis.   - has AV fistula, awaiting vascular evaluation of when it is mature to use. no bruit currently     GI: Reports having normal bowel movement.  No further hematemesis   - H/H stable. Transfused yesterdya   - GI consulted. No need to scope at the moment. will re-evaluate at later date  - continue PO Protonix/     Heme: H/H improving 9.4/30 WBC increasing 14.6   - transfused yesterday with appropriate response.   - Heparin infusion on at 20. PTT 50 this afternoon. increase to 21 units. Heparin to be turned off at midnight.     ID: History of MRSA bacteremia, TV IE, septic emboli and spinal abscess. Low grade temp today of 100.3 WBC trending up.  - on Rifampin, Dapto and Cefteraline. continue current managemen t  - Echo yesterday with stable vegetation.  - Surgery tomorrow with Ortho for Spinal abscess.     #dispo: OR in am for Ortho   - hold valsartan 24-48h prior to surgery   - GI PPX

## 2017-09-21 NOTE — PROGRESS NOTE ADULT - SUBJECTIVE AND OBJECTIVE BOX
Patient is a 47y old  Female who presents with a chief complaint of TV IE / MRSA Bacteremia. (13 Sep 2017 22:57)    Diagnosis: Epidural abscess, osteomyelitis C5-C6   Procedure:  Surgeon: Shiela [id number]    11.9  )-----------( 318      ( 21 Sep 2017 02:41 )             27.1     09-    130<L>  |  94<L>  |  17  ----------------------------<  125<H>  4.0   |  24  |  3.08<H>    Ca    8.3<L>      21 Sep 2017 02:42  Mg     1.8           PT/INR - ( 21 Sep 2017 02:41 )   PT: 16.0 sec;   INR: 1.43          PTT - ( 21 Sep 2017 02:41 )  PTT:59.0 sec  Urinalysis Basic - ( 20 Sep 2017 22:37 )    Color: Yellow / Appearance: x / S.020 / pH: x  Gluc: x / Ketone: NEGATIVE  / Bili: NEGATIVE / Urobili: 0.2 E.U./dL   Blood: x / Protein: >=300 mg/dL / Nitrite: NEGATIVE   Leuk Esterase: Small / RBC: < 5 /HPF / WBC Many /HPF   Sq Epi: x / Non Sq Epi: Moderate /HPF / Bacteria: Present /HPF        [x ] Type & Screen  [x ] CBC  [x ] BMP  [x ] PT/PTT/INR  [ ] Urinalysis  [x ] Chest X-ray  [x ] EKG  [ ] NPO/IVF  [ ] Consent  [ ] Clearance  [ ] Added on to OR Schedule  [ ] Anti-coagulation held    Assessment & Plan: 47yoFemale with Epidural Abscess, osteomyelitis C5-C6   -For OR 17 Patient is a 47y old  Female who presents with a chief complaint of TV IE / MRSA Bacteremia. (13 Sep 2017 22:57)    Diagnosis: Epidural abscess, osteomyelitis C5-C6   Procedure: C4-C5 CORPECTOMY, ANTERIOR INSTRUMENTATION WITH FUSION, ICBG  Surgeon: Shiela                          [id number]    11.9  )-----------( 318      ( 21 Sep 2017 02:41 )             27.1     09-    130<L>  |  94<L>  |  17  ----------------------------<  125<H>  4.0   |  24  |  3.08<H>    Ca    8.3<L>      21 Sep 2017 02:42  Mg     1.8           PT/INR - ( 21 Sep 2017 02:41 )   PT: 16.0 sec;   INR: 1.43          PTT - ( 21 Sep 2017 02:41 )  PTT:59.0 sec  Urinalysis Basic - ( 20 Sep 2017 22:37 )    Color: Yellow / Appearance: x / S.020 / pH: x  Gluc: x / Ketone: NEGATIVE  / Bili: NEGATIVE / Urobili: 0.2 E.U./dL   Blood: x / Protein: >=300 mg/dL / Nitrite: NEGATIVE   Leuk Esterase: Small / RBC: < 5 /HPF / WBC Many /HPF   Sq Epi: x / Non Sq Epi: Moderate /HPF / Bacteria: Present /HPF        [x ] Type & Screen  [x ] CBC  [x ] BMP  [x ] PT/PTT/INR  [x ] Urinalysis  [x ] Chest X-ray  [x ] EKG  [x ] NPO/IVF  [x ] Consent  [x ] Clearance  [x ] Added on to OR Schedule   [x ] Anti-coagulation held- heparin drip held at midnight per Ct note or am of sx    Assessment & Plan: 47yoFemale with Epidural Abscess, osteomyelitis C5-C6   -For OR 17

## 2017-09-21 NOTE — PROGRESS NOTE ADULT - PROBLEM SELECTOR PLAN 2
hemoglobin - 8.5  - most likely due to the infection and underlying Renal failure   - we will do EPO with HD .

## 2017-09-21 NOTE — PROGRESS NOTE ADULT - SUBJECTIVE AND OBJECTIVE BOX
Objective and Subjective   The patient in her bed. DOes not endorse any complains, no shortness of breath, no chest pain, no fever.       Patient is a 47 year old female with history of HTN, HLD, DM II, ESRD on HD (MWF. Last HD 2017. Receives HD via Right Femoral HD catheter placed on 2017 at [facility] 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 The renal follows the case for the need of HD. Last HD done on  - 3.0 liters off           sodium chloride 0.9% lock flush 3 milliLiter(s) every 8 hours  aspirin enteric coated 81 milliGRAM(s) daily  valsartan 160 milliGRAM(s) daily  simvastatin 20 milliGRAM(s) at bedtime  insulin lispro (HumaLOG) corrective regimen sliding scale   Before meals and at bedtime  dextrose 5%. 1000 milliLiter(s) <Continuous>  dextrose Gel 1 Dose(s) once PRN  dextrose 50% Injectable 12.5 Gram(s) once  dextrose 50% Injectable 25 Gram(s) once  dextrose 50% Injectable 25 Gram(s) once  glucagon  Injectable 1 milliGRAM(s) once PRN  polyethylene glycol 3350 17 Gram(s) daily  pantoprazole    Tablet 40 milliGRAM(s) before breakfast  acetaminophen    Suspension. 650 milliGRAM(s) every 6 hours PRN  DAPTOmycin IVPB 700 milliGRAM(s) every 48 hours  rifampin 600 milliGRAM(s) daily  Ceftaroline Fosamil IVPB 200 milliGRAM(s) every 12 hours  amLODIPine   Tablet 10 milliGRAM(s) daily  metoprolol 50 milliGRAM(s) every 6 hours  gabapentin 100 milliGRAM(s) two times a day  gabapentin 300 milliGRAM(s) at bedtime  insulin lispro Injectable (HumaLOG) 13 Unit(s) three times a day before meals  sertraline 25 milliGRAM(s) daily  traZODone 25 milliGRAM(s) at bedtime  insulin glargine Injectable (LANTUS) 35 Unit(s) at bedtime  heparin  Infusion 2000 Unit(s)/Hr <Continuous>      Allergies    penicillin (Unknown)  Sular (Unknown)    Intolerances        T(C): , Max: 37.9 (17 @ 10:00)  T(F): , Max: 100.3 (17 @ 10:00)  HR: 88 (17 @ 08:34)  BP: 161/78 (17 @ 08:34)  BP(mean): 106 (17 @ 08:34)  RR: 16 (17 @ 08:34)  SpO2: 96% (17 @ 08:34)  Wt(kg): --     @ 07:01  -   @ 07:00  --------------------------------------------------------  IN:    heparin Infusion: 232 mL    IV PiggyBack: 200 mL  Total IN: 432 mL    OUT:    Other: 3000 mL  Total OUT: 3000 mL    Total NET: -2568 mL       @ 07:01  -   @ 11:25  --------------------------------------------------------  IN:    Oral Fluid: 120 mL  Total IN: 120 mL    OUT:  Total OUT: 0 mL    Total NET: 120 mL      Alert and oriented  No JVD  HAs a catheter in the left IJ   No respiratory distress   Normal air entry in the lungs, no wheezing, no crackles, no rales   RRR, normal s1/s2, no mumurs, rubs or gallops  Abdomen - soft, non-tender, non-distended, normal bowel sounds   Extremities - mild peripheral edema   The line in the right groined removed           LABS:                        9.4    14.6  )-----------( 320      ( 21 Sep 2017 11:09 )             30.0         130<L>  |  94<L>  |  17  ----------------------------<  125<H>  4.0   |  24  |  3.08<H>    Ca    8.3<L>      21 Sep 2017 02:42  Mg     1.8             PT/INR - ( 21 Sep 2017 02:41 )   PT: 16.0 sec;   INR: 1.43          PTT - ( 21 Sep 2017 02:41 )  PTT:59.0 sec  Urinalysis Basic - ( 20 Sep 2017 22:37 )    Color: Yellow / Appearance: x / S.020 / pH: x  Gluc: x / Ketone: NEGATIVE  / Bili: NEGATIVE / Urobili: 0.2 E.U./dL   Blood: x / Protein: >=300 mg/dL / Nitrite: NEGATIVE   Leuk Esterase: Small / RBC: < 5 /HPF / WBC Many /HPF   Sq Epi: x / Non Sq Epi: Moderate /HPF / Bacteria: Present /HPF            RADIOLOGY & ADDITIONAL STUDIES:  < from: MRI Lumbar Spine w/o Cont (17 @ 22:45) >  IMPRESSION: Findings suspicious for osteomyelitis at the C5/6 level with   prevertebral fluid/edema and small epidural soft tissue component. No   cord compression at this time. Additional discitis osteomyelitis at the   T7/8 level without prevertebral or epidural component. Extensive abnormal   marrow which may be secondary to renal osteodystrophy.          < end of copied text >

## 2017-09-21 NOTE — PROGRESS NOTE ADULT - SUBJECTIVE AND OBJECTIVE BOX
Patient discussed on morning rounds with Dr. Gee     Operation / Date: pre-op for orthopedic surgery tomorrow.     SUBJECTIVE ASSESSMENT:  47y Female reports that she has feeling in the inner part of her legs today that she didn't have in previous days. States that she still has neck and right shoulder pain that is stable from the previous days. She reports a bowel movement yesterday that was normal. She had dialysis yesterday and tolerated it well. Reports feeling depressed and that being transferred to another room means she is being exiled. Patient was reassured that she is under good care and that she will be closely watched everywhere that she will be in the hospital. She has questions regarding her surgery tomorrow. Will defer questions to ortho.         Vital Signs Last 24 Hrs  T(C): 37.9 (21 Sep 2017 10:00), Max: 37.9 (21 Sep 2017 10:00)  T(F): 100.3 (21 Sep 2017 10:00), Max: 100.3 (21 Sep 2017 10:00)  HR: 88 (21 Sep 2017 08:34) (80 - 98)  BP: 161/78 (21 Sep 2017 08:34) (119/65 - 182/79)  BP(mean): 106 (21 Sep 2017 08:34) (83 - 121)  RR: 16 (21 Sep 2017 08:34) (14 - 20)  SpO2: 96% (21 Sep 2017 08:34) (95% - 100%)  I&O's Detail    20 Sep 2017 07:  -  21 Sep 2017 07:00  --------------------------------------------------------  IN:    heparin Infusion: 232 mL    IV PiggyBack: 200 mL  Total IN: 432 mL    OUT:    Other: 3000 mL  Total OUT: 3000 mL    Total NET: -2568 mL      21 Sep 2017 07:  -  21 Sep 2017 10:52  --------------------------------------------------------  IN:    Oral Fluid: 120 mL  Total IN: 120 mL    OUT:  Total OUT: 0 mL    Total NET: 120 mL          PHYSICAL EXAM:    General:  AOx3 in distress due to pain. Tangential thought process. Confused but reoriented.     Neurological: Light sensation intact on the left inner thigh  1/5 strength in left leg. EOMS intact. PERRLA. No nystagmus. No facial droop. No incontinence. no saddle anesthsia.     Cardiovascular: RRR distance heart sounds no audible murmur. No friction rubs.     Respiratory: scattered rhonchi. No wheeze or rales.     Gastrointestinal: non tender non distended. active bowel sounds     Extremities: tenderness but no warm over tight shoulder and scapula. + Cervical spinal tenderness. Trace lower extremity edema. Keloid scaring of left medial aspect of foot.     Vascular: 1+ DP pulses bilaterally. 2+ radial     Lines: Left IJ permacath.       LABS:                        8.5    11.9  )-----------( 318      ( 21 Sep 2017 02:41 )             27.1     PT/INR - ( 21 Sep 2017 02:41 )   PT: 16.0 sec;   INR: 1.43          PTT - ( 21 Sep 2017 02:41 )  PTT:59.0 sec        130<L>  |  94<L>  |  17  ----------------------------<  125<H>  4.0   |  24  |  3.08<H>    Ca    8.3<L>      21 Sep 2017 02:42  Mg     1.8             Urinalysis Basic - ( 20 Sep 2017 22:37 )    Color: Yellow / Appearance: x / S.020 / pH: x  Gluc: x / Ketone: NEGATIVE  / Bili: NEGATIVE / Urobili: 0.2 E.U./dL   Blood: x / Protein: >=300 mg/dL / Nitrite: NEGATIVE   Leuk Esterase: Small / RBC: < 5 /HPF / WBC Many /HPF   Sq Epi: x / Non Sq Epi: Moderate /HPF / Bacteria: Present /HPF        MEDICATIONS  (STANDING):  sodium chloride 0.9% lock flush 3 milliLiter(s) IV Push every 8 hours  aspirin enteric coated 81 milliGRAM(s) Oral daily  valsartan 160 milliGRAM(s) Oral daily  simvastatin 20 milliGRAM(s) Oral at bedtime  insulin lispro (HumaLOG) corrective regimen sliding scale   SubCutaneous Before meals and at bedtime  dextrose 5%. 1000 milliLiter(s) (50 mL/Hr) IV Continuous <Continuous>  dextrose 50% Injectable 12.5 Gram(s) IV Push once  dextrose 50% Injectable 25 Gram(s) IV Push once  dextrose 50% Injectable 25 Gram(s) IV Push once  polyethylene glycol 3350 17 Gram(s) Oral daily  pantoprazole    Tablet 40 milliGRAM(s) Oral before breakfast  DAPTOmycin IVPB 700 milliGRAM(s) IV Intermittent every 48 hours  rifampin 600 milliGRAM(s) Oral daily  Ceftaroline Fosamil IVPB 200 milliGRAM(s) IV Intermittent every 12 hours  amLODIPine   Tablet 10 milliGRAM(s) Oral daily  metoprolol 50 milliGRAM(s) Oral every 6 hours  gabapentin 100 milliGRAM(s) Oral two times a day  gabapentin 300 milliGRAM(s) Oral at bedtime  insulin lispro Injectable (HumaLOG) 13 Unit(s) SubCutaneous three times a day before meals  sertraline 25 milliGRAM(s) Oral daily  traZODone 25 milliGRAM(s) Oral at bedtime  insulin glargine Injectable (LANTUS) 35 Unit(s) SubCutaneous at bedtime  heparin  Infusion 2000 Unit(s)/Hr (20 mL/Hr) IV Continuous <Continuous>    MEDICATIONS  (PRN):  dextrose Gel 1 Dose(s) Oral once PRN Blood Glucose LESS THAN 70 milliGRAM(s)/deciliter  glucagon  Injectable 1 milliGRAM(s) IntraMuscular once PRN Glucose LESS THAN 70 milligrams/deciliter  acetaminophen    Suspension. 650 milliGRAM(s) Oral every 6 hours PRN Mild Pain (1 - 3)        RADIOLOGY & ADDITIONAL TESTS:  < from: Xray Chest 1 View AP -PORTABLE-Routine (17 @ 05:09) >    INTERPRETATION:  CLINICAL INDICATION: 47-year-old with endocarditis.      FINDINGS: Portable view of the chest is compared to 2017 and  demonstrates a left-sided central venous catheter with the tip in the   SVC. The heart is magnified. Low lung volumes. Mild central pulmonary   venous congestion. No consolidation. No pleural effusion. No interval   change.      < end of copied text >

## 2017-09-21 NOTE — PROGRESS NOTE ADULT - ATTENDING COMMENTS
Patient examined, fellow's hx and PE reviewed and confirmed. I find BP stable, afebrile. A/P reviewed and confirmed. Continue dialysis. Continue abx. See full note Pt seen with Dr. Munoz on rounds this afternoon.  Glucoses have been surprisingly good, and given the relatively "tight" AM fingerstick today, will decrease the Lantus slightly for tonight when pt to be NPO.  Should definitely check fingerstick before she leaves for the OR.

## 2017-09-21 NOTE — PROGRESS NOTE ADULT - ASSESSMENT
This is 47 year old female with PMH stated above. The renal service is activated for the need of HD. She was dialyzed on 9/20- 3.0 liters off. No need for HD for today

## 2017-09-21 NOTE — PROGRESS NOTE ADULT - SUBJECTIVE AND OBJECTIVE BOX
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 (13 Sep 2017 22:57)      Pt reports the following symptoms:    CONSTITUTIONAL:  Negative fever or chills, feels well, good appetite  EYES:  Negative  blurry vision or double vision  CARDIOVASCULAR:  Negative for chest pain or palpitations  RESPIRATORY:  Negative for cough, wheezing, or SOB   GASTROINTESTINAL:  Negative for nausea, vomiting, diarrhea, constipation, or abdominal pain  GENITOURINARY:  Negative frequency, urgency or dysuria  NEUROLOGIC:  No headache, confusion, dizziness, lightheadedness    MEDICATIONS  (STANDING):  sodium chloride 0.9% lock flush 3 milliLiter(s) IV Push every 8 hours  aspirin enteric coated 81 milliGRAM(s) Oral daily  valsartan 160 milliGRAM(s) Oral daily  simvastatin 20 milliGRAM(s) Oral at bedtime  insulin lispro (HumaLOG) corrective regimen sliding scale   SubCutaneous Before meals and at bedtime  dextrose 5%. 1000 milliLiter(s) (50 mL/Hr) IV Continuous <Continuous>  dextrose 50% Injectable 12.5 Gram(s) IV Push once  dextrose 50% Injectable 25 Gram(s) IV Push once  dextrose 50% Injectable 25 Gram(s) IV Push once  polyethylene glycol 3350 17 Gram(s) Oral daily  pantoprazole    Tablet 40 milliGRAM(s) Oral before breakfast  DAPTOmycin IVPB 700 milliGRAM(s) IV Intermittent every 48 hours  rifampin 600 milliGRAM(s) Oral daily  Ceftaroline Fosamil IVPB 200 milliGRAM(s) IV Intermittent every 12 hours  amLODIPine   Tablet 10 milliGRAM(s) Oral daily  metoprolol 50 milliGRAM(s) Oral every 6 hours  gabapentin 100 milliGRAM(s) Oral two times a day  gabapentin 300 milliGRAM(s) Oral at bedtime  insulin lispro Injectable (HumaLOG) 13 Unit(s) SubCutaneous three times a day before meals  sertraline 25 milliGRAM(s) Oral daily  traZODone 25 milliGRAM(s) Oral at bedtime  insulin glargine Injectable (LANTUS) 35 Unit(s) SubCutaneous at bedtime  heparin  Infusion 2100 Unit(s)/Hr (21 mL/Hr) IV Continuous <Continuous>    MEDICATIONS  (PRN):  dextrose Gel 1 Dose(s) Oral once PRN Blood Glucose LESS THAN 70 milliGRAM(s)/deciliter  glucagon  Injectable 1 milliGRAM(s) IntraMuscular once PRN Glucose LESS THAN 70 milligrams/deciliter  acetaminophen    Suspension. 650 milliGRAM(s) Oral every 6 hours PRN Mild Pain (1 - 3)      PHYSICAL EXAM  Vital Signs Last 24 Hrs  T(C): 36.7 (21 Sep 2017 12:02), Max: 37.9 (21 Sep 2017 10:00)  T(F): 98.1 (21 Sep 2017 12:02), Max: 100.3 (21 Sep 2017 10:00)  HR: 92 (21 Sep 2017 14:44) (84 - 98)  BP: 154/73 (21 Sep 2017 14:44) (119/65 - 169/71)  BP(mean): 97 (21 Sep 2017 14:44) (83 - 121)  RR: 26 (21 Sep 2017 14:44) (14 - 26)  SpO2: 96% (21 Sep 2017 14:44) (95% - 100%)    Constitutional: wn/wd in NAD.   HEENT: NCAT, MMM, OP clear, EOMI, no proptosis or lid retraction  Neck: no thyromegaly or palpable thyroid nodules   Respiratory: lungs CTAB.  Cardiovascular: regular rhythm, normal S1 and S2, no audible murmurs, no peripheral edema  GI: soft, NT/ND, no masses/HSM appreciated.  Neurology: no tremors, DTR 2+  Skin: no visible rashes/lesions  Psychiatric: AAO x 3, normal affect/mood.    LABS:                        9.4    14.6  )-----------( 320      ( 21 Sep 2017 11:09 )             30.0         129<L>  |  95<L>  |  18  ----------------------------<  102<H>  4.2   |  20<L>  |  3.33<H>    Ca    8.7      21 Sep 2017 11:09  Mg     1.9     -      PT/INR - ( 21 Sep 2017 11:08 )   PT: 15.5 sec;   INR: 1.39          PTT - ( 21 Sep 2017 11:08 )  PTT:50.6 sec  Urinalysis Basic - ( 20 Sep 2017 22:37 )    Color: Yellow / Appearance: x / S.020 / pH: x  Gluc: x / Ketone: NEGATIVE  / Bili: NEGATIVE / Urobili: 0.2 E.U./dL   Blood: x / Protein: >=300 mg/dL / Nitrite: NEGATIVE   Leuk Esterase: Small / RBC: < 5 /HPF / WBC Many /HPF   Sq Epi: x / Non Sq Epi: Moderate /HPF / Bacteria: Present /HPF      Thyroid Stimulating Hormone, Serum: 2.447 uIU/mL ( @ 22:51)  Thyroid Stimulating Hormone, Serum: 1.251 uIU/mL ( @ 01:12)      HbA1C: 8.2 % ( @ 22:51)  8.2 % ( @ 01:11)    CAPILLARY BLOOD GLUCOSE  178 (21 Sep 2017 13:49)  116 (21 Sep 2017 03:00)  111 (20 Sep 2017 21:30)  111 (20 Sep 2017 20:00)  134 (20 Sep 2017 16:32)      Insulin Sliding Scale requirements X 24 Hours:    RADIOLOGY & ADDITIONAL TESTS:    A/P: 47y Female with history of DM type II presenting for       1.  DM -     Please continue           units lantus at bedtime  / in the morning and        units lispro with meals and lispro moderate / low dose sliding scale 4 times daily with meals and at bedtime.  Please continue consistent carbohydrate diet.      Goal FSG is   Will continue to monitor   For discharge, pt can continue    Pt can follow up at discharge with [facility] by calling  to make an appointment.   Will discuss case with     and update primary team INTERVAL HPI/OVERNIGHT EVENTS:    Patient is a 47y old  Female who presents with a chief complaint of TV IE / MRSA Bacteremia.   She reports feeling well today.  She ate cereal for breakfast and cereal for lunch and has juice at the bedside.   She is planned for surgery tomorrow for her cervical osteomyelitis    Pt reports the following symptoms:    CONSTITUTIONAL:  Negative fever or chills, feels well, good appetite  EYES:  Negative  blurry vision or double vision  CARDIOVASCULAR:  Negative for chest pain or palpitations  RESPIRATORY:  Negative for cough, wheezing, or SOB   GASTROINTESTINAL:  Negative for nausea, vomiting, diarrhea, constipation, or abdominal pain  GENITOURINARY:  Negative frequency, urgency or dysuria  NEUROLOGIC:  No headache, confusion, dizziness, lightheadedness    MEDICATIONS  (STANDING):  sodium chloride 0.9% lock flush 3 milliLiter(s) IV Push every 8 hours  aspirin enteric coated 81 milliGRAM(s) Oral daily  valsartan 160 milliGRAM(s) Oral daily  simvastatin 20 milliGRAM(s) Oral at bedtime  insulin lispro (HumaLOG) corrective regimen sliding scale   SubCutaneous Before meals and at bedtime  dextrose 5%. 1000 milliLiter(s) (50 mL/Hr) IV Continuous <Continuous>  dextrose 50% Injectable 12.5 Gram(s) IV Push once  dextrose 50% Injectable 25 Gram(s) IV Push once  dextrose 50% Injectable 25 Gram(s) IV Push once  polyethylene glycol 3350 17 Gram(s) Oral daily  pantoprazole    Tablet 40 milliGRAM(s) Oral before breakfast  DAPTOmycin IVPB 700 milliGRAM(s) IV Intermittent every 48 hours  rifampin 600 milliGRAM(s) Oral daily  Ceftaroline Fosamil IVPB 200 milliGRAM(s) IV Intermittent every 12 hours  amLODIPine   Tablet 10 milliGRAM(s) Oral daily  metoprolol 50 milliGRAM(s) Oral every 6 hours  gabapentin 100 milliGRAM(s) Oral two times a day  gabapentin 300 milliGRAM(s) Oral at bedtime  insulin lispro Injectable (HumaLOG) 13 Unit(s) SubCutaneous three times a day before meals  sertraline 25 milliGRAM(s) Oral daily  traZODone 25 milliGRAM(s) Oral at bedtime  insulin glargine Injectable (LANTUS) 35 Unit(s) SubCutaneous at bedtime  heparin  Infusion 2100 Unit(s)/Hr (21 mL/Hr) IV Continuous <Continuous>    MEDICATIONS  (PRN):  dextrose Gel 1 Dose(s) Oral once PRN Blood Glucose LESS THAN 70 milliGRAM(s)/deciliter  glucagon  Injectable 1 milliGRAM(s) IntraMuscular once PRN Glucose LESS THAN 70 milligrams/deciliter  acetaminophen    Suspension. 650 milliGRAM(s) Oral every 6 hours PRN Mild Pain (1 - 3)      PHYSICAL EXAM  Vital Signs Last 24 Hrs  T(C): 36.7 (21 Sep 2017 12:02), Max: 37.9 (21 Sep 2017 10:00)  T(F): 98.1 (21 Sep 2017 12:02), Max: 100.3 (21 Sep 2017 10:00)  HR: 92 (21 Sep 2017 14:44) (84 - 98)  BP: 154/73 (21 Sep 2017 14:44) (119/65 - 169/71)  BP(mean): 97 (21 Sep 2017 14:44) (83 - 121)  RR: 26 (21 Sep 2017 14:44) (14 - 26)  SpO2: 96% (21 Sep 2017 14:44) (95% - 100%)    Constitutional: wn/wd in NAD.   HEENT: NCAT, MMM, OP clear, EOMI, no proptosis or lid retraction  Neck: no thyromegaly or palpable thyroid nodules   Respiratory: lungs CTAB.  Cardiovascular: regular rhythm, normal S1 and S2, no audible murmurs, no peripheral edema  GI: soft, NT/ND, no masses/HSM appreciated.  Neurology: no tremors, DTR 2+  Skin: no visible rashes/lesions  Psychiatric: AAO x 3, normal affect/mood.    LABS:                        9.4    14.6  )-----------( 320      ( 21 Sep 2017 11:09 )             30.0     09-    129<L>  |  95<L>  |  18  ----------------------------<  102<H>  4.2   |  20<L>  |  3.33<H>    Ca    8.7      21 Sep 2017 11:09  Mg     1.9     09-21      PT/INR - ( 21 Sep 2017 11:08 )   PT: 15.5 sec;   INR: 1.39          PTT - ( 21 Sep 2017 11:08 )  PTT:50.6 sec  Urinalysis Basic - ( 20 Sep 2017 22:37 )    Color: Yellow / Appearance: x / S.020 / pH: x  Gluc: x / Ketone: NEGATIVE  / Bili: NEGATIVE / Urobili: 0.2 E.U./dL   Blood: x / Protein: >=300 mg/dL / Nitrite: NEGATIVE   Leuk Esterase: Small / RBC: < 5 /HPF / WBC Many /HPF   Sq Epi: x / Non Sq Epi: Moderate /HPF / Bacteria: Present /HPF      Thyroid Stimulating Hormone, Serum: 2.447 uIU/mL ( @ 22:51)  Thyroid Stimulating Hormone, Serum: 1.251 uIU/mL ( @ 01:12)      HbA1C: 8.2 % ( @ 22:51)  8.2 % ( @ 01:11)    CAPILLARY BLOOD GLUCOSE  178 (21 Sep 2017 13:49)  116 (21 Sep 2017 03:00)  111 (20 Sep 2017 21:30)  111 (20 Sep 2017 20:00)  134 (20 Sep 2017 16:32)    A/P: 47y Female with history of DM type II presenting for management of infective endocarditis and ostemyelitis, now with improved glycemic control, for OR tomorrow.     1.  DM - type 2, uncontrolled but not severe, complicated.   In light of patient going to the OR tomorrow, can decrease lantus to 30 units once daily.   Can resume 35 units of lantus and 13 units lispro three times daily with meals following surgery.   Please continue moderate dose sliding scale four times daily with meals and at bedtime.     Goal FSG is 140-180  Will continue to monitor   For discharge, pt can continue a basal bolus insulin regimen with doses pending clinical course.      Pt can follow up at discharge with [facility] by calling  to make an appointment.   Will discuss case with Dr. Ross   and update primary team

## 2017-09-22 ENCOUNTER — RESULT REVIEW (OUTPATIENT)
Age: 47
End: 2017-09-22

## 2017-09-22 LAB
ANION GAP SERPL CALC-SCNC: 14 MMOL/L — SIGNIFICANT CHANGE UP (ref 5–17)
ANION GAP SERPL CALC-SCNC: 17 MMOL/L — SIGNIFICANT CHANGE UP (ref 5–17)
APTT BLD: 34.1 SEC — SIGNIFICANT CHANGE UP (ref 27.5–37.4)
APTT BLD: 37.5 SEC — HIGH (ref 27.5–37.4)
BASE EXCESS BLDA CALC-SCNC: 0.7 MMOL/L — SIGNIFICANT CHANGE UP (ref -2–3)
BUN SERPL-MCNC: 18 MG/DL — SIGNIFICANT CHANGE UP (ref 7–23)
BUN SERPL-MCNC: 24 MG/DL — HIGH (ref 7–23)
CALCIUM SERPL-MCNC: 8.7 MG/DL — SIGNIFICANT CHANGE UP (ref 8.4–10.5)
CALCIUM SERPL-MCNC: 8.7 MG/DL — SIGNIFICANT CHANGE UP (ref 8.4–10.5)
CHLORIDE SERPL-SCNC: 89 MMOL/L — LOW (ref 96–108)
CHLORIDE SERPL-SCNC: 93 MMOL/L — LOW (ref 96–108)
CO2 SERPL-SCNC: 22 MMOL/L — SIGNIFICANT CHANGE UP (ref 22–31)
CO2 SERPL-SCNC: 23 MMOL/L — SIGNIFICANT CHANGE UP (ref 22–31)
CREAT SERPL-MCNC: 3.39 MG/DL — HIGH (ref 0.5–1.3)
CREAT SERPL-MCNC: 3.97 MG/DL — HIGH (ref 0.5–1.3)
GAS PNL BLDA: SIGNIFICANT CHANGE UP
GLUCOSE SERPL-MCNC: 115 MG/DL — HIGH (ref 70–99)
GLUCOSE SERPL-MCNC: 79 MG/DL — SIGNIFICANT CHANGE UP (ref 70–99)
HCO3 BLDA-SCNC: 26 MMOL/L — SIGNIFICANT CHANGE UP (ref 21–28)
HCT VFR BLD CALC: 27.3 % — LOW (ref 34.5–45)
HCT VFR BLD CALC: 29 % — LOW (ref 34.5–45)
HGB BLD-MCNC: 8.4 G/DL — LOW (ref 11.5–15.5)
HGB BLD-MCNC: 8.5 G/DL — LOW (ref 11.5–15.5)
INR BLD: 1.33 — HIGH (ref 0.88–1.16)
INR BLD: 1.54 — HIGH (ref 0.88–1.16)
LACTATE SERPL-SCNC: 0.5 MMOL/L — SIGNIFICANT CHANGE UP (ref 0.5–2)
MAGNESIUM SERPL-MCNC: 1.9 MG/DL — SIGNIFICANT CHANGE UP (ref 1.6–2.6)
MAGNESIUM SERPL-MCNC: 1.9 MG/DL — SIGNIFICANT CHANGE UP (ref 1.6–2.6)
MCHC RBC-ENTMCNC: 24.9 PG — LOW (ref 27–34)
MCHC RBC-ENTMCNC: 25.9 PG — LOW (ref 27–34)
MCHC RBC-ENTMCNC: 29.3 G/DL — LOW (ref 32–36)
MCHC RBC-ENTMCNC: 30.8 G/DL — LOW (ref 32–36)
MCV RBC AUTO: 84.3 FL — SIGNIFICANT CHANGE UP (ref 80–100)
MCV RBC AUTO: 84.8 FL — SIGNIFICANT CHANGE UP (ref 80–100)
PCO2 BLDA: 44 MMHG — SIGNIFICANT CHANGE UP (ref 32–45)
PH BLDA: 7.39 — SIGNIFICANT CHANGE UP (ref 7.35–7.45)
PLATELET # BLD AUTO: 345 K/UL — SIGNIFICANT CHANGE UP (ref 150–400)
PLATELET # BLD AUTO: 351 K/UL — SIGNIFICANT CHANGE UP (ref 150–400)
PO2 BLDA: 93 MMHG — SIGNIFICANT CHANGE UP (ref 83–108)
POTASSIUM SERPL-MCNC: 4.3 MMOL/L — SIGNIFICANT CHANGE UP (ref 3.5–5.3)
POTASSIUM SERPL-MCNC: 4.7 MMOL/L — SIGNIFICANT CHANGE UP (ref 3.5–5.3)
POTASSIUM SERPL-SCNC: 4.3 MMOL/L — SIGNIFICANT CHANGE UP (ref 3.5–5.3)
POTASSIUM SERPL-SCNC: 4.7 MMOL/L — SIGNIFICANT CHANGE UP (ref 3.5–5.3)
PROTHROM AB SERPL-ACNC: 14.8 SEC — HIGH (ref 9.8–12.7)
PROTHROM AB SERPL-ACNC: 17.2 SEC — HIGH (ref 9.8–12.7)
RBC # BLD: 3.24 M/UL — LOW (ref 3.8–5.2)
RBC # BLD: 3.42 M/UL — LOW (ref 3.8–5.2)
RBC # FLD: 17.2 % — HIGH (ref 10.3–16.9)
RBC # FLD: 17.5 % — HIGH (ref 10.3–16.9)
SAO2 % BLDA: 97 % — SIGNIFICANT CHANGE UP (ref 95–100)
SODIUM SERPL-SCNC: 128 MMOL/L — LOW (ref 135–145)
SODIUM SERPL-SCNC: 130 MMOL/L — LOW (ref 135–145)
WBC # BLD: 12.4 K/UL — HIGH (ref 3.8–10.5)
WBC # BLD: 13.3 K/UL — HIGH (ref 3.8–10.5)
WBC # FLD AUTO: 12.4 K/UL — HIGH (ref 3.8–10.5)
WBC # FLD AUTO: 13.3 K/UL — HIGH (ref 3.8–10.5)

## 2017-09-22 PROCEDURE — 99232 SBSQ HOSP IP/OBS MODERATE 35: CPT | Mod: GC

## 2017-09-22 PROCEDURE — 71010: CPT | Mod: 26

## 2017-09-22 PROCEDURE — 90935 HEMODIALYSIS ONE EVALUATION: CPT

## 2017-09-22 RX ORDER — DEXTROSE 50 % IN WATER 50 %
12.5 SYRINGE (ML) INTRAVENOUS ONCE
Qty: 0 | Refills: 0 | Status: COMPLETED | OUTPATIENT
Start: 2017-09-22 | End: 2017-09-22

## 2017-09-22 RX ORDER — KETOROLAC TROMETHAMINE 30 MG/ML
30 SYRINGE (ML) INJECTION ONCE
Qty: 0 | Refills: 0 | Status: DISCONTINUED | OUTPATIENT
Start: 2017-09-22 | End: 2017-09-22

## 2017-09-22 RX ORDER — ERYTHROPOIETIN 10000 [IU]/ML
10000 INJECTION, SOLUTION INTRAVENOUS; SUBCUTANEOUS ONCE
Qty: 0 | Refills: 0 | Status: COMPLETED | OUTPATIENT
Start: 2017-09-22 | End: 2017-09-22

## 2017-09-22 RX ORDER — ALTEPLASE 100 MG
2 KIT INTRAVENOUS ONCE
Qty: 0 | Refills: 0 | Status: COMPLETED | OUTPATIENT
Start: 2017-09-22 | End: 2017-09-22

## 2017-09-22 RX ORDER — HYDROMORPHONE HYDROCHLORIDE 2 MG/ML
1 INJECTION INTRAMUSCULAR; INTRAVENOUS; SUBCUTANEOUS
Qty: 0 | Refills: 0 | Status: DISCONTINUED | OUTPATIENT
Start: 2017-09-22 | End: 2017-09-22

## 2017-09-22 RX ORDER — HYDROMORPHONE HYDROCHLORIDE 2 MG/ML
0.5 INJECTION INTRAMUSCULAR; INTRAVENOUS; SUBCUTANEOUS ONCE
Qty: 0 | Refills: 0 | Status: DISCONTINUED | OUTPATIENT
Start: 2017-09-22 | End: 2017-09-22

## 2017-09-22 RX ADMIN — HYDROMORPHONE HYDROCHLORIDE 1 MILLIGRAM(S): 2 INJECTION INTRAMUSCULAR; INTRAVENOUS; SUBCUTANEOUS at 19:15

## 2017-09-22 RX ADMIN — Medication 50 MILLIGRAM(S): at 00:33

## 2017-09-22 RX ADMIN — AMLODIPINE BESYLATE 10 MILLIGRAM(S): 2.5 TABLET ORAL at 07:37

## 2017-09-22 RX ADMIN — Medication 50 MILLIGRAM(S): at 07:37

## 2017-09-22 RX ADMIN — Medication 50 MILLIGRAM(S): at 23:10

## 2017-09-22 RX ADMIN — SODIUM CHLORIDE 3 MILLILITER(S): 9 INJECTION INTRAMUSCULAR; INTRAVENOUS; SUBCUTANEOUS at 15:35

## 2017-09-22 RX ADMIN — Medication 30 MILLIGRAM(S): at 21:07

## 2017-09-22 RX ADMIN — Medication 12.5 GRAM(S): at 05:25

## 2017-09-22 RX ADMIN — ALTEPLASE 2 MILLIGRAM(S): KIT at 10:00

## 2017-09-22 RX ADMIN — CEFTAROLINE FOSAMIL 50 MILLIGRAM(S): 600 POWDER, FOR SOLUTION INTRAVENOUS at 05:17

## 2017-09-22 RX ADMIN — Medication 50 MILLIGRAM(S): at 13:51

## 2017-09-22 RX ADMIN — Medication 25 MILLIGRAM(S): at 23:10

## 2017-09-22 RX ADMIN — GABAPENTIN 100 MILLIGRAM(S): 400 CAPSULE ORAL at 07:37

## 2017-09-22 RX ADMIN — Medication 12.5 MILLILITER(S): at 15:34

## 2017-09-22 RX ADMIN — HYDROMORPHONE HYDROCHLORIDE 1 MILLIGRAM(S): 2 INJECTION INTRAMUSCULAR; INTRAVENOUS; SUBCUTANEOUS at 19:00

## 2017-09-22 RX ADMIN — DAPTOMYCIN 128 MILLIGRAM(S): 500 INJECTION, POWDER, LYOPHILIZED, FOR SOLUTION INTRAVENOUS at 13:52

## 2017-09-22 RX ADMIN — INSULIN GLARGINE 30 UNIT(S): 100 INJECTION, SOLUTION SUBCUTANEOUS at 00:33

## 2017-09-22 RX ADMIN — SODIUM CHLORIDE 3 MILLILITER(S): 9 INJECTION INTRAMUSCULAR; INTRAVENOUS; SUBCUTANEOUS at 23:14

## 2017-09-22 RX ADMIN — SERTRALINE 25 MILLIGRAM(S): 25 TABLET, FILM COATED ORAL at 23:11

## 2017-09-22 RX ADMIN — HYDROMORPHONE HYDROCHLORIDE 1 MILLIGRAM(S): 2 INJECTION INTRAMUSCULAR; INTRAVENOUS; SUBCUTANEOUS at 19:30

## 2017-09-22 RX ADMIN — SODIUM CHLORIDE 3 MILLILITER(S): 9 INJECTION INTRAMUSCULAR; INTRAVENOUS; SUBCUTANEOUS at 07:36

## 2017-09-22 RX ADMIN — Medication 81 MILLIGRAM(S): at 23:14

## 2017-09-22 RX ADMIN — ERYTHROPOIETIN 10000 UNIT(S): 10000 INJECTION, SOLUTION INTRAVENOUS; SUBCUTANEOUS at 13:29

## 2017-09-22 RX ADMIN — CEFTAROLINE FOSAMIL 50 MILLIGRAM(S): 600 POWDER, FOR SOLUTION INTRAVENOUS at 20:04

## 2017-09-22 RX ADMIN — SIMVASTATIN 20 MILLIGRAM(S): 20 TABLET, FILM COATED ORAL at 23:11

## 2017-09-22 RX ADMIN — INSULIN GLARGINE 35 UNIT(S): 100 INJECTION, SOLUTION SUBCUTANEOUS at 23:00

## 2017-09-22 RX ADMIN — GABAPENTIN 300 MILLIGRAM(S): 400 CAPSULE ORAL at 23:10

## 2017-09-22 RX ADMIN — HYDROMORPHONE HYDROCHLORIDE 0.5 MILLIGRAM(S): 2 INJECTION INTRAMUSCULAR; INTRAVENOUS; SUBCUTANEOUS at 21:07

## 2017-09-22 RX ADMIN — HYDROMORPHONE HYDROCHLORIDE 1 MILLIGRAM(S): 2 INJECTION INTRAMUSCULAR; INTRAVENOUS; SUBCUTANEOUS at 19:52

## 2017-09-22 NOTE — PROGRESS NOTE ADULT - SUBJECTIVE AND OBJECTIVE BOX
Objective and Subjective   The patient in her bed. DOes not endorse any complains, no shortness of breath, no chest pain, no fever. Planned for surgery today - osteomyelitis of cervical vertebrae       Patient is a 47 year old female with history of HTN, HLD, DM II, ESRD on HD (MWF. Last HD 2017. Receives HD via Right Femoral HD catheter placed on 2017 at [facility] 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 The renal follows the case for the need of HD. Last HD done on  - 3.0 liters off     Patient seen and examined at bedside.     sodium chloride 0.9% lock flush 3 milliLiter(s) every 8 hours  aspirin enteric coated 81 milliGRAM(s) daily  valsartan 160 milliGRAM(s) daily  simvastatin 20 milliGRAM(s) at bedtime  insulin lispro (HumaLOG) corrective regimen sliding scale   Before meals and at bedtime  dextrose 5%. 1000 milliLiter(s) <Continuous>  dextrose Gel 1 Dose(s) once PRN  dextrose 50% Injectable 12.5 Gram(s) once  dextrose 50% Injectable 25 Gram(s) once  dextrose 50% Injectable 25 Gram(s) once  glucagon  Injectable 1 milliGRAM(s) once PRN  polyethylene glycol 3350 17 Gram(s) daily  pantoprazole    Tablet 40 milliGRAM(s) before breakfast  acetaminophen    Suspension. 650 milliGRAM(s) every 6 hours PRN  DAPTOmycin IVPB 700 milliGRAM(s) every 48 hours  rifampin 600 milliGRAM(s) daily  Ceftaroline Fosamil IVPB 200 milliGRAM(s) every 12 hours  amLODIPine   Tablet 10 milliGRAM(s) daily  metoprolol 50 milliGRAM(s) every 6 hours  gabapentin 100 milliGRAM(s) two times a day  gabapentin 300 milliGRAM(s) at bedtime  insulin lispro Injectable (HumaLOG) 13 Unit(s) three times a day before meals  sertraline 25 milliGRAM(s) daily  traZODone 25 milliGRAM(s) at bedtime  insulin glargine Injectable (LANTUS) 35 Unit(s) at bedtime  epoetin fransisca Injectable 09452 Unit(s) once  alteplase for catheter clearance 2 milliGRAM(s) once      Allergies    penicillin (Unknown)  Sular (Unknown)    Intolerances        T(C): , Max: 37.1 (17 @ 10:05)  T(F): , Max: 98.7 (17 @ 10:05)  HR: 92 (17 @ 08:50)  BP: 153/70 (17 @ 08:50)  BP(mean): 99 (17 @ 00:22)  RR: 17 (17 @ 08:50)  SpO2: 93% (17 @ 08:50)  Wt(kg): --     @ 07:01  -   @ 07:00  --------------------------------------------------------  IN:    IV PiggyBack: 50 mL    Oral Fluid: 360 mL  Total IN: 410 mL    OUT:  Total OUT: 0 mL    Total NET: 410 mL       @ 07:01  -   @ 10:31  --------------------------------------------------------  IN:  Total IN: 0 mL    OUT:    Voided: 400 mL  Total OUT: 400 mL    Total NET: -400 mL    Alert and oriented  No JVD  HAs a catheter in the left IJ   No respiratory distress   Normal air entry in the lungs, no wheezing, no crackles, no rales   RRR, normal s1/s2, no mumurs, rubs or gallops  Abdomen - soft, non-tender, non-distended, normal bowel sounds   Extremities - mild peripheral edema   The line in the right groined removed       LABS:                        9.4    14.6  )-----------( 320      ( 21 Sep 2017 11:09 )             30.0         129<L>  |  95<L>  |  18  ----------------------------<  102<H>  4.2   |  20<L>  |  3.33<H>    Ca    8.7      21 Sep 2017 11:09  Mg     1.9     -        PT/INR - ( 21 Sep 2017 11:08 )   PT: 15.5 sec;   INR: 1.39          PTT - ( 21 Sep 2017 11:08 )  PTT:50.6 sec  Urinalysis Basic - ( 20 Sep 2017 22:37 )    Color: Yellow / Appearance: x / S.020 / pH: x  Gluc: x / Ketone: NEGATIVE  / Bili: NEGATIVE / Urobili: 0.2 E.U./dL   Blood: x / Protein: >=300 mg/dL / Nitrite: NEGATIVE   Leuk Esterase: Small / RBC: < 5 /HPF / WBC Many /HPF   Sq Epi: x / Non Sq Epi: Moderate /HPF / Bacteria: Present /HPF        RADIOLOGY & ADDITIONAL STUDIES:      < from: Xray Chest 1 View AP -PORTABLE-Routine (17 @ 07:02) >    INTERPRETATION:  Chest X-Ray dated 2017 7:02 AM    Indication: follow up    Comparison studies: Chest dated 2017    An AP portable view of the chest demonstrates a dense area of   consolidation at the right lung base which is increased since 2017.      IMPRESSION:  Increased consolidation right lung base.      < end of copied text >

## 2017-09-22 NOTE — PROGRESS NOTE ADULT - SUBJECTIVE AND OBJECTIVE BOX
Patient was seen and evaluated on dialysis.   Patient is tolerating the procedure well.   HR: 92 (09-22-17 @ 08:50)  BP: 153/70 (09-22-17 @ 08:50) pusle 65, /67  Continue dialysis:   Dialyzer:  180        QB: 250       QD: 600   Goal UF 3 liters over 3 Hours       The catheter is inthe left IJ not working - on the arterial side can not reach the desire flow. The catheter was flush with Cathflow and even after that the flow has not improved significant> we proceed with flow of 240 on the arterial side. The primary team aware of the situation - to consider replacement or new catheter - possibly femoral line.

## 2017-09-22 NOTE — PROGRESS NOTE ADULT - ATTENDING COMMENTS
Glucose was excellent this morning with precautionary decrease in Lantus dose, but then fell into the 70 range by this afternoon while pt still NPO and awaiting OR.  Gave 1/2 amp of D50W with good response, but with glucose still only in the 120s when pt ready to leave for OR, decided to give another half amp as a precaution.  Should also decrease Lantus for tonight to 25 units Glucose was excellent this morning with precautionary decrease in Lantus dose, but then fell into the 70 range by this afternoon while pt still NPO and awaiting OR.  Gave 1/2 amp of D50W with good response, but with glucose still only in the 120s when pt ready to leave for OR, decided to give another half amp as a precaution.  Should also decrease Lantus for tonight to 25 units  9/23/17: Dr Barajas: Patient now eating small quantities. In view of this and recent hypoglycemic events and BS high 100s today after 17 u Lantus last evenings should receive Lantus 25 units this evening Glucose was excellent this morning with precautionary decrease in Lantus dose, but then fell into the 70 range by this afternoon while pt still NPO and awaiting OR.  Gave 1/2 amp of D50W with good response, but with glucose still only in the 120s when pt ready to leave for OR, decided to give another half amp as a precaution.  Should also decrease Lantus for tonight to 25 units  9/23/17: Dr Barajas: Patient now eating small quantities. In view of this and recent hypoglycemic events and BS high 100s today after 17 u Lantus last evenings should receive Lantus 25 units this evening  9/24/17: Dr Barajas: BS low 100s this AM and so no change

## 2017-09-22 NOTE — PROGRESS NOTE ADULT - SUBJECTIVE AND OBJECTIVE BOX
Patient discussed on morning rounds with Dr. Escamilla     SUBJECTIVE ASSESSMENT:  47y Female offers no new complaints this morning. She is currently getting dialysis and preparing for surgery.     Vital Signs Last 24 Hrs  T(C): 36.9 (22 Sep 2017 13:30), Max: 37.1 (22 Sep 2017 10:05)  T(F): 98.4 (22 Sep 2017 13:30), Max: 98.7 (22 Sep 2017 10:05)  HR: 84 (22 Sep 2017 15:20) (84 - 94)  BP: 176/80 (22 Sep 2017 15:20) (134/81 - 176/80)  BP(mean): 110 (22 Sep 2017 13:30) (88 - 133)  RR: 18 (22 Sep 2017 15:20) (15 - 23)  SpO2: 96% (22 Sep 2017 15:20) (93% - 96%)  I&O's Detail    21 Sep 2017 07:01  -  22 Sep 2017 07:00  --------------------------------------------------------  IN:    IV PiggyBack: 50 mL    Oral Fluid: 360 mL  Total IN: 410 mL    OUT:  Total OUT: 0 mL    Total NET: 410 mL      22 Sep 2017 07:01  -  22 Sep 2017 17:59  --------------------------------------------------------  IN:  Total IN: 0 mL    OUT:    Other: 1500 mL    Voided: 400 mL  Total OUT: 1900 mL    Total NET: -1900 mL          PHYSICAL EXAM:    General:  AOx3 in distress due to pain. Tangential thought process. Confused but reoriented.     Neurological: Light sensation intact on the left inner thigh  1/5 strength in left leg. EOMS intact. PERRLA. No nystagmus. No facial droop. No incontinence. no saddle anesthsia.     Cardiovascular: RRR distance heart sounds no audible murmur. No friction rubs.     Respiratory: scattered rhonchi. No wheeze or rales.     Gastrointestinal: non tender non distended. active bowel sounds     Extremities: tenderness but no warm over tight shoulder and scapula. + Cervical spinal tenderness. Trace lower extremity edema. Keloid scaring of left medial aspect of foot.     Vascular: 1+ DP pulses bilaterally. 2+ radial     Lines: Left IJ permacath.       LABS:                        8.4    13.3  )-----------( 351      ( 22 Sep 2017 13:20 )             27.3       COUMADIN:  Yes/No. REASON: .    PT/INR - ( 22 Sep 2017 13:20 )   PT: 14.8 sec;   INR: 1.33          PTT - ( 22 Sep 2017 13:20 )  PTT:34.1 sec        128<L>  |  89<L>  |  24<H>  ----------------------------<  79  4.7   |  22  |  3.97<H>    Ca    8.7      22 Sep 2017 13:20  Mg     1.9             Urinalysis Basic - ( 20 Sep 2017 22:37 )    Color: Yellow / Appearance: x / S.020 / pH: x  Gluc: x / Ketone: NEGATIVE  / Bili: NEGATIVE / Urobili: 0.2 E.U./dL   Blood: x / Protein: >=300 mg/dL / Nitrite: NEGATIVE   Leuk Esterase: Small / RBC: < 5 /HPF / WBC Many /HPF   Sq Epi: x / Non Sq Epi: Moderate /HPF / Bacteria: Present /HPF        MEDICATIONS  (STANDING):  sodium chloride 0.9% lock flush 3 milliLiter(s) IV Push every 8 hours  aspirin enteric coated 81 milliGRAM(s) Oral daily  valsartan 160 milliGRAM(s) Oral daily  simvastatin 20 milliGRAM(s) Oral at bedtime  insulin lispro (HumaLOG) corrective regimen sliding scale   SubCutaneous Before meals and at bedtime  dextrose 5%. 1000 milliLiter(s) (50 mL/Hr) IV Continuous <Continuous>  dextrose 50% Injectable 12.5 Gram(s) IV Push once  dextrose 50% Injectable 25 Gram(s) IV Push once  dextrose 50% Injectable 25 Gram(s) IV Push once  polyethylene glycol 3350 17 Gram(s) Oral daily  pantoprazole    Tablet 40 milliGRAM(s) Oral before breakfast  DAPTOmycin IVPB 700 milliGRAM(s) IV Intermittent every 48 hours  rifampin 600 milliGRAM(s) Oral daily  Ceftaroline Fosamil IVPB 200 milliGRAM(s) IV Intermittent every 12 hours  amLODIPine   Tablet 10 milliGRAM(s) Oral daily  metoprolol 50 milliGRAM(s) Oral every 6 hours  gabapentin 100 milliGRAM(s) Oral two times a day  gabapentin 300 milliGRAM(s) Oral at bedtime  insulin lispro Injectable (HumaLOG) 13 Unit(s) SubCutaneous three times a day before meals  sertraline 25 milliGRAM(s) Oral daily  traZODone 25 milliGRAM(s) Oral at bedtime  insulin glargine Injectable (LANTUS) 35 Unit(s) SubCutaneous at bedtime    MEDICATIONS  (PRN):  dextrose Gel 1 Dose(s) Oral once PRN Blood Glucose LESS THAN 70 milliGRAM(s)/deciliter  glucagon  Injectable 1 milliGRAM(s) IntraMuscular once PRN Glucose LESS THAN 70 milligrams/deciliter  acetaminophen    Suspension. 650 milliGRAM(s) Oral every 6 hours PRN Mild Pain (1 - 3)        RADIOLOGY & ADDITIONAL TESTS:

## 2017-09-22 NOTE — PROGRESS NOTE ADULT - PROBLEM SELECTOR PLAN 2
hemoglobin - 9.4  - most likely due to the infection and underlying Renal failure   - we will do EPO with HD today.

## 2017-09-22 NOTE — PROGRESS NOTE ADULT - ASSESSMENT
47 year old female with PMHx HTN, HLD, DM2, ESRD on HD (MWF, last HD 9/13/17, Right Femoral HD catheter), and chronic left foot OM who was transferred to [facility] from [facility] on 9/13/17 with known TV IE (MRSA Bacteremia.) who has failed medical management with aggressive antibiotics. Patient transferred to [facility] to be evaluated for surgical intervention. Of note patient was seen, managed, and evaluated for surgical intervention in 08/2017 with Dr. Gee. At that time the decision to pursue medical therapy was made. Patient received 2U FFP, prior to HD, 1 UPRBC while receiving HD for elevator INR  .  Cardiac cath revealed + RAC lesion.  Vascular and ortho consulted for chronic L foot OM, Given new lower extremity neuro deficit. MRI of brain and spine were performed that revealed C5/6 osteomyelitis with and overlying abscess as well as T7/8 osteomyletis without obscess. Patient is now pre-op for discectomy/copectomy with fusion.  Renal following for ESRD and HD. Getting HD today pre-op.     #Neuro: h/o MCA and putamen CVA, pt reporting weakness in L leg, Neuro consulted. Found to have osteomyletis of the cervical C5/6and thoracic spine T7/8 with abscess formation at C5/6. Pre-op for copectomy and discectomy of C5/6 tomorrow with fusion.   - pain management with Tylenol and gabapentin.  - Dr. Luna following from neurology  - Surgery today  - continue Zoloft     #Cards:  HR and BP well controlled.   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 Kingston following, cont rifampin, daptomycin, and ceftaroline.   - blood cultures negative during this admission, Escobar reports latest blood cultures on 9/13/17 positive for MRSA   - Repeat blood cultures sent on 9/18/17. negative  - cont ASA, metoprolol, amlodipine (increased for HTN), valsartan, statin  - cardiac cath  on 9/15 showing m-dRCA 75% stenosis, on appropriate medical therapy will get CABG once patient is cleared for surgery     #Pulm: 98% on RA today.   - CT chest showing possible septic emboli, lung nodules.   - Continue Is.   - Lungs clear on chest Xray from yesterday.     #Endocrine: history of DM: A1c 8 Finger sticks well controlled today.   - appreciate endocrine consult, cont to adjust insulin pending FBG  - currently on 35 unit Lantus in evening and 13 Units with meals.     #Renal: ESRD on HD: Left IJ permacath placed on saturday 9/16/17  - HD today before surgery   - has AV fistula, awaiting vascular evaluation of when it is mature to use. no bruit currently     GI: Reports having normal bowel movement.  No further hematemesis   - H/H stable. Transfused 9/20/17   - GI consulted. No need to scope at the moment. will re-evaluate at later date  - continue PO Protonix/     Heme: H/H stbale.   - Hold off on Heparin post op until cleared by Dr. Gee due to risk of bleeding.       ID: History of MRSA bacteremia, TV IE, septic emboli and spinal abscess. Low grade temp today of 100.3 WBC trending up.  - on Rifampin, Dapto and Cefteraline. continue current managemen t  - Echo with stable vegetation.  - Surgery today with Ortho for Spinal abscess.     #dispo: recover from ortho surgery and then Cardiac surgery once stable.

## 2017-09-22 NOTE — PROGRESS NOTE ADULT - SUBJECTIVE AND OBJECTIVE BOX
Orthopaedics Post Op Check    Procedure: Anterior corpectomy/discectomy C5-6, anterior interbody fusion C5-6  Surgeon: Shiela    Pt comfortable, without complaints  Denies CP, SOB, N/V, numbness/tingling     Vital Signs Last 24 Hrs  T(C): 36.1 (22 Sep 2017 22:32), Max: 37.1 (22 Sep 2017 10:05)  T(F): 97 (22 Sep 2017 22:32), Max: 98.7 (22 Sep 2017 10:05)  HR: 90 (22 Sep 2017 21:00) (84 - 94)  BP: 112/63 (22 Sep 2017 22:00) (103/62 - 176/80)  BP(mean): 95 (22 Sep 2017 21:00) (86 - 110)  RR: 14 (22 Sep 2017 22:00) (12 - 23)  SpO2: 98% (22 Sep 2017 22:00) (93% - 99%)  AVSS, NAD    Dressing C/D/I  General: Pt Alert and oriented     Pulses: WWP,   Sensation: SILT  Motor: 5/5 C5-T1 b/l, 5/5 L2-S1 RLE, 3/5 LLE (c/w baseline)                          8.5    12.4  )-----------( 345      ( 22 Sep 2017 21:20 )             29.0   09-22    130<L>  |  93<L>  |  18  ----------------------------<  115<H>  4.3   |  23  |  3.39<H>    Ca    8.7      22 Sep 2017 21:20  Mg     1.9     09-22      Post op XR: no fracture or foreign body    A/P: 47yFemale POD#0 s/p above procedure  - Stable, mgmt per ccu  - Pain Control  - DVT ppx: SCDs  - Post op abx: cont dapto/rifampin  - PT, WBS: WBAT  - F/U AM Labs    Johan Schwab MD, PGY-2  [phone]

## 2017-09-22 NOTE — PROGRESS NOTE ADULT - ATTENDING COMMENTS
Patient examined, fellow's hx and PE reviewed and confirmed. I find stable on dialysis. Catheter with decreased flow. A/P reviewed and confirmed. Catheter replacement. Continue HD. See full note

## 2017-09-22 NOTE — PROGRESS NOTE ADULT - ASSESSMENT
This is 47 year old female with PMH stated above. The renal service is activated for the need of HD. She was dialyzed on 9/20- 3.0 liters off. We will do HD today

## 2017-09-22 NOTE — PROGRESS NOTE ADULT - SUBJECTIVE AND OBJECTIVE BOX
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 (13 Sep 2017 22:57)    Patient has a headache but no sweats or flushing. She has been NPO for her surgery.    Pt reports the following symptoms:    CONSTITUTIONAL:  Negative fever or chills, feels well, good appetite  EYES:  Negative  blurry vision or double vision  CARDIOVASCULAR:  Negative for chest pain or palpitations  RESPIRATORY:  Negative for cough, wheezing, or SOB   GASTROINTESTINAL:  Negative for nausea, vomiting, diarrhea, constipation, or abdominal pain  GENITOURINARY:  Negative frequency, urgency or dysuria  NEUROLOGIC:  No headache, confusion, dizziness, lightheadedness    MEDICATIONS  (STANDING):  sodium chloride 0.9% lock flush 3 milliLiter(s) IV Push every 8 hours  aspirin enteric coated 81 milliGRAM(s) Oral daily  valsartan 160 milliGRAM(s) Oral daily  simvastatin 20 milliGRAM(s) Oral at bedtime  insulin lispro (HumaLOG) corrective regimen sliding scale   SubCutaneous Before meals and at bedtime  dextrose 5%. 1000 milliLiter(s) (50 mL/Hr) IV Continuous <Continuous>  dextrose 50% Injectable 12.5 Gram(s) IV Push once  dextrose 50% Injectable 25 Gram(s) IV Push once  dextrose 50% Injectable 25 Gram(s) IV Push once  polyethylene glycol 3350 17 Gram(s) Oral daily  pantoprazole    Tablet 40 milliGRAM(s) Oral before breakfast  DAPTOmycin IVPB 700 milliGRAM(s) IV Intermittent every 48 hours  rifampin 600 milliGRAM(s) Oral daily  Ceftaroline Fosamil IVPB 200 milliGRAM(s) IV Intermittent every 12 hours  amLODIPine   Tablet 10 milliGRAM(s) Oral daily  metoprolol 50 milliGRAM(s) Oral every 6 hours  gabapentin 100 milliGRAM(s) Oral two times a day  gabapentin 300 milliGRAM(s) Oral at bedtime  insulin lispro Injectable (HumaLOG) 13 Unit(s) SubCutaneous three times a day before meals  sertraline 25 milliGRAM(s) Oral daily  traZODone 25 milliGRAM(s) Oral at bedtime  insulin glargine Injectable (LANTUS) 35 Unit(s) SubCutaneous at bedtime  dextrose 50% Injectable 12.5 milliLiter(s) IV Push once    MEDICATIONS  (PRN):  dextrose Gel 1 Dose(s) Oral once PRN Blood Glucose LESS THAN 70 milliGRAM(s)/deciliter  glucagon  Injectable 1 milliGRAM(s) IntraMuscular once PRN Glucose LESS THAN 70 milligrams/deciliter  acetaminophen    Suspension. 650 milliGRAM(s) Oral every 6 hours PRN Mild Pain (1 - 3)      PHYSICAL EXAM  Vital Signs Last 24 Hrs  T(C): 36.9 (22 Sep 2017 13:30), Max: 37.1 (22 Sep 2017 10:05)  T(F): 98.4 (22 Sep 2017 13:30), Max: 98.7 (22 Sep 2017 10:05)  HR: 88 (22 Sep 2017 13:30) (85 - 94)  BP: 165/77 (22 Sep 2017 13:30) (134/81 - 173/84)  BP(mean): 110 (22 Sep 2017 13:30) (88 - 133)  RR: 19 (22 Sep 2017 13:30) (15 - 23)  SpO2: 95% (22 Sep 2017 13:30) (93% - 96%)    Constitutional: wn/wd in NAD.   HEENT: NCAT, MMM  Respiratory: lungs CTAB.  Cardiovascular: regular rhythm, normal S1 and S2, no audible murmurs, no peripheral edema  GI: soft, NT/ND, no masses/HSM appreciated.  Skin: no visible rashes/lesions  Psychiatric: AAO x 3, normal affect/mood.    LABS:                        8.4    13.3  )-----------( 351      ( 22 Sep 2017 13:20 )             27.3     09-22    128<L>  |  89<L>  |  24<H>  ----------------------------<  79  4.7   |  22  |  3.97<H>    Ca    8.7      22 Sep 2017 13:20  Mg     1.9     09-22      PT/INR - ( 22 Sep 2017 13:20 )   PT: 14.8 sec;   INR: 1.33          PTT - ( 22 Sep 2017 13:20 )  PTT:34.1 sec  Urinalysis Basic - ( 20 Sep 2017 22:37 )    Color: Yellow / Appearance: x / S.020 / pH: x  Gluc: x / Ketone: NEGATIVE  / Bili: NEGATIVE / Urobili: 0.2 E.U./dL   Blood: x / Protein: >=300 mg/dL / Nitrite: NEGATIVE   Leuk Esterase: Small / RBC: < 5 /HPF / WBC Many /HPF   Sq Epi: x / Non Sq Epi: Moderate /HPF / Bacteria: Present /HPF      Thyroid Stimulating Hormone, Serum: 2.447 uIU/mL ( @ 22:51)  Thyroid Stimulating Hormone, Serum: 1.251 uIU/mL ( @ 01:12)      HbA1C: 8.2 % ( @ 22:51)  8.2 % ( @ 01:11)    CAPILLARY BLOOD GLUCOSE  92 (22 Sep 2017 11:19)  112 (22 Sep 2017 05:41)  101 (21 Sep 2017 23:16)  77 (21 Sep 2017 22:45)  96 (21 Sep 2017 21:41)  136 (21 Sep 2017 16:11)    A/P:47y Female with DM2 admitted with persistent MRSA bacteremia with 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    1.  DM 2-uncontrolled, complicated- Patient has a very poor diabetic history and has multiple diabetic complications. Her A1C is likely higher given that patient is on procrit and has high cell turnover. Patient has been counseled on her poor diet choices and to be consistent with her meals. She will likely be difficult to control while inpatient due to her access to outside food and non-compliance with a diabetic diet.  Patient was given lantus 30 units last night due to her NPO status. Her FS has been trending downwards to 78 before her procedure. We recommended 1/2 amp of D50 prior to procedure given that patient is on dialysis and would limit IV fluids. After surgery, Please cont lantus 30 units at night.  Please cont lispro 13 units before each meal.  Please continue lispro moderate dose sliding scale four times daily with meals and at bedtime    Pt's fingerstick glucose goal is 120-180     Will continue to monitor     Pt can follow up at discharge with [facility] by calling  to make an appointment.   Will discuss case with Dr. Ross  and update primary team INTERVAL HPI/OVERNIGHT EVENTS:    Patient is a 47y old  Female who presents with a chief complaint of TV IE / MRSA Bacteremia. (13 Sep 2017 22:57)    Patient has a headache but no sweats or flushing. She has been NPO for her surgery.    Pt reports the following symptoms:    CONSTITUTIONAL:  Negative fever or chills, feels well, good appetite  EYES:  Negative  blurry vision or double vision  CARDIOVASCULAR:  Negative for chest pain or palpitations  RESPIRATORY:  Negative for cough, wheezing, or SOB   GASTROINTESTINAL:  Negative for nausea, vomiting, diarrhea, constipation, or abdominal pain  GENITOURINARY:  Negative frequency, urgency or dysuria  NEUROLOGIC:  No headache, confusion, dizziness, lightheadedness    MEDICATIONS  (STANDING):  sodium chloride 0.9% lock flush 3 milliLiter(s) IV Push every 8 hours  aspirin enteric coated 81 milliGRAM(s) Oral daily  valsartan 160 milliGRAM(s) Oral daily  simvastatin 20 milliGRAM(s) Oral at bedtime  insulin lispro (HumaLOG) corrective regimen sliding scale   SubCutaneous Before meals and at bedtime  dextrose 5%. 1000 milliLiter(s) (50 mL/Hr) IV Continuous <Continuous>  dextrose 50% Injectable 12.5 Gram(s) IV Push once  dextrose 50% Injectable 25 Gram(s) IV Push once  dextrose 50% Injectable 25 Gram(s) IV Push once  polyethylene glycol 3350 17 Gram(s) Oral daily  pantoprazole    Tablet 40 milliGRAM(s) Oral before breakfast  DAPTOmycin IVPB 700 milliGRAM(s) IV Intermittent every 48 hours  rifampin 600 milliGRAM(s) Oral daily  Ceftaroline Fosamil IVPB 200 milliGRAM(s) IV Intermittent every 12 hours  amLODIPine   Tablet 10 milliGRAM(s) Oral daily  metoprolol 50 milliGRAM(s) Oral every 6 hours  gabapentin 100 milliGRAM(s) Oral two times a day  gabapentin 300 milliGRAM(s) Oral at bedtime  insulin lispro Injectable (HumaLOG) 13 Unit(s) SubCutaneous three times a day before meals  sertraline 25 milliGRAM(s) Oral daily  traZODone 25 milliGRAM(s) Oral at bedtime  insulin glargine Injectable (LANTUS) 35 Unit(s) SubCutaneous at bedtime  dextrose 50% Injectable 12.5 milliLiter(s) IV Push once    MEDICATIONS  (PRN):  dextrose Gel 1 Dose(s) Oral once PRN Blood Glucose LESS THAN 70 milliGRAM(s)/deciliter  glucagon  Injectable 1 milliGRAM(s) IntraMuscular once PRN Glucose LESS THAN 70 milligrams/deciliter  acetaminophen    Suspension. 650 milliGRAM(s) Oral every 6 hours PRN Mild Pain (1 - 3)      PHYSICAL EXAM  Vital Signs Last 24 Hrs  T(C): 36.9 (22 Sep 2017 13:30), Max: 37.1 (22 Sep 2017 10:05)  T(F): 98.4 (22 Sep 2017 13:30), Max: 98.7 (22 Sep 2017 10:05)  HR: 88 (22 Sep 2017 13:30) (85 - 94)  BP: 165/77 (22 Sep 2017 13:30) (134/81 - 173/84)  BP(mean): 110 (22 Sep 2017 13:30) (88 - 133)  RR: 19 (22 Sep 2017 13:30) (15 - 23)  SpO2: 95% (22 Sep 2017 13:30) (93% - 96%)    Constitutional: wn/wd in NAD.   HEENT: NCAT, MMM  Respiratory: lungs CTAB.  Cardiovascular: regular rhythm, normal S1 and S2, no audible murmurs, no peripheral edema  GI: soft, NT/ND, no masses/HSM appreciated.  Skin: no visible rashes/lesions  Psychiatric: AAO x 3, normal affect/mood.    LABS:                        8.4    13.3  )-----------( 351      ( 22 Sep 2017 13:20 )             27.3     09-22    128<L>  |  89<L>  |  24<H>  ----------------------------<  79  4.7   |  22  |  3.97<H>    Ca    8.7      22 Sep 2017 13:20  Mg     1.9     09-22      PT/INR - ( 22 Sep 2017 13:20 )   PT: 14.8 sec;   INR: 1.33          PTT - ( 22 Sep 2017 13:20 )  PTT:34.1 sec  Urinalysis Basic - ( 20 Sep 2017 22:37 )    Color: Yellow / Appearance: x / S.020 / pH: x  Gluc: x / Ketone: NEGATIVE  / Bili: NEGATIVE / Urobili: 0.2 E.U./dL   Blood: x / Protein: >=300 mg/dL / Nitrite: NEGATIVE   Leuk Esterase: Small / RBC: < 5 /HPF / WBC Many /HPF   Sq Epi: x / Non Sq Epi: Moderate /HPF / Bacteria: Present /HPF      Thyroid Stimulating Hormone, Serum: 2.447 uIU/mL ( @ 22:51)  Thyroid Stimulating Hormone, Serum: 1.251 uIU/mL ( @ 01:12)      HbA1C: 8.2 % ( @ 22:51)  8.2 % ( @ 01:11)    CAPILLARY BLOOD GLUCOSE  92 (22 Sep 2017 11:19)  112 (22 Sep 2017 05:41)  101 (21 Sep 2017 23:16)  77 (21 Sep 2017 22:45)  96 (21 Sep 2017 21:41)  136 (21 Sep 2017 16:11)    A/P:47y Female with DM2 admitted with persistent MRSA bacteremia with 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    1.  DM 2-uncontrolled, complicated- Patient has a very poor diabetic history and has multiple diabetic complications. Her A1C is likely higher given that patient is on procrit and has high cell turnover. Patient has been counseled on her poor diet choices and to be consistent with her meals. She will likely be difficult to control while inpatient due to her access to outside food and non-compliance with a diabetic diet.  Patient was given lantus 30 units last night due to her NPO status. Her FS has been trending downwards to 78 before her procedure. We recommended 1/2 amp of D50 prior to procedure given that patient is on dialysis and would limit IV fluids. After surgery, Please dec lantus to 25 units at night.  Please cont lispro 13 units before each meal.  Please continue lispro moderate dose sliding scale four times daily with meals and at bedtime    Pt's fingerstick glucose goal is 120-180     Will continue to monitor     Pt can follow up at discharge with [facility] by calling  to make an appointment.   Will discuss case with Dr. Ross  and update primary team INTERVAL HPI/OVERNIGHT EVENTS:    Patient is a 47y old  Female who presents with a chief complaint of TV IE / MRSA Bacteremia. (13 Sep 2017 22:57)    Patient has a headache but no sweats or flushing. She has been NPO for her surgery.    Pt reports the following symptoms:    CONSTITUTIONAL:  Negative fever or chills, feels well, good appetite  EYES:  Negative  blurry vision or double vision  CARDIOVASCULAR:  Negative for chest pain or palpitations  RESPIRATORY:  Negative for cough, wheezing, or SOB   GASTROINTESTINAL:  Negative for nausea, vomiting, diarrhea, constipation, or abdominal pain  GENITOURINARY:  Negative frequency, urgency or dysuria  NEUROLOGIC:  No headache, confusion, dizziness, lightheadedness    MEDICATIONS  (STANDING):  sodium chloride 0.9% lock flush 3 milliLiter(s) IV Push every 8 hours  aspirin enteric coated 81 milliGRAM(s) Oral daily  valsartan 160 milliGRAM(s) Oral daily  simvastatin 20 milliGRAM(s) Oral at bedtime  insulin lispro (HumaLOG) corrective regimen sliding scale   SubCutaneous Before meals and at bedtime  dextrose 5%. 1000 milliLiter(s) (50 mL/Hr) IV Continuous <Continuous>  dextrose 50% Injectable 12.5 Gram(s) IV Push once  dextrose 50% Injectable 25 Gram(s) IV Push once  dextrose 50% Injectable 25 Gram(s) IV Push once  polyethylene glycol 3350 17 Gram(s) Oral daily  pantoprazole    Tablet 40 milliGRAM(s) Oral before breakfast  DAPTOmycin IVPB 700 milliGRAM(s) IV Intermittent every 48 hours  rifampin 600 milliGRAM(s) Oral daily  Ceftaroline Fosamil IVPB 200 milliGRAM(s) IV Intermittent every 12 hours  amLODIPine   Tablet 10 milliGRAM(s) Oral daily  metoprolol 50 milliGRAM(s) Oral every 6 hours  gabapentin 100 milliGRAM(s) Oral two times a day  gabapentin 300 milliGRAM(s) Oral at bedtime  insulin lispro Injectable (HumaLOG) 13 Unit(s) SubCutaneous three times a day before meals  sertraline 25 milliGRAM(s) Oral daily  traZODone 25 milliGRAM(s) Oral at bedtime  insulin glargine Injectable (LANTUS) 35 Unit(s) SubCutaneous at bedtime  dextrose 50% Injectable 12.5 milliLiter(s) IV Push once    MEDICATIONS  (PRN):  dextrose Gel 1 Dose(s) Oral once PRN Blood Glucose LESS THAN 70 milliGRAM(s)/deciliter  glucagon  Injectable 1 milliGRAM(s) IntraMuscular once PRN Glucose LESS THAN 70 milligrams/deciliter  acetaminophen    Suspension. 650 milliGRAM(s) Oral every 6 hours PRN Mild Pain (1 - 3)      PHYSICAL EXAM  Vital Signs Last 24 Hrs  T(C): 36.9 (22 Sep 2017 13:30), Max: 37.1 (22 Sep 2017 10:05)  T(F): 98.4 (22 Sep 2017 13:30), Max: 98.7 (22 Sep 2017 10:05)  HR: 88 (22 Sep 2017 13:30) (85 - 94)  BP: 165/77 (22 Sep 2017 13:30) (134/81 - 173/84)  BP(mean): 110 (22 Sep 2017 13:30) (88 - 133)  RR: 19 (22 Sep 2017 13:30) (15 - 23)  SpO2: 95% (22 Sep 2017 13:30) (93% - 96%)    Constitutional: wn/wd in NAD.   HEENT: NCAT, MMM  Respiratory: lungs CTAB.  Cardiovascular: regular rhythm, normal S1 and S2, no audible murmurs, no peripheral edema  GI: soft, NT/ND, no masses/HSM appreciated.  Skin: no visible rashes/lesions  Psychiatric: AAO x 3, normal affect/mood.    LABS:                        8.4    13.3  )-----------( 351      ( 22 Sep 2017 13:20 )             27.3     09-22    128<L>  |  89<L>  |  24<H>  ----------------------------<  79  4.7   |  22  |  3.97<H>    Ca    8.7      22 Sep 2017 13:20  Mg     1.9     09-22      PT/INR - ( 22 Sep 2017 13:20 )   PT: 14.8 sec;   INR: 1.33          PTT - ( 22 Sep 2017 13:20 )  PTT:34.1 sec  Urinalysis Basic - ( 20 Sep 2017 22:37 )    Color: Yellow / Appearance: x / S.020 / pH: x  Gluc: x / Ketone: NEGATIVE  / Bili: NEGATIVE / Urobili: 0.2 E.U./dL   Blood: x / Protein: >=300 mg/dL / Nitrite: NEGATIVE   Leuk Esterase: Small / RBC: < 5 /HPF / WBC Many /HPF   Sq Epi: x / Non Sq Epi: Moderate /HPF / Bacteria: Present /HPF      Thyroid Stimulating Hormone, Serum: 2.447 uIU/mL ( @ 22:51)  Thyroid Stimulating Hormone, Serum: 1.251 uIU/mL ( @ 01:12)      HbA1C: 8.2 % ( @ 22:51)  8.2 % ( @ 01:11)    CAPILLARY BLOOD GLUCOSE  92 (22 Sep 2017 11:19)  112 (22 Sep 2017 05:41)  101 (21 Sep 2017 23:16)  77 (21 Sep 2017 22:45)  96 (21 Sep 2017 21:41)  136 (21 Sep 2017 16:11)    A/P:47y Female with DM2 admitted with persistent MRSA bacteremia with 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    1.  DM 2-uncontrolled, complicated- Patient has a very poor diabetic history and has multiple diabetic complications. Her A1C is likely higher given that patient is on procrit and has high cell turnover. Patient has been counseled on her poor diet choices and to be consistent with her meals. She will likely be difficult to control while inpatient due to her access to outside food and non-compliance with a diabetic diet.  Patient was given lantus 30 units last night due to her NPO status. Her FS has been trending downwards to 78 before her procedure. We recommended an amp of D50 prior to procedure given that patient is on dialysis and would limit IV fluids. After surgery, Please dec lantus to 25 units at night.  Please cont lispro 13 units before each meal.  Please continue lispro moderate dose sliding scale four times daily with meals and at bedtime    Pt's fingerstick glucose goal is 120-180     Will continue to monitor     Pt can follow up at discharge with [facility] by calling  to make an appointment.   Will discuss case with Dr. Ross  and update primary team

## 2017-09-23 LAB
ALBUMIN SERPL ELPH-MCNC: 2.2 G/DL — LOW (ref 3.3–5)
ALP SERPL-CCNC: 266 U/L — HIGH (ref 40–120)
ALT FLD-CCNC: 5 U/L — LOW (ref 10–45)
ANION GAP SERPL CALC-SCNC: 14 MMOL/L — SIGNIFICANT CHANGE UP (ref 5–17)
APTT BLD: 36.7 SEC — SIGNIFICANT CHANGE UP (ref 27.5–37.4)
AST SERPL-CCNC: 11 U/L — SIGNIFICANT CHANGE UP (ref 10–40)
BASE EXCESS BLDA CALC-SCNC: 1.1 MMOL/L — SIGNIFICANT CHANGE UP (ref -2–3)
BILIRUB DIRECT SERPL-MCNC: <0.2 MG/DL — SIGNIFICANT CHANGE UP (ref 0–0.2)
BILIRUB INDIRECT FLD-MCNC: >0.2 MG/DL — SIGNIFICANT CHANGE UP (ref 0.2–1)
BILIRUB SERPL-MCNC: 0.4 MG/DL — SIGNIFICANT CHANGE UP (ref 0.2–1.2)
BUN SERPL-MCNC: 21 MG/DL — SIGNIFICANT CHANGE UP (ref 7–23)
CALCIUM SERPL-MCNC: 8.5 MG/DL — SIGNIFICANT CHANGE UP (ref 8.4–10.5)
CHLORIDE SERPL-SCNC: 93 MMOL/L — LOW (ref 96–108)
CK SERPL-CCNC: 26 U/L — SIGNIFICANT CHANGE UP (ref 25–170)
CO2 SERPL-SCNC: 22 MMOL/L — SIGNIFICANT CHANGE UP (ref 22–31)
CREAT SERPL-MCNC: 3.62 MG/DL — HIGH (ref 0.5–1.3)
GAS PNL BLDA: SIGNIFICANT CHANGE UP
GLUCOSE SERPL-MCNC: 96 MG/DL — SIGNIFICANT CHANGE UP (ref 70–99)
GRAM STN FLD: SIGNIFICANT CHANGE UP
HCO3 BLDA-SCNC: 26 MMOL/L — SIGNIFICANT CHANGE UP (ref 21–28)
HCT VFR BLD CALC: 26.5 % — LOW (ref 34.5–45)
HGB BLD-MCNC: 8 G/DL — LOW (ref 11.5–15.5)
INR BLD: 1.46 — HIGH (ref 0.88–1.16)
MAGNESIUM SERPL-MCNC: 2 MG/DL — SIGNIFICANT CHANGE UP (ref 1.6–2.6)
MCHC RBC-ENTMCNC: 25.8 PG — LOW (ref 27–34)
MCHC RBC-ENTMCNC: 30.2 G/DL — LOW (ref 32–36)
MCV RBC AUTO: 85.5 FL — SIGNIFICANT CHANGE UP (ref 80–100)
PCO2 BLDA: 43 MMHG — SIGNIFICANT CHANGE UP (ref 32–45)
PH BLDA: 7.4 — SIGNIFICANT CHANGE UP (ref 7.35–7.45)
PHOSPHATE SERPL-MCNC: 5.5 MG/DL — HIGH (ref 2.5–4.5)
PLATELET # BLD AUTO: 335 K/UL — SIGNIFICANT CHANGE UP (ref 150–400)
PO2 BLDA: 149 MMHG — HIGH (ref 83–108)
POTASSIUM SERPL-MCNC: 4.4 MMOL/L — SIGNIFICANT CHANGE UP (ref 3.5–5.3)
POTASSIUM SERPL-SCNC: 4.4 MMOL/L — SIGNIFICANT CHANGE UP (ref 3.5–5.3)
PROT SERPL-MCNC: 7.7 G/DL — SIGNIFICANT CHANGE UP (ref 6–8.3)
PROTHROM AB SERPL-ACNC: 16.3 SEC — HIGH (ref 9.8–12.7)
RBC # BLD: 3.1 M/UL — LOW (ref 3.8–5.2)
RBC # FLD: 17.5 % — HIGH (ref 10.3–16.9)
SAO2 % BLDA: 99 % — SIGNIFICANT CHANGE UP (ref 95–100)
SODIUM SERPL-SCNC: 129 MMOL/L — LOW (ref 135–145)
WBC # BLD: 10.3 K/UL — SIGNIFICANT CHANGE UP (ref 3.8–10.5)
WBC # FLD AUTO: 10.3 K/UL — SIGNIFICANT CHANGE UP (ref 3.8–10.5)

## 2017-09-23 PROCEDURE — 71010: CPT | Mod: 26

## 2017-09-23 PROCEDURE — 93010 ELECTROCARDIOGRAM REPORT: CPT

## 2017-09-23 RX ORDER — KETOROLAC TROMETHAMINE 30 MG/ML
30 SYRINGE (ML) INJECTION EVERY 6 HOURS
Qty: 0 | Refills: 0 | Status: DISCONTINUED | OUTPATIENT
Start: 2017-09-23 | End: 2017-09-28

## 2017-09-23 RX ORDER — KETOROLAC TROMETHAMINE 30 MG/ML
15 SYRINGE (ML) INJECTION ONCE
Qty: 0 | Refills: 0 | Status: DISCONTINUED | OUTPATIENT
Start: 2017-09-23 | End: 2017-09-23

## 2017-09-23 RX ORDER — HYDROMORPHONE HYDROCHLORIDE 2 MG/ML
0.5 INJECTION INTRAMUSCULAR; INTRAVENOUS; SUBCUTANEOUS ONCE
Qty: 0 | Refills: 0 | Status: DISCONTINUED | OUTPATIENT
Start: 2017-09-23 | End: 2017-09-23

## 2017-09-23 RX ADMIN — Medication 30 MILLIGRAM(S): at 17:15

## 2017-09-23 RX ADMIN — SIMVASTATIN 20 MILLIGRAM(S): 20 TABLET, FILM COATED ORAL at 22:29

## 2017-09-23 RX ADMIN — SODIUM CHLORIDE 3 MILLILITER(S): 9 INJECTION INTRAMUSCULAR; INTRAVENOUS; SUBCUTANEOUS at 09:38

## 2017-09-23 RX ADMIN — GABAPENTIN 300 MILLIGRAM(S): 400 CAPSULE ORAL at 22:29

## 2017-09-23 RX ADMIN — GABAPENTIN 100 MILLIGRAM(S): 400 CAPSULE ORAL at 17:15

## 2017-09-23 RX ADMIN — Medication 15 MILLIGRAM(S): at 04:13

## 2017-09-23 RX ADMIN — CEFTAROLINE FOSAMIL 50 MILLIGRAM(S): 600 POWDER, FOR SOLUTION INTRAVENOUS at 05:00

## 2017-09-23 RX ADMIN — Medication 50 MILLIGRAM(S): at 07:04

## 2017-09-23 RX ADMIN — SODIUM CHLORIDE 3 MILLILITER(S): 9 INJECTION INTRAMUSCULAR; INTRAVENOUS; SUBCUTANEOUS at 22:29

## 2017-09-23 RX ADMIN — POLYETHYLENE GLYCOL 3350 17 GRAM(S): 17 POWDER, FOR SOLUTION ORAL at 12:07

## 2017-09-23 RX ADMIN — Medication 2: at 12:04

## 2017-09-23 RX ADMIN — Medication 25 MILLIGRAM(S): at 22:29

## 2017-09-23 RX ADMIN — CEFTAROLINE FOSAMIL 50 MILLIGRAM(S): 600 POWDER, FOR SOLUTION INTRAVENOUS at 17:14

## 2017-09-23 RX ADMIN — Medication 13 UNIT(S): at 12:03

## 2017-09-23 RX ADMIN — Medication 30 MILLIGRAM(S): at 10:07

## 2017-09-23 RX ADMIN — Medication 30 MILLIGRAM(S): at 10:05

## 2017-09-23 RX ADMIN — HYDROMORPHONE HYDROCHLORIDE 0.5 MILLIGRAM(S): 2 INJECTION INTRAMUSCULAR; INTRAVENOUS; SUBCUTANEOUS at 04:00

## 2017-09-23 RX ADMIN — VALSARTAN 160 MILLIGRAM(S): 80 TABLET ORAL at 07:04

## 2017-09-23 RX ADMIN — Medication 15 MILLIGRAM(S): at 03:40

## 2017-09-23 RX ADMIN — AMLODIPINE BESYLATE 10 MILLIGRAM(S): 2.5 TABLET ORAL at 07:04

## 2017-09-23 RX ADMIN — PANTOPRAZOLE SODIUM 40 MILLIGRAM(S): 20 TABLET, DELAYED RELEASE ORAL at 07:04

## 2017-09-23 RX ADMIN — Medication 81 MILLIGRAM(S): at 12:07

## 2017-09-23 RX ADMIN — SODIUM CHLORIDE 3 MILLILITER(S): 9 INJECTION INTRAMUSCULAR; INTRAVENOUS; SUBCUTANEOUS at 15:30

## 2017-09-23 RX ADMIN — SERTRALINE 25 MILLIGRAM(S): 25 TABLET, FILM COATED ORAL at 12:07

## 2017-09-23 RX ADMIN — GABAPENTIN 100 MILLIGRAM(S): 400 CAPSULE ORAL at 07:18

## 2017-09-23 RX ADMIN — HYDROMORPHONE HYDROCHLORIDE 0.5 MILLIGRAM(S): 2 INJECTION INTRAMUSCULAR; INTRAVENOUS; SUBCUTANEOUS at 03:40

## 2017-09-23 NOTE — PROGRESS NOTE ADULT - ASSESSMENT
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

## 2017-09-23 NOTE — PROGRESS NOTE ADULT - SUBJECTIVE AND OBJECTIVE BOX
Patient seen and examined at bedside.     SUBJECTIVE: No acute events overnight.  Pain tolerable.    Denies Chest Pain/SOB.    Denies Headache.    Denies Nausea/vomiting.      OBJECTIVE:   T(C): 37.1 (09-23-17 @ 06:14), Max: 37.1 (09-22-17 @ 10:05)  T(F): 98.7 (09-23-17 @ 06:14), Max: 98.7 (09-22-17 @ 10:05)  HR: 72 (09-23-17 @ 06:00) (70 - 94)  BP: 112/63 (09-22-17 @ 22:00) (103/62 - 176/80)  RR:  (10 - 19)  SpO2:  (93% - 100%)  Wt(kg): --    NAD, non labored respirations  Affected extremity:          Dressing: clean/dry/intact          Drains: 1         Sensation: [x ] intact to light touch  [ ] decreased                              Vascular: [x ] warm well perfused; capillary refill <3 seconds          Motor exam: [x ]         [x ] Upper extremity                   Pascual (c5)   Bi(c5/6)   WE(c6)   EE(c7)    (c8)                                                R           5              5            5             5             5                                               L            5              5            5             5            5         [ ] Lower extremity                   IP(L2)     Q(L3)     TA(L4)     EHL(L5)     GS(s1)                                                 R          5           5          5            5              5                                               L           5           5         5             5              5                                     8.0<L>  10.3  )-------------------( 335      ( 09-23 @ 04:01 )                 26.5<L>    I&O's Detail    22 Sep 2017 07:01  -  23 Sep 2017 07:00  --------------------------------------------------------  IN:  Total IN: 0 mL    OUT:    Other: 1500 mL    Voided: 400 mL  Total OUT: 1900 mL    Total NET: -1900 mL          A/P :  47y Female s/p C6 corpectomy , C5-C7 ant. plating    -    Pain control + bowel regimen  -    DVT ppx: SCDs    -    Periop abx    -    ADAT  -    Check AM labs  -    Weight bearing status:   WBAT        -    Physical Therapy  -    Resume CCU meds  -    Mx p CCU

## 2017-09-23 NOTE — PROGRESS NOTE ADULT - SUBJECTIVE AND OBJECTIVE BOX
CC: RA THROMBUSMRSA  ENDOCARDITISMRSA      INTERVAL HISTORY: no complaints though appears slightly uncomfortable      ROS: No chest pain, no sob, no abd pain. No n/v/d    PAST MEDICAL & SURGICAL HISTORY:      PHYSICAL EXAM:  T(C): 36.9 (17 @ 09:56), Max: 37.1 (17 @ 11:15)  HR: 78 (17 @ 10:00)  BP: 112/63 (17 @ 22:00) (103/62 - 176/80)  RR: 18 (17 @ 10:00)  SpO2: 98% (17 @ 10:00)  Wt(kg): --  I&O's Summary    22 Sep 2017 07:  -  23 Sep 2017 07:00  --------------------------------------------------------  IN: 0 mL / OUT: 1902.5 mL / NET: -1902.5 mL    23 Sep 2017 07:01  -  23 Sep 2017 11:02  --------------------------------------------------------  IN: 150 mL / OUT: 0 mL / NET: 150 mL      Weight 86.1 ( @ 13:03)  General: AAO x 3,  NAD.  HEENT: moist mucous membranes, no pallor/cyanosis.  Neck: no JVD visible.  Cardiac: S1, S2. RRR. No murmurs   Respratory: CTA b/l, no access muscle use.   Abdomen: soft. nontender. nondistended  Skin: no rashes.  Extremities: no LE edema b/l  Access: L IJ       DATA:                        8.0<L>  10.3  )-----------( 335      ( 23 Sep 2017 04:01 )             26.5<L>        129<L>  |  93<L>  |  21     ----------------------------<  96     Ca:8.5   (23 Sep 2017 04:01)  4.4     |  22     |  3.62<H>      eGFR if Non : 14 <L>  eGFR if : 16 <L>    TPro  7.7 g/dL  /  Alb  2.2 g/dL<L>  /  TBili  0.4 mg/dL  /  DBili  <0.2 mg/dL  /  AST  11 U/L  /  ALT  5 U/L<L>  /  AlkPhos  266 U/L<H>  23 Sep 2017 04:01        Urinalysis Basic - ( 20 Sep 2017 22:37 )  Color: Yellow / Appearance: x / S.020 / pH: x  Gluc: x / Ketone: NEGATIVE  / Bili: NEGATIVE / Urobili: 0.2 E.U./dL   Blood: x / Protein: >=300 mg/dL<!!> / Nitrite: NEGATIVE   Leuk Esterase: Small<!!> / RBC: < 5 /HPF / WBC Many /HPF<!!>   Sq Epi: x / Non Sq Epi: Moderate /HPF<!!> / Bacteria: Present /HPF<!!>                MEDICATIONS  (STANDING):  sodium chloride 0.9% lock flush 3 milliLiter(s) IV Push every 8 hours  aspirin enteric coated 81 milliGRAM(s) Oral daily  valsartan 160 milliGRAM(s) Oral daily  simvastatin 20 milliGRAM(s) Oral at bedtime  insulin lispro (HumaLOG) corrective regimen sliding scale   SubCutaneous Before meals and at bedtime  dextrose 5%. 1000 milliLiter(s) (50 mL/Hr) IV Continuous <Continuous>  dextrose 50% Injectable 12.5 Gram(s) IV Push once  dextrose 50% Injectable 25 Gram(s) IV Push once  dextrose 50% Injectable 25 Gram(s) IV Push once  polyethylene glycol 3350 17 Gram(s) Oral daily  pantoprazole    Tablet 40 milliGRAM(s) Oral before breakfast  DAPTOmycin IVPB 700 milliGRAM(s) IV Intermittent every 48 hours  rifampin 600 milliGRAM(s) Oral daily  Ceftaroline Fosamil IVPB 200 milliGRAM(s) IV Intermittent every 12 hours  amLODIPine   Tablet 10 milliGRAM(s) Oral daily  gabapentin 100 milliGRAM(s) Oral two times a day  gabapentin 300 milliGRAM(s) Oral at bedtime  insulin lispro Injectable (HumaLOG) 13 Unit(s) SubCutaneous three times a day before meals  sertraline 25 milliGRAM(s) Oral daily  traZODone 25 milliGRAM(s) Oral at bedtime  insulin glargine Injectable (LANTUS) 35 Unit(s) SubCutaneous at bedtime    MEDICATIONS  (PRN):  dextrose Gel 1 Dose(s) Oral once PRN Blood Glucose LESS THAN 70 milliGRAM(s)/deciliter  glucagon  Injectable 1 milliGRAM(s) IntraMuscular once PRN Glucose LESS THAN 70 milligrams/deciliter  acetaminophen    Suspension. 650 milliGRAM(s) Oral every 6 hours PRN Mild Pain (1 - 3)  ketorolac   Injectable 30 milliGRAM(s) IV Push every 6 hours PRN Moderate Pain (4 - 6)

## 2017-09-23 NOTE — PROGRESS NOTE ADULT - SUBJECTIVE AND OBJECTIVE BOX
Patient discussed on morning rounds with Dr. Escamilla     SUBJECTIVE ASSESSMENT:  Patient seen this morning at bedside, denies any complaints at this time including chest pain, shortness of breath or palpitations. States her neck is sore from surgery yesterday and that she wants to sleep.     Vital Signs Last 24 Hrs  T(C): 37.8 (23 Sep 2017 18:26), Max: 37.8 (23 Sep 2017 18:26)  T(F): 100 (23 Sep 2017 18:26), Max: 100 (23 Sep 2017 18:26)  HR: 88 (23 Sep 2017 17:45) (70 - 94)  BP: 112/63 (22 Sep 2017 22:00) (103/62 - 140/75)  BP(mean): 95 (22 Sep 2017 21:00) (86 - 95)  RR: 18 (23 Sep 2017 17:45) (10 - 23)  SpO2: 97% (23 Sep 2017 17:45) (96% - 100%)  I&O's Detail    22 Sep 2017 07:01  -  23 Sep 2017 07:00  --------------------------------------------------------  IN:  Total IN: 0 mL    OUT:    Drain: 2.5 mL    Other: 1500 mL    Voided: 400 mL  Total OUT: 1902.5 mL    Total NET: -1902.5 mL      23 Sep 2017 07:01  -  23 Sep 2017 18:34  --------------------------------------------------------  IN:    Oral Fluid: 470 mL  Total IN: 470 mL    OUT:  Total OUT: 0 mL    Total NET: 470 mL    CHEST TUBE:  No  LOLLY DRAIN:  Yes VIKY drain from cervical spine surgery   EPICARDIAL WIRES: No  TIE DOWNS: No  COOK: No    PHYSICAL EXAM:    General: Patient lying comfortably in bed, no acute distress     Neurological: Alert and oriented. No focal neurological deficits     Cardiovascular: S1S2, RRR, no murmurs appreciated on exam     Respiratory:  Diminished breath sounds at bases bilaterally. Unable to assess posterior lung fields 2/2 patient refusal to sit up     Gastrointestinal:  Abdomen soft, non tender, non distended     Extremities: Warm and well perfused, No edema or calf tenderness bilaterally   Anterior cervical spine dressing in place, dressing C/D/I with VIKY drain     Vascular: peripheral pulses 2+ bilaterally   L IJ central line in place     Incision Sites: L foot: + chronic fissure on L medial ankle, no pain, tenderness, drainage.     LABS:                        8.0    10.3  )-----------( 335      ( 23 Sep 2017 04:01 )             26.5       COUMADIN:  No    PT/INR - ( 23 Sep 2017 04:01 )   PT: 16.3 sec;   INR: 1.46          PTT - ( 23 Sep 2017 04:01 )  PTT:36.7 sec    09-23    129<L>  |  93<L>  |  21  ----------------------------<  96  4.4   |  22  |  3.62<H>    Ca    8.5      23 Sep 2017 04:01  Phos  5.5     09-23  Mg     2.0     09-23    TPro  7.7  /  Alb  2.2<L>  /  TBili  0.4  /  DBili  <0.2  /  AST  11  /  ALT  5<L>  /  AlkPhos  266<H>  09-23    MEDICATIONS  (STANDING):  sodium chloride 0.9% lock flush 3 milliLiter(s) IV Push every 8 hours  aspirin enteric coated 81 milliGRAM(s) Oral daily  valsartan 160 milliGRAM(s) Oral daily  simvastatin 20 milliGRAM(s) Oral at bedtime  insulin lispro (HumaLOG) corrective regimen sliding scale   SubCutaneous Before meals and at bedtime  polyethylene glycol 3350 17 Gram(s) Oral daily  pantoprazole    Tablet 40 milliGRAM(s) Oral before breakfast  DAPTOmycin IVPB 700 milliGRAM(s) IV Intermittent every 48 hours  rifampin 600 milliGRAM(s) Oral daily  Ceftaroline Fosamil IVPB 200 milliGRAM(s) IV Intermittent every 12 hours  amLODIPine   Tablet 10 milliGRAM(s) Oral daily  gabapentin 100 milliGRAM(s) Oral two times a day  gabapentin 300 milliGRAM(s) Oral at bedtime  sertraline 25 milliGRAM(s) Oral daily  traZODone 25 milliGRAM(s) Oral at bedtime    MEDICATIONS  (PRN):  dextrose Gel 1 Dose(s) Oral once PRN Blood Glucose LESS THAN 70 milliGRAM(s)/deciliter  glucagon  Injectable 1 milliGRAM(s) IntraMuscular once PRN Glucose LESS THAN 70 milligrams/deciliter  acetaminophen    Suspension. 650 milliGRAM(s) Oral every 6 hours PRN Mild Pain (1 - 3)  ketorolac   Injectable 30 milliGRAM(s) IV Push every 6 hours PRN Moderate Pain (4 - 6)    RADIOLOGY & ADDITIONAL TESTS:  9/23/17 CXR: Pending official read, cervical plating with VIKY drain noticed. Lung fields stable     A/P: 47 year old female, PMHx HTN, HLD, DM, ESRD on HD, chronic left foot OM, known Tricuspid valve endocarditis recently admitted to [facility] under Dr. Gee and was deemed a non-surgical candidate, subsequent transferred to [facility] for medical management. Patient failed medical management with agreesive antibiotics and was found to have enlaring tricuspid vegetation on ALCIDES and was transferred back to [facility] for further evaluation. During this admission patient complaining of back/neck pain, workup revealed cervical abscess and patient is now POD#1 s/p C6 corpectomy and C5-C7 anterior plating with Dr. Kuo (orthopedics). Patient transferred to CCU post procedure. No acute events overnight. This AM patient became bradycardic HR 40s with non conducted P waves. Patient converted back to NSR and has remained hemodynamically stable since.     Neurovascular: Stable.   - Last admission CT head findings concerning for evolving right MCA infarct, this admission patient complaining of LLE weakness. Neurology Dr. Luna following appreciate recs. MRI head no acute pathology   - MRI cervical/thoracic/lumbar spine for neck/back pain revealed cervical OM with epidural abscess s/p C6 Corpectomy and C5-C7 anterior plating with Dr. Kuo on 9/23. Appreciate ortho recs.   - continue toradol and tylenol PRN for pain   - continue gabapentin 100mg BID and 300mg qhs for LLE weakness and pain   - continue sertraline 25mg daily for depression   - continue trazodone 25mg qhs for insomnia     Cardiovascular: Hemodynamically stable. HR controlled.  - MRSA Endocarditis, preoperative for TVR with Dr. Gee this upcoming week pending clearance for heparin by orthopedics. Preoperative workup completed.  Cardiac cath revealed RCA lesion patient will get CABG x1 with TVR. Continue asa 81mg    - SVC/RA throbus found on previous admission, heparin drip held yesterday for orthopedic surgery, will resume once cleared by ortho 2/2 risk of epidural hematoma   - 2nd degree HB this AM, pacing pads in place with TVP kit in room. Metoprolol discontinued. Continue to monitor rhythm   - Hx of HLD, continue simvastatin 20mg qhs   - Hx of HTN, continue amlodipine 10mg. Valsartan 160mg discontinued 2/2 risk of periop hypotension. Start clonidine 0.1mg BID.     Respiratory: 02 Sat = 97% on 3L NC   - continue to wean O2 to maintain Sao2  > 93%   - Encourage ambulation and Use of IS 10x / hr while awake.  - CXR stable, f/u CXR in AM     GI: Stable.  - Continue PO diet   - Continue protonix 40mg for GI ppx     Renal / :  ESRD on HD, Cr 3.26   - Renal following, HD Monday, appreciate recs.  - Hyponatremic, continue 1L fluid restriction   - Monitor I/O's.    Endocrine:  DM, A1C 8.2 TSH 2.447  - Endocrine following, patient hypoglycemic for the past couple of days with insulin being held 2/2 decrease in appetite. D/dwayne Lantus and humalog. Continue insulin sliding scale. Will restart insulin if blood sugars increase     Hematologic: SVC/RA clot  - Heparin drip held for OR with orthopedics, pending clearance when to resume.     ID:  MRSA Endocarditis   - Dr. Kingston following as ID, continue daptomycin 700mg q48 following HD, rifampin 600mg and ceftaroline 200mg BID   - Blood cultures from 9/19 NGTD. Blood cultures sent this AM f/u results.   - Chronic OM of L foot, vascular/ortho following. Gallium scan completed with no obvious source of MRSA bacteremia. No further intervention needed.   - Observe for SIRS/Sepsis Syndrome.    Prophylaxis: holding heparin 2/2 risk of epidural hematoma. Will follow up with ortho when can resume SQH   -SCD's    Disposition: OR next week pending ortho clearance.

## 2017-09-24 LAB
ANION GAP SERPL CALC-SCNC: 15 MMOL/L — SIGNIFICANT CHANGE UP (ref 5–17)
APTT BLD: 35.4 SEC — SIGNIFICANT CHANGE UP (ref 27.5–37.4)
BUN SERPL-MCNC: 29 MG/DL — HIGH (ref 7–23)
CALCIUM SERPL-MCNC: 8.3 MG/DL — LOW (ref 8.4–10.5)
CHLORIDE SERPL-SCNC: 91 MMOL/L — LOW (ref 96–108)
CO2 SERPL-SCNC: 22 MMOL/L — SIGNIFICANT CHANGE UP (ref 22–31)
CREAT SERPL-MCNC: 4.51 MG/DL — HIGH (ref 0.5–1.3)
CULTURE RESULTS: SIGNIFICANT CHANGE UP
GLUCOSE SERPL-MCNC: 136 MG/DL — HIGH (ref 70–99)
HCT VFR BLD CALC: 26.3 % — LOW (ref 34.5–45)
HGB BLD-MCNC: 8 G/DL — LOW (ref 11.5–15.5)
INR BLD: 1.38 — HIGH (ref 0.88–1.16)
MAGNESIUM SERPL-MCNC: 2 MG/DL — SIGNIFICANT CHANGE UP (ref 1.6–2.6)
MCHC RBC-ENTMCNC: 25.8 PG — LOW (ref 27–34)
MCHC RBC-ENTMCNC: 30.4 G/DL — LOW (ref 32–36)
MCV RBC AUTO: 84.8 FL — SIGNIFICANT CHANGE UP (ref 80–100)
PLATELET # BLD AUTO: 374 K/UL — SIGNIFICANT CHANGE UP (ref 150–400)
POTASSIUM SERPL-MCNC: 4.6 MMOL/L — SIGNIFICANT CHANGE UP (ref 3.5–5.3)
POTASSIUM SERPL-SCNC: 4.6 MMOL/L — SIGNIFICANT CHANGE UP (ref 3.5–5.3)
PROTHROM AB SERPL-ACNC: 15.4 SEC — HIGH (ref 9.8–12.7)
RBC # BLD: 3.1 M/UL — LOW (ref 3.8–5.2)
RBC # FLD: 17.4 % — HIGH (ref 10.3–16.9)
SODIUM SERPL-SCNC: 128 MMOL/L — LOW (ref 135–145)
SPECIMEN SOURCE: SIGNIFICANT CHANGE UP
WBC # BLD: 11.1 K/UL — HIGH (ref 3.8–10.5)
WBC # FLD AUTO: 11.1 K/UL — HIGH (ref 3.8–10.5)

## 2017-09-24 PROCEDURE — 71010: CPT | Mod: 26

## 2017-09-24 RX ADMIN — SODIUM CHLORIDE 3 MILLILITER(S): 9 INJECTION INTRAMUSCULAR; INTRAVENOUS; SUBCUTANEOUS at 06:33

## 2017-09-24 RX ADMIN — Medication 30 MILLIGRAM(S): at 03:50

## 2017-09-24 RX ADMIN — Medication 30 MILLIGRAM(S): at 19:00

## 2017-09-24 RX ADMIN — AMLODIPINE BESYLATE 10 MILLIGRAM(S): 2.5 TABLET ORAL at 06:32

## 2017-09-24 RX ADMIN — DAPTOMYCIN 128 MILLIGRAM(S): 500 INJECTION, POWDER, LYOPHILIZED, FOR SOLUTION INTRAVENOUS at 09:30

## 2017-09-24 RX ADMIN — SODIUM CHLORIDE 3 MILLILITER(S): 9 INJECTION INTRAMUSCULAR; INTRAVENOUS; SUBCUTANEOUS at 23:33

## 2017-09-24 RX ADMIN — Medication 30 MILLIGRAM(S): at 03:33

## 2017-09-24 RX ADMIN — Medication 30 MILLIGRAM(S): at 12:52

## 2017-09-24 RX ADMIN — Medication 0.1 MILLIGRAM(S): at 06:32

## 2017-09-24 RX ADMIN — SODIUM CHLORIDE 3 MILLILITER(S): 9 INJECTION INTRAMUSCULAR; INTRAVENOUS; SUBCUTANEOUS at 14:00

## 2017-09-24 RX ADMIN — SERTRALINE 25 MILLIGRAM(S): 25 TABLET, FILM COATED ORAL at 11:35

## 2017-09-24 RX ADMIN — Medication 4: at 23:43

## 2017-09-24 RX ADMIN — GABAPENTIN 100 MILLIGRAM(S): 400 CAPSULE ORAL at 06:32

## 2017-09-24 RX ADMIN — Medication 2: at 16:58

## 2017-09-24 RX ADMIN — Medication 81 MILLIGRAM(S): at 11:34

## 2017-09-24 RX ADMIN — CEFTAROLINE FOSAMIL 50 MILLIGRAM(S): 600 POWDER, FOR SOLUTION INTRAVENOUS at 06:00

## 2017-09-24 RX ADMIN — CEFTAROLINE FOSAMIL 50 MILLIGRAM(S): 600 POWDER, FOR SOLUTION INTRAVENOUS at 18:12

## 2017-09-24 RX ADMIN — GABAPENTIN 100 MILLIGRAM(S): 400 CAPSULE ORAL at 18:12

## 2017-09-24 RX ADMIN — Medication 30 MILLIGRAM(S): at 18:16

## 2017-09-24 RX ADMIN — Medication 2: at 11:34

## 2017-09-24 RX ADMIN — SIMVASTATIN 20 MILLIGRAM(S): 20 TABLET, FILM COATED ORAL at 23:31

## 2017-09-24 RX ADMIN — Medication 0.2 MILLIGRAM(S): at 17:03

## 2017-09-24 RX ADMIN — PANTOPRAZOLE SODIUM 40 MILLIGRAM(S): 20 TABLET, DELAYED RELEASE ORAL at 07:14

## 2017-09-24 RX ADMIN — Medication 30 MILLIGRAM(S): at 13:17

## 2017-09-24 RX ADMIN — Medication 25 MILLIGRAM(S): at 23:31

## 2017-09-24 RX ADMIN — GABAPENTIN 300 MILLIGRAM(S): 400 CAPSULE ORAL at 23:32

## 2017-09-24 NOTE — PROGRESS NOTE ADULT - SUBJECTIVE AND OBJECTIVE BOX
Patient seen and examined at bedside.     No major overnight events  Resting right now  No complaints  Labs reviewed  CXR reviewed    Patient is pending tentative TVR / CABG with CT Sx once medically appropriate.     sodium chloride 0.9% lock flush 3 milliLiter(s) every 8 hours  aspirin enteric coated 81 milliGRAM(s) daily  simvastatin 20 milliGRAM(s) at bedtime  insulin lispro (HumaLOG) corrective regimen sliding scale   Before meals and at bedtime  dextrose 5%. 1000 milliLiter(s) <Continuous>  dextrose Gel 1 Dose(s) once PRN  dextrose 50% Injectable 12.5 Gram(s) once  dextrose 50% Injectable 25 Gram(s) once  dextrose 50% Injectable 25 Gram(s) once  glucagon  Injectable 1 milliGRAM(s) once PRN  polyethylene glycol 3350 17 Gram(s) daily  pantoprazole    Tablet 40 milliGRAM(s) before breakfast  acetaminophen    Suspension. 650 milliGRAM(s) every 6 hours PRN  DAPTOmycin IVPB 700 milliGRAM(s) every 48 hours  rifampin 600 milliGRAM(s) daily  Ceftaroline Fosamil IVPB 200 milliGRAM(s) every 12 hours  amLODIPine   Tablet 10 milliGRAM(s) daily  gabapentin 100 milliGRAM(s) two times a day  gabapentin 300 milliGRAM(s) at bedtime  sertraline 25 milliGRAM(s) daily  traZODone 25 milliGRAM(s) at bedtime  ketorolac   Injectable 30 milliGRAM(s) every 6 hours PRN  cloNIDine 0.1 milliGRAM(s) every 12 hours      Allergies    penicillin (Unknown)  Sular (Unknown)    Intolerances        T(C): , Max: 37.8 (09-23-17 @ 18:26)  T(F): , Max: 100 (09-23-17 @ 18:26)  HR: 80 (09-24-17 @ 08:00)  BP: 146/72 (09-24-17 @ 07:00)  BP(mean): 105 (09-24-17 @ 07:00)  RR: 15 (09-24-17 @ 08:00)  SpO2: 100% (09-24-17 @ 08:00)  Wt(kg): --    09-23 @ 07:01  -  09-24 @ 07:00  --------------------------------------------------------  IN:    IV PiggyBack: 50 mL    Oral Fluid: 670 mL  Total IN: 720 mL    OUT:    Drain: 20 mL  Total OUT: 20 mL    Total NET: 700 mL              LABS:                        8.0    11.1  )-----------( 374      ( 24 Sep 2017 04:53 )             26.3     09-24    128<L>  |  91<L>  |  29<H>  ----------------------------<  136<H>  4.6   |  22  |  4.51<H>    Ca    8.3<L>      24 Sep 2017 04:53  Phos  5.5     09-23  Mg     2.0     09-24    TPro  7.7  /  Alb  2.2<L>  /  TBili  0.4  /  DBili  <0.2  /  AST  11  /  ALT  5<L>  /  AlkPhos  266<H>  09-23      PT/INR - ( 24 Sep 2017 04:53 )   PT: 15.4 sec;   INR: 1.38          PTT - ( 24 Sep 2017 04:53 )  PTT:35.4 sec          RADIOLOGY & ADDITIONAL STUDIES:

## 2017-09-24 NOTE — PROGRESS NOTE ADULT - PROBLEM SELECTOR PLAN 2
Volume and chemistries appropriate  Next HD on Monday  However, if her left IJ temporary HD catheter is still suboptimal in terms of blood flow, will need a new catheter to be placed. Given her ongoing infectious issues and access lines, she may need a femoral line (can be put in and removed after HD in light of infectious concerns)

## 2017-09-24 NOTE — PROGRESS NOTE ADULT - SUBJECTIVE AND OBJECTIVE BOX
Patient seen and examined at bedside.     SUBJECTIVE: No acute events overnight.  Pain tolerable.    Denies Chest Pain/SOB.    Denies Headache.    Denies Nausea/vomiting.      OBJECTIVE:   T(C): 37 (09-24-17 @ 05:35), Max: 37.8 (09-23-17 @ 18:26)  T(F): 98.6 (09-24-17 @ 05:35), Max: 100 (09-23-17 @ 18:26)  HR: 90 (09-24-17 @ 05:00) (72 - 92)  BP: --  RR:  (13 - 23)  SpO2:  (91% - 100%)  Wt(kg): --    NAD, non labored respirations  Affected extremity:          Dressing: clean/dry/intact          Drains: 1         Sensation: [x ] intact to light touch  [ ] decreased                              Vascular: [x ] warm well perfused; capillary refill <3 seconds          Motor exam: [x ]         [x ] Upper extremity                   Pascual (c5)   Bi(c5/6)   WE(c6)   EE(c7)    (c8)                                                R           5              5            5             5             5                                               L            5              5            5             5            5         [x ] Lower extremity                   IP(L2)     Q(L3)     TA(L4)     EHL(L5)     GS(s1)                                                 R          5           5          5            5              5                                               L           5           5         5             5              5                                     8.0<L>  11.1<H> )-------------------( 374      ( 09-24 @ 04:53 )                 26.3<L>    I&O's Detail    22 Sep 2017 07:01  -  23 Sep 2017 07:00  --------------------------------------------------------  IN:  Total IN: 0 mL    OUT:    Drain: 2.5 mL    Other: 1500 mL    Voided: 400 mL  Total OUT: 1902.5 mL    Total NET: -1902.5 mL      23 Sep 2017 07:01  -  24 Sep 2017 06:40  --------------------------------------------------------  IN:    Oral Fluid: 570 mL  Total IN: 570 mL    OUT:  Total OUT: 0 mL    Total NET: 570 mL          A/P :  47y Female s/p C6 corpectomy, Ant plating C5-7 9/22/17       -    Pain control + bowel regimen  -    DVT ppx: SCDs    -    Periop abx    -    ADAT  -    Check AM labs  -    Weight bearing status:   WBAT        -    Physical Therapy  -    Resume home meds  -    Dispo planning

## 2017-09-24 NOTE — PROGRESS NOTE ADULT - SUBJECTIVE AND OBJECTIVE BOX
Patient discussed on morning rounds with Dr. Escamilla     SUBJECTIVE ASSESSMENT:  Patient seen this morning at bedside, states her neck is sore from her orthopedic surgery but controlled with pain medication. She denies any chest pain, shortness of breath or palpitations      Vital Signs Last 24 Hrs  T(C): 36.6 (24 Sep 2017 13:00), Max: 37.8 (23 Sep 2017 18:26)  T(F): 97.9 (24 Sep 2017 13:00), Max: 100 (23 Sep 2017 18:26)  HR: 88 (24 Sep 2017 13:00) (80 - 92)  BP: 146/72 (24 Sep 2017 07:00) (146/72 - 146/72)  BP(mean): 105 (24 Sep 2017 07:00) (105 - 105)  RR: 17 (24 Sep 2017 13:00) (13 - 23)  SpO2: 98% (24 Sep 2017 13:00) (91% - 100%)  I&O's Detail    23 Sep 2017 07:01  -  24 Sep 2017 07:00  --------------------------------------------------------  IN:    IV PiggyBack: 50 mL    Oral Fluid: 670 mL  Total IN: 720 mL    OUT:    Drain: 20 mL  Total OUT: 20 mL    Total NET: 700 mL      24 Sep 2017 07:01  -  24 Sep 2017 13:58  --------------------------------------------------------  IN:    IV PiggyBack: 50 mL    Oral Fluid: 200 mL  Total IN: 250 mL    OUT:    Voided: 200 mL  Total OUT: 200 mL    Total NET: 50 mL    CHEST TUBE:  No  LOLLY DRAIN:  Yes VIKY drain from cervical spine surgery   EPICARDIAL WIRES: No  TIE DOWNS: No  COOK: No    PHYSICAL EXAM:    General: Patient lying comfortably in bed, no acute distress     Neurological: Alert and oriented. No focal neurological deficits     Cardiovascular: S1S2, RRR, no murmurs appreciated on exam     Respiratory:  Poor inspiratory effort 2/2 neck pain while sitting up. Diminished breath sounds bilaterally.     Gastrointestinal:  Abdomen soft, non tender, non distended     Extremities: Warm and well perfused, No edema or calf tenderness bilaterally   Anterior cervical spine dressing in place, dressing C/D/I with VIKY drain   L foot + chronic fissure on L medial ankle, no drainage or tenderness     Vascular: peripheral pulses 2+ bilaterally   L IJ central line in place     LABS:                        8.0    11.1  )-----------( 374      ( 24 Sep 2017 04:53 )             26.3       COUMADIN:  No    PT/INR - ( 24 Sep 2017 04:53 )   PT: 15.4 sec;   INR: 1.38          PTT - ( 24 Sep 2017 04:53 )  PTT:35.4 sec    09-24    128<L>  |  91<L>  |  29<H>  ----------------------------<  136<H>  4.6   |  22  |  4.51<H>    Ca    8.3<L>      24 Sep 2017 04:53  Phos  5.5     09-23  Mg     2.0     09-24    TPro  7.7  /  Alb  2.2<L>  /  TBili  0.4  /  DBili  <0.2  /  AST  11  /  ALT  5<L>  /  AlkPhos  266<H>  09-23    MEDICATIONS  (STANDING):  sodium chloride 0.9% lock flush 3 milliLiter(s) IV Push every 8 hours  aspirin enteric coated 81 milliGRAM(s) Oral daily  simvastatin 20 milliGRAM(s) Oral at bedtime  insulin lispro (HumaLOG) corrective regimen sliding scale   SubCutaneous Before meals and at bedtime  polyethylene glycol 3350 17 Gram(s) Oral daily  pantoprazole    Tablet 40 milliGRAM(s) Oral before breakfast  DAPTOmycin IVPB 700 milliGRAM(s) IV Intermittent every 48 hours  rifampin 600 milliGRAM(s) Oral daily  Ceftaroline Fosamil IVPB 200 milliGRAM(s) IV Intermittent every 12 hours  amLODIPine   Tablet 10 milliGRAM(s) Oral daily  gabapentin 100 milliGRAM(s) Oral two times a day  gabapentin 300 milliGRAM(s) Oral at bedtime  sertraline 25 milliGRAM(s) Oral daily  traZODone 25 milliGRAM(s) Oral at bedtime  cloNIDine 0.2 milliGRAM(s) Oral every 12 hours    MEDICATIONS  (PRN):  dextrose Gel 1 Dose(s) Oral once PRN Blood Glucose LESS THAN 70 milliGRAM(s)/deciliter  glucagon  Injectable 1 milliGRAM(s) IntraMuscular once PRN Glucose LESS THAN 70 milligrams/deciliter  acetaminophen    Suspension. 650 milliGRAM(s) Oral every 6 hours PRN Mild Pain (1 - 3)  ketorolac   Injectable 30 milliGRAM(s) IV Push every 6 hours PRN Moderate Pain (4 - 6)    RADIOLOGY & ADDITIONAL TESTS:  9/24/17 CXR: Pending official read, bilateral pulmonary congestion improved compared to prior xray. RLL emboli/nodule again noted as well as anterior cervical plating     A/P: 47 year old female, PMHx HTN, HLD, DM, ESRD on HD, chronic left foot OM, known Tricuspid valve endocarditis recently admitted to [facility] under Dr. Gee and was deemed a non-surgical candidate, subsequent transferred to [facility] for medical management. Patient failed medical management with agreesive antibiotics and was found to have enlaring tricuspid vegetation on ALCIDES and was transferred back to [facility] for further evaluation. During this admission patient complaining of back/neck pain, workup revealed cervical abscess and patient is now POD#1 s/p C6 corpectomy and C5-C7 anterior plating with Dr. Kuo (orthopedics). Patient transferred to CCU post procedure. Yesterday patient became bradycardic HR 40s with non conducted P waves. Patient converted back to NSR and has remained hemodynamically stable since. No acute events overnight     Neurovascular: Stable.   - Last admission CT head findings concerning for evolving right MCA infarct, this admission patient complaining of LLE weakness. Neurology Dr. Luna following appreciate recs. MRI head no acute pathology   - MRI cervical/thoracic/lumbar spine for neck/back pain revealed cervical OM with epidural abscess s/p C6 Corpectomy and C5-C7 anterior plating with Dr. Kuo on 9/23. Appreciate ortho recs.   - continue toradol and tylenol PRN for pain   - continue gabapentin 100mg BID and 300mg qhs for LLE weakness and pain   - continue sertraline 25mg daily for depression   - continue trazodone 25mg qhs for insomnia     Cardiovascular: Hemodynamically stable. HR controlled.  - MRSA Endocarditis, preoperative for TVR with Dr. Gee this upcoming week, as per Dr. Kuo cleared for full heparin (30,000 units in OR) for Friday 9/30. Preoperative workup completed.  Cardiac cath revealed RCA lesion patient will get CABG x1 with TVR. Continue asa 81mg    - SVC/RA thrombus found on previous admission, heparin drip held 2/2 orthopedic surgery at risk for epidural hematoma.   - 2nd degree HB yesterday AM, pacing pads in place with TVP kit in room. Metoprolol discontinued. Continue to monitor rhythm   - Hx of HLD, continue simvastatin 20mg qhs   - Hx of HTN, continue amlodipine 10mg. Valsartan 160mg discontinued 2/2 risk of periop hypotension. Increased clonidine to 0.2mg BID, continue to monitor BP     Respiratory: 02 Sat = 98% on 2L NC   - continue to wean O2 to maintain Sao2  > 93%   - Encourage ambulation and Use of IS 10x / hr while awake.  - CXR stable, f/u CXR in AM     GI: Stable.  - Continue PO diet   - Continue protonix 40mg for GI ppx     Renal / :  ESRD on HD, Cr 4.51  - Renal following, HD Monday, appreciate recs.  - Hyponatremic, continue 1L fluid restriction   - Monitor I/O's.    Endocrine:  DM, A1C 8.2 TSH 2.447  - Endocrine following, patient hypoglycemic for the past couple of days with insulin being held 2/2 decrease in appetite. D/dwayne Lantus and humalog yesterday. Today FS between 120-170s, Continue insulin sliding scale. Will restart standing insulin if blood sugars increase     Hematologic: SVC/RA clot  - Heparin drip held for OR with orthopedics, as per Dr. Kuo safe anticoagulate after 9/30/17     ID:  MRSA Endocarditis   - Dr. Kingston following as ID, continue daptomycin 700mg q48 following HD, rifampin 600mg and ceftaroline 200mg BID   - Blood cultures from 9/19 NGTD. Blood cultures sent 9/23 negative growth at 12 hours   - Chronic OM of L foot, vascular/ortho following. Gallium scan completed with no obvious source of MRSA bacteremia. No further intervention needed.   - Observe for SIRS/Sepsis Syndrome.    Prophylaxis: holding heparin 2/2 risk of epidural hematoma.   -SCD's    Disposition: OR next week Patient discussed on morning rounds with Dr. Escamilla     SUBJECTIVE ASSESSMENT:  Patient seen this morning at bedside, states her neck is sore from her orthopedic surgery but controlled with pain medication. She denies any chest pain, shortness of breath or palpitations      Vital Signs Last 24 Hrs  T(C): 36.6 (24 Sep 2017 13:00), Max: 37.8 (23 Sep 2017 18:26)  T(F): 97.9 (24 Sep 2017 13:00), Max: 100 (23 Sep 2017 18:26)  HR: 88 (24 Sep 2017 13:00) (80 - 92)  BP: 146/72 (24 Sep 2017 07:00) (146/72 - 146/72)  BP(mean): 105 (24 Sep 2017 07:00) (105 - 105)  RR: 17 (24 Sep 2017 13:00) (13 - 23)  SpO2: 98% (24 Sep 2017 13:00) (91% - 100%)  I&O's Detail    23 Sep 2017 07:01  -  24 Sep 2017 07:00  --------------------------------------------------------  IN:    IV PiggyBack: 50 mL    Oral Fluid: 670 mL  Total IN: 720 mL    OUT:    Drain: 20 mL  Total OUT: 20 mL    Total NET: 700 mL      24 Sep 2017 07:01  -  24 Sep 2017 13:58  --------------------------------------------------------  IN:    IV PiggyBack: 50 mL    Oral Fluid: 200 mL  Total IN: 250 mL    OUT:    Voided: 200 mL  Total OUT: 200 mL    Total NET: 50 mL    CHEST TUBE:  No  LOLLY DRAIN:  Yes VIKY drain from cervical spine surgery   EPICARDIAL WIRES: No  TIE DOWNS: No  COOK: No    PHYSICAL EXAM:    General: Patient lying comfortably in bed, no acute distress     Neurological: Alert and oriented. No focal neurological deficits     Cardiovascular: S1S2, RRR, no murmurs appreciated on exam     Respiratory:  Poor inspiratory effort 2/2 neck pain while sitting up. Diminished breath sounds bilaterally.     Gastrointestinal:  Abdomen soft, non tender, non distended     Extremities: Warm and well perfused, No edema or calf tenderness bilaterally   Anterior cervical spine dressing in place, dressing C/D/I with VIKY drain   L foot + chronic fissure on L medial ankle, no drainage or tenderness     Vascular: peripheral pulses 2+ bilaterally   L IJ central line in place     LABS:                        8.0    11.1  )-----------( 374      ( 24 Sep 2017 04:53 )             26.3       COUMADIN:  No    PT/INR - ( 24 Sep 2017 04:53 )   PT: 15.4 sec;   INR: 1.38          PTT - ( 24 Sep 2017 04:53 )  PTT:35.4 sec    09-24    128<L>  |  91<L>  |  29<H>  ----------------------------<  136<H>  4.6   |  22  |  4.51<H>    Ca    8.3<L>      24 Sep 2017 04:53  Phos  5.5     09-23  Mg     2.0     09-24    TPro  7.7  /  Alb  2.2<L>  /  TBili  0.4  /  DBili  <0.2  /  AST  11  /  ALT  5<L>  /  AlkPhos  266<H>  09-23    MEDICATIONS  (STANDING):  sodium chloride 0.9% lock flush 3 milliLiter(s) IV Push every 8 hours  aspirin enteric coated 81 milliGRAM(s) Oral daily  simvastatin 20 milliGRAM(s) Oral at bedtime  insulin lispro (HumaLOG) corrective regimen sliding scale   SubCutaneous Before meals and at bedtime  polyethylene glycol 3350 17 Gram(s) Oral daily  pantoprazole    Tablet 40 milliGRAM(s) Oral before breakfast  DAPTOmycin IVPB 700 milliGRAM(s) IV Intermittent every 48 hours  rifampin 600 milliGRAM(s) Oral daily  Ceftaroline Fosamil IVPB 200 milliGRAM(s) IV Intermittent every 12 hours  amLODIPine   Tablet 10 milliGRAM(s) Oral daily  gabapentin 100 milliGRAM(s) Oral two times a day  gabapentin 300 milliGRAM(s) Oral at bedtime  sertraline 25 milliGRAM(s) Oral daily  traZODone 25 milliGRAM(s) Oral at bedtime  cloNIDine 0.2 milliGRAM(s) Oral every 12 hours    MEDICATIONS  (PRN):  dextrose Gel 1 Dose(s) Oral once PRN Blood Glucose LESS THAN 70 milliGRAM(s)/deciliter  glucagon  Injectable 1 milliGRAM(s) IntraMuscular once PRN Glucose LESS THAN 70 milligrams/deciliter  acetaminophen    Suspension. 650 milliGRAM(s) Oral every 6 hours PRN Mild Pain (1 - 3)  ketorolac   Injectable 30 milliGRAM(s) IV Push every 6 hours PRN Moderate Pain (4 - 6)    RADIOLOGY & ADDITIONAL TESTS:  9/24/17 CXR: Pending official read, bilateral pulmonary congestion improved compared to prior xray. RLL emboli/nodule again noted as well as anterior cervical plating     A/P: 47 year old female, PMHx HTN, HLD, DM, ESRD on HD, chronic left foot OM, known Tricuspid valve endocarditis recently admitted to [facility] under Dr. Gee and was deemed a non-surgical candidate, subsequent transferred to [facility] for medical management. Patient failed medical management with agreesive antibiotics and was found to have enlaring tricuspid vegetation on ALCIDES and was transferred back to [facility] for further evaluation. During this admission patient complaining of back/neck pain, workup revealed cervical abscess and patient is now POD#2 s/p C6 corpectomy and C5-C7 anterior plating with Dr. Kuo (orthopedics). Patient transferred to CCU post procedure. Yesterday patient became bradycardic HR 40s with non conducted P waves. Patient converted back to NSR and has remained hemodynamically stable since. No acute events overnight     Neurovascular: Stable.   - Last admission CT head findings concerning for evolving right MCA infarct, this admission patient complaining of LLE weakness. Neurology Dr. Luna following appreciate recs. MRI head no acute pathology   - MRI cervical/thoracic/lumbar spine for neck/back pain revealed cervical OM with epidural abscess s/p C6 Corpectomy and C5-C7 anterior plating with Dr. Kuo on 9/23. Appreciate ortho recs.   - continue toradol and tylenol PRN for pain   - continue gabapentin 100mg BID and 300mg qhs for LLE weakness and pain   - continue sertraline 25mg daily for depression   - continue trazodone 25mg qhs for insomnia     Cardiovascular: Hemodynamically stable. HR controlled.  - MRSA Endocarditis, preoperative for TVR with Dr. Gee this upcoming week, as per Dr. Kuo cleared for full heparin (30,000 units in OR) for Friday 9/30. Preoperative workup completed.  Cardiac cath revealed RCA lesion patient will get CABG x1 with TVR. Continue asa 81mg    - SVC/RA thrombus found on previous admission, heparin drip held 2/2 orthopedic surgery at risk for epidural hematoma.   - 2nd degree HB yesterday AM, pacing pads in place with TVP kit in room. Metoprolol discontinued. Continue to monitor rhythm   - Hx of HLD, continue simvastatin 20mg qhs   - Hx of HTN, continue amlodipine 10mg. Valsartan 160mg discontinued 2/2 risk of periop hypotension. Increased clonidine to 0.2mg BID, continue to monitor BP     Respiratory: 02 Sat = 98% on 2L NC   - continue to wean O2 to maintain Sao2  > 93%   - Encourage ambulation and Use of IS 10x / hr while awake.  - CXR stable, f/u CXR in AM     GI: Stable.  - Continue PO diet   - Continue protonix 40mg for GI ppx     Renal / :  ESRD on HD, Cr 4.51  - Renal following, HD Monday, appreciate recs.  - Hyponatremic, continue 1L fluid restriction   - Monitor I/O's.    Endocrine:  DM, A1C 8.2 TSH 2.447  - Endocrine following, patient hypoglycemic for the past couple of days with insulin being held 2/2 decrease in appetite. D/dwayne Lantus and humalog yesterday. Today FS between 120-170s, Continue insulin sliding scale. Will restart standing insulin if blood sugars increase     Hematologic: SVC/RA clot  - Heparin drip held for OR with orthopedics, as per Dr. Kuo safe anticoagulate after 9/30/17     ID:  MRSA Endocarditis   - Dr. Kingston following as ID, continue daptomycin 700mg q48 following HD, rifampin 600mg and ceftaroline 200mg BID   - Blood cultures from 9/19 NGTD. Blood cultures sent 9/23 negative growth at 12 hours   - Chronic OM of L foot, vascular/ortho following. Gallium scan completed with no obvious source of MRSA bacteremia. No further intervention needed.   - Observe for SIRS/Sepsis Syndrome.    Prophylaxis: holding heparin 2/2 risk of epidural hematoma.   -SCD's    Disposition: OR next week

## 2017-09-25 LAB
-  VANCOMYCIN: SIGNIFICANT CHANGE UP
ANION GAP SERPL CALC-SCNC: 14 MMOL/L — SIGNIFICANT CHANGE UP (ref 5–17)
APTT BLD: 42.9 SEC — HIGH (ref 27.5–37.4)
BLD GP AB SCN SERPL QL: NEGATIVE — SIGNIFICANT CHANGE UP
BUN SERPL-MCNC: 36 MG/DL — HIGH (ref 7–23)
CALCIUM SERPL-MCNC: 8 MG/DL — LOW (ref 8.4–10.5)
CHLORIDE SERPL-SCNC: 94 MMOL/L — LOW (ref 96–108)
CO2 SERPL-SCNC: 21 MMOL/L — LOW (ref 22–31)
CREAT SERPL-MCNC: 5.34 MG/DL — HIGH (ref 0.5–1.3)
GLUCOSE SERPL-MCNC: 144 MG/DL — HIGH (ref 70–99)
HCT VFR BLD CALC: 24.7 % — LOW (ref 34.5–45)
HGB BLD-MCNC: 7.3 G/DL — LOW (ref 11.5–15.5)
INR BLD: 1.33 — HIGH (ref 0.88–1.16)
MAGNESIUM SERPL-MCNC: 2.1 MG/DL — SIGNIFICANT CHANGE UP (ref 1.6–2.6)
MCHC RBC-ENTMCNC: 25.1 PG — LOW (ref 27–34)
MCHC RBC-ENTMCNC: 29.6 G/DL — LOW (ref 32–36)
MCV RBC AUTO: 84.9 FL — SIGNIFICANT CHANGE UP (ref 80–100)
METHOD TYPE: SIGNIFICANT CHANGE UP
PLATELET # BLD AUTO: 344 K/UL — SIGNIFICANT CHANGE UP (ref 150–400)
POTASSIUM SERPL-MCNC: 4.9 MMOL/L — SIGNIFICANT CHANGE UP (ref 3.5–5.3)
POTASSIUM SERPL-SCNC: 4.9 MMOL/L — SIGNIFICANT CHANGE UP (ref 3.5–5.3)
PROTHROM AB SERPL-ACNC: 14.9 SEC — HIGH (ref 9.8–12.7)
RBC # BLD: 2.91 M/UL — LOW (ref 3.8–5.2)
RBC # FLD: 16.8 % — SIGNIFICANT CHANGE UP (ref 10.3–16.9)
RH IG SCN BLD-IMP: NEGATIVE — SIGNIFICANT CHANGE UP
SODIUM SERPL-SCNC: 129 MMOL/L — LOW (ref 135–145)
WBC # BLD: 8.6 K/UL — SIGNIFICANT CHANGE UP (ref 3.8–10.5)
WBC # FLD AUTO: 8.6 K/UL — SIGNIFICANT CHANGE UP (ref 3.8–10.5)

## 2017-09-25 PROCEDURE — 71010: CPT | Mod: 26,76

## 2017-09-25 PROCEDURE — 99232 SBSQ HOSP IP/OBS MODERATE 35: CPT

## 2017-09-25 PROCEDURE — 36558 INSERT TUNNELED CV CATH: CPT | Mod: LT

## 2017-09-25 PROCEDURE — 36569 INSJ PICC 5 YR+ W/O IMAGING: CPT

## 2017-09-25 PROCEDURE — 77001 FLUOROGUIDE FOR VEIN DEVICE: CPT | Mod: 26

## 2017-09-25 PROCEDURE — 99152 MOD SED SAME PHYS/QHP 5/>YRS: CPT

## 2017-09-25 PROCEDURE — 76937 US GUIDE VASCULAR ACCESS: CPT | Mod: 26,59

## 2017-09-25 RX ORDER — INSULIN GLARGINE 100 [IU]/ML
20 INJECTION, SOLUTION SUBCUTANEOUS AT BEDTIME
Qty: 0 | Refills: 0 | Status: DISCONTINUED | OUTPATIENT
Start: 2017-09-25 | End: 2017-10-02

## 2017-09-25 RX ORDER — ERYTHROPOIETIN 10000 [IU]/ML
10000 INJECTION, SOLUTION INTRAVENOUS; SUBCUTANEOUS ONCE
Qty: 0 | Refills: 0 | Status: COMPLETED | OUTPATIENT
Start: 2017-09-25 | End: 2017-09-25

## 2017-09-25 RX ORDER — INSULIN LISPRO 100/ML
10 VIAL (ML) SUBCUTANEOUS
Qty: 0 | Refills: 0 | Status: DISCONTINUED | OUTPATIENT
Start: 2017-09-25 | End: 2017-10-02

## 2017-09-25 RX ORDER — INSULIN GLARGINE 100 [IU]/ML
25 INJECTION, SOLUTION SUBCUTANEOUS AT BEDTIME
Qty: 0 | Refills: 0 | Status: DISCONTINUED | OUTPATIENT
Start: 2017-09-25 | End: 2017-09-25

## 2017-09-25 RX ADMIN — Medication 81 MILLIGRAM(S): at 12:47

## 2017-09-25 RX ADMIN — GABAPENTIN 100 MILLIGRAM(S): 400 CAPSULE ORAL at 06:45

## 2017-09-25 RX ADMIN — ERYTHROPOIETIN 10000 UNIT(S): 10000 INJECTION, SOLUTION INTRAVENOUS; SUBCUTANEOUS at 19:47

## 2017-09-25 RX ADMIN — SODIUM CHLORIDE 3 MILLILITER(S): 9 INJECTION INTRAMUSCULAR; INTRAVENOUS; SUBCUTANEOUS at 06:46

## 2017-09-25 RX ADMIN — Medication 30 MILLIGRAM(S): at 06:45

## 2017-09-25 RX ADMIN — SIMVASTATIN 20 MILLIGRAM(S): 20 TABLET, FILM COATED ORAL at 22:54

## 2017-09-25 RX ADMIN — AMLODIPINE BESYLATE 10 MILLIGRAM(S): 2.5 TABLET ORAL at 06:45

## 2017-09-25 RX ADMIN — Medication 30 MILLIGRAM(S): at 09:30

## 2017-09-25 RX ADMIN — Medication 30 MILLIGRAM(S): at 23:33

## 2017-09-25 RX ADMIN — Medication 25 MILLIGRAM(S): at 22:54

## 2017-09-25 RX ADMIN — Medication 0.2 MILLIGRAM(S): at 06:45

## 2017-09-25 RX ADMIN — SODIUM CHLORIDE 3 MILLILITER(S): 9 INJECTION INTRAMUSCULAR; INTRAVENOUS; SUBCUTANEOUS at 22:55

## 2017-09-25 RX ADMIN — Medication 6: at 12:46

## 2017-09-25 RX ADMIN — CEFTAROLINE FOSAMIL 50 MILLIGRAM(S): 600 POWDER, FOR SOLUTION INTRAVENOUS at 06:45

## 2017-09-25 RX ADMIN — INSULIN GLARGINE 20 UNIT(S): 100 INJECTION, SOLUTION SUBCUTANEOUS at 23:00

## 2017-09-25 RX ADMIN — PANTOPRAZOLE SODIUM 40 MILLIGRAM(S): 20 TABLET, DELAYED RELEASE ORAL at 06:45

## 2017-09-25 RX ADMIN — Medication 2: at 18:52

## 2017-09-25 RX ADMIN — Medication 30 MILLIGRAM(S): at 14:50

## 2017-09-25 RX ADMIN — Medication 10 UNIT(S): at 18:53

## 2017-09-25 RX ADMIN — SODIUM CHLORIDE 3 MILLILITER(S): 9 INJECTION INTRAMUSCULAR; INTRAVENOUS; SUBCUTANEOUS at 14:55

## 2017-09-25 RX ADMIN — SERTRALINE 25 MILLIGRAM(S): 25 TABLET, FILM COATED ORAL at 12:47

## 2017-09-25 RX ADMIN — Medication 30 MILLIGRAM(S): at 17:34

## 2017-09-25 RX ADMIN — GABAPENTIN 300 MILLIGRAM(S): 400 CAPSULE ORAL at 22:54

## 2017-09-25 NOTE — PROGRESS NOTE ADULT - SUBJECTIVE AND OBJECTIVE BOX
Objective and Subjective   The patient in her bed. DOes not endorse any complains, no shortness of breath, no chest pain, no fever. s/p cervical spine surgery for osteomyelitis doen on 9/23.       Patient is a 47 year old female with history of HTN, HLD, DM II, ESRD on HD (MWF. Last HD 09/13/2017. Receives HD via Right Femoral HD catheter placed on 09/13/2017 at [facility] 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 The renal follows the case for the need of HD. Last HD done on 9/23 - 2.0 liters off         Patient seen and examined at bedside.     sodium chloride 0.9% lock flush 3 milliLiter(s) every 8 hours  aspirin enteric coated 81 milliGRAM(s) daily  simvastatin 20 milliGRAM(s) at bedtime  insulin lispro (HumaLOG) corrective regimen sliding scale   Before meals and at bedtime  dextrose 5%. 1000 milliLiter(s) <Continuous>  dextrose Gel 1 Dose(s) once PRN  dextrose 50% Injectable 12.5 Gram(s) once  dextrose 50% Injectable 25 Gram(s) once  dextrose 50% Injectable 25 Gram(s) once  glucagon  Injectable 1 milliGRAM(s) once PRN  polyethylene glycol 3350 17 Gram(s) daily  pantoprazole    Tablet 40 milliGRAM(s) before breakfast  acetaminophen    Suspension. 650 milliGRAM(s) every 6 hours PRN  DAPTOmycin IVPB 700 milliGRAM(s) every 48 hours  rifampin 600 milliGRAM(s) daily  Ceftaroline Fosamil IVPB 200 milliGRAM(s) every 12 hours  amLODIPine   Tablet 10 milliGRAM(s) daily  gabapentin 100 milliGRAM(s) two times a day  gabapentin 300 milliGRAM(s) at bedtime  sertraline 25 milliGRAM(s) daily  traZODone 25 milliGRAM(s) at bedtime  ketorolac   Injectable 30 milliGRAM(s) every 6 hours PRN  cloNIDine 0.2 milliGRAM(s) every 12 hours      Allergies    penicillin (Unknown)  Sular (Unknown)    Intolerances    Alert and oriented  No JVD  HAs a catheter in the left IJ   No respiratory distress   Normal air entry in the lungs, no wheezing, no crackles, no rales   RRR, normal s1/s2, no mumurs, rubs or gallops  Abdomen - soft, non-tender, non-distended, normal bowel sounds   Extremities - mild peripheral edema   The line in the right groined removed     T(C): , Max: 37.2 (09-25-17 @ 01:02)  T(F): , Max: 98.9 (09-25-17 @ 01:02)  HR: 78 (09-25-17 @ 13:00)  BP: --  BP(mean): --  RR: 21 (09-25-17 @ 13:00)  SpO2: 100% (09-25-17 @ 13:00)  Wt(kg): --    09-24 @ 07:01  -  09-25 @ 07:00  --------------------------------------------------------  IN:    IV PiggyBack: 100 mL    Oral Fluid: 400 mL  Total IN: 500 mL    OUT:    Drain: 10 mL    Voided: 200 mL  Total OUT: 210 mL    Total NET: 290 mL      09-25 @ 07:01  -  09-25 @ 13:21  --------------------------------------------------------  IN:    Oral Fluid: 320 mL  Total IN: 320 mL    OUT:  Total OUT: 0 mL    Total NET: 320 mL              LABS:                        7.3    8.6   )-----------( 344      ( 25 Sep 2017 04:08 )             24.7     09-25    129<L>  |  94<L>  |  36<H>  ----------------------------<  144<H>  4.9   |  21<L>  |  5.34<H>    Ca    8.0<L>      25 Sep 2017 04:08  Mg     2.1     09-25        PT/INR - ( 25 Sep 2017 04:08 )   PT: 14.9 sec;   INR: 1.33          PTT - ( 25 Sep 2017 04:08 )  PTT:42.9 sec          RADIOLOGY & ADDITIONAL STUDIES:

## 2017-09-25 NOTE — PROGRESS NOTE ADULT - SUBJECTIVE AND OBJECTIVE BOX
Patient was seen and evaluated on dialysis.   Patient is tolerating the procedure well.   HR: 76 (09-25-17 @ 18:00)  BP: 127/68 (09-25-17 @ 14:00)  Continue dialysis:   Dialyzer: optiflux 180 QB: 400 QD: 500 3K bath    Goal UF 3kg over  240 minutes

## 2017-09-25 NOTE — CHART NOTE - NSCHARTNOTEFT_GEN_A_CORE
Admitting Diagnosis:   Patient is a 47y old  Female who presents with a chief complaint of TV IE / MRSA Bacteremia. (13 Sep 2017 22:57)      PAST MEDICAL & SURGICAL HISTORY:      Current Nutrition Order:   Diet, NPO (17 @ 11:30)      PO Intake: Good (%) [   ]  Fair (50-75%) [   ] Poor (<25%) [ x  ]    GI Issues: poor appetite.Noted plans for PICC and initiation of TPN    Pain:None reported    Skin Integrity:Sacral erythemia and Dm foot ulcers And buttocks    Labs:       129<L>  |  94<L>  |  36<H>  ----------------------------<  144<H>  4.9   |  21<L>  |  5.34<H>    Ca    8.0<L>      25 Sep 2017 04:08  Mg     2.1           CAPILLARY BLOOD GLUCOSE  266 (25 Sep 2017 12:00)  148 (25 Sep 2017 07:00)  211 (24 Sep 2017 22:00)          Medications:  MEDICATIONS  (STANDING):  sodium chloride 0.9% lock flush 3 milliLiter(s) IV Push every 8 hours  aspirin enteric coated 81 milliGRAM(s) Oral daily  simvastatin 20 milliGRAM(s) Oral at bedtime  insulin lispro (HumaLOG) corrective regimen sliding scale   SubCutaneous Before meals and at bedtime  dextrose 5%. 1000 milliLiter(s) (50 mL/Hr) IV Continuous <Continuous>  dextrose 50% Injectable 12.5 Gram(s) IV Push once  dextrose 50% Injectable 25 Gram(s) IV Push once  dextrose 50% Injectable 25 Gram(s) IV Push once  polyethylene glycol 3350 17 Gram(s) Oral daily  pantoprazole    Tablet 40 milliGRAM(s) Oral before breakfast  DAPTOmycin IVPB 700 milliGRAM(s) IV Intermittent every 48 hours  rifampin 600 milliGRAM(s) Oral daily  Ceftaroline Fosamil IVPB 200 milliGRAM(s) IV Intermittent every 12 hours  amLODIPine   Tablet 10 milliGRAM(s) Oral daily  gabapentin 100 milliGRAM(s) Oral two times a day  gabapentin 300 milliGRAM(s) Oral at bedtime  sertraline 25 milliGRAM(s) Oral daily  traZODone 25 milliGRAM(s) Oral at bedtime  cloNIDine 0.2 milliGRAM(s) Oral every 12 hours    MEDICATIONS  (PRN):  dextrose Gel 1 Dose(s) Oral once PRN Blood Glucose LESS THAN 70 milliGRAM(s)/deciliter  glucagon  Injectable 1 milliGRAM(s) IntraMuscular once PRN Glucose LESS THAN 70 milligrams/deciliter  acetaminophen    Suspension. 650 milliGRAM(s) Oral every 6 hours PRN Mild Pain (1 - 3)  ketorolac   Injectable 30 milliGRAM(s) IV Push every 6 hours PRN Moderate Pain (4 - 6)      Weight:87.5  Daily     Daily Weight in k.5 (25 Sep 2017 05:14)    Weight Change: 4 lb weight gain based on recorded weight.IBW used (68.2kg) due to fluid shift changes    Estimated energy needs: [id number]:2046-2387kcal and 1.4-1.6gmprotein:95-109gm and fluids per MD due to HD and fluid shift changes    Subjective: 46 y/o female s/p cervical spine surgery.Appetite remains compromised. Plans for PICC and initiation of TPN.    Previous Nutrition Diagnosis :food and nutrition related knowledge deficit r/t noncompliance with diet     Active [  ]  Resolved [ x  ]    If resolved, new PES: Inadequate oral intake r/t diminished appetite AEB: less than 50% intake  Goal:Meet 80% of needs consistently     Recommendations :With PICC:Dextrose 150gm/100gmAA 1 liter(may warrant lipids if po remains poor)Continue with renal diet and fluid restriction.Daily weights  Education: Patient educated previously.Poor adherence.  Risk Level: High [ x  ] Moderate [   ] Low [   ]

## 2017-09-25 NOTE — PROGRESS NOTE ADULT - SUBJECTIVE AND OBJECTIVE BOX
Patient discussed on morning rounds with Dr. Escamilla        SUBJECTIVE ASSESSMENT:  47y Female seen and examined. Today patient reports upper back pain due to surgery  three days ago.  She denies fever, chest pain, SOB, abdominal pain, N/V/D/C.          Vital Signs Last 24 Hrs  T(C): 36.4 (25 Sep 2017 12:00), Max: 37.2 (25 Sep 2017 01:02)  T(F): 97.6 (25 Sep 2017 12:00), Max: 98.9 (25 Sep 2017 01:02)  HR: 76 (25 Sep 2017 14:00) (70 - 91)  BP: 127/68 (25 Sep 2017 14:00) (127/68 - 127/68)  BP(mean): 86 (25 Sep 2017 14:00) (86 - 86)  RR: 27 (25 Sep 2017 14:00) (13 - 27)  SpO2: 99% (25 Sep 2017 14:00) (92% - 100%)  I&O's Detail    24 Sep 2017 07:01  -  25 Sep 2017 07:00  --------------------------------------------------------  IN:    IV PiggyBack: 100 mL    Oral Fluid: 400 mL  Total IN: 500 mL    OUT:    Drain: 10 mL    Voided: 200 mL  Total OUT: 210 mL    Total NET: 290 mL      25 Sep 2017 07:01  -  25 Sep 2017 15:22  --------------------------------------------------------  IN:    Oral Fluid: 320 mL  Total IN: 320 mL    OUT:  Total OUT: 0 mL    Total NET: 320 mL          CHEST TUBE:  No  LOLLY DRAIN:  No.  EPICARDIAL WIRES: No.  TIE DOWNS: No.  COOK: No.    PHYSICAL EXAM:    General: laying comfortably in bed, no acute distress    Neurological: awake alert and oriented, CN grossly intact, 5/5 strength all extremities     Cardiovascular: S1S2, RRR no murmurs heard at this time     Respiratory: decreased breath sounds bilaterally, + rhonchi L base.  No wheeze/ crackles     Gastrointestinal:+BS soft nontender nondistended     Extremities: Warm and well perfused, no edema, no calf tenderness    Vascular: 2+ radial and DP pulses bilaterally     Incision Sites: Anterior cervical dressing in place with drain: CDI.  L foot + chronic fissure on L medial ankle, no drainage or tenderness. L IJ: CDI, no erythema tenderness drainage     LABS:                        7.3    8.6   )-----------( 344      ( 25 Sep 2017 04:08 )             24.7       COUMADIN:  No, heparin was help post operatively until friday 9/29/17,    PT/INR - ( 25 Sep 2017 04:08 )   PT: 14.9 sec;   INR: 1.33          PTT - ( 25 Sep 2017 04:08 )  PTT:42.9 sec    09-25    129<L>  |  94<L>  |  36<H>  ----------------------------<  144<H>  4.9   |  21<L>  |  5.34<H>    Ca    8.0<L>      25 Sep 2017 04:08  Mg     2.1     09-25            MEDICATIONS  (STANDING):  sodium chloride 0.9% lock flush 3 milliLiter(s) IV Push every 8 hours  aspirin enteric coated 81 milliGRAM(s) Oral daily  simvastatin 20 milliGRAM(s) Oral at bedtime  insulin lispro (HumaLOG) corrective regimen sliding scale   SubCutaneous Before meals and at bedtime  dextrose 5%. 1000 milliLiter(s) (50 mL/Hr) IV Continuous <Continuous>  dextrose 50% Injectable 12.5 Gram(s) IV Push once  dextrose 50% Injectable 25 Gram(s) IV Push once  dextrose 50% Injectable 25 Gram(s) IV Push once  polyethylene glycol 3350 17 Gram(s) Oral daily  pantoprazole    Tablet 40 milliGRAM(s) Oral before breakfast  DAPTOmycin IVPB 700 milliGRAM(s) IV Intermittent every 48 hours  rifampin 600 milliGRAM(s) Oral daily  Ceftaroline Fosamil IVPB 200 milliGRAM(s) IV Intermittent every 12 hours  amLODIPine   Tablet 10 milliGRAM(s) Oral daily  gabapentin 100 milliGRAM(s) Oral two times a day  gabapentin 300 milliGRAM(s) Oral at bedtime  sertraline 25 milliGRAM(s) Oral daily  traZODone 25 milliGRAM(s) Oral at bedtime  cloNIDine 0.2 milliGRAM(s) Oral every 12 hours  epoetin fransisca Injectable 47419 Unit(s) IV Push once  insulin glargine Injectable (LANTUS) 25 Unit(s) SubCutaneous at bedtime  insulin lispro Injectable (HumaLOG) 10 Unit(s) SubCutaneous three times a day before meals    MEDICATIONS  (PRN):  dextrose Gel 1 Dose(s) Oral once PRN Blood Glucose LESS THAN 70 milliGRAM(s)/deciliter  glucagon  Injectable 1 milliGRAM(s) IntraMuscular once PRN Glucose LESS THAN 70 milligrams/deciliter  acetaminophen    Suspension. 650 milliGRAM(s) Oral every 6 hours PRN Mild Pain (1 - 3)  ketorolac   Injectable 30 milliGRAM(s) IV Push every 6 hours PRN Moderate Pain (4 - 6)        RADIOLOGY & ADDITIONAL TESTS:

## 2017-09-25 NOTE — PROGRESS NOTE ADULT - ASSESSMENT
47 year old female with PMHx HTN, HLD, DM, ESRD on HD, chronic left foot OM, Tricuspid valve endocarditis who was recently admitted to [facility] under Dr. Gee and found to not be a surgical candidate, and then transferred to [facility] for medical management. Patient failed medical management with aggressive antibiotics and was found to have enlarging tricuspid vegetation on ALCIDES and was transferred back to [facility] for further evaluation. During this admission patient complaining of back/neck pain, workup revealed cervical abscess and patient is now POD3 s/p C6 corpectomy and C5-C7 anterior plating with Dr. Kuo (orthopedics). Patient transferred to CCU post procedure. POD1 patient became bradycardic HR 40s with non conducted P waves. Patient converted back to NSR and has remained hemodynamically stable since. Today POD3, patient getting PICC line for TPN, and Permacath placed for HD.  Blood cx sent.     Neurovascular: Stable.   - Last admission CT head findings concerning for evolving right MCA infarct, this admission patient complaining of LLE weakness. Neurology Dr. Luna following appreciate recs. MRI head no acute pathology   -POD3 C6 Corpectomy and C5-C7 anterior plating with Dr. Kuo on 9/23/17 for cervical OM with epidural abscess,  Appreciate ortho recs.   - continue toradol and tylenol PRN for pain   - continue gabapentin 100mg BID and 300mg qhs for LLE weakness and pain   - continue sertraline 25mg daily for depression and  trazodone 25mg qhs for insomnia     Cardiovascular: Hemodynamically stable. HR controlled.  - MRSA Endocarditis- continue daptomycin, ceftaroline, and rigampin  -preop TVR/ CABG next week, as per Dr. Kuo heparin can be restarted Friday 9/30/17. Preop workup completed . Continue [id number]  daily   - SVC thrombus- holding heparin due to orthopedic surgery at risk for epidural hematoma.   - 2nd degree heartblock 9/23/17 pacing pads in place with TVP kit in room. Metoprolol discontinued. Continue to monitor rhythm   - Hx HLD, continue simvastatin 20mg qhs   - Hx  HTN, continue amlodipine 10mg. Valsartan 160mg held for OR. Increased clonidine to 0.2mg BID  -continue to monitor BP     Respiratory: 02 Sat = 99% RA   - Encourage ambulation and Use of IS 10x / hr while awake.  -    GI: Stable.  - NPO for Permacath   -Nutrition rec Reglan for bowel regimen and Miranol 2.5mg BID for appetite stimulant   - Continue protonix 40mg for GI PPX    Renal / :  ESRD on HD, Cr 5.34  - Renal following, HD today, permacath placed in IR today, 1UPRBC to be given with HD  - Monitor I/O's.    Endocrine:  DM, A1C 8.2 TSH 2.447  - Endocrine following, rec restarting on 25U lantus QHS, and 10U humalog premeal, and ISS     Hematologic:   H&H 7.3/24.7  -SVC thrombus- heparin held for OR with orthopedics, as per Dr. Kuo safe anticoagulate after 9/30/17   -1 UPRBC to be given during HD     ID:    - MRSA endocarditis- continue daptomycin 700mg q48 following HD, rifampin 600mg and ceftaroline 200mg BID, Dr. Kingston following   - Blood cultures from 9/19 negative. Blood cultures sent 9/23/17 negative to date, Blood cx to be sent today  - Chronic OM of L foot, vascular/ortho following.   - Observe for SIRS/Sepsis Syndrome.    Prophylaxis:   -holding heparin due to ortho surgery  -SCD's    Disposition  -OR next week 47 year old female with PMHx HTN, HLD, DM, ESRD on HD, chronic left foot OM, Tricuspid valve endocarditis who was recently admitted to [facility] under Dr. Gee and found to not be a surgical candidate, and then transferred to [place] for medical management. Patient failed medical management with aggressive antibiotics and was found to have enlarging tricuspid vegetation on ALCIDES and was transferred back to [facility] for further evaluation. During this admission patient complaining of back/neck pain, workup revealed cervical abscess and patient is now POD3 s/p C6 corpectomy and C5-C7 anterior plating with Dr. Kuo (orthopedics). Patient transferred to CCU post procedure. POD1 patient became bradycardic HR 40s with non conducted P waves. Patient converted back to NSR and has remained hemodynamically stable since. Today POD3, patient getting PICC line for TPN, and Permacath placed for HD.  Blood cx sent.     Neurovascular: Stable.   - Last admission CT head findings concerning for evolving right MCA infarct, this admission patient complaining of LLE weakness. Neurology Dr. Luna following appreciate recs. MRI head no acute pathology   -POD3 C6 Corpectomy and C5-C7 anterior plating with Dr. Kuo on 9/23/17 for cervical OM with epidural abscess,  Appreciate ortho recs.   - continue toradol and tylenol PRN for pain   - continue gabapentin 100mg BID and 300mg qhs for LLE weakness and pain   - continue sertraline 25mg daily for depression and  trazodone 25mg qhs for insomnia     Cardiovascular: Hemodynamically stable. HR controlled.  - MRSA Endocarditis- continue daptomycin, ceftaroline, and rigampin  -preop TVR/ CABG next week, as per Dr. Kuo heparin can be restarted Friday 9/30/17. Preop workup completed . Continue [id number]  daily   - SVC thrombus- holding heparin due to orthopedic surgery at risk for epidural hematoma.   - 2nd degree heartblock 9/23/17 pacing pads in place with TVP kit in room. Metoprolol discontinued. Continue to monitor rhythm   - Hx HLD, continue simvastatin 20mg qhs   - Hx HTN, continue amlodipine 10mg. Valsartan 160mg held for OR. Increased clonidine to 0.2mg BID  -continue to monitor BP     Respiratory: 02 Sat = 99% RA   - Encourage ambulation and Use of IS 10x / hr while awake.  -AM CXR    GI: Stable.  - NPO for Permacath   -Nutrition rec Reglan for bowel regimen and Miranol 2.5mg BID for appetite stimulant   - Continue protonix 40mg for GI PPX    Renal / :  ESRD on HD, Cr 5.34  - Renal following, HD today, permacath placed in IR today, 1UPRBC to be given with HD  - Monitor I/O's.    Endocrine:  DM, A1C 8.2 TSH 2.447  - Endocrine following, rec restarting on 25U lantus QHS, and 10U humalog premeal, and ISS     Hematologic:   H&H 7.3/24.7  -SVC thrombus- heparin held for OR with orthopedics, as per Dr. Kuo safe anticoagulate after 9/30/17   -1 UPRBC to be given during HD     ID:    - MRSA endocarditis- continue daptomycin 700mg q48 following HD, rifampin 600mg and ceftaroline 200mg BID, Dr. Kingston following   - Blood cultures from 9/19 negative. Blood cultures sent 9/23/17 negative to date, Blood cx to be sent today  - Chronic OM of L foot, vascular/ortho following.   - Observe for SIRS/Sepsis Syndrome.    Prophylaxis:   -holding heparin due to ortho surgery  -SCD's    Disposition  -OR next week 47 year old female with PMHx HTN, HLD, DM, ESRD on HD, chronic left foot OM, Tricuspid valve endocarditis who was recently admitted to [facility] under Dr. Gee and found to not be a surgical candidate, and then transferred to [place] for medical management. Patient failed medical management with aggressive antibiotics and was found to have enlarging tricuspid vegetation on ALCIDES and was transferred back to [facility] for further evaluation. During this admission patient complaining of back/neck pain, workup revealed cervical abscess and patient is now POD3 s/p C6 corpectomy and C5-C7 anterior plating with Dr. Kuo (orthopedics). Patient transferred to CCU post procedure. POD1 patient became bradycardic HR 40s with non conducted P waves. Patient converted back to NSR and has remained hemodynamically stable since. Today POD3, patient getting PICC line for TPN, and Permacath placed for HD.  Blood cx sent.     Neurovascular: Stable.   - Last admission CT head findings concerning for evolving right MCA infarct, this admission patient complaining of LLE weakness. Neurology Dr. Luna following appreciate recs. MRI head no acute pathology   -POD3 C6 Corpectomy and C5-C7 anterior plating with Dr. Kuo on 9/23/17 for cervical OM with epidural abscess,  Appreciate ortho recs.   - continue toradol and tylenol PRN for pain   - continue gabapentin 100mg BID and 300mg qhs for LLE weakness and pain   - continue sertraline 25mg daily for depression and  trazodone 25mg qhs for insomnia     Cardiovascular: Hemodynamically stable. HR controlled.  - MRSA Endocarditis- continue daptomycin, ceftaroline, and rigampin  -preop TVR/ CABG next week, as per Dr. Kuo heparin can be restarted Friday 9/30/17. Preop workup completed . Continue [id number]  daily   - SVC thrombus- holding heparin due to orthopedic surgery at risk for epidural hematoma.   - 2nd degree heartblock 9/23/17 pacing pads in place with TVP kit in room. Metoprolol discontinued. Continue to monitor rhythm   - Hx HLD, continue simvastatin 20mg qhs   - Hx HTN, continue amlodipine 10mg. Valsartan 160mg held for OR. Increased clonidine to 0.2mg BID  -continue to monitor BP     Respiratory: 02 Sat = 99% RA   - Encourage ambulation and Use of IS 10x / hr while awake.  -AM CXR    GI: Stable.  - NPO for Permacath   -start miralax for bowel regimen.  - Continue protonix 40mg for GI PPX    Renal / :  ESRD on HD, Cr 5.34  - Renal following, HD today, permacath placed in IR today, 1UPRBC to be given with HD  - Monitor I/O's.    Endocrine:  DM, A1C 8.2 TSH 2.447  - Endocrine following, rec restarting on 20U lantus QHS, and 10U humalog premeal, and ISS     Hematologic:   H&H 7.3/24.7  -SVC thrombus- heparin held for OR with orthopedics, as per Dr. Kuo safe anticoagulate after 9/30/17   -1 UPRBC to be given during HD     ID:    - MRSA endocarditis- continue daptomycin 700mg q48 following HD, rifampin 600mg and ceftaroline 200mg BID, Dr. Kingston following   - Blood cultures from 9/19 negative. Blood cultures sent 9/23/17 negative to date, Blood cx to be sent today from [place]   - Chronic OM of L foot, vascular/ortho following.   - Observe for SIRS/Sepsis Syndrome.    Prophylaxis:   -holding heparin due to ortho surgery  -SCD's    Disposition  -OR next week

## 2017-09-25 NOTE — PROGRESS NOTE ADULT - ASSESSMENT
This is 47 year old female with PMH stated above. The renal service is activated for the need of HD. She was dialyzed on 9/23- .0 liters off. We will do HD today - after placement of new line. The left IJ was with poor flow during the last HD session

## 2017-09-25 NOTE — PROGRESS NOTE ADULT - ATTENDING COMMENTS
Pt was off the floor at the time of our rounds this afternoon.  Recent events were discussed with Dr. Yoon.  Appetite has been poorer since surgery, and basal insulin requirements also seem to have decreased further.  (Were already decreasing prior to surgery--?? due to treatment of infection).  To restart Lantus at only 20 units a day, and although will also decrease the pre-meal Humalog, standing dose needs to be held if pt does not intend to eat a particular meal

## 2017-09-25 NOTE — PROGRESS NOTE ADULT - SUBJECTIVE AND OBJECTIVE BOX
Patient seen and examined at bedside.     SUBJECTIVE: No acute events overnight.  Pain tolerable.    Denies Chest Pain/SOB.    Denies Headache.    Denies Nausea/vomiting.      OBJECTIVE:   T(C): 37.1 (09-25-17 @ 05:13), Max: 37.2 (09-25-17 @ 01:02)  T(F): 98.8 (09-25-17 @ 05:13), Max: 98.9 (09-25-17 @ 01:02)  HR: 76 (09-25-17 @ 06:00) (74 - 91)  BP: 146/72 (09-24-17 @ 07:00) (146/72 - 146/72)  RR:  (13 - 24)  SpO2:  (92% - 100%)  Wt(kg): --    NAD, non labored respirations  Affected extremity:          Dressing: clean/dry/intact          Drains: 1         Sensation: [ x] intact to light touch  [ ] decreased                              Vascular: [x ] warm well perfused; capillary refill <3 seconds          Motor exam: [x ]         [x ] Upper extremity                   Pascual (c5)   Bi(c5/6)   WE(c6)   EE(c7)    (c8)                                                R           5              5            5             5             5                                               L            5              5            5             5            5         [ ] Lower extremity                   IP(L2)     Q(L3)     TA(L4)     EHL(L5)     GS(s1)                                                 R          5           5          5            5              5                                               L           5           5         5             5              5                                     7.3<L>  8.6   )-------------------( 344      ( 09-25 @ 04:08 )                 24.7<L>    I&O's Detail    23 Sep 2017 07:01  -  24 Sep 2017 07:00  --------------------------------------------------------  IN:    IV PiggyBack: 50 mL    Oral Fluid: 670 mL  Total IN: 720 mL    OUT:    Drain: 20 mL  Total OUT: 20 mL    Total NET: 700 mL      24 Sep 2017 07:01  -  25 Sep 2017 06:26  --------------------------------------------------------  IN:    IV PiggyBack: 100 mL    Oral Fluid: 400 mL  Total IN: 500 mL    OUT:    Drain: 10 mL    Voided: 200 mL  Total OUT: 210 mL    Total NET: 290 mL          A/P :  47y Female s/p C6 corpectomy C5-7 Ant Plating    6/22/17  -    Pain control + bowel regimen  -    DVT ppx: SCDs    -    Periop abx    -    ADAT  -    Check AM labs  -    Weight bearing status:   WBAT        -    Physical Therapy  -    Resume home meds  -    P ccu

## 2017-09-25 NOTE — PROGRESS NOTE ADULT - PROBLEM SELECTOR PLAN 2
hemoglobin - 7.3  - most likely due to the infection and underlying Renal failure   - we will do EPO with HD today.

## 2017-09-25 NOTE — PROGRESS NOTE ADULT - SUBJECTIVE AND OBJECTIVE BOX
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 (13 Sep 2017 22:57)    Patient denies any complaints and with minimal pain. She does not have the best appetite and sometimes eats but can skip meals. Her insulin was stopped over the weekend due to low blood sugars and poor PO intake.    Pt reports the following symptoms:    CONSTITUTIONAL:  Negative fever or chills, feels well, good appetite  EYES:  Negative  blurry vision or double vision  CARDIOVASCULAR:  Negative for chest pain or palpitations  RESPIRATORY:  Negative for cough, wheezing, or SOB   GASTROINTESTINAL:  Negative for nausea, vomiting, diarrhea, constipation, or abdominal pain  GENITOURINARY:  Negative frequency, urgency or dysuria  NEUROLOGIC:  No headache, confusion, dizziness, lightheadedness    MEDICATIONS  (STANDING):  sodium chloride 0.9% lock flush 3 milliLiter(s) IV Push every 8 hours  aspirin enteric coated 81 milliGRAM(s) Oral daily  simvastatin 20 milliGRAM(s) Oral at bedtime  insulin lispro (HumaLOG) corrective regimen sliding scale   SubCutaneous Before meals and at bedtime  dextrose 5%. 1000 milliLiter(s) (50 mL/Hr) IV Continuous <Continuous>  dextrose 50% Injectable 12.5 Gram(s) IV Push once  dextrose 50% Injectable 25 Gram(s) IV Push once  dextrose 50% Injectable 25 Gram(s) IV Push once  polyethylene glycol 3350 17 Gram(s) Oral daily  pantoprazole    Tablet 40 milliGRAM(s) Oral before breakfast  DAPTOmycin IVPB 700 milliGRAM(s) IV Intermittent every 48 hours  rifampin 600 milliGRAM(s) Oral daily  Ceftaroline Fosamil IVPB 200 milliGRAM(s) IV Intermittent every 12 hours  amLODIPine   Tablet 10 milliGRAM(s) Oral daily  gabapentin 100 milliGRAM(s) Oral two times a day  gabapentin 300 milliGRAM(s) Oral at bedtime  sertraline 25 milliGRAM(s) Oral daily  traZODone 25 milliGRAM(s) Oral at bedtime  cloNIDine 0.2 milliGRAM(s) Oral every 12 hours    MEDICATIONS  (PRN):  dextrose Gel 1 Dose(s) Oral once PRN Blood Glucose LESS THAN 70 milliGRAM(s)/deciliter  glucagon  Injectable 1 milliGRAM(s) IntraMuscular once PRN Glucose LESS THAN 70 milligrams/deciliter  acetaminophen    Suspension. 650 milliGRAM(s) Oral every 6 hours PRN Mild Pain (1 - 3)  ketorolac   Injectable 30 milliGRAM(s) IV Push every 6 hours PRN Moderate Pain (4 - 6)      PHYSICAL EXAM  Vital Signs Last 24 Hrs  T(C): 36.4 (25 Sep 2017 12:00), Max: 37.2 (25 Sep 2017 01:02)  T(F): 97.6 (25 Sep 2017 12:00), Max: 98.9 (25 Sep 2017 01:02)  HR: 78 (25 Sep 2017 13:00) (70 - 91)  BP: --  BP(mean): --  RR: 21 (25 Sep 2017 13:00) (13 - 25)  SpO2: 100% (25 Sep 2017 13:00) (92% - 100%)    Constitutional: wn/wd in NAD.   HEENT: NCAT, MMM, OP clear, EOMI, no proptosis or lid retraction  Neck: no thyromegaly or palpable thyroid nodules   Respiratory: lungs CTAB.  Cardiovascular: regular rhythm, normal S1 and S2, no audible murmurs, no peripheral edema  GI: soft, NT/ND, no masses/HSM appreciated.  Neurology: no tremors, DTR 2+  Skin: no visible rashes/lesions  Psychiatric: AAO x 3, normal affect/mood.    LABS:                        7.3    8.6   )-----------( 344      ( 25 Sep 2017 04:08 )             24.7     09-25    129<L>  |  94<L>  |  36<H>  ----------------------------<  144<H>  4.9   |  21<L>  |  5.34<H>    Ca    8.0<L>      25 Sep 2017 04:08  Mg     2.1     09-25      PT/INR - ( 25 Sep 2017 04:08 )   PT: 14.9 sec;   INR: 1.33          PTT - ( 25 Sep 2017 04:08 )  PTT:42.9 sec    Thyroid Stimulating Hormone, Serum: 2.447 uIU/mL (09-13 @ 22:51)  Thyroid Stimulating Hormone, Serum: 1.251 uIU/mL (08-24 @ 01:12)      HbA1C: 8.2 % (09-13 @ 22:51)  8.2 % (08-24 @ 01:11)    CAPILLARY BLOOD GLUCOSE  266 (25 Sep 2017 12:00)  148 (25 Sep 2017 07:00)  211 (24 Sep 2017 22:00)    A/P:47y Female with DM2 admitted with persistent MRSA bacteremia with involvement of her Tricuspid valve now s/p cervical surgery for abscess from septic emboli    1.  DM 2-uncontrolled, complicated- Patient has a very poor diabetic history and has multiple diabetic complications. Her A1C is likely higher given that patient is on procrit and has high cell turnover. Patient has been counseled on her poor diet choices and to be consistent with her meals. She will likely be difficult to control while inpatient due to her access to outside food and non-compliance with a diabetic diet and her inconsistencies with her PO intake. She was taken off on standing insulin over the weekend but now her FS are climbing upto 200s.  Please start lantus to 25 units at night.  Please start lispro 10 units before each meal.  Please continue lispro moderate dose sliding scale four times daily with meals and at bedtime    Pt's fingerstick glucose goal is 120-180     Will continue to monitor     Pt can follow up at discharge with [facility] by calling  to make an appointment.   Will discuss case with Dr. Ross  and update primary team INTERVAL HPI/OVERNIGHT EVENTS:    Patient is a 47y old  Female who presents with a chief complaint of TV IE / MRSA Bacteremia. (13 Sep 2017 22:57)    Patient denies any complaints and with minimal pain. She does not have the best appetite and sometimes eats but can skip meals. Her insulin was stopped over the weekend due to low blood sugars and poor PO intake.    Pt reports the following symptoms:    CONSTITUTIONAL:  Negative fever or chills, feels well, good appetite  EYES:  Negative  blurry vision or double vision  CARDIOVASCULAR:  Negative for chest pain or palpitations  RESPIRATORY:  Negative for cough, wheezing, or SOB   GASTROINTESTINAL:  Negative for nausea, vomiting, diarrhea, constipation, or abdominal pain  GENITOURINARY:  Negative frequency, urgency or dysuria  NEUROLOGIC:  No headache, confusion, dizziness, lightheadedness    MEDICATIONS  (STANDING):  sodium chloride 0.9% lock flush 3 milliLiter(s) IV Push every 8 hours  aspirin enteric coated 81 milliGRAM(s) Oral daily  simvastatin 20 milliGRAM(s) Oral at bedtime  insulin lispro (HumaLOG) corrective regimen sliding scale   SubCutaneous Before meals and at bedtime  dextrose 5%. 1000 milliLiter(s) (50 mL/Hr) IV Continuous <Continuous>  dextrose 50% Injectable 12.5 Gram(s) IV Push once  dextrose 50% Injectable 25 Gram(s) IV Push once  dextrose 50% Injectable 25 Gram(s) IV Push once  polyethylene glycol 3350 17 Gram(s) Oral daily  pantoprazole    Tablet 40 milliGRAM(s) Oral before breakfast  DAPTOmycin IVPB 700 milliGRAM(s) IV Intermittent every 48 hours  rifampin 600 milliGRAM(s) Oral daily  Ceftaroline Fosamil IVPB 200 milliGRAM(s) IV Intermittent every 12 hours  amLODIPine   Tablet 10 milliGRAM(s) Oral daily  gabapentin 100 milliGRAM(s) Oral two times a day  gabapentin 300 milliGRAM(s) Oral at bedtime  sertraline 25 milliGRAM(s) Oral daily  traZODone 25 milliGRAM(s) Oral at bedtime  cloNIDine 0.2 milliGRAM(s) Oral every 12 hours    MEDICATIONS  (PRN):  dextrose Gel 1 Dose(s) Oral once PRN Blood Glucose LESS THAN 70 milliGRAM(s)/deciliter  glucagon  Injectable 1 milliGRAM(s) IntraMuscular once PRN Glucose LESS THAN 70 milligrams/deciliter  acetaminophen    Suspension. 650 milliGRAM(s) Oral every 6 hours PRN Mild Pain (1 - 3)  ketorolac   Injectable 30 milliGRAM(s) IV Push every 6 hours PRN Moderate Pain (4 - 6)      PHYSICAL EXAM  Vital Signs Last 24 Hrs  T(C): 36.4 (25 Sep 2017 12:00), Max: 37.2 (25 Sep 2017 01:02)  T(F): 97.6 (25 Sep 2017 12:00), Max: 98.9 (25 Sep 2017 01:02)  HR: 78 (25 Sep 2017 13:00) (70 - 91)  BP: --  BP(mean): --  RR: 21 (25 Sep 2017 13:00) (13 - 25)  SpO2: 100% (25 Sep 2017 13:00) (92% - 100%)    Constitutional: wn/wd in NAD.   HEENT: NCAT, MMM, OP clear, EOMI, no proptosis or lid retraction  Neck: no thyromegaly or palpable thyroid nodules   Respiratory: lungs CTAB.  Cardiovascular: regular rhythm, normal S1 and S2, no audible murmurs, no peripheral edema  GI: soft, NT/ND, no masses/HSM appreciated.  Neurology: no tremors, DTR 2+  Skin: no visible rashes/lesions  Psychiatric: AAO x 3, normal affect/mood.    LABS:                        7.3    8.6   )-----------( 344      ( 25 Sep 2017 04:08 )             24.7     09-25    129<L>  |  94<L>  |  36<H>  ----------------------------<  144<H>  4.9   |  21<L>  |  5.34<H>    Ca    8.0<L>      25 Sep 2017 04:08  Mg     2.1     09-25      PT/INR - ( 25 Sep 2017 04:08 )   PT: 14.9 sec;   INR: 1.33          PTT - ( 25 Sep 2017 04:08 )  PTT:42.9 sec    Thyroid Stimulating Hormone, Serum: 2.447 uIU/mL (09-13 @ 22:51)  Thyroid Stimulating Hormone, Serum: 1.251 uIU/mL (08-24 @ 01:12)      HbA1C: 8.2 % (09-13 @ 22:51)  8.2 % (08-24 @ 01:11)    CAPILLARY BLOOD GLUCOSE  266 (25 Sep 2017 12:00)  148 (25 Sep 2017 07:00)  211 (24 Sep 2017 22:00)    A/P:47y Female with DM2 admitted with persistent MRSA bacteremia with involvement of her Tricuspid valve now s/p cervical surgery for abscess from septic emboli    1.  DM 2-uncontrolled, complicated- Patient has a very poor diabetic history and has multiple diabetic complications. Her A1C is likely higher given that patient is on procrit and has high cell turnover. Patient has been counseled on her poor diet choices and to be consistent with her meals. She will likely be difficult to control while inpatient due to her access to outside food and non-compliance with a diabetic diet and her inconsistencies with her PO intake. She was taken off on standing insulin over the weekend but now her FS are climbing upto 200s.  Please start lantus to 20 units at night.  Please start lispro 10 units before each meal.  Please continue lispro moderate dose sliding scale four times daily with meals and at bedtime    Pt's fingerstick glucose goal is 120-180     Will continue to monitor     Pt can follow up at discharge with [facility] by calling  to make an appointment.   Will discuss case with Dr. Ross  and update primary team

## 2017-09-25 NOTE — PROGRESS NOTE ADULT - SUBJECTIVE AND OBJECTIVE BOX
Stroke Neurology Follow-Up Visit    Pt seen and examined.   She's had surgical resection of osteomyelitis and epidural abscess found in cervical vertebrae.  Some discomfort at site of bandage but patient more concerned about the location of her next central line.  She still reports that her left leg feels numb compared to her right leg, unchanged since previously.  Both legs feel weak with trouble raising Reports continued bilateral lower extremity weakness, with LLE worse than RLE. This has been unchanged since last month when she was first diagnosed with endocarditis.   Patient on antibiotics.  Still working up for cardiothoracic surgery     Exam:  Vital Signs Last 24 Hrs  T(C): 36.4 (25 Sep 2017 12:00), Max: 37.2 (25 Sep 2017 01:02)  T(F): 97.6 (25 Sep 2017 12:00), Max: 98.9 (25 Sep 2017 01:02)  HR: 76 (25 Sep 2017 18:00) (70 - 89)  BP: 127/68 (25 Sep 2017 14:00) (127/68 - 131/72)  BP(mean): 86 (25 Sep 2017 14:00) (86 - 92)  RR: 16 (25 Sep 2017 18:00) (13 - 27)  SpO2: 100% (25 Sep 2017 18:00) (92% - 100%)    Gen: Lying in bed, calm, NAD   CV: RRR  Extremities: 2+ radial pulses bilaterally    Neurological exam:  Mental status: Awake, alert and oriented x3. Able to follow commands, but reluctant at times. Naming and repetition intact-no aphasia.  Attention/concentration intact.  No dysarthria, speech is fluent and spontaneous   Cranial nerves: Pupils equally round and reactive to light, 3 mm, visual fields full, extraocular muscles intact, V1 through V3 intact bilaterally and symmetric, no facial droop, tongue was midline   Motor: No pronator drift of upper extremities, both UE at least 4+/5, trouble raising both legs but better on right, Right LE at least 4-/5, Left LE at least 3/5 since even though patient stated that she couldn't lift her left leg, she was able to flex at her knee and then adduct her left leg with good strength so she has hip and knee flexion, good toe movement   Sensation: Intact and symmetric to light touch in upper extremities.  Decreased light touch on whole left leg including both thigh and calf, intact in her foot  Coordination: finger to nose intact bilaterally   Reflexes: symmetric in all extremities. Mute toes bilaterally.   Gait: deferred at this time     MEDICATIONS  (STANDING):  sodium chloride 0.9% lock flush 3 milliLiter(s) IV Push every 8 hours  aspirin enteric coated 81 milliGRAM(s) Oral daily  simvastatin 20 milliGRAM(s) Oral at bedtime  insulin lispro (HumaLOG) corrective regimen sliding scale   SubCutaneous Before meals and at bedtime  polyethylene glycol 3350 17 Gram(s) Oral daily  pantoprazole    Tablet 40 milliGRAM(s) Oral before breakfast  DAPTOmycin IVPB 700 milliGRAM(s) IV Intermittent every 48 hours  rifampin 600 milliGRAM(s) Oral daily  Ceftaroline Fosamil IVPB 200 milliGRAM(s) IV Intermittent every 12 hours  amLODIPine   Tablet 10 milliGRAM(s) Oral daily  gabapentin 100 milliGRAM(s) Oral two times a day  gabapentin 300 milliGRAM(s) Oral at bedtime  sertraline 25 milliGRAM(s) Oral daily  traZODone 25 milliGRAM(s) Oral at bedtime  cloNIDine 0.2 milliGRAM(s) Oral every 12 hours  epoetin fransisca Injectable 38489 Unit(s) IV Push once  insulin lispro Injectable (HumaLOG) 10 Unit(s) SubCutaneous three times a day before meals  insulin glargine Injectable (LANTUS) 20 Unit(s) SubCutaneous at bedtime    MEDICATIONS  (PRN):  acetaminophen    Suspension. 650 milliGRAM(s) Oral every 6 hours PRN Mild Pain (1 - 3)  ketorolac   Injectable 30 milliGRAM(s) IV Push every 6 hours PRN Moderate Pain (4 - 6)    Labs:                        7.3    8.6   )-----------( 344      ( 25 Sep 2017 04:08 )             24.7   09-25    129<L>  |  94<L>  |  36<H>  ----------------------------<  144<H>  4.9   |  21<L>  |  5.34<H>    Ca    8.0<L>      25 Sep 2017 04:08  Mg     2.1     09-25    PT/INR - ( 25 Sep 2017 04:08 )   PT: 14.9 sec;   INR: 1.33     PTT - ( 25 Sep 2017 04:08 )  PTT:42.9 sec    Hemoglobin A1C, Whole Blood: 8.2 %  Cholesterol, Serum: 70 mg/dL  HDL Cholesterol, Serum: 12 mg/dL  Triglycerides, Serum: 140 mg/dL  LDL 30  Thyroid Stimulating Hormone, Serum: 2.447 uIU/mL    Radiology:    CT Head No Cont (09.17.17 @ 10:13) -  IMPRESSION: No intracranial hemorrhage or acute transcortical infarct.   Bilateral orbital proptosis and clinical correlations recommended.    MRI Head w/o Cont (09.19.17 @ 22:45) -  Mild cortical volume loss. Minimum small vessel ischemic and lacunar disease. No acute ischemia. Minimum right mastoid disease. Bilateral orbital proptosis.    MRI Cervical/Thoracic/Lumbar Spine w/o Cont (09.19.17 @ 22:45) -   Findings suspicious for osteomyelitis at the C5/6 level with prevertebral fluid/edema and small epidural soft tissue component. No   cord compression at this time. Additional discitis osteomyelitis at the T7/8 level without prevertebral or epidural component. Extensive abnormal   marrow which may be secondary to renal osteodystrophy.    Assessment & Plan:  47 year old female with PMH of HTN, HLD, DM II, ESRD on HD, chronic left foot osteomyelitis, transferred again from [facility] for further workup for endocarditis. Of note, pt did have reports of bilateral lower extremity weakness on last admission, documented as 2/5 of RLE and LLE. Imaging from previous admission and this admission are not suggestive of stroke.  Doubt related to spinal osteomyelitis either.    - She seems a little stronger today than last week.  - Would monitor weakness while rest of workup for endocarditis in progress.  - She may warrant EMG/NCS as outpatient after workup completed for cardiology depending on residual deficit.

## 2017-09-26 LAB
-  CEFAZOLIN: SIGNIFICANT CHANGE UP
-  CLINDAMYCIN: SIGNIFICANT CHANGE UP
-  DAPTOMYCIN: SIGNIFICANT CHANGE UP
-  ERYTHROMYCIN: SIGNIFICANT CHANGE UP
-  LINEZOLID: SIGNIFICANT CHANGE UP
-  OXACILLIN: SIGNIFICANT CHANGE UP
-  PENICILLIN: SIGNIFICANT CHANGE UP
-  RIFAMPIN: SIGNIFICANT CHANGE UP
-  TETRACYCLINE: SIGNIFICANT CHANGE UP
-  TRIMETHOPRIM/SULFAMETHOXAZOLE: SIGNIFICANT CHANGE UP
-  VANCOMYCIN: SIGNIFICANT CHANGE UP
ANION GAP SERPL CALC-SCNC: 11 MMOL/L — SIGNIFICANT CHANGE UP (ref 5–17)
APTT BLD: 36.4 SEC — SIGNIFICANT CHANGE UP (ref 27.5–37.4)
BUN SERPL-MCNC: 21 MG/DL — SIGNIFICANT CHANGE UP (ref 7–23)
CALCIUM SERPL-MCNC: 8.1 MG/DL — LOW (ref 8.4–10.5)
CHLORIDE SERPL-SCNC: 95 MMOL/L — LOW (ref 96–108)
CO2 SERPL-SCNC: 25 MMOL/L — SIGNIFICANT CHANGE UP (ref 22–31)
CREAT SERPL-MCNC: 3.46 MG/DL — HIGH (ref 0.5–1.3)
CULTURE RESULTS: SIGNIFICANT CHANGE UP
GLUCOSE SERPL-MCNC: 124 MG/DL — HIGH (ref 70–99)
HCT VFR BLD CALC: 26.4 % — LOW (ref 34.5–45)
HGB BLD-MCNC: 8.1 G/DL — LOW (ref 11.5–15.5)
INR BLD: 1.38 — HIGH (ref 0.88–1.16)
MAGNESIUM SERPL-MCNC: 2 MG/DL — SIGNIFICANT CHANGE UP (ref 1.6–2.6)
MCHC RBC-ENTMCNC: 26 PG — LOW (ref 27–34)
MCHC RBC-ENTMCNC: 30.7 G/DL — LOW (ref 32–36)
MCV RBC AUTO: 84.6 FL — SIGNIFICANT CHANGE UP (ref 80–100)
METHOD TYPE: SIGNIFICANT CHANGE UP
ORGANISM # SPEC MICROSCOPIC CNT: SIGNIFICANT CHANGE UP
PLATELET # BLD AUTO: 369 K/UL — SIGNIFICANT CHANGE UP (ref 150–400)
POTASSIUM SERPL-MCNC: 4.3 MMOL/L — SIGNIFICANT CHANGE UP (ref 3.5–5.3)
POTASSIUM SERPL-SCNC: 4.3 MMOL/L — SIGNIFICANT CHANGE UP (ref 3.5–5.3)
PROTHROM AB SERPL-ACNC: 15.4 SEC — HIGH (ref 9.8–12.7)
RBC # BLD: 3.12 M/UL — LOW (ref 3.8–5.2)
RBC # FLD: 16.9 % — SIGNIFICANT CHANGE UP (ref 10.3–16.9)
SODIUM SERPL-SCNC: 131 MMOL/L — LOW (ref 135–145)
SPECIMEN SOURCE: SIGNIFICANT CHANGE UP
WBC # BLD: 7 K/UL — SIGNIFICANT CHANGE UP (ref 3.8–10.5)
WBC # FLD AUTO: 7 K/UL — SIGNIFICANT CHANGE UP (ref 3.8–10.5)

## 2017-09-26 PROCEDURE — 71010: CPT | Mod: 26

## 2017-09-26 PROCEDURE — 99233 SBSQ HOSP IP/OBS HIGH 50: CPT

## 2017-09-26 PROCEDURE — 99232 SBSQ HOSP IP/OBS MODERATE 35: CPT | Mod: GC

## 2017-09-26 RX ORDER — LABETALOL HCL 100 MG
10 TABLET ORAL ONCE
Qty: 0 | Refills: 0 | Status: DISCONTINUED | OUTPATIENT
Start: 2017-09-26 | End: 2017-09-29

## 2017-09-26 RX ADMIN — Medication 10 UNIT(S): at 18:29

## 2017-09-26 RX ADMIN — AMLODIPINE BESYLATE 10 MILLIGRAM(S): 2.5 TABLET ORAL at 05:22

## 2017-09-26 RX ADMIN — Medication 10 UNIT(S): at 06:33

## 2017-09-26 RX ADMIN — Medication 2: at 22:40

## 2017-09-26 RX ADMIN — GABAPENTIN 100 MILLIGRAM(S): 400 CAPSULE ORAL at 05:22

## 2017-09-26 RX ADMIN — Medication 30 MILLIGRAM(S): at 19:19

## 2017-09-26 RX ADMIN — Medication 0.2 MILLIGRAM(S): at 05:22

## 2017-09-26 RX ADMIN — GABAPENTIN 300 MILLIGRAM(S): 400 CAPSULE ORAL at 22:32

## 2017-09-26 RX ADMIN — DAPTOMYCIN 128 MILLIGRAM(S): 500 INJECTION, POWDER, LYOPHILIZED, FOR SOLUTION INTRAVENOUS at 10:54

## 2017-09-26 RX ADMIN — SODIUM CHLORIDE 3 MILLILITER(S): 9 INJECTION INTRAMUSCULAR; INTRAVENOUS; SUBCUTANEOUS at 22:40

## 2017-09-26 RX ADMIN — PANTOPRAZOLE SODIUM 40 MILLIGRAM(S): 20 TABLET, DELAYED RELEASE ORAL at 06:32

## 2017-09-26 RX ADMIN — Medication 81 MILLIGRAM(S): at 12:11

## 2017-09-26 RX ADMIN — Medication 30 MILLIGRAM(S): at 11:15

## 2017-09-26 RX ADMIN — Medication 25 MILLIGRAM(S): at 22:33

## 2017-09-26 RX ADMIN — Medication 30 MILLIGRAM(S): at 10:54

## 2017-09-26 RX ADMIN — SODIUM CHLORIDE 3 MILLILITER(S): 9 INJECTION INTRAMUSCULAR; INTRAVENOUS; SUBCUTANEOUS at 05:22

## 2017-09-26 RX ADMIN — SODIUM CHLORIDE 3 MILLILITER(S): 9 INJECTION INTRAMUSCULAR; INTRAVENOUS; SUBCUTANEOUS at 14:44

## 2017-09-26 RX ADMIN — SIMVASTATIN 20 MILLIGRAM(S): 20 TABLET, FILM COATED ORAL at 22:33

## 2017-09-26 RX ADMIN — CEFTAROLINE FOSAMIL 50 MILLIGRAM(S): 600 POWDER, FOR SOLUTION INTRAVENOUS at 18:58

## 2017-09-26 RX ADMIN — Medication 30 MILLIGRAM(S): at 00:00

## 2017-09-26 RX ADMIN — CEFTAROLINE FOSAMIL 50 MILLIGRAM(S): 600 POWDER, FOR SOLUTION INTRAVENOUS at 06:00

## 2017-09-26 RX ADMIN — GABAPENTIN 100 MILLIGRAM(S): 400 CAPSULE ORAL at 18:28

## 2017-09-26 RX ADMIN — SERTRALINE 25 MILLIGRAM(S): 25 TABLET, FILM COATED ORAL at 12:10

## 2017-09-26 RX ADMIN — Medication 10 UNIT(S): at 12:09

## 2017-09-26 RX ADMIN — INSULIN GLARGINE 20 UNIT(S): 100 INJECTION, SOLUTION SUBCUTANEOUS at 22:30

## 2017-09-26 RX ADMIN — Medication 0.2 MILLIGRAM(S): at 18:28

## 2017-09-26 RX ADMIN — Medication 2: at 12:09

## 2017-09-26 NOTE — PROGRESS NOTE ADULT - SUBJECTIVE AND OBJECTIVE BOX
Patient discussed on morning rounds with Dr. Escamilla     SUBJECTIVE ASSESSMENT:  Patient seen this morning at bedside, states her lower extremity weakness has progressively improving this morning she is able to move it completely on her own. States she wants her neck drain removed and her neck is sore. She denies any chest pain, shortness of breath or palpitations.     Vital Signs Last 24 Hrs  T(C): 36.8 (26 Sep 2017 09:00), Max: 37.2 (26 Sep 2017 00:51)  T(F): 98.3 (26 Sep 2017 09:00), Max: 98.9 (26 Sep 2017 00:51)  HR: 72 (26 Sep 2017 10:00) (70 - 84)  BP: 120/68 (26 Sep 2017 06:00) (120/68 - 143/65)  BP(mean): 85 (26 Sep 2017 06:00) (78 - 92)  RR: 13 (26 Sep 2017 10:00) (12 - 27)  SpO2: 100% (26 Sep 2017 10:00) (92% - 100%)  I&O's Detail    25 Sep 2017 07:01  -  26 Sep 2017 07:00  --------------------------------------------------------  IN:    Oral Fluid: 320 mL    Other: 1000 mL  Total IN: 1320 mL    OUT:    Drain: 10 mL    Other: 4000 mL    Voided: 250 mL  Total OUT: 4260 mL    Total NET: -2940 mL    CHEST TUBE:  No  LOLLY DRAIN:  Yes VIKY drain from cervical spine surgery   EPICARDIAL WIRES: No  TIE DOWNS: No  COOK: No    PHYSICAL EXAM:    General: Patient lying comfortably in bed, no acute distress     Neurological: Alert and oriented. No focal neurological deficits. LLE and RLE strength 5/5     Cardiovascular: S1S2, RRR, no murmurs appreciated on exam     Respiratory:  Poor inspiratory effort 2/2 neck pain while sitting up. Diminished breath sounds bilaterally. with scattered crackles at left base.     Gastrointestinal:  Abdomen soft, non tender, non distended     Extremities: Warm and well perfused, No edema or calf tenderness bilaterally   Anterior cervical spine dressing in place, dressing C/D/I with VIKY drain   L foot + chronic fissure on L medial ankle, no drainage or tenderness     Vascular: peripheral pulses 2+ bilaterally   New left tunneled HD catheter   Right PICC line     LABS:                        8.1    7.0   )-----------( 369      ( 26 Sep 2017 06:21 )             26.4       COUMADIN:  No    PT/INR - ( 26 Sep 2017 06:21 )   PT: 15.4 sec;   INR: 1.38          PTT - ( 26 Sep 2017 06:21 )  PTT:36.4 sec    09-26    131<L>  |  95<L>  |  21  ----------------------------<  124<H>  4.3   |  25  |  3.46<H>    Ca    8.1<L>      26 Sep 2017 06:21  Mg     2.0     09-26      MEDICATIONS  (STANDING):  sodium chloride 0.9% lock flush 3 milliLiter(s) IV Push every 8 hours  aspirin enteric coated 81 milliGRAM(s) Oral daily  simvastatin 20 milliGRAM(s) Oral at bedtime  insulin lispro (HumaLOG) corrective regimen sliding scale   SubCutaneous Before meals and at bedtime  polyethylene glycol 3350 17 Gram(s) Oral daily  pantoprazole    Tablet 40 milliGRAM(s) Oral before breakfast  DAPTOmycin IVPB 700 milliGRAM(s) IV Intermittent every 48 hours  rifampin 600 milliGRAM(s) Oral daily  Ceftaroline Fosamil IVPB 200 milliGRAM(s) IV Intermittent every 12 hours  amLODIPine   Tablet 10 milliGRAM(s) Oral daily  gabapentin 100 milliGRAM(s) Oral two times a day  gabapentin 300 milliGRAM(s) Oral at bedtime  sertraline 25 milliGRAM(s) Oral daily  traZODone 25 milliGRAM(s) Oral at bedtime  cloNIDine 0.2 milliGRAM(s) Oral every 12 hours  insulin lispro Injectable (HumaLOG) 10 Unit(s) SubCutaneous three times a day before meals  insulin glargine Injectable (LANTUS) 20 Unit(s) SubCutaneous at bedtime    MEDICATIONS  (PRN):  dextrose Gel 1 Dose(s) Oral once PRN Blood Glucose LESS THAN 70 milliGRAM(s)/deciliter  glucagon  Injectable 1 milliGRAM(s) IntraMuscular once PRN Glucose LESS THAN 70 milligrams/deciliter  acetaminophen    Suspension. 650 milliGRAM(s) Oral every 6 hours PRN Mild Pain (1 - 3)  ketorolac   Injectable 30 milliGRAM(s) IV Push every 6 hours PRN Moderate Pain (4 - 6)    RADIOLOGY & ADDITIONAL TESTS:  9/26/17 CXR: Pending official read, Left HD catheter in place and R PICC line in good position,. Cervical plating and drain noted. Bilateral small pleural effusions and pulm congestion stable.       A/P: 47 year old female, PMHx HTN, HLD, DM, ESRD on HD, chronic left foot OM, known Tricuspid valve endocarditis recently admitted to [facility] under Dr. Gee and was deemed a non-surgical candidate, subsequent transferred to [facility] for medical management. Patient failed medical management with agreesive antibiotics and was found to have enlaring tricuspid vegetation on ALCIDES and was transferred back to [facility] for further evaluation. During this admission patient complaining of back/neck pain, workup revealed cervical abscess and patient is now POD#4 s/p C6 corpectomy and C5-C7 anterior plating with Dr. Kuo (orthopedics). Patient transferred to CCU post procedure. Over the weekend, patient became bradycardic HR 40s with non conducted P waves. Patient converted back to NSR and has remained hemodynamically stable since. Yesterday patient had right PICC line place and new HD catheter placed for access by IR. No acute events overnight     Neurovascular: Stable.   - Last admission CT head findings concerning for evolving right MCA infarct, this admission patient complaining of LLE weakness. Neurology Dr. Luna following appreciate recs. MRI head no acute pathology. LLE weakness improving today .  - MRI cervical/thoracic/lumbar spine for neck/back pain revealed cervical OM with epidural abscess s/p C6 Corpectomy and C5-C7 anterior plating with Dr. Kuo on 9/22. Appreciate ortho recs.   - continue toradol and tylenol PRN for pain   - continue gabapentin 100mg BID and 300mg qhs for LLE weakness and pain   - continue sertraline 25mg daily for depression   - continue trazodone 25mg qhs for insomnia     Cardiovascular: Hemodynamically stable. HR controlled.  - MRSA Endocarditis, preoperative for TVR with Dr. Gee next week, as per Dr. Kuo cleared for full heparin (30,000 units in OR) for Friday 9/30. Preoperative workup completed.  Cardiac cath revealed RCA lesion patient will get CABG x1 with TVR. Continue asa 81mg    - SVC/RA thrombus found on previous admission, heparin drip held 2/2 orthopedic surgery at risk for epidural hematoma.   - 2nd degree HB 9/23 AM, pacing pads in place with TVP kit in room. Metoprolol discontinued. Continue to monitor rhythm   - Hx of HLD, continue simvastatin 20mg qhs   - Hx of HTN, continue amlodipine 10mg and clonidine 0.2mg BID. BP improved today. Valsartan 160mg discontinued 2/2 risk of periop hypotension. continue to monitor BP     Respiratory: 02 Sat = 100% on 2L NC   - continue to wean O2 to maintain Sao2  > 93%   - Encourage ambulation and Use of IS 10x / hr while awake.  - CXR stable, f/u CXR in AM     GI: Stable.  - Continue PO diet   - Continue protonix 40mg for GI ppx   - continue bowel regimen     Renal / :  ESRD on HD, Cr 3.46  - Renal following, HD yesterday via new HD catheter. Continue to appreciate recs   - Hyponatremic, continue 1L fluid restriction   - Monitor I/O's.    Endocrine:  DM, A1C 8.2 TSH 2.447  - Endocrine following, patient hypoglycemic over the weekend, insulin decreased and discontinued 2/2 decrease in appetite. FS started to creep up yesterday, as per endocrine restarted on lantus 25u qhs and 10u premeal. Will continue to monitor fingersticks  - Continue insulin sliding scale      Hematologic: SVC/RA clot  - Heparin drip held for OR with orthopedics, as per Dr. Kuo safe anticoagulate after 9/30/17   - received 1u prbc with HD yesterday. H/H stable this AM, continue to trend.     ID:  MRSA Endocarditis   - Dr. Kingston following as ID, continue daptomycin 700mg q48 following HD, rifampin 600mg and ceftaroline 200mg BID. Need weekly CPK and CMP, last done 9/23   - Blood cultures from 9/19 NGTD. Blood cultures sent 9/23 and 9/25. negative thus far.   - Chronic OM of L foot, vascular/ortho following. Gallium scan completed with no obvious source of MRSA bacteremia. No further intervention needed.   - Observe for SIRS/Sepsis Syndrome.    Prophylaxis: holding heparin 2/2 risk of epidural hematoma.   -SCD's    Disposition: OR next week

## 2017-09-26 NOTE — PROGRESS NOTE BEHAVIORAL HEALTH - PROBLEM SELECTOR PROBLEM 1
Adjustment disorder with anxious mood

## 2017-09-26 NOTE — PROGRESS NOTE BEHAVIORAL HEALTH - NSBHCONSULTFOLLOWAFTERCARE_PSY_A_CORE FT
will provide pt with referrals prior to d/c
We will provide pt with follow up referrals
will provide referrals to pt once close to discharge

## 2017-09-26 NOTE — PROGRESS NOTE BEHAVIORAL HEALTH - AXIS III
DM  MRSA endocarditis
MRSA bacteremia, endocarditis, osteomyelitis of left foot; HTN, hyperlipidemia, DM, s/p stroke that left her right hand weak; end-stage renal failure; on dialysis
MRSA bacteremia, endocarditis, osteomyelitis of left foot; HTN, hyperlipidemia, DM, s/p stroke that left her right hand weak; end-stage renal failure; on dialysis

## 2017-09-26 NOTE — PROGRESS NOTE BEHAVIORAL HEALTH - NSBHADMITCOUNSEL_PSY_A_CORE
risks and benefits of treatment options/risk factor reduction/diagnostic results/impressions and/or recommended studies/importance of adherence to chosen treatment
instructions for management, treatment and follow up/diagnostic results/impressions and/or recommended studies/risks and benefits of treatment options/35
diagnostic results/impressions and/or recommended studies/importance of adherence to chosen treatment/risks and benefits of treatment options/instructions for management, treatment and follow up/risk factor reduction

## 2017-09-26 NOTE — PROGRESS NOTE BEHAVIORAL HEALTH - PROBLEM SELECTOR PLAN 1
Increase Zoloft to 50 mg qday to address mood/anxiety symptoms.   Continue Trazodone 25-50 mg qhs to address insomnia.  Will continue to provide supportive therapy
Zoloft 25 mg qday  Trazodone 25mg qhs, may repeat X 1 to address insomina  Will continue to provide supportive therapy
Continue to provide supportive therapy   Neurontin 100 mg bid prn  Pain management as per primary team

## 2017-09-26 NOTE — PROGRESS NOTE ADULT - ATTENDING COMMENTS
PO intake is starting to improve, though her fingerstick was borderline low at bedtime last night (88 ng%).  Her glucoses were nonetheless quite stable overnight on the 20 unit dose of Lantus.  May need to decrease the pre-meal dose if the daytime readings stay near or below 100.

## 2017-09-26 NOTE — PROGRESS NOTE BEHAVIORAL HEALTH - NSBHFUPINTERVALHXFT_PSY_A_CORE
Pt expressed fears about the upcoming surgery. Support provided. She endorsed feeling anxious and down secondary to ongoing medical issues. Pt endorsed insomnia. She was agreeable to initiate Zoloft to address anxiety and depressive symptoms. Side effects, benefits and risks of Zoloft and Trazodone were discussed.
Pt is focused on neck pain. Anxious about upcoming surgery. Worried about her mother being left alone if "something happens to me". Pt denies hopelessness or SI. She however feels sad about her current medical issues, agreeing to engage in ongoing supportive therapy.   Support provided to pt
Pt discussed her feelings about upcoming surgery. Spoke about the flood of emotions. Having difficulty with family not being by her side daily. Support provided. Coping adequately. Denies side effects to medications. Slept well last night. Smiling on approach.

## 2017-09-26 NOTE — PROGRESS NOTE ADULT - ATTENDING COMMENTS
Patient examined, fellow's hx and PE reviewed and confirmed. I find stable on dialysis. BP controlled. A/P reviewed and confirmed. Continue dialysis. Continue BP meds.  See full note

## 2017-09-26 NOTE — PROGRESS NOTE BEHAVIORAL HEALTH - SUMMARY
46 yo  female, currently with anxiety symptoms in the context of upcoming surgery. Pt is coping appropriately with ongoing stressors. Will continue to provide supportive therapy.
46 yo  female who is now presenting with anxiety and depressive symptoms in context of upcoming cardiac surgery. Pt is future oriented and identifies her mother and boyfriend to be a source of support. She is agreeable to the trial of Cymbalta and Trazodone.
The pt is a 48 yo single  female domiciled with boyfriend and mother, with multiple medical problems and current MRSA bacteremia with endocarditis and also left foot osteomyelitis. Pt is expressing fears about the upcoming heart surgery and risk of death. She has no significant psych or substance abuse history. Pt is exhibiting anxiety in the context of ongoing medical issues. Pt will greatly benefit from ongoing psychotherapy.

## 2017-09-26 NOTE — PROGRESS NOTE ADULT - SUBJECTIVE AND OBJECTIVE BOX
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 (13 Sep 2017 22:57). On this admission, patient was also found to have septic emboli, cervical osteomyelitis and abscess s/p corpectomy and anterior plating POD #4. Patient is scheduled for TVR/CABG either on Friday or Monday.     Patient appetite is not fully recovered. She had chicken, mashed potato for dinner; she had chicken, eggs, and bread for breakfast. She denies taking outside food/snacks.       Pt reports the following symptoms:    CONSTITUTIONAL:  Negative fever or chills, feels well, good appetite  EYES:  Negative  blurry vision or double vision  CARDIOVASCULAR:  Negative for chest pain or palpitations  RESPIRATORY:  Negative for cough, wheezing, or SOB   GASTROINTESTINAL:  Negative for nausea, vomiting, diarrhea, constipation, or abdominal pain  GENITOURINARY:  Negative frequency, urgency or dysuria  NEUROLOGIC:  No headache, confusion, dizziness, lightheadedness    MEDICATIONS  (STANDING):  sodium chloride 0.9% lock flush 3 milliLiter(s) IV Push every 8 hours  aspirin enteric coated 81 milliGRAM(s) Oral daily  simvastatin 20 milliGRAM(s) Oral at bedtime  insulin lispro (HumaLOG) corrective regimen sliding scale   SubCutaneous Before meals and at bedtime  dextrose 5%. 1000 milliLiter(s) (50 mL/Hr) IV Continuous <Continuous>  dextrose 50% Injectable 12.5 Gram(s) IV Push once  dextrose 50% Injectable 25 Gram(s) IV Push once  dextrose 50% Injectable 25 Gram(s) IV Push once  polyethylene glycol 3350 17 Gram(s) Oral daily  pantoprazole    Tablet 40 milliGRAM(s) Oral before breakfast  DAPTOmycin IVPB 700 milliGRAM(s) IV Intermittent every 48 hours  rifampin 600 milliGRAM(s) Oral daily  Ceftaroline Fosamil IVPB 200 milliGRAM(s) IV Intermittent every 12 hours  amLODIPine   Tablet 10 milliGRAM(s) Oral daily  gabapentin 100 milliGRAM(s) Oral two times a day  gabapentin 300 milliGRAM(s) Oral at bedtime  sertraline 25 milliGRAM(s) Oral daily  traZODone 25 milliGRAM(s) Oral at bedtime  cloNIDine 0.2 milliGRAM(s) Oral every 12 hours  insulin lispro Injectable (HumaLOG) 10 Unit(s) SubCutaneous three times a day before meals  insulin glargine Injectable (LANTUS) 20 Unit(s) SubCutaneous at bedtime    MEDICATIONS  (PRN):  dextrose Gel 1 Dose(s) Oral once PRN Blood Glucose LESS THAN 70 milliGRAM(s)/deciliter  glucagon  Injectable 1 milliGRAM(s) IntraMuscular once PRN Glucose LESS THAN 70 milligrams/deciliter  acetaminophen    Suspension. 650 milliGRAM(s) Oral every 6 hours PRN Mild Pain (1 - 3)  ketorolac   Injectable 30 milliGRAM(s) IV Push every 6 hours PRN Moderate Pain (4 - 6)      PHYSICAL EXAM  Vital Signs Last 24 Hrs  T(C): 36.8 (26 Sep 2017 09:00), Max: 37.2 (26 Sep 2017 00:51)  T(F): 98.3 (26 Sep 2017 09:00), Max: 98.9 (26 Sep 2017 00:51)  HR: 80 (26 Sep 2017 12:00) (72 - 84)  BP: 120/68 (26 Sep 2017 06:00) (120/68 - 143/65)  BP(mean): 85 (26 Sep 2017 06:00) (78 - 92)  RR: 17 (26 Sep 2017 12:00) (9 - 27)  SpO2: 97% (26 Sep 2017 12:00) (92% - 100%)    Constitutional: wn/wd in NAD.   HEENT: NCAT, MMM, OP clear, EOMI, no proptosis or lid retraction  Neck: no thyromegaly or palpable thyroid nodules, central line presents.  Respiratory: lungs CTAB.  Cardiovascular: regular rhythm, normal S1 and S2, + audible murmurs, no peripheral edema  GI: soft, NT/ND, no masses/HSM appreciated.  Neurology: no tremors, DTR 2+  Skin: no visible rashes/lesions  Psychiatric: AAO x 3, normal affect/mood.    LABS:                        8.1    7.0   )-----------( 369      ( 26 Sep 2017 06:21 )             26.4     09-26    131<L>  |  95<L>  |  21  ----------------------------<  124<H>  4.3   |  25  |  3.46<H>    Ca    8.1<L>      26 Sep 2017 06:21  Mg     2.0     09-26      PT/INR - ( 26 Sep 2017 06:21 )   PT: 15.4 sec;   INR: 1.38          PTT - ( 26 Sep 2017 06:21 )  PTT:36.4 sec    Thyroid Stimulating Hormone, Serum: 2.447 uIU/mL (09-13 @ 22:51)  Thyroid Stimulating Hormone, Serum: 1.251 uIU/mL (08-24 @ 01:12)      HbA1C: 8.2 % (09-13 @ 22:51)  8.2 % (08-24 @ 01:11)    CAPILLARY BLOOD GLUCOSE  177 (26 Sep 2017 11:00)  124 (26 Sep 2017 05:00)  98 (25 Sep 2017 22:00)  168 (25 Sep 2017 18:00)      Insulin Sliding Scale requirements X 24 Hours:    RADIOLOGY & ADDITIONAL TESTS:    A/P: 47y Female with history of DM type II admitted due to persistent TV IE with MRSA despite aggressive antibiotic therapy, been evaluated for possible CABG/ TVR by CTS team. On this admission, patient also was found to have septic emboli, osteomyelitis and abscess s/p corpectomy and anterior plating 4 days ago.      1.  DM 2, uncontrolled, complicated - patient with poor diabetic history and multiple diabetic complications. A1c is likely higher given that patient is on Procrit and has high cell turnover. Patient also seems to be non compliance with her diabetes diet. Patient was restarted back on insulin yesterday.  Please continue   units lantus at bedtime  / in the morning and        units lispro with meals and lispro moderate / low dose sliding scale 4 times daily with meals and at bedtime.  Please continue consistent carbohydrate diet.      Goal FSG is 120-180  Will continue to monitor   For discharge, pt can continue    Pt can follow up at discharge with [facility] by calling  to make an appointment.   Will discuss case with Dr. Ross, Dr. Yoon   and update primary team INTERVAL HPI/OVERNIGHT EVENTS:    Patient is a 47y old  Female who presents with a chief complaint of TV IE / MRSA Bacteremia. (13 Sep 2017 22:57). On this admission, patient was also found to have septic emboli, cervical osteomyelitis and abscess s/p corpectomy and anterior plating POD #4. Patient is scheduled for TVR/CABG either on Friday or Monday.     Patient appetite is not fully recovered. She had chicken, mashed potato for dinner; she had chicken, eggs, and bread for breakfast. She denies taking outside food/snacks.       Pt reports the following symptoms:    CONSTITUTIONAL:  Negative fever or chills, feels well, good appetite  EYES:  Negative  blurry vision or double vision  CARDIOVASCULAR:  Negative for chest pain or palpitations  RESPIRATORY:  Negative for cough, wheezing, or SOB   GASTROINTESTINAL:  Negative for nausea, vomiting, diarrhea, constipation, or abdominal pain  GENITOURINARY:  Negative frequency, urgency or dysuria  NEUROLOGIC:  No headache, confusion, dizziness, lightheadedness    MEDICATIONS  (STANDING):  sodium chloride 0.9% lock flush 3 milliLiter(s) IV Push every 8 hours  aspirin enteric coated 81 milliGRAM(s) Oral daily  simvastatin 20 milliGRAM(s) Oral at bedtime  insulin lispro (HumaLOG) corrective regimen sliding scale   SubCutaneous Before meals and at bedtime  dextrose 5%. 1000 milliLiter(s) (50 mL/Hr) IV Continuous <Continuous>  dextrose 50% Injectable 12.5 Gram(s) IV Push once  dextrose 50% Injectable 25 Gram(s) IV Push once  dextrose 50% Injectable 25 Gram(s) IV Push once  polyethylene glycol 3350 17 Gram(s) Oral daily  pantoprazole    Tablet 40 milliGRAM(s) Oral before breakfast  DAPTOmycin IVPB 700 milliGRAM(s) IV Intermittent every 48 hours  rifampin 600 milliGRAM(s) Oral daily  Ceftaroline Fosamil IVPB 200 milliGRAM(s) IV Intermittent every 12 hours  amLODIPine   Tablet 10 milliGRAM(s) Oral daily  gabapentin 100 milliGRAM(s) Oral two times a day  gabapentin 300 milliGRAM(s) Oral at bedtime  sertraline 25 milliGRAM(s) Oral daily  traZODone 25 milliGRAM(s) Oral at bedtime  cloNIDine 0.2 milliGRAM(s) Oral every 12 hours  insulin lispro Injectable (HumaLOG) 10 Unit(s) SubCutaneous three times a day before meals  insulin glargine Injectable (LANTUS) 20 Unit(s) SubCutaneous at bedtime    MEDICATIONS  (PRN):  dextrose Gel 1 Dose(s) Oral once PRN Blood Glucose LESS THAN 70 milliGRAM(s)/deciliter  glucagon  Injectable 1 milliGRAM(s) IntraMuscular once PRN Glucose LESS THAN 70 milligrams/deciliter  acetaminophen    Suspension. 650 milliGRAM(s) Oral every 6 hours PRN Mild Pain (1 - 3)  ketorolac   Injectable 30 milliGRAM(s) IV Push every 6 hours PRN Moderate Pain (4 - 6)      PHYSICAL EXAM  Vital Signs Last 24 Hrs  T(C): 36.8 (26 Sep 2017 09:00), Max: 37.2 (26 Sep 2017 00:51)  T(F): 98.3 (26 Sep 2017 09:00), Max: 98.9 (26 Sep 2017 00:51)  HR: 80 (26 Sep 2017 12:00) (72 - 84)  BP: 120/68 (26 Sep 2017 06:00) (120/68 - 143/65)  BP(mean): 85 (26 Sep 2017 06:00) (78 - 92)  RR: 17 (26 Sep 2017 12:00) (9 - 27)  SpO2: 97% (26 Sep 2017 12:00) (92% - 100%)    Constitutional: wn/wd in NAD.   HEENT: NCAT, MMM, OP clear, EOMI, no proptosis or lid retraction  Neck: no thyromegaly or palpable thyroid nodules, central line presents.  Respiratory: lungs CTAB.  Cardiovascular: regular rhythm, normal S1 and S2, + audible murmurs, no peripheral edema  GI: soft, NT/ND, no masses/HSM appreciated.  Neurology: no tremors, DTR 2+  Skin: no visible rashes/lesions  Psychiatric: AAO x 3, normal affect/mood.    LABS:                        8.1    7.0   )-----------( 369      ( 26 Sep 2017 06:21 )             26.4     09-26    131<L>  |  95<L>  |  21  ----------------------------<  124<H>  4.3   |  25  |  3.46<H>    Ca    8.1<L>      26 Sep 2017 06:21  Mg     2.0     09-26      PT/INR - ( 26 Sep 2017 06:21 )   PT: 15.4 sec;   INR: 1.38          PTT - ( 26 Sep 2017 06:21 )  PTT:36.4 sec    Thyroid Stimulating Hormone, Serum: 2.447 uIU/mL (09-13 @ 22:51)  Thyroid Stimulating Hormone, Serum: 1.251 uIU/mL (08-24 @ 01:12)      HbA1C: 8.2 % (09-13 @ 22:51)  8.2 % (08-24 @ 01:11)    CAPILLARY BLOOD GLUCOSE  177 (26 Sep 2017 11:00)  124 (26 Sep 2017 05:00)  98 (25 Sep 2017 22:00)  168 (25 Sep 2017 18:00)      Insulin Sliding Scale requirements X 24 Hours:    RADIOLOGY & ADDITIONAL TESTS:    A/P: 47y Female with history of DM type II admitted due to persistent TV IE with MRSA despite aggressive antibiotic therapy, been evaluated for possible CABG/ TVR by CTS team. On this admission, patient also was found to have septic emboli, osteomyelitis and abscess s/p corpectomy and anterior plating 4 days ago.      1.  DM 2, uncontrolled, complicated - patient with poor diabetic history and multiple diabetic complications. A1c is likely higher given that patient is on Procrit and has high cell turnover. Patient also seems to be non compliance with her diabetes diet. Patient was restarted back on insulin yesterday.  Please continue 20 units lantus at bedtime and 10 units lispro with meals. if FS before dinner <110, can decrease Lispro to 8 units with meals. Continue lispro moderate dose sliding scale 4 times daily with meals and at bedtime.  Please continue consistent carbohydrate diet.      Goal FSG is 120-180  Will continue to monitor   For discharge, pt can continue    Pt can follow up at discharge with [facility] by calling  to make an appointment.   Will discuss case with Dr. Ross, Dr. Yoon   and update primary team

## 2017-09-26 NOTE — PROGRESS NOTE ADULT - ASSESSMENT
This is 47 year old female with PMH stated above. The renal service is activated for the need of HD. She was dialyzed on 9/25- 3.0 liters off. New permcath placed on 9/25. No need of HD for today

## 2017-09-26 NOTE — PROGRESS NOTE ADULT - SUBJECTIVE AND OBJECTIVE BOX
Objective and Subjective   The patient in her bed. DOes not endorse any complains, no shortness of breath, no chest pain, no fever. eating her breakfast s/p cervical spine surgery for osteomyelitis doen on 9/23. S/p permcath placement on 9/25       Patient is a 47 year old female with history of HTN, HLD, DM II, ESRD on HD (MWF. Last HD 09/13/2017. Receives HD via Right Femoral HD catheter placed on 09/13/2017 at [facility] 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 The renal follows the case for the need of HD. Last HD done on 9/25- 3.0 liters off         sodium chloride 0.9% lock flush 3 milliLiter(s) every 8 hours  aspirin enteric coated 81 milliGRAM(s) daily  simvastatin 20 milliGRAM(s) at bedtime  insulin lispro (HumaLOG) corrective regimen sliding scale   Before meals and at bedtime  dextrose 5%. 1000 milliLiter(s) <Continuous>  dextrose Gel 1 Dose(s) once PRN  dextrose 50% Injectable 12.5 Gram(s) once  dextrose 50% Injectable 25 Gram(s) once  dextrose 50% Injectable 25 Gram(s) once  glucagon  Injectable 1 milliGRAM(s) once PRN  polyethylene glycol 3350 17 Gram(s) daily  pantoprazole    Tablet 40 milliGRAM(s) before breakfast  acetaminophen    Suspension. 650 milliGRAM(s) every 6 hours PRN  DAPTOmycin IVPB 700 milliGRAM(s) every 48 hours  rifampin 600 milliGRAM(s) daily  Ceftaroline Fosamil IVPB 200 milliGRAM(s) every 12 hours  amLODIPine   Tablet 10 milliGRAM(s) daily  gabapentin 100 milliGRAM(s) two times a day  gabapentin 300 milliGRAM(s) at bedtime  sertraline 25 milliGRAM(s) daily  traZODone 25 milliGRAM(s) at bedtime  ketorolac   Injectable 30 milliGRAM(s) every 6 hours PRN  cloNIDine 0.2 milliGRAM(s) every 12 hours  insulin lispro Injectable (HumaLOG) 10 Unit(s) three times a day before meals  insulin glargine Injectable (LANTUS) 20 Unit(s) at bedtime      Allergies    penicillin (Unknown)  Sular (Unknown)    Intolerances        T(C): , Max: 37.2 (09-26-17 @ 00:51)  T(F): , Max: 98.9 (09-26-17 @ 00:51)  HR: 76 (09-26-17 @ 14:00)  BP: 120/68 (09-26-17 @ 06:00)  BP(mean): 85 (09-26-17 @ 06:00)  RR: 11 (09-26-17 @ 14:00)  SpO2: 99% (09-26-17 @ 14:00)  Wt(kg): --    09-25 @ 07:01  -  09-26 @ 07:00  --------------------------------------------------------  IN:    Oral Fluid: 320 mL    Other: 1000 mL  Total IN: 1320 mL    OUT:    Drain: 10 mL    Other: 4000 mL    Voided: 250 mL  Total OUT: 4260 mL    Total NET: -2940 mL      09-26 @ 07:01  -  09-26 @ 14:27  --------------------------------------------------------  IN:    IV PiggyBack: 50 mL  Total IN: 50 mL    OUT:  Total OUT: 0 mL    Total NET: 50 mL    Alert and oriented  No JVD  HAs a catheter in the left IJ   No respiratory distress   Normal air entry in the lungs, no wheezing, no crackles, no rales   RRR, normal s1/s2, no mumurs, rubs or gallops  Abdomen - soft, non-tender, non-distended, normal bowel sounds   Extremities - mild peripheral edema   The line in the right groined removed           LABS:                        8.1    7.0   )-----------( 369      ( 26 Sep 2017 06:21 )             26.4     09-26    131<L>  |  95<L>  |  21  ----------------------------<  124<H>  4.3   |  25  |  3.46<H>    Ca    8.1<L>      26 Sep 2017 06:21  Mg     2.0     09-26        PT/INR - ( 26 Sep 2017 06:21 )   PT: 15.4 sec;   INR: 1.38          PTT - ( 26 Sep 2017 06:21 )  PTT:36.4 sec          RADIOLOGY & ADDITIONAL STUDIES:    < from: Xray Chest 1 View AP-PORTABLE IMMEDIATE (09.25.17 @ 18:28) >  PROCEDURE DATE:  09/25/2017                     INTERPRETATION:  PROCEDURE: Chest x-ray    TECHNIQUE: Single portable view of the chest    INDICATION: 47-year-old female status post PICC and PermCath placement    COMPARISON: None    FINDINGS: Interval placement of a right-sided PICC with its tip   projecting over the brachiocephalic/SVC junction. There is also interval   placement of a left-sided dialysis catheter with its tip projecting over   the right atrium. Cardiomediastinal and hilar contours are normal. There   are mild patchy opacities at the right lung base, unchanged previous   study. Mild bilateral pleural effusions. The visualized osseous   structures are intact. Status post lower cervical spinal fusion.     IMPRESSION: Interval placement of right-sided PICC line and left-sided   dialysis catheter. Right basilar consolidation without change.    < end of copied text >

## 2017-09-27 LAB
ANION GAP SERPL CALC-SCNC: 11 MMOL/L — SIGNIFICANT CHANGE UP (ref 5–17)
APTT BLD: 38.8 SEC — HIGH (ref 27.5–37.4)
BUN SERPL-MCNC: 33 MG/DL — HIGH (ref 7–23)
CALCIUM SERPL-MCNC: 8.7 MG/DL — SIGNIFICANT CHANGE UP (ref 8.4–10.5)
CHLORIDE SERPL-SCNC: 96 MMOL/L — SIGNIFICANT CHANGE UP (ref 96–108)
CO2 SERPL-SCNC: 25 MMOL/L — SIGNIFICANT CHANGE UP (ref 22–31)
CREAT SERPL-MCNC: 4.38 MG/DL — HIGH (ref 0.5–1.3)
GLUCOSE SERPL-MCNC: 120 MG/DL — HIGH (ref 70–99)
HCT VFR BLD CALC: 27.5 % — LOW (ref 34.5–45)
HGB BLD-MCNC: 8.2 G/DL — LOW (ref 11.5–15.5)
INR BLD: 1.37 — HIGH (ref 0.88–1.16)
MAGNESIUM SERPL-MCNC: 2 MG/DL — SIGNIFICANT CHANGE UP (ref 1.6–2.6)
MCHC RBC-ENTMCNC: 25.5 PG — LOW (ref 27–34)
MCHC RBC-ENTMCNC: 29.8 G/DL — LOW (ref 32–36)
MCV RBC AUTO: 85.7 FL — SIGNIFICANT CHANGE UP (ref 80–100)
PLATELET # BLD AUTO: 334 K/UL — SIGNIFICANT CHANGE UP (ref 150–400)
POTASSIUM SERPL-MCNC: 4.9 MMOL/L — SIGNIFICANT CHANGE UP (ref 3.5–5.3)
POTASSIUM SERPL-SCNC: 4.9 MMOL/L — SIGNIFICANT CHANGE UP (ref 3.5–5.3)
PROTHROM AB SERPL-ACNC: 15.3 SEC — HIGH (ref 9.8–12.7)
RBC # BLD: 3.21 M/UL — LOW (ref 3.8–5.2)
RBC # FLD: 16.7 % — SIGNIFICANT CHANGE UP (ref 10.3–16.9)
SODIUM SERPL-SCNC: 132 MMOL/L — LOW (ref 135–145)
WBC # BLD: 8 K/UL — SIGNIFICANT CHANGE UP (ref 3.8–10.5)
WBC # FLD AUTO: 8 K/UL — SIGNIFICANT CHANGE UP (ref 3.8–10.5)

## 2017-09-27 PROCEDURE — 71010: CPT | Mod: 26,76

## 2017-09-27 PROCEDURE — 99233 SBSQ HOSP IP/OBS HIGH 50: CPT

## 2017-09-27 RX ORDER — CALCITRIOL 0.5 UG/1
1 CAPSULE ORAL ONCE
Qty: 0 | Refills: 0 | Status: COMPLETED | OUTPATIENT
Start: 2017-09-27 | End: 2017-09-27

## 2017-09-27 RX ORDER — ERYTHROPOIETIN 10000 [IU]/ML
10000 INJECTION, SOLUTION INTRAVENOUS; SUBCUTANEOUS ONCE
Qty: 0 | Refills: 0 | Status: COMPLETED | OUTPATIENT
Start: 2017-09-27 | End: 2017-09-27

## 2017-09-27 RX ADMIN — Medication 30 MILLIGRAM(S): at 11:43

## 2017-09-27 RX ADMIN — SIMVASTATIN 20 MILLIGRAM(S): 20 TABLET, FILM COATED ORAL at 21:31

## 2017-09-27 RX ADMIN — Medication 30 MILLIGRAM(S): at 08:15

## 2017-09-27 RX ADMIN — Medication 25 MILLIGRAM(S): at 21:31

## 2017-09-27 RX ADMIN — Medication 30 MILLIGRAM(S): at 19:30

## 2017-09-27 RX ADMIN — Medication 10 UNIT(S): at 07:39

## 2017-09-27 RX ADMIN — Medication 30 MILLIGRAM(S): at 19:00

## 2017-09-27 RX ADMIN — SODIUM CHLORIDE 3 MILLILITER(S): 9 INJECTION INTRAMUSCULAR; INTRAVENOUS; SUBCUTANEOUS at 14:18

## 2017-09-27 RX ADMIN — PANTOPRAZOLE SODIUM 40 MILLIGRAM(S): 20 TABLET, DELAYED RELEASE ORAL at 07:01

## 2017-09-27 RX ADMIN — CEFTAROLINE FOSAMIL 50 MILLIGRAM(S): 600 POWDER, FOR SOLUTION INTRAVENOUS at 05:02

## 2017-09-27 RX ADMIN — Medication 30 MILLIGRAM(S): at 05:01

## 2017-09-27 RX ADMIN — GABAPENTIN 300 MILLIGRAM(S): 400 CAPSULE ORAL at 21:31

## 2017-09-27 RX ADMIN — SODIUM CHLORIDE 3 MILLILITER(S): 9 INJECTION INTRAMUSCULAR; INTRAVENOUS; SUBCUTANEOUS at 22:50

## 2017-09-27 RX ADMIN — CEFTAROLINE FOSAMIL 50 MILLIGRAM(S): 600 POWDER, FOR SOLUTION INTRAVENOUS at 17:06

## 2017-09-27 RX ADMIN — Medication 0.2 MILLIGRAM(S): at 17:06

## 2017-09-27 RX ADMIN — Medication 0.2 MILLIGRAM(S): at 05:07

## 2017-09-27 RX ADMIN — Medication 81 MILLIGRAM(S): at 11:13

## 2017-09-27 RX ADMIN — ERYTHROPOIETIN 10000 UNIT(S): 10000 INJECTION, SOLUTION INTRAVENOUS; SUBCUTANEOUS at 10:58

## 2017-09-27 RX ADMIN — SERTRALINE 25 MILLIGRAM(S): 25 TABLET, FILM COATED ORAL at 11:13

## 2017-09-27 RX ADMIN — SODIUM CHLORIDE 3 MILLILITER(S): 9 INJECTION INTRAMUSCULAR; INTRAVENOUS; SUBCUTANEOUS at 05:03

## 2017-09-27 RX ADMIN — GABAPENTIN 100 MILLIGRAM(S): 400 CAPSULE ORAL at 17:06

## 2017-09-27 RX ADMIN — GABAPENTIN 100 MILLIGRAM(S): 400 CAPSULE ORAL at 05:01

## 2017-09-27 RX ADMIN — Medication 30 MILLIGRAM(S): at 12:25

## 2017-09-27 RX ADMIN — CALCITRIOL 1 MICROGRAM(S): 0.5 CAPSULE ORAL at 10:59

## 2017-09-27 RX ADMIN — Medication 6: at 21:31

## 2017-09-27 RX ADMIN — INSULIN GLARGINE 20 UNIT(S): 100 INJECTION, SOLUTION SUBCUTANEOUS at 21:31

## 2017-09-27 RX ADMIN — AMLODIPINE BESYLATE 10 MILLIGRAM(S): 2.5 TABLET ORAL at 05:02

## 2017-09-27 NOTE — PROGRESS NOTE ADULT - SUBJECTIVE AND OBJECTIVE BOX
Patient seen and examined at bedside.     SUBJECTIVE: No acute events overnight.  Pain tolerable.    Denies Chest Pain/SOB.    Denies Headache.    Denies Nausea/vomiting.      OBJECTIVE:   T(C): 36.5 (09-27-17 @ 08:00), Max: 37.2 (09-26-17 @ 16:43)  T(F): 97.7 (09-27-17 @ 08:00), Max: 98.9 (09-26-17 @ 16:43)  HR: 70 (09-27-17 @ 08:00) (68 - 86)  BP: --  RR:  (0 - 24)  SpO2:  (94% - 100%)  Wt(kg): --    NAD, non labored respirations  Affected extremity:          Dressing: clean/dry/intact          Drains: 1         Sensation: [x ] intact to light touch  [ ] decreased                              Vascular: [x ] warm well perfused; capillary refill <3 seconds          Motor exam: [ x]         [x ] Upper extremity                   Pascual (c5)   Bi(c5/6)   WE(c6)   EE(c7)    (c8)                                                R           5              5            5             5             5                                               L            5              5            5             5            5         [ ] Lower extremity                   IP(L2)     Q(L3)     TA(L4)     EHL(L5)     GS(s1)                                                 R          5           5          5            5              5                                               L           5           5         5             5              5                                     8.2<L>  8.0   )-------------------( 334      ( 09-27 @ 03:46 )                 27.5<L>    I&O's Detail    26 Sep 2017 07:01  -  27 Sep 2017 07:00  --------------------------------------------------------  IN:    IV PiggyBack: 50 mL    Oral Fluid: 350 mL  Total IN: 400 mL    OUT:    Drain: 10 mL    Voided: 200 mL  Total OUT: 210 mL    Total NET: 190 mL          A/P :  47y Female s/p C6 CORPECTOMY, C5-7 Ant Plating  -    Pain control + bowel regimen  -    DVT ppx: SCDs    -    Periop abx    -    ADAT  -    Check AM labs  -    Weight bearing status:   WBAT        -    Physical Therapy  -    Resume home meds  -    Dispo planning

## 2017-09-27 NOTE — PROGRESS NOTE ADULT - SUBJECTIVE AND OBJECTIVE BOX
Objective and Subjective   The patient in her bed. DOes not endorse any complains, no shortness of breath, no chest pain, no fever. eating her breakfast s/p cervical spine surgery for osteomyelitis doen on 9/23.       Patient is a 47 year old female with history of HTN, HLD, DM II, ESRD on HD (MWF. Last HD 09/13/2017. Receives HD via Right Femoral HD catheter placed on 09/13/2017 at [facility] 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 The renal follows the case for the need of HD. Last HD done on 9/25- 3.0 liters off             Patient seen and examined at bedside.     sodium chloride 0.9% lock flush 3 milliLiter(s) every 8 hours  aspirin enteric coated 81 milliGRAM(s) daily  simvastatin 20 milliGRAM(s) at bedtime  insulin lispro (HumaLOG) corrective regimen sliding scale   Before meals and at bedtime  dextrose 5%. 1000 milliLiter(s) <Continuous>  dextrose Gel 1 Dose(s) once PRN  dextrose 50% Injectable 12.5 Gram(s) once  dextrose 50% Injectable 25 Gram(s) once  dextrose 50% Injectable 25 Gram(s) once  glucagon  Injectable 1 milliGRAM(s) once PRN  polyethylene glycol 3350 17 Gram(s) daily  pantoprazole    Tablet 40 milliGRAM(s) before breakfast  acetaminophen    Suspension. 650 milliGRAM(s) every 6 hours PRN  DAPTOmycin IVPB 700 milliGRAM(s) every 48 hours  rifampin 600 milliGRAM(s) daily  Ceftaroline Fosamil IVPB 200 milliGRAM(s) every 12 hours  amLODIPine   Tablet 10 milliGRAM(s) daily  gabapentin 100 milliGRAM(s) two times a day  gabapentin 300 milliGRAM(s) at bedtime  sertraline 25 milliGRAM(s) daily  traZODone 25 milliGRAM(s) at bedtime  ketorolac   Injectable 30 milliGRAM(s) every 6 hours PRN  cloNIDine 0.2 milliGRAM(s) every 12 hours  insulin lispro Injectable (HumaLOG) 10 Unit(s) three times a day before meals  insulin glargine Injectable (LANTUS) 20 Unit(s) at bedtime  labetalol Injectable 10 milliGRAM(s) once      Allergies    penicillin (Unknown)  Sular (Unknown)    Intolerances        T(C): , Max: 37.2 (09-26-17 @ 16:43)  T(F): , Max: 98.9 (09-26-17 @ 16:43)  HR: 68 (09-27-17 @ 11:00)  BP: --  BP(mean): --  RR: 10 (09-27-17 @ 11:00)  SpO2: 95% (09-27-17 @ 11:00)  Wt(kg): --    09-26 @ 07:01  -  09-27 @ 07:00  --------------------------------------------------------  IN:    IV PiggyBack: 50 mL    Oral Fluid: 350 mL  Total IN: 400 mL    OUT:    Drain: 10 mL    Voided: 200 mL  Total OUT: 210 mL    Total NET: 190 mL      09-27 @ 07:01  -  09-27 @ 11:23  --------------------------------------------------------  IN:    Oral Fluid: 240 mL  Total IN: 240 mL    OUT:  Total OUT: 0 mL    Total NET: 240 mL        Alert and oriented  No JVD  HAs a catheter in the left IJ   No respiratory distress   Normal air entry in the lungs, no wheezing, no crackles, no rales   RRR, normal s1/s2, no mumurs, rubs or gallops  Abdomen - soft, non-tender, non-distended, normal bowel sounds   Extremities - mild peripheral edema   The line in the right groined removed           LABS:                        8.2    8.0   )-----------( 334      ( 27 Sep 2017 03:46 )             27.5     09-27    132<L>  |  96  |  33<H>  ----------------------------<  120<H>  4.9   |  25  |  4.38<H>    Ca    8.7      27 Sep 2017 03:46  Mg     2.0     09-27        PT/INR - ( 27 Sep 2017 03:46 )   PT: 15.3 sec;   INR: 1.37          PTT - ( 27 Sep 2017 03:46 )  PTT:38.8 sec          RADIOLOGY & ADDITIONAL STUDIES:    < from: Xray Chest 1 View AP-PORTABLE IMMEDIATE (09.27.17 @ 09:40) >        INTERPRETATION:  CLINICAL INDICATION: 47-year-old postoperative study.      FINDINGS: Portable view of the chest is compared to 9/27/2017and   demonstrates improved aeration of the bilateral lungs. Interval   development of patchy regions of atelectasis or consolidation within the   right midlung and right lower lobe. No interval change in small central   pulmonary edema. Left-sided intravenous catheter with the tip in the   right atrium. No interval change in small layering bilateral pleural   effusion.        < end of copied text >

## 2017-09-27 NOTE — PROGRESS NOTE ADULT - SUBJECTIVE AND OBJECTIVE BOX
Patient was seen and evaluated on dialysis.   Patient is tolerating the procedure well.   HR: 74 (09-27-17 @ 10:00)  BP: -- pusle 65, /67  Continue dialysis:   Dialyzer:  180      QB: 400       QD: 500  Goal UF 3 liters  over 210

## 2017-09-27 NOTE — PROGRESS NOTE ADULT - ATTENDING COMMENTS
Patient examined, fellow's hx and PE reviewed and confirmed. I find AF on abx. A/P reviewed and confirmed. Continue dialysis.  See full note

## 2017-09-27 NOTE — PROGRESS NOTE ADULT - ATTENDING COMMENTS
Case discussed on rounds this afternoon.  Glucoses have remained surprisingly well controlled despite the frequent food from outside.  Basal insulin requirements remain lower than last week.  To continue the current regimen for now

## 2017-09-27 NOTE — PROGRESS NOTE ADULT - SUBJECTIVE AND OBJECTIVE BOX
Patient discussed on morning rounds with Dr. Gee    SUBJECTIVE ASSESSMENT:  Patient seen this morning at bedside, states she is hungry and wants to eat while she is on dialysis today. She denies any other complaints but wants her neck drain to be removed.  She denies any chest pain, shortness of breath or palpitations.       Vital Signs Last 24 Hrs  T(C): 36.5 (27 Sep 2017 11:00), Max: 37.2 (26 Sep 2017 16:43)  T(F): 97.7 (27 Sep 2017 11:00), Max: 98.9 (26 Sep 2017 16:43)  HR: 68 (27 Sep 2017 12:30) (68 - 86)  BP: --  BP(mean): --  RR: 10 (27 Sep 2017 12:30) (0 - 24)  SpO2: 96% (27 Sep 2017 12:30) (93% - 100%)  I&O's Detail    26 Sep 2017 07:01  -  27 Sep 2017 07:00  --------------------------------------------------------  IN:    IV PiggyBack: 50 mL    Oral Fluid: 350 mL  Total IN: 400 mL    OUT:    Drain: 10 mL    Voided: 200 mL  Total OUT: 210 mL    Total NET: 190 mL      27 Sep 2017 07:01  -  27 Sep 2017 12:53  --------------------------------------------------------  IN:    Oral Fluid: 240 mL  Total IN: 240 mL    OUT:  Total OUT: 0 mL    Total NET: 240 mL    CHEST TUBE:  No  LOLLY DRAIN:  Yes VIKY drain from cervical spine surgery   EPICARDIAL WIRES: No  TIE DOWNS: No  COOK: No    PHYSICAL EXAM:    General: Patient lying comfortably in bed, no acute distress     Neurological: Alert and oriented. No focal neurological deficits. LLE and RLE strength 5/5     Cardiovascular: S1S2, RRR, no murmurs appreciated on exam     Respiratory:  Poor inspiratory effort 2/2 neck pain while sitting up. Diminished breath sounds bilaterally. with scattered crackles at left base.     Gastrointestinal:  Abdomen soft, non tender, non distended     Extremities: Warm and well perfused, No edema or calf tenderness bilaterally   Anterior cervical spine dressing in place, dressing C/D/I with VIKY drain   L foot + chronic fissure on L medial ankle, no drainage or tenderness     Vascular: peripheral pulses 2+ bilaterally   Left tunneled HD catheter   Right PICC line     LABS:                        8.2    8.0   )-----------( 334      ( 27 Sep 2017 03:46 )             27.5       COUMADIN: No    PT/INR - ( 27 Sep 2017 03:46 )   PT: 15.3 sec;   INR: 1.37          PTT - ( 27 Sep 2017 03:46 )  PTT:38.8 sec    09-27    132<L>  |  96  |  33<H>  ----------------------------<  120<H>  4.9   |  25  |  4.38<H>    Ca    8.7      27 Sep 2017 03:46  Mg     2.0     09-27            MEDICATIONS  (STANDING):  sodium chloride 0.9% lock flush 3 milliLiter(s) IV Push every 8 hours  aspirin enteric coated 81 milliGRAM(s) Oral daily  simvastatin 20 milliGRAM(s) Oral at bedtime  insulin lispro (HumaLOG) corrective regimen sliding scale   SubCutaneous Before meals and at bedtime  dextrose 5%. 1000 milliLiter(s) (50 mL/Hr) IV Continuous <Continuous>  dextrose 50% Injectable 12.5 Gram(s) IV Push once  dextrose 50% Injectable 25 Gram(s) IV Push once  dextrose 50% Injectable 25 Gram(s) IV Push once  polyethylene glycol 3350 17 Gram(s) Oral daily  pantoprazole    Tablet 40 milliGRAM(s) Oral before breakfast  DAPTOmycin IVPB 700 milliGRAM(s) IV Intermittent every 48 hours  rifampin 600 milliGRAM(s) Oral daily  Ceftaroline Fosamil IVPB 200 milliGRAM(s) IV Intermittent every 12 hours  amLODIPine   Tablet 10 milliGRAM(s) Oral daily  gabapentin 100 milliGRAM(s) Oral two times a day  gabapentin 300 milliGRAM(s) Oral at bedtime  sertraline 25 milliGRAM(s) Oral daily  traZODone 25 milliGRAM(s) Oral at bedtime  cloNIDine 0.2 milliGRAM(s) Oral every 12 hours  insulin lispro Injectable (HumaLOG) 10 Unit(s) SubCutaneous three times a day before meals  insulin glargine Injectable (LANTUS) 20 Unit(s) SubCutaneous at bedtime  labetalol Injectable 10 milliGRAM(s) IV Push once    MEDICATIONS  (PRN):  dextrose Gel 1 Dose(s) Oral once PRN Blood Glucose LESS THAN 70 milliGRAM(s)/deciliter  glucagon  Injectable 1 milliGRAM(s) IntraMuscular once PRN Glucose LESS THAN 70 milligrams/deciliter  acetaminophen    Suspension. 650 milliGRAM(s) Oral every 6 hours PRN Mild Pain (1 - 3)  ketorolac   Injectable 30 milliGRAM(s) IV Push every 6 hours PRN Moderate Pain (4 - 6)    RADIOLOGY & ADDITIONAL TESTS:  9/27/17 CXR: < from: Xray Chest 1 View AP-PORTABLE IMMEDIATE (09.27.17 @ 09:40) >  Portable view of the chest is compared to 9/27/2017and   demonstrates improved aeration of the bilateral lungs. Interval   development of patchy regions of atelectasis or consolidation within the   right midlung and right lower lobe. No interval change in small central   pulmonary edema. Left-sided intravenous catheter with the tip in the   right atrium. No interval change in small layering bilateral pleural   effusion.    < end of copied text >      A/P: 47 year old female, PMHx HTN, HLD, DM, ESRD on HD, chronic left foot OM, known Tricuspid valve endocarditis recently admitted to [facility] under Dr. Gee and was deemed a non-surgical candidate, subsequent transferred to [facility] for medical management. Patient failed medical management with agreesive antibiotics and was found to have enlaring tricuspid vegetation on ALCIDES and was transferred back to [facility] for further evaluation. During this admission patient complaining of back/neck pain, workup revealed cervical abscess and patient is now POD#4 s/p C6 corpectomy and C5-C7 anterior plating with Dr. Kuo (orthopedics). Patient transferred to CCU post procedure. Over the weekend, patient became bradycardic HR 40s with non conducted P waves. Patient converted back to NSR and has remained hemodynamically stable since. Patient had right PICC line place and new HD catheter placed for access by IR on 9/25. No acute events overnight.     Neurovascular: Stable.   - Last admission CT head findings concerning for evolving right MCA infarct, this admission patient complaining of LLE weakness. Neurology Dr. Luna following appreciate recs. MRI head no acute pathology. LLE weakness improving today .  - MRI cervical/thoracic/lumbar spine for neck/back pain revealed cervical OM with epidural abscess s/p C6 Corpectomy and C5-C7 anterior plating with Dr. Kuo on 9/22. Appreciate ortho recs.   - continue toradol and tylenol PRN for pain   - continue gabapentin 100mg BID and 300mg qhs for LLE weakness and pain   - continue sertraline 25mg daily for depression   - continue trazodone 25mg qhs for insomnia     Cardiovascular: Hemodynamically stable. HR controlled.  - MRSA Endocarditis, preoperative for TVR with Dr. Gee next week, as per Dr. Kuo cleared for full heparin (30,000 units in OR) for Friday 9/30. Preoperative workup completed.  Cardiac cath revealed RCA lesion patient will get CABG x1 with TVR. Continue asa 81mg    - SVC/RA thrombus found on previous admission, heparin drip held 2/2 orthopedic surgery at risk for epidural hematoma.   - 2nd degree HB 9/23 AM, pacing pads in place with TVP kit in room. Metoprolol discontinued. Continue to monitor rhythm   - Hx of HLD, continue simvastatin 20mg qhs   - Hx of HTN, continue amlodipine 10mg and clonidine 0.2mg BID. BP improved today. Valsartan 160mg discontinued 2/2 risk of periop hypotension. continue to monitor BP     Respiratory: 02 Sat = 93% on RA  - Encourage ambulation and Use of IS 10x / hr while awake.  - CXR stable, f/u CXR in AM     GI: Stable.  - Continue PO diet   - Continue protonix 40mg for GI ppx   - continue bowel regimen     Renal / :  ESRD on HD, Cr 4.38  - Renal following, HD today. Continue to appreciate recs    - Hyponatremic, continue 1L fluid restriction   - Monitor I/O's.    Endocrine:  DM, A1C 8.2 TSH 2.447  - Endocrine following, patient hypoglycemic over the weekend, insulin decreased and discontinued 2/2 decrease in appetite. FS started to creep up yesterday, as per endocrine restarted on lantus 20u qhs and 10u premeal. Will continue to monitor fingersticks  - Continue insulin sliding scale      Vascular: Patient to go to IR for fistulogram to assess AV fistula maturity    Hematologic: SVC/RA clot  - Heparin drip held for OR with orthopedics, as per Dr. Kuo safe anticoagulate after 9/30/17   - Anemia 2/2 ESRD, continue to trend H/H     ID:  MRSA Endocarditis   - Dr. Kingston following as ID, continue daptomycin 700mg q48 following HD, rifampin 600mg and ceftaroline 200mg BID. Need weekly CPK and CMP, last done 9/23   - Blood cultures from 9/19 NGTD. Blood cultures sent 9/23 and 9/25 negative thus far.   - Chronic OM of L foot, vascular/ortho following. Gallium scan completed with no obvious source of MRSA bacteremia. No further intervention needed.   - Observe for SIRS/Sepsis Syndrome.    Prophylaxis: holding heparin 2/2 risk of epidural hematoma.   -SCD's    Disposition: OR next week

## 2017-09-27 NOTE — PROGRESS NOTE ADULT - PROBLEM SELECTOR PLAN 2
hemoglobin - 8.2  - most likely due to the infection and underlying Renal failure   - we will do EPO with HD today.

## 2017-09-27 NOTE — PROGRESS NOTE ADULT - SUBJECTIVE AND OBJECTIVE BOX
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 (13 Sep 2017 22:57). On this admission, patient was also found to have septic emboli, cervical osteomyelitis and abscess s/p corpectomy and anterior plating POD #4. Patient is scheduled for TVR/CABG either on Friday.    Patient had chicken soup at 3 pm. At around 6.30 pm, patient had beef stew and rice that was brought by her mother. Patient only had glucerna this morning.     Pt reports the following symptoms:    CONSTITUTIONAL:  Negative fever or chills, feels well, good appetite  EYES:  Negative  blurry vision or double vision  CARDIOVASCULAR:  Negative for chest pain or palpitations  RESPIRATORY:  Negative for cough, wheezing, or SOB   GASTROINTESTINAL:  Negative for nausea, vomiting, diarrhea, constipation, or abdominal pain  GENITOURINARY:  Negative frequency, urgency or dysuria  NEUROLOGIC:  No headache, confusion, dizziness, lightheadedness    MEDICATIONS  (STANDING):  sodium chloride 0.9% lock flush 3 milliLiter(s) IV Push every 8 hours  aspirin enteric coated 81 milliGRAM(s) Oral daily  simvastatin 20 milliGRAM(s) Oral at bedtime  insulin lispro (HumaLOG) corrective regimen sliding scale   SubCutaneous Before meals and at bedtime  dextrose 5%. 1000 milliLiter(s) (50 mL/Hr) IV Continuous <Continuous>  dextrose 50% Injectable 12.5 Gram(s) IV Push once  dextrose 50% Injectable 25 Gram(s) IV Push once  dextrose 50% Injectable 25 Gram(s) IV Push once  polyethylene glycol 3350 17 Gram(s) Oral daily  pantoprazole    Tablet 40 milliGRAM(s) Oral before breakfast  DAPTOmycin IVPB 700 milliGRAM(s) IV Intermittent every 48 hours  rifampin 600 milliGRAM(s) Oral daily  Ceftaroline Fosamil IVPB 200 milliGRAM(s) IV Intermittent every 12 hours  amLODIPine   Tablet 10 milliGRAM(s) Oral daily  gabapentin 100 milliGRAM(s) Oral two times a day  gabapentin 300 milliGRAM(s) Oral at bedtime  sertraline 25 milliGRAM(s) Oral daily  traZODone 25 milliGRAM(s) Oral at bedtime  cloNIDine 0.2 milliGRAM(s) Oral every 12 hours  insulin lispro Injectable (HumaLOG) 10 Unit(s) SubCutaneous three times a day before meals  insulin glargine Injectable (LANTUS) 20 Unit(s) SubCutaneous at bedtime  labetalol Injectable 10 milliGRAM(s) IV Push once  epoetin fransisca Injectable 28130 Unit(s) IV Push once  calcitriol Injectable 1 MICROGram(s) IV Push once    MEDICATIONS  (PRN):  dextrose Gel 1 Dose(s) Oral once PRN Blood Glucose LESS THAN 70 milliGRAM(s)/deciliter  glucagon  Injectable 1 milliGRAM(s) IntraMuscular once PRN Glucose LESS THAN 70 milligrams/deciliter  acetaminophen    Suspension. 650 milliGRAM(s) Oral every 6 hours PRN Mild Pain (1 - 3)  ketorolac   Injectable 30 milliGRAM(s) IV Push every 6 hours PRN Moderate Pain (4 - 6)      PHYSICAL EXAM  Vital Signs Last 24 Hrs  T(C): 36.5 (27 Sep 2017 08:00), Max: 37.2 (26 Sep 2017 16:43)  T(F): 97.7 (27 Sep 2017 08:00), Max: 98.9 (26 Sep 2017 16:43)  HR: 74 (27 Sep 2017 10:00) (68 - 86)  BP: --  BP(mean): --  RR: 13 (27 Sep 2017 10:00) (0 - 24)  SpO2: 97% (27 Sep 2017 10:00) (93% - 100%)    Constitutional: wn/wd in NAD.   HEENT: NCAT, MMM, OP clear, EOMI, no proptosis or lid retraction  Neck: no thyromegaly or palpable thyroid nodules   Respiratory: scattered crackles  Cardiovascular: regular rhythm, normal S1 and S2, no audible murmurs, no peripheral edema  GI: soft, NT/ND, no masses/HSM appreciated.  Neurology: no tremors, DTR 2+  Skin: no visible rashes/lesions. right IJ  Psychiatric: AAO x 3, normal affect/mood.    LABS:                        8.2    8.0   )-----------( 334      ( 27 Sep 2017 03:46 )             27.5     09-27    132<L>  |  96  |  33<H>  ----------------------------<  120<H>  4.9   |  25  |  4.38<H>    Ca    8.7      27 Sep 2017 03:46  Mg     2.0     09-27      PT/INR - ( 27 Sep 2017 03:46 )   PT: 15.3 sec;   INR: 1.37          PTT - ( 27 Sep 2017 03:46 )  PTT:38.8 sec    Thyroid Stimulating Hormone, Serum: 2.447 uIU/mL (09-13 @ 22:51)  Thyroid Stimulating Hormone, Serum: 1.251 uIU/mL (08-24 @ 01:12)      HbA1C: 8.2 % (09-13 @ 22:51)  8.2 % (08-24 @ 01:11)    CAPILLARY BLOOD GLUCOSE  177 (26 Sep 2017 11:00)      Insulin Sliding Scale requirements X 24 Hours:    RADIOLOGY & ADDITIONAL TESTS:    A/P: 47y Female with history of DM type II presenting for       1.  DM 2, uncontrolled, complicated - patient with poor diabetic history and multiple diabetic complications. A1c is likely higher given that patient is on Procrit and has high cell turnover. Patient also seems to be non compliance with her diabetes diet. Patient had meal from outside hospital last night.    Please continue    units lantus at bedtime and        units lispro with meals and lispro moderate dose sliding scale 4 times daily with meals and at bedtime.  Please continue consistent carbohydrate diet.      Goal FSG is 120-180  Will continue to monitor   For discharge, pt can continue    Pt can follow up at discharge with [facility] by calling  to make an appointment.   Will discuss case with Dr. Ross, Dr. 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 (13 Sep 2017 22:57). On this admission, patient was also found to have septic emboli, cervical osteomyelitis and abscess s/p corpectomy and anterior plating POD #4. Patient is scheduled for TVR/CABG on Monday.    Patient had chicken soup at 3 pm. At around 6.30 pm, patient had beef stew and rice that was brought by her mother. Patient only had glucerna this morning.     Pt reports the following symptoms:    CONSTITUTIONAL:  Negative fever or chills, feels well, good appetite  EYES:  Negative  blurry vision or double vision  CARDIOVASCULAR:  Negative for chest pain or palpitations  RESPIRATORY:  Negative for cough, wheezing, or SOB   GASTROINTESTINAL:  Negative for nausea, vomiting, diarrhea, constipation, or abdominal pain  GENITOURINARY:  Negative frequency, urgency or dysuria  NEUROLOGIC:  No headache, confusion, dizziness, lightheadedness    MEDICATIONS  (STANDING):  sodium chloride 0.9% lock flush 3 milliLiter(s) IV Push every 8 hours  aspirin enteric coated 81 milliGRAM(s) Oral daily  simvastatin 20 milliGRAM(s) Oral at bedtime  insulin lispro (HumaLOG) corrective regimen sliding scale   SubCutaneous Before meals and at bedtime  dextrose 5%. 1000 milliLiter(s) (50 mL/Hr) IV Continuous <Continuous>  dextrose 50% Injectable 12.5 Gram(s) IV Push once  dextrose 50% Injectable 25 Gram(s) IV Push once  dextrose 50% Injectable 25 Gram(s) IV Push once  polyethylene glycol 3350 17 Gram(s) Oral daily  pantoprazole    Tablet 40 milliGRAM(s) Oral before breakfast  DAPTOmycin IVPB 700 milliGRAM(s) IV Intermittent every 48 hours  rifampin 600 milliGRAM(s) Oral daily  Ceftaroline Fosamil IVPB 200 milliGRAM(s) IV Intermittent every 12 hours  amLODIPine   Tablet 10 milliGRAM(s) Oral daily  gabapentin 100 milliGRAM(s) Oral two times a day  gabapentin 300 milliGRAM(s) Oral at bedtime  sertraline 25 milliGRAM(s) Oral daily  traZODone 25 milliGRAM(s) Oral at bedtime  cloNIDine 0.2 milliGRAM(s) Oral every 12 hours  insulin lispro Injectable (HumaLOG) 10 Unit(s) SubCutaneous three times a day before meals  insulin glargine Injectable (LANTUS) 20 Unit(s) SubCutaneous at bedtime  labetalol Injectable 10 milliGRAM(s) IV Push once  epoetin fransisca Injectable 27599 Unit(s) IV Push once  calcitriol Injectable 1 MICROGram(s) IV Push once    MEDICATIONS  (PRN):  dextrose Gel 1 Dose(s) Oral once PRN Blood Glucose LESS THAN 70 milliGRAM(s)/deciliter  glucagon  Injectable 1 milliGRAM(s) IntraMuscular once PRN Glucose LESS THAN 70 milligrams/deciliter  acetaminophen    Suspension. 650 milliGRAM(s) Oral every 6 hours PRN Mild Pain (1 - 3)  ketorolac   Injectable 30 milliGRAM(s) IV Push every 6 hours PRN Moderate Pain (4 - 6)      PHYSICAL EXAM  Vital Signs Last 24 Hrs  T(C): 36.5 (27 Sep 2017 08:00), Max: 37.2 (26 Sep 2017 16:43)  T(F): 97.7 (27 Sep 2017 08:00), Max: 98.9 (26 Sep 2017 16:43)  HR: 74 (27 Sep 2017 10:00) (68 - 86)  BP: --  BP(mean): --  RR: 13 (27 Sep 2017 10:00) (0 - 24)  SpO2: 97% (27 Sep 2017 10:00) (93% - 100%)    Constitutional: wn/wd in NAD.   HEENT: NCAT, MMM, OP clear, EOMI, no proptosis or lid retraction  Neck: no thyromegaly or palpable thyroid nodules   Respiratory: scattered crackles  Cardiovascular: regular rhythm, normal S1 and S2, no audible murmurs, no peripheral edema  GI: soft, NT/ND, no masses/HSM appreciated.  Neurology: no tremors, DTR 2+  Skin: no visible rashes/lesions. right IJ  Psychiatric: AAO x 3, normal affect/mood.    LABS:                        8.2    8.0   )-----------( 334      ( 27 Sep 2017 03:46 )             27.5     09-27    132<L>  |  96  |  33<H>  ----------------------------<  120<H>  4.9   |  25  |  4.38<H>    Ca    8.7      27 Sep 2017 03:46  Mg     2.0     09-27      PT/INR - ( 27 Sep 2017 03:46 )   PT: 15.3 sec;   INR: 1.37          PTT - ( 27 Sep 2017 03:46 )  PTT:38.8 sec    Thyroid Stimulating Hormone, Serum: 2.447 uIU/mL (09-13 @ 22:51)  Thyroid Stimulating Hormone, Serum: 1.251 uIU/mL (08-24 @ 01:12)      HbA1C: 8.2 % (09-13 @ 22:51)  8.2 % (08-24 @ 01:11)    CAPILLARY BLOOD GLUCOSE  177 (26 Sep 2017 11:00)      Insulin Sliding Scale requirements X 24 Hours:    RADIOLOGY & ADDITIONAL TESTS:    A/P: 47y Female with history of DM type II presenting for       1.  DM 2, uncontrolled, complicated - patient with poor diabetic history and multiple diabetic complications. A1c is likely higher given that patient is on Procrit and has high cell turnover. Patient also seems to be non compliance with her diabetes diet. Patient had meal from outside hospital last night.    Please continue 20 units lantus at bedtime and 10 units lispro with meals and lispro moderate dose sliding scale 4 times daily with meals and at bedtime.  Please continue consistent carbohydrate diet.      Goal FSG is 120-180  Will continue to monitor   For discharge, pt can continue    Pt can follow up at discharge with [facility] by calling  to make an appointment.   Will discuss case with Dr. Ross, Dr. 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 (13 Sep 2017 22:57). On this admission, patient was also found to have septic emboli, cervical osteomyelitis and abscess s/p corpectomy and anterior plating POD #4. Patient is scheduled for TVR/CABG on Monday.    Patient had chicken soup at 3 pm. At around 6.30 pm, patient had beef stew and rice that was brought by her mother. Patient only had glucerna this morning.     Pt reports the following symptoms:    CONSTITUTIONAL:  Negative fever or chills, feels well, good appetite  EYES:  Negative  blurry vision or double vision  CARDIOVASCULAR:  Negative for chest pain or palpitations  RESPIRATORY:  Negative for cough, wheezing, or SOB   GASTROINTESTINAL:  Negative for nausea, vomiting, diarrhea, constipation, or abdominal pain  GENITOURINARY:  Negative frequency, urgency or dysuria  NEUROLOGIC:  No headache, confusion, dizziness, lightheadedness    MEDICATIONS  (STANDING):  sodium chloride 0.9% lock flush 3 milliLiter(s) IV Push every 8 hours  aspirin enteric coated 81 milliGRAM(s) Oral daily  simvastatin 20 milliGRAM(s) Oral at bedtime  insulin lispro (HumaLOG) corrective regimen sliding scale   SubCutaneous Before meals and at bedtime  dextrose 5%. 1000 milliLiter(s) (50 mL/Hr) IV Continuous <Continuous>  dextrose 50% Injectable 12.5 Gram(s) IV Push once  dextrose 50% Injectable 25 Gram(s) IV Push once  dextrose 50% Injectable 25 Gram(s) IV Push once  polyethylene glycol 3350 17 Gram(s) Oral daily  pantoprazole    Tablet 40 milliGRAM(s) Oral before breakfast  DAPTOmycin IVPB 700 milliGRAM(s) IV Intermittent every 48 hours  rifampin 600 milliGRAM(s) Oral daily  Ceftaroline Fosamil IVPB 200 milliGRAM(s) IV Intermittent every 12 hours  amLODIPine   Tablet 10 milliGRAM(s) Oral daily  gabapentin 100 milliGRAM(s) Oral two times a day  gabapentin 300 milliGRAM(s) Oral at bedtime  sertraline 25 milliGRAM(s) Oral daily  traZODone 25 milliGRAM(s) Oral at bedtime  cloNIDine 0.2 milliGRAM(s) Oral every 12 hours  insulin lispro Injectable (HumaLOG) 10 Unit(s) SubCutaneous three times a day before meals  insulin glargine Injectable (LANTUS) 20 Unit(s) SubCutaneous at bedtime  labetalol Injectable 10 milliGRAM(s) IV Push once  epoetin fransisca Injectable 91396 Unit(s) IV Push once  calcitriol Injectable 1 MICROGram(s) IV Push once    MEDICATIONS  (PRN):  dextrose Gel 1 Dose(s) Oral once PRN Blood Glucose LESS THAN 70 milliGRAM(s)/deciliter  glucagon  Injectable 1 milliGRAM(s) IntraMuscular once PRN Glucose LESS THAN 70 milligrams/deciliter  acetaminophen    Suspension. 650 milliGRAM(s) Oral every 6 hours PRN Mild Pain (1 - 3)  ketorolac   Injectable 30 milliGRAM(s) IV Push every 6 hours PRN Moderate Pain (4 - 6)      PHYSICAL EXAM  Vital Signs Last 24 Hrs  T(C): 36.5 (27 Sep 2017 08:00), Max: 37.2 (26 Sep 2017 16:43)  T(F): 97.7 (27 Sep 2017 08:00), Max: 98.9 (26 Sep 2017 16:43)  HR: 74 (27 Sep 2017 10:00) (68 - 86)  BP: --  BP(mean): --  RR: 13 (27 Sep 2017 10:00) (0 - 24)  SpO2: 97% (27 Sep 2017 10:00) (93% - 100%)    Constitutional: wn/wd in NAD.   HEENT: NCAT, MMM, OP clear, EOMI, no proptosis or lid retraction  Neck: no thyromegaly or palpable thyroid nodules   Respiratory: scattered crackles  Cardiovascular: regular rhythm, normal S1 and S2, no audible murmurs, no peripheral edema  GI: soft, NT/ND, no masses/HSM appreciated.  Neurology: no tremors, DTR 2+  Skin: no visible rashes/lesions. right IJ  Psychiatric: AAO x 3, normal affect/mood.    LABS:                        8.2    8.0   )-----------( 334      ( 27 Sep 2017 03:46 )             27.5     09-27    132<L>  |  96  |  33<H>  ----------------------------<  120<H>  4.9   |  25  |  4.38<H>    Ca    8.7      27 Sep 2017 03:46  Mg     2.0     09-27      PT/INR - ( 27 Sep 2017 03:46 )   PT: 15.3 sec;   INR: 1.37          PTT - ( 27 Sep 2017 03:46 )  PTT:38.8 sec    Thyroid Stimulating Hormone, Serum: 2.447 uIU/mL (09-13 @ 22:51)  Thyroid Stimulating Hormone, Serum: 1.251 uIU/mL (08-24 @ 01:12)      HbA1C: 8.2 % (09-13 @ 22:51)  8.2 % (08-24 @ 01:11)    CAPILLARY BLOOD GLUCOSE  177 (26 Sep 2017 11:00)          A/P: 47y Female with history of DM type II presenting for MRSA bacteremia and 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      1.  DM 2, uncontrolled, complicated - patient with poor diabetic history and multiple diabetic complications. A1c is likely higher given that patient is on Procrit and has high cell turnover. Patient also seems to be non compliance with her diabetes diet. Patient had meal from outside hospital last night.    Please continue 20 units lantus at bedtime and 10 units lispro with meals and lispro moderate dose sliding scale 4 times daily with meals and at bedtime.  Please continue consistent carbohydrate diet.      Goal FSG is 120-180  Will continue to monitor   For discharge, pt can continue above regimen    Pt can follow up at discharge with [facility] by calling  to make an appointment.   Will discuss case with Dr. Ross, Dr. Yoon  and update primary team

## 2017-09-27 NOTE — PROGRESS NOTE ADULT - ASSESSMENT
This is 47 year old female with PMH stated above. The renal service is activated for the need of HD. She was dialyzed on 9/25- 3.0 liters off. New permcath placed on 9/25. She will be dialyzed today

## 2017-09-28 LAB
ANION GAP SERPL CALC-SCNC: 12 MMOL/L — SIGNIFICANT CHANGE UP (ref 5–17)
APTT BLD: 39.1 SEC — HIGH (ref 27.5–37.4)
BUN SERPL-MCNC: 21 MG/DL — SIGNIFICANT CHANGE UP (ref 7–23)
CALCIUM SERPL-MCNC: 8.6 MG/DL — SIGNIFICANT CHANGE UP (ref 8.4–10.5)
CHLORIDE SERPL-SCNC: 93 MMOL/L — LOW (ref 96–108)
CO2 SERPL-SCNC: 25 MMOL/L — SIGNIFICANT CHANGE UP (ref 22–31)
CREAT SERPL-MCNC: 3.31 MG/DL — HIGH (ref 0.5–1.3)
CULTURE RESULTS: SIGNIFICANT CHANGE UP
GLUCOSE SERPL-MCNC: 232 MG/DL — HIGH (ref 70–99)
HCT VFR BLD CALC: 27.8 % — LOW (ref 34.5–45)
HGB BLD-MCNC: 8.2 G/DL — LOW (ref 11.5–15.5)
INR BLD: 1.4 — HIGH (ref 0.88–1.16)
MAGNESIUM SERPL-MCNC: 2 MG/DL — SIGNIFICANT CHANGE UP (ref 1.6–2.6)
MCHC RBC-ENTMCNC: 25.1 PG — LOW (ref 27–34)
MCHC RBC-ENTMCNC: 29.5 G/DL — LOW (ref 32–36)
MCV RBC AUTO: 85 FL — SIGNIFICANT CHANGE UP (ref 80–100)
PLATELET # BLD AUTO: 357 K/UL — SIGNIFICANT CHANGE UP (ref 150–400)
POTASSIUM SERPL-MCNC: 4.9 MMOL/L — SIGNIFICANT CHANGE UP (ref 3.5–5.3)
POTASSIUM SERPL-SCNC: 4.9 MMOL/L — SIGNIFICANT CHANGE UP (ref 3.5–5.3)
PROTHROM AB SERPL-ACNC: 15.7 SEC — HIGH (ref 9.8–12.7)
RBC # BLD: 3.27 M/UL — LOW (ref 3.8–5.2)
RBC # FLD: 16.4 % — SIGNIFICANT CHANGE UP (ref 10.3–16.9)
SODIUM SERPL-SCNC: 130 MMOL/L — LOW (ref 135–145)
SPECIMEN SOURCE: SIGNIFICANT CHANGE UP
WBC # BLD: 8.8 K/UL — SIGNIFICANT CHANGE UP (ref 3.8–10.5)
WBC # FLD AUTO: 8.8 K/UL — SIGNIFICANT CHANGE UP (ref 3.8–10.5)

## 2017-09-28 PROCEDURE — 71010: CPT | Mod: 26

## 2017-09-28 PROCEDURE — 99232 SBSQ HOSP IP/OBS MODERATE 35: CPT

## 2017-09-28 PROCEDURE — 36000 PLACE NEEDLE IN VEIN: CPT

## 2017-09-28 RX ADMIN — Medication 0.2 MILLIGRAM(S): at 06:07

## 2017-09-28 RX ADMIN — SERTRALINE 25 MILLIGRAM(S): 25 TABLET, FILM COATED ORAL at 11:08

## 2017-09-28 RX ADMIN — SODIUM CHLORIDE 3 MILLILITER(S): 9 INJECTION INTRAMUSCULAR; INTRAVENOUS; SUBCUTANEOUS at 22:20

## 2017-09-28 RX ADMIN — GABAPENTIN 100 MILLIGRAM(S): 400 CAPSULE ORAL at 18:16

## 2017-09-28 RX ADMIN — PANTOPRAZOLE SODIUM 40 MILLIGRAM(S): 20 TABLET, DELAYED RELEASE ORAL at 05:59

## 2017-09-28 RX ADMIN — SODIUM CHLORIDE 3 MILLILITER(S): 9 INJECTION INTRAMUSCULAR; INTRAVENOUS; SUBCUTANEOUS at 05:58

## 2017-09-28 RX ADMIN — SODIUM CHLORIDE 3 MILLILITER(S): 9 INJECTION INTRAMUSCULAR; INTRAVENOUS; SUBCUTANEOUS at 14:06

## 2017-09-28 RX ADMIN — Medication 30 MILLIGRAM(S): at 18:55

## 2017-09-28 RX ADMIN — Medication 2: at 06:30

## 2017-09-28 RX ADMIN — GABAPENTIN 300 MILLIGRAM(S): 400 CAPSULE ORAL at 22:19

## 2017-09-28 RX ADMIN — Medication 10 UNIT(S): at 19:27

## 2017-09-28 RX ADMIN — Medication 30 MILLIGRAM(S): at 01:55

## 2017-09-28 RX ADMIN — CEFTAROLINE FOSAMIL 50 MILLIGRAM(S): 600 POWDER, FOR SOLUTION INTRAVENOUS at 06:07

## 2017-09-28 RX ADMIN — Medication 0.2 MILLIGRAM(S): at 18:16

## 2017-09-28 RX ADMIN — GABAPENTIN 100 MILLIGRAM(S): 400 CAPSULE ORAL at 05:59

## 2017-09-28 RX ADMIN — Medication 81 MILLIGRAM(S): at 11:08

## 2017-09-28 RX ADMIN — CEFTAROLINE FOSAMIL 50 MILLIGRAM(S): 600 POWDER, FOR SOLUTION INTRAVENOUS at 17:45

## 2017-09-28 RX ADMIN — AMLODIPINE BESYLATE 10 MILLIGRAM(S): 2.5 TABLET ORAL at 05:59

## 2017-09-28 RX ADMIN — Medication 25 MILLIGRAM(S): at 22:18

## 2017-09-28 RX ADMIN — DAPTOMYCIN 128 MILLIGRAM(S): 500 INJECTION, POWDER, LYOPHILIZED, FOR SOLUTION INTRAVENOUS at 09:41

## 2017-09-28 RX ADMIN — Medication 2: at 22:17

## 2017-09-28 RX ADMIN — Medication 2: at 11:12

## 2017-09-28 RX ADMIN — Medication 30 MILLIGRAM(S): at 18:21

## 2017-09-28 RX ADMIN — Medication 30 MILLIGRAM(S): at 01:25

## 2017-09-28 RX ADMIN — SIMVASTATIN 20 MILLIGRAM(S): 20 TABLET, FILM COATED ORAL at 22:19

## 2017-09-28 RX ADMIN — INSULIN GLARGINE 20 UNIT(S): 100 INJECTION, SOLUTION SUBCUTANEOUS at 22:19

## 2017-09-28 NOTE — PROGRESS NOTE ADULT - SUBJECTIVE AND OBJECTIVE BOX
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 (13 Sep 2017 22:57)      Pt reports the following symptoms:    CONSTITUTIONAL:  Negative fever or chills, feels well, good appetite  EYES:  Negative  blurry vision or double vision  CARDIOVASCULAR:  Negative for chest pain or palpitations  RESPIRATORY:  Negative for cough, wheezing, or SOB   GASTROINTESTINAL:  Negative for nausea, vomiting, diarrhea, constipation, or abdominal pain  GENITOURINARY:  Negative frequency, urgency or dysuria  NEUROLOGIC:  No headache, confusion, dizziness, lightheadedness    MEDICATIONS  (STANDING):  sodium chloride 0.9% lock flush 3 milliLiter(s) IV Push every 8 hours  aspirin enteric coated 81 milliGRAM(s) Oral daily  simvastatin 20 milliGRAM(s) Oral at bedtime  insulin lispro (HumaLOG) corrective regimen sliding scale   SubCutaneous Before meals and at bedtime  dextrose 5%. 1000 milliLiter(s) (50 mL/Hr) IV Continuous <Continuous>  dextrose 50% Injectable 12.5 Gram(s) IV Push once  dextrose 50% Injectable 25 Gram(s) IV Push once  dextrose 50% Injectable 25 Gram(s) IV Push once  polyethylene glycol 3350 17 Gram(s) Oral daily  pantoprazole    Tablet 40 milliGRAM(s) Oral before breakfast  DAPTOmycin IVPB 700 milliGRAM(s) IV Intermittent every 48 hours  rifampin 600 milliGRAM(s) Oral daily  Ceftaroline Fosamil IVPB 200 milliGRAM(s) IV Intermittent every 12 hours  amLODIPine   Tablet 10 milliGRAM(s) Oral daily  gabapentin 100 milliGRAM(s) Oral two times a day  gabapentin 300 milliGRAM(s) Oral at bedtime  sertraline 25 milliGRAM(s) Oral daily  traZODone 25 milliGRAM(s) Oral at bedtime  cloNIDine 0.2 milliGRAM(s) Oral every 12 hours  insulin lispro Injectable (HumaLOG) 10 Unit(s) SubCutaneous three times a day before meals  insulin glargine Injectable (LANTUS) 20 Unit(s) SubCutaneous at bedtime  labetalol Injectable 10 milliGRAM(s) IV Push once    MEDICATIONS  (PRN):  dextrose Gel 1 Dose(s) Oral once PRN Blood Glucose LESS THAN 70 milliGRAM(s)/deciliter  glucagon  Injectable 1 milliGRAM(s) IntraMuscular once PRN Glucose LESS THAN 70 milligrams/deciliter  acetaminophen    Suspension. 650 milliGRAM(s) Oral every 6 hours PRN Mild Pain (1 - 3)  ketorolac   Injectable 30 milliGRAM(s) IV Push every 6 hours PRN Moderate Pain (4 - 6)      PHYSICAL EXAM  Vital Signs Last 24 Hrs  T(C): 36.8 (28 Sep 2017 09:00), Max: 37.3 (27 Sep 2017 21:21)  T(F): 98.3 (28 Sep 2017 09:00), Max: 99.2 (27 Sep 2017 21:21)  HR: 74 (28 Sep 2017 11:00) (66 - 92)  BP: --  BP(mean): --  RR: 20 (28 Sep 2017 11:00) (7 - 21)  SpO2: 95% (28 Sep 2017 11:00) (90% - 100%)    Constitutional: wn/wd in NAD.   HEENT: NCAT, MMM, OP clear, EOMI, no proptosis or lid retraction  Neck: no thyromegaly or palpable thyroid nodules   Respiratory: lungs CTAB.  Cardiovascular: regular rhythm, normal S1 and S2, no audible murmurs, no peripheral edema  GI: soft, NT/ND, no masses/HSM appreciated.  Neurology: no tremors, DTR 2+  Skin: no visible rashes/lesions  Psychiatric: AAO x 3, normal affect/mood.    LABS:                        8.2    8.8   )-----------( 357      ( 28 Sep 2017 04:15 )             27.8     09-28    130<L>  |  93<L>  |  21  ----------------------------<  232<H>  4.9   |  25  |  3.31<H>    Ca    8.6      28 Sep 2017 04:15  Mg     2.0     09-28      PT/INR - ( 28 Sep 2017 04:15 )   PT: 15.7 sec;   INR: 1.40          PTT - ( 28 Sep 2017 04:15 )  PTT:39.1 sec    Thyroid Stimulating Hormone, Serum: 2.447 uIU/mL (09-13 @ 22:51)  Thyroid Stimulating Hormone, Serum: 1.251 uIU/mL (08-24 @ 01:12)      HbA1C: 8.2 % (09-13 @ 22:51)  8.2 % (08-24 @ 01:11)    CAPILLARY BLOOD GLUCOSE  153 (28 Sep 2017 11:00)  183 (28 Sep 2017 06:00)  252 (27 Sep 2017 21:00)  98 (27 Sep 2017 16:30)  70 (27 Sep 2017 16:00)      Insulin Sliding Scale requirements X 24 Hours:    RADIOLOGY & ADDITIONAL TESTS:    A/P: 47y Female with history of DM type II presenting for       1.  DM -     Please continue           units lantus at bedtime  / in the morning and        units lispro with meals and lispro moderate / low dose sliding scale 4 times daily with meals and at bedtime.  Please continue consistent carbohydrate diet.      Goal FSG is   Will continue to monitor   For discharge, pt can continue    Pt can follow up at discharge with [facility] by calling  to make an appointment.   Will discuss case with     and update primary team INTERVAL HPI/OVERNIGHT EVENTS:    Patient is a 47y old  Female who presents with a chief complaint of TV IE / MRSA Bacteremia. (13 Sep 2017 22:57). On this admission, patient was also found to have septic emboli, cervical osteomyelitis and abscess s/p corpectomy and anterior plating POD #4. Patient is scheduled for TVR/CABG on Monday.    Patient was NPO for fistulogram yesterday. She became hypoglycemic (FS 70) at 4 pm. FS improved to 98 with apple juice. The fistulogram was postponed. Patient had outside food for dinner (mashed sweet potato and steak). patient is NPO again today for fistulogram.      Pt reports the following symptoms:    CONSTITUTIONAL:  Negative fever or chills, feels well, good appetite  EYES:  Negative  blurry vision or double vision  CARDIOVASCULAR:  Negative for chest pain or palpitations  RESPIRATORY:  Negative for cough, wheezing, or SOB   GASTROINTESTINAL:  Negative for nausea, vomiting, diarrhea, constipation, or abdominal pain  GENITOURINARY:  Negative frequency, urgency or dysuria  NEUROLOGIC:  No headache, confusion, dizziness, lightheadedness    MEDICATIONS  (STANDING):  sodium chloride 0.9% lock flush 3 milliLiter(s) IV Push every 8 hours  aspirin enteric coated 81 milliGRAM(s) Oral daily  simvastatin 20 milliGRAM(s) Oral at bedtime  insulin lispro (HumaLOG) corrective regimen sliding scale   SubCutaneous Before meals and at bedtime  dextrose 5%. 1000 milliLiter(s) (50 mL/Hr) IV Continuous <Continuous>  dextrose 50% Injectable 12.5 Gram(s) IV Push once  dextrose 50% Injectable 25 Gram(s) IV Push once  dextrose 50% Injectable 25 Gram(s) IV Push once  polyethylene glycol 3350 17 Gram(s) Oral daily  pantoprazole    Tablet 40 milliGRAM(s) Oral before breakfast  DAPTOmycin IVPB 700 milliGRAM(s) IV Intermittent every 48 hours  rifampin 600 milliGRAM(s) Oral daily  Ceftaroline Fosamil IVPB 200 milliGRAM(s) IV Intermittent every 12 hours  amLODIPine   Tablet 10 milliGRAM(s) Oral daily  gabapentin 100 milliGRAM(s) Oral two times a day  gabapentin 300 milliGRAM(s) Oral at bedtime  sertraline 25 milliGRAM(s) Oral daily  traZODone 25 milliGRAM(s) Oral at bedtime  cloNIDine 0.2 milliGRAM(s) Oral every 12 hours  insulin lispro Injectable (HumaLOG) 10 Unit(s) SubCutaneous three times a day before meals  insulin glargine Injectable (LANTUS) 20 Unit(s) SubCutaneous at bedtime  labetalol Injectable 10 milliGRAM(s) IV Push once    MEDICATIONS  (PRN):  dextrose Gel 1 Dose(s) Oral once PRN Blood Glucose LESS THAN 70 milliGRAM(s)/deciliter  glucagon  Injectable 1 milliGRAM(s) IntraMuscular once PRN Glucose LESS THAN 70 milligrams/deciliter  acetaminophen    Suspension. 650 milliGRAM(s) Oral every 6 hours PRN Mild Pain (1 - 3)  ketorolac   Injectable 30 milliGRAM(s) IV Push every 6 hours PRN Moderate Pain (4 - 6)      PHYSICAL EXAM  Vital Signs Last 24 Hrs  T(C): 36.8 (28 Sep 2017 09:00), Max: 37.3 (27 Sep 2017 21:21)  T(F): 98.3 (28 Sep 2017 09:00), Max: 99.2 (27 Sep 2017 21:21)  HR: 74 (28 Sep 2017 11:00) (66 - 92)  BP: --  BP(mean): --  RR: 20 (28 Sep 2017 11:00) (7 - 21)  SpO2: 95% (28 Sep 2017 11:00) (90% - 100%)    Constitutional: wn/wd in NAD.   HEENT: NCAT, MMM, OP clear, EOMI, no proptosis or lid retraction  Neck: no thyromegaly or palpable thyroid nodules   Respiratory: lungs CTAB.  Cardiovascular: regular rhythm, normal S1 and S2, no audible murmurs, no peripheral edema  GI: soft, NT/ND, no masses/HSM appreciated.  Neurology: no tremors, DTR 2+  Skin: no visible rashes/lesions  Psychiatric: AAO x 3, normal affect/mood.    LABS:                        8.2    8.8   )-----------( 357      ( 28 Sep 2017 04:15 )             27.8     09-28    130<L>  |  93<L>  |  21  ----------------------------<  232<H>  4.9   |  25  |  3.31<H>    Ca    8.6      28 Sep 2017 04:15  Mg     2.0     09-28      PT/INR - ( 28 Sep 2017 04:15 )   PT: 15.7 sec;   INR: 1.40          PTT - ( 28 Sep 2017 04:15 )  PTT:39.1 sec    Thyroid Stimulating Hormone, Serum: 2.447 uIU/mL (09-13 @ 22:51)  Thyroid Stimulating Hormone, Serum: 1.251 uIU/mL (08-24 @ 01:12)      HbA1C: 8.2 % (09-13 @ 22:51)  8.2 % (08-24 @ 01:11)    CAPILLARY BLOOD GLUCOSE  153 (28 Sep 2017 11:00)  183 (28 Sep 2017 06:00)  252 (27 Sep 2017 21:00)  98 (27 Sep 2017 16:30)  70 (27 Sep 2017 16:00)      Insulin Sliding Scale requirements X 24 Hours:    RADIOLOGY & ADDITIONAL TESTS:    A/P: 47y Female with history of DM type II presenting for       1. DM 2, uncontrolled, complicated - patient with poor diabetic history and multiple diabetic complications. A1c is likely higher given that patient is on Procrit and has high cell turnover. Patient also seems to be non compliance with her diabetes diet. Patient had meal from outside hospital last night. She is NPO today    Please continue        units lantus at bedtime and      units lispro with meals and lispro moderate dose sliding scale 4 times daily with meals and at bedtime.  Please continue consistent carbohydrate diet.      Goal FSG is 120-180  Will continue to monitor   For discharge, pt can continue    Pt can follow up at discharge with [facility] by calling  to make an appointment.   Will discuss case with Dr. Ross, Dr. 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 (13 Sep 2017 22:57). On this admission, patient was also found to have septic emboli, cervical osteomyelitis and abscess s/p corpectomy and anterior plating POD #4. Patient is scheduled for TVR/CABG on Monday.    Patient was NPO for fistulogram yesterday. She became hypoglycemic (FS 70) at 4 pm. FS improved to 98 with apple juice. The fistulogram was postponed. Patient had outside food for dinner (mashed sweet potato and steak). patient is NPO again today for fistulogram.      Pt reports the following symptoms:    CONSTITUTIONAL:  Negative fever or chills, feels well, good appetite  EYES:  Negative  blurry vision or double vision  CARDIOVASCULAR:  Negative for chest pain or palpitations  RESPIRATORY:  Negative for cough, wheezing, or SOB   GASTROINTESTINAL:  Negative for nausea, vomiting, diarrhea, constipation, or abdominal pain  GENITOURINARY:  Negative frequency, urgency or dysuria  NEUROLOGIC:  No headache, confusion, dizziness, lightheadedness    MEDICATIONS  (STANDING):  sodium chloride 0.9% lock flush 3 milliLiter(s) IV Push every 8 hours  aspirin enteric coated 81 milliGRAM(s) Oral daily  simvastatin 20 milliGRAM(s) Oral at bedtime  insulin lispro (HumaLOG) corrective regimen sliding scale   SubCutaneous Before meals and at bedtime  dextrose 5%. 1000 milliLiter(s) (50 mL/Hr) IV Continuous <Continuous>  dextrose 50% Injectable 12.5 Gram(s) IV Push once  dextrose 50% Injectable 25 Gram(s) IV Push once  dextrose 50% Injectable 25 Gram(s) IV Push once  polyethylene glycol 3350 17 Gram(s) Oral daily  pantoprazole    Tablet 40 milliGRAM(s) Oral before breakfast  DAPTOmycin IVPB 700 milliGRAM(s) IV Intermittent every 48 hours  rifampin 600 milliGRAM(s) Oral daily  Ceftaroline Fosamil IVPB 200 milliGRAM(s) IV Intermittent every 12 hours  amLODIPine   Tablet 10 milliGRAM(s) Oral daily  gabapentin 100 milliGRAM(s) Oral two times a day  gabapentin 300 milliGRAM(s) Oral at bedtime  sertraline 25 milliGRAM(s) Oral daily  traZODone 25 milliGRAM(s) Oral at bedtime  cloNIDine 0.2 milliGRAM(s) Oral every 12 hours  insulin lispro Injectable (HumaLOG) 10 Unit(s) SubCutaneous three times a day before meals  insulin glargine Injectable (LANTUS) 20 Unit(s) SubCutaneous at bedtime  labetalol Injectable 10 milliGRAM(s) IV Push once    MEDICATIONS  (PRN):  dextrose Gel 1 Dose(s) Oral once PRN Blood Glucose LESS THAN 70 milliGRAM(s)/deciliter  glucagon  Injectable 1 milliGRAM(s) IntraMuscular once PRN Glucose LESS THAN 70 milligrams/deciliter  acetaminophen    Suspension. 650 milliGRAM(s) Oral every 6 hours PRN Mild Pain (1 - 3)  ketorolac   Injectable 30 milliGRAM(s) IV Push every 6 hours PRN Moderate Pain (4 - 6)      PHYSICAL EXAM  Vital Signs Last 24 Hrs  T(C): 36.8 (28 Sep 2017 09:00), Max: 37.3 (27 Sep 2017 21:21)  T(F): 98.3 (28 Sep 2017 09:00), Max: 99.2 (27 Sep 2017 21:21)  HR: 74 (28 Sep 2017 11:00) (66 - 92)  BP: --  BP(mean): --  RR: 20 (28 Sep 2017 11:00) (7 - 21)  SpO2: 95% (28 Sep 2017 11:00) (90% - 100%)    Constitutional: wn/wd in NAD.   HEENT: NCAT, MMM, OP clear, EOMI, no proptosis or lid retraction  Neck: no thyromegaly or palpable thyroid nodules   Respiratory: lungs CTAB.  Cardiovascular: regular rhythm, normal S1 and S2, no audible murmurs, no peripheral edema  GI: soft, NT/ND, no masses/HSM appreciated.  Neurology: no tremors, DTR 2+  Skin: no visible rashes/lesions  Psychiatric: AAO x 3, normal affect/mood.    LABS:                        8.2    8.8   )-----------( 357      ( 28 Sep 2017 04:15 )             27.8     09-28    130<L>  |  93<L>  |  21  ----------------------------<  232<H>  4.9   |  25  |  3.31<H>    Ca    8.6      28 Sep 2017 04:15  Mg     2.0     09-28      PT/INR - ( 28 Sep 2017 04:15 )   PT: 15.7 sec;   INR: 1.40          PTT - ( 28 Sep 2017 04:15 )  PTT:39.1 sec    Thyroid Stimulating Hormone, Serum: 2.447 uIU/mL (09-13 @ 22:51)  Thyroid Stimulating Hormone, Serum: 1.251 uIU/mL (08-24 @ 01:12)      HbA1C: 8.2 % (09-13 @ 22:51)  8.2 % (08-24 @ 01:11)    CAPILLARY BLOOD GLUCOSE  153 (28 Sep 2017 11:00)  183 (28 Sep 2017 06:00)  252 (27 Sep 2017 21:00)  98 (27 Sep 2017 16:30)  70 (27 Sep 2017 16:00)      Insulin Sliding Scale requirements X 24 Hours:    RADIOLOGY & ADDITIONAL TESTS:    A/P: 47y Female with history of DM type II presenting for       1. DM 2, uncontrolled, complicated - patient with poor diabetic history and multiple diabetic complications. A1c is likely higher given that patient is on Procrit and has high cell turnover. Patient also seems to be non compliance with her diabetes diet. Patient had meal from outside hospital last night. She is NPO today    Please continue 20 units lantus at bedtime and 10 units lispro with meals and lispro moderate dose sliding scale 4 times daily with meals and at bedtime.  Please continue consistent carbohydrate diet.      Goal FSG is 120-180  Will continue to monitor   For discharge, pt can continue    Pt can follow up at discharge with [facility] by calling  to make an appointment.   Will discuss case with Dr. Ross, Dr. 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 (13 Sep 2017 22:57). On this admission, patient was also found to have septic emboli, cervical osteomyelitis and abscess s/p corpectomy and anterior plating POD #4. Patient is scheduled for TVR/CABG on Monday.    Patient was NPO for fistulogram yesterday. She became hypoglycemic (FS 70) at 4 pm. FS improved to 98 with apple juice. The fistulogram was postponed. Patient had outside food for dinner (mashed sweet potato and steak). patient is NPO again today for fistulogram.      Pt reports the following symptoms:    CONSTITUTIONAL:  Negative fever or chills, feels well, good appetite  EYES:  Negative  blurry vision or double vision  CARDIOVASCULAR:  Negative for chest pain or palpitations  RESPIRATORY:  Negative for cough, wheezing, or SOB   GASTROINTESTINAL:  Negative for nausea, vomiting, diarrhea, constipation, or abdominal pain  GENITOURINARY:  Negative frequency, urgency or dysuria  NEUROLOGIC:  No headache, confusion, dizziness, lightheadedness    MEDICATIONS  (STANDING):  sodium chloride 0.9% lock flush 3 milliLiter(s) IV Push every 8 hours  aspirin enteric coated 81 milliGRAM(s) Oral daily  simvastatin 20 milliGRAM(s) Oral at bedtime  insulin lispro (HumaLOG) corrective regimen sliding scale   SubCutaneous Before meals and at bedtime  dextrose 5%. 1000 milliLiter(s) (50 mL/Hr) IV Continuous <Continuous>  dextrose 50% Injectable 12.5 Gram(s) IV Push once  dextrose 50% Injectable 25 Gram(s) IV Push once  dextrose 50% Injectable 25 Gram(s) IV Push once  polyethylene glycol 3350 17 Gram(s) Oral daily  pantoprazole    Tablet 40 milliGRAM(s) Oral before breakfast  DAPTOmycin IVPB 700 milliGRAM(s) IV Intermittent every 48 hours  rifampin 600 milliGRAM(s) Oral daily  Ceftaroline Fosamil IVPB 200 milliGRAM(s) IV Intermittent every 12 hours  amLODIPine   Tablet 10 milliGRAM(s) Oral daily  gabapentin 100 milliGRAM(s) Oral two times a day  gabapentin 300 milliGRAM(s) Oral at bedtime  sertraline 25 milliGRAM(s) Oral daily  traZODone 25 milliGRAM(s) Oral at bedtime  cloNIDine 0.2 milliGRAM(s) Oral every 12 hours  insulin lispro Injectable (HumaLOG) 10 Unit(s) SubCutaneous three times a day before meals  insulin glargine Injectable (LANTUS) 20 Unit(s) SubCutaneous at bedtime  labetalol Injectable 10 milliGRAM(s) IV Push once    MEDICATIONS  (PRN):  dextrose Gel 1 Dose(s) Oral once PRN Blood Glucose LESS THAN 70 milliGRAM(s)/deciliter  glucagon  Injectable 1 milliGRAM(s) IntraMuscular once PRN Glucose LESS THAN 70 milligrams/deciliter  acetaminophen    Suspension. 650 milliGRAM(s) Oral every 6 hours PRN Mild Pain (1 - 3)  ketorolac   Injectable 30 milliGRAM(s) IV Push every 6 hours PRN Moderate Pain (4 - 6)      PHYSICAL EXAM  Vital Signs Last 24 Hrs  T(C): 36.8 (28 Sep 2017 09:00), Max: 37.3 (27 Sep 2017 21:21)  T(F): 98.3 (28 Sep 2017 09:00), Max: 99.2 (27 Sep 2017 21:21)  HR: 74 (28 Sep 2017 11:00) (66 - 92)  BP: --  BP(mean): --  RR: 20 (28 Sep 2017 11:00) (7 - 21)  SpO2: 95% (28 Sep 2017 11:00) (90% - 100%)    Constitutional: wn/wd in NAD.   HEENT: NCAT, MMM, OP clear, EOMI, no proptosis or lid retraction  Neck: no thyromegaly or palpable thyroid nodules   Respiratory: lungs CTAB.  Cardiovascular: regular rhythm, normal S1 and S2, no audible murmurs, no peripheral edema  GI: soft, NT/ND, no masses/HSM appreciated.  Neurology: no tremors, DTR 2+  Skin: no visible rashes/lesions  Psychiatric: AAO x 3, normal affect/mood.    LABS:                        8.2    8.8   )-----------( 357      ( 28 Sep 2017 04:15 )             27.8     09-28    130<L>  |  93<L>  |  21  ----------------------------<  232<H>  4.9   |  25  |  3.31<H>    Ca    8.6      28 Sep 2017 04:15  Mg     2.0     09-28      PT/INR - ( 28 Sep 2017 04:15 )   PT: 15.7 sec;   INR: 1.40          PTT - ( 28 Sep 2017 04:15 )  PTT:39.1 sec    Thyroid Stimulating Hormone, Serum: 2.447 uIU/mL (09-13 @ 22:51)  Thyroid Stimulating Hormone, Serum: 1.251 uIU/mL (08-24 @ 01:12)      HbA1C: 8.2 % (09-13 @ 22:51)  8.2 % (08-24 @ 01:11)    CAPILLARY BLOOD GLUCOSE  153 (28 Sep 2017 11:00)  183 (28 Sep 2017 06:00)  252 (27 Sep 2017 21:00)  98 (27 Sep 2017 16:30)  70 (27 Sep 2017 16:00)      Insulin Sliding Scale requirements X 24 Hours:    RADIOLOGY & ADDITIONAL TESTS:    A/P: 47y Female with history of DM type II presenting for       1. DM 2, uncontrolled, complicated - patient with poor diabetic history and multiple diabetic complications. A1c is likely higher given that patient is on Procrit and has high cell turnover. Patient also seems to be non compliance with her diabetes diet. Patient had meal from outside hospital last night. She is NPO today    Please continue 20 units lantus at bedtime and 10 units lispro with meals and lispro moderate dose sliding scale 4 times daily with meals and at bedtime.  Please continue consistent carbohydrate diet.      Goal FSG is 120-180  Will continue to monitor       Pt can follow up at discharge with [facility] by calling  to make an appointment.   Will discuss case with Dr. Ross, Dr. 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 (13 Sep 2017 22:57). On this admission, patient was also found to have septic emboli, cervical osteomyelitis and abscess s/p corpectomy and anterior plating POD #4. Patient is scheduled for TVR/CABG on Monday.    Patient was NPO for fistulogram yesterday. She became hypoglycemic (FS 70) at 4 pm. FS improved to 98 with apple juice. The fistulogram was postponed. Patient had outside food for dinner (mashed sweet potato and steak). patient is NPO again today for fistulogram.      Pt reports the following symptoms:    CONSTITUTIONAL:  Negative fever or chills, feels well, good appetite  EYES:  Negative  blurry vision or double vision  CARDIOVASCULAR:  Negative for chest pain or palpitations  RESPIRATORY:  Negative for cough, wheezing, or SOB   GASTROINTESTINAL:  Negative for nausea, vomiting, diarrhea, constipation, or abdominal pain  GENITOURINARY:  Negative frequency, urgency or dysuria  NEUROLOGIC:  No headache, confusion, dizziness, lightheadedness    MEDICATIONS  (STANDING):  sodium chloride 0.9% lock flush 3 milliLiter(s) IV Push every 8 hours  aspirin enteric coated 81 milliGRAM(s) Oral daily  simvastatin 20 milliGRAM(s) Oral at bedtime  insulin lispro (HumaLOG) corrective regimen sliding scale   SubCutaneous Before meals and at bedtime  dextrose 5%. 1000 milliLiter(s) (50 mL/Hr) IV Continuous <Continuous>  dextrose 50% Injectable 12.5 Gram(s) IV Push once  dextrose 50% Injectable 25 Gram(s) IV Push once  dextrose 50% Injectable 25 Gram(s) IV Push once  polyethylene glycol 3350 17 Gram(s) Oral daily  pantoprazole    Tablet 40 milliGRAM(s) Oral before breakfast  DAPTOmycin IVPB 700 milliGRAM(s) IV Intermittent every 48 hours  rifampin 600 milliGRAM(s) Oral daily  Ceftaroline Fosamil IVPB 200 milliGRAM(s) IV Intermittent every 12 hours  amLODIPine   Tablet 10 milliGRAM(s) Oral daily  gabapentin 100 milliGRAM(s) Oral two times a day  gabapentin 300 milliGRAM(s) Oral at bedtime  sertraline 25 milliGRAM(s) Oral daily  traZODone 25 milliGRAM(s) Oral at bedtime  cloNIDine 0.2 milliGRAM(s) Oral every 12 hours  insulin lispro Injectable (HumaLOG) 10 Unit(s) SubCutaneous three times a day before meals  insulin glargine Injectable (LANTUS) 20 Unit(s) SubCutaneous at bedtime  labetalol Injectable 10 milliGRAM(s) IV Push once    MEDICATIONS  (PRN):  dextrose Gel 1 Dose(s) Oral once PRN Blood Glucose LESS THAN 70 milliGRAM(s)/deciliter  glucagon  Injectable 1 milliGRAM(s) IntraMuscular once PRN Glucose LESS THAN 70 milligrams/deciliter  acetaminophen    Suspension. 650 milliGRAM(s) Oral every 6 hours PRN Mild Pain (1 - 3)  ketorolac   Injectable 30 milliGRAM(s) IV Push every 6 hours PRN Moderate Pain (4 - 6)      PHYSICAL EXAM  Vital Signs Last 24 Hrs  T(C): 36.8 (28 Sep 2017 09:00), Max: 37.3 (27 Sep 2017 21:21)  T(F): 98.3 (28 Sep 2017 09:00), Max: 99.2 (27 Sep 2017 21:21)  HR: 74 (28 Sep 2017 11:00) (66 - 92)  BP: --  BP(mean): --  RR: 20 (28 Sep 2017 11:00) (7 - 21)  SpO2: 95% (28 Sep 2017 11:00) (90% - 100%)    Constitutional: wn/wd in NAD.   HEENT: NCAT, MMM, OP clear, EOMI, no proptosis or lid retraction  Neck: no thyromegaly or palpable thyroid nodules   Respiratory: lungs CTAB.  Cardiovascular: regular rhythm, normal S1 and S2, no audible murmurs, no peripheral edema  GI: soft, NT/ND, no masses/HSM appreciated.  Neurology: no tremors, DTR 2+  Skin: no visible rashes/lesions  Psychiatric: AAO x 3, normal affect/mood.    LABS:                        8.2    8.8   )-----------( 357      ( 28 Sep 2017 04:15 )             27.8     09-28    130<L>  |  93<L>  |  21  ----------------------------<  232<H>  4.9   |  25  |  3.31<H>    Ca    8.6      28 Sep 2017 04:15  Mg     2.0     09-28      PT/INR - ( 28 Sep 2017 04:15 )   PT: 15.7 sec;   INR: 1.40          PTT - ( 28 Sep 2017 04:15 )  PTT:39.1 sec    Thyroid Stimulating Hormone, Serum: 2.447 uIU/mL (09-13 @ 22:51)  Thyroid Stimulating Hormone, Serum: 1.251 uIU/mL (08-24 @ 01:12)      HbA1C: 8.2 % (09-13 @ 22:51)  8.2 % (08-24 @ 01:11)    CAPILLARY BLOOD GLUCOSE  153 (28 Sep 2017 11:00)  183 (28 Sep 2017 06:00)  252 (27 Sep 2017 21:00)  98 (27 Sep 2017 16:30)  70 (27 Sep 2017 16:00)      Insulin Sliding Scale requirements X 24 Hours:    RADIOLOGY & ADDITIONAL TESTS:    A/P: 47y Female with history of DM type II presenting for MRSA bacteremia and 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      1. DM 2, uncontrolled, complicated - patient with poor diabetic history and multiple diabetic complications. A1c is likely higher given that patient is on Procrit and has high cell turnover. Patient also seems to be non compliance with her diabetes diet. Patient had meal from outside hospital last night. She is NPO today    Please continue 20 units lantus at bedtime and 10 units lispro with meals and lispro moderate dose sliding scale 4 times daily with meals and at bedtime.  Please continue consistent carbohydrate diet.      Goal FSG is 120-180  Will continue to monitor       Pt can follow up at discharge with [facility] by calling  to make an appointment.   Will discuss case with Dr. Ross, Dr. Yoon  and update primary team

## 2017-09-28 NOTE — PROGRESS NOTE ADULT - ASSESSMENT
This is 47 year old female with PMH stated above. The renal service is activated for the need of HD. She was dialyzed on 9/25- 3.0 liters off. New permcath placed on 9/25. She was dialyzed yesterday - 3.0 liters off

## 2017-09-28 NOTE — PROGRESS NOTE ADULT - ASSESSMENT
47 year old female, PMHx HTN, HLD, DM, ESRD on HD, chronic left foot OM, known Tricuspid valve endocarditis recently admitted to [facility] under Dr. Gee and was deemed a non-surgical candidate, subsequent transferred to [facility] for medical management. Patient failed medical management with agreesive antibiotics and was found to have enlaring tricuspid vegetation on ALCIDES and was transferred back to [facility] for further evaluation. During this admission patient complaining of back/neck pain, workup revealed cervical abscess and patient is now POD#4 s/p C6 corpectomy and C5-C7 anterior plating with Dr. Kuo (orthopedics). Patient transferred to CCU post procedure. Over the weekend, patient became bradycardic HR 40s with non conducted P waves. Patient converted back to NSR and has remained hemodynamically stable since. Patient had right PICC line place and new HD catheter placed for access by IR on 9/25. Today patient had fistulagram done showing a none patent fistula. During Transfer from bed to stretcher the hemavac from the neck was pulled out. Ortho made aware awaiting recs.     Neurovascular: Stable.   - Last admission CT head findings concerning for evolving right MCA infarct, this admission patient complaining of LLE weakness. Neurology Dr. Luna following appreciate recs. MRI head no acute pathology. LLE weakness improving today .  - MRI cervical/thoracic/lumbar spine for neck/back pain revealed cervical OM with epidural abscess s/p C6 Corpectomy and C5-C7 anterior plating with Dr. Kuo on 9/22. Appreciate ortho recs. Stable neuro exam .  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 Gee next week, as per Dr. Kuo cleared for full heparin (30,000 units in OR) for Friday 9/30. Preoperative workup completed.  Cardiac cath revealed RCA lesion patient will get CABG x1 with TVR. Continue asa 81mg    - SVC/RA thrombus found on previous admission, heparin drip held 2/2 orthopedic surgery at risk for epidural hematoma.   - 2nd degree HB 9/23 AM, pacing pads in place with TVP kit in room. Metoprolol discontinued. Continue to monitor rhythm   - Hx of HLD, continue simvastatin 20mg qhs   - Hx of HTN, continue amlodipine 10mg and clonidine 0.2mg BID.     Respiratory: 02 Sat = 94% on RA History of embolic PE.   - holding heparin due to drain .   - Encourage ambulation and Use of IS 10x / hr while awake.  - CXR stable, f/u CXR in AM     GI: Last BM yesterday. no diarrea.   - Continue PO diet   - Continue protonix 40mg for GI ppx   - continue bowel regimen     Renal / :  ESRD on HD, Cr 3.31 Sodium 130   - Renal following, HD yesterday with 2.4 Liters removed.  next session tomorrow. Continue to appreciate recs    - Hyponatremic, continue 1L fluid restriction. limit free water.   - Fistulogram today. Fistula is not matured.   - Monitor I/O's.    Endocrine:  DM, A1C 8.2 TSH 2.447. elevated PM finger stick last night. brought in outside food.   - Endocrine following,  lantus 20u qhs and 10u premeal. Will continue to monitor fingersticks  - Continue insulin sliding scale      Hematologic: SVC/RA clot  - Heparin drip held for OR with orthopedics, as per Dr. 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 Kingston following as ID, continue daptomycin 700mg q48 following HD, rifampin 600mg and ceftaroline 200mg BID. Need weekly CPK and CMP, last done 9/23. Next to be 9/30/17   - Blood cultures from 9/19 NGTD. Blood cultures sent 9/23 and 9/25 negative thus far.   - Chronic OM of L foot, vascular/ortho following. Gallium scan completed with no obvious source of MRSA bacteremia. No further intervention needed.   - Observe for SIRS/Sepsis Syndrome.  - Ortho follow for spinal abscess. drain pulled on accident today. Ortho made aware. no obvious bleeding or hematoma development     Prophylaxis: holding heparin 2/2 risk of epidural hematoma.   -SCD's    Disposition: OR Monday 47 year old female, PMHx HTN, HLD, DM, ESRD on HD, chronic left foot OM, known Tricuspid valve endocarditis recently admitted to [facility] under Dr. Gee and was deemed a non-surgical candidate, subsequent transferred to [facility] for medical management. Patient failed medical management with agreesive antibiotics and was found to have enlaring tricuspid vegetation on ALCIDES and was transferred back to [facility] for further evaluation. During this admission patient complaining of back/neck pain, workup revealed cervical abscess and patient is now POD#4 s/p C6 corpectomy and C5-C7 anterior plating with Dr. Kuo (orthopedics). Patient transferred to CCU post procedure. Over the weekend, patient became bradycardic HR 40s with non conducted P waves. Patient converted back to NSR and has remained hemodynamically stable since. Patient had right PICC line place and new HD catheter placed for access by IR on 9/25. Today patient had fistulagram done showing a none patent fistula. During Transfer from bed to stretcher the hemavac from the neck was pulled out. Ortho made aware awaiting recs.     Neurovascular: Stable.   - Last admission CT head findings concerning for evolving right MCA infarct, this admission patient complaining of LLE weakness. Neurology Dr. Luna following appreciate recs. MRI head no acute pathology. LLE weakness improving today .  - MRI cervical/thoracic/lumbar spine for neck/back pain revealed cervical OM with epidural abscess s/p C6 Corpectomy and C5-C7 anterior plating with Dr. Kuo on 9/22. Appreciate ortho recs. Stable neuro exam .  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 Gee next week, as per Dr. Kuo cleared for full heparin (30,000 units in OR) for Friday 9/30. Preoperative workup completed.  Cardiac cath revealed RCA lesion patient will get CABG x1 with TVR. Continue asa 81mg    - SVC/RA thrombus found on previous admission, heparin drip held 2/2 orthopedic surgery at risk for epidural hematoma.   - 2nd degree HB 9/23 AM, pacing pads in place with TVP kit in room. Metoprolol discontinued. Continue to monitor rhythm   - Hx of HLD, continue simvastatin 20mg qhs   - Hx of HTN, continue amlodipine 10mg and clonidine 0.2mg BID.   - needs repeat echo to assess vegetation.     Respiratory: 02 Sat = 94% on RA History of embolic PE.   - holding heparin due to drain .   - Encourage ambulation and Use of IS 10x / hr while awake.  - CXR stable, f/u CXR in AM     GI: Last BM yesterday. no diarrea.   - Continue PO diet   - Continue protonix 40mg for GI ppx   - continue bowel regimen     Renal / :  ESRD on HD, Cr 3.31 Sodium 130   - Renal following, HD yesterday with 2.4 Liters removed.  next session tomorrow. Continue to appreciate recs    - Hyponatremic, continue 1L fluid restriction. limit free water.   - Fistulogram today. Fistula is not matured.   - Monitor I/O's.    Endocrine:  DM, A1C 8.2 TSH 2.447. elevated PM finger stick last night. brought in outside food.   - Endocrine following,  lantus 20u qhs and 10u premeal. Will continue to monitor fingersticks  - Continue insulin sliding scale      Hematologic: SVC/RA clot  - Heparin drip held for OR with orthopedics, as per Dr. 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 Kingston following as ID, continue daptomycin 700mg q48 following HD, rifampin 600mg and ceftaroline 200mg BID. Need weekly CPK and CMP, last done 9/23. Next to be 9/30/17   - Blood cultures from 9/19 NGTD. Blood cultures sent 9/23 and 9/25 negative thus far.   - Chronic OM of L foot, vascular/ortho following. Gallium scan completed with no obvious source of MRSA bacteremia. No further intervention needed.   - Observe for SIRS/Sepsis Syndrome.  - Ortho follow for spinal abscess. drain pulled on accident today. Ortho made aware. no obvious bleeding or hematoma development     Prophylaxis: holding heparin 2/2 risk of epidural hematoma.   -SCD's    Disposition: OR Monday

## 2017-09-28 NOTE — PROGRESS NOTE ADULT - ATTENDING COMMENTS
Patient examined, fellows hx and PE reviewed and confirmed. I find stable on dialysis. A/P reviewed and confirmed. Continue dialysis. See fellow's note.

## 2017-09-28 NOTE — PROGRESS NOTE ADULT - ATTENDING COMMENTS
Fingersticks discussed with Paramjit Marie and Karrie on rounds this afternoon.  Overnight glycemic control remains excellent on the current Lantus dose, but this dose then provokes hypoglycemia if the pt has been NPO for the entire day--probably because of decreased basal insulin requirements in the late afternoon.  Will need to decrease the Lantus dose when pt expected to be NPO the following day

## 2017-09-28 NOTE — PROGRESS NOTE ADULT - SUBJECTIVE AND OBJECTIVE BOX
Objective and Subjective   The patient in her bed. DOes not endorse any complains, no shortness of breath, no chest pain, no fever. Eating her breakfast s/p cervical spine surgery for osteomyelitis doen on 9/23.       Patient is a 47 year old female with history of HTN, HLD, DM II, ESRD on HD (MWF. Last HD 09/13/2017. Receives HD via Right Femoral HD catheter placed on 09/13/2017 at [facility] 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 The renal follows the case for the need of HD. Last HD done on 9/25- 3.0 liters off         sodium chloride 0.9% lock flush 3 milliLiter(s) every 8 hours  aspirin enteric coated 81 milliGRAM(s) daily  simvastatin 20 milliGRAM(s) at bedtime  insulin lispro (HumaLOG) corrective regimen sliding scale   Before meals and at bedtime  dextrose 5%. 1000 milliLiter(s) <Continuous>  dextrose Gel 1 Dose(s) once PRN  dextrose 50% Injectable 12.5 Gram(s) once  dextrose 50% Injectable 25 Gram(s) once  dextrose 50% Injectable 25 Gram(s) once  glucagon  Injectable 1 milliGRAM(s) once PRN  polyethylene glycol 3350 17 Gram(s) daily  pantoprazole    Tablet 40 milliGRAM(s) before breakfast  acetaminophen    Suspension. 650 milliGRAM(s) every 6 hours PRN  DAPTOmycin IVPB 700 milliGRAM(s) every 48 hours  rifampin 600 milliGRAM(s) daily  Ceftaroline Fosamil IVPB 200 milliGRAM(s) every 12 hours  amLODIPine   Tablet 10 milliGRAM(s) daily  gabapentin 100 milliGRAM(s) two times a day  gabapentin 300 milliGRAM(s) at bedtime  sertraline 25 milliGRAM(s) daily  traZODone 25 milliGRAM(s) at bedtime  ketorolac   Injectable 30 milliGRAM(s) every 6 hours PRN  cloNIDine 0.2 milliGRAM(s) every 12 hours  insulin lispro Injectable (HumaLOG) 10 Unit(s) three times a day before meals  insulin glargine Injectable (LANTUS) 20 Unit(s) at bedtime  labetalol Injectable 10 milliGRAM(s) once      Allergies    penicillin (Unknown)  Sular (Unknown)    Intolerances        T(C): , Max: 37.3 (09-27-17 @ 21:21)  T(F): , Max: 99.2 (09-27-17 @ 21:21)  HR: 72 (09-28-17 @ 10:00)  BP: --  BP(mean): --  RR: 10 (09-28-17 @ 10:00)  SpO2: 98% (09-28-17 @ 10:00)  Wt(kg): --    09-27 @ 07:01  -  09-28 @ 07:00  --------------------------------------------------------  IN:    IV PiggyBack: 100 mL    Oral Fluid: 480 mL  Total IN: 580 mL    OUT:    Other: 3000 mL  Total OUT: 3000 mL    Total NET: -2420 mL      09-28 @ 07:01  -  09-28 @ 10:11  --------------------------------------------------------  IN:    IV PiggyBack: 50 mL    Oral Fluid: 320 mL  Total IN: 370 mL    OUT:  Total OUT: 0 mL    Total NET: 370 mL    Alert and oriented  No JVD  HAs a catheter in the left IJ   No respiratory distress   Normal air entry in the lungs, no wheezing, no crackles, no rales   RRR, normal s1/s2, no mumurs, rubs or gallops  Abdomen - soft, non-tender, non-distended, normal bowel sounds   Extremities - mild peripheral edema   The line in the right groined removed     LABS:                        8.2    8.8   )-----------( 357      ( 28 Sep 2017 04:15 )             27.8     09-28    130<L>  |  93<L>  |  21  ----------------------------<  232<H>  4.9   |  25  |  3.31<H>    Ca    8.6      28 Sep 2017 04:15  Mg     2.0     09-28        PT/INR - ( 28 Sep 2017 04:15 )   PT: 15.7 sec;   INR: 1.40          PTT - ( 28 Sep 2017 04:15 )  PTT:39.1 sec          RADIOLOGY & ADDITIONAL STUDIES:    < from: Xray Chest 1 View AP -PORTABLE-Routine (09.28.17 @ 07:18) >  INTERPRETATION:    Portable AP Radiograph dated 9/28/2017 7:18 AM    CLINICAL INFORMATION: 47 years, Female, preop    PRIOR STUDIES: September 27, 2017    FINDINGS: Heart size is stable. Left chest tunneled dialysis catheter is   again noted to be in place, unchanged. Pulmonary vascular congestion has   improved. Areas of patchy atelectasis versus consolidation throughout the   right lung have improved with mild residual opacity in the right lower   lobe. Small left pleural effusion is stable.    IMPRESSION:  Improved pulmonary vascular congestion. Patchy atelectasis versus   consolidation throughout the right lung has improved. Mildresidual   opacity in the right lower lobe.    < end of copied text >

## 2017-09-28 NOTE — PROGRESS NOTE ADULT - SUBJECTIVE AND OBJECTIVE BOX
Patient discussed on morning rounds with Dr. Gee     Operation / Date: Pre- OP TVR     SUBJECTIVE ASSESSMENT:  47y Female reports mild neck pain. States that she has more mobility in her leg since her surgery but the sensation has stayed about the same. She can feel the back of her left thigh but can not feel anterior and medial aspect of thigh. Other than being hungry while waiting for Fistulagram she offers no other complaints at this time.         Vital Signs Last 24 Hrs  T(C): 36.8 (28 Sep 2017 09:00), Max: 37.3 (27 Sep 2017 21:21)  T(F): 98.3 (28 Sep 2017 09:00), Max: 99.2 (27 Sep 2017 21:21)  HR: 74 (28 Sep 2017 12:00) (66 - 92)  BP: --  BP(mean): --  RR: 17 (28 Sep 2017 12:00) (7 - 21)  SpO2: 94% (28 Sep 2017 12:00) (90% - 100%)  I&O's Detail    27 Sep 2017 07:01  -  28 Sep 2017 07:00  --------------------------------------------------------  IN:    IV PiggyBack: 100 mL    Oral Fluid: 480 mL  Total IN: 580 mL    OUT:    Other: 3000 mL  Total OUT: 3000 mL    Total NET: -2420 mL      28 Sep 2017 07:01  -  28 Sep 2017 12:45  --------------------------------------------------------  IN:    IV PiggyBack: 50 mL    Oral Fluid: 320 mL  Total IN: 370 mL    OUT:  Total OUT: 0 mL    Total NET: 370 mL          CHEST TUBE:  No.  LOLLY DRAIN:  No.  EPICARDIAL WIRES: No.  TIE DOWNS: No.  COOK: No.    PHYSICAL EXAM:    General: Aox3 in no acute distress.     Neurological: no sensation to the anterior medical left thigh. Bend left leg at knee but can not lift left leg. no saddle anesthsia.   EOMS intact. PERRLA. No nystagmus. No facial droop. No incontinence.    Cardiovascular: RRR distance heart sounds no audible murmur. No friction rubs.     Respiratory:  fine bibasilar crackle.s     Gastrointestinal: non tender non distended. active bowel sounds     Extremities: tenderness but no warm over Cervical spinous process. . Trace lower extremity edema. Keloid scaring of left medial aspect of foot.     Vascular: 1+ DP pulses bilaterally. 2+ radial     Lines: Left IJ permacath.         LABS:                        8.2    8.8   )-----------( 357      ( 28 Sep 2017 04:15 )             27.8       COUMADIN:  No. REASON: .    PT/INR - ( 28 Sep 2017 04:15 )   PT: 15.7 sec;   INR: 1.40          PTT - ( 28 Sep 2017 04:15 )  PTT:39.1 sec    09-28    130<L>  |  93<L>  |  21  ----------------------------<  232<H>  4.9   |  25  |  3.31<H>    Ca    8.6      28 Sep 2017 04:15  Mg     2.0     09-28            MEDICATIONS  (STANDING):  sodium chloride 0.9% lock flush 3 milliLiter(s) IV Push every 8 hours  aspirin enteric coated 81 milliGRAM(s) Oral daily  simvastatin 20 milliGRAM(s) Oral at bedtime  insulin lispro (HumaLOG) corrective regimen sliding scale   SubCutaneous Before meals and at bedtime  dextrose 5%. 1000 milliLiter(s) (50 mL/Hr) IV Continuous <Continuous>  dextrose 50% Injectable 12.5 Gram(s) IV Push once  dextrose 50% Injectable 25 Gram(s) IV Push once  dextrose 50% Injectable 25 Gram(s) IV Push once  polyethylene glycol 3350 17 Gram(s) Oral daily  pantoprazole    Tablet 40 milliGRAM(s) Oral before breakfast  DAPTOmycin IVPB 700 milliGRAM(s) IV Intermittent every 48 hours  rifampin 600 milliGRAM(s) Oral daily  Ceftaroline Fosamil IVPB 200 milliGRAM(s) IV Intermittent every 12 hours  amLODIPine   Tablet 10 milliGRAM(s) Oral daily  gabapentin 100 milliGRAM(s) Oral two times a day  gabapentin 300 milliGRAM(s) Oral at bedtime  sertraline 25 milliGRAM(s) Oral daily  traZODone 25 milliGRAM(s) Oral at bedtime  cloNIDine 0.2 milliGRAM(s) Oral every 12 hours  insulin lispro Injectable (HumaLOG) 10 Unit(s) SubCutaneous three times a day before meals  insulin glargine Injectable (LANTUS) 20 Unit(s) SubCutaneous at bedtime  labetalol Injectable 10 milliGRAM(s) IV Push once    MEDICATIONS  (PRN):  dextrose Gel 1 Dose(s) Oral once PRN Blood Glucose LESS THAN 70 milliGRAM(s)/deciliter  glucagon  Injectable 1 milliGRAM(s) IntraMuscular once PRN Glucose LESS THAN 70 milligrams/deciliter  acetaminophen    Suspension. 650 milliGRAM(s) Oral every 6 hours PRN Mild Pain (1 - 3)  ketorolac   Injectable 30 milliGRAM(s) IV Push every 6 hours PRN Moderate Pain (4 - 6)        RADIOLOGY & ADDITIONAL TESTS:

## 2017-09-29 LAB
ANION GAP SERPL CALC-SCNC: 12 MMOL/L — SIGNIFICANT CHANGE UP (ref 5–17)
APTT BLD: 38 SEC — HIGH (ref 27.5–37.4)
BASOPHILS NFR BLD AUTO: 1 % — SIGNIFICANT CHANGE UP (ref 0–2)
BUN SERPL-MCNC: 26 MG/DL — HIGH (ref 7–23)
CALCIUM SERPL-MCNC: 8.4 MG/DL — SIGNIFICANT CHANGE UP (ref 8.4–10.5)
CHLORIDE SERPL-SCNC: 91 MMOL/L — LOW (ref 96–108)
CO2 SERPL-SCNC: 26 MMOL/L — SIGNIFICANT CHANGE UP (ref 22–31)
CREAT SERPL-MCNC: 4.19 MG/DL — HIGH (ref 0.5–1.3)
EOSINOPHIL NFR BLD AUTO: 3 % — SIGNIFICANT CHANGE UP (ref 0–6)
GLUCOSE SERPL-MCNC: 142 MG/DL — HIGH (ref 70–99)
HCT VFR BLD CALC: 26.8 % — LOW (ref 34.5–45)
HGB BLD-MCNC: 7.9 G/DL — LOW (ref 11.5–15.5)
INR BLD: 1.36 — HIGH (ref 0.88–1.16)
LYMPHOCYTES # BLD AUTO: 10.2 % — LOW (ref 13–44)
MAGNESIUM SERPL-MCNC: 2.1 MG/DL — SIGNIFICANT CHANGE UP (ref 1.6–2.6)
MCHC RBC-ENTMCNC: 25.2 PG — LOW (ref 27–34)
MCHC RBC-ENTMCNC: 29.5 G/DL — LOW (ref 32–36)
MCV RBC AUTO: 85.6 FL — SIGNIFICANT CHANGE UP (ref 80–100)
MONOCYTES NFR BLD AUTO: 6.8 % — SIGNIFICANT CHANGE UP (ref 2–14)
NEUTROPHILS NFR BLD AUTO: 79 % — HIGH (ref 43–77)
PHOSPHATE SERPL-MCNC: 5.1 MG/DL — HIGH (ref 2.5–4.5)
PLATELET # BLD AUTO: 332 K/UL — SIGNIFICANT CHANGE UP (ref 150–400)
POTASSIUM SERPL-MCNC: 5.3 MMOL/L — SIGNIFICANT CHANGE UP (ref 3.5–5.3)
POTASSIUM SERPL-SCNC: 5.3 MMOL/L — SIGNIFICANT CHANGE UP (ref 3.5–5.3)
PROTHROM AB SERPL-ACNC: 15.2 SEC — HIGH (ref 9.8–12.7)
RBC # BLD: 3.13 M/UL — LOW (ref 3.8–5.2)
RBC # FLD: 16.5 % — SIGNIFICANT CHANGE UP (ref 10.3–16.9)
SODIUM SERPL-SCNC: 129 MMOL/L — LOW (ref 135–145)
SURGICAL PATHOLOGY STUDY: SIGNIFICANT CHANGE UP
WBC # BLD: 9.3 K/UL — SIGNIFICANT CHANGE UP (ref 3.8–10.5)
WBC # FLD AUTO: 9.3 K/UL — SIGNIFICANT CHANGE UP (ref 3.8–10.5)

## 2017-09-29 PROCEDURE — 99232 SBSQ HOSP IP/OBS MODERATE 35: CPT | Mod: GC

## 2017-09-29 PROCEDURE — 99233 SBSQ HOSP IP/OBS HIGH 50: CPT

## 2017-09-29 PROCEDURE — 93010 ELECTROCARDIOGRAM REPORT: CPT

## 2017-09-29 PROCEDURE — 93306 TTE W/DOPPLER COMPLETE: CPT | Mod: 26

## 2017-09-29 RX ORDER — CALCITRIOL 0.5 UG/1
1 CAPSULE ORAL ONCE
Qty: 0 | Refills: 0 | Status: COMPLETED | OUTPATIENT
Start: 2017-09-29 | End: 2017-09-29

## 2017-09-29 RX ORDER — ERYTHROPOIETIN 10000 [IU]/ML
10000 INJECTION, SOLUTION INTRAVENOUS; SUBCUTANEOUS ONCE
Qty: 0 | Refills: 0 | Status: COMPLETED | OUTPATIENT
Start: 2017-09-29 | End: 2017-09-29

## 2017-09-29 RX ORDER — OXYCODONE AND ACETAMINOPHEN 5; 325 MG/1; MG/1
1 TABLET ORAL ONCE
Qty: 0 | Refills: 0 | Status: DISCONTINUED | OUTPATIENT
Start: 2017-09-29 | End: 2017-09-29

## 2017-09-29 RX ORDER — LABETALOL HCL 100 MG
10 TABLET ORAL ONCE
Qty: 0 | Refills: 0 | Status: COMPLETED | OUTPATIENT
Start: 2017-09-29 | End: 2017-09-29

## 2017-09-29 RX ORDER — LABETALOL HCL 100 MG
5 TABLET ORAL ONCE
Qty: 0 | Refills: 0 | Status: COMPLETED | OUTPATIENT
Start: 2017-09-29 | End: 2017-09-29

## 2017-09-29 RX ADMIN — INSULIN GLARGINE 20 UNIT(S): 100 INJECTION, SOLUTION SUBCUTANEOUS at 22:04

## 2017-09-29 RX ADMIN — OXYCODONE AND ACETAMINOPHEN 1 TABLET(S): 5; 325 TABLET ORAL at 19:44

## 2017-09-29 RX ADMIN — CEFTAROLINE FOSAMIL 50 MILLIGRAM(S): 600 POWDER, FOR SOLUTION INTRAVENOUS at 17:46

## 2017-09-29 RX ADMIN — GABAPENTIN 100 MILLIGRAM(S): 400 CAPSULE ORAL at 06:35

## 2017-09-29 RX ADMIN — Medication 10 UNIT(S): at 19:48

## 2017-09-29 RX ADMIN — Medication 10 UNIT(S): at 07:30

## 2017-09-29 RX ADMIN — Medication 0.2 MILLIGRAM(S): at 17:46

## 2017-09-29 RX ADMIN — Medication 0.2 MILLIGRAM(S): at 06:35

## 2017-09-29 RX ADMIN — CALCITRIOL 1 MICROGRAM(S): 0.5 CAPSULE ORAL at 10:45

## 2017-09-29 RX ADMIN — Medication: at 14:40

## 2017-09-29 RX ADMIN — CEFTAROLINE FOSAMIL 50 MILLIGRAM(S): 600 POWDER, FOR SOLUTION INTRAVENOUS at 06:34

## 2017-09-29 RX ADMIN — GABAPENTIN 100 MILLIGRAM(S): 400 CAPSULE ORAL at 17:46

## 2017-09-29 RX ADMIN — AMLODIPINE BESYLATE 10 MILLIGRAM(S): 2.5 TABLET ORAL at 06:35

## 2017-09-29 RX ADMIN — SODIUM CHLORIDE 3 MILLILITER(S): 9 INJECTION INTRAMUSCULAR; INTRAVENOUS; SUBCUTANEOUS at 14:40

## 2017-09-29 RX ADMIN — Medication 5 MILLIGRAM(S): at 13:34

## 2017-09-29 RX ADMIN — OXYCODONE AND ACETAMINOPHEN 1 TABLET(S): 5; 325 TABLET ORAL at 22:04

## 2017-09-29 RX ADMIN — OXYCODONE AND ACETAMINOPHEN 1 TABLET(S): 5; 325 TABLET ORAL at 23:56

## 2017-09-29 RX ADMIN — Medication 10 MILLIGRAM(S): at 15:06

## 2017-09-29 RX ADMIN — OXYCODONE AND ACETAMINOPHEN 1 TABLET(S): 5; 325 TABLET ORAL at 17:45

## 2017-09-29 RX ADMIN — GABAPENTIN 300 MILLIGRAM(S): 400 CAPSULE ORAL at 22:04

## 2017-09-29 RX ADMIN — ERYTHROPOIETIN 10000 UNIT(S): 10000 INJECTION, SOLUTION INTRAVENOUS; SUBCUTANEOUS at 10:46

## 2017-09-29 RX ADMIN — SERTRALINE 25 MILLIGRAM(S): 25 TABLET, FILM COATED ORAL at 14:30

## 2017-09-29 RX ADMIN — SODIUM CHLORIDE 3 MILLILITER(S): 9 INJECTION INTRAMUSCULAR; INTRAVENOUS; SUBCUTANEOUS at 22:05

## 2017-09-29 RX ADMIN — Medication 5 MILLIGRAM(S): at 13:20

## 2017-09-29 RX ADMIN — Medication 25 MILLIGRAM(S): at 22:04

## 2017-09-29 RX ADMIN — SODIUM CHLORIDE 3 MILLILITER(S): 9 INJECTION INTRAMUSCULAR; INTRAVENOUS; SUBCUTANEOUS at 06:30

## 2017-09-29 RX ADMIN — PANTOPRAZOLE SODIUM 40 MILLIGRAM(S): 20 TABLET, DELAYED RELEASE ORAL at 07:30

## 2017-09-29 RX ADMIN — Medication 81 MILLIGRAM(S): at 14:39

## 2017-09-29 RX ADMIN — Medication 10 MILLIGRAM(S): at 15:09

## 2017-09-29 RX ADMIN — Medication 10 UNIT(S): at 14:31

## 2017-09-29 RX ADMIN — SIMVASTATIN 20 MILLIGRAM(S): 20 TABLET, FILM COATED ORAL at 22:04

## 2017-09-29 NOTE — PROGRESS NOTE ADULT - ATTENDING COMMENTS
above reviewed, and pt examined.  proceeding with hd today.  labs and meds reviewed.  pt to rreceive prbc on hd.

## 2017-09-29 NOTE — PROGRESS NOTE ADULT - SUBJECTIVE AND OBJECTIVE BOX
Patient discussed on morning rounds with Dr. Gee     Operation / Date: pre-op     SUBJECTIVE ASSESSMENT:  47y Female reports feeling cold and tired after dialysis. States that she has some next pain but that its controlled with percocet. Patient's BP elevated this afternoon but without headache, vision changes, nausea. tinnitus.      Vital Signs Last 24 Hrs  T(C): 36.9 (29 Sep 2017 13:50), Max: 37.1 (28 Sep 2017 17:29)  T(F): 98.5 (29 Sep 2017 13:50), Max: 98.7 (28 Sep 2017 17:29)  HR: 94 (29 Sep 2017 15:00) (68 - 94)  BP: 180/68 (29 Sep 2017 15:00) (152/71 - 217/81)  BP(mean): 104 (29 Sep 2017 15:00) (101 - 158)  RR: 21 (29 Sep 2017 15:00) (9 - 25)  SpO2: 98% (29 Sep 2017 15:00) (90% - 100%)  I&O's Detail    28 Sep 2017 07:01  -  29 Sep 2017 07:00  --------------------------------------------------------  IN:    IV PiggyBack: 150 mL    Oral Fluid: 570 mL  Total IN: 720 mL    OUT:    Voided: 300 mL  Total OUT: 300 mL    Total NET: 420 mL      29 Sep 2017 07:01  -  29 Sep 2017 15:48  --------------------------------------------------------  IN:    Packed Red Blood Cells: 350 mL  Total IN: 350 mL    OUT:    Other: 3200 mL  Total OUT: 3200 mL    Total NET: -2850 mL      PHYSICAL EXAM:    General: Aox3 in no acute distress.     Neurological: no sensation to the anterior medical left thigh. Bend left leg at knee but can not lift left leg. no saddle anesthsia.   EOMS intact. PERRLA. No nystagmus. No facial droop. No incontinence. Right neck with steri-strips in place mild ttp. right upper subclavicular incision with steri strips in place.     Cardiovascular: RRR distance heart sounds no audible murmur. No friction rubs.     Respiratory:  fine bibasilar crackle.s     Gastrointestinal: non tender non distended. active bowel sounds     Extremities: tenderness but no warm over Cervical spinous process. . Trace lower extremity edema. Keloid scaring of left medial aspect of foot.     Vascular: 1+ DP pulses bilaterally. 2+ radial     Lines: Left IJ permacath.       LABS:                        7.9    9.3   )-----------( 332      ( 29 Sep 2017 05:53 )             26.8         PT/INR - ( 29 Sep 2017 05:53 )   PT: 15.2 sec;   INR: 1.36          PTT - ( 29 Sep 2017 05:53 )  PTT:38.0 sec    09-29    129<L>  |  91<L>  |  26<H>  ----------------------------<  142<H>  5.3   |  26  |  4.19<H>    Ca    8.4      29 Sep 2017 05:53  Phos  5.1     09-29  Mg     2.1     09-29            MEDICATIONS  (STANDING):  sodium chloride 0.9% lock flush 3 milliLiter(s) IV Push every 8 hours  aspirin enteric coated 81 milliGRAM(s) Oral daily  simvastatin 20 milliGRAM(s) Oral at bedtime  insulin lispro (HumaLOG) corrective regimen sliding scale   SubCutaneous Before meals and at bedtime  dextrose 5%. 1000 milliLiter(s) (50 mL/Hr) IV Continuous <Continuous>  dextrose 50% Injectable 12.5 Gram(s) IV Push once  dextrose 50% Injectable 25 Gram(s) IV Push once  dextrose 50% Injectable 25 Gram(s) IV Push once  polyethylene glycol 3350 17 Gram(s) Oral daily  pantoprazole    Tablet 40 milliGRAM(s) Oral before breakfast  DAPTOmycin IVPB 700 milliGRAM(s) IV Intermittent every 48 hours  rifampin 600 milliGRAM(s) Oral daily  Ceftaroline Fosamil IVPB 200 milliGRAM(s) IV Intermittent every 12 hours  amLODIPine   Tablet 10 milliGRAM(s) Oral daily  gabapentin 100 milliGRAM(s) Oral two times a day  gabapentin 300 milliGRAM(s) Oral at bedtime  sertraline 25 milliGRAM(s) Oral daily  traZODone 25 milliGRAM(s) Oral at bedtime  cloNIDine 0.2 milliGRAM(s) Oral every 12 hours  insulin lispro Injectable (HumaLOG) 10 Unit(s) SubCutaneous three times a day before meals  insulin glargine Injectable (LANTUS) 20 Unit(s) SubCutaneous at bedtime  oxyCODONE    5 mG/acetaminophen 325 mG 1 Tablet(s) Oral once    MEDICATIONS  (PRN):  dextrose Gel 1 Dose(s) Oral once PRN Blood Glucose LESS THAN 70 milliGRAM(s)/deciliter  glucagon  Injectable 1 milliGRAM(s) IntraMuscular once PRN Glucose LESS THAN 70 milligrams/deciliter  acetaminophen    Suspension. 650 milliGRAM(s) Oral every 6 hours PRN Mild Pain (1 - 3)        RADIOLOGY & ADDITIONAL TESTS:  < from: Xray Chest 1 View AP -PORTABLE-Routine (09.28.17 @ 07:18) >  INTERPRETATION:    Portable AP Radiograph dated 9/28/2017 7:18 AM    CLINICAL INFORMATION: 47 years, Female, preop    PRIOR STUDIES: September 27, 2017    FINDINGS: Heart size is stable. Left chest tunneled dialysis catheter is   again noted to be in place, unchanged. Pulmonary vascular congestion has   improved. Areas of patchy atelectasis versus consolidation throughout the   right lung have improved with mild residual opacity in the right lower   lobe. Small left pleural effusion is stable.    IMPRESSION:  Improved pulmonary vascular congestion. Patchy atelectasis versus   consolidation throughout the right lung has improved. Mildresidual   opacity in the right lower lobe.    < end of copied text >

## 2017-09-29 NOTE — PROGRESS NOTE ADULT - SUBJECTIVE AND OBJECTIVE BOX
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 (13 Sep 2017 22:57)    Patient had sweet mashed potato and .............for dinner. She had glucerna for breakfast. She has not had lunch as patient was getting HD.    Pt reports the following symptoms:    CONSTITUTIONAL:  Negative fever or chills, feels well, good appetite  EYES:  Negative  blurry vision or double vision  CARDIOVASCULAR:  Negative for chest pain or palpitations  RESPIRATORY:  Negative for cough, wheezing, or SOB   GASTROINTESTINAL:  Negative for nausea, vomiting, diarrhea, constipation, or abdominal pain  GENITOURINARY:  Negative frequency, urgency or dysuria  NEUROLOGIC:  No headache, confusion, dizziness, lightheadedness    MEDICATIONS  (STANDING):  sodium chloride 0.9% lock flush 3 milliLiter(s) IV Push every 8 hours  aspirin enteric coated 81 milliGRAM(s) Oral daily  simvastatin 20 milliGRAM(s) Oral at bedtime  insulin lispro (HumaLOG) corrective regimen sliding scale   SubCutaneous Before meals and at bedtime  dextrose 5%. 1000 milliLiter(s) (50 mL/Hr) IV Continuous <Continuous>  dextrose 50% Injectable 12.5 Gram(s) IV Push once  dextrose 50% Injectable 25 Gram(s) IV Push once  dextrose 50% Injectable 25 Gram(s) IV Push once  polyethylene glycol 3350 17 Gram(s) Oral daily  pantoprazole    Tablet 40 milliGRAM(s) Oral before breakfast  DAPTOmycin IVPB 700 milliGRAM(s) IV Intermittent every 48 hours  rifampin 600 milliGRAM(s) Oral daily  Ceftaroline Fosamil IVPB 200 milliGRAM(s) IV Intermittent every 12 hours  amLODIPine   Tablet 10 milliGRAM(s) Oral daily  gabapentin 100 milliGRAM(s) Oral two times a day  gabapentin 300 milliGRAM(s) Oral at bedtime  sertraline 25 milliGRAM(s) Oral daily  traZODone 25 milliGRAM(s) Oral at bedtime  cloNIDine 0.2 milliGRAM(s) Oral every 12 hours  insulin lispro Injectable (HumaLOG) 10 Unit(s) SubCutaneous three times a day before meals  insulin glargine Injectable (LANTUS) 20 Unit(s) SubCutaneous at bedtime    MEDICATIONS  (PRN):  dextrose Gel 1 Dose(s) Oral once PRN Blood Glucose LESS THAN 70 milliGRAM(s)/deciliter  glucagon  Injectable 1 milliGRAM(s) IntraMuscular once PRN Glucose LESS THAN 70 milligrams/deciliter  acetaminophen    Suspension. 650 milliGRAM(s) Oral every 6 hours PRN Mild Pain (1 - 3)      PHYSICAL EXAM  Vital Signs Last 24 Hrs  T(C): 36.9 (29 Sep 2017 13:50), Max: 37.1 (28 Sep 2017 17:29)  T(F): 98.5 (29 Sep 2017 13:50), Max: 98.7 (28 Sep 2017 17:29)  HR: 89 (29 Sep 2017 13:50) (68 - 89)  BP: 213/100 (29 Sep 2017 13:45) (186/90 - 213/100)  BP(mean): 149 (29 Sep 2017 13:45) (106 - 149)  RR: 22 (29 Sep 2017 13:50) (9 - 25)  SpO2: 100% (29 Sep 2017 13:50) (90% - 100%)    Constitutional: wn/wd in NAD.   HEENT: NCAT, MMM, OP clear, EOMI, no proptosis or lid retraction  Neck: no thyromegaly or palpable thyroid nodules   Respiratory: lungs CTAB.  Cardiovascular: regular rhythm, normal S1 and S2, no audible murmurs, no peripheral edema  GI: soft, NT/ND, no masses/HSM appreciated.  Neurology: no tremors, DTR 2+  Skin: no visible rashes/lesions  Psychiatric: AAO x 3, normal affect/mood.    LABS:                        7.9    9.3   )-----------( 332      ( 29 Sep 2017 05:53 )             26.8     09-29    129<L>  |  91<L>  |  26<H>  ----------------------------<  142<H>  5.3   |  26  |  4.19<H>    Ca    8.4      29 Sep 2017 05:53  Phos  5.1     09-29  Mg     2.1     09-29      PT/INR - ( 29 Sep 2017 05:53 )   PT: 15.2 sec;   INR: 1.36          PTT - ( 29 Sep 2017 05:53 )  PTT:38.0 sec    Thyroid Stimulating Hormone, Serum: 2.447 uIU/mL (09-13 @ 22:51)  Thyroid Stimulating Hormone, Serum: 1.251 uIU/mL (08-24 @ 01:12)      HbA1C: 8.2 % (09-13 @ 22:51)  8.2 % (08-24 @ 01:11)    CAPILLARY BLOOD GLUCOSE  168 (29 Sep 2017 11:00)  137 (29 Sep 2017 07:00)  184 (28 Sep 2017 22:00)  144 (28 Sep 2017 19:26)  90 (28 Sep 2017 15:30)      Insulin Sliding Scale requirements X 24 Hours:    RADIOLOGY & ADDITIONAL TESTS:    A/P: 47y Female with history of DM type II presenting for       1.  DM -     Please continue           units lantus at bedtime  / in the morning and        units lispro with meals and lispro moderate / low dose sliding scale 4 times daily with meals and at bedtime.  Please continue consistent carbohydrate diet.      Goal FSG is   Will continue to monitor   For discharge, pt can continue    Pt can follow up at discharge with [facility] by calling  to make an appointment.   Will discuss case with     and update primary team INTERVAL HPI/OVERNIGHT EVENTS:    Patient is a 47y old  Female who presents with a chief complaint of TV IE / MRSA Bacteremia. (13 Sep 2017 22:57).  On this admission, patient was also found to have septic emboli, cervical osteomyelitis and abscess s/p corpectomy and anterior plating POD #4. Patient is scheduled for TVR/CABG on Monday.    Patient had sweet mashed potato and omelette for dinner. She had glucerna, cream of wheat and a small amount of orange juice for breakfast. She has not had lunch as patient was getting HD.    Pt reports the following symptoms:    CONSTITUTIONAL:  Negative fever or chills, feels well, good appetite  EYES:  Negative  blurry vision or double vision  CARDIOVASCULAR:  Negative for chest pain or palpitations  RESPIRATORY:  Negative for cough, wheezing, or SOB   GASTROINTESTINAL:  Negative for nausea, vomiting, diarrhea, constipation, or abdominal pain  GENITOURINARY:  Negative frequency, urgency or dysuria  NEUROLOGIC:  No headache, confusion, dizziness, lightheadedness    MEDICATIONS  (STANDING):  sodium chloride 0.9% lock flush 3 milliLiter(s) IV Push every 8 hours  aspirin enteric coated 81 milliGRAM(s) Oral daily  simvastatin 20 milliGRAM(s) Oral at bedtime  insulin lispro (HumaLOG) corrective regimen sliding scale   SubCutaneous Before meals and at bedtime  dextrose 5%. 1000 milliLiter(s) (50 mL/Hr) IV Continuous <Continuous>  dextrose 50% Injectable 12.5 Gram(s) IV Push once  dextrose 50% Injectable 25 Gram(s) IV Push once  dextrose 50% Injectable 25 Gram(s) IV Push once  polyethylene glycol 3350 17 Gram(s) Oral daily  pantoprazole    Tablet 40 milliGRAM(s) Oral before breakfast  DAPTOmycin IVPB 700 milliGRAM(s) IV Intermittent every 48 hours  rifampin 600 milliGRAM(s) Oral daily  Ceftaroline Fosamil IVPB 200 milliGRAM(s) IV Intermittent every 12 hours  amLODIPine   Tablet 10 milliGRAM(s) Oral daily  gabapentin 100 milliGRAM(s) Oral two times a day  gabapentin 300 milliGRAM(s) Oral at bedtime  sertraline 25 milliGRAM(s) Oral daily  traZODone 25 milliGRAM(s) Oral at bedtime  cloNIDine 0.2 milliGRAM(s) Oral every 12 hours  insulin lispro Injectable (HumaLOG) 10 Unit(s) SubCutaneous three times a day before meals  insulin glargine Injectable (LANTUS) 20 Unit(s) SubCutaneous at bedtime    MEDICATIONS  (PRN):  dextrose Gel 1 Dose(s) Oral once PRN Blood Glucose LESS THAN 70 milliGRAM(s)/deciliter  glucagon  Injectable 1 milliGRAM(s) IntraMuscular once PRN Glucose LESS THAN 70 milligrams/deciliter  acetaminophen    Suspension. 650 milliGRAM(s) Oral every 6 hours PRN Mild Pain (1 - 3)      PHYSICAL EXAM  Vital Signs Last 24 Hrs  T(C): 36.9 (29 Sep 2017 13:50), Max: 37.1 (28 Sep 2017 17:29)  T(F): 98.5 (29 Sep 2017 13:50), Max: 98.7 (28 Sep 2017 17:29)  HR: 89 (29 Sep 2017 13:50) (68 - 89)  BP: 213/100 (29 Sep 2017 13:45) (186/90 - 213/100)  BP(mean): 149 (29 Sep 2017 13:45) (106 - 149)  RR: 22 (29 Sep 2017 13:50) (9 - 25)  SpO2: 100% (29 Sep 2017 13:50) (90% - 100%)    Constitutional: wn/wd in NAD.   HEENT: NCAT, MMM, OP clear, EOMI, no proptosis or lid retraction  Neck: no thyromegaly or palpable thyroid nodules   Respiratory: lungs CTAB.  Cardiovascular: regular rhythm, normal S1 and S2, no audible murmurs, no peripheral edema  GI: soft, NT/ND, no masses/HSM appreciated.  Neurology: no tremors, DTR 2+  Skin: no visible rashes/lesions  Psychiatric: AAO x 3, normal affect/mood.    LABS:                        7.9    9.3   )-----------( 332      ( 29 Sep 2017 05:53 )             26.8     09-29    129<L>  |  91<L>  |  26<H>  ----------------------------<  142<H>  5.3   |  26  |  4.19<H>    Ca    8.4      29 Sep 2017 05:53  Phos  5.1     09-29  Mg     2.1     09-29      PT/INR - ( 29 Sep 2017 05:53 )   PT: 15.2 sec;   INR: 1.36          PTT - ( 29 Sep 2017 05:53 )  PTT:38.0 sec    Thyroid Stimulating Hormone, Serum: 2.447 uIU/mL (09-13 @ 22:51)  Thyroid Stimulating Hormone, Serum: 1.251 uIU/mL (08-24 @ 01:12)      HbA1C: 8.2 % (09-13 @ 22:51)  8.2 % (08-24 @ 01:11)    CAPILLARY BLOOD GLUCOSE  168 (29 Sep 2017 11:00)  137 (29 Sep 2017 07:00)  184 (28 Sep 2017 22:00)  144 (28 Sep 2017 19:26)  90 (28 Sep 2017 15:30)      Insulin Sliding Scale requirements X 24 Hours:    RADIOLOGY & ADDITIONAL TESTS:    A/P: 47y Female with history of DM type II presenting for DM type II presenting for MRSA bacteremia and TV endocarditis s/p cervical corpectomy. She is scheduled for TAVR/CABG on Monday 1.  DM 2, uncontrolled, complicated - patient with poor diabetic history and multiple diabetic complications. A1c is likely higher given that patient is on Procrit and has high cell turnover. Patient also seems to be non compliance with her diabetes diet. Patient had meal from outside hospital last night.     Please continue 20 units lantus at bedtime and 10 units lispro with meals and lispro moderate / low dose sliding scale 4 times daily with meals and at bedtime.  Please continue consistent carbohydrate diet.      Goal FSG is 120 - 180  Will continue to monitor   For discharge, pt can continue    Pt can follow up at discharge with [facility] by calling  to make an appointment.   Will discuss case with Dr. Ross, Dr. Yoon and update primary team

## 2017-09-29 NOTE — CHART NOTE - NSCHARTNOTEFT_GEN_A_CORE
Admitting Diagnosis:   Patient is a 47y old  Female who presents with a chief complaint of TV IE / MRSA Bacteremia. (13 Sep 2017 22:57)      PAST MEDICAL & SURGICAL HISTORY:      Current Nutrition Order:C CHO no snack/renal with 1 liter fluid restriction with glucerna shake TID       PO Intake: Good (%) [   ]  Fair (50-75%) [ x  ] Poor (<25%) [   ]    GI Issues: none reported    Pain:some    Skin Integrity:left wound of foot.surgical wound of cervical    Labs:       129<L>  |  91<L>  |  26<H>  ----------------------------<  142<H>  5.3   |  26  |  4.19<H>    Ca    8.4      29 Sep 2017 05:53  Phos  5.1       Mg     2.1           CAPILLARY BLOOD GLUCOSE  168 (29 Sep 2017 11:00)  137 (29 Sep 2017 07:00)  184 (28 Sep 2017 22:00)  144 (28 Sep 2017 19:26)  90 (28 Sep 2017 15:30)          Medications:  MEDICATIONS  (STANDING):  sodium chloride 0.9% lock flush 3 milliLiter(s) IV Push every 8 hours  aspirin enteric coated 81 milliGRAM(s) Oral daily  simvastatin 20 milliGRAM(s) Oral at bedtime  insulin lispro (HumaLOG) corrective regimen sliding scale   SubCutaneous Before meals and at bedtime  dextrose 5%. 1000 milliLiter(s) (50 mL/Hr) IV Continuous <Continuous>  dextrose 50% Injectable 12.5 Gram(s) IV Push once  dextrose 50% Injectable 25 Gram(s) IV Push once  dextrose 50% Injectable 25 Gram(s) IV Push once  polyethylene glycol 3350 17 Gram(s) Oral daily  pantoprazole    Tablet 40 milliGRAM(s) Oral before breakfast  DAPTOmycin IVPB 700 milliGRAM(s) IV Intermittent every 48 hours  rifampin 600 milliGRAM(s) Oral daily  Ceftaroline Fosamil IVPB 200 milliGRAM(s) IV Intermittent every 12 hours  amLODIPine   Tablet 10 milliGRAM(s) Oral daily  gabapentin 100 milliGRAM(s) Oral two times a day  gabapentin 300 milliGRAM(s) Oral at bedtime  sertraline 25 milliGRAM(s) Oral daily  traZODone 25 milliGRAM(s) Oral at bedtime  cloNIDine 0.2 milliGRAM(s) Oral every 12 hours  insulin lispro Injectable (HumaLOG) 10 Unit(s) SubCutaneous three times a day before meals  insulin glargine Injectable (LANTUS) 20 Unit(s) SubCutaneous at bedtime  labetalol Injectable 10 milliGRAM(s) IV Push once    MEDICATIONS  (PRN):  dextrose Gel 1 Dose(s) Oral once PRN Blood Glucose LESS THAN 70 milliGRAM(s)/deciliter  glucagon  Injectable 1 milliGRAM(s) IntraMuscular once PRN Glucose LESS THAN 70 milligrams/deciliter  acetaminophen    Suspension. 650 milliGRAM(s) Oral every 6 hours PRN Mild Pain (1 - 3)      Weight:86.1kg  Daily     Daily Weight in k.1 (29 Sep 2017 10:20)    Weight Change: stable since admission     Estimated energy needs: based on IBW [id number]:[id number] and 1.4-1.6gmprotein:95-109gmprotein and fluids per MD due to CONSTANTIN,    Subjective: 48 y/o female s/p cervical corpectomy.Eating somewhat better.S/P HD.     Previous Nutrition Diagnosis:Inadequate oral intake r/t diminished po intake AEB: 50% intake     Active [   ]  Resolved [   ]  New PES:increased nutrient needs r/t increased demand for kcal /protein and nutrients AEB:HD  If resolved, new PES:     Goal:Meet 80% consistently    Recommendations:daily weights.Encourage po    Education: initiated    Risk Level: High [  x ] Moderate [   ] Low [   ]

## 2017-09-29 NOTE — PROGRESS NOTE ADULT - ASSESSMENT
47 year old female, PMHx HTN, HLD, DM, ESRD on HD, chronic left foot OM, known Tricuspid valve endocarditis recently admitted to [facility] under Dr. Gee and was deemed a non-surgical candidate, subsequent transferred to [facility] for medical management. Patient failed medical management with agreesive antibiotics and was found to have enlaring tricuspid vegetation on ALCIDES and was transferred back to [facility] for further evaluation. During this admission patient complaining of back/neck pain, workup revealed cervical abscess and patient is now POD#4 s/p C6 corpectomy and C5-C7 anterior plating with Dr. Kuo (orthopedics). Patient transferred to CCU post procedure. Over the weekend, patient became bradycardic HR 40s with non conducted P waves. Patient converted back to NSR and has remained hemodynamically stable since. Patient had right PICC line place and new HD catheter placed for access by IR on 9/25. Today patient had fistulagram done showing a none patent fistula. During Transfer from bed to stretcher the hemavac from the neck was pulled out. Ortho made aware and reported no intervention.     Neurovascular: Stable.   - Last admission CT head findings concerning for evolving right MCA infarct, this admission patient complaining of LLE weakness. Neurology Dr. Luna following appreciate recs. MRI head no acute pathology. LLE weakness improving today .  - MRI cervical/thoracic/lumbar spine for neck/back pain revealed cervical OM with epidural abscess s/p C6 Corpectomy and C5-C7 anterior plating with Dr. Kuo on 9/22. Tube removed accidentally yesterday. Appreciate ortho recs. Stable neuro exam .  - continue toradol and tylenol PRN for pain   - continue gabapentin 100mg BID and 300mg qhs for LLE weakness and pain   - continue sertraline 25mg daily for depression   - continue trazodone 25mg qhs for insomnia     Cardiovascular:  TV endocarditis with septic emboli and SVC/ RA clot: RCA lesion. Hypertensive today. Given multiple doses of Labereolol this afternoon. Will get EKG tomorrow and consider restarting standing beta blocker.   - MRSA Endocarditis, preoperative for TVR with Dr. Gee next week perioperative work up complete.   - Holding A/C because drain is out an no way of monitor for bleed.   -  Continue asa 81mg    - SVC/RA thrombus found on previous admission, heparin drip held 2/2 orthopedic surgery at risk for epidural hematoma.   - 2nd degree HB 9/23 AM, pacing pads in place with TVP kit in room. Metoprolol discontinued. Continue to monitor rhythm   - Hx of HLD, continue simvastatin 20mg qhs   - Hx of HTN, continue amlodipine 10mg and clonidine 0.2mg BID.   - Repeat Echo today with stable vegetation     Respiratory: 02 Sat = 94% on RA History of embolic PE.   - holding heparin due to removal of drain.   - Encourage ambulation and Use of IS 10x / hr while awake.  - CXR stable, f/u CXR in AM     GI: Last BM 9/27/17. no diarrhea.   - Continue PO diet   - Continue protonix 40mg for GI ppx   - continue bowel regimen     Renal / :  ESRD on HD, Cr 3.31 Sodium 129  - Renal following, HD today with 3.2 Liters removed.. Continue to appreciate recs    - Follow up with Dr. Gee to see if he wants dialysis prior to surgery   - Hyponatremic, continue 1L fluid restriction. limit free water.   - Fistulogram demonstrate. Fistula is not matured.   - Monitor I/O's.    Endocrine:  DM, A1C 8.2 TSH 2.447. elevated PM finger stick last night.   - Endocrine following,  lantus 20u qhs and 10u premeal. Will continue to monitor fingersticks  - Continue insulin sliding scale      Hematologic: RA clot is still seen on echo. Hemglobin   - Heparin drip held because drain removed yesterday and no way to monitor for bleed.   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 Kingston following as ID, continue daptomycin 700mg q48 following HD, rifampin 600mg and ceftaroline 200mg BID. Need weekly CPK and CMP, last done 9/23. Next to be 9/30/17   - Blood cultures from 9/19 NGTD. Blood cultures sent 9/23 and 9/25 negative thus far.   - Chronic OM of L foot, vascular/ortho following. Gallium scan completed with no obvious source of MRSA bacteremia. No further intervention needed.   - Observe for SIRS/Sepsis Syndrome.  - Ortho follow for spinal abscess. drain pulled on accident today. Ortho made aware. no obvious bleeding or hematoma development     Prophylaxis: holding heparin 2/2 risk of epidural hematoma.   -SCD's    Disposition: OR Monday 47 year old female, PMHx HTN, HLD, DM, ESRD on HD, chronic left foot OM, known Tricuspid valve endocarditis recently admitted to [facility] under Dr. Gee and was deemed a non-surgical candidate, subsequent transferred to [facility] for medical management. Patient failed medical management with agreesive antibiotics and was found to have enlaring tricuspid vegetation on ALCIDES and was transferred back to [facility] for further evaluation. During this admission patient complaining of back/neck pain, workup revealed cervical abscess and patient is now POD#4 s/p C6 corpectomy and C5-C7 anterior plating with Dr. Kuo (orthopedics). Patient transferred to CCU post procedure. Over the weekend, patient became bradycardic HR 40s with non conducted P waves. Patient converted back to NSR and has remained hemodynamically stable since. Patient had right PICC line place and new HD catheter placed for access by IR on 9/25. Today patient had fistulagram done showing a none patent fistula. During Transfer from bed to stretcher the hemavac from the neck was pulled out. Ortho made aware and reported no intervention.     Neurovascular: Stable.   - Last admission CT head findings concerning for evolving right MCA infarct, this admission patient complaining of LLE weakness. Neurology Dr. Luna following appreciate recs. MRI head no acute pathology. LLE weakness improving today .  - MRI cervical/thoracic/lumbar spine for neck/back pain revealed cervical OM with epidural abscess s/p C6 Corpectomy and C5-C7 anterior plating with Dr. Kuo on 9/22. Tube removed accidentally yesterday. Appreciate ortho recs. Stable neuro exam .  - continue toradol and tylenol PRN for pain   - continue gabapentin 100mg BID and 300mg qhs for LLE weakness and pain   - continue sertraline 25mg daily for depression   - continue trazodone 25mg qhs for insomnia     Cardiovascular:  TV endocarditis with septic emboli and SVC/ RA clot: RCA lesion. Hypertensive today. Given multiple doses of Labereolol this afternoon. Will get EKG tomorrow and consider restarting standing beta blocker.   - MRSA Endocarditis, preoperative for TVR with Dr. Gee next week perioperative work up complete.   - Holding A/C because drain is out an no way of monitor for bleed.   -  Continue asa 81mg    - SVC/RA thrombus found on previous admission, heparin drip held 2/2 orthopedic surgery at risk for epidural hematoma.   - 2nd degree HB 9/23 AM, pacing pads in place with TVP kit in room. Metoprolol discontinued. Continue to monitor rhythm   - Hx of HLD, continue simvastatin 20mg qhs   - Hx of HTN, continue amlodipine 10mg and clonidine 0.2mg BID.   - Repeat Echo today with stable vegetation     Respiratory: 02 Sat = 94% on RA History of embolic PE.   - holding heparin due to removal of drain.   - Encourage ambulation and Use of IS 10x / hr while awake.  - CXR stable, f/u CXR in AM     GI: Last BM 9/27/17. no diarrhea.   - Continue PO diet   - Continue protonix 40mg for GI ppx   - continue bowel regimen     Renal / :  ESRD on HD, Cr 3.31 Sodium 129  - Renal following, HD today with 3.2 Liters removed.. Continue to appreciate recs    - Follow up with Dr. Gee to see if he wants dialysis prior to surgery   - Hyponatremic, continue 1L fluid restriction. limit free water.   - Fistulogram demonstrate. Fistula is not matured.   - Monitor I/O's.    Endocrine:  DM, A1C 8.2 TSH 2.447. elevated PM finger stick last night.   - Endocrine following,  lantus 20u qhs and 10u premeal. Will continue to monitor fingersticks  - Continue insulin sliding scale      Hematologic: RA clot is still seen on echo. Hemglobin   - Heparin drip held because drain removed yesterday and no way to monitor for bleed.   - Anemia 2/2 ESRD, continue to trend H/H stable today at 8.2/27.8    ID:  MRSA Endocarditis vegetaion still seen on Echo today.   - Dr. Kingston following as ID, continue daptomycin 700mg q48 following HD, rifampin 600mg and ceftaroline 200mg BID. Need weekly CPK and CMP, last done 9/23. Next to be 9/30/17 (orderd)   - Blood cultures from 9/19 NGTD. Blood cultures sent 9/23 and 9/25 negative thus far.   - Chronic OM of L foot, vascular/ortho following. Gallium scan completed with no obvious source of MRSA bacteremia. No further intervention needed.   - Observe for SIRS/Sepsis Syndrome.  - Ortho follow for spinal abscess. drain pulled on accident today. Ortho made aware. no obvious bleeding or hematoma development     Prophylaxis:   -SCD's    Disposition: OR Monday

## 2017-09-29 NOTE — PROGRESS NOTE ADULT - SUBJECTIVE AND OBJECTIVE BOX
Objective and Subjective   The patient in her bed. Sleeping this mornign DOes not endorse any complains, no shortness of breath, no chest pain, no fever. s/p cervical spine surgery for osteomyelitis doen on 9/23.       Patient is a 47 year old female with history of HTN, HLD, DM II, ESRD on HD (MWF. Last HD 09/13/2017. Receives HD via Right Femoral HD catheter placed on 09/13/2017 at [facility] 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 The renal follows the case for the need of HD. Last HD done on 9/27 3.0 liters off           Patient seen and examined at bedside.     sodium chloride 0.9% lock flush 3 milliLiter(s) every 8 hours  aspirin enteric coated 81 milliGRAM(s) daily  simvastatin 20 milliGRAM(s) at bedtime  insulin lispro (HumaLOG) corrective regimen sliding scale   Before meals and at bedtime  dextrose 5%. 1000 milliLiter(s) <Continuous>  dextrose Gel 1 Dose(s) once PRN  dextrose 50% Injectable 12.5 Gram(s) once  dextrose 50% Injectable 25 Gram(s) once  dextrose 50% Injectable 25 Gram(s) once  glucagon  Injectable 1 milliGRAM(s) once PRN  polyethylene glycol 3350 17 Gram(s) daily  pantoprazole    Tablet 40 milliGRAM(s) before breakfast  acetaminophen    Suspension. 650 milliGRAM(s) every 6 hours PRN  DAPTOmycin IVPB 700 milliGRAM(s) every 48 hours  rifampin 600 milliGRAM(s) daily  Ceftaroline Fosamil IVPB 200 milliGRAM(s) every 12 hours  amLODIPine   Tablet 10 milliGRAM(s) daily  gabapentin 100 milliGRAM(s) two times a day  gabapentin 300 milliGRAM(s) at bedtime  sertraline 25 milliGRAM(s) daily  traZODone 25 milliGRAM(s) at bedtime  cloNIDine 0.2 milliGRAM(s) every 12 hours  insulin lispro Injectable (HumaLOG) 10 Unit(s) three times a day before meals  insulin glargine Injectable (LANTUS) 20 Unit(s) at bedtime  labetalol Injectable 10 milliGRAM(s) once  epoetin fransisca Injectable 59738 Unit(s) once  calcitriol Injectable 1 MICROGram(s) once      Allergies    penicillin (Unknown)  Sular (Unknown)    Intolerances        T(C): , Max: 37.1 (09-28-17 @ 17:29)  T(F): , Max: 98.7 (09-28-17 @ 17:29)  HR: 74 (09-29-17 @ 09:00)  BP: --  BP(mean): --  RR: 22 (09-29-17 @ 09:00)  SpO2: 98% (09-29-17 @ 09:00)  Wt(kg): --    09-28 @ 07:01  -  09-29 @ 07:00  --------------------------------------------------------  IN:    IV PiggyBack: 150 mL    Oral Fluid: 570 mL  Total IN: 720 mL    OUT:    Voided: 300 mL  Total OUT: 300 mL    Total NET: 420 mL        Physical exam:  Alert and oriented  No JVD  No respiratory distress   Normal air entry in the lungs, no wheezing, no crackles, no rales   RRR, normal s1/s2, no mumurs, rubs or gallops  Abdomen - soft, non-tender, non-distended, normal bowel sounds   Extremities - mild peripheral edema   HAs a permcath on the left         LABS:                        7.9    9.3   )-----------( 332      ( 29 Sep 2017 05:53 )             26.8     09-29    129<L>  |  91<L>  |  26<H>  ----------------------------<  142<H>  5.3   |  26  |  4.19<H>    Ca    8.4      29 Sep 2017 05:53  Phos  5.1     09-29  Mg     2.1     09-29        PT/INR - ( 29 Sep 2017 05:53 )   PT: 15.2 sec;   INR: 1.36          PTT - ( 29 Sep 2017 05:53 )  PTT:38.0 sec          RADIOLOGY & ADDITIONAL STUDIES:    < from: Xray Chest 1 View AP -PORTABLE-Routine (09.28.17 @ 07:18) >    INTERPRETATION:    Portable AP Radiograph dated 9/28/2017 7:18 AM    CLINICAL INFORMATION: 47 years, Female, preop    PRIOR STUDIES: September 27, 2017    FINDINGS: Heart size is stable. Left chest tunneled dialysis catheter is   again noted to be in place, unchanged. Pulmonary vascular congestion has   improved. Areas of patchy atelectasis versus consolidation throughout the   right lung have improved with mild residual opacity in the right lower   lobe. Small left pleural effusion is stable.    IMPRESSION:  Improved pulmonary vascular congestion. Patchy atelectasis versus   consolidation throughout the right lung has improved. Mildresidual   opacity in the right lower lobe.    < end of copied text >

## 2017-09-29 NOTE — PROGRESS NOTE ADULT - PROBLEM SELECTOR PLAN 2
hemoglobin - 7.9  - most likely due to the infection and underlying Renal failure   - we will do EPO with HD today.

## 2017-09-29 NOTE — PROGRESS NOTE ADULT - ASSESSMENT
This is 47 year old female with PMH stated above. The renal service is activated for the need of HD. She was dialyzed on 9/25- 3.0 liters off. New permcath placed on 9/25. She was dialyzed on 9/27- 3.0 liters off . she will have HD today again

## 2017-09-29 NOTE — PROGRESS NOTE ADULT - SUBJECTIVE AND OBJECTIVE BOX
Patient seen and examined at bedside.     SUBJECTIVE: No acute events overnight.  Pain tolerable.    Denies Chest Pain/SOB.    Denies Headache.    Denies Nausea/vomiting.      OBJECTIVE:   T(C): 36.6 (09-29-17 @ 01:27), Max: 37.1 (09-28-17 @ 17:29)  T(F): 97.8 (09-29-17 @ 01:27), Max: 98.7 (09-28-17 @ 17:29)  HR: 74 (09-29-17 @ 06:00) (68 - 78)  BP: --  RR:  (9 - 25)  SpO2:  (90% - 100%)  Wt(kg): --    NAD, non labored respirations  Affected extremity:          Dressing: clean/dry/intact          Drains: none         Sensation: [x ] intact to light touch  [ ] decreased                              Vascular: [x ] warm well perfused; capillary refill <3 seconds          Motor exam: [x ]         [x ] Upper extremity                   Pascual (c5)   Bi(c5/6)   WE(c6)   EE(c7)    (c8)                                                R           5              5            5             5             5                                               L            5              5            5             5            5         [ ] Lower extremity                   IP(L2)     Q(L3)     TA(L4)     EHL(L5)     GS(s1)                                                 R          5           5          5            5              5                                               L           5           5         5             5              5                                     7.9<L>  9.3   )-------------------( 332      ( 09-29 @ 05:53 )                 26.8<L>    I&O's Detail    27 Sep 2017 07:01  -  28 Sep 2017 07:00  --------------------------------------------------------  IN:    IV PiggyBack: 100 mL    Oral Fluid: 480 mL  Total IN: 580 mL    OUT:    Other: 3000 mL  Total OUT: 3000 mL    Total NET: -2420 mL      28 Sep 2017 07:01  -  29 Sep 2017 06:23  --------------------------------------------------------  IN:    IV PiggyBack: 100 mL    Oral Fluid: 570 mL  Total IN: 670 mL    OUT:    Voided: 300 mL  Total OUT: 300 mL    Total NET: 370 mL          A/P :  47y Female s/p Cervical corpectomy C6, Ant Plating 9/22/17  -    Pain control + bowel regimen  -    DVT ppx: SCDs    -    Periop abx    -    ADAT  -    Check AM labs  -    Weight bearing status:   WBAT        -    Physical Therapy  -    Resume home meds  -    Dispo planning: p CT

## 2017-09-29 NOTE — PROGRESS NOTE ADULT - ATTENDING COMMENTS
Pt seen with Dr. Marie and Karrie on rounds this afternoon.  Was complaining of "feeling cold" but this is apparently the usual for her after HD.  (She was not having actual rigors).  Glucoses have been surprisingly well controlled, and current Lantus dose appears appropriate.  Will nonetheless decrease this by 25% the night before surgery.

## 2017-09-29 NOTE — PROGRESS NOTE ADULT - SUBJECTIVE AND OBJECTIVE BOX
Patient was seen and evaluated on dialysis.   Patient is tolerating the procedure well.   HR: 74  BP: 95/65   Continue dialysis:   Dialyzer:  180    QB:   400     QD: 500  Goal UF 3 liters over 3.5 Hours       The patient stable no complains, supposed to get one unit of PRBC during HD

## 2017-09-29 NOTE — PROGRESS NOTE ADULT - ATTENDING COMMENTS
have reviewed above and examined pt..   bp in 90's syst.       getting prbc         feels cold as always on hd. exam w/o acute new findings.

## 2017-09-30 LAB
ALBUMIN SERPL ELPH-MCNC: 2.2 G/DL — LOW (ref 3.3–5)
ALP SERPL-CCNC: 365 U/L — HIGH (ref 40–120)
ALT FLD-CCNC: 10 U/L — SIGNIFICANT CHANGE UP (ref 10–45)
ANION GAP SERPL CALC-SCNC: 12 MMOL/L — SIGNIFICANT CHANGE UP (ref 5–17)
AST SERPL-CCNC: 27 U/L — SIGNIFICANT CHANGE UP (ref 10–40)
BILIRUB DIRECT SERPL-MCNC: <0.2 MG/DL — SIGNIFICANT CHANGE UP (ref 0–0.2)
BILIRUB INDIRECT FLD-MCNC: >0.3 MG/DL — SIGNIFICANT CHANGE UP (ref 0.2–1)
BILIRUB SERPL-MCNC: 0.5 MG/DL — SIGNIFICANT CHANGE UP (ref 0.2–1.2)
BUN SERPL-MCNC: 17 MG/DL — SIGNIFICANT CHANGE UP (ref 7–23)
CALCIUM SERPL-MCNC: 9 MG/DL — SIGNIFICANT CHANGE UP (ref 8.4–10.5)
CHLORIDE SERPL-SCNC: 94 MMOL/L — LOW (ref 96–108)
CK SERPL-CCNC: 20 U/L — LOW (ref 25–170)
CO2 SERPL-SCNC: 27 MMOL/L — SIGNIFICANT CHANGE UP (ref 22–31)
CREAT SERPL-MCNC: 3.08 MG/DL — HIGH (ref 0.5–1.3)
CULTURE RESULTS: SIGNIFICANT CHANGE UP
CULTURE RESULTS: SIGNIFICANT CHANGE UP
GLUCOSE SERPL-MCNC: 130 MG/DL — HIGH (ref 70–99)
HCT VFR BLD CALC: 28.4 % — LOW (ref 34.5–45)
HGB BLD-MCNC: 8.6 G/DL — LOW (ref 11.5–15.5)
INR BLD: 1.38 — HIGH (ref 0.88–1.16)
MAGNESIUM SERPL-MCNC: 2 MG/DL — SIGNIFICANT CHANGE UP (ref 1.6–2.6)
MCHC RBC-ENTMCNC: 25.8 PG — LOW (ref 27–34)
MCHC RBC-ENTMCNC: 30.3 G/DL — LOW (ref 32–36)
MCV RBC AUTO: 85.3 FL — SIGNIFICANT CHANGE UP (ref 80–100)
PHOSPHATE SERPL-MCNC: 5.4 MG/DL — HIGH (ref 2.5–4.5)
PLATELET # BLD AUTO: 351 K/UL — SIGNIFICANT CHANGE UP (ref 150–400)
POTASSIUM SERPL-MCNC: 4.6 MMOL/L — SIGNIFICANT CHANGE UP (ref 3.5–5.3)
POTASSIUM SERPL-SCNC: 4.6 MMOL/L — SIGNIFICANT CHANGE UP (ref 3.5–5.3)
PROT SERPL-MCNC: 8 G/DL — SIGNIFICANT CHANGE UP (ref 6–8.3)
PROTHROM AB SERPL-ACNC: 15.4 SEC — HIGH (ref 9.8–12.7)
RBC # BLD: 3.33 M/UL — LOW (ref 3.8–5.2)
RBC # FLD: 16.6 % — SIGNIFICANT CHANGE UP (ref 10.3–16.9)
SODIUM SERPL-SCNC: 133 MMOL/L — LOW (ref 135–145)
SPECIMEN SOURCE: SIGNIFICANT CHANGE UP
SPECIMEN SOURCE: SIGNIFICANT CHANGE UP
WBC # BLD: 8.8 K/UL — SIGNIFICANT CHANGE UP (ref 3.8–10.5)
WBC # FLD AUTO: 8.8 K/UL — SIGNIFICANT CHANGE UP (ref 3.8–10.5)

## 2017-09-30 PROCEDURE — 71010: CPT | Mod: 26

## 2017-09-30 PROCEDURE — 71250 CT THORAX DX C-: CPT | Mod: 26

## 2017-09-30 PROCEDURE — 99233 SBSQ HOSP IP/OBS HIGH 50: CPT

## 2017-09-30 PROCEDURE — 93010 ELECTROCARDIOGRAM REPORT: CPT

## 2017-09-30 PROCEDURE — 70491 CT SOFT TISSUE NECK W/DYE: CPT | Mod: 26

## 2017-09-30 RX ORDER — OXYCODONE AND ACETAMINOPHEN 5; 325 MG/1; MG/1
1 TABLET ORAL ONCE
Qty: 0 | Refills: 0 | Status: DISCONTINUED | OUTPATIENT
Start: 2017-09-30 | End: 2017-09-30

## 2017-09-30 RX ADMIN — Medication 10 UNIT(S): at 12:56

## 2017-09-30 RX ADMIN — CEFTAROLINE FOSAMIL 50 MILLIGRAM(S): 600 POWDER, FOR SOLUTION INTRAVENOUS at 18:57

## 2017-09-30 RX ADMIN — PANTOPRAZOLE SODIUM 40 MILLIGRAM(S): 20 TABLET, DELAYED RELEASE ORAL at 06:47

## 2017-09-30 RX ADMIN — SODIUM CHLORIDE 3 MILLILITER(S): 9 INJECTION INTRAMUSCULAR; INTRAVENOUS; SUBCUTANEOUS at 06:34

## 2017-09-30 RX ADMIN — Medication 25 MILLIGRAM(S): at 22:00

## 2017-09-30 RX ADMIN — DAPTOMYCIN 128 MILLIGRAM(S): 500 INJECTION, POWDER, LYOPHILIZED, FOR SOLUTION INTRAVENOUS at 14:31

## 2017-09-30 RX ADMIN — GABAPENTIN 100 MILLIGRAM(S): 400 CAPSULE ORAL at 06:47

## 2017-09-30 RX ADMIN — SERTRALINE 25 MILLIGRAM(S): 25 TABLET, FILM COATED ORAL at 12:56

## 2017-09-30 RX ADMIN — Medication 0.2 MILLIGRAM(S): at 06:47

## 2017-09-30 RX ADMIN — GABAPENTIN 300 MILLIGRAM(S): 400 CAPSULE ORAL at 21:55

## 2017-09-30 RX ADMIN — OXYCODONE AND ACETAMINOPHEN 1 TABLET(S): 5; 325 TABLET ORAL at 12:56

## 2017-09-30 RX ADMIN — GABAPENTIN 100 MILLIGRAM(S): 400 CAPSULE ORAL at 18:57

## 2017-09-30 RX ADMIN — Medication 0.2 MILLIGRAM(S): at 14:31

## 2017-09-30 RX ADMIN — SODIUM CHLORIDE 3 MILLILITER(S): 9 INJECTION INTRAMUSCULAR; INTRAVENOUS; SUBCUTANEOUS at 22:00

## 2017-09-30 RX ADMIN — INSULIN GLARGINE 20 UNIT(S): 100 INJECTION, SOLUTION SUBCUTANEOUS at 21:56

## 2017-09-30 RX ADMIN — CEFTAROLINE FOSAMIL 50 MILLIGRAM(S): 600 POWDER, FOR SOLUTION INTRAVENOUS at 12:57

## 2017-09-30 RX ADMIN — OXYCODONE AND ACETAMINOPHEN 1 TABLET(S): 5; 325 TABLET ORAL at 13:57

## 2017-09-30 RX ADMIN — Medication 81 MILLIGRAM(S): at 12:56

## 2017-09-30 RX ADMIN — Medication 10 UNIT(S): at 07:08

## 2017-09-30 RX ADMIN — AMLODIPINE BESYLATE 10 MILLIGRAM(S): 2.5 TABLET ORAL at 06:47

## 2017-09-30 RX ADMIN — SIMVASTATIN 20 MILLIGRAM(S): 20 TABLET, FILM COATED ORAL at 21:57

## 2017-09-30 RX ADMIN — SODIUM CHLORIDE 3 MILLILITER(S): 9 INJECTION INTRAMUSCULAR; INTRAVENOUS; SUBCUTANEOUS at 14:11

## 2017-09-30 RX ADMIN — Medication 0.2 MILLIGRAM(S): at 21:56

## 2017-09-30 RX ADMIN — Medication 10 UNIT(S): at 19:21

## 2017-09-30 NOTE — PROGRESS NOTE ADULT - SUBJECTIVE AND OBJECTIVE BOX
INTERVAL HPI/OVERNIGHT EVENTS in ICU:    Patient is a 47y old  female who presents with a chief complaint of TV IE / MRSA Bacteremia. (09-13-17) scheduled for TAVR on Monday   was seen and examined today.  Reports the following symptoms:    CONSTITUTIONAL:  Negative fever or chills, feels better, moderate appetite  EYES:  Blurry vision  CARDIOVASCULAR:  Negative for chest pain or palpitations  RESPIRATORY:  Negative for cough, wheezing, or SOB   GASTROINTESTINAL:  Negative for nausea, vomiting, diarrhea, constipation, or abdominal pain  GENITOURINARY:  Negative frequency, urgency or dysuria  NEUROLOGIC:  No headache, confusion, dizziness, lightheadedness    MEDICATIONS  (STANDING):  sodium chloride 0.9% lock flush 3 milliLiter(s) IV Push every 8 hours  aspirin enteric coated 81 milliGRAM(s) Oral daily  simvastatin 20 milliGRAM(s) Oral at bedtime  insulin lispro (HumaLOG) corrective regimen sliding scale   SubCutaneous Before meals and at bedtime  dextrose 5%. 1000 milliLiter(s) (50 mL/Hr) IV Continuous <Continuous>  dextrose 50% Injectable 12.5 Gram(s) IV Push once  dextrose 50% Injectable 25 Gram(s) IV Push once  dextrose 50% Injectable 25 Gram(s) IV Push once  polyethylene glycol 3350 17 Gram(s) Oral daily  pantoprazole    Tablet 40 milliGRAM(s) Oral before breakfast  DAPTOmycin IVPB 700 milliGRAM(s) IV Intermittent every 48 hours  rifampin 600 milliGRAM(s) Oral daily  Ceftaroline Fosamil IVPB 200 milliGRAM(s) IV Intermittent every 12 hours  amLODIPine   Tablet 10 milliGRAM(s) Oral daily  gabapentin 100 milliGRAM(s) Oral two times a day  gabapentin 300 milliGRAM(s) Oral at bedtime  sertraline 25 milliGRAM(s) Oral daily  traZODone 25 milliGRAM(s) Oral at bedtime  insulin lispro Injectable (HumaLOG) 10 Unit(s) SubCutaneous three times a day before meals  insulin glargine Injectable (LANTUS) 20 Unit(s) SubCutaneous at bedtime  cloNIDine 0.2 milliGRAM(s) Oral every 8 hours    MEDICATIONS  (PRN):  dextrose Gel 1 Dose(s) Oral once PRN Blood Glucose LESS THAN 70 milliGRAM(s)/deciliter  glucagon  Injectable 1 milliGRAM(s) IntraMuscular once PRN Glucose LESS THAN 70 milligrams/deciliter  acetaminophen    Suspension. 650 milliGRAM(s) Oral every 6 hours PRN Mild Pain (1 - 3)        LABS:                        8.6    8.8   )-----------( 351      ( 30 Sep 2017 06:34 )             28.4     09-30    133<L>  |  94<L>  |  17  ----------------------------<  130<H>  4.6   |  27  |  3.08<H>    Ca    9.0      30 Sep 2017 06:32  Phos  5.4     09-30  Mg     2.0     09-30    TPro  8.0  /  Alb  2.2<L>  /  TBili  0.5  /  DBili  <0.2  /  AST  27  /  ALT  10  /  AlkPhos  365<H>  09-30    Hemoglobin A1C, Whole Blood: 8.2 % (09-13-17 @ 22:51)  Hemoglobin A1C, Whole Blood: 8.2 % (08-24-17 @ 01:11)    PT/INR - ( 30 Sep 2017 06:32 )   PT: 15.4 sec;   INR: 1.38          PTT - ( 29 Sep 2017 05:53 )  PTT:38.0 sec    Thyroid Stimulating Hormone, Serum: 2.447 uIU/mL (09-13 @ 22:51)  Thyroid Stimulating Hormone, Serum: 1.251 uIU/mL (08-24 @ 01:12)      RADIOLOGY & ADDITIONAL TESTS:      Vital Signs Last 24 Hrs  T(C): 37.7 (30 Sep 2017 17:27), Max: 37.7 (30 Sep 2017 17:27)  T(F): 99.9 (30 Sep 2017 17:27), Max: 99.9 (30 Sep 2017 17:27)  HR: 80 (30 Sep 2017 22:00) (70 - 84)  BP: --  BP(mean): --  RR: 17 (30 Sep 2017 22:00) (11 - 25)  SpO2: 98% (30 Sep 2017 22:00) (92% - 100%)    CAPILLARY BLOOD GLUCOSE  142 (09-30-17 @ 16:00)  120 (09-30-17 @ 07:00)  130 (09-29-17 @ 22:00)  140 (09-29-17 @ 18:00)      PHYSICAL EXAM:  Constitutional: wn/wd in NAD.   HEENT: NCAT, MMM, OP clear, EOMI, , no proptosis or lid retraction  Neck: supple, no acanthosis, no thyromegaly or palpable thyroid nodules   Respiratory: Lungs CTAB.  Cardiovascular: regular rhythm, normal S1 and S2, no audible murmurs, no peripheral edema  GI: soft, + bowel sounds, NT/ND, no masses/HSM appreciated.  Neurology: decreased vision, decreased foot sensation, no tremors, DTR 2+  Skin: no visible rashes/lesions  Psychiatric: AAO x 3, normal affect/mood.        A/P: 47y female with DM on insulin, ESRD on HD admitted for bacteremia scheduled for TAVR. Consulted and being followed for Diabetes Type 2 requiring insulin.     Uncontrolled DM Type 2 (HbA1c 8.2%) with dietary non-compliance and poor follow-up.    Please continue 20 units Lantus HS, premeal Lispro 10 units TID, and Lispro moderate dose sliding scale 4 times daily (before meals and bedtime).  Please continue consistent carbohydrate (Diabetic)/renal diet, FS ac & hs. Target  - 150 mg/dl.    F/U with outpatient PCP.    Case discussed with primary team.      Attending attestation:  I have seen and evaluated the patient at the bedside.  Nurse Practitioner/Fellow/Resident’s note reviewed, including vital signs, PE and laboratory results.  Direct personal management and extensive interpretation of the data conducted.   Assessment and recommendations as above.

## 2017-09-30 NOTE — PROVIDER CONTACT NOTE (OTHER) - ACTION/TREATMENT ORDERED:
No new orders at this time. Do not administer additional meds. Continue to monitor.
Will continue to talk to pt about moving in the bed and getting OOB to chair in order to promote healing.
assess pt.  pain meds prn

## 2017-09-30 NOTE — PROVIDER CONTACT NOTE (OTHER) - BACKGROUND
BLANE Gomez made aware and assessed pt. Ortho called and also made aware.
Pt stated that she was not yet ready to get OOB. Benefits of activity and possible dangers of remaining in bed were discussed with the pt. When pt was ready to get OOB the dialysis nurse arrived and
On closer examination drain appears not to be functioning properly. No leak noted. BLANE Sutherland made aware and ortho team also called to be made aware. Per ortho resident, no interventions at this
do not plan to reinsert one. Per MD Kaitlin if the pt can speak, swallow, and there is only minimal swelling around the neck there is no concern and no further evaluations needed. Pt is awake, is
to assess site and determine if there will be any further diagnostics to assess the site. Unable to reach ortho. I paged the team many times and escalated to JENN Anand. BLANE Gomez contacted again to let
pt admitted with mrsa bacteremia, history of ESRD, poor vasculature
HTN, Osteomyelitis

## 2017-09-30 NOTE — PROGRESS NOTE ADULT - ATTENDING COMMENTS
Patient examined, fellow's hx and PE reviewed and confirmed. I find ESRD, HTN. A/P reviewed and confirmed. Continue HD. Continue BP meds. See full note

## 2017-09-30 NOTE — PROGRESS NOTE ADULT - SUBJECTIVE AND OBJECTIVE BOX
Patient seen and examined at bedside. Patient is a 47 year old female with a history of hypertension, hyperlipidemia, DM II, ESRD on HD M/W/F, endocarditis, thrombus in the right heart, hypertension, diabetes, and hyperlipidemia whom presented to the hospital from [facility]. Patient states she is feeling better. She denies any shortness of breath, nausea, or vomiting.     sodium chloride 0.9% lock flush 3 milliLiter(s) every 8 hours  aspirin enteric coated 81 milliGRAM(s) daily  simvastatin 20 milliGRAM(s) at bedtime  insulin lispro (HumaLOG) corrective regimen sliding scale   Before meals and at bedtime  dextrose 5%. 1000 milliLiter(s) <Continuous>  dextrose Gel 1 Dose(s) once PRN  dextrose 50% Injectable 12.5 Gram(s) once  dextrose 50% Injectable 25 Gram(s) once  dextrose 50% Injectable 25 Gram(s) once  glucagon  Injectable 1 milliGRAM(s) once PRN  polyethylene glycol 3350 17 Gram(s) daily  pantoprazole    Tablet 40 milliGRAM(s) before breakfast  acetaminophen    Suspension. 650 milliGRAM(s) every 6 hours PRN  DAPTOmycin IVPB 700 milliGRAM(s) every 48 hours  rifampin 600 milliGRAM(s) daily  Ceftaroline Fosamil IVPB 200 milliGRAM(s) every 12 hours  amLODIPine   Tablet 10 milliGRAM(s) daily  gabapentin 100 milliGRAM(s) two times a day  gabapentin 300 milliGRAM(s) at bedtime  sertraline 25 milliGRAM(s) daily  traZODone 25 milliGRAM(s) at bedtime  insulin lispro Injectable (HumaLOG) 10 Unit(s) three times a day before meals  insulin glargine Injectable (LANTUS) 20 Unit(s) at bedtime  cloNIDine 0.2 milliGRAM(s) every 8 hours    Allergies    penicillin (Unknown)  Sular (Unknown)    Intolerances    T(C): , Max: 37.8 (09-29-17 @ 17:14)  T(F): , Max: 100.1 (09-29-17 @ 17:14)  HR: 80 (09-30-17 @ 15:00)  BP: 167/70 (09-29-17 @ 18:00)  BP(mean): 103 (09-29-17 @ 18:00)  RR: 14 (09-30-17 @ 15:00)  SpO2: 99% (09-30-17 @ 15:00)    09-29 @ 07:01  -  09-30 @ 07:00  --------------------------------------------------------  IN:    IV PiggyBack: 50 mL    Oral Fluid: 350 mL    Packed Red Blood Cells: 350 mL  Total IN: 750 mL    OUT:    Other: 3200 mL    Voided: 300 mL  Total OUT: 3500 mL    Total NET: -2750 mL    09-30 @ 07:01  -  09-30 @ 16:30  --------------------------------------------------------  IN:  Total IN: 0 mL    OUT:    Voided: 200 mL  Total OUT: 200 mL    Total NET: -200 mL    LABS:                        8.6    8.8   )-----------( 351      ( 30 Sep 2017 06:34 )             28.4     09-30    133<L>  |  94<L>  |  17  ----------------------------<  130<H>  4.6   |  27  |  3.08<H>    Ca    9.0      30 Sep 2017 06:32  Phos  5.4     09-30  Mg     2.0     09-30    TPro  8.0  /  Alb  2.2<L>  /  TBili  0.5  /  DBili  <0.2  /  AST  27  /  ALT  10  /  AlkPhos  365<H>  09-30    PT/INR - ( 30 Sep 2017 06:32 )   PT: 15.4 sec;   INR: 1.38        PTT - ( 29 Sep 2017 05:53 )  PTT:38.0 sec

## 2017-09-30 NOTE — PROGRESS NOTE ADULT - SUBJECTIVE AND OBJECTIVE BOX
Patient seen and examined at bedside.     SUBJECTIVE: No acute events overnight.  Pain tolerable.    Denies Chest Pain/SOB.    Denies Headache.    Denies Nausea/vomiting.      OBJECTIVE:   T(C): 36.9 (09-30-17 @ 01:13), Max: 37.8 (09-29-17 @ 17:14)  T(F): 98.5 (09-30-17 @ 01:13), Max: 100.1 (09-29-17 @ 17:14)  HR: 78 (09-30-17 @ 07:00) (70 - 98)  BP: 167/70 (09-29-17 @ 18:00) (151/59 - 217/81)  RR:  (10 - 29)  SpO2:  (91% - 100%)  Wt(kg): --    NAD, non labored respirations  Affected extremity:          Dressing: clean/dry/intact          Drains: none         Sensation: [x ] intact to light touch  [ ] decreased                              Vascular: [x ] warm well perfused; capillary refill <3 seconds          Motor exam: [x ]         [x ] Upper extremity                   Pascual (c5)   Bi(c5/6)   WE(c6)   EE(c7)    (c8)                                                R           5              5            5             5             5                                               L            5              5            5             5            5         [ ] Lower extremity                   IP(L2)     Q(L3)     TA(L4)     EHL(L5)     GS(s1)                                                 R          5           5          5            5              5                                               L           5           5         5             5              5                                     7.9<L>  9.3   )-------------------( 332      ( 09-29 @ 05:53 )                 26.8<L>    I&O's Detail    29 Sep 2017 07:01  -  30 Sep 2017 07:00  --------------------------------------------------------  IN:    IV PiggyBack: 50 mL    Oral Fluid: 350 mL    Packed Red Blood Cells: 350 mL  Total IN: 750 mL    OUT:    Other: 3200 mL  Total OUT: 3200 mL    Total NET: -2450 mL          A/P :  47y Female s/p Cervical Corpectomy C6, Ant Plating C5-7     9/22/17  -    Pain control + bowel regimen  Mx p CT surgery.     Call with any issues.

## 2017-09-30 NOTE — PROGRESS NOTE ADULT - ASSESSMENT
47 year old female, PMHx HTN, HLD, DM, ESRD on HD, chronic left foot OM, known Tricuspid valve endocarditis recently admitted to [facility] under Dr. Gee and was deemed a non-surgical candidate, subsequent transferred to [facility] for medical management. Patient failed medical management with agreesive antibiotics and was found to have enlaring tricuspid vegetation on ALCIDES and was transferred back to [facility] for further evaluation. During this admission patient complaining of back/neck pain, workup revealed cervical abscess and patient is now POD#4 s/p C6 corpectomy and C5-C7 anterior plating with Dr. Kuo (orthopedics). Patient transferred to CCU post procedure. Over the weekend, patient became bradycardic HR 40s with non conducted P waves. Patient converted back to NSR and has remained hemodynamically stable since. Patient had right PICC line place and new HD catheter placed for access by IR on 9/25. Fistulagram done showing a none patent fistula. During Transfer from bed to stretcher the hemavac from the neck was pulled out. Ortho made aware and reported no intervention. Today patient is reporting right arm pain that feels like an electric shock. Chest X-ray worsening tyhis AM. Will get CT chest for concern for aspiration and CT neck with contrast to evaluate for hematoma as patient will need heparin on monday for surgery     Neurovascular: Stable.   - Last admission CT head findings concerning for evolving right MCA infarct, this admission patient complaining of LLE weakness. Neurology Dr. Luna following appreciate recs. MRI head no acute pathology.   - MRI cervical/thoracic/lumbar spine for neck/back pain revealed cervical OM with epidural abscess s/p C6 Corpectomy and C5-C7 anterior plating with Dr. Kuo on 9/22. Tube removed accidentally unintentionally on 9/28/17. Appreciate ortho recs. Stable neuro exam.   - Repeating CT neck today to evaluate for hematoma.   - continue Tylenol for pain and Percocet PRN   - continue gabapentin 100mg BID and 300mg qhs for LLE weakness and pain   - continue sertraline 25mg daily for depression   - continue trazodone 25mg qhs for insomnia     Cardiovascular:  TV endocarditis with septic emboli and SVC/ RA clot: RCA lesion. Hypertensive yesterday now better controlled.   - MRSA Endocarditis, preoperative for TVR with Dr. Gee next week perioperative work up complete.   - Holding A/C because drain is out an no way of monitor for bleed.   -  Continue asa 81mg    - SVC/RA thrombus found on previous admission, heparin drip held 2/2 orthopedic surgery at risk for epidural hematoma.   - 2nd degree HB 9/23 AM, pacing pads in place with TVP kit in room. Metoprolol discontinued. Continue to monitor rhythm   - Hx of HLD, continue simvastatin 20mg qhs   - Hx of HTN, continue amlodipine 10mg and increased clonidine to 0.2mg TID.   - Repeat Echo  with stable vegetation and severe TR     Respiratory: 02 Sat = 94% on RA History of embolic PE.   - holding heparin due to removal of drain.   - Encourage ambulation and Use of IS 10x / hr while awake.  - CXR stable, f/u CXR in AM     GI: Last BM 9/27/17. no diarrhea.   - Continue PO diet   - Continue protonix 40mg for GI ppx   - continue bowel regimen     Renal / :  ESRD on HD, Cr 3.31 Sodium 129  - Renal following, HD today with 3.2 Liters removed. Continue to appreciate recs    - Follow up with Dr. Gee to see if he wants dialysis prior to surgery   - Hyponatremic, continue 1L fluid restriction. limit free water.   - Fistulogram demonstrate. Fistula is not matured.   - Monitor I/O's.    Endocrine:  DM, A1C 8.2 TSH 2.447. elevated PM finger stick last night.   - Endocrine following,  lantus 20u qhs and 10u premeal. Will continue to monitor fingersticks  - Continue insulin sliding scale      Hematologic: RA clot is still seen on echo. Hemoglobin improved after 1 unit os PRBCs given yesterday.   - Heparin drip held because drain removed yesterday and no way to monitor for bleed.   - Anemia 2/2 ESRD, continue to trend     ID:  MRSA Endocarditis vegetaion still seen on Echo today.   - Dr. Kingston following as ID, continue daptomycin 700mg q48 following HD, rifampin 600mg and ceftaroline 200mg BID. Need weekly CPK and CMP, last done 9/23. CPK 20 today.    - If CT chest looks improved can possibly discontinue ceftaroline.   - Needs IV access. Take out PICC.   - Blood cultures from 9/19 NGTD. Blood cultures sent 9/23 and 9/25 negative thus far.   - Chronic OM of L foot, vascular/ortho following. Gallium scan completed with no obvious source of MRSA bacteremia. No further intervention needed.   - Observe for SIRS/Sepsis Syndrome.  - Ortho follow for spinal abscess. Drain out. Ortho made aware. no obvious bleeding or hematoma development     Prophylaxis:   - SCD's  - continue to hold heparin.     Disposition: OR Monday

## 2017-09-30 NOTE — PROGRESS NOTE ADULT - SUBJECTIVE AND OBJECTIVE BOX
Patient discussed on morning rounds with Dr. Gee     Operation / Date: Pre-op TVR and CABG for RCA lesion     SUBJECTIVE ASSESSMENT:  47y Female reports severe right arm pain that feels like a shock down her entire right arm with associated numbness and tingling in below. Patient states that the PICC line is causing her the distress and says that the pain is aggravated by moving her right arm and with manipulation of the PICC line. She denies neck pain.  States that her breathing feels better in relation to the past few days. No other complaints at this time.         Vital Signs Last 24 Hrs  T(C): 36.8 (30 Sep 2017 09:27), Max: 37.8 (29 Sep 2017 17:14)  T(F): 98.2 (30 Sep 2017 09:27), Max: 100.1 (29 Sep 2017 17:14)  HR: 80 (30 Sep 2017 10:00) (70 - 98)  BP: 167/70 (29 Sep 2017 18:00) (151/59 - 217/81)  BP(mean): 103 (29 Sep 2017 18:00) (94 - 158)  RR: 20 (30 Sep 2017 10:00) (10 - 29)  SpO2: 95% (30 Sep 2017 10:00) (91% - 100%)  I&O's Detail    29 Sep 2017 07:01  -  30 Sep 2017 07:00  --------------------------------------------------------  IN:    IV PiggyBack: 50 mL    Oral Fluid: 350 mL    Packed Red Blood Cells: 350 mL  Total IN: 750 mL    OUT:    Other: 3200 mL    Voided: 300 mL  Total OUT: 3500 mL    Total NET: -2750 mL      30 Sep 2017 07:01  -  30 Sep 2017 11:04  --------------------------------------------------------  IN:  Total IN: 0 mL    OUT:    Voided: 200 mL  Total OUT: 200 mL    Total NET: -200 mL          CHEST TUBE:  Yes/No. AIR LEAKS: Yes/No. Suction / H2O SEAL.   LOLLY DRAIN:  Yes/No.  EPICARDIAL WIRES: Yes/No.  TIE DOWNS: Yes/No.  COOK: Yes/No.    PHYSICAL EXAM:    General: Aox3 in no acute distress.     Neurological: no sensation to the anterior medical left thigh. Bend left leg at knee but can not lift left leg. no saddle anesthsia.   EOMS intact. PERRLA. No nystagmus. No facial droop. No incontinence. Right neck with steri-strips in place mild ttp. right upper subclavicular incision with steri strips in place.     Cardiovascular: RRR distance heart sounds no audible murmur. No friction rubs.     Respiratory:  fine bibasilar crackle.s     Gastrointestinal: non tender non distended. active bowel sounds     Extremities: tenderness but no warm over Cervical spinous process. . Trace lower extremity edema. Keloid scaring of left medial aspect of foot. Right PICC line in the upper arm. no erythema, induration or increased warmth. no tenderness to palpation around the site. No bruit or thrill over the fistula on left arm .     Vascular: 1+ DP pulses bilaterally. 2+ radial     Lines: Left IJ permacath.         LABS:                        8.6    8.8   )-----------( 351      ( 30 Sep 2017 06:34 )             28.4       COUMADIN:  No.     PT/INR - ( 30 Sep 2017 06:32 )   PT: 15.4 sec;   INR: 1.38          PTT - ( 29 Sep 2017 05:53 )  PTT:38.0 sec    09-30    133<L>  |  94<L>  |  17  ----------------------------<  130<H>  4.6   |  27  |  3.08<H>    Ca    9.0      30 Sep 2017 06:32  Phos  5.4     09-30  Mg     2.0     09-30    TPro  8.0  /  Alb  2.2<L>  /  TBili  0.5  /  DBili  <0.2  /  AST  27  /  ALT  10  /  AlkPhos  365<H>  09-30          MEDICATIONS  (STANDING):  sodium chloride 0.9% lock flush 3 milliLiter(s) IV Push every 8 hours  aspirin enteric coated 81 milliGRAM(s) Oral daily  simvastatin 20 milliGRAM(s) Oral at bedtime  insulin lispro (HumaLOG) corrective regimen sliding scale   SubCutaneous Before meals and at bedtime  dextrose 5%. 1000 milliLiter(s) (50 mL/Hr) IV Continuous <Continuous>  dextrose 50% Injectable 12.5 Gram(s) IV Push once  dextrose 50% Injectable 25 Gram(s) IV Push once  dextrose 50% Injectable 25 Gram(s) IV Push once  polyethylene glycol 3350 17 Gram(s) Oral daily  pantoprazole    Tablet 40 milliGRAM(s) Oral before breakfast  DAPTOmycin IVPB 700 milliGRAM(s) IV Intermittent every 48 hours  rifampin 600 milliGRAM(s) Oral daily  Ceftaroline Fosamil IVPB 200 milliGRAM(s) IV Intermittent every 12 hours  amLODIPine   Tablet 10 milliGRAM(s) Oral daily  gabapentin 100 milliGRAM(s) Oral two times a day  gabapentin 300 milliGRAM(s) Oral at bedtime  sertraline 25 milliGRAM(s) Oral daily  traZODone 25 milliGRAM(s) Oral at bedtime  insulin lispro Injectable (HumaLOG) 10 Unit(s) SubCutaneous three times a day before meals  insulin glargine Injectable (LANTUS) 20 Unit(s) SubCutaneous at bedtime  oxyCODONE    5 mG/acetaminophen 325 mG 1 Tablet(s) Oral once  cloNIDine 0.2 milliGRAM(s) Oral every 8 hours    MEDICATIONS  (PRN):  dextrose Gel 1 Dose(s) Oral once PRN Blood Glucose LESS THAN 70 milliGRAM(s)/deciliter  glucagon  Injectable 1 milliGRAM(s) IntraMuscular once PRN Glucose LESS THAN 70 milligrams/deciliter  acetaminophen    Suspension. 650 milliGRAM(s) Oral every 6 hours PRN Mild Pain (1 - 3)        RADIOLOGY & ADDITIONAL TESTS:    < from: Xray Chest 1 View AP-PORTABLE IMMEDIATE (09.30.17 @ 07:12) >  Portable examination of chest demonstrates no interval change position   remaining support devices in comparison to prior examination of the chest   9/28/2017. Mild congestion and/or infiltrates. Possible Small right   effusion.    Impression: Congestion and/or infiltrates. Possible small right effusion    < end of copied text >

## 2017-09-30 NOTE — PROGRESS NOTE ADULT - ASSESSMENT
Patient is a 47 year old female with a history of hypertension, hyperlipidemia, DM II, ESRD on HD M/W/F, endocarditis, thrombus in the right heart, hypertension, diabetes, and hyperlipidemia whom presented to the hospital from [facility].

## 2017-10-01 LAB
ALBUMIN SERPL ELPH-MCNC: 2.5 G/DL — LOW (ref 3.3–5)
ALP SERPL-CCNC: 412 U/L — HIGH (ref 40–120)
ALT FLD-CCNC: 11 U/L — SIGNIFICANT CHANGE UP (ref 10–45)
ANION GAP SERPL CALC-SCNC: 11 MMOL/L — SIGNIFICANT CHANGE UP (ref 5–17)
APTT BLD: 36.3 SEC — SIGNIFICANT CHANGE UP (ref 27.5–37.4)
AST SERPL-CCNC: 18 U/L — SIGNIFICANT CHANGE UP (ref 10–40)
BASOPHILS NFR BLD AUTO: 0.8 % — SIGNIFICANT CHANGE UP (ref 0–2)
BILIRUB SERPL-MCNC: 0.6 MG/DL — SIGNIFICANT CHANGE UP (ref 0.2–1.2)
BLD GP AB SCN SERPL QL: NEGATIVE — SIGNIFICANT CHANGE UP
BUN SERPL-MCNC: 25 MG/DL — HIGH (ref 7–23)
CALCIUM SERPL-MCNC: 8.7 MG/DL — SIGNIFICANT CHANGE UP (ref 8.4–10.5)
CHLORIDE SERPL-SCNC: 91 MMOL/L — LOW (ref 96–108)
CO2 SERPL-SCNC: 28 MMOL/L — SIGNIFICANT CHANGE UP (ref 22–31)
CREAT SERPL-MCNC: 3.88 MG/DL — HIGH (ref 0.5–1.3)
EOSINOPHIL NFR BLD AUTO: 2.5 % — SIGNIFICANT CHANGE UP (ref 0–6)
GLUCOSE SERPL-MCNC: 120 MG/DL — HIGH (ref 70–99)
HCT VFR BLD CALC: 28.3 % — LOW (ref 34.5–45)
HGB BLD-MCNC: 8.4 G/DL — LOW (ref 11.5–15.5)
INR BLD: 1.4 — HIGH (ref 0.88–1.16)
LYMPHOCYTES # BLD AUTO: 9.5 % — LOW (ref 13–44)
MAGNESIUM SERPL-MCNC: 2 MG/DL — SIGNIFICANT CHANGE UP (ref 1.6–2.6)
MCHC RBC-ENTMCNC: 25.2 PG — LOW (ref 27–34)
MCHC RBC-ENTMCNC: 29.7 G/DL — LOW (ref 32–36)
MCV RBC AUTO: 85 FL — SIGNIFICANT CHANGE UP (ref 80–100)
MONOCYTES NFR BLD AUTO: 8.6 % — SIGNIFICANT CHANGE UP (ref 2–14)
NEUTROPHILS NFR BLD AUTO: 78.6 % — HIGH (ref 43–77)
PHOSPHATE SERPL-MCNC: 5 MG/DL — HIGH (ref 2.5–4.5)
PLATELET # BLD AUTO: 336 K/UL — SIGNIFICANT CHANGE UP (ref 150–400)
POTASSIUM SERPL-MCNC: 5.4 MMOL/L — HIGH (ref 3.5–5.3)
POTASSIUM SERPL-SCNC: 5.4 MMOL/L — HIGH (ref 3.5–5.3)
PROT SERPL-MCNC: 7.9 G/DL — SIGNIFICANT CHANGE UP (ref 6–8.3)
PROTHROM AB SERPL-ACNC: 15.7 SEC — HIGH (ref 9.8–12.7)
RBC # BLD: 3.33 M/UL — LOW (ref 3.8–5.2)
RBC # FLD: 16.6 % — SIGNIFICANT CHANGE UP (ref 10.3–16.9)
RH IG SCN BLD-IMP: NEGATIVE — SIGNIFICANT CHANGE UP
SODIUM SERPL-SCNC: 130 MMOL/L — LOW (ref 135–145)
WBC # BLD: 9 K/UL — SIGNIFICANT CHANGE UP (ref 3.8–10.5)
WBC # FLD AUTO: 9 K/UL — SIGNIFICANT CHANGE UP (ref 3.8–10.5)

## 2017-10-01 PROCEDURE — 71010: CPT | Mod: 26

## 2017-10-01 PROCEDURE — 93010 ELECTROCARDIOGRAM REPORT: CPT

## 2017-10-01 PROCEDURE — 99233 SBSQ HOSP IP/OBS HIGH 50: CPT

## 2017-10-01 RX ORDER — CEFTAROLINE FOSAMIL 600 MG/20ML
200 POWDER, FOR SOLUTION INTRAVENOUS EVERY 12 HOURS
Qty: 0 | Refills: 0 | Status: DISCONTINUED | OUTPATIENT
Start: 2017-10-01 | End: 2017-10-05

## 2017-10-01 RX ORDER — CEFTAROLINE FOSAMIL 600 MG/20ML
200 POWDER, FOR SOLUTION INTRAVENOUS EVERY 12 HOURS
Qty: 0 | Refills: 0 | Status: DISCONTINUED | OUTPATIENT
Start: 2017-10-01 | End: 2017-10-01

## 2017-10-01 RX ORDER — CEFTAROLINE FOSAMIL 600 MG/20ML
200 POWDER, FOR SOLUTION INTRAVENOUS ONCE
Qty: 0 | Refills: 0 | Status: COMPLETED | OUTPATIENT
Start: 2017-10-01 | End: 2017-10-01

## 2017-10-01 RX ORDER — CHLORHEXIDINE GLUCONATE 213 G/1000ML
1 SOLUTION TOPICAL ONCE
Qty: 0 | Refills: 0 | Status: COMPLETED | OUTPATIENT
Start: 2017-10-01 | End: 2017-10-01

## 2017-10-01 RX ORDER — OXYCODONE AND ACETAMINOPHEN 5; 325 MG/1; MG/1
1 TABLET ORAL ONCE
Qty: 0 | Refills: 0 | Status: DISCONTINUED | OUTPATIENT
Start: 2017-10-01 | End: 2017-10-01

## 2017-10-01 RX ORDER — OXYCODONE AND ACETAMINOPHEN 5; 325 MG/1; MG/1
1 TABLET ORAL EVERY 6 HOURS
Qty: 0 | Refills: 0 | Status: DISCONTINUED | OUTPATIENT
Start: 2017-10-01 | End: 2017-10-02

## 2017-10-01 RX ORDER — CHLORHEXIDINE GLUCONATE 213 G/1000ML
10 SOLUTION TOPICAL ONCE
Qty: 0 | Refills: 0 | Status: DISCONTINUED | OUTPATIENT
Start: 2017-10-01 | End: 2017-10-02

## 2017-10-01 RX ADMIN — GABAPENTIN 300 MILLIGRAM(S): 400 CAPSULE ORAL at 22:35

## 2017-10-01 RX ADMIN — OXYCODONE AND ACETAMINOPHEN 1 TABLET(S): 5; 325 TABLET ORAL at 17:53

## 2017-10-01 RX ADMIN — GABAPENTIN 100 MILLIGRAM(S): 400 CAPSULE ORAL at 05:20

## 2017-10-01 RX ADMIN — OXYCODONE AND ACETAMINOPHEN 1 TABLET(S): 5; 325 TABLET ORAL at 09:48

## 2017-10-01 RX ADMIN — SODIUM CHLORIDE 3 MILLILITER(S): 9 INJECTION INTRAMUSCULAR; INTRAVENOUS; SUBCUTANEOUS at 13:51

## 2017-10-01 RX ADMIN — OXYCODONE AND ACETAMINOPHEN 1 TABLET(S): 5; 325 TABLET ORAL at 18:26

## 2017-10-01 RX ADMIN — AMLODIPINE BESYLATE 10 MILLIGRAM(S): 2.5 TABLET ORAL at 05:19

## 2017-10-01 RX ADMIN — CEFTAROLINE FOSAMIL 50 MILLIGRAM(S): 600 POWDER, FOR SOLUTION INTRAVENOUS at 17:45

## 2017-10-01 RX ADMIN — SODIUM CHLORIDE 3 MILLILITER(S): 9 INJECTION INTRAMUSCULAR; INTRAVENOUS; SUBCUTANEOUS at 05:21

## 2017-10-01 RX ADMIN — Medication 25 MILLIGRAM(S): at 22:35

## 2017-10-01 RX ADMIN — Medication 0.2 MILLIGRAM(S): at 13:51

## 2017-10-01 RX ADMIN — SERTRALINE 25 MILLIGRAM(S): 25 TABLET, FILM COATED ORAL at 13:23

## 2017-10-01 RX ADMIN — Medication 81 MILLIGRAM(S): at 12:02

## 2017-10-01 RX ADMIN — Medication 0.2 MILLIGRAM(S): at 22:35

## 2017-10-01 RX ADMIN — CEFTAROLINE FOSAMIL 50 MILLIGRAM(S): 600 POWDER, FOR SOLUTION INTRAVENOUS at 05:42

## 2017-10-01 RX ADMIN — Medication 10 UNIT(S): at 16:55

## 2017-10-01 RX ADMIN — Medication 10: at 07:33

## 2017-10-01 RX ADMIN — SIMVASTATIN 20 MILLIGRAM(S): 20 TABLET, FILM COATED ORAL at 22:35

## 2017-10-01 RX ADMIN — Medication 0.2 MILLIGRAM(S): at 05:20

## 2017-10-01 RX ADMIN — PANTOPRAZOLE SODIUM 40 MILLIGRAM(S): 20 TABLET, DELAYED RELEASE ORAL at 07:33

## 2017-10-01 RX ADMIN — Medication 10 UNIT(S): at 12:03

## 2017-10-01 RX ADMIN — Medication 2: at 12:03

## 2017-10-01 RX ADMIN — GABAPENTIN 100 MILLIGRAM(S): 400 CAPSULE ORAL at 17:43

## 2017-10-01 RX ADMIN — SODIUM CHLORIDE 3 MILLILITER(S): 9 INJECTION INTRAMUSCULAR; INTRAVENOUS; SUBCUTANEOUS at 22:36

## 2017-10-01 RX ADMIN — OXYCODONE AND ACETAMINOPHEN 1 TABLET(S): 5; 325 TABLET ORAL at 10:04

## 2017-10-01 NOTE — PROGRESS NOTE ADULT - SUBJECTIVE AND OBJECTIVE BOX
INTERVAL HPI/OVERNIGHT EVENTS in CCU:    Patient is a 47y old female who presents with a chief complaint of TV IE / MRSA Bacteremia. (09-13-17) for TAVR tomorrow  was seen and examined today. Reports the following symptoms:    CONSTITUTIONAL:  Negative fever or chills, feels neck stiffness and pain, good appetite  EYES:  Decreased and blurry vision  CARDIOVASCULAR:  Negative for chest pain or palpitations  RESPIRATORY:  Negative for cough, wheezing, or SOB   GASTROINTESTINAL:  Negative for nausea, vomiting, diarrhea, constipation, or abdominal pain  GENITOURINARY:  Negative frequency, urgency or dysuria  NEUROLOGIC:  Left lower extremity weakness, No headache, confusion, dizziness, lightheadedness    MEDICATIONS  (STANDING):  sodium chloride 0.9% lock flush 3 milliLiter(s) IV Push every 8 hours  aspirin enteric coated 81 milliGRAM(s) Oral daily  simvastatin 20 milliGRAM(s) Oral at bedtime  insulin lispro (HumaLOG) corrective regimen sliding scale   SubCutaneous Before meals and at bedtime  dextrose 5%. 1000 milliLiter(s) (50 mL/Hr) IV Continuous <Continuous>  dextrose 50% Injectable 12.5 Gram(s) IV Push once  dextrose 50% Injectable 25 Gram(s) IV Push once  dextrose 50% Injectable 25 Gram(s) IV Push once  polyethylene glycol 3350 17 Gram(s) Oral daily  pantoprazole    Tablet 40 milliGRAM(s) Oral before breakfast  DAPTOmycin IVPB 700 milliGRAM(s) IV Intermittent every 48 hours  rifampin 600 milliGRAM(s) Oral daily  amLODIPine   Tablet 10 milliGRAM(s) Oral daily  gabapentin 100 milliGRAM(s) Oral two times a day  gabapentin 300 milliGRAM(s) Oral at bedtime  sertraline 25 milliGRAM(s) Oral daily  traZODone 25 milliGRAM(s) Oral at bedtime  insulin lispro Injectable (HumaLOG) 10 Unit(s) SubCutaneous three times a day before meals  insulin glargine Injectable (LANTUS) 20 Unit(s) SubCutaneous at bedtime  cloNIDine 0.2 milliGRAM(s) Oral every 8 hours  Ceftaroline Fosamil IVPB 200 milliGRAM(s) IV Intermittent every 12 hours    MEDICATIONS  (PRN):  dextrose Gel 1 Dose(s) Oral once PRN Blood Glucose LESS THAN 70 milliGRAM(s)/deciliter  glucagon  Injectable 1 milliGRAM(s) IntraMuscular once PRN Glucose LESS THAN 70 milligrams/deciliter  acetaminophen    Suspension. 650 milliGRAM(s) Oral every 6 hours PRN Mild Pain (1 - 3)        LABS:                        8.4    9.0   )-----------( 336      ( 01 Oct 2017 03:06 )             28.3     10-01    130<L>  |  91<L>  |  25<H>  ----------------------------<  120<H>  5.4<H>   |  28  |  3.88<H>    Ca    8.7      01 Oct 2017 03:06  Phos  5.0     10-01  Mg     2.0     10-01    TPro  7.9  /  Alb  2.5<L>  /  TBili  0.6  /  DBili  x   /  AST  18  /  ALT  11  /  AlkPhos  412<H>  10-01    Hemoglobin A1C, Whole Blood: 8.2 % (09-13-17 @ 22:51)  Hemoglobin A1C, Whole Blood: 8.2 % (08-24-17 @ 01:11)    PT/INR - ( 01 Oct 2017 03:06 )   PT: 15.7 sec;   INR: 1.40          PTT - ( 01 Oct 2017 03:06 )  PTT:36.3 sec    Thyroid Stimulating Hormone, Serum: 2.447 uIU/mL (09-13 @ 22:51)  Thyroid Stimulating Hormone, Serum: 1.251 uIU/mL (08-24 @ 01:12)      RADIOLOGY & ADDITIONAL TESTS:      Vital Signs Last 24 Hrs  T(C): 36.6 (01 Oct 2017 05:56), Max: 37.7 (30 Sep 2017 17:27)  T(F): 97.8 (01 Oct 2017 05:56), Max: 99.9 (30 Sep 2017 17:27)  HR: 74 (01 Oct 2017 07:00) (72 - 84)  BP: --  BP(mean): --  RR: 18 (01 Oct 2017 07:00) (0 - 25)  SpO2: 91% (01 Oct 2017 07:00) (91% - 100%)    CAPILLARY BLOOD GLUCOSE  142 (09-30-17 @ 16:00)  120 (09-30-17 @ 07:00)  130 (09-29-17 @ 22:00)      PHYSICAL EXAM:  Constitutional: wn/wd in NAD.   HEENT: NCAT, MMM, OP clear, no proptosis or lid retraction  Neck: Decreased ROM, No thyromegaly or palpable thyroid nodules   Respiratory: Lungs CTAB.  Cardiovascular: regular rhythm, normal S1 and S2, no audible murmurs, no peripheral edema  GI: soft, + bowel sounds, NT/ND  Neurology: LLE decreased motor strength and ROM, no tremors, DTR 2+  Skin: no visible rashes/lesions  Psychiatric: AAO x 3, depressed affect/mood.        A/P: 47yFemale admitted for TAVR. Consulted and being followed for Diabetes Type 2 (HbA1c 8.2%).     DM Type 2 well controlled on diabetic diet and intensive insulin regimen:    Please continue 20 units Lantus HS, premeal Lispro 10 units TID, and  Lispro moderate dose sliding scale 4 times daily (before meals and bedtime).  Please continue consistent carbohydrate (Diabetic) diet, FS ac & hs. Target  - 150 mg/dl.   Post-op iv Regular insulin per [facility] protocol.  Outpatient f/u with PCP.     Case discussed with attending and primary team.    Attending attestation:  I have seen and evaluated the patient at the bedside.  Nurse Practitioner/Fellow/Resident’s note reviewed, including vital signs, PE and laboratory results.  Direct personal management and extensive interpretation of the data conducted.  Assessment and recommendations as above.

## 2017-10-01 NOTE — PROGRESS NOTE ADULT - SUBJECTIVE AND OBJECTIVE BOX
Planned Date of Surgery: 10/2/17                                                                                                            Surgeon: Dr. Gee     Procedure: TVR and CABGx1     HPI:  47y [id number] year old female, PMHx HTN, HLD, DM, ESRD on HD, chronic left foot OM, known Tricuspid valve endocarditis recently admitted to [facility] under Dr. Gee and was deemed a non-surgical candidate, subsequent transferred to [facility] for medical management. Patient failed medical management with agreesive antibiotics and was found to have enlaring tricuspid vegetation on ALCIDES and was transferred back to [facility] for further evaluation. During this admission patient complaining of back/neck pain, workup revealed cervical abscess and patient is now POD#4 s/p C6 corpectomy and C5-C7 anterior plating with Dr. Kuo (orthopedics). Patient transferred to CCU post procedure. Over the weekend, patient became bradycardic HR 40s with non conducted P waves. Patient converted back to NSR and has remained hemodynamically stable since. Patient had right PICC line place and new HD catheter placed for access by IR on 9/25. Fistulagram done showing a none patent fistula. During Transfer from bed to stretcher the hemavac from the neck was pulled out. Ortho made aware and reported no intervention. Today patient is reporting right arm pain that feels like an electric shock. Chest X-ray worsening tyhis AM. Will get CT chest for concern for aspiration and CT neck with contrast to evaluate for hematoma as patient will need heparin on monday for surgery. Patient is now medically ready for surgical intervention.     PAST MEDICAL & SURGICAL HISTORY:      penicillin (Unknown)  Sular (Unknown)      MEDICATIONS  (STANDING):  sodium chloride 0.9% lock flush 3 milliLiter(s) IV Push every 8 hours  aspirin enteric coated 81 milliGRAM(s) Oral daily  simvastatin 20 milliGRAM(s) Oral at bedtime  insulin lispro (HumaLOG) corrective regimen sliding scale   SubCutaneous Before meals and at bedtime  dextrose 5%. 1000 milliLiter(s) (50 mL/Hr) IV Continuous <Continuous>  dextrose 50% Injectable 12.5 Gram(s) IV Push once  dextrose 50% Injectable 25 Gram(s) IV Push once  dextrose 50% Injectable 25 Gram(s) IV Push once  polyethylene glycol 3350 17 Gram(s) Oral daily  pantoprazole    Tablet 40 milliGRAM(s) Oral before breakfast  DAPTOmycin IVPB 700 milliGRAM(s) IV Intermittent every 48 hours  rifampin 600 milliGRAM(s) Oral daily  amLODIPine   Tablet 10 milliGRAM(s) Oral daily  gabapentin 100 milliGRAM(s) Oral two times a day  gabapentin 300 milliGRAM(s) Oral at bedtime  sertraline 25 milliGRAM(s) Oral daily  traZODone 25 milliGRAM(s) Oral at bedtime  insulin lispro Injectable (HumaLOG) 10 Unit(s) SubCutaneous three times a day before meals  insulin glargine Injectable (LANTUS) 20 Unit(s) SubCutaneous at bedtime  cloNIDine 0.2 milliGRAM(s) Oral every 8 hours  Ceftaroline Fosamil IVPB 200 milliGRAM(s) IV Intermittent every 12 hours    MEDICATIONS  (PRN):  dextrose Gel 1 Dose(s) Oral once PRN Blood Glucose LESS THAN 70 milliGRAM(s)/deciliter  glucagon  Injectable 1 milliGRAM(s) IntraMuscular once PRN Glucose LESS THAN 70 milligrams/deciliter  acetaminophen    Suspension. 650 milliGRAM(s) Oral every 6 hours PRN Mild Pain (1 - 3)      On Beta Blocker? NO / CONTRAINDICATED Reason: 2nd degree heart block  Labs:                        8.4    9.0   )-----------( 336      ( 01 Oct 2017 03:06 )             28.3     10-01    130<L>  |  91<L>  |  25<H>  ----------------------------<  120<H>  5.4<H>   |  28  |  3.88<H>    Ca    8.7      01 Oct 2017 03:06  Phos  5.0     10-01  Mg     2.0     10-01    TPro  7.9  /  Alb  2.5<L>  /  TBili  0.6  /  DBili  x   /  AST  18  /  ALT  11  /  AlkPhos  412<H>  10-01    PT/INR - ( 01 Oct 2017 03:06 )   PT: 15.7 sec;   INR: 1.40          PTT - ( 01 Oct 2017 03:06 )  PTT:36.3 sec    ABO Interpretation: O (10-01-17 @ 05:30)      CARDIAC MARKERS ( 30 Sep 2017 06:32 )  x     / x     / 20 U/L / x     / x              Hgb A1C:  8.2    EKG: < from: 12 Lead ECG (09.30.17 @ 09:06) >  **Preliminary Report**       Ventricular Rate 74 BPM    Atrial Rate 74 BPM    P-R Interval 188 ms    QRS Duration 86 ms    Q-T Interval 412 ms    QTC Calculation(Bezet) 457 ms    P Axis 7 degrees    R Axis 79 degrees    T Axis 44 degrees    Diagnosis Line Normal sinus rhythm  Low voltage QRS  Possible Anterolateral infarct , age undetermined    < end of copied text >      CXR: < from: Xray Chest 1 View AP -PORTABLE-Routine (10.01.17 @ 04:07) >  INTERPRETATION:  Clinical History: Follow-up    Portable examination the chest demonstrates change lung pathology in   comparison to prior examination of the chest 9/30/2017. No interval   change position remaining support devices.    Impression: No interval change    < end of copied text >      CT Scans: < from: CT Neck Soft Tissue w/ IV Cont (09.30.17 @ 18:48) >  IMPRESSION:  1. Mediastinal lymphadenopathy measuring up to 2.2 cm in short axis.    2. Incompletely visualized right pleural effusion, at least part of which   appears loculated.    3. There are multiple irregular nodular consolidations in both upper   lobes which could represent  atypical pulmonary infection or neoplasm.    4. Mild inflammation adjacent to the right lobe of the thyroid gland and   inferior right  sternocleidomastoid muscle which could represent postsurgical change or   cellulitis. No soft tissue gas  or abscess.    5. Atherosclerosis of the common carotid arteries, carotid bulbs,   proximal internal carotid arteries  bilaterally, without significant luminal narrowing.    Thank you for allowing us to participate in the care of your patient.    Dictated and Authenticated by: Darron Waterman MD  10/01/2017 1:56 AM Eastern Time ([place] & [place])    The above report was submitted by the [facility] attending radiologist and   copied to [employer] by residentCasalduc, MD.    < end of copied text >      < from: CT Chest No Cont (09.30.17 @ 18:47) >  IMPRESSION:  1. Small pericardial effusion.    2. Small bilateral pleural effusions, at least part of which appears   loculated on the right.    3. Multifocal irregular nodular consolidations in both lungs, overall   improved compared to the prior  study, compatible with resolving pulmonary infection/septic emboli.    4.Indeterminate T5 vertebral body sclerotic lesion.    5. Hepatosplenomegaly.    Thank you for allowing us to participate in the care of your patient.    Dictated and Authenticated by: Darron Waterman MD  10/01/2017 2:00 AM Eastern Time ([place] & [place])    The above report was submitted by the [facility] attending radiologist and   copied to [employer] by resident  Casalduc, MD.    < end of copied text >      Cath Report: RCA 75% mid and distal     Echo: < from: Echocardiogram (09.29.17 @ 13:18) >  INTERPRETATION:  Patient Height: 178.0 cm  Patient Weight: 85.0 kg  Systolic Pressure: 144 mmHg  Diastolic Pressure: 55 mmHg  BSA: 2.0 m^2  Interpretation Summary  The left atrial size is normal.The right atrium is borderline   dilated.Structurally normal aortic valve with no  aortic regurgitation or   hemodynamically significant valvular aortic stenosis.  Structurally   normal   mitral valve with no mitral regurgitation noted.There is a 2.0 x 1.1 cm   independently mobile echodensity attached to atrial side of the tricuspid   valve leaflet. Another 1.3 x 0.8 cm echodensity is visualized attached to   right atrial wall. These findingsare most likely consistent with   vegetations.   There is severe tricuspid regurgitation. The pulmonary artery systolic   pressure is estimated to be 45 mmHg.  Structurally normal pulmonic valve   with   no pulmonic regurgitation noted.The right ventricle is normal in size and   function.There is moderate concentric left ventricular hypertrophy.The   left   ventricular wall motion is normal.The left ventricular ejection fraction   is   estimated to be 55-60%. Indeterminate LV diastolic dysfunction.  No   aortic   root dilatation.A small pericardial effusion noted.Compared to prior   study   performed on 9/20/17, there is no significant change.    < end of copied text >      Carotid Duplex: < from: US Duplex Carotid Arteries Complete, Bilateral (08.24.17 @ 13:59) >  IMPRESSION:  No evidence of hemodynamically significant stenosis in the visualized   internal carotid and common carotid arteries..    Elevated velocity in the left ECA consistent with moderate stenosis.    < end of copied text >      Consult in Chart?  YES   Consent in Chart? YES   Pre-op Orders Placed? YES   Blood Prodeucts Ordered? YES   NPO ordered? YES

## 2017-10-01 NOTE — PROGRESS NOTE ADULT - NEUROLOGICAL DETAILS
responds to verbal commands/alert and oriented x 3
responds to verbal commands/alert and oriented x 3

## 2017-10-01 NOTE — PROGRESS NOTE ADULT - CONSTITUTIONAL DETAILS
well-nourished/well-groomed/well-developed
cachectic/well-groomed/well-developed
well-developed/well-groomed/well-nourished

## 2017-10-01 NOTE — PROGRESS NOTE ADULT - ASSESSMENT
Patient is a 47 year old female with a history of hypertension, hyperlipidemia, DM II, ESRD on HD M/W/F, endocarditis, thrombus in the right heart, hypertension, diabetes, and hyperlipidemia whom presented to the hospital from [facility].

## 2017-10-01 NOTE — PROGRESS NOTE ADULT - SUBJECTIVE AND OBJECTIVE BOX
Patient seen and examined at bedside. Patient is a 47 year old female with a history of hypertension, hyperlipidemia, DM II, ESRD on HD M/W/F, endocarditis, thrombus in the right heart, hypertension, diabetes, and hyperlipidemia whom presented to the hospital from [facility]. Patient was last dialyzed 9/29 with UF of 3.2L. Patient denies any shortness of breath, nausea or vomiting.     sodium chloride 0.9% lock flush 3 milliLiter(s) every 8 hours  aspirin enteric coated 81 milliGRAM(s) daily  simvastatin 20 milliGRAM(s) at bedtime  insulin lispro (HumaLOG) corrective regimen sliding scale   Before meals and at bedtime  dextrose 5%. 1000 milliLiter(s) <Continuous>  dextrose Gel 1 Dose(s) once PRN  dextrose 50% Injectable 12.5 Gram(s) once  dextrose 50% Injectable 25 Gram(s) once  dextrose 50% Injectable 25 Gram(s) once  glucagon  Injectable 1 milliGRAM(s) once PRN  polyethylene glycol 3350 17 Gram(s) daily  pantoprazole    Tablet 40 milliGRAM(s) before breakfast  acetaminophen    Suspension. 650 milliGRAM(s) every 6 hours PRN  DAPTOmycin IVPB 700 milliGRAM(s) every 48 hours  rifampin 600 milliGRAM(s) daily  amLODIPine   Tablet 10 milliGRAM(s) daily  gabapentin 100 milliGRAM(s) two times a day  gabapentin 300 milliGRAM(s) at bedtime  sertraline 25 milliGRAM(s) daily  traZODone 25 milliGRAM(s) at bedtime  insulin lispro Injectable (HumaLOG) 10 Unit(s) three times a day before meals  insulin glargine Injectable (LANTUS) 20 Unit(s) at bedtime  cloNIDine 0.2 milliGRAM(s) every 8 hours  Ceftaroline Fosamil IVPB 200 milliGRAM(s) every 12 hours  chlorhexidine 4% Liquid 1 Application(s) once  chlorhexidine 0.12% Liquid 10 milliLiter(s) once    Allergies    penicillin (Unknown)  Sular (Unknown)    Intolerances    T(C): , Max: 37.7 (09-30-17 @ 17:27)  T(F): , Max: 99.9 (09-30-17 @ 17:27)  HR: 90 (10-01-17 @ 14:15)  BP: 167/74  RR: 31 (10-01-17 @ 14:15)  SpO2: 93% (10-01-17 @ 14:15)    09-30 @ 07:01  -  10-01 @ 07:00  --------------------------------------------------------  IN:    IV PiggyBack: 100 mL    Oral Fluid: 300 mL  Total IN: 400 mL    OUT:    Voided: 475 mL  Total OUT: 475 mL    Total NET: -75 mL    10-01 @ 07:01  -  10-01 @ 14:57  --------------------------------------------------------  IN:    Oral Fluid: 300 mL  Total IN: 300 mL    OUT:    Voided: 100 mL  Total OUT: 100 mL    Total NET: 200 mL    LABS:                        8.4    9.0   )-----------( 336      ( 01 Oct 2017 03:06 )             28.3     10-01    130<L>  |  91<L>  |  25<H>  ----------------------------<  120<H>  5.4<H>   |  28  |  3.88<H>    Ca    8.7      01 Oct 2017 03:06  Phos  5.0     10-01  Mg     2.0     10-01    TPro  7.9  /  Alb  2.5<L>  /  TBili  0.6  /  DBili  x   /  AST  18  /  ALT  11  /  AlkPhos  412<H>  10-01      PT/INR - ( 01 Oct 2017 03:06 )   PT: 15.7 sec;   INR: 1.40          PTT - ( 01 Oct 2017 03:06 )  PTT:36.3 sec          RADIOLOGY & ADDITIONAL STUDIES:

## 2017-10-01 NOTE — PROGRESS NOTE ADULT - ATTENDING COMMENTS
Patient examined, fellow's hx and PE reviewed and confirmed. I find stable on dialysis. HTN stable. A/P reviewed and confirmed. Continue dialysis. Continue BP meds. See full note.

## 2017-10-02 ENCOUNTER — RESULT REVIEW (OUTPATIENT)
Age: 47
End: 2017-10-02

## 2017-10-02 ENCOUNTER — APPOINTMENT (OUTPATIENT)
Dept: CARDIOTHORACIC SURGERY | Facility: HOSPITAL | Age: 47
End: 2017-10-02
Payer: MEDICAID

## 2017-10-02 LAB
ALBUMIN SERPL ELPH-MCNC: 2 G/DL — LOW (ref 3.3–5)
ALBUMIN SERPL ELPH-MCNC: 2.4 G/DL — LOW (ref 3.3–5)
ALP SERPL-CCNC: 344 U/L — HIGH (ref 40–120)
ALP SERPL-CCNC: 428 U/L — HIGH (ref 40–120)
ALT FLD-CCNC: 11 U/L — SIGNIFICANT CHANGE UP (ref 10–45)
ALT FLD-CCNC: 13 U/L — SIGNIFICANT CHANGE UP (ref 10–45)
ANION GAP SERPL CALC-SCNC: 11 MMOL/L — SIGNIFICANT CHANGE UP (ref 5–17)
ANION GAP SERPL CALC-SCNC: 12 MMOL/L — SIGNIFICANT CHANGE UP (ref 5–17)
ANION GAP SERPL CALC-SCNC: 17 MMOL/L — SIGNIFICANT CHANGE UP (ref 5–17)
APTT BLD: 39.6 SEC — HIGH (ref 27.5–37.4)
APTT BLD: 45.3 SEC — HIGH (ref 27.5–37.4)
AST SERPL-CCNC: 20 U/L — SIGNIFICANT CHANGE UP (ref 10–40)
AST SERPL-CCNC: 34 U/L — SIGNIFICANT CHANGE UP (ref 10–40)
BASE EXCESS BLDA CALC-SCNC: 3.9 MMOL/L — HIGH (ref -2–3)
BASOPHILS NFR BLD AUTO: 1.1 % — SIGNIFICANT CHANGE UP (ref 0–2)
BILIRUB SERPL-MCNC: 0.7 MG/DL — SIGNIFICANT CHANGE UP (ref 0.2–1.2)
BILIRUB SERPL-MCNC: 1.4 MG/DL — HIGH (ref 0.2–1.2)
BUN SERPL-MCNC: 15 MG/DL — SIGNIFICANT CHANGE UP (ref 7–23)
BUN SERPL-MCNC: 17 MG/DL — SIGNIFICANT CHANGE UP (ref 7–23)
BUN SERPL-MCNC: 34 MG/DL — HIGH (ref 7–23)
CA-I BLDA-SCNC: 1.1 MMOL/L — LOW (ref 1.12–1.3)
CALCIUM SERPL-MCNC: 8.5 MG/DL — SIGNIFICANT CHANGE UP (ref 8.4–10.5)
CALCIUM SERPL-MCNC: 8.6 MG/DL — SIGNIFICANT CHANGE UP (ref 8.4–10.5)
CALCIUM SERPL-MCNC: 8.7 MG/DL — SIGNIFICANT CHANGE UP (ref 8.4–10.5)
CHLORIDE SERPL-SCNC: 87 MMOL/L — LOW (ref 96–108)
CHLORIDE SERPL-SCNC: 90 MMOL/L — LOW (ref 96–108)
CHLORIDE SERPL-SCNC: 96 MMOL/L — SIGNIFICANT CHANGE UP (ref 96–108)
CO2 SERPL-SCNC: 20 MMOL/L — LOW (ref 22–31)
CO2 SERPL-SCNC: 26 MMOL/L — SIGNIFICANT CHANGE UP (ref 22–31)
CO2 SERPL-SCNC: 27 MMOL/L — SIGNIFICANT CHANGE UP (ref 22–31)
COHGB MFR BLDA: 0.9 % — SIGNIFICANT CHANGE UP
CREAT SERPL-MCNC: 2.61 MG/DL — HIGH (ref 0.5–1.3)
CREAT SERPL-MCNC: 2.64 MG/DL — HIGH (ref 0.5–1.3)
CREAT SERPL-MCNC: 4.64 MG/DL — HIGH (ref 0.5–1.3)
EOSINOPHIL NFR BLD AUTO: 2.7 % — SIGNIFICANT CHANGE UP (ref 0–6)
GAS PNL BLDA: SIGNIFICANT CHANGE UP
GLUCOSE SERPL-MCNC: 102 MG/DL — HIGH (ref 70–99)
GLUCOSE SERPL-MCNC: 163 MG/DL — HIGH (ref 70–99)
GLUCOSE SERPL-MCNC: 65 MG/DL — LOW (ref 70–99)
HCG SERPL-ACNC: 2.4 MIU/ML — SIGNIFICANT CHANGE UP
HCG SERPL-ACNC: 2.6 MIU/ML — SIGNIFICANT CHANGE UP
HCO3 BLDA-SCNC: 29 MMOL/L — HIGH (ref 21–28)
HCT VFR BLD CALC: 27.3 % — LOW (ref 34.5–45)
HCT VFR BLD CALC: 28.1 % — LOW (ref 34.5–45)
HGB BLD-MCNC: 8.2 G/DL — LOW (ref 11.5–15.5)
HGB BLD-MCNC: 8.2 G/DL — LOW (ref 11.5–15.5)
HGB BLDA-MCNC: 10.2 G/DL — LOW (ref 11.5–15.5)
INR BLD: 1.4 — HIGH (ref 0.88–1.16)
INR BLD: 1.7 — HIGH (ref 0.88–1.16)
LACTATE SERPL-SCNC: 1.3 MMOL/L — SIGNIFICANT CHANGE UP (ref 0.5–2)
LYMPHOCYTES # BLD AUTO: 11.3 % — LOW (ref 13–44)
MAGNESIUM SERPL-MCNC: 2.1 MG/DL — SIGNIFICANT CHANGE UP (ref 1.6–2.6)
MAGNESIUM SERPL-MCNC: 2.8 MG/DL — HIGH (ref 1.6–2.6)
MCHC RBC-ENTMCNC: 25.1 PG — LOW (ref 27–34)
MCHC RBC-ENTMCNC: 25.3 PG — LOW (ref 27–34)
MCHC RBC-ENTMCNC: 29.2 G/DL — LOW (ref 32–36)
MCHC RBC-ENTMCNC: 30 G/DL — LOW (ref 32–36)
MCV RBC AUTO: 84.3 FL — SIGNIFICANT CHANGE UP (ref 80–100)
MCV RBC AUTO: 85.9 FL — SIGNIFICANT CHANGE UP (ref 80–100)
METHGB MFR BLDA: 0.4 % — SIGNIFICANT CHANGE UP
MONOCYTES NFR BLD AUTO: 9.4 % — SIGNIFICANT CHANGE UP (ref 2–14)
NEUTROPHILS NFR BLD AUTO: 75.5 % — SIGNIFICANT CHANGE UP (ref 43–77)
O2 CT VFR BLDA CALC: 15 ML/DL — SIGNIFICANT CHANGE UP (ref 15–23)
OXYHGB MFR BLDA: 98 % — SIGNIFICANT CHANGE UP (ref 94–100)
PCO2 BLDA: 45 MMHG — SIGNIFICANT CHANGE UP (ref 32–45)
PH BLDA: 7.43 — SIGNIFICANT CHANGE UP (ref 7.35–7.45)
PHOSPHATE SERPL-MCNC: 4.8 MG/DL — HIGH (ref 2.5–4.5)
PHOSPHATE SERPL-MCNC: 5.2 MG/DL — HIGH (ref 2.5–4.5)
PLATELET # BLD AUTO: 296 K/UL — SIGNIFICANT CHANGE UP (ref 150–400)
PLATELET # BLD AUTO: 311 K/UL — SIGNIFICANT CHANGE UP (ref 150–400)
PO2 BLDA: 330 MMHG — HIGH (ref 83–108)
POTASSIUM BLDA-SCNC: 4.2 MMOL/L — SIGNIFICANT CHANGE UP (ref 3.5–4.9)
POTASSIUM SERPL-MCNC: 4.3 MMOL/L — SIGNIFICANT CHANGE UP (ref 3.5–5.3)
POTASSIUM SERPL-MCNC: 5.1 MMOL/L — SIGNIFICANT CHANGE UP (ref 3.5–5.3)
POTASSIUM SERPL-MCNC: 5.8 MMOL/L — HIGH (ref 3.5–5.3)
POTASSIUM SERPL-SCNC: 4.3 MMOL/L — SIGNIFICANT CHANGE UP (ref 3.5–5.3)
POTASSIUM SERPL-SCNC: 5.1 MMOL/L — SIGNIFICANT CHANGE UP (ref 3.5–5.3)
POTASSIUM SERPL-SCNC: 5.8 MMOL/L — HIGH (ref 3.5–5.3)
PROT SERPL-MCNC: 7.1 G/DL — SIGNIFICANT CHANGE UP (ref 6–8.3)
PROT SERPL-MCNC: 8 G/DL — SIGNIFICANT CHANGE UP (ref 6–8.3)
PROTHROM AB SERPL-ACNC: 15.7 SEC — HIGH (ref 9.8–12.7)
PROTHROM AB SERPL-ACNC: 19.1 SEC — HIGH (ref 9.8–12.7)
RBC # BLD: 3.24 M/UL — LOW (ref 3.8–5.2)
RBC # BLD: 3.27 M/UL — LOW (ref 3.8–5.2)
RBC # FLD: 16.3 % — SIGNIFICANT CHANGE UP (ref 10.3–16.9)
RBC # FLD: 16.4 % — SIGNIFICANT CHANGE UP (ref 10.3–16.9)
SAO2 % BLDA: 100 % — SIGNIFICANT CHANGE UP (ref 95–100)
SODIUM BLDA-SCNC: 133 MMOL/L — LOW (ref 138–146)
SODIUM SERPL-SCNC: 124 MMOL/L — LOW (ref 135–145)
SODIUM SERPL-SCNC: 127 MMOL/L — LOW (ref 135–145)
SODIUM SERPL-SCNC: 135 MMOL/L — SIGNIFICANT CHANGE UP (ref 135–145)
WBC # BLD: 22.2 K/UL — HIGH (ref 3.8–10.5)
WBC # BLD: 9.3 K/UL — SIGNIFICANT CHANGE UP (ref 3.8–10.5)
WBC # FLD AUTO: 22.2 K/UL — HIGH (ref 3.8–10.5)
WBC # FLD AUTO: 9.3 K/UL — SIGNIFICANT CHANGE UP (ref 3.8–10.5)

## 2017-10-02 PROCEDURE — 94010 BREATHING CAPACITY TEST: CPT | Mod: 26

## 2017-10-02 PROCEDURE — 71010: CPT | Mod: 26

## 2017-10-02 PROCEDURE — 99232 SBSQ HOSP IP/OBS MODERATE 35: CPT | Mod: GC

## 2017-10-02 PROCEDURE — 99291 CRITICAL CARE FIRST HOUR: CPT

## 2017-10-02 PROCEDURE — 93010 ELECTROCARDIOGRAM REPORT: CPT

## 2017-10-02 PROCEDURE — 33465 REPLACE TRICUSPID VALVE: CPT | Mod: 80

## 2017-10-02 PROCEDURE — 93010 ELECTROCARDIOGRAM REPORT: CPT | Mod: 77

## 2017-10-02 PROCEDURE — 33465 REPLACE TRICUSPID VALVE: CPT

## 2017-10-02 PROCEDURE — 71010: CPT | Mod: 26,77

## 2017-10-02 PROCEDURE — 90937 HEMODIALYSIS REPEATED EVAL: CPT | Mod: GC

## 2017-10-02 PROCEDURE — 99292 CRITICAL CARE ADDL 30 MIN: CPT

## 2017-10-02 RX ORDER — ACETAMINOPHEN 500 MG
1000 TABLET ORAL ONCE
Qty: 0 | Refills: 0 | Status: COMPLETED | OUTPATIENT
Start: 2017-10-02 | End: 2017-10-05

## 2017-10-02 RX ORDER — FAMOTIDINE 10 MG/ML
20 INJECTION INTRAVENOUS DAILY
Qty: 0 | Refills: 0 | Status: DISCONTINUED | OUTPATIENT
Start: 2017-10-02 | End: 2017-10-03

## 2017-10-02 RX ORDER — DEXTROSE 50 % IN WATER 50 %
25 SYRINGE (ML) INTRAVENOUS ONCE
Qty: 0 | Refills: 0 | Status: COMPLETED | OUTPATIENT
Start: 2017-10-02 | End: 2017-10-02

## 2017-10-02 RX ORDER — ERYTHROPOIETIN 10000 [IU]/ML
10000 INJECTION, SOLUTION INTRAVENOUS; SUBCUTANEOUS ONCE
Qty: 0 | Refills: 0 | Status: COMPLETED | OUTPATIENT
Start: 2017-10-02 | End: 2017-10-02

## 2017-10-02 RX ORDER — ACETAMINOPHEN 500 MG
1000 TABLET ORAL ONCE
Qty: 0 | Refills: 0 | Status: COMPLETED | OUTPATIENT
Start: 2017-10-02 | End: 2017-10-03

## 2017-10-02 RX ORDER — INSULIN HUMAN 100 [IU]/ML
1 INJECTION, SOLUTION SUBCUTANEOUS
Qty: 250 | Refills: 0 | Status: DISCONTINUED | OUTPATIENT
Start: 2017-10-02 | End: 2017-10-03

## 2017-10-02 RX ORDER — SODIUM CHLORIDE 9 MG/ML
1000 INJECTION, SOLUTION INTRAVENOUS
Qty: 0 | Refills: 0 | Status: DISCONTINUED | OUTPATIENT
Start: 2017-10-02 | End: 2017-10-02

## 2017-10-02 RX ORDER — NICARDIPINE HYDROCHLORIDE 30 MG/1
5 CAPSULE, EXTENDED RELEASE ORAL
Qty: 40 | Refills: 0 | Status: DISCONTINUED | OUTPATIENT
Start: 2017-10-02 | End: 2017-10-03

## 2017-10-02 RX ORDER — VASOPRESSIN 20 [USP'U]/ML
0.03 INJECTION INTRAVENOUS
Qty: 100 | Refills: 0 | Status: DISCONTINUED | OUTPATIENT
Start: 2017-10-02 | End: 2017-10-05

## 2017-10-02 RX ORDER — PROPOFOL 10 MG/ML
5 INJECTION, EMULSION INTRAVENOUS
Qty: 1000 | Refills: 0 | Status: DISCONTINUED | OUTPATIENT
Start: 2017-10-02 | End: 2017-10-03

## 2017-10-02 RX ORDER — FENTANYL CITRATE 50 UG/ML
25 INJECTION INTRAVENOUS
Qty: 0 | Refills: 0 | Status: DISCONTINUED | OUTPATIENT
Start: 2017-10-02 | End: 2017-10-03

## 2017-10-02 RX ORDER — SODIUM CHLORIDE 9 MG/ML
1000 INJECTION, SOLUTION INTRAVENOUS
Qty: 0 | Refills: 0 | Status: DISCONTINUED | OUTPATIENT
Start: 2017-10-02 | End: 2017-10-10

## 2017-10-02 RX ORDER — MEPERIDINE HYDROCHLORIDE 50 MG/ML
25 INJECTION INTRAMUSCULAR; INTRAVENOUS; SUBCUTANEOUS ONCE
Qty: 0 | Refills: 0 | Status: DISCONTINUED | OUTPATIENT
Start: 2017-10-02 | End: 2017-10-02

## 2017-10-02 RX ORDER — DEXTROSE 50 % IN WATER 50 %
50 SYRINGE (ML) INTRAVENOUS ONCE
Qty: 0 | Refills: 0 | Status: DISCONTINUED | OUTPATIENT
Start: 2017-10-02 | End: 2017-10-02

## 2017-10-02 RX ORDER — PROTAMINE SULFATE 10 MG/ML
25 AMPUL (ML) INTRAVENOUS ONCE
Qty: 0 | Refills: 0 | Status: COMPLETED | OUTPATIENT
Start: 2017-10-02 | End: 2017-10-02

## 2017-10-02 RX ORDER — KETOROLAC TROMETHAMINE 30 MG/ML
30 SYRINGE (ML) INJECTION ONCE
Qty: 0 | Refills: 0 | Status: DISCONTINUED | OUTPATIENT
Start: 2017-10-02 | End: 2017-10-03

## 2017-10-02 RX ORDER — DEXMEDETOMIDINE HYDROCHLORIDE IN 0.9% SODIUM CHLORIDE 4 UG/ML
0.3 INJECTION INTRAVENOUS
Qty: 200 | Refills: 0 | Status: DISCONTINUED | OUTPATIENT
Start: 2017-10-02 | End: 2017-10-03

## 2017-10-02 RX ORDER — HEPARIN SODIUM 5000 [USP'U]/ML
5000 INJECTION INTRAVENOUS; SUBCUTANEOUS EVERY 8 HOURS
Qty: 0 | Refills: 0 | Status: DISCONTINUED | OUTPATIENT
Start: 2017-10-02 | End: 2017-10-08

## 2017-10-02 RX ORDER — CALCITRIOL 0.5 UG/1
1 CAPSULE ORAL ONCE
Qty: 0 | Refills: 0 | Status: COMPLETED | OUTPATIENT
Start: 2017-10-02 | End: 2017-10-02

## 2017-10-02 RX ADMIN — OXYCODONE AND ACETAMINOPHEN 1 TABLET(S): 5; 325 TABLET ORAL at 06:35

## 2017-10-02 RX ADMIN — FENTANYL CITRATE 25 MICROGRAM(S): 50 INJECTION INTRAVENOUS at 22:30

## 2017-10-02 RX ADMIN — FENTANYL CITRATE 25 MICROGRAM(S): 50 INJECTION INTRAVENOUS at 23:00

## 2017-10-02 RX ADMIN — CHLORHEXIDINE GLUCONATE 1 APPLICATION(S): 213 SOLUTION TOPICAL at 00:30

## 2017-10-02 RX ADMIN — SODIUM CHLORIDE 3 MILLILITER(S): 9 INJECTION INTRAMUSCULAR; INTRAVENOUS; SUBCUTANEOUS at 22:00

## 2017-10-02 RX ADMIN — FENTANYL CITRATE 25 MICROGRAM(S): 50 INJECTION INTRAVENOUS at 22:45

## 2017-10-02 RX ADMIN — Medication 0.2 MILLIGRAM(S): at 06:32

## 2017-10-02 RX ADMIN — PROPOFOL 2.58 MICROGRAM(S)/KG/MIN: 10 INJECTION, EMULSION INTRAVENOUS at 22:46

## 2017-10-02 RX ADMIN — ERYTHROPOIETIN 10000 UNIT(S): 10000 INJECTION, SOLUTION INTRAVENOUS; SUBCUTANEOUS at 13:00

## 2017-10-02 RX ADMIN — AMLODIPINE BESYLATE 10 MILLIGRAM(S): 2.5 TABLET ORAL at 06:33

## 2017-10-02 RX ADMIN — PANTOPRAZOLE SODIUM 40 MILLIGRAM(S): 20 TABLET, DELAYED RELEASE ORAL at 06:32

## 2017-10-02 RX ADMIN — SODIUM CHLORIDE 3 MILLILITER(S): 9 INJECTION INTRAMUSCULAR; INTRAVENOUS; SUBCUTANEOUS at 06:31

## 2017-10-02 RX ADMIN — Medication 25 GRAM(S): at 16:44

## 2017-10-02 RX ADMIN — SODIUM CHLORIDE 50 MILLILITER(S): 9 INJECTION, SOLUTION INTRAVENOUS at 16:13

## 2017-10-02 RX ADMIN — GABAPENTIN 100 MILLIGRAM(S): 400 CAPSULE ORAL at 06:33

## 2017-10-02 RX ADMIN — DAPTOMYCIN 128 MILLIGRAM(S): 500 INJECTION, POWDER, LYOPHILIZED, FOR SOLUTION INTRAVENOUS at 13:28

## 2017-10-02 RX ADMIN — CEFTAROLINE FOSAMIL 50 MILLIGRAM(S): 600 POWDER, FOR SOLUTION INTRAVENOUS at 06:33

## 2017-10-02 RX ADMIN — INSULIN GLARGINE 20 UNIT(S): 100 INJECTION, SOLUTION SUBCUTANEOUS at 00:31

## 2017-10-02 RX ADMIN — Medication 25 MILLIGRAM(S): at 23:30

## 2017-10-02 RX ADMIN — OXYCODONE AND ACETAMINOPHEN 1 TABLET(S): 5; 325 TABLET ORAL at 07:22

## 2017-10-02 RX ADMIN — CALCITRIOL 1 MICROGRAM(S): 0.5 CAPSULE ORAL at 13:00

## 2017-10-02 RX ADMIN — FENTANYL CITRATE 25 MICROGRAM(S): 50 INJECTION INTRAVENOUS at 22:32

## 2017-10-02 NOTE — PROGRESS NOTE ADULT - PROBLEM SELECTOR PLAN 2
Likely due to anemia of chronic disease  Check Iron studies.  Epo to be given with dialysis treatment as per ordered.

## 2017-10-02 NOTE — PROGRESS NOTE ADULT - SUBJECTIVE AND OBJECTIVE BOX
Patient discussed on morning rounds with Dr. Gee     SUBJECTIVE ASSESSMENT:  47y Female         Vital Signs Last 24 Hrs  T(C): 36.7 (02 Oct 2017 13:05), Max: 37.2 (02 Oct 2017 09:00)  T(F): 98.1 (02 Oct 2017 13:05), Max: 98.9 (02 Oct 2017 09:00)  HR: 74 (02 Oct 2017 17:00) (66 - 981)  BP: 142/56 (02 Oct 2017 17:00) (142/56 - 155/63)  BP(mean): --  RR: 20 (02 Oct 2017 17:00) (0 - 27)  SpO2: 97% (02 Oct 2017 17:00) (91% - 100%)  I&O's Detail    01 Oct 2017 07:01  -  02 Oct 2017 07:00  --------------------------------------------------------  IN:    IV PiggyBack: 50 mL    Oral Fluid: 300 mL  Total IN: 350 mL    OUT:    Voided: 100 mL  Total OUT: 100 mL    Total NET: 250 mL      02 Oct 2017 07:01  -  02 Oct 2017 17:09  --------------------------------------------------------  IN:  Total IN: 0 mL    OUT:    Other: 3400 mL  Total OUT: 3400 mL    Total NET: -3400 mL          CHEST TUBE:  Yes/No. AIR LEAKS: Yes/No. Suction / H2O SEAL.   LOLLY DRAIN:  Yes/No.  EPICARDIAL WIRES: Yes/No.  TIE DOWNS: Yes/No.  COOK: Yes/No.    PHYSICAL EXAM:    General:     Neurological:    Cardiovascular:    Respiratory:    Gastrointestinal:    Extremities:    Vascular:    Incision Sites:    LABS:                        8.2    9.3   )-----------( 296      ( 02 Oct 2017 05:00 )             27.3       COUMADIN:  Yes/No. REASON: .    PT/INR - ( 02 Oct 2017 05:01 )   PT: 15.7 sec;   INR: 1.40          PTT - ( 02 Oct 2017 05:01 )  PTT:39.6 sec    10-02    127<L>  |  90<L>  |  34<H>  ----------------------------<  102<H>  5.8<H>   |  26  |  4.64<H>    Ca    8.6      02 Oct 2017 05:02  Phos  5.2     10-02  Mg     2.1     10-02    TPro  8.0  /  Alb  2.4<L>  /  TBili  0.7  /  DBili  x   /  AST  20  /  ALT  11  /  AlkPhos  428<H>  10-02          MEDICATIONS  (STANDING):  sodium chloride 0.9% lock flush 3 milliLiter(s) IV Push every 8 hours  aspirin enteric coated 81 milliGRAM(s) Oral daily  simvastatin 20 milliGRAM(s) Oral at bedtime  insulin lispro (HumaLOG) corrective regimen sliding scale   SubCutaneous Before meals and at bedtime  dextrose 5%. 1000 milliLiter(s) (50 mL/Hr) IV Continuous <Continuous>  dextrose 50% Injectable 12.5 Gram(s) IV Push once  dextrose 50% Injectable 25 Gram(s) IV Push once  dextrose 50% Injectable 25 Gram(s) IV Push once  polyethylene glycol 3350 17 Gram(s) Oral daily  pantoprazole    Tablet 40 milliGRAM(s) Oral before breakfast  DAPTOmycin IVPB 700 milliGRAM(s) IV Intermittent every 48 hours  rifampin 600 milliGRAM(s) Oral daily  amLODIPine   Tablet 10 milliGRAM(s) Oral daily  gabapentin 100 milliGRAM(s) Oral two times a day  gabapentin 300 milliGRAM(s) Oral at bedtime  sertraline 25 milliGRAM(s) Oral daily  traZODone 25 milliGRAM(s) Oral at bedtime  insulin lispro Injectable (HumaLOG) 10 Unit(s) SubCutaneous three times a day before meals  insulin glargine Injectable (LANTUS) 20 Unit(s) SubCutaneous at bedtime  cloNIDine 0.2 milliGRAM(s) Oral every 8 hours  Ceftaroline Fosamil IVPB 200 milliGRAM(s) IV Intermittent every 12 hours  chlorhexidine 0.12% Liquid 10 milliLiter(s) Swish and Spit once  dextrose 5%. 1000 milliLiter(s) (50 mL/Hr) IV Continuous <Continuous>  dextrose 5%. 1000 milliLiter(s) (50 mL/Hr) IV Continuous <Continuous>  dextrose 50% Injectable 25 Gram(s) IV Push once    MEDICATIONS  (PRN):  dextrose Gel 1 Dose(s) Oral once PRN Blood Glucose LESS THAN 70 milliGRAM(s)/deciliter  glucagon  Injectable 1 milliGRAM(s) IntraMuscular once PRN Glucose LESS THAN 70 milligrams/deciliter  acetaminophen    Suspension. 650 milliGRAM(s) Oral every 6 hours PRN Mild Pain (1 - 3)  oxyCODONE    5 mG/acetaminophen 325 mG 1 Tablet(s) Oral every 6 hours PRN Severe Pain (7 - 10)        RADIOLOGY & ADDITIONAL TESTS: Patient discussed on morning rounds with Dr. Gee     SUBJECTIVE ASSESSMENT:  Patient seen this morning at bedside, denies any chest pain, shortness of breath or palpitations. States she is ready for OR today.     Vital Signs Last 24 Hrs  T(C): 36.7 (02 Oct 2017 13:05), Max: 37.2 (02 Oct 2017 09:00)  T(F): 98.1 (02 Oct 2017 13:05), Max: 98.9 (02 Oct 2017 09:00)  HR: 74 (02 Oct 2017 17:00) (66 - 981)  BP: 142/56 (02 Oct 2017 17:00) (142/56 - 155/63)  BP(mean): --  RR: 20 (02 Oct 2017 17:00) (0 - 27)  SpO2: 97% (02 Oct 2017 17:00) (91% - 100%)  I&O's Detail    01 Oct 2017 07:01  -  02 Oct 2017 07:00  --------------------------------------------------------  IN:    IV PiggyBack: 50 mL    Oral Fluid: 300 mL  Total IN: 350 mL    OUT:    Voided: 100 mL  Total OUT: 100 mL    Total NET: 250 mL      02 Oct 2017 07:01  -  02 Oct 2017 17:09  --------------------------------------------------------  IN:  Total IN: 0 mL    OUT:    Other: 3400 mL  Total OUT: 3400 mL    Total NET: -3400 mL    CHEST TUBE:  No  OLLLY DRAIN:  No  EPICARDIAL WIRES: No  TIE DOWNS: No  COOK: No    PHYSICAL EXAM:    General: Patient lying comfortably in bed, no acute distress     Neurological: Alert and oriented. No focal neurological deficits.     Cardiovascular: S1S2, RRR, no murmurs appreciated on exam     Respiratory:  Poor inspiratory effort 2/2 neck pain while sitting up. Diminished breath sounds bilaterally. with scattered crackles at left base.     Gastrointestinal:  Abdomen soft, non tender, non distended     Extremities: Warm and well perfused, No edema or calf tenderness bilaterally   Anterior cervical spine dressing in place, dressing C/D/I  L foot + chronic fissure on L medial ankle, no drainage or tenderness     Vascular: peripheral pulses 2+ bilaterally   Left tunneled HD catheter   Right PICC line     LABS:                        8.2    9.3   )-----------( 296      ( 02 Oct 2017 05:00 )             27.3       COUMADIN:  No    PT/INR - ( 02 Oct 2017 05:01 )   PT: 15.7 sec;   INR: 1.40          PTT - ( 02 Oct 2017 05:01 )  PTT:39.6 sec    10-02    127<L>  |  90<L>  |  34<H>  ----------------------------<  102<H>  5.8<H>   |  26  |  4.64<H>    Ca    8.6      02 Oct 2017 05:02  Phos  5.2     10-02  Mg     2.1     10-02    TPro  8.0  /  Alb  2.4<L>  /  TBili  0.7  /  DBili  x   /  AST  20  /  ALT  11  /  AlkPhos  428<H>  10-02    MEDICATIONS  (STANDING):  sodium chloride 0.9% lock flush 3 milliLiter(s) IV Push every 8 hours  aspirin enteric coated 81 milliGRAM(s) Oral daily  simvastatin 20 milliGRAM(s) Oral at bedtime  insulin lispro (HumaLOG) corrective regimen sliding scale   SubCutaneous Before meals and at bedtime  polyethylene glycol 3350 17 Gram(s) Oral daily  pantoprazole    Tablet 40 milliGRAM(s) Oral before breakfast  DAPTOmycin IVPB 700 milliGRAM(s) IV Intermittent every 48 hours  rifampin 600 milliGRAM(s) Oral daily  amLODIPine   Tablet 10 milliGRAM(s) Oral daily  gabapentin 100 milliGRAM(s) Oral two times a day  gabapentin 300 milliGRAM(s) Oral at bedtime  sertraline 25 milliGRAM(s) Oral daily  traZODone 25 milliGRAM(s) Oral at bedtime  insulin lispro Injectable (HumaLOG) 10 Unit(s) SubCutaneous three times a day before meals  insulin glargine Injectable (LANTUS) 20 Unit(s) SubCutaneous at bedtime  cloNIDine 0.2 milliGRAM(s) Oral every 8 hours  Ceftaroline Fosamil IVPB 200 milliGRAM(s) IV Intermittent every 12 hours  chlorhexidine 0.12% Liquid 10 milliLiter(s) Swish and Spit once    MEDICATIONS  (PRN):  dextrose Gel 1 Dose(s) Oral once PRN Blood Glucose LESS THAN 70 milliGRAM(s)/deciliter  glucagon  Injectable 1 milliGRAM(s) IntraMuscular once PRN Glucose LESS THAN 70 milligrams/deciliter  acetaminophen    Suspension. 650 milliGRAM(s) Oral every 6 hours PRN Mild Pain (1 - 3)  oxyCODONE    5 mG/acetaminophen 325 mG 1 Tablet(s) Oral every 6 hours PRN Severe Pain (7 - 10)        RADIOLOGY & ADDITIONAL TESTS:  10/2/17 CXR: < from: Xray Chest 1 View AP -PORTABLE-Routine (10.02.17 @ 05:52) >  Bilateral, multifocal opacities are stable. Small bilateral   pleural effusions.    < end of copied text >    A/P: 47 year old female, PMHx HTN, HLD, DM, ESRD on HD, chronic left foot OM, known Tricuspid valve endocarditis recently admitted to [facility] under Dr. Gee and was deemed a non-surgical candidate, subsequent transferred to [facility] for medical management. Patient failed medical management with agreesive antibiotics and was found to have enlaring tricuspid vegetation on ALCIDES and was transferred back to [facility] for further evaluation. During this admission patient complaining of back/neck pain, workup revealed cervical abscess and patient is s/p C6 corpectomy and C5-C7 anterior plating with Dr. Kuo (orthopedics) on 9/22. Patient transferred to CCU post procedure. Over the weekend, patient became bradycardic HR 40s with non conducted P waves. Patient converted back to NSR and has remained hemodynamically stable since. Patient had right PICC line place and new HD catheter placed for access by IR on 9/25. No acute events overnight.     Neurovascular: Stable.   - Last admission CT head findings concerning for evolving right MCA infarct, this admission patient complaining of LLE weakness. Neurology Dr. Luna following appreciate recs. MRI head no acute pathology. LLE weakness improving today .  - MRI cervical/thoracic/lumbar spine for neck/back pain revealed cervical OM with epidural abscess s/p C6 Corpectomy and C5-C7 anterior plating with Dr. Kuo on 9/22. Appreciate ortho recs.   - continue toradol and tylenol PRN for pain   - continue gabapentin 100mg BID and 300mg qhs for LLE weakness and pain   - continue sertraline 25mg daily for depression   - continue trazodone 25mg qhs for insomnia     Cardiovascular: Hemodynamically stable. HR controlled.  - MRSA Endocarditis, preoperative for TVR with Dr. Gee today, as per Dr. Kuo cleared for full heparin (30,000 units in OR) for Friday 9/30. Preoperative workup completed.  Cardiac cath revealed RCA lesion patient will get CABG x1 with TVR. Continue asa 81mg    - SVC/RA thrombus found on previous admission, heparin drip held 2/2 orthopedic surgery at risk for epidural hematoma.   - 2nd degree HB 9/23 AM, pacing pads in place with TVP kit in room. Metoprolol discontinued. Continue to monitor rhythm   - Hx of HLD, continue simvastatin 20mg qhs   - Hx of HTN, continue amlodipine 10mg and clonidine 0.2mg q8.     Respiratory: 02 Sat = 97% on RA  - Encourage ambulation and Use of IS 10x / hr while awake.  - CXR stable,    GI: Stable.  - Continue PO diet   - Continue protonix 40mg for GI ppx   - continue bowel regimen     Renal / :  ESRD on HD, Cr 4.64  - K 5.8 this AM, no EKG changes. Patient to get HD this AM prior to OR   - Renal following, HD today. Continue to appreciate recs    - Hyponatremic, continue 1L fluid restriction   - Monitor I/O's.    Endocrine:  DM, A1C 8.2 TSH 2.447  - Endocrine following, continue Lantus 20u qhs and 10u premeal. pateint hypoglycemic this afternoon, given 1 amp dextrose prior to OR.    Will continue to monitor fingersticks  - Continue insulin sliding scale      Vascular:   - IR fistulogram done on 9/28 negative for mature AV fistula, continue to use HD catheter via Left sided tunnel cath     Hematologic: SVC/RA clot  - Heparin drip held 2/2 risk of epidural hematoma follow ortho surgery  - Anemia 2/2 ESRD, continue to trend H/H     ID:  MRSA Endocarditis   - Dr. Kingston following as ID, continue daptomycin 700mg q48 following HD, rifampin 600mg and ceftaroline 200mg BID. Need weekly CPK and CMP, last done 9/30  - Blood cultures from 9/19 NGTD. Blood cultures sent 9/23 and 9/25 negative thus far.   - Chronic OM of L foot, vascular/ortho following. Gallium scan completed with no obvious source of MRSA bacteremia. No further intervention needed.   - Observe for SIRS/Sepsis Syndrome.    Prophylaxis: holding heparin 2/2 risk of epidural hematoma.   -SCD's    Disposition: OR today

## 2017-10-02 NOTE — PROGRESS NOTE ADULT - SUBJECTIVE AND OBJECTIVE BOX
Patient was seen and evaluated on dialysis.   Patient is tolerating the procedure well.   HR: 66 (10-02-17 @ 11:00)  BP: 142/88 mmhg   Continue dialysis:   Dialyzer:    180 Optiflux      QB:  400      QD: 500 2K+  Goal UF 3 Kg over 3.5 Hours

## 2017-10-02 NOTE — PROGRESS NOTE ADULT - PROBLEM SELECTOR PLAN 4
BP range from 140 to 150's range at present.  continue clonidine 0.2 mg q8h   continue amlodipine 10 mg qdialy

## 2017-10-02 NOTE — PROGRESS NOTE ADULT - SUBJECTIVE AND OBJECTIVE BOX
CTICU  CRITICAL  CARE  attending     Hand off received 					   Pertinent clinical, laboratory, radiographic, hemodynamic, echocardiographic, respiratory data, microbiologic data and chart were reviewed and analyzed frequently throughout the course of the day and night  Patient seen and examined with CTS/ SH attending at bedside    Pt is a 47y , Female, HEALTH ISSUES - PROBLEM Dx:  Adjustment disorder with anxious mood: Adjustment disorder with anxious mood  Hyponatremia: Hyponatremia  Hypertension: Hypertension  Endocarditis: Endocarditis  Anemia: Anemia  End stage renal disease: End stage renal disease      , FAMILY HISTORY:  PAST MEDICAL & SURGICAL HISTORY:    Patient is a 47y old  Female who presents with a chief complaint of TV IE / MRSA Bacteremia. (13 Sep 2017 22:57)      14 system review was unremarkable  acute changes include acute respiratory failure  Vital signs, hemodynamic and respiratory parameters were reviewed from the bedside nursing flowsheet.  ICU Vital Signs Last 24 Hrs  T(C): 36.4 (02 Oct 2017 22:38), Max: 37.2 (02 Oct 2017 09:00)  T(F): 97.5 (02 Oct 2017 22:38), Max: 98.9 (02 Oct 2017 09:00)  HR: 96 (02 Oct 2017 23:00) (66 - 981)  BP: 142/56 (02 Oct 2017 17:00) (142/56 - 155/63)  BP(mean): --  ABP: 98/40 (02 Oct 2017 23:00) (98/40 - 176/58)  ABP(mean): 52 (02 Oct 2017 23:00) (52 - 100)  RR: 17 (02 Oct 2017 23:00) (0 - 27)  SpO2: 100% (02 Oct 2017 23:00) (91% - 100%)    Adult Advanced Hemodynamics Last 24 Hrs  CVP(mm Hg): --  CVP(cm H2O): --  CO: --  CI: --  PA: --  PA(mean): --  PCWP: --  SVR: --  SVRI: --  PVR: --  PVRI: --, ABG - ( 02 Oct 2017 22:31 )  pH: 7.40  /  pCO2: 38    /  pO2: 136   / HCO3: 22    / Base Excess: -2.2  /  SaO2: 99                Mode: AC/ CMV (Assist Control/ Continuous Mandatory Ventilation)  RR (machine): 10  TV (machine): 500  FiO2: 50  PEEP: 5  ITime: 1.2  MAP: 13  PIP: 32    Intake and output was reviewed and the fluid balance was calculated  Daily Height in cm: 177.8 (02 Oct 2017 16:01)    Daily Weight in k.9 (02 Oct 2017 13:05)  I&O's Summary    01 Oct 2017 07:01  -  02 Oct 2017 07:00  --------------------------------------------------------  IN: 350 mL / OUT: 100 mL / NET: 250 mL    02 Oct 2017 07:01  -  02 Oct 2017 23:18  --------------------------------------------------------  IN: 0 mL / OUT: 3400 mL / NET: -3400 mL        All lines and drain sites were assessed  Glycemic trend was reviewedCAPILLARY BLOOD GLUCOSE  95 (02 Oct 2017 09:00)        No acute change in mental status  Auscultation of the chest reveals equal bs  Abdomen is soft  Extremities are warm and well perfused  Wounds appear clean and unremarkable  Antibiotics are periop    labs  CBC Full  -  ( 02 Oct 2017 22:29 )  WBC Count : 22.2 K/uL  Hemoglobin : 8.2 g/dL  Hematocrit : 28.1 %  Platelet Count - Automated : 311 K/uL  Mean Cell Volume : 85.9 fL  Mean Cell Hemoglobin : 25.1 pg  Mean Cell Hemoglobin Concentration : 29.2 g/dL  Auto Neutrophil # : x  Auto Lymphocyte # : x  Auto Monocyte # : x  Auto Eosinophil # : x  Auto Basophil # : x  Auto Neutrophil % : x  Auto Lymphocyte % : x  Auto Monocyte % : x  Auto Eosinophil % : x  Auto Basophil % : x    10-02    124<L>  |  87<L>  |  17  ----------------------------<  163<H>  5.1   |  20<L>  |  2.64<H>    Ca    8.5      02 Oct 2017 22:29  Phos  4.8     10-02  Mg     2.8     10-02    TPro  7.1  /  Alb  2.0<L>  /  TBili  1.4<H>  /  DBili  x   /  AST  34  /  ALT  13  /  AlkPhos  344<H>  10-02    PT/INR - ( 02 Oct 2017 22:29 )   PT: 19.1 sec;   INR: 1.70          PTT - ( 02 Oct 2017 22:29 )  PTT:45.3 sec  The current medications were reviewed   MEDICATIONS  (STANDING):  sodium chloride 0.9% lock flush 3 milliLiter(s) IV Push every 8 hours  aspirin enteric coated 81 milliGRAM(s) Oral daily  simvastatin 20 milliGRAM(s) Oral at bedtime  polyethylene glycol 3350 17 Gram(s) Oral daily  DAPTOmycin IVPB 700 milliGRAM(s) IV Intermittent every 48 hours  rifampin 600 milliGRAM(s) Oral daily  amLODIPine   Tablet 10 milliGRAM(s) Oral daily  gabapentin 100 milliGRAM(s) Oral two times a day  gabapentin 300 milliGRAM(s) Oral at bedtime  sertraline 25 milliGRAM(s) Oral daily  traZODone 25 milliGRAM(s) Oral at bedtime  Ceftaroline Fosamil IVPB 200 milliGRAM(s) IV Intermittent every 12 hours  vasopressin Infusion 0.033 Unit(s)/Min (2 mL/Hr) IV Continuous <Continuous>  niCARdipine Infusion 5 mG/Hr (25 mL/Hr) IV Continuous <Continuous>  sodium chloride 0.45%. 1000 milliLiter(s) (10 mL/Hr) IV Continuous <Continuous>  famotidine Injectable 20 milliGRAM(s) IV Push daily  propofol Infusion 5 MICROgram(s)/kG/Min (2.583 mL/Hr) IV Continuous <Continuous>  insulin Infusion 1 Unit(s)/Hr (1 mL/Hr) IV Continuous <Continuous>  heparin  Injectable 5000 Unit(s) SubCutaneous every 8 hours  acetaminophen  IVPB. 1000 milliGRAM(s) IV Intermittent once  dexmedetomidine Infusion 0.3 MICROgram(s)/kG/Hr (6.457 mL/Hr) IV Continuous <Continuous>    MEDICATIONS  (PRN):  acetaminophen    Suspension. 650 milliGRAM(s) Oral every 6 hours PRN Mild Pain (1 - 3)  fentaNYL    Injectable 25 MICROGram(s) IV Push every 2 hours PRN Severe Pain (7 - 10)       PROBLEM LIST/ ASSESSMENT:  HEALTH ISSUES - PROBLEM Dx:  Adjustment disorder with anxious mood: Adjustment disorder with anxious mood  Hyponatremia: Hyponatremia  Hypertension: Hypertension  Endocarditis: Endocarditis  Anemia: Anemia  End stage renal disease: End stage renal disease      ,   Patient is a 47y old  Female who presents with a chief complaint of TV IE / MRSA Bacteremia. (13 Sep 2017 22:57)     s/p   acute changes include acute respiratory failure    My plan includes :  close hemodynamic, ventilatory and drain monitoring and management per post op routine    Monitor for arrhythmias and monitor parameters for organ perfusion  monitor neurologic status  Head of the bed should remain elevated to 45 deg .   chest PT and IS will be encouraged  monitor adequacy of oxygenation and ventilation and attempt to wean oxygen  Nutritional goals will be met using po eventually , ensure adequate caloric intake and montior the same  Stress ulcer and VTE prophylaxis will be achieved    Glycemic control is satisfactory  Electrolytes have been repleted as necessary and wound care has been carried out. Pain control has been achieved.   agressive physical therapy and early mobility and ambulation goals will be met   The family was updated about the course and plan  CRITICAL CARE TIME SPENT in evaluation and management, reassessments, review and interpretation of labs and x-rays, ventilator and hemodynamic management, formulating a plan and coordinating care: ___90____ MIN.  Time does not include procedural time.  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 via median sternotomy;    intraop:    2 L crystalloids  Cell saver    ICU:    on vent support;  low dose epi/Vaso  no bleeding;  initial chemistry shows a K+ level of 5.1; and a mildly worsening base deficit; from -2.2 to -3.8  Renal Svc fellow on call contacted by BLANE Rhodes; around 11p; advised to watch and monitor  pt is anuric; ESRD on HD and had earlier received a full dialysis treatment prior to OR    Adjustment disorder with anxious mood: Adjustment disorder with anxious mood  Hyponatremia: Hyponatremia  Hypertension: Hypertension  Endocarditis: Endocarditis  Anemia: Anemia  End stage renal disease: End stage renal disease      ,FAMILY HISTORY:  PAST MEDICAL & SURGICAL HISTORY:    Patient is a 47y old  Female who presents with a chief complaint of TV IE / MRSA Bacteremia. (13 Sep 2017 22:57)      14 system review was unremarkable  acute changes include acute respiratory failure  Vital signs, hemodynamic and respiratory parameters were reviewed from the bedside nursing flowsheet.  ICU Vital Signs Last 24 Hrs  T(C): 36.4 (02 Oct 2017 22:38), Max: 37.2 (02 Oct 2017 09:00)  T(F): 97.5 (02 Oct 2017 22:38), Max: 98.9 (02 Oct 2017 09:00)  HR: 96 (02 Oct 2017 23:00) (66 - 981)  BP: 142/56 (02 Oct 2017 17:00) (142/56 - 155/63)  BP(mean): --  ABP: 98/40 (02 Oct 2017 23:00) (98/40 - 176/58)  ABP(mean): 52 (02 Oct 2017 23:00) (52 - 100)  RR: 17 (02 Oct 2017 23:00) (0 - 27)  SpO2: 100% (02 Oct 2017 23:00) (91% - 100%)    Adult Advanced Hemodynamics Last 24 Hrs  CVP(mm Hg): --  CVP(cm H2O): --  CO: --  CI: --  PA: --  PA(mean): --  PCWP: --  SVR: --  SVRI: --  PVR: --  PVRI: --, ABG - ( 02 Oct 2017 22:31 )  pH: 7.40  /  pCO2: 38    /  pO2: 136   / HCO3: 22    / Base Excess: -2.2  /  SaO2: 99                Mode: AC/ CMV (Assist Control/ Continuous Mandatory Ventilation)  RR (machine): 10  TV (machine): 500  FiO2: 50  PEEP: 5  ITime: 1.2  MAP: 13  PIP: 32    Intake and output was reviewed and the fluid balance was calculated  Daily Height in cm: 177.8 (02 Oct 2017 16:01)    Daily Weight in k.9 (02 Oct 2017 13:05)  I&O's Summary    01 Oct 2017 07:01  -  02 Oct 2017 07:00  --------------------------------------------------------  IN: 350 mL / OUT: 100 mL / NET: 250 mL    02 Oct 2017 07:01  -  02 Oct 2017 23:18  --------------------------------------------------------  IN: 0 mL / OUT: 3400 mL / NET: -3400 mL        All lines and drain sites were assessed  Glycemic trend was reviewedCAPILLARY BLOOD GLUCOSE  95 (02 Oct 2017 09:00)        No acute change in mental status  Auscultation of the chest reveals equal bs  Abdomen is soft  Extremities are warm and well perfused  Wounds appear clean and unremarkable  Antibiotics are periop    labs  CBC Full  -  ( 02 Oct 2017 22:29 )  WBC Count : 22.2 K/uL  Hemoglobin : 8.2 g/dL  Hematocrit : 28.1 %  Platelet Count - Automated : 311 K/uL  Mean Cell Volume : 85.9 fL  Mean Cell Hemoglobin : 25.1 pg  Mean Cell Hemoglobin Concentration : 29.2 g/dL  Auto Neutrophil # : x  Auto Lymphocyte # : x  Auto Monocyte # : x  Auto Eosinophil # : x  Auto Basophil # : x  Auto Neutrophil % : x  Auto Lymphocyte % : x  Auto Monocyte % : x  Auto Eosinophil % : x  Auto Basophil % : x    10-02    124<L>  |  87<L>  |  17  ----------------------------<  163<H>  5.1   |  20<L>  |  2.64<H>    Ca    8.5      02 Oct 2017 22:29  Phos  4.8     10-02  Mg     2.8     10-02    TPro  7.1  /  Alb  2.0<L>  /  TBili  1.4<H>  /  DBili  x   /  AST  34  /  ALT  13  /  AlkPhos  344<H>  10-02    PT/INR - ( 02 Oct 2017 22:29 )   PT: 19.1 sec;   INR: 1.70          PTT - ( 02 Oct 2017 22:29 )  PTT:45.3 sec  The current medications were reviewed   MEDICATIONS  (STANDING):  sodium chloride 0.9% lock flush 3 milliLiter(s) IV Push every 8 hours  aspirin enteric coated 81 milliGRAM(s) Oral daily  simvastatin 20 milliGRAM(s) Oral at bedtime  polyethylene glycol 3350 17 Gram(s) Oral daily  DAPTOmycin IVPB 700 milliGRAM(s) IV Intermittent every 48 hours  rifampin 600 milliGRAM(s) Oral daily  amLODIPine   Tablet 10 milliGRAM(s) Oral daily  gabapentin 100 milliGRAM(s) Oral two times a day  gabapentin 300 milliGRAM(s) Oral at bedtime  sertraline 25 milliGRAM(s) Oral daily  traZODone 25 milliGRAM(s) Oral at bedtime  Ceftaroline Fosamil IVPB 200 milliGRAM(s) IV Intermittent every 12 hours  vasopressin Infusion 0.033 Unit(s)/Min (2 mL/Hr) IV Continuous <Continuous>  niCARdipine Infusion 5 mG/Hr (25 mL/Hr) IV Continuous <Continuous>  sodium chloride 0.45%. 1000 milliLiter(s) (10 mL/Hr) IV Continuous <Continuous>  famotidine Injectable 20 milliGRAM(s) IV Push daily  propofol Infusion 5 MICROgram(s)/kG/Min (2.583 mL/Hr) IV Continuous <Continuous>  insulin Infusion 1 Unit(s)/Hr (1 mL/Hr) IV Continuous <Continuous>  heparin  Injectable 5000 Unit(s) SubCutaneous every 8 hours  acetaminophen  IVPB. 1000 milliGRAM(s) IV Intermittent once  dexmedetomidine Infusion 0.3 MICROgram(s)/kG/Hr (6.457 mL/Hr) IV Continuous <Continuous>    MEDICATIONS  (PRN):  acetaminophen    Suspension. 650 milliGRAM(s) Oral every 6 hours PRN Mild Pain (1 - 3)  fentaNYL    Injectable 25 MICROGram(s) IV Push every 2 hours PRN Severe Pain (7 - 10)       PROBLEM LIST/ ASSESSMENT:  HEALTH ISSUES - PROBLEM Dx:    Hypotension  Hyperkalemia  on inotrope support  Dialysis status  Vent weaning and extubation  s/p TV replacement.      Adjustment disorder with anxious mood: Adjustment disorder with anxious mood  Hyponatremia: Hyponatremia  Hypertension: Hypertension  Endocarditis: Endocarditis  Anemia: Anemia  End stage renal disease: End stage renal disease      ,   Patient is a 47y old  Female who presents with a chief complaint of TV IE / MRSA Bacteremia. (13 Sep 2017 22:57)     s/p   acute changes include acute respiratory failure    My plan includes :  close hemodynamic, ventilatory and drain monitoring and management per post op routine  Wean and extubate  Follow serum potassium  levels closely; will re call renal fellow to dialyse if further increases in K+ happens  Monitor for arrhythmias and monitor parameters for organ perfusion  monitor neurologic status  Head of the bed should remain elevated to 45 deg .   chest PT and IS will be encouraged  monitor adequacy of oxygenation and ventilation and attempt to wean oxygen  Nutritional goals will be met using po eventually , ensure adequate caloric intake and montior the same  Stress ulcer and VTE prophylaxis will be achieved    Glycemic control is satisfactory  Electrolytes have been repleted as necessary and wound care has been carried out. Pain control has been achieved.   agressive physical therapy and early mobility and ambulation goals will be met   The family was updated about the course and plan  CRITICAL CARE TIME SPENT in evaluation and management, reassessments, review and interpretation of labs and x-rays, ventilator and hemodynamic management, formulating a plan and coordinating care: ___90____ MIN.  Time does not include procedural time.  CTICU ATTENDING     					    Carlos Ortiz MD

## 2017-10-02 NOTE — PROGRESS NOTE ADULT - ASSESSMENT
Patient is a 47 year old female with a history of hypertension, hyperlipidemia, DM II, ESRD on HD M/W/F, endocarditis, thrombus in the right heart, hypertension, diabetes, and hyperlipidemia whom presented to the hospital from [facility]. Nephrology consult called for HD treatment.

## 2017-10-02 NOTE — PROGRESS NOTE ADULT - SUBJECTIVE AND OBJECTIVE BOX
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 (13 Sep 2017 22:57).  On this admission, patient was also found to have septic emboli, cervical osteomyelitis and abscess s/p corpectomy and anterior plating POD #4. Patient is scheduled for TVR/CABG on today.    Yesterday, patient had a cream of wheat for breakfast; glucerna for lunch; she doesn't eat the chicken salad for dinner. At bedtime she had glucerna and juice. She is NPO today. at around 11.30 am, her FS was 82, D50% was provided.     Pt reports the following symptoms:    CONSTITUTIONAL:  Negative fever or chills, feels well, good appetite  EYES:  Negative  blurry vision or double vision  CARDIOVASCULAR:  Negative for chest pain or palpitations  RESPIRATORY:  Negative for cough, wheezing, or SOB   GASTROINTESTINAL:  Negative for nausea, vomiting, diarrhea, constipation, or abdominal pain  GENITOURINARY:  Negative frequency, urgency or dysuria  NEUROLOGIC:  No headache, confusion, dizziness, lightheadedness    MEDICATIONS  (STANDING):  sodium chloride 0.9% lock flush 3 milliLiter(s) IV Push every 8 hours  aspirin enteric coated 81 milliGRAM(s) Oral daily  simvastatin 20 milliGRAM(s) Oral at bedtime  insulin lispro (HumaLOG) corrective regimen sliding scale   SubCutaneous Before meals and at bedtime  dextrose 5%. 1000 milliLiter(s) (50 mL/Hr) IV Continuous <Continuous>  dextrose 50% Injectable 12.5 Gram(s) IV Push once  dextrose 50% Injectable 25 Gram(s) IV Push once  dextrose 50% Injectable 25 Gram(s) IV Push once  polyethylene glycol 3350 17 Gram(s) Oral daily  pantoprazole    Tablet 40 milliGRAM(s) Oral before breakfast  DAPTOmycin IVPB 700 milliGRAM(s) IV Intermittent every 48 hours  rifampin 600 milliGRAM(s) Oral daily  amLODIPine   Tablet 10 milliGRAM(s) Oral daily  gabapentin 100 milliGRAM(s) Oral two times a day  gabapentin 300 milliGRAM(s) Oral at bedtime  sertraline 25 milliGRAM(s) Oral daily  traZODone 25 milliGRAM(s) Oral at bedtime  insulin lispro Injectable (HumaLOG) 10 Unit(s) SubCutaneous three times a day before meals  insulin glargine Injectable (LANTUS) 20 Unit(s) SubCutaneous at bedtime  cloNIDine 0.2 milliGRAM(s) Oral every 8 hours  Ceftaroline Fosamil IVPB 200 milliGRAM(s) IV Intermittent every 12 hours  chlorhexidine 0.12% Liquid 10 milliLiter(s) Swish and Spit once  dextrose 5%. 1000 milliLiter(s) (50 mL/Hr) IV Continuous <Continuous>    MEDICATIONS  (PRN):  dextrose Gel 1 Dose(s) Oral once PRN Blood Glucose LESS THAN 70 milliGRAM(s)/deciliter  glucagon  Injectable 1 milliGRAM(s) IntraMuscular once PRN Glucose LESS THAN 70 milligrams/deciliter  acetaminophen    Suspension. 650 milliGRAM(s) Oral every 6 hours PRN Mild Pain (1 - 3)  oxyCODONE    5 mG/acetaminophen 325 mG 1 Tablet(s) Oral every 6 hours PRN Severe Pain (7 - 10)      PHYSICAL EXAM  Vital Signs Last 24 Hrs  T(C): 36.7 (02 Oct 2017 13:05), Max: 37.2 (02 Oct 2017 09:00)  T(F): 98.1 (02 Oct 2017 13:05), Max: 98.9 (02 Oct 2017 09:00)  HR: 981 (02 Oct 2017 13:05) (66 - 981)  BP: --  BP(mean): --  RR: 18 (02 Oct 2017 13:05) (0 - 31)  SpO2: 98% (02 Oct 2017 13:05) (91% - 100%)    Constitutional: wn/wd in NAD.   HEENT: NCAT, MMM, OP clear, EOMI, no proptosis or lid retraction  Neck: no thyromegaly or palpable thyroid nodules   Respiratory: lungs CTAB.  Cardiovascular: regular rhythm, normal S1 and S2, no audible murmurs, no peripheral edema  GI: soft, NT/ND, no masses/HSM appreciated.  Neurology: no tremors, DTR 2+  Skin: no visible rashes/lesions  Psychiatric: AAO x 3, normal affect/mood.    LABS:                        8.2    9.3   )-----------( 296      ( 02 Oct 2017 05:00 )             27.3     10-02    127<L>  |  90<L>  |  34<H>  ----------------------------<  102<H>  5.8<H>   |  26  |  4.64<H>    Ca    8.6      02 Oct 2017 05:02  Phos  5.2     10-02  Mg     2.1     10-02    TPro  8.0  /  Alb  2.4<L>  /  TBili  0.7  /  DBili  x   /  AST  20  /  ALT  11  /  AlkPhos  428<H>  10-02    PT/INR - ( 02 Oct 2017 05:01 )   PT: 15.7 sec;   INR: 1.40          PTT - ( 02 Oct 2017 05:01 )  PTT:39.6 sec    Thyroid Stimulating Hormone, Serum: 2.447 uIU/mL (09-13 @ 22:51)  Thyroid Stimulating Hormone, Serum: 1.251 uIU/mL (08-24 @ 01:12)      HbA1C: 8.2 % (09-13 @ 22:51)  8.2 % (08-24 @ 01:11)    CAPILLARY BLOOD GLUCOSE  95 (02 Oct 2017 09:00)  101 (02 Oct 2017 06:00)  104 (02 Oct 2017 00:00)  76 (01 Oct 2017 22:00)  135 (01 Oct 2017 16:31)      Insulin Sliding Scale requirements X 24 Hours:    RADIOLOGY & ADDITIONAL TESTS:    A/P: 47y Female with history of DM type II presenting for       A/P: 47y Female with history of DM type II presenting for DM type II presenting for MRSA bacteremia and TV endocarditis s/p cervical corpectomy. She is scheduled for TAVR/CABG on Monday 1.  DM 2, uncontrolled, complicated - patient with poor diabetic history and multiple diabetic complications. A1c is likely higher given that patient is on Procrit and has high cell turnover. Patient also seems to be non compliance with her diabetes diet. Patient had meal from outside hospital last night.     Please continue                   units lantus at bedtime and                      units lispro with meals and lispro moderate / low dose sliding scale 4 times daily with meals and at bedtime.  Please continue consistent carbohydrate diet.      Goal FSG is 120 - 180  Will continue to monitor   For discharge, pt can continue    Pt can follow up at discharge with [facility] by calling  to make an appointment.   Will discuss case with Dr. Ross, Dr. Yoon and update primary team INTERVAL HPI/OVERNIGHT EVENTS:    Patient is a 47y old  Female who presents with a chief complaint of TV IE / MRSA Bacteremia. (13 Sep 2017 22:57).  On this admission, patient was also found to have septic emboli, cervical osteomyelitis and abscess s/p corpectomy and anterior plating POD #4. Patient is scheduled for TVR today.    Yesterday, patient had a cream of wheat for breakfast; glucerna for lunch; she doesn't eat the chicken salad for dinner. At bedtime she had glucerna and juice. She is NPO today. at around 11.30 am, her FS was 82, D50% was provided.     Pt reports the following symptoms:    CONSTITUTIONAL:  Negative fever or chills, feels well, good appetite  EYES:  Negative  blurry vision or double vision  CARDIOVASCULAR:  Negative for chest pain or palpitations  RESPIRATORY:  Negative for cough, wheezing, or SOB   GASTROINTESTINAL:  Negative for nausea, vomiting, diarrhea, constipation, or abdominal pain  GENITOURINARY:  Negative frequency, urgency or dysuria  NEUROLOGIC:  No headache, confusion, dizziness, lightheadedness    MEDICATIONS  (STANDING):  sodium chloride 0.9% lock flush 3 milliLiter(s) IV Push every 8 hours  aspirin enteric coated 81 milliGRAM(s) Oral daily  simvastatin 20 milliGRAM(s) Oral at bedtime  insulin lispro (HumaLOG) corrective regimen sliding scale   SubCutaneous Before meals and at bedtime  dextrose 5%. 1000 milliLiter(s) (50 mL/Hr) IV Continuous <Continuous>  dextrose 50% Injectable 12.5 Gram(s) IV Push once  dextrose 50% Injectable 25 Gram(s) IV Push once  dextrose 50% Injectable 25 Gram(s) IV Push once  polyethylene glycol 3350 17 Gram(s) Oral daily  pantoprazole    Tablet 40 milliGRAM(s) Oral before breakfast  DAPTOmycin IVPB 700 milliGRAM(s) IV Intermittent every 48 hours  rifampin 600 milliGRAM(s) Oral daily  amLODIPine   Tablet 10 milliGRAM(s) Oral daily  gabapentin 100 milliGRAM(s) Oral two times a day  gabapentin 300 milliGRAM(s) Oral at bedtime  sertraline 25 milliGRAM(s) Oral daily  traZODone 25 milliGRAM(s) Oral at bedtime  insulin lispro Injectable (HumaLOG) 10 Unit(s) SubCutaneous three times a day before meals  insulin glargine Injectable (LANTUS) 20 Unit(s) SubCutaneous at bedtime  cloNIDine 0.2 milliGRAM(s) Oral every 8 hours  Ceftaroline Fosamil IVPB 200 milliGRAM(s) IV Intermittent every 12 hours  chlorhexidine 0.12% Liquid 10 milliLiter(s) Swish and Spit once  dextrose 5%. 1000 milliLiter(s) (50 mL/Hr) IV Continuous <Continuous>    MEDICATIONS  (PRN):  dextrose Gel 1 Dose(s) Oral once PRN Blood Glucose LESS THAN 70 milliGRAM(s)/deciliter  glucagon  Injectable 1 milliGRAM(s) IntraMuscular once PRN Glucose LESS THAN 70 milligrams/deciliter  acetaminophen    Suspension. 650 milliGRAM(s) Oral every 6 hours PRN Mild Pain (1 - 3)  oxyCODONE    5 mG/acetaminophen 325 mG 1 Tablet(s) Oral every 6 hours PRN Severe Pain (7 - 10)      PHYSICAL EXAM  Vital Signs Last 24 Hrs  T(C): 36.7 (02 Oct 2017 13:05), Max: 37.2 (02 Oct 2017 09:00)  T(F): 98.1 (02 Oct 2017 13:05), Max: 98.9 (02 Oct 2017 09:00)  HR: 981 (02 Oct 2017 13:05) (66 - 981)  BP: --  BP(mean): --  RR: 18 (02 Oct 2017 13:05) (0 - 31)  SpO2: 98% (02 Oct 2017 13:05) (91% - 100%)    Constitutional: wn/wd in NAD.   HEENT: NCAT, MMM, OP clear, EOMI, no proptosis or lid retraction  Neck: no thyromegaly or palpable thyroid nodules   Respiratory: lungs CTAB.  Cardiovascular: regular rhythm, normal S1 and S2, no audible murmurs, no peripheral edema  GI: soft, NT/ND, no masses/HSM appreciated.  Neurology: no tremors, DTR 2+  Skin: no visible rashes/lesions  Psychiatric: AAO x 3, normal affect/mood.    LABS:                        8.2    9.3   )-----------( 296      ( 02 Oct 2017 05:00 )             27.3     10-02    127<L>  |  90<L>  |  34<H>  ----------------------------<  102<H>  5.8<H>   |  26  |  4.64<H>    Ca    8.6      02 Oct 2017 05:02  Phos  5.2     10-02  Mg     2.1     10-02    TPro  8.0  /  Alb  2.4<L>  /  TBili  0.7  /  DBili  x   /  AST  20  /  ALT  11  /  AlkPhos  428<H>  10-02    PT/INR - ( 02 Oct 2017 05:01 )   PT: 15.7 sec;   INR: 1.40          PTT - ( 02 Oct 2017 05:01 )  PTT:39.6 sec    Thyroid Stimulating Hormone, Serum: 2.447 uIU/mL (09-13 @ 22:51)  Thyroid Stimulating Hormone, Serum: 1.251 uIU/mL (08-24 @ 01:12)      HbA1C: 8.2 % (09-13 @ 22:51)  8.2 % (08-24 @ 01:11)    CAPILLARY BLOOD GLUCOSE  95 (02 Oct 2017 09:00)  101 (02 Oct 2017 06:00)  104 (02 Oct 2017 00:00)  76 (01 Oct 2017 22:00)  135 (01 Oct 2017 16:31)      Insulin Sliding Scale requirements X 24 Hours:    RADIOLOGY & ADDITIONAL TESTS:    A/P: 47y Female with history of DM type II presenting for       A/P: 47y Female with history of DM type II presenting for DM type II presenting for MRSA bacteremia and TV endocarditis s/p cervical corpectomy. She is scheduled for TVR today      1.  DM 2, uncontrolled, complicated - patient with poor diabetic history and multiple diabetic complications. A1c is likely higher given that patient is on Procrit and has high cell turnover. Patient also seems to be non compliance with her diabetes diet.     Patient is scheduled for TVR. Please stop insulin for now. The decision to start patient on insulin drip depends on the FS during the procedure as per primary team. If primary team decided to start patient on Lantus, please start with Lantus 15 units at bedtime. Continue with Lispro 10 units only with meals.     Goal FSG is 120 - 180  Will continue to monitor   For discharge, pt can continue    Pt can follow up at discharge with [facility] by calling  to make an appointment.   Will discuss case with Dr. Ross, Dr. Yoon and update primary team

## 2017-10-02 NOTE — BRIEF OPERATIVE NOTE - PROCEDURE
<<-----Click on this checkbox to enter Procedure Open replacement of tricuspid valve  10/02/2017    Active  [id number]

## 2017-10-02 NOTE — PROGRESS NOTE ADULT - SUBJECTIVE AND OBJECTIVE BOX
Patient is a 47y Female seen and evaluated at bedside.       sodium chloride 0.9% lock flush 3 every 8 hours  aspirin enteric coated 81 daily  simvastatin 20 at bedtime  insulin lispro (HumaLOG) corrective regimen sliding scale  Before meals and at bedtime  dextrose 5%. 1000 <Continuous>  dextrose Gel 1 once PRN  dextrose 50% Injectable 12.5 once  dextrose 50% Injectable 25 once  dextrose 50% Injectable 25 once  glucagon  Injectable 1 once PRN  polyethylene glycol 3350 17 daily  pantoprazole    Tablet 40 before breakfast  acetaminophen    Suspension. 650 every 6 hours PRN  DAPTOmycin IVPB 700 every 48 hours  rifampin 600 daily  amLODIPine   Tablet 10 daily  gabapentin 100 two times a day  gabapentin 300 at bedtime  sertraline 25 daily  traZODone 25 at bedtime  insulin lispro Injectable (HumaLOG) 10 three times a day before meals  insulin glargine Injectable (LANTUS) 20 at bedtime  cloNIDine 0.2 every 8 hours  Ceftaroline Fosamil IVPB 200 every 12 hours  chlorhexidine 0.12% Liquid 10 once  oxyCODONE    5 mG/acetaminophen 325 mG 1 every 6 hours PRN  calcitriol Injectable 1 once  epoetin fransisca Injectable 32010 once      Allergies    penicillin (Unknown)  Sular (Unknown)    Intolerances        T(C): , Max: 37.2 (10-02-17 @ 09:00)  T(F): , Max: 98.9 (10-02-17 @ 09:00)  HR: 66 (10-02-17 @ 11:00)  BP: --  BP(mean): --  RR: 15 (10-02-17 @ 11:00)  SpO2: 97% (10-02-17 @ 11:00)  Wt(kg): --    10-01 @ 07:01  -  10-02 @ 07:00  --------------------------------------------------------  IN: 350 mL / OUT: 100 mL / NET: 250 mL          Review of Systems:  CONSTITUTIONAL: No fever or chills, No fatigue or tiredness.  EYES: No blurred or double vision.  RESPIRATORY: No shortness of breath, cough, hemoptysis  CARDIOVASCULAR: No Chest pain or shortness of breath  GASTROINTESTINAL: NO abdominal or flank pain, No nausea or vomiting, No diarrhea  GENITOURINARY: No dysuria or urinary burning, No difficulty passing urine, No hematuria  NEUROLOGICAL: No headaches or blurred vision  SKIN: No skin rashes   MUSCULOSKELETAL: No arthralgia, Joint pain, leg edema, No muscle pains      PHYSICAL EXAM:  GENERAL: NAD, well-developed, well nourished, alert, awake, no acute distress at present  HEAD:  Atraumatic, Normocephalic,   EYES: Bilateral conjuctiva and sclera normal   Oral cavity: Oral mucosa dry and pink  NECK: Neck supple, No JVD  CHEST/LUNG: Clear to auscultation bilaterally; No wheeze, no rales, no crepitations  HEART: Regular rate and rhythm. ALBARO II/VI at LPSB, No gallop, no rub   ABDOMEN: Soft, Nontender, BS+nt, No flank tenderness.   EXTREMITIES: No clubbing, cyanosis, or edema  Neurology: AAOx3, no focal neurological deficit  SKIN: No rashes or lesions          ACCESS:     LABS:                        8.2    9.3   )-----------( 296      ( 02 Oct 2017 05:00 )             27.3     10-02    127<L>  |  90<L>  |  34<H>  ----------------------------<  102<H>  5.8<H>   |  26  |  4.64<H>    Ca    8.6      02 Oct 2017 05:02  Phos  5.2     10-02  Mg     2.1     10-02    TPro  8.0  /  Alb  2.4<L>  /  TBili  0.7  /  DBili  x   /  AST  20  /  ALT  11  /  AlkPhos  428<H>  10-02      PT/INR - ( 02 Oct 2017 05:01 )   PT: 15.7 sec;   INR: 1.40          PTT - ( 02 Oct 2017 05:01 )  PTT:39.6 sec          RADIOLOGY & ADDITIONAL STUDIES: Patient is a 47y Female seen and evaluated at bedside. Patient denies any chest pain, shortness of breath, palpitations, dizziness at present. Started on HD treatment for 4 months now being admitted for endocarditis and RIght heart thrombus and planned for surgery this afternoon.       sodium chloride 0.9% lock flush 3 every 8 hours  aspirin enteric coated 81 daily  simvastatin 20 at bedtime  insulin lispro (HumaLOG) corrective regimen sliding scale  Before meals and at bedtime  dextrose 5%. 1000 <Continuous>  dextrose Gel 1 once PRN  dextrose 50% Injectable 12.5 once  dextrose 50% Injectable 25 once  dextrose 50% Injectable 25 once  glucagon  Injectable 1 once PRN  polyethylene glycol 3350 17 daily  pantoprazole    Tablet 40 before breakfast  acetaminophen    Suspension. 650 every 6 hours PRN  DAPTOmycin IVPB 700 every 48 hours  rifampin 600 daily  amLODIPine   Tablet 10 daily  gabapentin 100 two times a day  gabapentin 300 at bedtime  sertraline 25 daily  traZODone 25 at bedtime  insulin lispro Injectable (HumaLOG) 10 three times a day before meals  insulin glargine Injectable (LANTUS) 20 at bedtime  cloNIDine 0.2 every 8 hours  Ceftaroline Fosamil IVPB 200 every 12 hours  chlorhexidine 0.12% Liquid 10 once  oxyCODONE    5 mG/acetaminophen 325 mG 1 every 6 hours PRN  calcitriol Injectable 1 once  epoetin fransisca Injectable 69404 once      Allergies    penicillin (Unknown)  Sular (Unknown)    Intolerances        T(C): , Max: 37.2 (10-02-17 @ 09:00)  T(F): , Max: 98.9 (10-02-17 @ 09:00)  HR: 66 (10-02-17 @ 11:00)  BP: --  BP(mean): --  RR: 15 (10-02-17 @ 11:00)  SpO2: 97% (10-02-17 @ 11:00)  Wt(kg): --    10-01 @ 07:01  -  10-02 @ 07:00  --------------------------------------------------------  IN: 350 mL / OUT: 100 mL / NET: 250 mL          Review of Systems:  CONSTITUTIONAL: No fever or chills, No fatigue or tiredness.  EYES: No blurred or double vision.  RESPIRATORY: No shortness of breath, cough, hemoptysis  CARDIOVASCULAR: No Chest pain or shortness of breath  GASTROINTESTINAL: NO abdominal or flank pain, No nausea or vomiting, No diarrhea  GENITOURINARY: No dysuria or urinary burning, No difficulty passing urine, No hematuria  NEUROLOGICAL: No headaches or blurred vision  SKIN: No skin rashes   MUSCULOSKELETAL: No arthralgia, Joint pain, leg edema, No muscle pains      PHYSICAL EXAM:  GENERAL: NAD, well-developed, well nourished, alert, awake, no acute distress at present  HEAD:  Atraumatic, Normocephalic,   EYES: Bilateral conjuctiva and sclera normal   Oral cavity: Oral mucosa dry and pink  NECK: Neck supple, No JVD, Left sided permacath present. No bleeding  CHEST/LUNG: Clear to auscultation bilaterally; No wheeze, no rales, no crepitations  HEART: Regular rate and rhythm. ALBARO II/VI at LPSB, No gallop, no rub   ABDOMEN: Soft, Nontender, BS+nt, No flank tenderness.   EXTREMITIES: No clubbing, cyanosis, or edema  Neurology: AAOx3, no focal neurological deficit  SKIN: No rashes or lesions          ACCESS:     LABS:                        8.2    9.3   )-----------( 296      ( 02 Oct 2017 05:00 )             27.3     10-02    127<L>  |  90<L>  |  34<H>  ----------------------------<  102<H>  5.8<H>   |  26  |  4.64<H>    Ca    8.6      02 Oct 2017 05:02  Phos  5.2     10-02  Mg     2.1     10-02    TPro  8.0  /  Alb  2.4<L>  /  TBili  0.7  /  DBili  x   /  AST  20  /  ALT  11  /  AlkPhos  428<H>  10-02      PT/INR - ( 02 Oct 2017 05:01 )   PT: 15.7 sec;   INR: 1.40          PTT - ( 02 Oct 2017 05:01 )  PTT:39.6 sec          RADIOLOGY & ADDITIONAL STUDIES:    < from: Xray Chest 1 View AP -PORTABLE-Routine (10.01.17 @ 04:07) >    EXAM:  XR CHEST 1 VIEW PORT ROUTINE                          PROCEDURE DATE:  10/01/2017                     INTERPRETATION:  Clinical History: Follow-up    Portable examination the chest demonstrates change lung pathology in   comparison to prior examination of the chest 9/30/2017. No interval   change position remaining support devices.    Impression: No interval change            "Thank you for the opportunity to participate in the care of this   patient."        SHEILA TAYLOR M.D., ATTENDING RADIOLOGIST  This document has been electronically signed. Oct  1 2017 10:03AM                  < end of copied text >

## 2017-10-02 NOTE — PROGRESS NOTE ADULT - ATTENDING COMMENTS
Pt seen just prior to leaving for OR late this afternoon.  Glucoses have been somewhat tightly controlled, and as had happened previously, she becomes borderline hypoglycemic in the late afternoon on her usual Lantus dose if kept NPO for the whole day.  Was given D50W to treat this.  Will assume that she will be managed with an insulin drip post-op, and then transitioned back to Lantus.  If by any chance her surgery is postponed until tomorrow, please give only 15 units of Lantus at bedtime tonight

## 2017-10-02 NOTE — BRIEF OPERATIVE NOTE - OPERATION/FINDINGS
Additional procedures: right atrial and right ventricular leads placement  Findings: vegetation of tricuspid valve

## 2017-10-03 LAB
ALBUMIN SERPL ELPH-MCNC: 1.9 G/DL — LOW (ref 3.3–5)
ALBUMIN SERPL ELPH-MCNC: 2 G/DL — LOW (ref 3.3–5)
ALBUMIN SERPL ELPH-MCNC: 2.1 G/DL — LOW (ref 3.3–5)
ALBUMIN SERPL ELPH-MCNC: 2.3 G/DL — LOW (ref 3.3–5)
ALP SERPL-CCNC: 301 U/L — HIGH (ref 40–120)
ALP SERPL-CCNC: 322 U/L — HIGH (ref 40–120)
ALP SERPL-CCNC: 326 U/L — HIGH (ref 40–120)
ALP SERPL-CCNC: 345 U/L — HIGH (ref 40–120)
ALT FLD-CCNC: 11 U/L — SIGNIFICANT CHANGE UP (ref 10–45)
ALT FLD-CCNC: 12 U/L — SIGNIFICANT CHANGE UP (ref 10–45)
ALT FLD-CCNC: 12 U/L — SIGNIFICANT CHANGE UP (ref 10–45)
ALT FLD-CCNC: 13 U/L — SIGNIFICANT CHANGE UP (ref 10–45)
ANION GAP SERPL CALC-SCNC: 14 MMOL/L — SIGNIFICANT CHANGE UP (ref 5–17)
ANION GAP SERPL CALC-SCNC: 16 MMOL/L — SIGNIFICANT CHANGE UP (ref 5–17)
ANION GAP SERPL CALC-SCNC: 17 MMOL/L — SIGNIFICANT CHANGE UP (ref 5–17)
APTT BLD: 35.6 SEC — SIGNIFICANT CHANGE UP (ref 27.5–37.4)
APTT BLD: 37.5 SEC — HIGH (ref 27.5–37.4)
APTT BLD: 38 SEC — HIGH (ref 27.5–37.4)
APTT BLD: 38.2 SEC — HIGH (ref 27.5–37.4)
APTT BLD: 40.2 SEC — HIGH (ref 27.5–37.4)
AST SERPL-CCNC: 24 U/L — SIGNIFICANT CHANGE UP (ref 10–40)
AST SERPL-CCNC: 31 U/L — SIGNIFICANT CHANGE UP (ref 10–40)
AST SERPL-CCNC: 31 U/L — SIGNIFICANT CHANGE UP (ref 10–40)
AST SERPL-CCNC: 36 U/L — SIGNIFICANT CHANGE UP (ref 10–40)
BILIRUB SERPL-MCNC: 0.6 MG/DL — SIGNIFICANT CHANGE UP (ref 0.2–1.2)
BILIRUB SERPL-MCNC: 0.8 MG/DL — SIGNIFICANT CHANGE UP (ref 0.2–1.2)
BILIRUB SERPL-MCNC: 1.2 MG/DL — SIGNIFICANT CHANGE UP (ref 0.2–1.2)
BILIRUB SERPL-MCNC: 1.4 MG/DL — HIGH (ref 0.2–1.2)
BUN SERPL-MCNC: 13 MG/DL — SIGNIFICANT CHANGE UP (ref 7–23)
BUN SERPL-MCNC: 17 MG/DL — SIGNIFICANT CHANGE UP (ref 7–23)
BUN SERPL-MCNC: 18 MG/DL — SIGNIFICANT CHANGE UP (ref 7–23)
BUN SERPL-MCNC: 19 MG/DL — SIGNIFICANT CHANGE UP (ref 7–23)
BUN SERPL-MCNC: 21 MG/DL — SIGNIFICANT CHANGE UP (ref 7–23)
BUN SERPL-MCNC: 21 MG/DL — SIGNIFICANT CHANGE UP (ref 7–23)
CALCIUM SERPL-MCNC: 8.2 MG/DL — LOW (ref 8.4–10.5)
CALCIUM SERPL-MCNC: 8.2 MG/DL — LOW (ref 8.4–10.5)
CALCIUM SERPL-MCNC: 8.3 MG/DL — LOW (ref 8.4–10.5)
CALCIUM SERPL-MCNC: 8.4 MG/DL — SIGNIFICANT CHANGE UP (ref 8.4–10.5)
CALCIUM SERPL-MCNC: 8.7 MG/DL — SIGNIFICANT CHANGE UP (ref 8.4–10.5)
CALCIUM SERPL-MCNC: 9 MG/DL — SIGNIFICANT CHANGE UP (ref 8.4–10.5)
CHLORIDE SERPL-SCNC: 89 MMOL/L — LOW (ref 96–108)
CHLORIDE SERPL-SCNC: 90 MMOL/L — LOW (ref 96–108)
CHLORIDE SERPL-SCNC: 90 MMOL/L — LOW (ref 96–108)
CHLORIDE SERPL-SCNC: 94 MMOL/L — LOW (ref 96–108)
CHLORIDE SERPL-SCNC: 94 MMOL/L — LOW (ref 96–108)
CHLORIDE SERPL-SCNC: 95 MMOL/L — LOW (ref 96–108)
CO2 SERPL-SCNC: 20 MMOL/L — LOW (ref 22–31)
CO2 SERPL-SCNC: 21 MMOL/L — LOW (ref 22–31)
CO2 SERPL-SCNC: 23 MMOL/L — SIGNIFICANT CHANGE UP (ref 22–31)
CO2 SERPL-SCNC: 24 MMOL/L — SIGNIFICANT CHANGE UP (ref 22–31)
CO2 SERPL-SCNC: 25 MMOL/L — SIGNIFICANT CHANGE UP (ref 22–31)
CO2 SERPL-SCNC: 25 MMOL/L — SIGNIFICANT CHANGE UP (ref 22–31)
CREAT SERPL-MCNC: 1.77 MG/DL — HIGH (ref 0.5–1.3)
CREAT SERPL-MCNC: 2.22 MG/DL — HIGH (ref 0.5–1.3)
CREAT SERPL-MCNC: 2.51 MG/DL — HIGH (ref 0.5–1.3)
CREAT SERPL-MCNC: 2.86 MG/DL — HIGH (ref 0.5–1.3)
CREAT SERPL-MCNC: 2.96 MG/DL — HIGH (ref 0.5–1.3)
CREAT SERPL-MCNC: 3.12 MG/DL — HIGH (ref 0.5–1.3)
GAS PNL BLDA: SIGNIFICANT CHANGE UP
GLUCOSE SERPL-MCNC: 120 MG/DL — HIGH (ref 70–99)
GLUCOSE SERPL-MCNC: 121 MG/DL — HIGH (ref 70–99)
GLUCOSE SERPL-MCNC: 181 MG/DL — HIGH (ref 70–99)
GLUCOSE SERPL-MCNC: 183 MG/DL — HIGH (ref 70–99)
GLUCOSE SERPL-MCNC: 258 MG/DL — HIGH (ref 70–99)
GLUCOSE SERPL-MCNC: 89 MG/DL — SIGNIFICANT CHANGE UP (ref 70–99)
GRAM STN FLD: SIGNIFICANT CHANGE UP
HCT VFR BLD CALC: 22.3 % — LOW (ref 34.5–45)
HCT VFR BLD CALC: 22.7 % — LOW (ref 34.5–45)
HCT VFR BLD CALC: 24.6 % — LOW (ref 34.5–45)
HCT VFR BLD CALC: 25 % — LOW (ref 34.5–45)
HCT VFR BLD CALC: 26.8 % — LOW (ref 34.5–45)
HGB BLD-MCNC: 6.8 G/DL — CRITICAL LOW (ref 11.5–15.5)
HGB BLD-MCNC: 7.1 G/DL — LOW (ref 11.5–15.5)
HGB BLD-MCNC: 7.4 G/DL — LOW (ref 11.5–15.5)
HGB BLD-MCNC: 7.5 G/DL — LOW (ref 11.5–15.5)
HGB BLD-MCNC: 8.1 G/DL — LOW (ref 11.5–15.5)
INR BLD: 1.46 — HIGH (ref 0.88–1.16)
INR BLD: 1.49 — HIGH (ref 0.88–1.16)
INR BLD: 1.49 — HIGH (ref 0.88–1.16)
INR BLD: 1.6 — HIGH (ref 0.88–1.16)
INR BLD: 1.69 — HIGH (ref 0.88–1.16)
LACTATE SERPL-SCNC: 1.2 MMOL/L — SIGNIFICANT CHANGE UP (ref 0.5–2)
LACTATE SERPL-SCNC: 1.4 MMOL/L — SIGNIFICANT CHANGE UP (ref 0.5–2)
LACTATE SERPL-SCNC: 1.8 MMOL/L — SIGNIFICANT CHANGE UP (ref 0.5–2)
MAGNESIUM SERPL-MCNC: 2.2 MG/DL — SIGNIFICANT CHANGE UP (ref 1.6–2.6)
MAGNESIUM SERPL-MCNC: 2.3 MG/DL — SIGNIFICANT CHANGE UP (ref 1.6–2.6)
MAGNESIUM SERPL-MCNC: 2.3 MG/DL — SIGNIFICANT CHANGE UP (ref 1.6–2.6)
MAGNESIUM SERPL-MCNC: 2.7 MG/DL — HIGH (ref 1.6–2.6)
MCHC RBC-ENTMCNC: 25.5 PG — LOW (ref 27–34)
MCHC RBC-ENTMCNC: 25.5 PG — LOW (ref 27–34)
MCHC RBC-ENTMCNC: 25.9 PG — LOW (ref 27–34)
MCHC RBC-ENTMCNC: 26.1 PG — LOW (ref 27–34)
MCHC RBC-ENTMCNC: 26.4 PG — LOW (ref 27–34)
MCHC RBC-ENTMCNC: 30 G/DL — LOW (ref 32–36)
MCHC RBC-ENTMCNC: 30.1 G/DL — LOW (ref 32–36)
MCHC RBC-ENTMCNC: 30.2 G/DL — LOW (ref 32–36)
MCHC RBC-ENTMCNC: 30.5 G/DL — LOW (ref 32–36)
MCHC RBC-ENTMCNC: 31.3 G/DL — LOW (ref 32–36)
MCV RBC AUTO: 83.5 FL — SIGNIFICANT CHANGE UP (ref 80–100)
MCV RBC AUTO: 84.3 FL — SIGNIFICANT CHANGE UP (ref 80–100)
MCV RBC AUTO: 85 FL — SIGNIFICANT CHANGE UP (ref 80–100)
MCV RBC AUTO: 86 FL — SIGNIFICANT CHANGE UP (ref 80–100)
MCV RBC AUTO: 86.4 FL — SIGNIFICANT CHANGE UP (ref 80–100)
NIGHT BLUE STAIN TISS: SIGNIFICANT CHANGE UP
PHOSPHATE SERPL-MCNC: 2.7 MG/DL — SIGNIFICANT CHANGE UP (ref 2.5–4.5)
PHOSPHATE SERPL-MCNC: 3.1 MG/DL — SIGNIFICANT CHANGE UP (ref 2.5–4.5)
PHOSPHATE SERPL-MCNC: 4.5 MG/DL — SIGNIFICANT CHANGE UP (ref 2.5–4.5)
PHOSPHATE SERPL-MCNC: 4.6 MG/DL — HIGH (ref 2.5–4.5)
PHOSPHATE SERPL-MCNC: 5 MG/DL — HIGH (ref 2.5–4.5)
PLATELET # BLD AUTO: 233 K/UL — SIGNIFICANT CHANGE UP (ref 150–400)
PLATELET # BLD AUTO: 235 K/UL — SIGNIFICANT CHANGE UP (ref 150–400)
PLATELET # BLD AUTO: 243 K/UL — SIGNIFICANT CHANGE UP (ref 150–400)
PLATELET # BLD AUTO: 272 K/UL — SIGNIFICANT CHANGE UP (ref 150–400)
PLATELET # BLD AUTO: 318 K/UL — SIGNIFICANT CHANGE UP (ref 150–400)
POTASSIUM SERPL-MCNC: 3.7 MMOL/L — SIGNIFICANT CHANGE UP (ref 3.5–5.3)
POTASSIUM SERPL-MCNC: 3.9 MMOL/L — SIGNIFICANT CHANGE UP (ref 3.5–5.3)
POTASSIUM SERPL-MCNC: 4 MMOL/L — SIGNIFICANT CHANGE UP (ref 3.5–5.3)
POTASSIUM SERPL-MCNC: 5.4 MMOL/L — HIGH (ref 3.5–5.3)
POTASSIUM SERPL-MCNC: 5.5 MMOL/L — HIGH (ref 3.5–5.3)
POTASSIUM SERPL-MCNC: 6 MMOL/L — HIGH (ref 3.5–5.3)
POTASSIUM SERPL-SCNC: 3.7 MMOL/L — SIGNIFICANT CHANGE UP (ref 3.5–5.3)
POTASSIUM SERPL-SCNC: 3.9 MMOL/L — SIGNIFICANT CHANGE UP (ref 3.5–5.3)
POTASSIUM SERPL-SCNC: 4 MMOL/L — SIGNIFICANT CHANGE UP (ref 3.5–5.3)
POTASSIUM SERPL-SCNC: 5.4 MMOL/L — HIGH (ref 3.5–5.3)
POTASSIUM SERPL-SCNC: 5.5 MMOL/L — HIGH (ref 3.5–5.3)
POTASSIUM SERPL-SCNC: 6 MMOL/L — HIGH (ref 3.5–5.3)
PROT SERPL-MCNC: 6.9 G/DL — SIGNIFICANT CHANGE UP (ref 6–8.3)
PROT SERPL-MCNC: 7 G/DL — SIGNIFICANT CHANGE UP (ref 6–8.3)
PROT SERPL-MCNC: 7.2 G/DL — SIGNIFICANT CHANGE UP (ref 6–8.3)
PROT SERPL-MCNC: 7.3 G/DL — SIGNIFICANT CHANGE UP (ref 6–8.3)
PROTHROM AB SERPL-ACNC: 16.3 SEC — HIGH (ref 9.8–12.7)
PROTHROM AB SERPL-ACNC: 16.7 SEC — HIGH (ref 9.8–12.7)
PROTHROM AB SERPL-ACNC: 16.7 SEC — HIGH (ref 9.8–12.7)
PROTHROM AB SERPL-ACNC: 17.9 SEC — HIGH (ref 9.8–12.7)
PROTHROM AB SERPL-ACNC: 19 SEC — HIGH (ref 9.8–12.7)
RBC # BLD: 2.58 M/UL — LOW (ref 3.8–5.2)
RBC # BLD: 2.72 M/UL — LOW (ref 3.8–5.2)
RBC # BLD: 2.86 M/UL — LOW (ref 3.8–5.2)
RBC # BLD: 2.94 M/UL — LOW (ref 3.8–5.2)
RBC # BLD: 3.18 M/UL — LOW (ref 3.8–5.2)
RBC # FLD: 15.9 % — SIGNIFICANT CHANGE UP (ref 10.3–16.9)
RBC # FLD: 16 % — SIGNIFICANT CHANGE UP (ref 10.3–16.9)
RBC # FLD: 16.2 % — SIGNIFICANT CHANGE UP (ref 10.3–16.9)
RBC # FLD: 16.6 % — SIGNIFICANT CHANGE UP (ref 10.3–16.9)
RBC # FLD: 16.7 % — SIGNIFICANT CHANGE UP (ref 10.3–16.9)
SODIUM SERPL-SCNC: 126 MMOL/L — LOW (ref 135–145)
SODIUM SERPL-SCNC: 127 MMOL/L — LOW (ref 135–145)
SODIUM SERPL-SCNC: 127 MMOL/L — LOW (ref 135–145)
SODIUM SERPL-SCNC: 133 MMOL/L — LOW (ref 135–145)
SPECIMEN SOURCE: SIGNIFICANT CHANGE UP
WBC # BLD: 10.4 K/UL — SIGNIFICANT CHANGE UP (ref 3.8–10.5)
WBC # BLD: 20 K/UL — HIGH (ref 3.8–10.5)
WBC # BLD: 24.5 K/UL — HIGH (ref 3.8–10.5)
WBC # BLD: 8.9 K/UL — SIGNIFICANT CHANGE UP (ref 3.8–10.5)
WBC # BLD: 9.3 K/UL — SIGNIFICANT CHANGE UP (ref 3.8–10.5)
WBC # FLD AUTO: 10.4 K/UL — SIGNIFICANT CHANGE UP (ref 3.8–10.5)
WBC # FLD AUTO: 20 K/UL — HIGH (ref 3.8–10.5)
WBC # FLD AUTO: 24.5 K/UL — HIGH (ref 3.8–10.5)
WBC # FLD AUTO: 8.9 K/UL — SIGNIFICANT CHANGE UP (ref 3.8–10.5)
WBC # FLD AUTO: 9.3 K/UL — SIGNIFICANT CHANGE UP (ref 3.8–10.5)

## 2017-10-03 PROCEDURE — 71010: CPT | Mod: 26

## 2017-10-03 PROCEDURE — 99291 CRITICAL CARE FIRST HOUR: CPT

## 2017-10-03 PROCEDURE — 99232 SBSQ HOSP IP/OBS MODERATE 35: CPT | Mod: GC

## 2017-10-03 PROCEDURE — 90937 HEMODIALYSIS REPEATED EVAL: CPT | Mod: GC

## 2017-10-03 RX ORDER — INSULIN LISPRO 100/ML
4 VIAL (ML) SUBCUTANEOUS
Qty: 0 | Refills: 0 | Status: DISCONTINUED | OUTPATIENT
Start: 2017-10-03 | End: 2017-10-04

## 2017-10-03 RX ORDER — CALCIUM GLUCONATE 100 MG/ML
1 VIAL (ML) INTRAVENOUS ONCE
Qty: 0 | Refills: 0 | Status: DISCONTINUED | OUTPATIENT
Start: 2017-10-03 | End: 2017-10-04

## 2017-10-03 RX ORDER — INSULIN LISPRO 100/ML
VIAL (ML) SUBCUTANEOUS
Qty: 0 | Refills: 0 | Status: DISCONTINUED | OUTPATIENT
Start: 2017-10-03 | End: 2017-10-03

## 2017-10-03 RX ORDER — INSULIN LISPRO 100/ML
VIAL (ML) SUBCUTANEOUS
Qty: 0 | Refills: 0 | Status: DISCONTINUED | OUTPATIENT
Start: 2017-10-03 | End: 2017-10-04

## 2017-10-03 RX ORDER — DEXTROSE 50 % IN WATER 50 %
25 SYRINGE (ML) INTRAVENOUS ONCE
Qty: 0 | Refills: 0 | Status: DISCONTINUED | OUTPATIENT
Start: 2017-10-03 | End: 2017-10-03

## 2017-10-03 RX ORDER — DEXTROSE 50 % IN WATER 50 %
1 SYRINGE (ML) INTRAVENOUS ONCE
Qty: 0 | Refills: 0 | Status: DISCONTINUED | OUTPATIENT
Start: 2017-10-03 | End: 2017-10-04

## 2017-10-03 RX ORDER — OXYCODONE AND ACETAMINOPHEN 5; 325 MG/1; MG/1
1 TABLET ORAL EVERY 4 HOURS
Qty: 0 | Refills: 0 | Status: DISCONTINUED | OUTPATIENT
Start: 2017-10-03 | End: 2017-10-10

## 2017-10-03 RX ORDER — EPINEPHRINE 0.3 MG/.3ML
0.02 INJECTION INTRAMUSCULAR; SUBCUTANEOUS
Qty: 4 | Refills: 0 | Status: DISCONTINUED | OUTPATIENT
Start: 2017-10-03 | End: 2017-10-03

## 2017-10-03 RX ORDER — CALCIUM GLUCONATE 100 MG/ML
2 VIAL (ML) INTRAVENOUS ONCE
Qty: 0 | Refills: 0 | Status: COMPLETED | OUTPATIENT
Start: 2017-10-03 | End: 2017-10-03

## 2017-10-03 RX ORDER — INSULIN GLARGINE 100 [IU]/ML
20 INJECTION, SOLUTION SUBCUTANEOUS EVERY MORNING
Qty: 0 | Refills: 0 | Status: DISCONTINUED | OUTPATIENT
Start: 2017-10-03 | End: 2017-10-03

## 2017-10-03 RX ORDER — GLUCAGON INJECTION, SOLUTION 0.5 MG/.1ML
1 INJECTION, SOLUTION SUBCUTANEOUS ONCE
Qty: 0 | Refills: 0 | Status: DISCONTINUED | OUTPATIENT
Start: 2017-10-03 | End: 2017-10-04

## 2017-10-03 RX ORDER — INSULIN HUMAN 100 [IU]/ML
10 INJECTION, SOLUTION SUBCUTANEOUS ONCE
Qty: 0 | Refills: 0 | Status: COMPLETED | OUTPATIENT
Start: 2017-10-03 | End: 2017-10-03

## 2017-10-03 RX ORDER — DEXTROSE 50 % IN WATER 50 %
25 SYRINGE (ML) INTRAVENOUS ONCE
Qty: 0 | Refills: 0 | Status: DISCONTINUED | OUTPATIENT
Start: 2017-10-03 | End: 2017-10-04

## 2017-10-03 RX ORDER — INSULIN LISPRO 100/ML
7 VIAL (ML) SUBCUTANEOUS
Qty: 0 | Refills: 0 | Status: DISCONTINUED | OUTPATIENT
Start: 2017-10-03 | End: 2017-10-03

## 2017-10-03 RX ORDER — INSULIN GLARGINE 100 [IU]/ML
20 INJECTION, SOLUTION SUBCUTANEOUS EVERY MORNING
Qty: 0 | Refills: 0 | Status: DISCONTINUED | OUTPATIENT
Start: 2017-10-04 | End: 2017-10-04

## 2017-10-03 RX ORDER — SODIUM CHLORIDE 9 MG/ML
1000 INJECTION, SOLUTION INTRAVENOUS
Qty: 0 | Refills: 0 | Status: DISCONTINUED | OUTPATIENT
Start: 2017-10-03 | End: 2017-10-03

## 2017-10-03 RX ORDER — DEXTROSE 50 % IN WATER 50 %
12.5 SYRINGE (ML) INTRAVENOUS ONCE
Qty: 0 | Refills: 0 | Status: DISCONTINUED | OUTPATIENT
Start: 2017-10-03 | End: 2017-10-03

## 2017-10-03 RX ORDER — DEXTROSE 50 % IN WATER 50 %
50 SYRINGE (ML) INTRAVENOUS ONCE
Qty: 0 | Refills: 0 | Status: COMPLETED | OUTPATIENT
Start: 2017-10-03 | End: 2017-10-03

## 2017-10-03 RX ORDER — GLUCAGON INJECTION, SOLUTION 0.5 MG/.1ML
1 INJECTION, SOLUTION SUBCUTANEOUS ONCE
Qty: 0 | Refills: 0 | Status: DISCONTINUED | OUTPATIENT
Start: 2017-10-03 | End: 2017-10-03

## 2017-10-03 RX ORDER — SODIUM CHLORIDE 9 MG/ML
1000 INJECTION, SOLUTION INTRAVENOUS
Qty: 0 | Refills: 0 | Status: DISCONTINUED | OUTPATIENT
Start: 2017-10-03 | End: 2017-10-04

## 2017-10-03 RX ORDER — DEXTROSE 50 % IN WATER 50 %
1 SYRINGE (ML) INTRAVENOUS ONCE
Qty: 0 | Refills: 0 | Status: DISCONTINUED | OUTPATIENT
Start: 2017-10-03 | End: 2017-10-03

## 2017-10-03 RX ORDER — DEXTROSE 50 % IN WATER 50 %
12.5 SYRINGE (ML) INTRAVENOUS ONCE
Qty: 0 | Refills: 0 | Status: DISCONTINUED | OUTPATIENT
Start: 2017-10-03 | End: 2017-10-04

## 2017-10-03 RX ORDER — PANTOPRAZOLE SODIUM 20 MG/1
40 TABLET, DELAYED RELEASE ORAL
Qty: 0 | Refills: 0 | Status: DISCONTINUED | OUTPATIENT
Start: 2017-10-03 | End: 2017-10-10

## 2017-10-03 RX ADMIN — Medication 30 MILLIGRAM(S): at 01:00

## 2017-10-03 RX ADMIN — OXYCODONE AND ACETAMINOPHEN 1 TABLET(S): 5; 325 TABLET ORAL at 18:38

## 2017-10-03 RX ADMIN — HEPARIN SODIUM 5000 UNIT(S): 5000 INJECTION INTRAVENOUS; SUBCUTANEOUS at 08:36

## 2017-10-03 RX ADMIN — Medication 81 MILLIGRAM(S): at 09:32

## 2017-10-03 RX ADMIN — Medication 1000 MILLIGRAM(S): at 07:00

## 2017-10-03 RX ADMIN — Medication 400 GRAM(S): at 02:00

## 2017-10-03 RX ADMIN — Medication 400 MILLIGRAM(S): at 06:41

## 2017-10-03 RX ADMIN — GABAPENTIN 100 MILLIGRAM(S): 400 CAPSULE ORAL at 08:14

## 2017-10-03 RX ADMIN — INSULIN HUMAN 10 UNIT(S): 100 INJECTION, SOLUTION SUBCUTANEOUS at 02:00

## 2017-10-03 RX ADMIN — SIMVASTATIN 20 MILLIGRAM(S): 20 TABLET, FILM COATED ORAL at 21:27

## 2017-10-03 RX ADMIN — Medication 400 GRAM(S): at 23:00

## 2017-10-03 RX ADMIN — OXYCODONE AND ACETAMINOPHEN 1 TABLET(S): 5; 325 TABLET ORAL at 11:27

## 2017-10-03 RX ADMIN — Medication 7 UNIT(S): at 13:04

## 2017-10-03 RX ADMIN — Medication 50 MILLILITER(S): at 02:00

## 2017-10-03 RX ADMIN — GABAPENTIN 100 MILLIGRAM(S): 400 CAPSULE ORAL at 18:14

## 2017-10-03 RX ADMIN — Medication 25 MILLIGRAM(S): at 21:27

## 2017-10-03 RX ADMIN — OXYCODONE AND ACETAMINOPHEN 1 TABLET(S): 5; 325 TABLET ORAL at 10:13

## 2017-10-03 RX ADMIN — SODIUM CHLORIDE 3 MILLILITER(S): 9 INJECTION INTRAMUSCULAR; INTRAVENOUS; SUBCUTANEOUS at 21:23

## 2017-10-03 RX ADMIN — HEPARIN SODIUM 5000 UNIT(S): 5000 INJECTION INTRAVENOUS; SUBCUTANEOUS at 14:10

## 2017-10-03 RX ADMIN — CEFTAROLINE FOSAMIL 50 MILLIGRAM(S): 600 POWDER, FOR SOLUTION INTRAVENOUS at 18:15

## 2017-10-03 RX ADMIN — SODIUM CHLORIDE 3 MILLILITER(S): 9 INJECTION INTRAMUSCULAR; INTRAVENOUS; SUBCUTANEOUS at 16:11

## 2017-10-03 RX ADMIN — Medication: at 22:00

## 2017-10-03 RX ADMIN — SODIUM CHLORIDE 3 MILLILITER(S): 9 INJECTION INTRAMUSCULAR; INTRAVENOUS; SUBCUTANEOUS at 06:41

## 2017-10-03 RX ADMIN — OXYCODONE AND ACETAMINOPHEN 1 TABLET(S): 5; 325 TABLET ORAL at 21:00

## 2017-10-03 RX ADMIN — Medication 30 MILLIGRAM(S): at 01:15

## 2017-10-03 RX ADMIN — INSULIN GLARGINE 20 UNIT(S): 100 INJECTION, SOLUTION SUBCUTANEOUS at 09:26

## 2017-10-03 RX ADMIN — CEFTAROLINE FOSAMIL 50 MILLIGRAM(S): 600 POWDER, FOR SOLUTION INTRAVENOUS at 06:42

## 2017-10-03 RX ADMIN — SERTRALINE 25 MILLIGRAM(S): 25 TABLET, FILM COATED ORAL at 10:32

## 2017-10-03 RX ADMIN — PANTOPRAZOLE SODIUM 40 MILLIGRAM(S): 20 TABLET, DELAYED RELEASE ORAL at 08:14

## 2017-10-03 RX ADMIN — GABAPENTIN 300 MILLIGRAM(S): 400 CAPSULE ORAL at 21:27

## 2017-10-03 NOTE — PROGRESS NOTE ADULT - SUBJECTIVE AND OBJECTIVE BOX
S.     Ceftaroline Fosamil IVPB 200  DAPTOmycin IVPB 700  rifampin 600  aspirin enteric coated 81  Ceftaroline Fosamil IVPB 200  DAPTOmycin IVPB 700  EPINEPHrine     Infusion 0.02  heparin  Injectable 5000  rifampin 600      Allergies    penicillin (Unknown)  Sular (Unknown)    Intolerances        Vital Signs Last 24 Hrs  T(C): 35.7 (03 Oct 2017 10:07), Max: 36.7 (02 Oct 2017 13:05)  T(F): 96.3 (03 Oct 2017 10:07), Max: 98.1 (02 Oct 2017 13:05)  HR: 82 (03 Oct 2017 11:00) (72 - 981)  BP: 142/56 (02 Oct 2017 17:00) (142/56 - 155/63)  BP(mean): --  RR: 21 (03 Oct 2017 11:00) (7 - 22)  SpO2: 95% (03 Oct 2017 11:00) (95% - 100%)    Physical Exam:  General:  Pulmonary:  Cardiovascular:  Abdominal:  Extremities:  Pulses:   Right:                                                                           Left:  FEM [ ]2+ [ ]1+ [ ] doppler                                            FEM [ ]2+ [ ]1+ [ ] doppler    POP [ ]2+ [ ]1+ [ ] doppler                                            POP [ ]2+ [ ]1+ [ ] doppler    DP [ ]2+ [ ]1+ [ ] doppler                                               DP [ ]2+ [ ]1+ [ ] doppler  PT[ ]2+ [ ]1+ [ ] doppler                                                 PT [ ]2+ [ ]1+ [ ] doppler      LABS:                        7.5    20.0  )-----------( 272      ( 03 Oct 2017 05:32 )             25.0     10-03    127<L>  |  90<L>  |  21  ----------------------------<  183<H>  5.5<H>   |  23  |  3.12<H>    Ca    9.0      03 Oct 2017 05:32  Phos  4.5     10-03  Mg     2.7     10-03    TPro  7.3  /  Alb  1.9<L>  /  TBili  0.8  /  DBili  x   /  AST  31  /  ALT  12  /  AlkPhos  322<H>  10-03    PT/INR - ( 03 Oct 2017 05:32 )   PT: 17.9 sec;   INR: 1.60          PTT - ( 03 Oct 2017 05:32 )  PTT:40.2 sec      RADIOLOGY & ADDITIONAL TESTS: S. 48 y/o F h/o  HTN, HLD, DM II, ESRD on HD, chronic left foot OM (3 years, previous 4 surgeries)  transferred to [facility] from [facility] with known TV IE (MRSA – Bacteremia.) who has failed medical management with aggressive antibiotics.  Patient here at [facility] to be evaluated for surgical intervention.     Pt with negative blood Cx since 9/19/17. She went to OR 10/2/17 for tricuspic valve replacement. Vascular originally consulted for r/o left foot osteo as source of IE. Now consulted for new AVF.  Pt had left AVF placed in [facility] 4 months ago and it never developed.   IR attempted fistulogram but could not pass wire due to atretic cephalic vein.  Pt has left permcath for dialysis. She has right arm PICC line for IV Abx. In addition, she has left cental line and right radial a-line.      Ceftaroline Fosamil IVPB 200  DAPTOmycin IVPB 700  rifampin 600  aspirin enteric coated 81  Ceftaroline Fosamil IVPB 200  DAPTOmycin IVPB 700  EPINEPHrine     Infusion 0.02  heparin  Injectable 5000  rifampin 600      Allergies    penicillin (Unknown)  Sular (Unknown)    Intolerances        Vital Signs Last 24 Hrs  T(C): 35.7 (03 Oct 2017 10:07), Max: 36.7 (02 Oct 2017 13:05)  T(F): 96.3 (03 Oct 2017 10:07), Max: 98.1 (02 Oct 2017 13:05)  HR: 82 (03 Oct 2017 11:00) (72 - 981)  BP: 142/56 (02 Oct 2017 17:00) (142/56 - 155/63)  BP(mean): --  RR: 21 (03 Oct 2017 11:00) (7 - 22)  SpO2: 95% (03 Oct 2017 11:00) (95% - 100%)    Physical Exam:  General: 48 y/o f awake, alert, NAD  Pulmonary:  Cardiovascular:  Abdominal:  Extremities: Left arm healed surgery scars forearm and antecubital fossa. No palpable AVF. No thrill or pulse.    strength 4/4 b/l. Decreased sensation to touch on right 2/2 old CVA.  Right arm: upper arm PICC. Radial a-line.  Pulses: b/l brachial 2+ pulses. Left radial/ulnar/palmar arch triphasic signals.             Right radial perez - biphasic. Ulnar/palmar biphasic.   Right:                                                                           Left:  FEM [ ]2+ [ ]1+ [ ] doppler                                            FEM [ ]2+ [ ]1+ [ ] doppler    POP [ ]2+ [ ]1+ [ ] doppler                                            POP [ ]2+ [ ]1+ [ ] doppler    DP [ ]2+ [ ]1+ [ ] doppler                                               DP [ ]2+ [ ]1+ [ ] doppler  PT[ ]2+ [ ]1+ [ ] doppler                                                 PT [ ]2+ [ ]1+ [ ] doppler      LABS:                        7.5    20.0  )-----------( 272      ( 03 Oct 2017 05:32 )             25.0     10-03    127<L>  |  90<L>  |  21  ----------------------------<  183<H>  5.5<H>   |  23  |  3.12<H>    Ca    9.0      03 Oct 2017 05:32  Phos  4.5     10-03  Mg     2.7     10-03    TPro  7.3  /  Alb  1.9<L>  /  TBili  0.8  /  DBili  x   /  AST  31  /  ALT  12  /  AlkPhos  322<H>  10-03    PT/INR - ( 03 Oct 2017 05:32 )   PT: 17.9 sec;   INR: 1.60          PTT - ( 03 Oct 2017 05:32 )  PTT:40.2 sec      RADIOLOGY & ADDITIONAL TESTS:

## 2017-10-03 NOTE — PROGRESS NOTE ADULT - SUBJECTIVE AND OBJECTIVE BOX
INTERVAL HPI/OVERNIGHT EVENTS:    POD#1 TV replacement  EF 55%    (+)MRSA Bactermia/TV Endocarditis    46yo female with Hx HTN, HLD, DM, CVA, ESRD/HD via Right Femoral HD catheter (9/13/2017 - Gris)/failed LUE AVF, chronic left foot OM transferred for management of MRSA bacteremia/TV endocarditis  TTE/ALCIDES 9/12 and 9/13: questionable worsening/enlarging TV vegetation    CT Head 8/24: evolving right MCA territory infarction. No intracranial hemorrhage.  CTa Head/Neck 8/24: No aneurysm or arteriovenous malformation. Fine calcifications at the bifurcations of both common carotid  arteries, bilateral vertebral arteries and carotid siphon, otherwise unremarkable study.  CT Structural 8/24: Large intraluminal thrombus within the SVC extending into RA. Bilateral pulmonary nodules/mediastinal & hilar adenopathy, likely metastatic. Splenomegaly. Possible thoracic osseous blastic metastasis. Small-to-moderate pericardial effusion.    Repeat TTE 8/24: EF 65%, LA dilated. Trace AR/MR. Thickened TV leaflets - Irregular, shaggy appearing mass seen on the atrial side of the tricuspid valve. large linear independently mobile mass RA protruding through the tricuspid valve in diastole - mild TR  Severe pulm HTN (60 mmHg) . Small pericardial effusion noted. Bubble study (-)right to left shunting.    Carotids 8/24: No evidence of hemodynamically significant stenosis in the visualized  internal carotid and common carotid arteries. Elevated velocity in the left ECA consistent with moderate stenosis.    CT Head 8/25: No significant interval change. (-)hemorrhage/mass. A small hypodense lesion in the left posterior putamen.  CT Brain Stroke 8/27: No acute intracranial hemorrhage, mass effect, or CT evidence of acute transcortical infarction.    9/22 - underwent C6 corpectomy - C5-7 anterior plating   gross purulence reportedly encountered - Op Cx 9/22 - (+)MRSA  Last (+)documented Blood Cx - 8/28           ICU Vital Signs Last 24 Hrs  T(C): 36.3 (03 Oct 2017 04:57), Max: 37.2 (02 Oct 2017 09:00)  T(F): 97.3 (03 Oct 2017 04:57), Max: 98.9 (02 Oct 2017 09:00)  HR: 74 (03 Oct 2017 08:00) (66 - 981)  BP: 142/56 (02 Oct 2017 17:00) (142/56 - 155/63)  ABP: 110/46 (03 Oct 2017 08:00) (98/40 - 176/58)  ABP(mean): 64 (03 Oct 2017 08:00) (52 - 96)  SpO2: 95% (03 Oct 2017 08:00) (95% - 100%)    Qtts:   Epi 0.01  vaso 0.033 (2)  insulin    I&O's Summary    02 Oct 2017 07:01  -  03 Oct 2017 07:00  --------------------------------------------------------  IN: 500.1 mL / OUT: 3605 mL / NET: -3104.9 mL    03 Oct 2017 07:01  -  03 Oct 2017 08:33  --------------------------------------------------------  IN: 29.2 mL / OUT: 0 mL / NET: 29.2 mL    Physical Exam    Heart  Lungs  Abd  Ext  Chest  Neuro  Skin    LABS:                        7.5    20.0  )-----------( 272      ( 03 Oct 2017 05:32 )             25.0     10-03    127<L>  |  90<L>  |  21  ----------------------------<  183<H>  5.5<H>   |  23  |  3.12<H>    Ca    9.0      03 Oct 2017 05:32  Phos  4.5     10-03  Mg     2.7     10-03    TPro  7.3  /  Alb  1.9<L>  /  TBili  0.8  /  DBili  x   /  AST  31  /  ALT  12  /  AlkPhos  322<H>  10-03    PT/INR - ( 03 Oct 2017 05:32 )   PT: 17.9 sec;   INR: 1.60          PTT - ( 03 Oct 2017 05:32 )  PTT:40.2 sec    ABG - ( 03 Oct 2017 05:30 )  pH: 7.38  /  pCO2: 44    /  pO2: 216   / HCO3: 26    / Base Excess: 0.3   /  SaO2: 100       RADIOLOGY & ADDITIONAL STUDIES:  Ortho Op Cx 9/22 - (+)MRSA  Blood Cx 8/28 (+)MRSA  Blood Cx 9/13 ; 9/14 ; 9/19; 9/23 ; 9/25 x 2 negative  Op Cx 10/2 - no growth to date    Patient with 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 CV    I have spent/provided stated minutes of critical care time to this patient: 60 min INTERVAL HPI/OVERNIGHT EVENTS:    POD#1 TV replacement  EF 55%    (+)MRSA Bactermia/TV Endocarditis    48yo female with Hx HTN, HLD, DM, CVA, ESRD/HD prior Right Femoral HD catheter (9/13/2017 - Gris)/failed LUE AVF(per patient never functional) - now with Left SC  catheter, chronic left foot OM transferred for management of MRSA bacteremia/TV endocarditis  TTE/ALCIDES 9/12 and 9/13: questionable worsening/enlarging TV vegetation    CT Head 8/24: evolving right MCA territory infarction. No intracranial hemorrhage.  CTa Head/Neck 8/24: No aneurysm or arteriovenous malformation. Fine calcifications at the bifurcations of both common carotid  arteries, bilateral vertebral arteries and carotid siphon, otherwise unremarkable study.  CT Structural 8/24: Large intraluminal thrombus within the SVC extending into RA. Bilateral pulmonary nodules/mediastinal & hilar adenopathy, likely metastatic. Splenomegaly. Possible thoracic osseous blastic metastasis. Small-to-moderate pericardial effusion.    Repeat TTE 8/24: EF 65%, LA dilated. Trace AR/MR. Thickened TV leaflets - Irregular, shaggy appearing mass seen on the atrial side of the tricuspid valve. large linear independently mobile mass RA protruding through the tricuspid valve in diastole - mild TR  Severe pulm HTN (60 mmHg) . Small pericardial effusion noted. Bubble study (-)right to left shunting.    Carotids 8/24: No evidence of hemodynamically significant stenosis in the visualized  internal carotid and common carotid arteries. Elevated velocity in the left ECA consistent with moderate stenosis.    CT Head 8/25: No significant interval change. (-)hemorrhage/mass. A small hypodense lesion in the left posterior putamen.  CT Brain Stroke 8/27: No acute intracranial hemorrhage, mass effect, or CT evidence of acute transcortical infarction.    9/22 - underwent C6 corpectomy - C5-7 anterior plating   gross purulence reportedly encountered - Op Cx 9/22 - (+)MRSA  Last (+)documented Blood Cx - 8/28     To OR 10/2 - TV replacement - no blood products required intraop  Extubated midnight overnight  Tx medically for hyperkalemia overnight at temporizing measure - now on HD  to get 1 U pRBC with HD session this am    Patient awake/alert and interactive  reports some soreness, but otherwise no complaints    ICU Vital Signs Last 24 Hrs  T(C): 36.3 (03 Oct 2017 04:57), Max: 37.2 (02 Oct 2017 09:00)  T(F): 97.3 (03 Oct 2017 04:57), Max: 98.9 (02 Oct 2017 09:00)  HR: 74 (03 Oct 2017 08:00) (66 - 981) Sinus  BP: 142/56 (02 Oct 2017 17:00) (142/56 - 155/63)  ABP: 110/46 (03 Oct 2017 08:00) (98/40 - 176/58)  SpO2: 95% (03 Oct 2017 08:00) (95% - 100%) on RA    Qtts:   Epi 0.01  vaso 0.033 (2)  insulin    I&O's Summary    02 Oct 2017 07:01  -  03 Oct 2017 07:00  --------------------------------------------------------  IN: 500.1 mL / OUT: 3605 mL / NET: -3104.9 mL    03 Oct 2017 07:01  -  03 Oct 2017 08:33  --------------------------------------------------------  IN: 29.2 mL / OUT: 0 mL / NET: 29.2 mL    Physical Exam    Heart - regular (-)rub/gallop  Lungs - dec BS at bases (-)wheeze/rhonchi  Abd - (+)BS obese/soft (-)r/r/g  Ext - warm to touch ; no cyanosis/clubbing; faint palpable thrill appreciated  Chest - vac dressing in place - left SC tunneled cath  Neuro - alert/oriented and interactive; on HD at this time - moving all extremities to simple commands  Skin - no rash    LABS:                        7.5    20.0  )-----------( 272      ( 03 Oct 2017 05:32 )             25.0     10-03    127<L>  |  90<L>  |  21  ----------------------------<  183<H>  5.5<H>   |  23  |  3.12<H>    Ca    9.0      03 Oct 2017 05:32  Phos  4.5     10-03  Mg     2.7     10-03    TPro  7.3  /  Alb  1.9<L>  /  TBili  0.8  /  DBili  x   /  AST  31  /  ALT  12  /  AlkPhos  322<H>  10-03    PT/INR - ( 03 Oct 2017 05:32 )   PT: 17.9 sec;   INR: 1.60     PTT - ( 03 Oct 2017 05:32 )  PTT:40.2 sec    ABG - ( 03 Oct 2017 05:30 ) 7.38/44/216/100     RADIOLOGY & ADDITIONAL STUDIES:  Ortho Op Cx 9/22 - (+)MRSA  Blood Cx 8/28 (+)MRSA  Blood Cx 9/13 ; 9/14 ; 9/19; 9/23 ; 9/25 x 2 negative  Op Cx 10/2 - no growth to date    Patient with 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 CV  remains on low dose Epi and Vasopressin - titrate down to off - maintain MAP 60  plan transfusion support in HD this am  cont ASA/statin  to d/w Dr Gee - need for systemic AC in light of CTa findings  f/u operative cultures    2. 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  culture data as above  Cont Daptomycin/Ceftaroline and po rifampin  will check surveillance CPK - required weekly while on Dapto - last 9/30 and serial LFT on Rifampin  WBC normal/Afebrile  f/u Operative culture - with hardware in place - lifelong oral suppressive Abx may be required after IV treatment course    I have spent/provided stated minutes of critical care time to this patient: 60 min INTERVAL HPI/OVERNIGHT EVENTS:    POD#1 TV replacement  EF 55%    (+)MRSA Bactermia/TV Endocarditis    48yo female with Hx HTN, HLD, DM, CVA, ESRD/HD prior Right Femoral HD catheter (9/13/2017 - Gris)/failed LUE AVF(per patient never functional) - now with Left SC  catheter, chronic left foot OM transferred for management of MRSA bacteremia/TV endocarditis  TTE/ALCIDES 9/12 and 9/13: questionable worsening/enlarging TV vegetation    CT Head 8/24: evolving right MCA territory infarction. No intracranial hemorrhage.  CTa Head/Neck 8/24: No aneurysm or arteriovenous malformation. Fine calcifications at the bifurcations of both common carotid  arteries, bilateral vertebral arteries and carotid siphon, otherwise unremarkable study.  CT Structural 8/24: Large intraluminal thrombus within the SVC extending into RA. Bilateral pulmonary nodules/mediastinal & hilar adenopathy, likely metastatic. Splenomegaly. Possible thoracic osseous blastic metastasis. Small-to-moderate pericardial effusion.    Repeat TTE 8/24: EF 65%, LA dilated. Trace AR/MR. Thickened TV leaflets - Irregular, shaggy appearing mass seen on the atrial side of the tricuspid valve. large linear independently mobile mass RA protruding through the tricuspid valve in diastole - mild TR  Severe pulm HTN (60 mmHg) . Small pericardial effusion noted. Bubble study (-)right to left shunting.    Carotids 8/24: No evidence of hemodynamically significant stenosis in the visualized  internal carotid and common carotid arteries. Elevated velocity in the left ECA consistent with moderate stenosis.    CT Head 8/25: No significant interval change. (-)hemorrhage/mass. A small hypodense lesion in the left posterior putamen.  CT Brain Stroke 8/27: No acute intracranial hemorrhage, mass effect, or CT evidence of acute transcortical infarction.    9/22 - underwent C6 corpectomy - C5-7 anterior plating   gross purulence reportedly encountered - Op Cx 9/22 - (+)MRSA  Last (+)documented Blood Cx - 8/28     To OR 10/2 - TV replacement - no blood products required intraop  Extubated midnight overnight  Tx medically for hyperkalemia overnight at temporizing measure - now on HD  to get 1 U pRBC with HD session this am    Patient awake/alert and interactive  reports some soreness, but otherwise no complaints    ICU Vital Signs Last 24 Hrs  T(C): 36.3 (03 Oct 2017 04:57), Max: 37.2 (02 Oct 2017 09:00)  T(F): 97.3 (03 Oct 2017 04:57), Max: 98.9 (02 Oct 2017 09:00)  HR: 74 (03 Oct 2017 08:00) (66 - 981) Sinus  BP: 142/56 (02 Oct 2017 17:00) (142/56 - 155/63)  ABP: 110/46 (03 Oct 2017 08:00) (98/40 - 176/58)  SpO2: 95% (03 Oct 2017 08:00) (95% - 100%) on RA    Qtts:   Epi 0.01 - now off  vaso 0.033 (2)  insulin    I&O's Summary    02 Oct 2017 07:01  -  03 Oct 2017 07:00  --------------------------------------------------------  IN: 500.1 mL / OUT: 3605 mL / NET: -3104.9 mL    03 Oct 2017 07:01  -  03 Oct 2017 08:33  --------------------------------------------------------  IN: 29.2 mL / OUT: 0 mL / NET: 29.2 mL    Physical Exam    Heart - regular (-)rub/gallop  Lungs - dec BS at bases (-)wheeze/rhonchi  Abd - (+)BS obese/soft (-)r/r/g  Ext - warm to touch ; no cyanosis/clubbing; faint palpable thrill appreciated  Chest - vac dressing in place - left SC tunneled cath  Neuro - alert/oriented and interactive; on HD at this time - moving all extremities to simple commands  Skin - no rash    LABS:                        7.5    20.0  )-----------( 272      ( 03 Oct 2017 05:32 )             25.0     10-03    127<L>  |  90<L>  |  21  ----------------------------<  183<H>  5.5<H>   |  23  |  3.12<H>    Ca    9.0      03 Oct 2017 05:32  Phos  4.5     10-03  Mg     2.7     10-03    TPro  7.3  /  Alb  1.9<L>  /  TBili  0.8  /  DBili  x   /  AST  31  /  ALT  12  /  AlkPhos  322<H>  10-03    PT/INR - ( 03 Oct 2017 05:32 )   PT: 17.9 sec;   INR: 1.60     PTT - ( 03 Oct 2017 05:32 )  PTT:40.2 sec    ABG - ( 03 Oct 2017 05:30 ) 7.38/44/216/100     RADIOLOGY & ADDITIONAL STUDIES:  Ortho Op Cx 9/22 - (+)MRSA  Blood Cx 8/28 (+)MRSA  Blood Cx 9/13 ; 9/14 ; 9/19; 9/23 ; 9/25 x 2 negative  Op Cx 10/2 - no growth to date    Patient with 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 CV  remains on low dose Epi and Vasopressin - titrate down to off - maintain MAP 60  plan transfusion support in HD this am  cont ASA/statin  to d/w Dr Gee - need for systemic AC in light of CTa findings  f/u operative cultures    2. 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 Renal   getting HD via SC tunneled cath - placed 9/25  prior LUE fistula - non-functional - reportedly assessed by IR (Rebekah prior)  vascular to be consulted to assess access ongoing    4. Endocrine  maintain glycemic control  insulin infusion - to be transitioned off today  Endocrine following  POC testing 100/103/135    5. Ortho  signed off 8/30  to contact ortho to discuss any post-op restrictions - PT etc/follow-up etc - per note 8/29 - WBAT    bowel regimen  pain management  DVT and GI prophylaxis    d/w patient/staff/Renal attending/CTS    I have spent/provided stated minutes of critical care time to this patient: 60 min

## 2017-10-03 NOTE — PROGRESS NOTE ADULT - ASSESSMENT
46 y/o F h/o  HTN, HLD, DM II, ESRD on HD via left permcath, s/p left AVF that failed to develop admitted with IE TV now s/p TVR. Vascular consulted for new AVF creation. Pt is right handed. Will d/w timing with Dr Pierson.  1. No blood draws or IVs in Left arm. Please place DO NOT USE wrist band on left.  2. b/l upper extremity vein mapping.

## 2017-10-03 NOTE — PROGRESS NOTE ADULT - ATTENDING COMMENTS
Pt seen with Dr. Marie on rounds this afternoon.  Required insulin drip overnight, was converted to her previous Lantus dose this morning.  Can also resume the 7 units pre-meal Humalog, but need to hold this (and give only sliding scale coverage) if the pt is unable to eat.

## 2017-10-03 NOTE — PROGRESS NOTE ADULT - PROBLEM SELECTOR PLAN 2
Likely due to anemia of chronic disease  Check Iron studies.  Will give epo during next HD treatment.

## 2017-10-03 NOTE — CHART NOTE - NSCHARTNOTEFT_GEN_A_CORE
Hyperkalemia:    repeat K+ level at 0255 hrs : 5.4;  Pt has no ectopy/pauses and is hemodynamically stable.  Called back Renal fellow, Dr Shultz; Agreed on providing dialysis treatment @ 7am; or earlier if potassium levels go up further.  Will re check lytes.    Carlos Ortiz,  Intensivist/CTICU

## 2017-10-03 NOTE — PROGRESS NOTE ADULT - SUBJECTIVE AND OBJECTIVE BOX
Patient was seen and evaluated on dialysis.   Patient is tolerating the procedure well.   HR: 72 (10-03-17 @ 09:00)  BP: 142/56 (10-02-17 @ 17:00)  Continue dialysis:   Dialyzer:  180 Optiflux        QB: 400       QD: 500   2K+  Goal UF 1.5 Kg over 3 Hours

## 2017-10-03 NOTE — PROGRESS NOTE ADULT - SUBJECTIVE AND OBJECTIVE BOX
Patient is a 47y Female seen and evaluated at bedside. Pateint underwent Tricuspid valve replacement yesterday for endocarditis. Denies any chest pain, shortness of breath, palpitations, dizziness at present. Patient denies any other complaints at present. Patient had HD treatment with UF goal of 3kg yesterday and tolerated procedure well.       acetaminophen    Suspension. 650 every 6 hours PRN  acetaminophen  IVPB. 1000 once  aspirin enteric coated 81 daily  Ceftaroline Fosamil IVPB 200 every 12 hours  DAPTOmycin IVPB 700 every 48 hours  dextrose 5%. 1000 <Continuous>  dextrose 50% Injectable 12.5 once  dextrose 50% Injectable 25 once  dextrose 50% Injectable 25 once  dextrose Gel 1 once PRN  EPINEPHrine     Infusion 0.02 <Continuous>  gabapentin 100 two times a day  gabapentin 300 at bedtime  glucagon  Injectable 1 once PRN  heparin  Injectable 5000 every 8 hours  insulin glargine Injectable (LANTUS) 20 every morning  insulin Infusion 1 <Continuous>  insulin lispro (HumaLOG) corrective regimen sliding scale  Before meals and at bedtime  insulin lispro Injectable (HumaLOG) 7 three times a day before meals  oxyCODONE    5 mG/acetaminophen 325 mG 1 every 4 hours PRN  pantoprazole    Tablet 40 before breakfast  polyethylene glycol 3350 17 daily  rifampin 600 daily  sertraline 25 daily  simvastatin 20 at bedtime  sodium chloride 0.45%. 1000 <Continuous>  sodium chloride 0.9% lock flush 3 every 8 hours  traZODone 25 at bedtime  vasopressin Infusion 0.033 <Continuous>      Allergies    penicillin (Unknown)  Sular (Unknown)    Intolerances        T(C): , Max: 36.7 (10-02-17 @ 13:05)  T(F): , Max: 98.1 (10-02-17 @ 13:05)  HR: 72 (10-03-17 @ 09:00)  BP: 142/56 (10-02-17 @ 17:00)  BP(mean): --  RR: 18 (10-03-17 @ 09:00)  SpO2: 98% (10-03-17 @ 09:00)  Wt(kg): --    10-02 @ 07:01  -  10-03 @ 07:00  --------------------------------------------------------  IN: 500.1 mL / OUT: 3605 mL / NET: -3104.9 mL    10-03 @ 07:01  -  10-03 @ 10:42  --------------------------------------------------------  IN: 337.2 mL / OUT: 0 mL / NET: 337.2 mL      Height (cm): 177.8 (10-02 @ 16:01)  Weight (kg): 86.1 (10-02 @ 16:01)  BMI (kg/m2): 27.2 (10-02 @ 16:01)  BSA (m2): 2.04 (10-02 @ 16:01)    Review of Systems:  CONSTITUTIONAL: No fever or chills, No fatigue or tiredness.  EYES: No blurred or double vision.  RESPIRATORY: Mild shortness of breath, Denies any cough, hemoptysis  CARDIOVASCULAR: Mild chest discomfort, denies any shortness of breath, palpitations, dizziness  GASTROINTESTINAL: NO abdominal or flank pain, No nausea or vomiting, No diarrhea  GENITOURINARY: No dysuria or urinary burning, No difficulty passing urine, No hematuria  NEUROLOGICAL: No headaches or blurred vision  SKIN: No skin rashes   MUSCULOSKELETAL: No arthralgia, Joint pain, leg edema, No muscle pains      PHYSICAL EXAM:  GENERAL: NAD, well-developed, well nourished, alert, awake, no acute distress at present  HEAD:  Atraumatic, Normocephalic,   EYES: Bilateral conjuctiva and sclera normal   Oral cavity: Oral mucosa dry and pale  NECK: Neck supple, No JVD  CHEST/LUNG: Bilateral decreased breath sounds, Bibasilar minimal crepitations, no wheezing, no rhonchi, Surgical scar+nt,   HEART: Regular rate and rhythm. ALBARO II/VI at LPSB, No gallop, no rub   ABDOMEN: Soft, Nontender, BS+nt, No flank tenderness.   EXTREMITIES: No clubbing, cyanosis, or edema  Neurology: AAOx3, no focal neurological deficit  SKIN: No rashes or lesions          ACCESS:     LABS:                        7.5    20.0  )-----------( 272      ( 03 Oct 2017 05:32 )             25.0     10-03    127<L>  |  90<L>  |  21  ----------------------------<  183<H>  5.5<H>   |  23  |  3.12<H>    Ca    9.0      03 Oct 2017 05:32  Phos  4.5     10-03  Mg     2.7     10-03    TPro  7.3  /  Alb  1.9<L>  /  TBili  0.8  /  DBili  x   /  AST  31  /  ALT  12  /  AlkPhos  322<H>  10-03      PT/INR - ( 03 Oct 2017 05:32 )   PT: 17.9 sec;   INR: 1.60          PTT - ( 03 Oct 2017 05:32 )  PTT:40.2 sec          RADIOLOGY & ADDITIONAL STUDIES:    < from: Xray Chest 1 View AP -PORTABLE-Routine (10.03.17 @ 04:39) >    EXAM:  XR CHEST 1 VIEW PORT ROUTINE                          PROCEDURE DATE:  10/03/2017                     INTERPRETATION:  INDICATION: Follow Up. Status post median sternotomy.   Pneumomediastinum.    TECHNIQUE: Chest, single portable AP view on 10/3/2017 4:39 AM     COMPARISON: Chest radiographs 10/2/2017    FINDINGS:  The right PICC line tip, left IJ central venous catheter and dialysis   catheters are unchanged in positions. The endotracheal tube has been   removed. There are at least 3 small caliber mediastinal drainage   catheters and a large bore right-sided mediastinal drainage catheter in   place.  Median sternotomy wires are unchanged in alignment.  Redemonstrated is a pericardial gas density along the left heart border.  Thereis interval worsening of diffuse bilateral airspace disease, mostly   secondary to polio congestion. Obscuration of the left hemidiaphragm   likely due to increasing effusion. No evidence of pneumothorax.        IMPRESSION:   1. Pneumomediastinum along the left heart border.    2. Interval worsening of diffuse bilateral airspace disease and   increasing pleural effusions.    3. Status post extubation. Unchanged positions of indwelling central   venous and dialysis catheters; and mediastinal drainage catheters            "Thank you for the opportunity to participate in the care of this   patient."        LUIS ANGEL KOVACS M.D., ATTENDING RADIOLOGIST  This document has been electronically signed. Oct  3 2017  9:33AM                  < end of copied text > Patient is a 47y Female seen and evaluated at bedside. Pateint underwent Tricuspid valve replacement yesterday for endocarditis. Denies any chest pain, shortness of breath, palpitations, dizziness at present. Patient denies any other complaints at present. Patient had HD treatment with UF goal of 3kg yesterday and tolerated procedure well.       acetaminophen    Suspension. 650 every 6 hours PRN  acetaminophen  IVPB. 1000 once  aspirin enteric coated 81 daily  Ceftaroline Fosamil IVPB 200 every 12 hours  DAPTOmycin IVPB 700 every 48 hours  dextrose 5%. 1000 <Continuous>  dextrose 50% Injectable 12.5 once  dextrose 50% Injectable 25 once  dextrose 50% Injectable 25 once  dextrose Gel 1 once PRN  EPINEPHrine     Infusion 0.02 <Continuous>  gabapentin 100 two times a day  gabapentin 300 at bedtime  glucagon  Injectable 1 once PRN  heparin  Injectable 5000 every 8 hours  insulin glargine Injectable (LANTUS) 20 every morning  insulin Infusion 1 <Continuous>  insulin lispro (HumaLOG) corrective regimen sliding scale  Before meals and at bedtime  insulin lispro Injectable (HumaLOG) 7 three times a day before meals  oxyCODONE    5 mG/acetaminophen 325 mG 1 every 4 hours PRN  pantoprazole    Tablet 40 before breakfast  polyethylene glycol 3350 17 daily  rifampin 600 daily  sertraline 25 daily  simvastatin 20 at bedtime  sodium chloride 0.45%. 1000 <Continuous>  sodium chloride 0.9% lock flush 3 every 8 hours  traZODone 25 at bedtime  vasopressin Infusion 0.033 <Continuous>      Allergies    penicillin (Unknown)  Sular (Unknown)    Intolerances        T(C): , Max: 36.7 (10-02-17 @ 13:05)  T(F): , Max: 98.1 (10-02-17 @ 13:05)  HR: 72 (10-03-17 @ 09:00)  BP: 142/56 (10-02-17 @ 17:00)  BP(mean): --  RR: 18 (10-03-17 @ 09:00)  SpO2: 98% (10-03-17 @ 09:00)  Wt(kg): --    10-02 @ 07:01  -  10-03 @ 07:00  --------------------------------------------------------  IN: 500.1 mL / OUT: 3605 mL / NET: -3104.9 mL    10-03 @ 07:01  -  10-03 @ 10:42  --------------------------------------------------------  IN: 337.2 mL / OUT: 0 mL / NET: 337.2 mL      Height (cm): 177.8 (10-02 @ 16:01)  Weight (kg): 86.1 (10-02 @ 16:01)  BMI (kg/m2): 27.2 (10-02 @ 16:01)  BSA (m2): 2.04 (10-02 @ 16:01)    Review of Systems:  CONSTITUTIONAL: No fever or chills, No fatigue or tiredness.  EYES: No blurred or double vision.  RESPIRATORY: Mild shortness of breath, Denies any cough, hemoptysis  CARDIOVASCULAR: Mild chest discomfort, denies any shortness of breath, palpitations, dizziness  GASTROINTESTINAL: NO abdominal or flank pain, No nausea or vomiting, No diarrhea  GENITOURINARY: No dysuria or urinary burning, No difficulty passing urine, No hematuria  NEUROLOGICAL: No headaches or blurred vision  SKIN: No skin rashes   MUSCULOSKELETAL: No arthralgia, Joint pain, leg edema, No muscle pains      PHYSICAL EXAM:  GENERAL: NAD, well-developed, well nourished, alert, awake, no acute distress at present  HEAD:  Atraumatic, Normocephalic,   EYES: Bilateral conjuctiva and sclera normal   Oral cavity: Oral mucosa dry and pale  NECK: Neck supple, No JVD, Left sided HD catheter present no active bleeding or signs of infection.  CHEST/LUNG: Bilateral decreased breath sounds, Bibasilar minimal crepitations, no wheezing, no rhonchi, Surgical scar+nt,   HEART: Regular rate and rhythm. ALBARO II/VI at LPSB, No gallop, no rub   ABDOMEN: Soft, Nontender, BS+nt, No flank tenderness.   EXTREMITIES: No clubbing, cyanosis, or edema  Neurology: AAOx3, no focal neurological deficit  SKIN: No rashes or lesions          ACCESS:     LABS:                        7.5    20.0  )-----------( 272      ( 03 Oct 2017 05:32 )             25.0     10-03    127<L>  |  90<L>  |  21  ----------------------------<  183<H>  5.5<H>   |  23  |  3.12<H>    Ca    9.0      03 Oct 2017 05:32  Phos  4.5     10-03  Mg     2.7     10-03    TPro  7.3  /  Alb  1.9<L>  /  TBili  0.8  /  DBili  x   /  AST  31  /  ALT  12  /  AlkPhos  322<H>  10-03      PT/INR - ( 03 Oct 2017 05:32 )   PT: 17.9 sec;   INR: 1.60          PTT - ( 03 Oct 2017 05:32 )  PTT:40.2 sec          RADIOLOGY & ADDITIONAL STUDIES:    < from: Xray Chest 1 View AP -PORTABLE-Routine (10.03.17 @ 04:39) >    EXAM:  XR CHEST 1 VIEW PORT ROUTINE                          PROCEDURE DATE:  10/03/2017                     INTERPRETATION:  INDICATION: Follow Up. Status post median sternotomy.   Pneumomediastinum.    TECHNIQUE: Chest, single portable AP view on 10/3/2017 4:39 AM     COMPARISON: Chest radiographs 10/2/2017    FINDINGS:  The right PICC line tip, left IJ central venous catheter and dialysis   catheters are unchanged in positions. The endotracheal tube has been   removed. There are at least 3 small caliber mediastinal drainage   catheters and a large bore right-sided mediastinal drainage catheter in   place.  Median sternotomy wires are unchanged in alignment.  Redemonstrated is a pericardial gas density along the left heart border.  Thereis interval worsening of diffuse bilateral airspace disease, mostly   secondary to polio congestion. Obscuration of the left hemidiaphragm   likely due to increasing effusion. No evidence of pneumothorax.        IMPRESSION:   1. Pneumomediastinum along the left heart border.    2. Interval worsening of diffuse bilateral airspace disease and   increasing pleural effusions.    3. Status post extubation. Unchanged positions of indwelling central   venous and dialysis catheters; and mediastinal drainage catheters            "Thank you for the opportunity to participate in the care of this   patient."        LUIS ANGEL KOVACS M.D., ATTENDING RADIOLOGIST  This document has been electronically signed. Oct  3 2017  9:33AM                  < end of copied text > Patient is a 47y Female seen and evaluated at bedside. Pateint underwent Tricuspid valve replacement yesterday for endocarditis. Denies any chest pain, shortness of breath, palpitations, dizziness at present. Patient denies any other complaints at present. Patient had HD treatment with UF goal of 3kg yesterday and tolerated procedure well.       acetaminophen    Suspension. 650 every 6 hours PRN  acetaminophen  IVPB. 1000 once  aspirin enteric coated 81 daily  Ceftaroline Fosamil IVPB 200 every 12 hours  DAPTOmycin IVPB 700 every 48 hours  dextrose 5%. 1000 <Continuous>  dextrose 50% Injectable 12.5 once  dextrose 50% Injectable 25 once  dextrose 50% Injectable 25 once  dextrose Gel 1 once PRN  EPINEPHrine     Infusion 0.02 <Continuous>  gabapentin 100 two times a day  gabapentin 300 at bedtime  glucagon  Injectable 1 once PRN  heparin  Injectable 5000 every 8 hours  insulin glargine Injectable (LANTUS) 20 every morning  insulin Infusion 1 <Continuous>  insulin lispro (HumaLOG) corrective regimen sliding scale  Before meals and at bedtime  insulin lispro Injectable (HumaLOG) 7 three times a day before meals  oxyCODONE    5 mG/acetaminophen 325 mG 1 every 4 hours PRN  pantoprazole    Tablet 40 before breakfast  polyethylene glycol 3350 17 daily  rifampin 600 daily  sertraline 25 daily  simvastatin 20 at bedtime  sodium chloride 0.45%. 1000 <Continuous>  sodium chloride 0.9% lock flush 3 every 8 hours  traZODone 25 at bedtime  vasopressin Infusion 0.033 <Continuous>      Allergies    penicillin (Unknown)  Sular (Unknown)    Intolerances        T(C): , Max: 36.7 (10-02-17 @ 13:05)  T(F): , Max: 98.1 (10-02-17 @ 13:05)  HR: 72 (10-03-17 @ 09:00)  BP: 142/56 (10-02-17 @ 17:00)  BP(mean): --  RR: 18 (10-03-17 @ 09:00)  SpO2: 98% (10-03-17 @ 09:00)  Wt(kg): --    10-02 @ 07:01  -  10-03 @ 07:00  --------------------------------------------------------  IN: 500.1 mL / OUT: 3605 mL / NET: -3104.9 mL    10-03 @ 07:01  -  10-03 @ 10:42  --------------------------------------------------------  IN: 337.2 mL / OUT: 0 mL / NET: 337.2 mL      Height (cm): 177.8 (10-02 @ 16:01)  Weight (kg): 86.1 (10-02 @ 16:01)  BMI (kg/m2): 27.2 (10-02 @ 16:01)  BSA (m2): 2.04 (10-02 @ 16:01)    Review of Systems:  CONSTITUTIONAL: No fever or chills, No fatigue or tiredness.  EYES: No blurred or double vision.  RESPIRATORY: Mild shortness of breath, Denies any cough, hemoptysis  CARDIOVASCULAR: Mild chest discomfort, denies any shortness of breath, palpitations, dizziness  GASTROINTESTINAL: NO abdominal or flank pain, No nausea or vomiting, No diarrhea  GENITOURINARY: No dysuria or urinary burning, No difficulty passing urine, No hematuria  NEUROLOGICAL: No headaches or blurred vision  SKIN: No skin rashes   MUSCULOSKELETAL: No arthralgia, Joint pain, leg edema, No muscle pains      PHYSICAL EXAM:  GENERAL: NAD, well-developed, well nourished, alert, awake, no acute distress at present  HEAD:  Atraumatic, Normocephalic,   EYES: Bilateral conjuctiva and sclera normal   Oral cavity: Oral mucosa dry and pale  NECK: Neck supple, No JVD, Left sided HD catheter present no active bleeding or signs of infection.  CHEST/LUNG: Bilateral decreased breath sounds, Bibasilar minimal crepitations, no wheezing, no rhonchi, Surgical scar+nt,   HEART: Regular rate and rhythm. ALBARO II/VI at LPSB, No gallop, no rub   ABDOMEN: Soft, Nontender, BS+nt, No flank tenderness.   EXTREMITIES: No clubbing, cyanosis, or edema, Right arm PICC line present, Right sided A-line noted.  Neurology: AAOx3, no focal neurological deficit  SKIN: No rashes or lesions          ACCESS:     LABS:                        7.5    20.0  )-----------( 272      ( 03 Oct 2017 05:32 )             25.0     10-03    127<L>  |  90<L>  |  21  ----------------------------<  183<H>  5.5<H>   |  23  |  3.12<H>    Ca    9.0      03 Oct 2017 05:32  Phos  4.5     10-03  Mg     2.7     10-03    TPro  7.3  /  Alb  1.9<L>  /  TBili  0.8  /  DBili  x   /  AST  31  /  ALT  12  /  AlkPhos  322<H>  10-03      PT/INR - ( 03 Oct 2017 05:32 )   PT: 17.9 sec;   INR: 1.60          PTT - ( 03 Oct 2017 05:32 )  PTT:40.2 sec          RADIOLOGY & ADDITIONAL STUDIES:    < from: Xray Chest 1 View AP -PORTABLE-Routine (10.03.17 @ 04:39) >    EXAM:  XR CHEST 1 VIEW PORT ROUTINE                          PROCEDURE DATE:  10/03/2017                     INTERPRETATION:  INDICATION: Follow Up. Status post median sternotomy.   Pneumomediastinum.    TECHNIQUE: Chest, single portable AP view on 10/3/2017 4:39 AM     COMPARISON: Chest radiographs 10/2/2017    FINDINGS:  The right PICC line tip, left IJ central venous catheter and dialysis   catheters are unchanged in positions. The endotracheal tube has been   removed. There are at least 3 small caliber mediastinal drainage   catheters and a large bore right-sided mediastinal drainage catheter in   place.  Median sternotomy wires are unchanged in alignment.  Redemonstrated is a pericardial gas density along the left heart border.  Thereis interval worsening of diffuse bilateral airspace disease, mostly   secondary to polio congestion. Obscuration of the left hemidiaphragm   likely due to increasing effusion. No evidence of pneumothorax.        IMPRESSION:   1. Pneumomediastinum along the left heart border.    2. Interval worsening of diffuse bilateral airspace disease and   increasing pleural effusions.    3. Status post extubation. Unchanged positions of indwelling central   venous and dialysis catheters; and mediastinal drainage catheters            "Thank you for the opportunity to participate in the care of this   patient."        LUIS ANGEL KOVACS M.D., ATTENDING RADIOLOGIST  This document has been electronically signed. Oct  3 2017  9:33AM                  < end of copied text >

## 2017-10-03 NOTE — PROGRESS NOTE ADULT - ASSESSMENT
Patient is a 47 year old female with a history of hypertension, hyperlipidemia, DM II, ESRD on HD M/W/F, endocarditis, thrombus in the right heart, hypertension, diabetes, and hyperlipidemia whom presented to the hospital from [facility]. Nephrology consult called for HD treatment.

## 2017-10-03 NOTE — PROGRESS NOTE ADULT - PROBLEM SELECTOR PLAN 4
BP range from 120 to 130's range at present.  Antihypertensive medications on hold for now per primary team.   Monitor blood pressure for now.   Avoid hypotension.

## 2017-10-03 NOTE — PROGRESS NOTE ADULT - SUBJECTIVE AND OBJECTIVE BOX
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 (13 Sep 2017 22:57).  On this admission, patient was also found to have septic emboli, cervical osteomyelitis and abscess s/p corpectomy and anterior plating POD #4. Patient is s/p TVR on 10/2/2017. Prior to TVR, patient was hypoglycemia, and D50% was provided.     Patient did not have dinner last night. Today she had glucerna for lunch.     CONSTITUTIONAL:  Negative fever or chills, feels well, good appetite  EYES:  Negative  blurry vision or double vision  CARDIOVASCULAR:  Negative for chest pain or palpitations  RESPIRATORY:  Negative for cough, wheezing, or SOB   GASTROINTESTINAL:  Negative for nausea, vomiting, diarrhea, constipation, or abdominal pain  GENITOURINARY:  Negative frequency, urgency or dysuria  NEUROLOGIC:  No headache, confusion, dizziness, lightheadedness    MEDICATIONS  (STANDING):  acetaminophen  IVPB. 1000 milliGRAM(s) IV Intermittent once  aspirin enteric coated 81 milliGRAM(s) Oral daily  Ceftaroline Fosamil IVPB 200 milliGRAM(s) IV Intermittent every 12 hours  DAPTOmycin IVPB 700 milliGRAM(s) IV Intermittent every 48 hours  dextrose 5%. 1000 milliLiter(s) (50 mL/Hr) IV Continuous <Continuous>  dextrose 50% Injectable 12.5 Gram(s) IV Push once  dextrose 50% Injectable 25 Gram(s) IV Push once  dextrose 50% Injectable 25 Gram(s) IV Push once  EPINEPHrine     Infusion 0.02 MICROgram(s)/kG/Min (6.457 mL/Hr) IV Continuous <Continuous>  gabapentin 100 milliGRAM(s) Oral two times a day  gabapentin 300 milliGRAM(s) Oral at bedtime  heparin  Injectable 5000 Unit(s) SubCutaneous every 8 hours  insulin glargine Injectable (LANTUS) 20 Unit(s) SubCutaneous every morning  insulin Infusion 1 Unit(s)/Hr (1 mL/Hr) IV Continuous <Continuous>  insulin lispro (HumaLOG) corrective regimen sliding scale   SubCutaneous Before meals and at bedtime  insulin lispro Injectable (HumaLOG) 7 Unit(s) SubCutaneous three times a day before meals  pantoprazole    Tablet 40 milliGRAM(s) Oral before breakfast  polyethylene glycol 3350 17 Gram(s) Oral daily  rifampin 600 milliGRAM(s) Oral daily  sertraline 25 milliGRAM(s) Oral daily  simvastatin 20 milliGRAM(s) Oral at bedtime  sodium chloride 0.45%. 1000 milliLiter(s) (10 mL/Hr) IV Continuous <Continuous>  sodium chloride 0.9% lock flush 3 milliLiter(s) IV Push every 8 hours  traZODone 25 milliGRAM(s) Oral at bedtime  vasopressin Infusion 0.033 Unit(s)/Min (2 mL/Hr) IV Continuous <Continuous>    MEDICATIONS  (PRN):  acetaminophen    Suspension. 650 milliGRAM(s) Oral every 6 hours PRN Mild Pain (1 - 3)  dextrose Gel 1 Dose(s) Oral once PRN Blood Glucose LESS THAN 70 milliGRAM(s)/deciliter  glucagon  Injectable 1 milliGRAM(s) IntraMuscular once PRN Glucose LESS THAN 70 milligrams/deciliter  oxyCODONE    5 mG/acetaminophen 325 mG 1 Tablet(s) Oral every 4 hours PRN Moderate Pain (4 - 6)      PHYSICAL EXAM  Vital Signs Last 24 Hrs  T(C): 35.7 (03 Oct 2017 10:07), Max: 36.7 (02 Oct 2017 13:05)  T(F): 96.3 (03 Oct 2017 10:07), Max: 98.1 (02 Oct 2017 13:05)  HR: 82 (03 Oct 2017 11:00) (70 - 981)  BP: 142/56 (02 Oct 2017 17:00) (142/56 - 155/63)  BP(mean): --  RR: 21 (03 Oct 2017 11:00) (7 - 22)  SpO2: 95% (03 Oct 2017 11:00) (95% - 100%)    Constitutional: wn/wd in NAD.   HEENT: NCAT, MMM, OP clear, EOMI, no proptosis or lid retraction  Neck: no thyromegaly or palpable thyroid nodules   Respiratory: lungs CTAB.  Cardiovascular: regular rhythm, normal S1 and S2, no audible murmurs, no peripheral edema  GI: soft, NT/ND, no masses/HSM appreciated.  Neurology: no tremors, DTR 2+  Skin: s/p open heart surgery, bandage clean  Psychiatric: AAO x 3, normal affect/mood.    LABS:                        7.5    20.0  )-----------( 272      ( 03 Oct 2017 05:32 )             25.0     10-03    127<L>  |  90<L>  |  21  ----------------------------<  183<H>  5.5<H>   |  23  |  3.12<H>    Ca    9.0      03 Oct 2017 05:32  Phos  4.5     10-03  Mg     2.7     10-03    TPro  7.3  /  Alb  1.9<L>  /  TBili  0.8  /  DBili  x   /  AST  31  /  ALT  12  /  AlkPhos  322<H>  10-03    PT/INR - ( 03 Oct 2017 05:32 )   PT: 17.9 sec;   INR: 1.60          PTT - ( 03 Oct 2017 05:32 )  PTT:40.2 sec    Thyroid Stimulating Hormone, Serum: 2.447 uIU/mL (09-13 @ 22:51)  Thyroid Stimulating Hormone, Serum: 1.251 uIU/mL (08-24 @ 01:12)      HbA1C: 8.2 % (09-13 @ 22:51)  8.2 % (08-24 @ 01:11)    CAPILLARY BLOOD GLUCOSE  100 (03 Oct 2017 08:00)  103 (03 Oct 2017 07:00)  135 (03 Oct 2017 06:00)  203 (03 Oct 2017 05:00)  215 (03 Oct 2017 04:00)  142 (02 Oct 2017 22:30)      Insulin Sliding Scale requirements X 24 Hours:    RADIOLOGY & ADDITIONAL TESTS:    A/P: 47y Female with history of DM type II presenting for DM type II presenting for MRSA 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  DM 2, uncontrolled, complicated - patient with poor diabetic history and multiple diabetic complications. A1c is likely higher given that patient is on Procrit and has high cell turnover. Patient also seems to be non compliance with her diabetes diet.   - Continue insulin drip for now  - Transition to Lantus.....units at bedtime. Continue Lispro .................. units with meals. Please continue moderate dose sliding scale lispro coverage    Goal FSG is 120 - 180  Will continue to monitor   For discharge, pt can continue    Pt can follow up at discharge with [facility] by calling  to make an appointment.   Will discuss case with Dr. Ross, Dr. Yoon and update primary team INTERVAL HPI/OVERNIGHT EVENTS:    Patient is a 47y old  Female who presents with a chief complaint of TV IE / MRSA Bacteremia. (13 Sep 2017 22:57).  On this admission, patient was also found to have septic emboli, cervical osteomyelitis and abscess s/p corpectomy and anterior plating POD #4. Patient is s/p TVR on 10/2/2017. Prior to TVR, patient was hypoglycemia, and D50% was provided.     Patient did not have dinner last night. Today she had glucerna and chicken soup for lunch.     CONSTITUTIONAL:  Negative fever or chills, feels well, good appetite  EYES:  Negative  blurry vision or double vision  CARDIOVASCULAR:  Negative for chest pain or palpitations  RESPIRATORY:  Negative for cough, wheezing, or SOB   GASTROINTESTINAL:  Negative for nausea, vomiting, diarrhea, constipation, or abdominal pain  GENITOURINARY:  Negative frequency, urgency or dysuria  NEUROLOGIC:  No headache, confusion, dizziness, lightheadedness    MEDICATIONS  (STANDING):  acetaminophen  IVPB. 1000 milliGRAM(s) IV Intermittent once  aspirin enteric coated 81 milliGRAM(s) Oral daily  Ceftaroline Fosamil IVPB 200 milliGRAM(s) IV Intermittent every 12 hours  DAPTOmycin IVPB 700 milliGRAM(s) IV Intermittent every 48 hours  dextrose 5%. 1000 milliLiter(s) (50 mL/Hr) IV Continuous <Continuous>  dextrose 50% Injectable 12.5 Gram(s) IV Push once  dextrose 50% Injectable 25 Gram(s) IV Push once  dextrose 50% Injectable 25 Gram(s) IV Push once  EPINEPHrine     Infusion 0.02 MICROgram(s)/kG/Min (6.457 mL/Hr) IV Continuous <Continuous>  gabapentin 100 milliGRAM(s) Oral two times a day  gabapentin 300 milliGRAM(s) Oral at bedtime  heparin  Injectable 5000 Unit(s) SubCutaneous every 8 hours  insulin glargine Injectable (LANTUS) 20 Unit(s) SubCutaneous every morning  insulin Infusion 1 Unit(s)/Hr (1 mL/Hr) IV Continuous <Continuous>  insulin lispro (HumaLOG) corrective regimen sliding scale   SubCutaneous Before meals and at bedtime  insulin lispro Injectable (HumaLOG) 7 Unit(s) SubCutaneous three times a day before meals  pantoprazole    Tablet 40 milliGRAM(s) Oral before breakfast  polyethylene glycol 3350 17 Gram(s) Oral daily  rifampin 600 milliGRAM(s) Oral daily  sertraline 25 milliGRAM(s) Oral daily  simvastatin 20 milliGRAM(s) Oral at bedtime  sodium chloride 0.45%. 1000 milliLiter(s) (10 mL/Hr) IV Continuous <Continuous>  sodium chloride 0.9% lock flush 3 milliLiter(s) IV Push every 8 hours  traZODone 25 milliGRAM(s) Oral at bedtime  vasopressin Infusion 0.033 Unit(s)/Min (2 mL/Hr) IV Continuous <Continuous>    MEDICATIONS  (PRN):  acetaminophen    Suspension. 650 milliGRAM(s) Oral every 6 hours PRN Mild Pain (1 - 3)  dextrose Gel 1 Dose(s) Oral once PRN Blood Glucose LESS THAN 70 milliGRAM(s)/deciliter  glucagon  Injectable 1 milliGRAM(s) IntraMuscular once PRN Glucose LESS THAN 70 milligrams/deciliter  oxyCODONE    5 mG/acetaminophen 325 mG 1 Tablet(s) Oral every 4 hours PRN Moderate Pain (4 - 6)      PHYSICAL EXAM  Vital Signs Last 24 Hrs  T(C): 35.7 (03 Oct 2017 10:07), Max: 36.7 (02 Oct 2017 13:05)  T(F): 96.3 (03 Oct 2017 10:07), Max: 98.1 (02 Oct 2017 13:05)  HR: 82 (03 Oct 2017 11:00) (70 - 981)  BP: 142/56 (02 Oct 2017 17:00) (142/56 - 155/63)  BP(mean): --  RR: 21 (03 Oct 2017 11:00) (7 - 22)  SpO2: 95% (03 Oct 2017 11:00) (95% - 100%)    Constitutional: wn/wd in NAD.   HEENT: NCAT, MMM, OP clear, EOMI, no proptosis or lid retraction  Neck: no thyromegaly or palpable thyroid nodules   Respiratory: lungs CTAB.  Cardiovascular: regular rhythm, normal S1 and S2, no audible murmurs, no peripheral edema  GI: soft, NT/ND, no masses/HSM appreciated.  Neurology: no tremors, DTR 2+  Skin: s/p open heart surgery, bandage clean  Psychiatric: AAO x 3, normal affect/mood.    LABS:                        7.5    20.0  )-----------( 272      ( 03 Oct 2017 05:32 )             25.0     10-03    127<L>  |  90<L>  |  21  ----------------------------<  183<H>  5.5<H>   |  23  |  3.12<H>    Ca    9.0      03 Oct 2017 05:32  Phos  4.5     10-03  Mg     2.7     10-03    TPro  7.3  /  Alb  1.9<L>  /  TBili  0.8  /  DBili  x   /  AST  31  /  ALT  12  /  AlkPhos  322<H>  10-03    PT/INR - ( 03 Oct 2017 05:32 )   PT: 17.9 sec;   INR: 1.60          PTT - ( 03 Oct 2017 05:32 )  PTT:40.2 sec    Thyroid Stimulating Hormone, Serum: 2.447 uIU/mL (09-13 @ 22:51)  Thyroid Stimulating Hormone, Serum: 1.251 uIU/mL (08-24 @ 01:12)      HbA1C: 8.2 % (09-13 @ 22:51)  8.2 % (08-24 @ 01:11)    CAPILLARY BLOOD GLUCOSE  100 (03 Oct 2017 08:00)  103 (03 Oct 2017 07:00)  135 (03 Oct 2017 06:00)  203 (03 Oct 2017 05:00)  215 (03 Oct 2017 04:00)  142 (02 Oct 2017 22:30)      Insulin Sliding Scale requirements X 24 Hours:    RADIOLOGY & ADDITIONAL TESTS:    A/P: 47y Female with history of DM type II presenting for DM type II presenting for MRSA bacteremia and TV endocarditis s/p cervical corpectomy, s/p TVR on 10/2/2017. Patient was transitioned back from insulin drip to Lantus 20 units today.       1.  DM 2, uncontrolled, complicated - patient with poor diabetic history and multiple diabetic complications. A1c is likely higher given that patient is on Procrit and has high cell turnover. Patient also seems to be non compliance with her diabetes diet.   - Continue Lantus 20 units daily. Continue Lispro 7 units with meals only if patient eats. May decrease lispro prandial if evening FS <120.   -  Please continue moderate dose sliding scale lispro coverage    Goal FSG is 120 - 180  Will continue to monitor   For discharge, pt can continue    Pt can follow up at discharge with [facility] by calling  to make an appointment.   Will discuss case with Dr. Ross, Dr. Yoon and update primary team

## 2017-10-03 NOTE — CHART NOTE - NSCHARTNOTEFT_GEN_A_CORE
Hyperkalemia.    1a chemistry shows a K+ level of 6; with a Bun/Cr of 17/2.64  Treated with 50 ml of intravenous Dextrose 50% + 10 units of Regular insulin as IV push +2g of IV calcium gluconate  Called the renal fellow on call and asked assistance with dialysis; stressing further the significance of Hyperkalemia in postop cardiac surgical patients; .who responded by saying he's going to consult with his attending.  Will repeat labs after 30 mins.    Carlos Ortiz M.D.  Intensivist/CTICU

## 2017-10-04 LAB
ANION GAP SERPL CALC-SCNC: 13 MMOL/L — SIGNIFICANT CHANGE UP (ref 5–17)
APTT BLD: 35 SEC — SIGNIFICANT CHANGE UP (ref 27.5–37.4)
BUN SERPL-MCNC: 22 MG/DL — SIGNIFICANT CHANGE UP (ref 7–23)
CALCIUM SERPL-MCNC: 8.8 MG/DL — SIGNIFICANT CHANGE UP (ref 8.4–10.5)
CHLORIDE SERPL-SCNC: 95 MMOL/L — LOW (ref 96–108)
CO2 SERPL-SCNC: 24 MMOL/L — SIGNIFICANT CHANGE UP (ref 22–31)
CREAT SERPL-MCNC: 2.82 MG/DL — HIGH (ref 0.5–1.3)
GAS PNL BLDA: SIGNIFICANT CHANGE UP
GLUCOSE SERPL-MCNC: 56 MG/DL — LOW (ref 70–99)
HCT VFR BLD CALC: 25.1 % — LOW (ref 34.5–45)
HGB BLD-MCNC: 8 G/DL — LOW (ref 11.5–15.5)
INR BLD: 1.38 — HIGH (ref 0.88–1.16)
LACTATE SERPL-SCNC: 0.7 MMOL/L — SIGNIFICANT CHANGE UP (ref 0.5–2)
MAGNESIUM SERPL-MCNC: 2.5 MG/DL — SIGNIFICANT CHANGE UP (ref 1.6–2.6)
MCHC RBC-ENTMCNC: 26.4 PG — LOW (ref 27–34)
MCHC RBC-ENTMCNC: 31.9 G/DL — LOW (ref 32–36)
MCV RBC AUTO: 82.8 FL — SIGNIFICANT CHANGE UP (ref 80–100)
PHOSPHATE SERPL-MCNC: 3.8 MG/DL — SIGNIFICANT CHANGE UP (ref 2.5–4.5)
PLATELET # BLD AUTO: 238 K/UL — SIGNIFICANT CHANGE UP (ref 150–400)
POTASSIUM SERPL-MCNC: 3.6 MMOL/L — SIGNIFICANT CHANGE UP (ref 3.5–5.3)
POTASSIUM SERPL-SCNC: 3.6 MMOL/L — SIGNIFICANT CHANGE UP (ref 3.5–5.3)
PROTHROM AB SERPL-ACNC: 15.4 SEC — HIGH (ref 9.8–12.7)
RBC # BLD: 3.03 M/UL — LOW (ref 3.8–5.2)
RBC # FLD: 16.4 % — SIGNIFICANT CHANGE UP (ref 10.3–16.9)
SODIUM SERPL-SCNC: 132 MMOL/L — LOW (ref 135–145)
WBC # BLD: 11 K/UL — HIGH (ref 3.8–10.5)
WBC # FLD AUTO: 11 K/UL — HIGH (ref 3.8–10.5)

## 2017-10-04 PROCEDURE — 71010: CPT | Mod: 26

## 2017-10-04 PROCEDURE — 99232 SBSQ HOSP IP/OBS MODERATE 35: CPT | Mod: GC

## 2017-10-04 PROCEDURE — 99291 CRITICAL CARE FIRST HOUR: CPT

## 2017-10-04 RX ORDER — INSULIN LISPRO 100/ML
VIAL (ML) SUBCUTANEOUS
Qty: 0 | Refills: 0 | Status: DISCONTINUED | OUTPATIENT
Start: 2017-10-04 | End: 2017-10-10

## 2017-10-04 RX ORDER — INSULIN LISPRO 100/ML
4 VIAL (ML) SUBCUTANEOUS
Qty: 0 | Refills: 0 | Status: DISCONTINUED | OUTPATIENT
Start: 2017-10-04 | End: 2017-10-10

## 2017-10-04 RX ORDER — DEXTROSE 50 % IN WATER 50 %
25 SYRINGE (ML) INTRAVENOUS ONCE
Qty: 0 | Refills: 0 | Status: COMPLETED | OUTPATIENT
Start: 2017-10-04 | End: 2017-10-04

## 2017-10-04 RX ORDER — INSULIN GLARGINE 100 [IU]/ML
15 INJECTION, SOLUTION SUBCUTANEOUS EVERY MORNING
Qty: 0 | Refills: 0 | Status: DISCONTINUED | OUTPATIENT
Start: 2017-10-05 | End: 2017-10-10

## 2017-10-04 RX ORDER — CALCIUM GLUCONATE 100 MG/ML
2 VIAL (ML) INTRAVENOUS ONCE
Qty: 0 | Refills: 0 | Status: COMPLETED | OUTPATIENT
Start: 2017-10-04 | End: 2017-10-03

## 2017-10-04 RX ORDER — INSULIN LISPRO 100/ML
VIAL (ML) SUBCUTANEOUS AT BEDTIME
Qty: 0 | Refills: 0 | Status: DISCONTINUED | OUTPATIENT
Start: 2017-10-04 | End: 2017-10-05

## 2017-10-04 RX ORDER — NYSTATIN CREAM 100000 [USP'U]/G
1 CREAM TOPICAL
Qty: 0 | Refills: 0 | Status: DISCONTINUED | OUTPATIENT
Start: 2017-10-04 | End: 2017-10-10

## 2017-10-04 RX ADMIN — Medication 81 MILLIGRAM(S): at 12:31

## 2017-10-04 RX ADMIN — DAPTOMYCIN 128 MILLIGRAM(S): 500 INJECTION, POWDER, LYOPHILIZED, FOR SOLUTION INTRAVENOUS at 10:52

## 2017-10-04 RX ADMIN — OXYCODONE AND ACETAMINOPHEN 1 TABLET(S): 5; 325 TABLET ORAL at 16:39

## 2017-10-04 RX ADMIN — HEPARIN SODIUM 5000 UNIT(S): 5000 INJECTION INTRAVENOUS; SUBCUTANEOUS at 21:02

## 2017-10-04 RX ADMIN — Medication 25 MILLIGRAM(S): at 21:02

## 2017-10-04 RX ADMIN — Medication 4 UNIT(S): at 08:01

## 2017-10-04 RX ADMIN — SERTRALINE 25 MILLIGRAM(S): 25 TABLET, FILM COATED ORAL at 12:12

## 2017-10-04 RX ADMIN — GABAPENTIN 300 MILLIGRAM(S): 400 CAPSULE ORAL at 21:02

## 2017-10-04 RX ADMIN — OXYCODONE AND ACETAMINOPHEN 1 TABLET(S): 5; 325 TABLET ORAL at 02:44

## 2017-10-04 RX ADMIN — CEFTAROLINE FOSAMIL 50 MILLIGRAM(S): 600 POWDER, FOR SOLUTION INTRAVENOUS at 18:34

## 2017-10-04 RX ADMIN — OXYCODONE AND ACETAMINOPHEN 1 TABLET(S): 5; 325 TABLET ORAL at 07:21

## 2017-10-04 RX ADMIN — OXYCODONE AND ACETAMINOPHEN 1 TABLET(S): 5; 325 TABLET ORAL at 01:52

## 2017-10-04 RX ADMIN — Medication 4 UNIT(S): at 17:25

## 2017-10-04 RX ADMIN — HEPARIN SODIUM 5000 UNIT(S): 5000 INJECTION INTRAVENOUS; SUBCUTANEOUS at 14:36

## 2017-10-04 RX ADMIN — Medication 25 GRAM(S): at 05:30

## 2017-10-04 RX ADMIN — Medication 2: at 17:26

## 2017-10-04 RX ADMIN — OXYCODONE AND ACETAMINOPHEN 1 TABLET(S): 5; 325 TABLET ORAL at 16:59

## 2017-10-04 RX ADMIN — Medication 4 UNIT(S): at 12:42

## 2017-10-04 RX ADMIN — PANTOPRAZOLE SODIUM 40 MILLIGRAM(S): 20 TABLET, DELAYED RELEASE ORAL at 07:05

## 2017-10-04 RX ADMIN — HEPARIN SODIUM 5000 UNIT(S): 5000 INJECTION INTRAVENOUS; SUBCUTANEOUS at 07:05

## 2017-10-04 RX ADMIN — SODIUM CHLORIDE 3 MILLILITER(S): 9 INJECTION INTRAMUSCULAR; INTRAVENOUS; SUBCUTANEOUS at 06:45

## 2017-10-04 RX ADMIN — NYSTATIN CREAM 1 APPLICATION(S): 100000 CREAM TOPICAL at 21:21

## 2017-10-04 RX ADMIN — CEFTAROLINE FOSAMIL 50 MILLIGRAM(S): 600 POWDER, FOR SOLUTION INTRAVENOUS at 07:05

## 2017-10-04 RX ADMIN — INSULIN GLARGINE 20 UNIT(S): 100 INJECTION, SOLUTION SUBCUTANEOUS at 08:01

## 2017-10-04 RX ADMIN — GABAPENTIN 100 MILLIGRAM(S): 400 CAPSULE ORAL at 07:05

## 2017-10-04 RX ADMIN — SODIUM CHLORIDE 3 MILLILITER(S): 9 INJECTION INTRAMUSCULAR; INTRAVENOUS; SUBCUTANEOUS at 14:25

## 2017-10-04 RX ADMIN — SODIUM CHLORIDE 3 MILLILITER(S): 9 INJECTION INTRAMUSCULAR; INTRAVENOUS; SUBCUTANEOUS at 21:11

## 2017-10-04 RX ADMIN — SIMVASTATIN 20 MILLIGRAM(S): 20 TABLET, FILM COATED ORAL at 21:02

## 2017-10-04 RX ADMIN — OXYCODONE AND ACETAMINOPHEN 1 TABLET(S): 5; 325 TABLET ORAL at 07:57

## 2017-10-04 RX ADMIN — GABAPENTIN 100 MILLIGRAM(S): 400 CAPSULE ORAL at 18:35

## 2017-10-04 NOTE — PROGRESS NOTE ADULT - PROBLEM SELECTOR PLAN 4
BP range from 120 to 130's range at present.  Antihypertensive medications on hold for now per primary team.   Monitor blood pressure for now.   Avoid hypotension and/or hypoperfusion.

## 2017-10-04 NOTE — PROGRESS NOTE ADULT - SUBJECTIVE AND OBJECTIVE BOX
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 (13 Sep 2017 22:57). On this admission, patient was also found to have septic emboli, cervical osteomyelitis and abscess s/p corpectomy and anterior plating POD #4. Patient is s/p TVR on 10/2/2017.     Patient looks frustrated this morning, not providing much information. Yesterday she did not have any lunch. She had soup ramírez around 3 pm, however she does not remember if she ate something afterward. This morning she had a cream of wheat and milk for breakfast. She does not want to eat the chicken sandwich for lunch.      CONSTITUTIONAL:  Negative fever or chills, feels well, good appetite  EYES:  Negative  blurry vision or double vision  CARDIOVASCULAR:  Negative for chest pain or palpitations  RESPIRATORY:  Negative for cough, wheezing, or SOB   GASTROINTESTINAL:  Negative for nausea, vomiting, diarrhea, constipation, or abdominal pain  GENITOURINARY:  Negative frequency, urgency or dysuria  NEUROLOGIC:  No headache, confusion, dizziness, lightheadedness    MEDICATIONS  (STANDING):  acetaminophen  IVPB. 1000 milliGRAM(s) IV Intermittent once  aspirin enteric coated 81 milliGRAM(s) Oral daily  Ceftaroline Fosamil IVPB 200 milliGRAM(s) IV Intermittent every 12 hours  DAPTOmycin IVPB 700 milliGRAM(s) IV Intermittent every 48 hours  dextrose 5%. 1000 milliLiter(s) (50 mL/Hr) IV Continuous <Continuous>  dextrose 50% Injectable 12.5 Gram(s) IV Push once  dextrose 50% Injectable 25 Gram(s) IV Push once  dextrose 50% Injectable 25 Gram(s) IV Push once  gabapentin 100 milliGRAM(s) Oral two times a day  gabapentin 300 milliGRAM(s) Oral at bedtime  heparin  Injectable 5000 Unit(s) SubCutaneous every 8 hours  insulin glargine Injectable (LANTUS) 20 Unit(s) SubCutaneous every morning  insulin lispro (HumaLOG) corrective regimen sliding scale   SubCutaneous Before meals and at bedtime  insulin lispro Injectable (HumaLOG) 4 Unit(s) SubCutaneous three times a day before meals  pantoprazole    Tablet 40 milliGRAM(s) Oral before breakfast  polyethylene glycol 3350 17 Gram(s) Oral daily  rifampin 600 milliGRAM(s) Oral daily  sertraline 25 milliGRAM(s) Oral daily  simvastatin 20 milliGRAM(s) Oral at bedtime  sodium chloride 0.45%. 1000 milliLiter(s) (10 mL/Hr) IV Continuous <Continuous>  sodium chloride 0.9% lock flush 3 milliLiter(s) IV Push every 8 hours  traZODone 25 milliGRAM(s) Oral at bedtime  vasopressin Infusion 0.033 Unit(s)/Min (2 mL/Hr) IV Continuous <Continuous>    MEDICATIONS  (PRN):  acetaminophen    Suspension. 650 milliGRAM(s) Oral every 6 hours PRN Mild Pain (1 - 3)  dextrose Gel 1 Dose(s) Oral once PRN Blood Glucose LESS THAN 70 milliGRAM(s)/deciliter  glucagon  Injectable 1 milliGRAM(s) IntraMuscular once PRN Glucose LESS THAN 70 milligrams/deciliter  oxyCODONE    5 mG/acetaminophen 325 mG 1 Tablet(s) Oral every 4 hours PRN Moderate Pain (4 - 6)      PHYSICAL EXAM  Vital Signs Last 24 Hrs  T(C): 35.6 (04 Oct 2017 05:00), Max: 36.1 (03 Oct 2017 17:31)  T(F): 96 (04 Oct 2017 05:00), Max: 96.9 (03 Oct 2017 17:31)  HR: 86 (04 Oct 2017 13:00) (76 - 92)  BP: --  BP(mean): --  RR: 20 (04 Oct 2017 13:00) (12 - 24)  SpO2: 99% (04 Oct 2017 13:00) (95% - 100%)    Constitutional: wn/wd in NAD.   HEENT: NCAT, MMM, OP clear, EOMI, no proptosis or lid retraction  Neck: no thyromegaly or palpable thyroid nodules   Respiratory: lungs CTAB.  Cardiovascular: regular rhythm, normal S1 and S2, no audible murmurs, no peripheral edema  GI: soft, NT/ND, no masses/HSM appreciated.  Neurology: no tremors, DTR 2+  Skin: no visible rashes/lesions  Psychiatric: AAO x 3, normal affect/mood.    LABS:                        8.0    11.0  )-----------( 238      ( 04 Oct 2017 05:24 )             25.1     10-04    132<L>  |  95<L>  |  22  ----------------------------<  56<L>  3.6   |  24  |  2.82<H>    Ca    8.8      04 Oct 2017 05:24  Phos  3.8     10-04  Mg     2.5     10-04    TPro  7.0  /  Alb  2.3<L>  /  TBili  0.6  /  DBili  x   /  AST  24  /  ALT  11  /  AlkPhos  301<H>  10-03    PT/INR - ( 04 Oct 2017 05:24 )   PT: 15.4 sec;   INR: 1.38          PTT - ( 04 Oct 2017 05:24 )  PTT:35.0 sec    Thyroid Stimulating Hormone, Serum: 2.447 uIU/mL (09-13 @ 22:51)  Thyroid Stimulating Hormone, Serum: 1.251 uIU/mL (08-24 @ 01:12)      HbA1C: 8.2 % (09-13 @ 22:51)  8.2 % (08-24 @ 01:11)    CAPILLARY BLOOD GLUCOSE  136 (04 Oct 2017 12:00)  118 (04 Oct 2017 06:30)  73 (04 Oct 2017 06:00)  54 (04 Oct 2017 05:30)  273 (03 Oct 2017 22:00)  79 (03 Oct 2017 17:00)      Insulin Sliding Scale requirements X 24 Hours:    RADIOLOGY & ADDITIONAL TESTS:    A/P: 47y Female with history of DM type II presenting for DM type II presenting for MRSA bacteremia and TV endocarditis s/p cervical corpectomy, s/p TVR on 10/2/2017. Yesterday, she was switched back to Lantus 20 units. Patient had low FS this morning.       1.  DM 2, uncontrolled, complicated - patient with poor diabetic history and multiple diabetic complications. A1c is likely higher given that patient is on Procrit and has high cell turnover. Patient also seems to be non compliance with her diabetes diet.   - Please decrease Lantus 20 units daily to .................. Please continue Lispro 4 units with meals only if patient eats. May decrease lispro prandial if evening FS <120.   -  Please continue moderate dose sliding scale lispro coverage    Goal FSG is 120 - 180  Will continue to monitor   For discharge, pt can continue    Pt can follow up at discharge with [facility] by calling  to make an appointment.   Will discuss case with Dr. Ross, Dr. Yoon and update primary team INTERVAL HPI/OVERNIGHT EVENTS:    Patient is a 47y old  Female who presents with a chief complaint of TV IE / MRSA Bacteremia. (13 Sep 2017 22:57). On this admission, patient was also found to have septic emboli, cervical osteomyelitis and abscess s/p corpectomy and anterior plating POD #4. Patient is s/p TVR on 10/2/2017.     Patient looks frustrated this morning, not providing much information. Yesterday she did not have any lunch. She had soup ramírez around 3 pm, however she does not remember if she ate something afterward. This morning she had a cream of wheat and milk for breakfast. She does not want to eat the chicken sandwich for lunch.      CONSTITUTIONAL:  Negative fever or chills, feels well, good appetite  EYES:  Negative  blurry vision or double vision  CARDIOVASCULAR:  Negative for chest pain or palpitations  RESPIRATORY:  Negative for cough, wheezing, or SOB   GASTROINTESTINAL:  Negative for nausea, vomiting, diarrhea, constipation, or abdominal pain  GENITOURINARY:  Negative frequency, urgency or dysuria  NEUROLOGIC:  No headache, confusion, dizziness, lightheadedness    MEDICATIONS  (STANDING):  acetaminophen  IVPB. 1000 milliGRAM(s) IV Intermittent once  aspirin enteric coated 81 milliGRAM(s) Oral daily  Ceftaroline Fosamil IVPB 200 milliGRAM(s) IV Intermittent every 12 hours  DAPTOmycin IVPB 700 milliGRAM(s) IV Intermittent every 48 hours  dextrose 5%. 1000 milliLiter(s) (50 mL/Hr) IV Continuous <Continuous>  dextrose 50% Injectable 12.5 Gram(s) IV Push once  dextrose 50% Injectable 25 Gram(s) IV Push once  dextrose 50% Injectable 25 Gram(s) IV Push once  gabapentin 100 milliGRAM(s) Oral two times a day  gabapentin 300 milliGRAM(s) Oral at bedtime  heparin  Injectable 5000 Unit(s) SubCutaneous every 8 hours  insulin glargine Injectable (LANTUS) 20 Unit(s) SubCutaneous every morning  insulin lispro (HumaLOG) corrective regimen sliding scale   SubCutaneous Before meals and at bedtime  insulin lispro Injectable (HumaLOG) 4 Unit(s) SubCutaneous three times a day before meals  pantoprazole    Tablet 40 milliGRAM(s) Oral before breakfast  polyethylene glycol 3350 17 Gram(s) Oral daily  rifampin 600 milliGRAM(s) Oral daily  sertraline 25 milliGRAM(s) Oral daily  simvastatin 20 milliGRAM(s) Oral at bedtime  sodium chloride 0.45%. 1000 milliLiter(s) (10 mL/Hr) IV Continuous <Continuous>  sodium chloride 0.9% lock flush 3 milliLiter(s) IV Push every 8 hours  traZODone 25 milliGRAM(s) Oral at bedtime  vasopressin Infusion 0.033 Unit(s)/Min (2 mL/Hr) IV Continuous <Continuous>    MEDICATIONS  (PRN):  acetaminophen    Suspension. 650 milliGRAM(s) Oral every 6 hours PRN Mild Pain (1 - 3)  dextrose Gel 1 Dose(s) Oral once PRN Blood Glucose LESS THAN 70 milliGRAM(s)/deciliter  glucagon  Injectable 1 milliGRAM(s) IntraMuscular once PRN Glucose LESS THAN 70 milligrams/deciliter  oxyCODONE    5 mG/acetaminophen 325 mG 1 Tablet(s) Oral every 4 hours PRN Moderate Pain (4 - 6)      PHYSICAL EXAM  Vital Signs Last 24 Hrs  T(C): 35.6 (04 Oct 2017 05:00), Max: 36.1 (03 Oct 2017 17:31)  T(F): 96 (04 Oct 2017 05:00), Max: 96.9 (03 Oct 2017 17:31)  HR: 86 (04 Oct 2017 13:00) (76 - 92)  BP: --  BP(mean): --  RR: 20 (04 Oct 2017 13:00) (12 - 24)  SpO2: 99% (04 Oct 2017 13:00) (95% - 100%)    Constitutional: wn/wd in NAD.   HEENT: NCAT, MMM, OP clear, EOMI, no proptosis or lid retraction  Neck: no thyromegaly or palpable thyroid nodules   Respiratory: lungs CTAB.  Cardiovascular: regular rhythm, normal S1 and S2, no audible murmurs, no peripheral edema  GI: soft, NT/ND, no masses/HSM appreciated.  Neurology: no tremors, DTR 2+  Skin: no visible rashes/lesions  Psychiatric: AAO x 3, normal affect/mood.    LABS:                        8.0    11.0  )-----------( 238      ( 04 Oct 2017 05:24 )             25.1     10-04    132<L>  |  95<L>  |  22  ----------------------------<  56<L>  3.6   |  24  |  2.82<H>    Ca    8.8      04 Oct 2017 05:24  Phos  3.8     10-04  Mg     2.5     10-04    TPro  7.0  /  Alb  2.3<L>  /  TBili  0.6  /  DBili  x   /  AST  24  /  ALT  11  /  AlkPhos  301<H>  10-03    PT/INR - ( 04 Oct 2017 05:24 )   PT: 15.4 sec;   INR: 1.38          PTT - ( 04 Oct 2017 05:24 )  PTT:35.0 sec    Thyroid Stimulating Hormone, Serum: 2.447 uIU/mL (09-13 @ 22:51)  Thyroid Stimulating Hormone, Serum: 1.251 uIU/mL (08-24 @ 01:12)      HbA1C: 8.2 % (09-13 @ 22:51)  8.2 % (08-24 @ 01:11)    CAPILLARY BLOOD GLUCOSE  136 (04 Oct 2017 12:00)  118 (04 Oct 2017 06:30)  73 (04 Oct 2017 06:00)  54 (04 Oct 2017 05:30)  273 (03 Oct 2017 22:00)  79 (03 Oct 2017 17:00)      Insulin Sliding Scale requirements X 24 Hours:    RADIOLOGY & ADDITIONAL TESTS:    A/P: 47y Female with history of DM type II presenting for DM type II presenting for MRSA bacteremia and TV endocarditis s/p cervical corpectomy, s/p TVR on 10/2/2017. Yesterday, she was switched back to Lantus 20 units. Patient had low FS this morning.       1.  DM 2, uncontrolled, complicated - patient with poor diabetic history and multiple diabetic complications. A1c is likely higher given that patient is on Procrit and has high cell turnover. Patient also seems to be non compliance with her diabetes diet.   - Please decrease Lantus 20 units daily to 15 units daily. Please continue Lispro 4 units with meals only if patient eats.   -  Please continue moderate dose sliding scale lispro coverage TID with meals. For bedtime, may continue sliding scale only if FS >200    Goal FSG is 120 - 180  Will continue to monitor   For discharge, pt can continue    Pt can follow up at discharge with [facility] by calling  to make an appointment.   Will discuss case with Dr. Ross, Dr. Yoon and update primary team

## 2017-10-04 NOTE — PROGRESS NOTE ADULT - SUBJECTIVE AND OBJECTIVE BOX
Patient is a 47y Female seen and evaluated at bedside. Patient states that she feel better at present and sitting in chair. She denies any chest pain, palpitations, dizziness, shortness of breath at present.    acetaminophen    Suspension. 650 every 6 hours PRN  acetaminophen  IVPB. 1000 once  aspirin enteric coated 81 daily  Ceftaroline Fosamil IVPB 200 every 12 hours  DAPTOmycin IVPB 700 every 48 hours  dextrose 5%. 1000 <Continuous>  dextrose 50% Injectable 12.5 once  dextrose 50% Injectable 25 once  dextrose 50% Injectable 25 once  dextrose Gel 1 once PRN  gabapentin 100 two times a day  gabapentin 300 at bedtime  glucagon  Injectable 1 once PRN  heparin  Injectable 5000 every 8 hours  insulin glargine Injectable (LANTUS) 20 every morning  insulin lispro (HumaLOG) corrective regimen sliding scale  Before meals and at bedtime  insulin lispro Injectable (HumaLOG) 4 three times a day before meals  oxyCODONE    5 mG/acetaminophen 325 mG 1 every 4 hours PRN  pantoprazole    Tablet 40 before breakfast  polyethylene glycol 3350 17 daily  rifampin 600 daily  sertraline 25 daily  simvastatin 20 at bedtime  sodium chloride 0.45%. 1000 <Continuous>  sodium chloride 0.9% lock flush 3 every 8 hours  traZODone 25 at bedtime  vasopressin Infusion 0.033 <Continuous>      Allergies    penicillin (Unknown)  Sular (Unknown)    Intolerances        T(C): , Max: 36.1 (10-03-17 @ 17:31)  T(F): , Max: 96.9 (10-03-17 @ 17:31)  HR: 84 (10-04-17 @ 10:00)  BP: --  BP(mean): --  RR: 23 (10-04-17 @ 10:00)  SpO2: 99% (10-04-17 @ 10:00)  Wt(kg): --    10-03 @ 07:01  -  10-04 @ 07:00  --------------------------------------------------------  IN: 1438.2 mL / OUT: 130 mL / NET: 1308.2 mL    10-04 @ 07:01  -  10-04 @ 10:22  --------------------------------------------------------  IN: 35 mL / OUT: 0 mL / NET: 35 mL          Review of Systems:  CONSTITUTIONAL: No fever or chills  EYES: No blurred or double vision.  RESPIRATORY: Mild shortness of breath, Denies any cough or hemoptysis   CARDIOVASCULAR: Mild shortness of breath Denies any chest tightness, palpitations, dizziness at present.  GASTROINTESTINAL: NO abdominal or flank pain, No nausea or vomiting, No diarrhea  GENITOURINARY: No dysuria or urinary burning, No difficulty passing urine, No hematuria  NEUROLOGICAL: No headaches or blurred vision  SKIN: No skin rashes   MUSCULOSKELETAL: No arthralgia, Joint pain, leg edema, No muscle pains      PHYSICAL EXAM:  GENERAL: NAD, well-developed, well nourished, alert, awake, no acute distress at present  HEAD:  Atraumatic, Normocephalic,   EYES: Bilateral conjuctiva and sclera pallor+nt  Oral cavity: Oral mucosa dry and pale  NECK: Neck supple, No JVD, Left sided permacath present.  CHEST/LUNG: Bilateral clear breath sounds with minimal crepitations at bases, No rhonchi, no wheezing, no rales, Chest tube drain+nt  HEART: Regular rate and rhythm. ALBARO II/VI at LPSB, No gallop, no rub   ABDOMEN: Soft, Nontender, BS+nt, No flank tenderness.   EXTREMITIES: Lt arm healed surgery scars forearm and antecubital fossa. No palpable AVF.No clubbing, cyanosis, or edema  Neurology: AAOx3, no focal neurological deficit  SKIN: No rashes or lesions          ACCESS:     LABS:                        8.0    11.0  )-----------( 238      ( 04 Oct 2017 05:24 )             25.1     10-04    132<L>  |  95<L>  |  22  ----------------------------<  56<L>  3.6   |  24  |  2.82<H>    Ca    8.8      04 Oct 2017 05:24  Phos  3.8     10-04  Mg     2.5     10-04    TPro  7.0  /  Alb  2.3<L>  /  TBili  0.6  /  DBili  x   /  AST  24  /  ALT  11  /  AlkPhos  301<H>  10-03      PT/INR - ( 04 Oct 2017 05:24 )   PT: 15.4 sec;   INR: 1.38          PTT - ( 04 Oct 2017 05:24 )  PTT:35.0 sec          RADIOLOGY & ADDITIONAL STUDIES:  < from: Xray Chest 1 View AP -PORTABLE-Routine (10.03.17 @ 04:39) >    EXAM:  XR CHEST 1 VIEW PORT ROUTINE                          PROCEDURE DATE:  10/03/2017                     INTERPRETATION:  INDICATION: Follow Up. Status post median sternotomy.   Pneumomediastinum.    TECHNIQUE: Chest, single portable AP view on 10/3/2017 4:39 AM     COMPARISON: Chest radiographs 10/2/2017    FINDINGS:  The right PICC line tip, left IJ central venous catheter and dialysis   catheters are unchanged in positions. The endotracheal tube has been   removed. There are at least 3 small caliber mediastinal drainage   catheters and a large bore right-sided mediastinal drainage catheter in   place.  Median sternotomy wires are unchanged in alignment.  Redemonstrated is a pericardial gas density along the left heart border.  Thereis interval worsening of diffuse bilateral airspace disease, mostly   secondary to polio congestion. Obscuration of the left hemidiaphragm   likely due to increasing effusion. No evidence of pneumothorax.        IMPRESSION:   1. Pneumomediastinum along the left heart border.    2. Interval worsening of diffuse bilateral airspace disease and   increasing pleural effusions.    3. Status post extubation. Unchanged positions of indwelling central   venous and dialysis catheters; and mediastinal drainage catheters            "Thank you for the opportunity to participate in the care of this   patient."        LUIS ANGEL KOVACS M.D., ATTENDING RADIOLOGIST  This document has been electronically signed. Oct  3 2017  9:33AM                  < end of copied text >

## 2017-10-04 NOTE — PROGRESS NOTE ADULT - PROBLEM SELECTOR PLAN 2
Anemia of chronic disease with acutely low Hgb likely due to recent CT surgery for Tricuspid valve. Patient received blood transfusion overnight and transient need for vasopressin.  Check Iron studies.  no IV iron due to infection/endocarditis.  Will give epo during next HD treatment.  Transfuse to keep Hct>25-30%.  Monitor serial hemoglobin for now.

## 2017-10-04 NOTE — PROGRESS NOTE ADULT - ATTENDING COMMENTS
Pt seen with Dr. Marie and Karrie on rounds this afternoon.  PO intake remains quite inconsistent, making regulation of her pre-meal Humalog regimen quite difficult.  She has not had any access to the outside food which was providing most of her suppers before the surgery.  Her poor PO intake is not responsible for her hypoglycemia this morning, however--which was probably a combination of both the Lantus (where her requirements are continually decreasing) and the bedtime Humalog coverage.  Should therefore decrease the Lantus to 15 units as noted above, and decrease the sliding scale at bedtime by 2 units (start at 200 mg%, not 150).  (Keep the pre-meal coverage as is)

## 2017-10-04 NOTE — PROGRESS NOTE ADULT - PROBLEM SELECTOR PLAN 3
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  Anticoagulant treatment per primary/cardiology/CT surgery team for TVR.  Continue IV antibiotics as per ID team.  Adjust antibiotics per renal clearance and monitor Drug level as indicated.  Avoid ndphrotoxic agents

## 2017-10-04 NOTE — PROGRESS NOTE ADULT - ASSESSMENT
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 Nephrology consult called for HD treatment. Patient underwent Tricuspid valve replacement on 10/02/2017. Patient had HD treatment on 10/03/2014 and tolerated procedure well. Patient had left arm AV fistula for past 4 months which never worked or mature. Left sided permacath placed on (09/25) present.

## 2017-10-04 NOTE — CHART NOTE - NSCHARTNOTEFT_GEN_A_CORE
Admitting Diagnosis:   Patient is a 47y old  Female who presents with a chief complaint of TV IE / MRSA Bacteremia. (13 Sep 2017 22:57)      PAST MEDICAL & SURGICAL HISTORY:  PMH:  HTN, HLD, DM II, ESRD on HD (MWF), and chronic left foot OM     Current Nutrition Order:  Dysphagia 2 mechanical soft/thin liquids, CST CHO, Dash/TLC, Renal diet    PO Intake:   Pt reports ~50% intake, but eats better when she likes the items (ie refused eggs is am, but consumed 100% of cream of wheat).     GI Issues:   Denies N/V/D/C  Last BM yesterday per pt; D after being C.  C/o swallowing difficulty; tolerated current dysphagia 2 diet.     Pain:  Denies current     Skin Integrity:  IAD perianal area, MSI with wound vac, R chest ASW      Labs:   10-04    132<L>  |  95<L>  |  22  ----------------------------<  56<L>  3.6   |  24  |  2.82<H>    Ca    8.8      04 Oct 2017 05:24  Phos  3.8     10-04  Mg     2.5     10-04    TPro  7.0  /  Alb  2.3<L>  /  TBili  0.6  /  DBili  x   /  AST  24  /  ALT  11  /  AlkPhos  301<H>  10-03    CAPILLARY BLOOD GLUCOSE  136 (04 Oct 2017 12:00)  118 (04 Oct 2017 06:30)  73 (04 Oct 2017 06:00)  54 (04 Oct 2017 05:30)  273 (03 Oct 2017 22:00)  79 (03 Oct 2017 17:00)          Medications:  MEDICATIONS  (STANDING):  acetaminophen  IVPB. 1000 milliGRAM(s) IV Intermittent once  aspirin enteric coated 81 milliGRAM(s) Oral daily  Ceftaroline Fosamil IVPB 200 milliGRAM(s) IV Intermittent every 12 hours  DAPTOmycin IVPB 700 milliGRAM(s) IV Intermittent every 48 hours  dextrose 5%. 1000 milliLiter(s) (50 mL/Hr) IV Continuous <Continuous>  dextrose 50% Injectable 12.5 Gram(s) IV Push once  dextrose 50% Injectable 25 Gram(s) IV Push once  dextrose 50% Injectable 25 Gram(s) IV Push once  gabapentin 100 milliGRAM(s) Oral two times a day  gabapentin 300 milliGRAM(s) Oral at bedtime  heparin  Injectable 5000 Unit(s) SubCutaneous every 8 hours  insulin glargine Injectable (LANTUS) 20 Unit(s) SubCutaneous every morning  insulin lispro (HumaLOG) corrective regimen sliding scale   SubCutaneous Before meals and at bedtime  insulin lispro Injectable (HumaLOG) 4 Unit(s) SubCutaneous three times a day before meals  pantoprazole    Tablet 40 milliGRAM(s) Oral before breakfast  polyethylene glycol 3350 17 Gram(s) Oral daily  rifampin 600 milliGRAM(s) Oral daily  sertraline 25 milliGRAM(s) Oral daily  simvastatin 20 milliGRAM(s) Oral at bedtime  sodium chloride 0.45%. 1000 milliLiter(s) (10 mL/Hr) IV Continuous <Continuous>  sodium chloride 0.9% lock flush 3 milliLiter(s) IV Push every 8 hours  traZODone 25 milliGRAM(s) Oral at bedtime  vasopressin Infusion 0.033 Unit(s)/Min (2 mL/Hr) IV Continuous <Continuous>    MEDICATIONS  (PRN):  acetaminophen    Suspension. 650 milliGRAM(s) Oral every 6 hours PRN Mild Pain (1 - 3)  dextrose Gel 1 Dose(s) Oral once PRN Blood Glucose LESS THAN 70 milliGRAM(s)/deciliter  glucagon  Injectable 1 milliGRAM(s) IntraMuscular once PRN Glucose LESS THAN 70 milligrams/deciliter  oxyCODONE    5 mG/acetaminophen 325 mG 1 Tablet(s) Oral every 4 hours PRN Moderate Pain (4 - 6)      Weight:  87kg (9/13)--> 89.5kg (9/17)--> 82.9kg (10/2)    Weight Change:   Wt changes likely 2/2 fluid shifts with HD; continue to trend dry wts    Estimated energy needs:   IBW 68.2kg used for EER and adjusted for HD and post-op.   30-35kcal/kg (2046-2387kcal), 1.4-1.6g/kg (95-109g), fluids per MD    Subjective:   46y/o F with tricuspid valve endocarditis, MRSA bacteremia, and CAD. S/p corpectomy; s/p TVR. Pt seen resting in bed. Last HD yesterday. Pt reporting ~50% intake, but eats more when she likes the food. Encouraged preferences within dietary constraints and discussed appropriate restrictions. Pt more receptive of need to follow restrictions upon this f/u. Discussed ONS to help improve nutrient intake; pt reports not tolerating Nepro 2/2 D, but willing to take glucerna TID. Will follow.     Previous Nutrition Diagnosis:  Food - and nutrition - related knowledge deficit; Limited adherence to nutrition - related recommendations RT pt is not compliant with diet restrictions appropriate for her medical status AEB pt reports not following Renal restrictions and A1C (9/13) 8.2    Active [X]  Resolved [   ]    If resolved, new PES:   Pt to recall 3x components to renal, DM diet    Goal:  Pt to demonstrate diet compliance and consume >75% of EER    Recommendations:  Change diet to dysphagia 2 mech/thin liquid, CST CHO, renal diet.  Add glucerna TID.  Monitor lytes and replete prn.   Trend dry wts.    Education:   Encouraged intake and ONS; Reinforced DM, Renal Diet    Risk Level: High [X] Moderate [   ] Low [   ].

## 2017-10-04 NOTE — PROGRESS NOTE ADULT - SUBJECTIVE AND OBJECTIVE BOX
INTERVAL HPI/OVERNIGHT EVENTS:    POD#2 TV replacement  EF 55%    9/22 - Corpectomy/C spine plating for epidural abscess    (+)MRSA Bactermia/TV Endocarditis  Daptomycin/Ceftaroline/Rifampin    48yo female with Hx HTN, HLD, DM, CVA, ESRD/HD prior Right Femoral HD catheter (9/13/2017 - Gris)/failed LUE AVF(per patient never functional) - now with Left SC  catheter, chronic left foot OM transferred for management of MRSA bacteremia/TV endocarditis  TTE/ALCIDES 9/12 and 9/13: questionable worsening/enlarging TV vegetation    CT Head 8/24: evolving right MCA territory infarction. No intracranial hemorrhage.  CTa Head/Neck 8/24: No aneurysm or arteriovenous malformation. Fine calcifications at the bifurcations of both common carotid  arteries, bilateral vertebral arteries and carotid siphon, otherwise unremarkable study.  CT Structural 8/24: Large intraluminal thrombus within the SVC extending into RA. Bilateral pulmonary nodules/mediastinal & hilar adenopathy, likely metastatic. Splenomegaly. Possible thoracic osseous blastic metastasis. Small-to-moderate pericardial effusion.    Repeat TTE 8/24: EF 65%, LA dilated. Trace AR/MR. Thickened TV leaflets - Irregular, shaggy appearing mass seen on the atrial side of the tricuspid valve. large linear independently mobile mass RA protruding through the tricuspid valve in diastole - mild TR  Severe pulm HTN (60 mmHg) . Small pericardial effusion noted. Bubble study (-)right to left shunting.    Carotids 8/24: No evidence of hemodynamically significant stenosis in the visualized  internal carotid and common carotid arteries. Elevated velocity in the left ECA consistent with moderate stenosis.    CT Head 8/25: No significant interval change. (-)hemorrhage/mass. A small hypodense lesion in the left posterior putamen.  CT Brain Stroke 8/27: No acute intracranial hemorrhage, mass effect, or CT evidence of acute transcortical infarction.    9/22 - underwent C6 corpectomy - C5-7 anterior plating   gross purulence reportedly encountered - Op Cx 9/22 - (+)MRSA  Last (+)documented Blood Cx - 8/28     To OR 10/2 - TV replacement - no blood products required intraop  Extubated midnight Op night    HD session 10/3    Vascular consulted 10/3 for long term HD access - plan vein mapping -  Do not use left arm for now - sign placed/band on patient and d/w nurse    transient vaso requirement overnight - off ; given 1 U pRBC for anemia (Hct 25)    patient OOB to chair this am    ICU Vital Signs Last 24 Hrs  T(C): 35.6 (04 Oct 2017 05:00), Max: 36.1 (03 Oct 2017 17:31)  T(F): 96 (04 Oct 2017 05:00), Max: 96.9 (03 Oct 2017 17:31)  HR: 84 (04 Oct 2017 10:00) (76 - 92) sinus  ABP: 122/48 (04 Oct 2017 10:00) (106/34 - 160/56)  ABP(mean): 70 (04 Oct 2017 10:00) (56 - 86)  SpO2: 99% (04 Oct 2017 10:00) (95% - 100%) RA    Qtts: None    Physical Exam    Heart - regular (-)rub/gallop  Lungs - CTA anterior (-)rales/rhonchi  Abd - (+)BS soft (-)r/r/g  Ext- warm to touch; no cyanosis/clubbing; (+)faint palpable thrill LUE - nonfunctional/never matured  Neuro - alert/oriented and interactive; non-focal and moving all extremities  Skin - no rash    LABS:                        8.0    11.0  )-----------( 238      ( 04 Oct 2017 05:24 )             25.1     10-04    132<L>  |  95<L>  |  22  ----------------------------<  56<L>  3.6   |  24  |  2.82<H>    Ca    8.8      04 Oct 2017 05:24  Phos  3.8     10-04  Mg     2.5     10-04    TPro  7.0  /  Alb  2.3<L>  /  TBili  0.6  /  DBili  x   /  AST  24  /  ALT  11  /  AlkPhos  301<H>  10-03    PT/INR - ( 04 Oct 2017 05:24 )   PT: 15.4 sec;   INR: 1.38     PTT - ( 04 Oct 2017 05:24 )  PTT:35.0 sec    ABG - ( 04 Oct 2017 05:22 ) 7.46/40/91/97        RADIOLOGY & ADDITIONAL STUDIES: reviewed    Patient with 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 CV  titrate off all pressors s/p 1 U pRBC overnight  cont ASA/statin  to d/w Dr Gee - need for eventual systemic AC in light of CTa findings - thrombus in SVC - defer timing to Dr Gee  f/u operative cultures - pending at this time    2. 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  culture data as above  Cont Daptomycin/Ceftaroline and po rifampin -   will check surveillance CPK - required weekly while on Dapto - last 9/30 and serial LFT on Rifampin  WBC normal/Afebrile  f/u Operative culture from heart surgery - with hardware in place - lifelong oral suppressive Abx may be required after IV treatment course  Op cultures from orthopedic procedure 9/22 (+) - D#12 Abx post ortho procedure  PICC placed 9/25    3. Renal   getting HD via SC tunneled cath - placed 9/25  prior LUE fistula - non-functional/never matured - reportedly assessed by IR (Rebekah chavez)  vascular consulted - plan vein mapping to assess potential long term access  no use on left arm for IV/blood draw - sign/band placed and d/w staff  d/w renal attending - plan start patient on routine HD sessions    4. Endocrine  maintain glycemic control  POC testing 118/73/54  cont lantus/premeal/ISS  Endocrine following  POC testing 100/103/135    5. Ortho  signed off 8/30  to contact ortho to discuss any post-op restrictions - PT etc/follow-up etc - per note 8/29 - WBAT    bowel regimen - last BM 10/3  pain management  DVT and GI prophylaxis    d/w patient/staff/Renal attending/CTS    I have spent/provided stated minutes of critical care time to this patient: 60 min

## 2017-10-05 LAB
ALBUMIN SERPL ELPH-MCNC: 2.2 G/DL — LOW (ref 3.3–5)
ALP SERPL-CCNC: 355 U/L — HIGH (ref 40–120)
ALT FLD-CCNC: 8 U/L — LOW (ref 10–45)
ANION GAP SERPL CALC-SCNC: 13 MMOL/L — SIGNIFICANT CHANGE UP (ref 5–17)
ANION GAP SERPL CALC-SCNC: 16 MMOL/L — SIGNIFICANT CHANGE UP (ref 5–17)
APTT BLD: 35.5 SEC — SIGNIFICANT CHANGE UP (ref 27.5–37.4)
APTT BLD: 37.2 SEC — SIGNIFICANT CHANGE UP (ref 27.5–37.4)
AST SERPL-CCNC: 12 U/L — SIGNIFICANT CHANGE UP (ref 10–40)
BASE EXCESS BLDA CALC-SCNC: 0.1 MMOL/L — SIGNIFICANT CHANGE UP (ref -2–3)
BILIRUB SERPL-MCNC: 0.6 MG/DL — SIGNIFICANT CHANGE UP (ref 0.2–1.2)
BUN SERPL-MCNC: 14 MG/DL — SIGNIFICANT CHANGE UP (ref 7–23)
BUN SERPL-MCNC: 27 MG/DL — HIGH (ref 7–23)
CALCIUM SERPL-MCNC: 8.3 MG/DL — LOW (ref 8.4–10.5)
CALCIUM SERPL-MCNC: 8.8 MG/DL — SIGNIFICANT CHANGE UP (ref 8.4–10.5)
CHLORIDE SERPL-SCNC: 91 MMOL/L — LOW (ref 96–108)
CHLORIDE SERPL-SCNC: 96 MMOL/L — SIGNIFICANT CHANGE UP (ref 96–108)
CO2 SERPL-SCNC: 23 MMOL/L — SIGNIFICANT CHANGE UP (ref 22–31)
CO2 SERPL-SCNC: 25 MMOL/L — SIGNIFICANT CHANGE UP (ref 22–31)
CREAT SERPL-MCNC: 2.35 MG/DL — HIGH (ref 0.5–1.3)
CREAT SERPL-MCNC: 3.49 MG/DL — HIGH (ref 0.5–1.3)
CULTURE RESULTS: NO GROWTH — SIGNIFICANT CHANGE UP
FERRITIN SERPL-MCNC: 1094 NG/ML — HIGH (ref 15–150)
GAS PNL BLDA: SIGNIFICANT CHANGE UP
GLUCOSE SERPL-MCNC: 100 MG/DL — HIGH (ref 70–99)
GLUCOSE SERPL-MCNC: 113 MG/DL — HIGH (ref 70–99)
HAV IGM SER-ACNC: SIGNIFICANT CHANGE UP
HBV CORE AB SER-ACNC: REACTIVE
HBV CORE IGM SER-ACNC: SIGNIFICANT CHANGE UP
HBV SURFACE AG SER-ACNC: SIGNIFICANT CHANGE UP
HCO3 BLDA-SCNC: 24 MMOL/L — SIGNIFICANT CHANGE UP (ref 21–28)
HCT VFR BLD CALC: 25.3 % — LOW (ref 34.5–45)
HCT VFR BLD CALC: 26.9 % — LOW (ref 34.5–45)
HCV AB S/CO SERPL IA: 0.28 S/CO — SIGNIFICANT CHANGE UP
HCV AB SERPL-IMP: SIGNIFICANT CHANGE UP
HGB BLD-MCNC: 7.7 G/DL — LOW (ref 11.5–15.5)
HGB BLD-MCNC: 8.1 G/DL — LOW (ref 11.5–15.5)
INR BLD: 1.33 — HIGH (ref 0.88–1.16)
INR BLD: 1.37 — HIGH (ref 0.88–1.16)
IRON SATN MFR SERPL: 36 UG/DL — SIGNIFICANT CHANGE UP (ref 30–160)
LACTATE SERPL-SCNC: 1.7 MMOL/L — SIGNIFICANT CHANGE UP (ref 0.5–2)
MAGNESIUM SERPL-MCNC: 2 MG/DL — SIGNIFICANT CHANGE UP (ref 1.6–2.6)
MAGNESIUM SERPL-MCNC: 2.2 MG/DL — SIGNIFICANT CHANGE UP (ref 1.6–2.6)
MCHC RBC-ENTMCNC: 25.9 PG — LOW (ref 27–34)
MCHC RBC-ENTMCNC: 26.1 PG — LOW (ref 27–34)
MCHC RBC-ENTMCNC: 30.1 G/DL — LOW (ref 32–36)
MCHC RBC-ENTMCNC: 30.4 G/DL — LOW (ref 32–36)
MCV RBC AUTO: 85.2 FL — SIGNIFICANT CHANGE UP (ref 80–100)
MCV RBC AUTO: 86.8 FL — SIGNIFICANT CHANGE UP (ref 80–100)
PCO2 BLDA: 37 MMHG — SIGNIFICANT CHANGE UP (ref 32–45)
PH BLDA: 7.44 — SIGNIFICANT CHANGE UP (ref 7.35–7.45)
PHOSPHATE SERPL-MCNC: 2.7 MG/DL — SIGNIFICANT CHANGE UP (ref 2.5–4.5)
PHOSPHATE SERPL-MCNC: 3.6 MG/DL — SIGNIFICANT CHANGE UP (ref 2.5–4.5)
PLATELET # BLD AUTO: 274 K/UL — SIGNIFICANT CHANGE UP (ref 150–400)
PLATELET # BLD AUTO: 279 K/UL — SIGNIFICANT CHANGE UP (ref 150–400)
PO2 BLDA: 74 MMHG — LOW (ref 83–108)
POTASSIUM SERPL-MCNC: 3.9 MMOL/L — SIGNIFICANT CHANGE UP (ref 3.5–5.3)
POTASSIUM SERPL-MCNC: 4 MMOL/L — SIGNIFICANT CHANGE UP (ref 3.5–5.3)
POTASSIUM SERPL-SCNC: 3.9 MMOL/L — SIGNIFICANT CHANGE UP (ref 3.5–5.3)
POTASSIUM SERPL-SCNC: 4 MMOL/L — SIGNIFICANT CHANGE UP (ref 3.5–5.3)
PROT SERPL-MCNC: 7.7 G/DL — SIGNIFICANT CHANGE UP (ref 6–8.3)
PROTHROM AB SERPL-ACNC: 14.9 SEC — HIGH (ref 9.8–12.7)
PROTHROM AB SERPL-ACNC: 15.3 SEC — HIGH (ref 9.8–12.7)
RBC # BLD: 2.97 M/UL — LOW (ref 3.8–5.2)
RBC # BLD: 3.1 M/UL — LOW (ref 3.8–5.2)
RBC # FLD: 16.5 % — SIGNIFICANT CHANGE UP (ref 10.3–16.9)
RBC # FLD: 16.7 % — SIGNIFICANT CHANGE UP (ref 10.3–16.9)
SAO2 % BLDA: 95 % — SIGNIFICANT CHANGE UP (ref 95–100)
SODIUM SERPL-SCNC: 130 MMOL/L — LOW (ref 135–145)
SODIUM SERPL-SCNC: 134 MMOL/L — LOW (ref 135–145)
SPECIMEN SOURCE: SIGNIFICANT CHANGE UP
SURGICAL PATHOLOGY STUDY: SIGNIFICANT CHANGE UP
WBC # BLD: 11.4 K/UL — HIGH (ref 3.8–10.5)
WBC # BLD: 12.7 K/UL — HIGH (ref 3.8–10.5)
WBC # FLD AUTO: 11.4 K/UL — HIGH (ref 3.8–10.5)
WBC # FLD AUTO: 12.7 K/UL — HIGH (ref 3.8–10.5)

## 2017-10-05 PROCEDURE — 71010: CPT | Mod: 26,76

## 2017-10-05 PROCEDURE — 99291 CRITICAL CARE FIRST HOUR: CPT

## 2017-10-05 PROCEDURE — 90937 HEMODIALYSIS REPEATED EVAL: CPT | Mod: GC

## 2017-10-05 PROCEDURE — 99232 SBSQ HOSP IP/OBS MODERATE 35: CPT | Mod: GC

## 2017-10-05 RX ORDER — SENNA PLUS 8.6 MG/1
2 TABLET ORAL AT BEDTIME
Qty: 0 | Refills: 0 | Status: DISCONTINUED | OUTPATIENT
Start: 2017-10-05 | End: 2017-10-10

## 2017-10-05 RX ORDER — ERYTHROPOIETIN 10000 [IU]/ML
10000 INJECTION, SOLUTION INTRAVENOUS; SUBCUTANEOUS ONCE
Qty: 0 | Refills: 0 | Status: COMPLETED | OUTPATIENT
Start: 2017-10-05 | End: 2017-10-05

## 2017-10-05 RX ORDER — METOPROLOL TARTRATE 50 MG
25 TABLET ORAL
Qty: 0 | Refills: 0 | Status: DISCONTINUED | OUTPATIENT
Start: 2017-10-05 | End: 2017-10-07

## 2017-10-05 RX ORDER — CALCITRIOL 0.5 UG/1
1 CAPSULE ORAL ONCE
Qty: 0 | Refills: 0 | Status: COMPLETED | OUTPATIENT
Start: 2017-10-05 | End: 2017-10-05

## 2017-10-05 RX ORDER — DOCUSATE SODIUM 100 MG
100 CAPSULE ORAL THREE TIMES A DAY
Qty: 0 | Refills: 0 | Status: DISCONTINUED | OUTPATIENT
Start: 2017-10-05 | End: 2017-10-10

## 2017-10-05 RX ORDER — CALCIUM GLUCONATE 100 MG/ML
2 VIAL (ML) INTRAVENOUS ONCE
Qty: 0 | Refills: 0 | Status: DISCONTINUED | OUTPATIENT
Start: 2017-10-05 | End: 2017-10-06

## 2017-10-05 RX ORDER — ALBUMIN HUMAN 25 %
250 VIAL (ML) INTRAVENOUS ONCE
Qty: 0 | Refills: 0 | Status: DISCONTINUED | OUTPATIENT
Start: 2017-10-05 | End: 2017-10-06

## 2017-10-05 RX ADMIN — SODIUM CHLORIDE 3 MILLILITER(S): 9 INJECTION INTRAMUSCULAR; INTRAVENOUS; SUBCUTANEOUS at 06:15

## 2017-10-05 RX ADMIN — SENNA PLUS 2 TABLET(S): 8.6 TABLET ORAL at 21:30

## 2017-10-05 RX ADMIN — SODIUM CHLORIDE 3 MILLILITER(S): 9 INJECTION INTRAMUSCULAR; INTRAVENOUS; SUBCUTANEOUS at 13:52

## 2017-10-05 RX ADMIN — OXYCODONE AND ACETAMINOPHEN 1 TABLET(S): 5; 325 TABLET ORAL at 14:10

## 2017-10-05 RX ADMIN — Medication 81 MILLIGRAM(S): at 14:07

## 2017-10-05 RX ADMIN — Medication 4 UNIT(S): at 18:32

## 2017-10-05 RX ADMIN — GABAPENTIN 100 MILLIGRAM(S): 400 CAPSULE ORAL at 06:15

## 2017-10-05 RX ADMIN — INSULIN GLARGINE 15 UNIT(S): 100 INJECTION, SOLUTION SUBCUTANEOUS at 07:10

## 2017-10-05 RX ADMIN — SODIUM CHLORIDE 3 MILLILITER(S): 9 INJECTION INTRAMUSCULAR; INTRAVENOUS; SUBCUTANEOUS at 21:24

## 2017-10-05 RX ADMIN — PANTOPRAZOLE SODIUM 40 MILLIGRAM(S): 20 TABLET, DELAYED RELEASE ORAL at 06:14

## 2017-10-05 RX ADMIN — Medication 400 MILLIGRAM(S): at 21:29

## 2017-10-05 RX ADMIN — OXYCODONE AND ACETAMINOPHEN 1 TABLET(S): 5; 325 TABLET ORAL at 08:00

## 2017-10-05 RX ADMIN — NYSTATIN CREAM 1 APPLICATION(S): 100000 CREAM TOPICAL at 06:14

## 2017-10-05 RX ADMIN — HEPARIN SODIUM 5000 UNIT(S): 5000 INJECTION INTRAVENOUS; SUBCUTANEOUS at 14:07

## 2017-10-05 RX ADMIN — OXYCODONE AND ACETAMINOPHEN 1 TABLET(S): 5; 325 TABLET ORAL at 07:00

## 2017-10-05 RX ADMIN — Medication 4 UNIT(S): at 13:34

## 2017-10-05 RX ADMIN — NYSTATIN CREAM 1 APPLICATION(S): 100000 CREAM TOPICAL at 18:44

## 2017-10-05 RX ADMIN — GABAPENTIN 300 MILLIGRAM(S): 400 CAPSULE ORAL at 21:30

## 2017-10-05 RX ADMIN — Medication 25 MILLIGRAM(S): at 17:07

## 2017-10-05 RX ADMIN — HEPARIN SODIUM 5000 UNIT(S): 5000 INJECTION INTRAVENOUS; SUBCUTANEOUS at 06:14

## 2017-10-05 RX ADMIN — HEPARIN SODIUM 5000 UNIT(S): 5000 INJECTION INTRAVENOUS; SUBCUTANEOUS at 21:29

## 2017-10-05 RX ADMIN — Medication 1000 MILLIGRAM(S): at 21:31

## 2017-10-05 RX ADMIN — SIMVASTATIN 20 MILLIGRAM(S): 20 TABLET, FILM COATED ORAL at 21:30

## 2017-10-05 RX ADMIN — Medication 25 MILLIGRAM(S): at 21:32

## 2017-10-05 RX ADMIN — SERTRALINE 25 MILLIGRAM(S): 25 TABLET, FILM COATED ORAL at 14:09

## 2017-10-05 RX ADMIN — Medication 100 MILLIGRAM(S): at 21:30

## 2017-10-05 RX ADMIN — OXYCODONE AND ACETAMINOPHEN 1 TABLET(S): 5; 325 TABLET ORAL at 15:15

## 2017-10-05 RX ADMIN — Medication 4 UNIT(S): at 07:09

## 2017-10-05 RX ADMIN — CALCITRIOL 1 MICROGRAM(S): 0.5 CAPSULE ORAL at 14:31

## 2017-10-05 RX ADMIN — ERYTHROPOIETIN 10000 UNIT(S): 10000 INJECTION, SOLUTION INTRAVENOUS; SUBCUTANEOUS at 14:31

## 2017-10-05 NOTE — PROGRESS NOTE ADULT - SUBJECTIVE AND OBJECTIVE BOX
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 (13 Sep 2017 22:57). On this admission, patient was also found to have septic emboli, cervical osteomyelitis and abscess s/p corpectomy and anterior plating POD #4. Patient is s/p TVR on 10/2/2017.     patient is feeling happy today, she said that she finally walks after 2 months on bed, though she's having a difficulty.    CONSTITUTIONAL:  Negative fever or chills, feels well, good appetite  EYES:  Negative  blurry vision or double vision  CARDIOVASCULAR:  Negative for chest pain or palpitations  RESPIRATORY:  Negative for cough, wheezing, or SOB   GASTROINTESTINAL:  Negative for nausea, vomiting, diarrhea, constipation, or abdominal pain  GENITOURINARY:  Negative frequency, urgency or dysuria  NEUROLOGIC:  No headache, confusion, dizziness, lightheadedness    MEDICATIONS  (STANDING):  acetaminophen  IVPB. 1000 milliGRAM(s) IV Intermittent once  aspirin enteric coated 81 milliGRAM(s) Oral daily  DAPTOmycin IVPB 700 milliGRAM(s) IV Intermittent every 48 hours  docusate sodium 100 milliGRAM(s) Oral three times a day  gabapentin 300 milliGRAM(s) Oral at bedtime  heparin  Injectable 5000 Unit(s) SubCutaneous every 8 hours  insulin glargine Injectable (LANTUS) 15 Unit(s) SubCutaneous every morning  insulin lispro (HumaLOG) corrective regimen sliding scale   SubCutaneous three times a day before meals  insulin lispro Injectable (HumaLOG) 4 Unit(s) SubCutaneous three times a day before meals  metoprolol 25 milliGRAM(s) Oral two times a day  nystatin Ointment 1 Application(s) Topical two times a day  pantoprazole    Tablet 40 milliGRAM(s) Oral before breakfast  polyethylene glycol 3350 17 Gram(s) Oral daily  rifampin 600 milliGRAM(s) Oral daily  senna 2 Tablet(s) Oral at bedtime  sertraline 25 milliGRAM(s) Oral daily  simvastatin 20 milliGRAM(s) Oral at bedtime  sodium chloride 0.45%. 1000 milliLiter(s) (10 mL/Hr) IV Continuous <Continuous>  sodium chloride 0.9% lock flush 3 milliLiter(s) IV Push every 8 hours  traZODone 25 milliGRAM(s) Oral at bedtime    MEDICATIONS  (PRN):  acetaminophen    Suspension. 650 milliGRAM(s) Oral every 6 hours PRN Mild Pain (1 - 3)  oxyCODONE    5 mG/acetaminophen 325 mG 1 Tablet(s) Oral every 4 hours PRN Moderate Pain (4 - 6)      PHYSICAL EXAM  Vital Signs Last 24 Hrs  T(C): 36 (05 Oct 2017 13:55), Max: 36.4 (05 Oct 2017 02:00)  T(F): 96.8 (05 Oct 2017 13:55), Max: 97.6 (05 Oct 2017 02:00)  HR: 96 (05 Oct 2017 17:00) (82 - 100)  BP: 142/63 (05 Oct 2017 17:00) (110/59 - 155/76)  BP(mean): 84 (05 Oct 2017 17:00) (56 - 120)  RR: 24 (05 Oct 2017 17:00) (4 - 30)  SpO2: 98% (05 Oct 2017 17:00) (77% - 100%)    Constitutional: wn/wd in NAD.   HEENT: NCAT, MMM, OP clear, EOMI, no proptosis or lid retraction  Neck: no thyromegaly or palpable thyroid nodules   Respiratory: lungs CTAB.  Cardiovascular: regular rhythm, normal S1 and S2, no audible murmurs, no peripheral edema  GI: soft, NT/ND, no masses/HSM appreciated.  Neurology: no tremors, DTR 2+  Skin: no visible rashes/lesions  Psychiatric: AAO x 3, normal affect/mood.    LABS:                        7.7    12.7  )-----------( 274      ( 05 Oct 2017 01:07 )             25.3     10-05    130<L>  |  91<L>  |  27<H>  ----------------------------<  100<H>  3.9   |  23  |  3.49<H>    Ca    8.8      05 Oct 2017 01:08  Phos  3.6     10-05  Mg     2.2     10-05    TPro  7.0  /  Alb  2.3<L>  /  TBili  0.6  /  DBili  x   /  AST  24  /  ALT  11  /  AlkPhos  301<H>  10-03    PT/INR - ( 05 Oct 2017 01:07 )   PT: 14.9 sec;   INR: 1.33          PTT - ( 05 Oct 2017 01:07 )  PTT:35.5 sec    Thyroid Stimulating Hormone, Serum: 2.447 uIU/mL (09-13 @ 22:51)  Thyroid Stimulating Hormone, Serum: 1.251 uIU/mL (08-24 @ 01:12)      HbA1C: 8.2 % (09-13 @ 22:51)  8.2 % (08-24 @ 01:11)    CAPILLARY BLOOD GLUCOSE  73 (05 Oct 2017 11:00)  111 (05 Oct 2017 06:00)  135 (04 Oct 2017 21:00)      Insulin Sliding Scale requirements X 24 Hours:    RADIOLOGY & ADDITIONAL TESTS:    A/P: 47y Female with history of DM type II presenting for DM type II presenting for MRSA bacteremia and TV endocarditis s/p cervical corpectomy, s/p TVR on 10/2/2017.   his morning FS today is well controlled.    1.  DM 2, uncontrolled, complicated - patient with poor diabetic history and multiple diabetic complications. A1c is likely higher given that patient is on Procrit and has high cell turnover. Patient also seems to be non compliance with her diabetes diet.   - Continue Lantus 15 units daily. Continue Lispro 4 units with meals only if patient eats.    -  Please continue moderate dose sliding scale lispro coverage    Goal FSG is 120 - 180  Will continue to monitor   For discharge, pt can continue    Pt can follow up at discharge with [facility] by calling  to make an appointment.   Will discuss case with Dr. Ross, Dr. Yoon and update primary team

## 2017-10-05 NOTE — PROGRESS NOTE ADULT - PROBLEM SELECTOR PLAN 2
Anemia of chronic disease with acutely low Hgb likely due to recent CT surgery for Tricuspid valve.   Iron studies pending  no IV iron due to active infection/endocarditis.  Will give epo during next HD treatment.  Transfuse to keep Hct>25-30%.  Monitor serial hemoglobin for now.

## 2017-10-05 NOTE — PROGRESS NOTE ADULT - SUBJECTIVE AND OBJECTIVE BOX
Patient was seen and evaluated on dialysis.   Patient is tolerating the procedure well.   HR: 84 (10-05-17 @ 12:00)  BP: 155/76 (10-05-17 @ 12:00)  Continue dialysis:   Dialyzer: Optiflux 180         QB: 400        QD: 500  3K+  Goal UF 2.5Kg over 3 Hours   Epo and calcijex during HD treatment.

## 2017-10-05 NOTE — PROGRESS NOTE ADULT - SUBJECTIVE AND OBJECTIVE BOX
INTERVAL HPI/OVERNIGHT EVENTS:    POD#3 TV replacement  EF 55%    9/22 - Corpectomy/C spine plating for epidural abscess    (+)MRSA Bactermia/TV Endocarditis  Daptomycin/Rifampin - Ceftaroline d/c by ID    48yo female with Hx HTN, HLD, DM, CVA, ESRD/HD prior Right Femoral HD catheter (9/13/2017 - Gris)/failed LUE AVF(per patient never functional) - now with Left SC  catheter, chronic left foot OM transferred for management of MRSA bacteremia/TV endocarditis  TTE/ALCIDES 9/12 and 9/13: questionable worsening/enlarging TV vegetation    CT Head 8/24: evolving right MCA territory infarction. No intracranial hemorrhage.  CTa Head/Neck 8/24: No aneurysm or arteriovenous malformation. Fine calcifications at the bifurcations of both common carotid  arteries, bilateral vertebral arteries and carotid siphon, otherwise unremarkable study.  CT Structural 8/24: Large intraluminal thrombus within the SVC extending into RA. Bilateral pulmonary nodules/mediastinal & hilar adenopathy, likely metastatic. Splenomegaly. Possible thoracic osseous blastic metastasis. Small-to-moderate pericardial effusion.    Repeat TTE 8/24: EF 65%, LA dilated. Trace AR/MR. Thickened TV leaflets - Irregular, shaggy appearing mass seen on the atrial side of the tricuspid valve. large linear independently mobile mass RA protruding through the tricuspid valve in diastole - mild TR  Severe pulm HTN (60 mmHg) . Small pericardial effusion noted. Bubble study (-)right to left shunting.    Carotids 8/24: No evidence of hemodynamically significant stenosis in the visualized  internal carotid and common carotid arteries. Elevated velocity in the left ECA consistent with moderate stenosis.    CT Head 8/25: No significant interval change. (-)hemorrhage/mass. A small hypodense lesion in the left posterior putamen.  CT Brain Stroke 8/27: No acute intracranial hemorrhage, mass effect, or CT evidence of acute transcortical infarction.    9/22 - underwent C6 corpectomy - C5-7 anterior plating   gross purulence reportedly encountered - Op Cx 9/22 - (+)MRSA  Last (+)documented Blood Cx - 8/28     To OR 10/2 - TV replacement - no blood products required intraop  Extubated midnight Op night    HD session 10/3    Vascular consulted 10/3 for long term HD access - plan vein mapping -  Do not use left arm for now - sign placed/band on patient and d/w nurse    planned HD today per d/w Renal   No acute events reported    ICU Vital Signs Last 24 Hrs  T(C): 36.4 (05 Oct 2017 02:00), Max: 36.4 (04 Oct 2017 16:00)  T(F): 97.6 (05 Oct 2017 02:00), Max: 97.6 (04 Oct 2017 16:00)  HR: 84 (05 Oct 2017 08:00) (82 - 100) sinus with 1st degree AV  BP: 110/59 (05 Oct 2017 06:00) (110/59 - 110/59)  BP(mean): 80 (05 Oct 2017 06:00) (80 - 80)  ABP: 126/48 (05 Oct 2017 08:00) (112/46 - 144/56)  ABP(mean): 66 (05 Oct 2017 08:00) (64 - 78)  SpO2: 100% (05 Oct 2017 08:00) (95% - 100%) RA    Qtts: None    I&O's Summary    04 Oct 2017 07:01  -  05 Oct 2017 07:00  --------------------------------------------------------  IN: 370 mL / OUT: 291 mL / NET: 79 mL    05 Oct 2017 07:01  -  05 Oct 2017 09:24  --------------------------------------------------------  IN: 20 mL / OUT: 0 mL / NET: 20 mL    Physical Exam    Heart - regular (-) rub/gallop  Lungs - CTA.   Abd  Ext  Chest  Neuro  Skin    LABS:                        7.7    12.7  )-----------( 274      ( 05 Oct 2017 01:07 )             25.3     10-05    130<L>  |  91<L>  |  27<H>  ----------------------------<  100<H>  3.9   |  23  |  3.49<H>    Ca    8.8      05 Oct 2017 01:08  Phos  3.6     10-05  Mg     2.2     10-05    TPro  7.0  /  Alb  2.3<L>  /  TBili  0.6  /  DBili  x   /  AST  24  /  ALT  11  /  AlkPhos  301<H>  10-03    PT/INR - ( 05 Oct 2017 01:07 )   PT: 14.9 sec;   INR: 1.33          PTT - ( 05 Oct 2017 01:07 )  PTT:35.5 sec    ABG - ( 05 Oct 2017 01:03 )  pH: 7.44  /  pCO2: 37    /  pO2: 74    / HCO3: 24    / Base Excess: 0.1   /  SaO2: 95                  RADIOLOGY & ADDITIONAL STUDIES:    I have spent/provided stated minutes of critical care time to this patient: INTERVAL HPI/OVERNIGHT EVENTS:    POD#3 TV replacement  EF 55%    9/22 - Corpectomy/C spine plating for epidural abscess    (+)MRSA Bactermia/TV Endocarditis  Daptomycin/Rifampin - Ceftaroline d/c by ID    46yo female with Hx HTN, HLD, DM, CVA, ESRD/HD prior Right Femoral HD catheter (9/13/2017 - Gris)/failed LUE AVF(per patient never functional) - now with Left SC  catheter, chronic left foot OM transferred for management of MRSA bacteremia/TV endocarditis  TTE/ALCIDES 9/12 and 9/13: questionable worsening/enlarging TV vegetation    CT Head 8/24: evolving right MCA territory infarction. No intracranial hemorrhage.  CTa Head/Neck 8/24: No aneurysm or arteriovenous malformation. Fine calcifications at the bifurcations of both common carotid  arteries, bilateral vertebral arteries and carotid siphon, otherwise unremarkable study.  CT Structural 8/24: Large intraluminal thrombus within the SVC extending into RA. Bilateral pulmonary nodules/mediastinal & hilar adenopathy, likely metastatic. Splenomegaly. Possible thoracic osseous blastic metastasis. Small-to-moderate pericardial effusion.    Repeat TTE 8/24: EF 65%, LA dilated. Trace AR/MR. Thickened TV leaflets - Irregular, shaggy appearing mass seen on the atrial side of the tricuspid valve. large linear independently mobile mass RA protruding through the tricuspid valve in diastole - mild TR  Severe pulm HTN (60 mmHg) . Small pericardial effusion noted. Bubble study (-)right to left shunting.    Carotids 8/24: No evidence of hemodynamically significant stenosis in the visualized  internal carotid and common carotid arteries. Elevated velocity in the left ECA consistent with moderate stenosis.    CT Head 8/25: No significant interval change. (-)hemorrhage/mass. A small hypodense lesion in the left posterior putamen.  CT Brain Stroke 8/27: No acute intracranial hemorrhage, mass effect, or CT evidence of acute transcortical infarction.    9/22 - underwent C6 corpectomy - C5-7 anterior plating   gross purulence reportedly encountered - Op Cx 9/22 - (+)MRSA  Last (+)documented Blood Cx - 8/28     To OR 10/2 - TV replacement - no blood products required intraop  Extubated midnight Op night    HD session 10/3    Vascular consulted 10/3 for long term HD access - plan vein mapping -  Do not use left arm for now - sign placed/band on patient and d/w nurse    planned HD today per d/w Renal   No acute events reported    ICU Vital Signs Last 24 Hrs  T(C): 36.4 (05 Oct 2017 02:00), Max: 36.4 (04 Oct 2017 16:00)  T(F): 97.6 (05 Oct 2017 02:00), Max: 97.6 (04 Oct 2017 16:00)  HR: 84 (05 Oct 2017 08:00) (82 - 100) sinus with 1st degree AV  BP: 110/59 (05 Oct 2017 06:00) (110/59 - 110/59)  BP(mean): 80 (05 Oct 2017 06:00) (80 - 80)  ABP: 126/48 (05 Oct 2017 08:00) (112/46 - 144/56)  ABP(mean): 66 (05 Oct 2017 08:00) (64 - 78)  SpO2: 100% (05 Oct 2017 08:00) (95% - 100%) RA    Qtts: None    I&O's Summary    04 Oct 2017 07:01  -  05 Oct 2017 07:00  --------------------------------------------------------  IN: 370 mL / OUT: 291 mL / NET: 79 mL    05 Oct 2017 07:01  -  05 Oct 2017 09:24  --------------------------------------------------------  IN: 20 mL / OUT: 0 mL / NET: 20 mL    Physical Exam    Heart - regular (-) rub/gallop  Lungs - CTA. - no rales/rhonchi/wheeze - dec BS at bases  Abd - (+)BS soft (-)r/r/g  Ext - warm to touch; no cyanosis/clubbing  Chest  Neuro  Skin    LABS:                        7.7    12.7  )-----------( 274      ( 05 Oct 2017 01:07 )             25.3     10-05    130<L>  |  91<L>  |  27<H>  ----------------------------<  100<H>  3.9   |  23  |  3.49<H>    Ca    8.8      05 Oct 2017 01:08  Phos  3.6     10-05  Mg     2.2     10-05    TPro  7.0  /  Alb  2.3<L>  /  TBili  0.6  /  DBili  x   /  AST  24  /  ALT  11  /  AlkPhos  301<H>  10-03    PT/INR - ( 05 Oct 2017 01:07 )   PT: 14.9 sec;   INR: 1.33     PTT - ( 05 Oct 2017 01:07 )  PTT:35.5 sec    ABG - ( 05 Oct 2017 01:03 ) 7.44/37/74/95    RADIOLOGY & ADDITIONAL STUDIES: reviewed    Patient with high grade MRSA bacteremia/epidural abscess and TV Endocarditis now s/p corpectomy/cervical plating and now POD #3 TV replacement    1. CV  hemodynamically stable - Low dose Metoprolol started (12.5 mg q8h)  cont ASA/statin  to d/w Dr Gee - need for eventual systemic AC in light of CTa findings - thrombus in SVC - defer timing to Dr Gee  f/u operative cultures - negative to date - cont to monitor    2. 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  culture data as above  Cont Daptomycin/po rifampin - Ceftaroline stopped as lung findings on improved  will check surveillance CPK - required weekly while on Dapto - last 9/30 and serial LFT on Rifampin  WBC normal/Afebrile  anticipate 8-12 weeks IV Abx therapy then may require lifelong Abx suppression in light of hardware placement  f/u Operative culture from heart surgery - with hardware in place - lifelong oral suppressive Abx may be required after IV treatment course  Op cultures from orthopedic procedure 9/22 (+) - D#12 Abx post ortho procedure  PICC placed 9/25    3. Renal   getting HD via SC tunneled cath - placed 9/25  prior LUE fistula - non-functional/never matured - reportedly assessed by IR (Rebekah chavez)  vascular consulted - plan vein mapping to assess potential long term access - to call and determine when testing to be performed  no use on left arm for IV/blood draw - sign/band placed and d/w staff  d/w renal attending - plan start patient on routine HD sessions  Plan HD today and ongoing sessions TIW  Hepatitis serologies negative    4. Endocrine  maintain glycemic control  POC testing 118/73/54  cont lantus/premeal/ISS  Endocrine following  POC testing 111    5. Ortho  signed off 8/30  to contact ortho to discuss any post-op restrictions - PT etc/follow-up etc - per note 8/29 - WBAT    bowel regimen - last BM 10/3  pain management  DVT and GI prophylaxis    remove perez/central line and Chest tube today - PICC and HD cath to remain in place    d/w patient/staff/Renal attending/CTS    I have spent/provided stated minutes of critical care time to this patient: 60 min

## 2017-10-05 NOTE — CHART NOTE - NSCHARTNOTEFT_GEN_A_CORE
CT Removal:    Pt seen and examined at bedside.  Case discussed with Dr. Gee.  Minimal output from CTs and mediastinal blakes.  No air leak appreciated.  Mediastinal blakes and pleural CT removed without incident per Dr. Gee request.  Tie downs secured and occlusive DSD placed.  CXR no obvious PTX noted.  Pt tolerated procedure well.

## 2017-10-05 NOTE — PROGRESS NOTE ADULT - ATTENDING COMMENTS
Pt seen with Dr. Marie and Karrie on rounds this afternoon.  Glucoses have been reasonably stable and satisfactory, nola given inconsistency of PO intake.  Though she did not become hypoglycemic on the 20 unit Lantus dose given yesterday AM, would stay with 15 units for now.

## 2017-10-05 NOTE — PROGRESS NOTE ADULT - SUBJECTIVE AND OBJECTIVE BOX
Patient is a 47y Female seen and evaluated at bedside. Patient feels mild shortness of breath at present and soreness of chest wall at surgical site. Denies any chest pain, palpitations, dizziness at present. Denies any other complaints at present.       acetaminophen    Suspension. 650 every 6 hours PRN  acetaminophen  IVPB. 1000 once  aspirin enteric coated 81 daily  calcitriol Injectable 1 once  DAPTOmycin IVPB 700 every 48 hours  docusate sodium 100 three times a day  epoetin fransisca Injectable 66173 once  gabapentin 300 at bedtime  heparin  Injectable 5000 every 8 hours  insulin glargine Injectable (LANTUS) 15 every morning  insulin lispro (HumaLOG) corrective regimen sliding scale  three times a day before meals  insulin lispro Injectable (HumaLOG) 4 three times a day before meals  metoprolol 25 two times a day  nystatin Ointment 1 two times a day  oxyCODONE    5 mG/acetaminophen 325 mG 1 every 4 hours PRN  pantoprazole    Tablet 40 before breakfast  polyethylene glycol 3350 17 daily  rifampin 600 daily  senna 2 at bedtime  sertraline 25 daily  simvastatin 20 at bedtime  sodium chloride 0.45%. 1000 <Continuous>  sodium chloride 0.9% lock flush 3 every 8 hours  traZODone 25 at bedtime      Allergies    penicillin (Unknown)  Sular (Unknown)    Intolerances        T(C): , Max: 36.4 (10-04-17 @ 16:00)  T(F): , Max: 97.6 (10-04-17 @ 16:00)  HR: 88 (10-05-17 @ 11:00)  BP: 122/56 (10-05-17 @ 11:00)  BP(mean): 83 (10-05-17 @ 11:00)  RR: 14 (10-05-17 @ 11:00)  SpO2: 99% (10-05-17 @ 11:00)  Wt(kg): --    10-04 @ 07:01  -  10-05 @ 07:00  --------------------------------------------------------  IN: 370 mL / OUT: 291 mL / NET: 79 mL    10-05 @ 07:01  -  10-05 @ 11:40  --------------------------------------------------------  IN: 20 mL / OUT: 0 mL / NET: 20 mL          Review of Systems:  CONSTITUTIONAL: No fever or chills  EYES: No blurred or double vision.  RESPIRATORY: Mild shortness of breath, denies any cough or hemoptysis  CARDIOVASCULAR: Mild shortness of breath+nt, No palpitations, dizzineess, No Chest pain   GASTROINTESTINAL: NO abdominal or flank pain, No nausea or vomiting, No diarrhea  GENITOURINARY: No dysuria or urinary burning, No difficulty passing urine, No hematuria  NEUROLOGICAL: No headaches or blurred vision  SKIN: No skin rashes   MUSCULOSKELETAL: Mild leg edema+nt, No arthralgia, Joint pain, No muscle pains      PHYSICAL EXAM:  GENERAL: NAD, well-developed, well nourished, alert, awake, no acute distress at present  HEAD:  Atraumatic, Normocephalic,   EYES: Bilateral conjuctiva and scleral pallor+nt  Oral cavity: Oral mucosa dry and pale  NECK: Neck supple, No JVD, Left subclavian permacath present. No bleeding.  CHEST/LUNG: BIlateral decreased breath sounds, bibasilar rales and crepitations at bases, No wheezing, surgical scar with stitches and chest tube present.   HEART: Regular rate and rhythm. ALBARO II/VI at LPSB, No gallop, no rub   ABDOMEN: Soft, Nontender, BS+nt, No flank tenderness.   EXTREMITIES: Bilateral leg edema+nt, No clubbing, cyanosis  Neurology: AAOx3, no focal neurological deficit  SKIN: No rashes or lesions          ACCESS:     LABS:                        7.7    12.7  )-----------( 274      ( 05 Oct 2017 01:07 )             25.3     10-05    130<L>  |  91<L>  |  27<H>  ----------------------------<  100<H>  3.9   |  23  |  3.49<H>    Ca    8.8      05 Oct 2017 01:08  Phos  3.6     10-05  Mg     2.2     10-05    TPro  7.0  /  Alb  2.3<L>  /  TBili  0.6  /  DBili  x   /  AST  24  /  ALT  11  /  AlkPhos  301<H>  10-03    Hepatitis C Virus S/CO Ratio: 0.28 S/CO (10-05 @ 01:16)  Hepatitis C Virus Interpretation: Nonreact (10-05 @ 01:16)    PT/INR - ( 05 Oct 2017 01:07 )   PT: 14.9 sec;   INR: 1.33          PTT - ( 05 Oct 2017 01:07 )  PTT:35.5 sec          RADIOLOGY & ADDITIONAL STUDIES:  < from: Xray Chest 1 View AP -PORTABLE-Routine (10.04.17 @ 03:27) >    EXAM:  XR CHEST 1 VIEW PORT ROUTINE                          PROCEDURE DATE:  10/04/2017                     INTERPRETATION:    Portable AP Radiograph dated 10/4/2017 3:27 AM    CLINICAL INFORMATION: 47 years, Female, post op    PRIOR STUDIES: October 3, 2017    FINDINGS: Support tubes and lines are unchanged. Previously seen   pneumomediastinum has improved. Improved pulmonary vascular congestion   and bilateral pleural effusions. No pneumothorax.    IMPRESSION:  Interval improvement of previously seen pneumomediastinum, pulmonary   vascular congestion, and bilateral pleural effusions.            "Thank you for the opportunity to participate in the care of this   patient."        MEGHAN HORTA M.D., RADIOLOGY ATTENDING  This document has been electronically signed. Oct  4 2017 12:24PM                  < end of copied text >

## 2017-10-05 NOTE — PROGRESS NOTE ADULT - ASSESSMENT
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 Nephrology consult called for HD treatment. Patient underwent Tricuspid valve replacement on 10/02/2017. Patient had HD treatment on 10/03/2014 and tolerated procedure well. Patient had left arm AV fistula for past 4 months which never worked or mature. Left sided permacath placed on (09/25) present. Patient had last HD treatment on 10/03/2017 with UF goal of 2.8 kg.

## 2017-10-06 LAB
ALBUMIN SERPL ELPH-MCNC: 2.1 G/DL — LOW (ref 3.3–5)
ALBUMIN SERPL ELPH-MCNC: 2.2 G/DL — LOW (ref 3.3–5)
ALP SERPL-CCNC: 357 U/L — HIGH (ref 40–120)
ALP SERPL-CCNC: 380 U/L — HIGH (ref 40–120)
ALT FLD-CCNC: 7 U/L — LOW (ref 10–45)
ALT FLD-CCNC: 8 U/L — LOW (ref 10–45)
ANION GAP SERPL CALC-SCNC: 12 MMOL/L — SIGNIFICANT CHANGE UP (ref 5–17)
ANION GAP SERPL CALC-SCNC: 13 MMOL/L — SIGNIFICANT CHANGE UP (ref 5–17)
APTT BLD: 36.4 SEC — SIGNIFICANT CHANGE UP (ref 27.5–37.4)
AST SERPL-CCNC: 11 U/L — SIGNIFICANT CHANGE UP (ref 10–40)
AST SERPL-CCNC: 13 U/L — SIGNIFICANT CHANGE UP (ref 10–40)
BILIRUB SERPL-MCNC: 0.5 MG/DL — SIGNIFICANT CHANGE UP (ref 0.2–1.2)
BILIRUB SERPL-MCNC: 0.5 MG/DL — SIGNIFICANT CHANGE UP (ref 0.2–1.2)
BUN SERPL-MCNC: 17 MG/DL — SIGNIFICANT CHANGE UP (ref 7–23)
BUN SERPL-MCNC: 20 MG/DL — SIGNIFICANT CHANGE UP (ref 7–23)
CALCIUM SERPL-MCNC: 8.6 MG/DL — SIGNIFICANT CHANGE UP (ref 8.4–10.5)
CALCIUM SERPL-MCNC: 8.8 MG/DL — SIGNIFICANT CHANGE UP (ref 8.4–10.5)
CHLORIDE SERPL-SCNC: 93 MMOL/L — LOW (ref 96–108)
CHLORIDE SERPL-SCNC: 94 MMOL/L — LOW (ref 96–108)
CO2 SERPL-SCNC: 25 MMOL/L — SIGNIFICANT CHANGE UP (ref 22–31)
CO2 SERPL-SCNC: 26 MMOL/L — SIGNIFICANT CHANGE UP (ref 22–31)
CREAT SERPL-MCNC: 2.93 MG/DL — HIGH (ref 0.5–1.3)
CREAT SERPL-MCNC: 3.39 MG/DL — HIGH (ref 0.5–1.3)
FOLATE SERPL-MCNC: 3.4 NG/ML — LOW (ref 4.8–24.2)
GLUCOSE SERPL-MCNC: 128 MG/DL — HIGH (ref 70–99)
GLUCOSE SERPL-MCNC: 91 MG/DL — SIGNIFICANT CHANGE UP (ref 70–99)
HBV SURFACE AB SER-ACNC: 199.3 MIU/ML — SIGNIFICANT CHANGE UP
HCT VFR BLD CALC: 24.4 % — LOW (ref 34.5–45)
HCT VFR BLD CALC: 26.5 % — LOW (ref 34.5–45)
HGB BLD-MCNC: 7.6 G/DL — LOW (ref 11.5–15.5)
HGB BLD-MCNC: 7.8 G/DL — LOW (ref 11.5–15.5)
INR BLD: 1.34 — HIGH (ref 0.88–1.16)
IRON SATN MFR SERPL: 29 % — SIGNIFICANT CHANGE UP (ref 14–50)
IRON SATN MFR SERPL: 37 UG/DL — SIGNIFICANT CHANGE UP (ref 30–160)
MAGNESIUM SERPL-MCNC: 2.1 MG/DL — SIGNIFICANT CHANGE UP (ref 1.6–2.6)
MAGNESIUM SERPL-MCNC: 2.2 MG/DL — SIGNIFICANT CHANGE UP (ref 1.6–2.6)
MCHC RBC-ENTMCNC: 25.9 PG — LOW (ref 27–34)
MCHC RBC-ENTMCNC: 26.5 PG — LOW (ref 27–34)
MCHC RBC-ENTMCNC: 29.4 G/DL — LOW (ref 32–36)
MCHC RBC-ENTMCNC: 31.1 G/DL — LOW (ref 32–36)
MCV RBC AUTO: 85 FL — SIGNIFICANT CHANGE UP (ref 80–100)
MCV RBC AUTO: 88 FL — SIGNIFICANT CHANGE UP (ref 80–100)
PHOSPHATE SERPL-MCNC: 3.2 MG/DL — SIGNIFICANT CHANGE UP (ref 2.5–4.5)
PHOSPHATE SERPL-MCNC: 3.7 MG/DL — SIGNIFICANT CHANGE UP (ref 2.5–4.5)
PLATELET # BLD AUTO: 266 K/UL — SIGNIFICANT CHANGE UP (ref 150–400)
PLATELET # BLD AUTO: 284 K/UL — SIGNIFICANT CHANGE UP (ref 150–400)
POTASSIUM SERPL-MCNC: 4.2 MMOL/L — SIGNIFICANT CHANGE UP (ref 3.5–5.3)
POTASSIUM SERPL-MCNC: 4.4 MMOL/L — SIGNIFICANT CHANGE UP (ref 3.5–5.3)
POTASSIUM SERPL-SCNC: 4.2 MMOL/L — SIGNIFICANT CHANGE UP (ref 3.5–5.3)
POTASSIUM SERPL-SCNC: 4.4 MMOL/L — SIGNIFICANT CHANGE UP (ref 3.5–5.3)
PROT SERPL-MCNC: 7.2 G/DL — SIGNIFICANT CHANGE UP (ref 6–8.3)
PROT SERPL-MCNC: 7.6 G/DL — SIGNIFICANT CHANGE UP (ref 6–8.3)
PROTHROM AB SERPL-ACNC: 15 SEC — HIGH (ref 9.8–12.7)
RBC # BLD: 2.87 M/UL — LOW (ref 3.8–5.2)
RBC # BLD: 3.01 M/UL — LOW (ref 3.8–5.2)
RBC # FLD: 17.1 % — HIGH (ref 10.3–16.9)
RBC # FLD: 17.4 % — HIGH (ref 10.3–16.9)
SODIUM SERPL-SCNC: 130 MMOL/L — LOW (ref 135–145)
SODIUM SERPL-SCNC: 133 MMOL/L — LOW (ref 135–145)
TIBC SERPL-MCNC: 126 UG/DL — LOW (ref 220–430)
UIBC SERPL-MCNC: 89 UG/DL — LOW (ref 110–370)
VIT B12 SERPL-MCNC: >2000 PG/ML — HIGH (ref 243–894)
WBC # BLD: 10.3 K/UL — SIGNIFICANT CHANGE UP (ref 3.8–10.5)
WBC # BLD: 11.3 K/UL — HIGH (ref 3.8–10.5)
WBC # FLD AUTO: 10.3 K/UL — SIGNIFICANT CHANGE UP (ref 3.8–10.5)
WBC # FLD AUTO: 11.3 K/UL — HIGH (ref 3.8–10.5)

## 2017-10-06 PROCEDURE — 99232 SBSQ HOSP IP/OBS MODERATE 35: CPT | Mod: GC

## 2017-10-06 PROCEDURE — 71010: CPT | Mod: 26

## 2017-10-06 PROCEDURE — 99233 SBSQ HOSP IP/OBS HIGH 50: CPT

## 2017-10-06 PROCEDURE — 93010 ELECTROCARDIOGRAM REPORT: CPT

## 2017-10-06 PROCEDURE — 99223 1ST HOSP IP/OBS HIGH 75: CPT

## 2017-10-06 PROCEDURE — G0365: CPT | Mod: 26

## 2017-10-06 PROCEDURE — 99231 SBSQ HOSP IP/OBS SF/LOW 25: CPT | Mod: GC

## 2017-10-06 RX ORDER — WARFARIN SODIUM 2.5 MG/1
5 TABLET ORAL ONCE
Qty: 0 | Refills: 0 | Status: COMPLETED | OUTPATIENT
Start: 2017-10-06 | End: 2017-10-06

## 2017-10-06 RX ADMIN — PANTOPRAZOLE SODIUM 40 MILLIGRAM(S): 20 TABLET, DELAYED RELEASE ORAL at 05:55

## 2017-10-06 RX ADMIN — HEPARIN SODIUM 5000 UNIT(S): 5000 INJECTION INTRAVENOUS; SUBCUTANEOUS at 14:18

## 2017-10-06 RX ADMIN — Medication 4 UNIT(S): at 18:42

## 2017-10-06 RX ADMIN — SIMVASTATIN 20 MILLIGRAM(S): 20 TABLET, FILM COATED ORAL at 21:32

## 2017-10-06 RX ADMIN — WARFARIN SODIUM 5 MILLIGRAM(S): 2.5 TABLET ORAL at 21:32

## 2017-10-06 RX ADMIN — Medication 100 MILLIGRAM(S): at 05:55

## 2017-10-06 RX ADMIN — Medication 25 MILLIGRAM(S): at 21:32

## 2017-10-06 RX ADMIN — NYSTATIN CREAM 1 APPLICATION(S): 100000 CREAM TOPICAL at 05:55

## 2017-10-06 RX ADMIN — Medication: at 21:38

## 2017-10-06 RX ADMIN — Medication 4 UNIT(S): at 12:26

## 2017-10-06 RX ADMIN — SODIUM CHLORIDE 3 MILLILITER(S): 9 INJECTION INTRAMUSCULAR; INTRAVENOUS; SUBCUTANEOUS at 14:58

## 2017-10-06 RX ADMIN — GABAPENTIN 300 MILLIGRAM(S): 400 CAPSULE ORAL at 21:31

## 2017-10-06 RX ADMIN — SODIUM CHLORIDE 3 MILLILITER(S): 9 INJECTION INTRAMUSCULAR; INTRAVENOUS; SUBCUTANEOUS at 05:26

## 2017-10-06 RX ADMIN — NYSTATIN CREAM 1 APPLICATION(S): 100000 CREAM TOPICAL at 18:57

## 2017-10-06 RX ADMIN — INSULIN GLARGINE 15 UNIT(S): 100 INJECTION, SOLUTION SUBCUTANEOUS at 08:10

## 2017-10-06 RX ADMIN — DAPTOMYCIN 128 MILLIGRAM(S): 500 INJECTION, POWDER, LYOPHILIZED, FOR SOLUTION INTRAVENOUS at 11:34

## 2017-10-06 RX ADMIN — Medication 100 MILLIGRAM(S): at 14:18

## 2017-10-06 RX ADMIN — Medication 25 MILLIGRAM(S): at 05:55

## 2017-10-06 RX ADMIN — HEPARIN SODIUM 5000 UNIT(S): 5000 INJECTION INTRAVENOUS; SUBCUTANEOUS at 21:32

## 2017-10-06 RX ADMIN — SERTRALINE 25 MILLIGRAM(S): 25 TABLET, FILM COATED ORAL at 11:35

## 2017-10-06 RX ADMIN — Medication 4 UNIT(S): at 08:10

## 2017-10-06 RX ADMIN — SODIUM CHLORIDE 3 MILLILITER(S): 9 INJECTION INTRAMUSCULAR; INTRAVENOUS; SUBCUTANEOUS at 21:37

## 2017-10-06 RX ADMIN — Medication 25 MILLIGRAM(S): at 18:56

## 2017-10-06 RX ADMIN — Medication 81 MILLIGRAM(S): at 11:35

## 2017-10-06 RX ADMIN — OXYCODONE AND ACETAMINOPHEN 1 TABLET(S): 5; 325 TABLET ORAL at 18:42

## 2017-10-06 RX ADMIN — HEPARIN SODIUM 5000 UNIT(S): 5000 INJECTION INTRAVENOUS; SUBCUTANEOUS at 05:55

## 2017-10-06 NOTE — PROVIDER CONTACT NOTE (OTHER) - ASSESSMENT
began treatment. After HD treatment pt stated that she was too tired to get OOB. Pt rested for a few hours. Again RN discussed the benefits of getting OOB. Pt stated that she is not yet ready to get OOB and does understand that it is in her best interest not to remain in the bed for long periods of time.
Pt is asymptomatic, resting comfortably in bed. , pt reports no distress.
her know that pt intends to eat despite being NPO. I again paged ortho a few times, with no response. At 5:50 PM I heard back from MD Husam Morton who assured me that the surgeon is aware that hemovac is out and that there will be no further imaging to assess the site. BLANE Gomez made aware that ortho has no additional recommendations. BLANE Gomez to d/c NPO order.
speaking clearly, swallows well, only minimal swelling noted around neck. Pt's vital signs are stable with no distress noted. Per Christine Madrigal and Praveen from ortho perspective no need for the pt to remain NPO. I also spoke with BLANE Gomez and relayed this information. BLANE Gomez confirmed that pt is no longer NPO. I asked BLANE Gomez a few times to d/c NPO order, but am still waiting for that to happen.
time. Ortho resident also aware that there was no drainage during the shift.
Initial assessment as documented.  PICC line flushes well, no resistance, good blood return  at 2130 assessment. No neurovascular deficit noted. At approximately 0530, writer attempts to draw blood from pICC line, flushes well, no blood return, pt c/o of "burning sensation in her body where PICC line is" Facial grimacing noted.  Again BLANE García made aware.  BLANE García states that he cane to see pt, but pt was sleeping.  Spoke at length with BLANE Pascual this am.  Both PA's say that they will look at pts films and assess pt.  At time of note, noone to visit and assess pt.
Denies discomfort at this time. No headache noted

## 2017-10-06 NOTE — PROVIDER CONTACT NOTE (OTHER) - RECOMMENDATIONS
Per MD Aguillon, no intervention at this time. Pt has pacing wires set to VVI 45/10 backup, will continue to monitor for now.
Pt was also reminded that she should be shifting her weight in the bed and make body position changes to avoid skin breakdown. Pt again expressed understanding, but was not willing to move in the bed.
12 lead EKG ordered and completed by BLANE Cordova. No interventions at this time. Will continue to monitor.
Pt is drinking and taking PO medications. Will continue to closely monitor pt for complications.
assess pt
Labetalol IVP PRN order available.

## 2017-10-06 NOTE — PROVIDER CONTACT NOTE (OTHER) - REASON
Irregular heart rhythm
Irregular heart rhythm
pt complain of picc line discomfort AND am antibiotics held
SBP

## 2017-10-06 NOTE — PROGRESS NOTE ADULT - PROBLEM SELECTOR PLAN 2
Anemia of chronic disease with acutely low Hgb likely due to recent CT surgery for Tricuspid valve.   Iron: 36, Ferritin: 1036  no IV iron due to active infection/endocarditis.  Will give epo during next HD treatment.  Transfuse to keep Hct>25-30%.  Monitor serial hemoglobin for now.

## 2017-10-06 NOTE — CONSULT NOTE ADULT - PROVIDER SPECIALTY LIST ADULT
Electrophysiology
Endocrinology
Gastroenterology
Nephrology
Orthopedics
Orthopedics
Vascular Surgery
Neurology

## 2017-10-06 NOTE — PROGRESS NOTE ADULT - SUBJECTIVE AND OBJECTIVE BOX
POD #4 s/p Tricuspid valve replacement for right sided MRSA endocarditis    Pt admitted with complicated MRSA bacteremia.  Had SVC infected clot with right sided endocarditis, septic emboli to lung and Cervical and thoracic level osteomyelitis.      9/22 underwent corpectomyC6 and C5-7 anterior plating for osteo/epidural abscess-- OR cultures positive for MRSA.         PMH :  RA THROMBUS MRSA  ENDOCARDITIS MRSA  Endocarditis of tricuspid valve  Adjustment disorder with anxious mood  Hyponatremia  Hypertension  Endocarditis  Anemia  Hematemesis without nausea    ROS : no issues.   All other systems reviewed and negative.    ICU Vital Signs Last 24 Hrs  T(C): 36.1 (10-06-17 @ 10:32), Max: 36.8 (10-05-17 @ 22:17)  T(F): 97 (10-06-17 @ 10:32), Max: 98.3 (10-05-17 @ 22:17)  HR: 62 (10-06-17 @ 10:55) (58 - 96)  BP: 111/41 (10-06-17 @ 10:55) (98/39 - 155/76)  BP(mean): 55 (10-06-17 @ 10:55) (44 - 122)  RR: 20 (10-06-17 @ 10:55) (0 - 24)  SpO2: 98% (10-06-17 @ 10:55) (77% - 100%)    I&O's Summary    05 Oct 2017 07:01  -  06 Oct 2017 07:00  --------------------------------------------------------  IN: 730 mL / OUT: 3200 mL / NET: -2470 mL    06 Oct 2017 07:01  -  06 Oct 2017 11:59  --------------------------------------------------------  IN: 0 mL / OUT: 101 mL / NET: -101 mL        ABG - ( 05 Oct 2017 01:03 )  pH: 7.44  /  pCO2: 37    /  pO2: 74    / HCO3: 24    / Base Excess: 0.1   /  SaO2: 95                            7.6    10.3  )-----------( 266      ( 06 Oct 2017 02:52 )             24.4     06 Oct 2017 02:53    133    |  94     |  17     ----------------------------<  128    4.2     |  26     |  2.93     Ca    8.6        06 Oct 2017 02:53  Phos  3.2       06 Oct 2017 02:53  Mg     2.2       06 Oct 2017 02:53    TPro  7.2    /  Alb  2.2    /  TBili  0.5    /  DBili  x      /  AST  13     /  ALT  8      /  AlkPhos  380    06 Oct 2017 02:53    PT/INR - ( 06 Oct 2017 02:52 )   PT: 15.0 sec;   INR: 1.34     PTT - ( 06 Oct 2017 02:52 )  PTT:36.4 sec    MEDICATIONS  (STANDING):  aspirin enteric coated 81 milliGRAM(s) Oral daily  DAPTOmycin IVPB 700 milliGRAM(s) IV Intermittent every 48 hours  docusate sodium 100 milliGRAM(s) Oral three times a day  gabapentin 300 milliGRAM(s) Oral at bedtime  heparin  Injectable 5000 Unit(s) SubCutaneous every 8 hours  insulin glargine Injectable (LANTUS) 15 Unit(s) SubCutaneous every morning  insulin lispro (HumaLOG) corrective regimen sliding scale   SubCutaneous Before meals and at bedtime  insulin lispro Injectable (HumaLOG) 4 Unit(s) SubCutaneous three times a day before meals  metoprolol 25 milliGRAM(s) Oral two times a day  nystatin Ointment 1 Application(s) Topical two times a day  pantoprazole    Tablet 40 milliGRAM(s) Oral before breakfast  polyethylene glycol 3350 17 Gram(s) Oral daily  rifampin 600 milliGRAM(s) Oral daily  senna 2 Tablet(s) Oral at bedtime  sertraline 25 milliGRAM(s) Oral daily  simvastatin 20 milliGRAM(s) Oral at bedtime  traZODone 25 milliGRAM(s) Oral at bedtime    PHYSICAL EXAM:  Gen : no acute distress  Respiratory: decreased in the bases  Cardiovascular: S1 and S2, RRR, no M/G/R  Gastrointestinal: BS+, soft, NT/ND  Extremities: No peripheral edema  Vascular: 2+ peripheral pulses  Neurological: A/O x 3, no focal deficits  Incision: clean dry/ no sign of infection  Lines: no sign of infection

## 2017-10-06 NOTE — PROVIDER CONTACT NOTE (OTHER) - SITUATION
Assisted pt to settle in bed after procedure. RN noted that hemovac catheter was hanging loose. On closer assessment hemovac was detached. R neck incision site intact. No signs of bleeding from site.
Attempted to get pt OOB this AM. Pt expressed concern that she cannot walk on her L leg and could only bear weight. Pt asked for a walker. 2 RNs approached pt with walker and tried to assist her OOB.
I spoke with ortho MDs Praveen and Kaitlin. Both assured me that the ortho team does not need to assess pt since hemovac came out. They and the surgeon are aware that the drain is out, but they
Pt still waiting to be seen by ortho team regarding hemovac coming out. Pt states that she will eat when her family comes regardless of her NPO status. Per BLANE Gomez, pt needs to be evaluated by ortho
RN noted that hemovac was no longer compressed. RN made multiple attempts to compress the device, but each time it quickly reinflate on its own.
Pt has irregular rhythm on monitor. Pt is asymptomatic, SBP = 140, denies any distress. EKG strip printed out and shown to MD Aguillon.
Pt showing irregular heartbeat on monitor. Looks to be either junctional or atrial fibrillation.
pt c/o of pain, burning sensation because picc line is sitting on nerves and right arm numbness. (percocet given as ordered with good effect).
/64 (98)

## 2017-10-06 NOTE — PROGRESS NOTE ADULT - PROBLEM SELECTOR PLAN 4
BP range from 120 to 130's range at present.  Antihypertensive medications on hold for now per primary team.   Monitor blood pressure for now.

## 2017-10-06 NOTE — PROGRESS NOTE ADULT - SUBJECTIVE AND OBJECTIVE BOX
Patient is a 47y Female seen and evaluated at bedside. Patient stats that she feels better at present. Mild shortness of breath at present. Denies any chest pain, palpitations, dizziness at present. Denies any other complaints at present. Interval removal of Chest tube and mediastinal blakes.      acetaminophen    Suspension. 650 every 6 hours PRN  aspirin enteric coated 81 daily  DAPTOmycin IVPB 700 every 48 hours  docusate sodium 100 three times a day  gabapentin 300 at bedtime  heparin  Injectable 5000 every 8 hours  insulin glargine Injectable (LANTUS) 15 every morning  insulin lispro (HumaLOG) corrective regimen sliding scale  Before meals and at bedtime  insulin lispro Injectable (HumaLOG) 4 three times a day before meals  metoprolol 25 two times a day  nystatin Ointment 1 two times a day  oxyCODONE    5 mG/acetaminophen 325 mG 1 every 4 hours PRN  pantoprazole    Tablet 40 before breakfast  polyethylene glycol 3350 17 daily  rifampin 600 daily  senna 2 at bedtime  sertraline 25 daily  simvastatin 20 at bedtime  sodium chloride 0.45%. 1000 <Continuous>  sodium chloride 0.9% lock flush 3 every 8 hours  traZODone 25 at bedtime      Allergies    penicillin (Unknown)  Sular (Unknown)    Intolerances        T(C): , Max: 36.8 (10-05-17 @ 22:17)  T(F): , Max: 98.3 (10-05-17 @ 22:17)  HR: 66 (10-06-17 @ 09:00)  BP: 129/99 (10-06-17 @ 09:00)  BP(mean): 122 (10-06-17 @ 09:00)  RR: 15 (10-06-17 @ 09:00)  SpO2: 100% (10-06-17 @ 09:00)  Wt(kg): --    10-05 @ 07:01  -  10-06 @ 07:00  --------------------------------------------------------  IN: 730 mL / OUT: 3200 mL / NET: -2470 mL          Review of Systems:  CONSTITUTIONAL: No fever or chills, No fatigue or tiredness.  EYES: No blurred or double vision.  RESPIRATORY: Mild shortness of breath, Denies any cough, hemoptysis  CARDIOVASCULAR: No Chest pain or shortness of breath  GASTROINTESTINAL: NO abdominal or flank pain, No nausea or vomiting, No diarrhea  GENITOURINARY: No dysuria or urinary burning, No difficulty passing urine, No hematuria  NEUROLOGICAL: No headaches or blurred vision  SKIN: No skin rashes   MUSCULOSKELETAL: trace pedal edema, No arthralgia, Joint pain, No muscle pains      PHYSICAL EXAM:  GENERAL: NAD, well-developed, well nourished, alert, awake, no acute distress at present  HEAD:  Atraumatic, Normocephalic,   EYES: Bilateral conjuctiva and sclera normal   Oral cavity: Oral mucosa dry and pale  NECK: Neck supple, No JVD, Left IJ permacath present.   CHEST/LUNG: Bilateral decreased breath sounds at bases with minimal crepitations, no wheezing, no rhonchi  HEART: Regular rate and rhythm. ALBARO II/VI at LPSB, No gallop, no rub   ABDOMEN: Soft, Nontender, BS+nt, No flank tenderness.   EXTREMITIES: Trace pedal edema+nt, No clubbing, cyanosis  Neurology: AAOx3, no focal neurological deficit  SKIN: No rashes or lesions          ACCESS:     LABS:                        7.6    10.3  )-----------( 266      ( 06 Oct 2017 02:52 )             24.4     10-06    133<L>  |  94<L>  |  17  ----------------------------<  128<H>  4.2   |  26  |  2.93<H>    Ca    8.6      06 Oct 2017 02:53  Phos  3.2     10-06  Mg     2.2     10-06    TPro  7.2  /  Alb  2.2<L>  /  TBili  0.5  /  DBili  x   /  AST  13  /  ALT  8<L>  /  AlkPhos  380<H>  10-06      PT/INR - ( 06 Oct 2017 02:52 )   PT: 15.0 sec;   INR: 1.34          PTT - ( 06 Oct 2017 02:52 )  PTT:36.4 sec          RADIOLOGY & ADDITIONAL STUDIES:  < from: Xray Chest 1 View AP-PORTABLE IMMEDIATE (10.05.17 @ 11:40) >    EXAM:  XR CHEST 1 VIEW PORT IMMEDIATE                          PROCEDURE DATE:  10/05/2017                     INTERPRETATION:  Chest X-Ray dated 10/5/2017 11:40 AM    Indication: s/p d/c CT    Comparison studies: Chest x-ray taken at 3:20 AM same day.    An AP portable view of the chest demonstrates removal of the smaller left   internal jugular line and chest tubes and mediastinal drains. No   pneumothorax is seen. Focal opacity is again noted at the right lung   base. A faint lucency is noted overlying the right diaphragm.    IMPRESSION:  Faint lucency over right diaphragm which could represent free   intra-abdominal air. Erect PA chest film would be useful.      Findings discussed with Dr. Mark at 12:25 PM on 10/5/2017.              "Thank you for the opportunity to participate in the care of this   patient."        LEIGH CHEUNG M.D., ATTENDING RADIOLOGIST  This document has been electronically signed. Oct  5 2017 12:30PM                  < end of copied text >

## 2017-10-06 NOTE — CONSULT NOTE ADULT - CONSULT REQUESTED DATE/TIME
06-Oct-2017 12:51
14-Sep-2017
14-Sep-2017 11:09
14-Sep-2017 11:26
14-Sep-2017 18:20
18-Sep-2017 10:16
20-Sep-2017 17:50
17-Sep-2017 11:00

## 2017-10-06 NOTE — PROVIDER CONTACT NOTE (OTHER) - NAME OF MD/NP/PA/DO NOTIFIED:
BLANE Sutherland
MD Madrigal, MD Husam Morton, BLANE Gomez
Ortho resident, BLANE Gomez
BLANE Gomez AND BLANE ellis
MD Aguillon
SOPHIE Aguillon MD; AMISH Cordova PA
SHANNAN Li
BLANE Gomez

## 2017-10-06 NOTE — PROGRESS NOTE ADULT - ASSESSMENT
46yo with prolonged and complicated MRSA bacteremia, Right sided endocarditis s/p TV replacement.  Septic emboli to lungs and spine s/p C6 corpectomy C5-7 anterior plating.  T7/8 discitis osteomyelitis.    Problems  1.  Complicated MRSA Bacteremia with right sided endocarditis, C5-7 small epidural abscess, discitis/osteo of T7/8  2. S/p TVR 10/4  3. S/p C6 corpectomy/ C5-7 anterior plating on 9/22 48yo with prolonged and complicated MRSA bacteremia, Right sided endocarditis s/p TV replacement.  Septic emboli to lungs and spine s/p C6 corpectomy C5-7 anterior plating.  T7/8 discitis osteomyelitis.    Problems  1.  Complicated MRSA Bacteremia with right sided endocarditis, C5-7 small epidural abscess, discitis/osteo of T7/8  2. S/p TVR 10/4  3. S/p C6 corpectomy/ C5-7 anterior plating on 9/22    Plan   -- continue antibiotics for deep seeded MRSA infection.  OR Cx from Tricuspid valve so far negative.  Pt will need 8-12 wks of antibiotics from date of her spine surgery  -- will resume anticoagulation for SVC clot  -- EP consulted for ? 2nd degree AV block and non-conducted P wave seen on EKG/Tele  -- Discharge planning initiated  -- ESRD on HD, Vascular following for AF fistula.    Floor care, ICU due to isolation bed.

## 2017-10-06 NOTE — PROGRESS NOTE ADULT - ATTENDING COMMENTS
Pt seen on rounds this afternoon.  Overall PO intake improving, but still inconsistent.  Basal insulin requirements continue to decrease, with AM fingrstick somewhat "tight" today--may need to decrease the Lantus to 12 units if AM fingersticks drop into the 90s.    Continue the current pre-meal Humalog for now

## 2017-10-06 NOTE — PROVIDER CONTACT NOTE (OTHER) - DATE AND TIME:
27-Sep-2017 09:00
28-Sep-2017 15:40
27-Sep-2017 19:10
28-Sep-2017 17:50
28-Sep-2017 18:10
05-Oct-2017 20:30
05-Oct-2017 21:00
30-Sep-2017
27-Sep-2017 23:45

## 2017-10-06 NOTE — CONSULT NOTE ADULT - SUBJECTIVE AND OBJECTIVE BOX
CHIEF COMPLAINT: endocarditis.    HISTORY OF PRESENT ILLNESS:  48yo female with Hx HTN, HLD, DM, CVA, ESRD/HD prior Right Femoral HD catheter (9/13/2017 - [facility])/failed LUE AVF, now with Left SC  catheter, chronic left foot OM transferred for management of MRSA bacteremia/TV endocarditis  Patient is s/p tricuspid valve replacement on  10/02/2017  EPS evaluation for PPM    PAST MEDICAL & SURGICAL HISTORY:        PERTINENT DIAGNOSTIC TESTING:    [ ] Echocardiogram:     < from: Echocardiogram (09.29.17 @ 13:18) >  The left atrial size is normal.The right atrium is borderline   dilated.Structurally normal aortic valve with no  aortic regurgitation or   hemodynamically significant valvular aortic stenosis.  Structurally   normal   mitral valve with no mitral regurgitation noted.There is a 2.0 x 1.1 cm   independently mobile echodensity attached to atrial side of the tricuspid   valve leaflet. Another 1.3 x 0.8 cm echodensity is visualized attached to   right atrial wall. These findingsare most likely consistent with   vegetations.   There is severe tricuspid regurgitation. The pulmonary artery systolic   pressure is estimated to be 45 mmHg.  Structurally normal pulmonic valve   with   no pulmonic regurgitation noted.The right ventricle is normal in size and   function.There is moderate concentric left ventricular hypertrophy.The   left   ventricular wall motion is normal.The left ventricular ejection fraction   is   estimated to be 55-60%. Indeterminate LV diastolic dysfunction.  No   aortic   root dilatation.A small pericardial effusion noted.Compared to prior   study   performed on 9/20/17, there is no significant change.      Allergies    penicillin (Unknown)  Sular (Unknown)    Intolerances    	    MEDICATIONS:  metoprolol 25 milliGRAM(s) Oral two times a day  DAPTOmycin IVPB 700 milliGRAM(s) IV Intermittent every 48 hours  rifampin 600 milliGRAM(s) Oral daily  cetaminophen    Suspension. 650 milliGRAM(s) Oral every 6 hours PRN  gabapentin 300 milliGRAM(s) Oral at bedtime  oxyCODONE    5 mG/acetaminophen 325 mG 1 Tablet(s) Oral every 4 hours PRN  sertraline 25 milliGRAM(s) Oral daily  traZODone 25 milliGRAM(s) Oral at bedtime  docusate sodium 100 milliGRAM(s) Oral three times a day  pantoprazole    Tablet 40 milliGRAM(s) Oral before breakfast  polyethylene glycol 3350 17 Gram(s) Oral daily  senna 2 Tablet(s) Oral at bedtime  insulin glargine Injectable (LANTUS) 15 Unit(s) SubCutaneous every morning  insulin lispro (HumaLOG) corrective regimen sliding scale   SubCutaneous Before meals and at bedtime  insulin lispro Injectable (HumaLOG) 4 Unit(s) SubCutaneous three times a day before meals  simvastatin 20 milliGRAM(s) Oral at bedtime  aspirin enteric coated 81 milliGRAM(s) Oral daily  heparin  Injectable 5000 Unit(s) SubCutaneous every 8 hours  nystatin Ointment 1 Application(s) Topical two times a day  sodium chloride 0.45%. 1000 milliLiter(s) IV Continuous <Continuous>  warfarin 5 milliGRAM(s) Oral once      FAMILY HISTORY:      SOCIAL HISTORY:    [x ] Non-smoker            REVIEW OF SYSTEMS:    CONSTITUTIONAL: No fever, weight loss, or fatigue  EYES: No eye pain, visual disturbances, or discharge  ENMT:  No difficulty hearing, tinnitus, vertigo; No sinus or throat pain  NECK: No pain or stiffness  BREASTS: No pain, masses, or nipple discharge  CARDIOVASCULAR: No chest pain, palpitations, dizziness, or leg swelling  GASTROINTESTINAL: No abdominal or epigastric pain. No nausea, vomiting, or hematemesis; No diarrhea or constipation. No melena or hematochezia.  GENITOURINARY: No dysuria, frequency, hematuria, or incontinence  NEUROLOGICAL: No headaches, memory loss, loss of strength, numbness, or tremors  SKIN: No itching, burning, rashes, or lesions   LYMPH Nodes: No enlarged glands  ENDOCRINE: No heat or cold intolerance; No hair loss  MUSCULOSKELETAL: No joint pain or swelling; No muscle, back, or extremity pain  PSYCHIATRIC: No depression, anxiety, mood swings, or difficulty sleeping    PHYSICAL EXAM:  T(C): 36.1 (10-06-17 @ 10:32), Max: 36.8 (10-05-17 @ 22:17)  HR: 62 (10-06-17 @ 10:55) (58 - 96)  BP: 111/41 (10-06-17 @ 10:55) (98/39 - 142/63)  RR: 20 (10-06-17 @ 10:55) (0 - 24)  SpO2: 98% (10-06-17 @ 10:55) (77% - 100%)  Wt(kg): --  I&O's Summary    05 Oct 2017 07:01  -  06 Oct 2017 07:00  --------------------------------------------------------  IN: 730 mL / OUT: 3200 mL / NET: -2470 mL    06 Oct 2017 07:01  -  06 Oct 2017 12:52  --------------------------------------------------------  IN: 0 mL / OUT: 101 mL / NET: -101 mL        TELEMETRY: 	 second degree type one, intermittent, junctional escape.   ECG: < from: 12 Lead ECG (10.06.17 @ 08:38) >  Ventricular Rate 64 BPM    Atrial Rate 82 BPM    QRS Duration 86 ms    Q-T Interval 454 ms    QTC Calculation(Bezet) 468 ms    R Axis -11 degrees    T Axis 36 degrees    < end of copied text >      Appearance: Normal	  HEENT:    PERRL, EOMI	  Cardiovascular: S1 S2 no edema  Respiratory: Lungs clear to auscultation	  Gastrointestinal:  Soft, Non-tender, + BS	  Neurologic: A&O x 3,  Extremities: no edema      	  LABS:	 	    CARDIAC MARKERS:                                  7.6    10.3  )-----------( 266      ( 06 Oct 2017 02:52 )             24.4     10-06    133<L>  |  94<L>  |  17  ----------------------------<  128<H>  4.2   |  26  |  2.93<H>    Ca    8.6      06 Oct 2017 02:53  Phos  3.2     10-06  Mg     2.2     10-06    TPro  7.2  /  Alb  2.2<L>  /  TBili  0.5  /  DBili  x   /  AST  13  /  ALT  8<L>  /  AlkPhos  380<H>  10-06    proBNP:   Lipid Profile:   HgA1c:   TSH:     ASSESSMENT/PLAN: 	  48yo female with Hx HTN, HLD, DM, CVA, ESRD/HD prior Right Femoral HD catheter (9/13/2017 - [facility])/failed LUE AVF, now with Left SC  catheter, chronic left foot OM transferred for management of MRSA bacteremia/TV endocarditis  Patient is s/p tricuspid valve replacement on  10/02/2017  Liliam continue to monitor telemetry, no episodes of bradycardia note, will follow and reevaluate need for PPM, given patients history of infections most likely will benefit form epicardial system if pacemaker is indicated

## 2017-10-06 NOTE — PROGRESS NOTE ADULT - SUBJECTIVE AND OBJECTIVE BOX
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 (13 Sep 2017 22:57). On this admission, patient was also found to have septic emboli, cervical osteomyelitis and abscess s/p corpectomy and anterior plating POD #4. Patient is s/p TVR on 10/2/2017.         CONSTITUTIONAL:  Negative fever or chills, feels well, good appetite  EYES:  Negative  blurry vision or double vision  CARDIOVASCULAR:  Negative for chest pain or palpitations  RESPIRATORY:  Negative for cough, wheezing, or SOB   GASTROINTESTINAL:  Negative for nausea, vomiting, diarrhea, constipation, or abdominal pain  GENITOURINARY:  Negative frequency, urgency or dysuria  NEUROLOGIC:  No headache, confusion, dizziness, lightheadedness    MEDICATIONS  (STANDING):  aspirin enteric coated 81 milliGRAM(s) Oral daily  DAPTOmycin IVPB 700 milliGRAM(s) IV Intermittent every 48 hours  docusate sodium 100 milliGRAM(s) Oral three times a day  gabapentin 300 milliGRAM(s) Oral at bedtime  heparin  Injectable 5000 Unit(s) SubCutaneous every 8 hours  insulin glargine Injectable (LANTUS) 15 Unit(s) SubCutaneous every morning  insulin lispro (HumaLOG) corrective regimen sliding scale   SubCutaneous Before meals and at bedtime  insulin lispro Injectable (HumaLOG) 4 Unit(s) SubCutaneous three times a day before meals  metoprolol 25 milliGRAM(s) Oral two times a day  nystatin Ointment 1 Application(s) Topical two times a day  pantoprazole    Tablet 40 milliGRAM(s) Oral before breakfast  polyethylene glycol 3350 17 Gram(s) Oral daily  rifampin 600 milliGRAM(s) Oral daily  senna 2 Tablet(s) Oral at bedtime  sertraline 25 milliGRAM(s) Oral daily  simvastatin 20 milliGRAM(s) Oral at bedtime  sodium chloride 0.45%. 1000 milliLiter(s) (10 mL/Hr) IV Continuous <Continuous>  sodium chloride 0.9% lock flush 3 milliLiter(s) IV Push every 8 hours  traZODone 25 milliGRAM(s) Oral at bedtime  warfarin 5 milliGRAM(s) Oral once    MEDICATIONS  (PRN):  acetaminophen    Suspension. 650 milliGRAM(s) Oral every 6 hours PRN Mild Pain (1 - 3)  oxyCODONE    5 mG/acetaminophen 325 mG 1 Tablet(s) Oral every 4 hours PRN Moderate Pain (4 - 6)      PHYSICAL EXAM  Vital Signs Last 24 Hrs  T(C): 36.1 (06 Oct 2017 10:32), Max: 36.8 (05 Oct 2017 22:17)  T(F): 97 (06 Oct 2017 10:32), Max: 98.3 (05 Oct 2017 22:17)  HR: 62 (06 Oct 2017 10:55) (58 - 96)  BP: 111/41 (06 Oct 2017 10:55) (98/39 - 142/63)  BP(mean): 55 (06 Oct 2017 10:55) (44 - 122)  RR: 20 (06 Oct 2017 10:55) (0 - 24)  SpO2: 98% (06 Oct 2017 10:55) (77% - 100%)    Constitutional: wn/wd in NAD.   HEENT: NCAT, MMM, OP clear, EOMI, no proptosis or lid retraction  Neck: no thyromegaly or palpable thyroid nodules   Respiratory: lungs CTAB.  Cardiovascular: regular rhythm, normal S1 and S2, no audible murmurs, no peripheral edema  GI: soft, NT/ND, no masses/HSM appreciated.  Neurology: no tremors, DTR 2+  Skin: no visible rashes/lesions  Psychiatric: AAO x 3, normal affect/mood.    LABS:                        7.6    10.3  )-----------( 266      ( 06 Oct 2017 02:52 )             24.4     10-06    133<L>  |  94<L>  |  17  ----------------------------<  128<H>  4.2   |  26  |  2.93<H>    Ca    8.6      06 Oct 2017 02:53  Phos  3.2     10-06  Mg     2.2     10-06    TPro  7.2  /  Alb  2.2<L>  /  TBili  0.5  /  DBili  x   /  AST  13  /  ALT  8<L>  /  AlkPhos  380<H>  10-06    PT/INR - ( 06 Oct 2017 02:52 )   PT: 15.0 sec;   INR: 1.34          PTT - ( 06 Oct 2017 02:52 )  PTT:36.4 sec    Thyroid Stimulating Hormone, Serum: 2.447 uIU/mL (09-13 @ 22:51)  Thyroid Stimulating Hormone, Serum: 1.251 uIU/mL (08-24 @ 01:12)      HbA1C: 8.2 % (09-13 @ 22:51)  8.2 % (08-24 @ 01:11)    CAPILLARY BLOOD GLUCOSE  112 (06 Oct 2017 06:00)      Insulin Sliding Scale requirements X 24 Hours:    RADIOLOGY & ADDITIONAL TESTS:    A/P: 47y Female with history of DM type II presenting for DM type II presenting for MRSA bacteremia and TV endocarditis s/p cervical corpectomy, s/p TVR on 10/2/2017.   his morning FS today is well controlled with Lantus 15 units    1.  DM 2, uncontrolled, complicated - patient with poor diabetic history and multiple diabetic complications. A1c is likely higher given that patient is on Procrit and has high cell turnover. Patient also seems to be non compliance with her diabetes diet.   - Continue Lantus 15 units daily. Continue Lispro 4 units with meals only if patient eats.    -  Please continue moderate dose sliding scale lispro coverage    Goal FSG is 120 - 180  Will continue to monitor   For discharge, pt can continue    Pt can follow up at discharge with [facility] by calling  to make an appointment.   Will discuss case with Dr. Ross, Dr. Yoon and update primary team INTERVAL HPI/OVERNIGHT EVENTS:    Patient is a 47y old  Female who presents with a chief complaint of TV IE / MRSA Bacteremia. (13 Sep 2017 22:57). On this admission, patient was also found to have septic emboli, cervical osteomyelitis and abscess s/p corpectomy and anterior plating POD #4. Patient is s/p TVR on 10/2/2017.     Patient looks happy today and talking that she walked on the hallway this morning. Patient had outside food - white rice and 3 meat balls for dinner. This morning she had cream of wheat and milk for breakfast; She had fried rice for lunch.    CONSTITUTIONAL:  Negative fever or chills, feels well, good appetite  EYES:  Negative  blurry vision or double vision  CARDIOVASCULAR:  Negative for chest pain or palpitations  RESPIRATORY:  Negative for cough, wheezing, or SOB   GASTROINTESTINAL:  Negative for nausea, vomiting, diarrhea, constipation, or abdominal pain  GENITOURINARY:  Negative frequency, urgency or dysuria  NEUROLOGIC:  No headache, confusion, dizziness, lightheadedness    MEDICATIONS  (STANDING):  aspirin enteric coated 81 milliGRAM(s) Oral daily  DAPTOmycin IVPB 700 milliGRAM(s) IV Intermittent every 48 hours  docusate sodium 100 milliGRAM(s) Oral three times a day  gabapentin 300 milliGRAM(s) Oral at bedtime  heparin  Injectable 5000 Unit(s) SubCutaneous every 8 hours  insulin glargine Injectable (LANTUS) 15 Unit(s) SubCutaneous every morning  insulin lispro (HumaLOG) corrective regimen sliding scale   SubCutaneous Before meals and at bedtime  insulin lispro Injectable (HumaLOG) 4 Unit(s) SubCutaneous three times a day before meals  metoprolol 25 milliGRAM(s) Oral two times a day  nystatin Ointment 1 Application(s) Topical two times a day  pantoprazole    Tablet 40 milliGRAM(s) Oral before breakfast  polyethylene glycol 3350 17 Gram(s) Oral daily  rifampin 600 milliGRAM(s) Oral daily  senna 2 Tablet(s) Oral at bedtime  sertraline 25 milliGRAM(s) Oral daily  simvastatin 20 milliGRAM(s) Oral at bedtime  sodium chloride 0.45%. 1000 milliLiter(s) (10 mL/Hr) IV Continuous <Continuous>  sodium chloride 0.9% lock flush 3 milliLiter(s) IV Push every 8 hours  traZODone 25 milliGRAM(s) Oral at bedtime  warfarin 5 milliGRAM(s) Oral once    MEDICATIONS  (PRN):  acetaminophen    Suspension. 650 milliGRAM(s) Oral every 6 hours PRN Mild Pain (1 - 3)  oxyCODONE    5 mG/acetaminophen 325 mG 1 Tablet(s) Oral every 4 hours PRN Moderate Pain (4 - 6)      PHYSICAL EXAM  Vital Signs Last 24 Hrs  T(C): 36.1 (06 Oct 2017 10:32), Max: 36.8 (05 Oct 2017 22:17)  T(F): 97 (06 Oct 2017 10:32), Max: 98.3 (05 Oct 2017 22:17)  HR: 62 (06 Oct 2017 10:55) (58 - 96)  BP: 111/41 (06 Oct 2017 10:55) (98/39 - 142/63)  BP(mean): 55 (06 Oct 2017 10:55) (44 - 122)  RR: 20 (06 Oct 2017 10:55) (0 - 24)  SpO2: 98% (06 Oct 2017 10:55) (77% - 100%)    Constitutional: wn/wd in NAD.   HEENT: NCAT, MMM, OP clear, EOMI, no proptosis or lid retraction  Neck: no thyromegaly or palpable thyroid nodules   Respiratory: lungs CTAB.  Cardiovascular: regular rhythm, normal S1 and S2, no audible murmurs, no peripheral edema  GI: soft, NT/ND, no masses/HSM appreciated.  Neurology: no tremors, DTR 2+  Skin: no visible rashes/lesions  Psychiatric: AAO x 3, normal affect/mood.    LABS:                        7.6    10.3  )-----------( 266      ( 06 Oct 2017 02:52 )             24.4     10-06    133<L>  |  94<L>  |  17  ----------------------------<  128<H>  4.2   |  26  |  2.93<H>    Ca    8.6      06 Oct 2017 02:53  Phos  3.2     10-06  Mg     2.2     10-06    TPro  7.2  /  Alb  2.2<L>  /  TBili  0.5  /  DBili  x   /  AST  13  /  ALT  8<L>  /  AlkPhos  380<H>  10-06    PT/INR - ( 06 Oct 2017 02:52 )   PT: 15.0 sec;   INR: 1.34          PTT - ( 06 Oct 2017 02:52 )  PTT:36.4 sec    Thyroid Stimulating Hormone, Serum: 2.447 uIU/mL (09-13 @ 22:51)  Thyroid Stimulating Hormone, Serum: 1.251 uIU/mL (08-24 @ 01:12)      HbA1C: 8.2 % (09-13 @ 22:51)  8.2 % (08-24 @ 01:11)    CAPILLARY BLOOD GLUCOSE  112 (06 Oct 2017 06:00)      Insulin Sliding Scale requirements X 24 Hours:    RADIOLOGY & ADDITIONAL TESTS:    A/P: 47y Female with history of DM type II presenting for DM type II presenting for MRSA bacteremia and TV endocarditis s/p cervical corpectomy, s/p TVR on 10/2/2017.   his morning FS today is well controlled with Lantus 15 units    1.  DM 2, uncontrolled, complicated - patient with poor diabetic history and multiple diabetic complications. A1c is likely higher given that patient is on Procrit and has high cell turnover. Patient also seems to be non compliance with her diabetes diet.   - Continue Lantus 15 units daily. Continue Lispro 4 units with meals only if patient eats.    -  Please continue moderate dose sliding scale lispro coverage    Goal FSG is 120 - 180  Will continue to monitor   For discharge, pt can continue    Pt can follow up at discharge with [facility] by calling  to make an appointment.   Will discuss case with Dr. Ross, Dr. Yoon and update primary team INTERVAL HPI/OVERNIGHT EVENTS:    Patient is a 47y old  Female who presents with a chief complaint of TV IE / MRSA Bacteremia. (13 Sep 2017 22:57). On this admission, patient was also found to have septic emboli, cervical osteomyelitis and abscess s/p corpectomy and anterior plating POD #4. Patient is s/p TVR on 10/2/2017.     Patient looks happy today and talking that she walked on the hallway this morning. Patient had outside food - white rice and 3 meat balls for dinner. This morning she had cream of wheat and milk for breakfast; She had fried rice for lunch.    CONSTITUTIONAL:  Negative fever or chills, feels well, good appetite  EYES:  Negative  blurry vision or double vision  CARDIOVASCULAR:  Negative for chest pain or palpitations  RESPIRATORY:  Negative for cough, wheezing, or SOB   GASTROINTESTINAL:  Negative for nausea, vomiting, diarrhea, constipation, or abdominal pain  GENITOURINARY:  Negative frequency, urgency or dysuria  NEUROLOGIC:  No headache, confusion, dizziness, lightheadedness    MEDICATIONS  (STANDING):  aspirin enteric coated 81 milliGRAM(s) Oral daily  DAPTOmycin IVPB 700 milliGRAM(s) IV Intermittent every 48 hours  docusate sodium 100 milliGRAM(s) Oral three times a day  gabapentin 300 milliGRAM(s) Oral at bedtime  heparin  Injectable 5000 Unit(s) SubCutaneous every 8 hours  insulin glargine Injectable (LANTUS) 15 Unit(s) SubCutaneous every morning  insulin lispro (HumaLOG) corrective regimen sliding scale   SubCutaneous Before meals and at bedtime  insulin lispro Injectable (HumaLOG) 4 Unit(s) SubCutaneous three times a day before meals  metoprolol 25 milliGRAM(s) Oral two times a day  nystatin Ointment 1 Application(s) Topical two times a day  pantoprazole    Tablet 40 milliGRAM(s) Oral before breakfast  polyethylene glycol 3350 17 Gram(s) Oral daily  rifampin 600 milliGRAM(s) Oral daily  senna 2 Tablet(s) Oral at bedtime  sertraline 25 milliGRAM(s) Oral daily  simvastatin 20 milliGRAM(s) Oral at bedtime  sodium chloride 0.45%. 1000 milliLiter(s) (10 mL/Hr) IV Continuous <Continuous>  sodium chloride 0.9% lock flush 3 milliLiter(s) IV Push every 8 hours  traZODone 25 milliGRAM(s) Oral at bedtime  warfarin 5 milliGRAM(s) Oral once    MEDICATIONS  (PRN):  acetaminophen    Suspension. 650 milliGRAM(s) Oral every 6 hours PRN Mild Pain (1 - 3)  oxyCODONE    5 mG/acetaminophen 325 mG 1 Tablet(s) Oral every 4 hours PRN Moderate Pain (4 - 6)      PHYSICAL EXAM  Vital Signs Last 24 Hrs  T(C): 36.1 (06 Oct 2017 10:32), Max: 36.8 (05 Oct 2017 22:17)  T(F): 97 (06 Oct 2017 10:32), Max: 98.3 (05 Oct 2017 22:17)  HR: 62 (06 Oct 2017 10:55) (58 - 96)  BP: 111/41 (06 Oct 2017 10:55) (98/39 - 142/63)  BP(mean): 55 (06 Oct 2017 10:55) (44 - 122)  RR: 20 (06 Oct 2017 10:55) (0 - 24)  SpO2: 98% (06 Oct 2017 10:55) (77% - 100%)    Constitutional: wn/wd in NAD.   HEENT: NCAT, MMM, OP clear, EOMI, no proptosis or lid retraction  Neck: no thyromegaly or palpable thyroid nodules   Respiratory: lungs CTAB.  Cardiovascular: regular rhythm, normal S1 and S2, no audible murmurs, no peripheral edema  GI: soft, NT/ND, no masses/HSM appreciated.  Neurology: no tremors, DTR 2+  Skin: no visible rashes/lesions  Psychiatric: AAO x 3, normal affect/mood.    LABS:                        7.6    10.3  )-----------( 266      ( 06 Oct 2017 02:52 )             24.4     10-06    133<L>  |  94<L>  |  17  ----------------------------<  128<H>  4.2   |  26  |  2.93<H>    Ca    8.6      06 Oct 2017 02:53  Phos  3.2     10-06  Mg     2.2     10-06    TPro  7.2  /  Alb  2.2<L>  /  TBili  0.5  /  DBili  x   /  AST  13  /  ALT  8<L>  /  AlkPhos  380<H>  10-06    PT/INR - ( 06 Oct 2017 02:52 )   PT: 15.0 sec;   INR: 1.34          PTT - ( 06 Oct 2017 02:52 )  PTT:36.4 sec    Thyroid Stimulating Hormone, Serum: 2.447 uIU/mL (09-13 @ 22:51)  Thyroid Stimulating Hormone, Serum: 1.251 uIU/mL (08-24 @ 01:12)      HbA1C: 8.2 % (09-13 @ 22:51)  8.2 % (08-24 @ 01:11)    CAPILLARY BLOOD GLUCOSE  112 (06 Oct 2017 06:00)      Insulin Sliding Scale requirements X 24 Hours:    RADIOLOGY & ADDITIONAL TESTS:    A/P: 47y Female with history of DM type II presenting for DM type II presenting for MRSA bacteremia and TV endocarditis s/p cervical corpectomy, s/p TVR on 10/2/2017.   his morning FS today is well controlled with Lantus 15 units    1.  DM 2, uncontrolled, complicated - patient with poor diabetic history and multiple diabetic complications. A1c is likely higher given that patient is on Procrit and has high cell turnover. Patient also seems to be non compliance with her diabetes diet.   -  Continue Lantus 15 units daily. Continue Lispro 4 units with meals only if patient eats.    -  Please continue moderate dose sliding scale lispro coverage TID AC.  For bedtime, please provide moderate dose sliding scale only if FS >200    Goal FSG is 120 - 180  Will continue to monitor   For discharge, pt can continue    Pt can follow up at discharge with [facility] by calling  to make an appointment.   Will discuss case with Dr. Ross, Dr. Yoon and update primary team INTERVAL HPI/OVERNIGHT EVENTS:    Patient is a 47y old  Female who presents with a chief complaint of TV IE / MRSA Bacteremia. (13 Sep 2017 22:57). On this admission, patient was also found to have septic emboli, cervical osteomyelitis and abscess s/p corpectomy and anterior plating POD #4. Patient is s/p TVR on 10/2/2017.     Patient looks happy today and talking that she walked on the hallway this morning. Patient had outside food - white rice and 3 meat balls for dinner. This morning she had cream of wheat and milk for breakfast; She had fried rice for lunch.    CONSTITUTIONAL:  Negative fever or chills, feels well, good appetite  EYES:  Negative  blurry vision or double vision  CARDIOVASCULAR:  Negative for chest pain or palpitations  RESPIRATORY:  Negative for cough, wheezing, or SOB   GASTROINTESTINAL:  Negative for nausea, vomiting, diarrhea, constipation, or abdominal pain  GENITOURINARY:  Negative frequency, urgency or dysuria  NEUROLOGIC:  No headache, confusion, dizziness, lightheadedness    MEDICATIONS  (STANDING):  aspirin enteric coated 81 milliGRAM(s) Oral daily  DAPTOmycin IVPB 700 milliGRAM(s) IV Intermittent every 48 hours  docusate sodium 100 milliGRAM(s) Oral three times a day  gabapentin 300 milliGRAM(s) Oral at bedtime  heparin  Injectable 5000 Unit(s) SubCutaneous every 8 hours  insulin glargine Injectable (LANTUS) 15 Unit(s) SubCutaneous every morning  insulin lispro (HumaLOG) corrective regimen sliding scale   SubCutaneous Before meals and at bedtime  insulin lispro Injectable (HumaLOG) 4 Unit(s) SubCutaneous three times a day before meals  metoprolol 25 milliGRAM(s) Oral two times a day  nystatin Ointment 1 Application(s) Topical two times a day  pantoprazole    Tablet 40 milliGRAM(s) Oral before breakfast  polyethylene glycol 3350 17 Gram(s) Oral daily  rifampin 600 milliGRAM(s) Oral daily  senna 2 Tablet(s) Oral at bedtime  sertraline 25 milliGRAM(s) Oral daily  simvastatin 20 milliGRAM(s) Oral at bedtime  sodium chloride 0.45%. 1000 milliLiter(s) (10 mL/Hr) IV Continuous <Continuous>  sodium chloride 0.9% lock flush 3 milliLiter(s) IV Push every 8 hours  traZODone 25 milliGRAM(s) Oral at bedtime  warfarin 5 milliGRAM(s) Oral once    MEDICATIONS  (PRN):  acetaminophen    Suspension. 650 milliGRAM(s) Oral every 6 hours PRN Mild Pain (1 - 3)  oxyCODONE    5 mG/acetaminophen 325 mG 1 Tablet(s) Oral every 4 hours PRN Moderate Pain (4 - 6)      PHYSICAL EXAM  Vital Signs Last 24 Hrs  T(C): 36.1 (06 Oct 2017 10:32), Max: 36.8 (05 Oct 2017 22:17)  T(F): 97 (06 Oct 2017 10:32), Max: 98.3 (05 Oct 2017 22:17)  HR: 62 (06 Oct 2017 10:55) (58 - 96)  BP: 111/41 (06 Oct 2017 10:55) (98/39 - 142/63)  BP(mean): 55 (06 Oct 2017 10:55) (44 - 122)  RR: 20 (06 Oct 2017 10:55) (0 - 24)  SpO2: 98% (06 Oct 2017 10:55) (77% - 100%)    Constitutional: wn/wd in NAD.   HEENT: NCAT, MMM, OP clear, EOMI, no proptosis or lid retraction  Neck: no thyromegaly or palpable thyroid nodules   Respiratory: lungs CTAB.  Cardiovascular: regular rhythm, normal S1 and S2, no audible murmurs, no peripheral edema  GI: soft, NT/ND, no masses/HSM appreciated.  Neurology: no tremors, DTR 2+  Skin: no visible rashes/lesions  Psychiatric: AAO x 3, normal affect/mood.    LABS:                        7.6    10.3  )-----------( 266      ( 06 Oct 2017 02:52 )             24.4     10-06    133<L>  |  94<L>  |  17  ----------------------------<  128<H>  4.2   |  26  |  2.93<H>    Ca    8.6      06 Oct 2017 02:53  Phos  3.2     10-06  Mg     2.2     10-06    TPro  7.2  /  Alb  2.2<L>  /  TBili  0.5  /  DBili  x   /  AST  13  /  ALT  8<L>  /  AlkPhos  380<H>  10-06    PT/INR - ( 06 Oct 2017 02:52 )   PT: 15.0 sec;   INR: 1.34          PTT - ( 06 Oct 2017 02:52 )  PTT:36.4 sec    Thyroid Stimulating Hormone, Serum: 2.447 uIU/mL (09-13 @ 22:51)  Thyroid Stimulating Hormone, Serum: 1.251 uIU/mL (08-24 @ 01:12)      HbA1C: 8.2 % (09-13 @ 22:51)  8.2 % (08-24 @ 01:11)    CAPILLARY BLOOD GLUCOSE  112 (06 Oct 2017 06:00)      Insulin Sliding Scale requirements X 24 Hours:    RADIOLOGY & ADDITIONAL TESTS:    A/P: 47y Female with history of DM type II presenting for DM type II presenting for MRSA bacteremia and TV endocarditis s/p cervical corpectomy, s/p TVR on 10/2/2017.   his morning FS today is well controlled with Lantus 15 units    1.  DM 2, uncontrolled, complicated - patient with poor diabetic history and multiple diabetic complications. A1c is likely higher given that patient is on Procrit and has high cell turnover. Patient also seems to be non compliance with her diabetes diet.   -  Continue Lantus 15 units daily. Continue Lispro 4 units with meals only if patient eats.    -  Please continue moderate dose sliding scale lispro coverage TID AC.  For bedtime, please provide moderate dose sliding scale only if FS >200. Patient has a chance of becoming hypoglycemic overnight due to her renal disease, therefore we are recommending a different sliding scale. It can start at 200-250 give 2, 251-400 give 4 and etc.    Goal FSG is 120 - 180  Will continue to monitor       Pt can follow up at discharge with [facility] by calling  to make an appointment.   Will discuss case with Dr. Ross, Dr. Yoon and update primary team

## 2017-10-06 NOTE — PROGRESS NOTE ADULT - ASSESSMENT
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 Nephrology consult called for HD treatment. Patient underwent Tricuspid valve replacement on 10/02/2017. Patient had HD treatment on 10/05/2014 with UF goal of 2.5L and tolerated procedure well. Patient had left arm AV fistula for past 4 months which never worked or mature. Left sided permacath placed on (09/25) present. Patient had last HD treatment on 10/05/2017 with UF goal of 2.6 kg.

## 2017-10-07 LAB
ANION GAP SERPL CALC-SCNC: 12 MMOL/L — SIGNIFICANT CHANGE UP (ref 5–17)
APTT BLD: 33.5 SEC — SIGNIFICANT CHANGE UP (ref 27.5–37.4)
BLD GP AB SCN SERPL QL: NEGATIVE — SIGNIFICANT CHANGE UP
BUN SERPL-MCNC: 24 MG/DL — HIGH (ref 7–23)
CALCIUM SERPL-MCNC: 8.6 MG/DL — SIGNIFICANT CHANGE UP (ref 8.4–10.5)
CALCIUM SERPL-MCNC: 8.8 MG/DL — SIGNIFICANT CHANGE UP (ref 8.4–10.5)
CHLORIDE SERPL-SCNC: 91 MMOL/L — LOW (ref 96–108)
CO2 SERPL-SCNC: 26 MMOL/L — SIGNIFICANT CHANGE UP (ref 22–31)
CREAT SERPL-MCNC: 3.93 MG/DL — HIGH (ref 0.5–1.3)
GLUCOSE SERPL-MCNC: 149 MG/DL — HIGH (ref 70–99)
HCT VFR BLD CALC: 25.2 % — LOW (ref 34.5–45)
HGB BLD-MCNC: 7.9 G/DL — LOW (ref 11.5–15.5)
INR BLD: 1.33 — HIGH (ref 0.88–1.16)
MAGNESIUM SERPL-MCNC: 2.2 MG/DL — SIGNIFICANT CHANGE UP (ref 1.6–2.6)
MCHC RBC-ENTMCNC: 27 PG — SIGNIFICANT CHANGE UP (ref 27–34)
MCHC RBC-ENTMCNC: 31.3 G/DL — LOW (ref 32–36)
MCV RBC AUTO: 86 FL — SIGNIFICANT CHANGE UP (ref 80–100)
PHOSPHATE SERPL-MCNC: 4.1 MG/DL — SIGNIFICANT CHANGE UP (ref 2.5–4.5)
PHOSPHATE SERPL-MCNC: 4.2 MG/DL — SIGNIFICANT CHANGE UP (ref 2.5–4.5)
PLATELET # BLD AUTO: 257 K/UL — SIGNIFICANT CHANGE UP (ref 150–400)
POTASSIUM SERPL-MCNC: 4.6 MMOL/L — SIGNIFICANT CHANGE UP (ref 3.5–5.3)
POTASSIUM SERPL-SCNC: 4.6 MMOL/L — SIGNIFICANT CHANGE UP (ref 3.5–5.3)
PROTHROM AB SERPL-ACNC: 14.8 SEC — HIGH (ref 9.8–12.7)
RBC # BLD: 2.93 M/UL — LOW (ref 3.8–5.2)
RBC # FLD: 18 % — HIGH (ref 10.3–16.9)
RH IG SCN BLD-IMP: NEGATIVE — SIGNIFICANT CHANGE UP
SODIUM SERPL-SCNC: 129 MMOL/L — LOW (ref 135–145)
URATE SERPL-MCNC: 6.9 MG/DL — SIGNIFICANT CHANGE UP (ref 2.5–7)
WBC # BLD: 9.6 K/UL — SIGNIFICANT CHANGE UP (ref 3.8–10.5)
WBC # FLD AUTO: 9.6 K/UL — SIGNIFICANT CHANGE UP (ref 3.8–10.5)

## 2017-10-07 PROCEDURE — 93010 ELECTROCARDIOGRAM REPORT: CPT

## 2017-10-07 PROCEDURE — 99291 CRITICAL CARE FIRST HOUR: CPT

## 2017-10-07 PROCEDURE — 99292 CRITICAL CARE ADDL 30 MIN: CPT

## 2017-10-07 PROCEDURE — 71010: CPT | Mod: 26

## 2017-10-07 PROCEDURE — 90935 HEMODIALYSIS ONE EVALUATION: CPT | Mod: GC

## 2017-10-07 RX ORDER — WARFARIN SODIUM 2.5 MG/1
3 TABLET ORAL ONCE
Qty: 0 | Refills: 0 | Status: COMPLETED | OUTPATIENT
Start: 2017-10-07 | End: 2017-10-07

## 2017-10-07 RX ORDER — ERYTHROPOIETIN 10000 [IU]/ML
10000 INJECTION, SOLUTION INTRAVENOUS; SUBCUTANEOUS ONCE
Qty: 0 | Refills: 0 | Status: COMPLETED | OUTPATIENT
Start: 2017-10-07 | End: 2017-10-07

## 2017-10-07 RX ORDER — ACETAMINOPHEN 500 MG
1000 TABLET ORAL ONCE
Qty: 0 | Refills: 0 | Status: COMPLETED | OUTPATIENT
Start: 2017-10-07 | End: 2017-10-07

## 2017-10-07 RX ADMIN — GABAPENTIN 300 MILLIGRAM(S): 400 CAPSULE ORAL at 21:07

## 2017-10-07 RX ADMIN — SODIUM CHLORIDE 3 MILLILITER(S): 9 INJECTION INTRAMUSCULAR; INTRAVENOUS; SUBCUTANEOUS at 06:26

## 2017-10-07 RX ADMIN — Medication 1000 MILLIGRAM(S): at 16:00

## 2017-10-07 RX ADMIN — NYSTATIN CREAM 1 APPLICATION(S): 100000 CREAM TOPICAL at 18:00

## 2017-10-07 RX ADMIN — SODIUM CHLORIDE 3 MILLILITER(S): 9 INJECTION INTRAMUSCULAR; INTRAVENOUS; SUBCUTANEOUS at 13:03

## 2017-10-07 RX ADMIN — PANTOPRAZOLE SODIUM 40 MILLIGRAM(S): 20 TABLET, DELAYED RELEASE ORAL at 06:27

## 2017-10-07 RX ADMIN — Medication 400 MILLIGRAM(S): at 15:37

## 2017-10-07 RX ADMIN — SERTRALINE 25 MILLIGRAM(S): 25 TABLET, FILM COATED ORAL at 12:58

## 2017-10-07 RX ADMIN — Medication 25 MILLIGRAM(S): at 21:08

## 2017-10-07 RX ADMIN — INSULIN GLARGINE 15 UNIT(S): 100 INJECTION, SOLUTION SUBCUTANEOUS at 07:27

## 2017-10-07 RX ADMIN — SIMVASTATIN 20 MILLIGRAM(S): 20 TABLET, FILM COATED ORAL at 21:08

## 2017-10-07 RX ADMIN — Medication 4 UNIT(S): at 12:57

## 2017-10-07 RX ADMIN — Medication 81 MILLIGRAM(S): at 12:58

## 2017-10-07 RX ADMIN — Medication 100 MILLIGRAM(S): at 13:04

## 2017-10-07 RX ADMIN — WARFARIN SODIUM 3 MILLIGRAM(S): 2.5 TABLET ORAL at 21:08

## 2017-10-07 RX ADMIN — HEPARIN SODIUM 5000 UNIT(S): 5000 INJECTION INTRAVENOUS; SUBCUTANEOUS at 13:03

## 2017-10-07 RX ADMIN — SODIUM CHLORIDE 3 MILLILITER(S): 9 INJECTION INTRAMUSCULAR; INTRAVENOUS; SUBCUTANEOUS at 21:09

## 2017-10-07 RX ADMIN — ERYTHROPOIETIN 10000 UNIT(S): 10000 INJECTION, SOLUTION INTRAVENOUS; SUBCUTANEOUS at 19:56

## 2017-10-07 RX ADMIN — HEPARIN SODIUM 5000 UNIT(S): 5000 INJECTION INTRAVENOUS; SUBCUTANEOUS at 06:27

## 2017-10-07 RX ADMIN — NYSTATIN CREAM 1 APPLICATION(S): 100000 CREAM TOPICAL at 06:28

## 2017-10-07 RX ADMIN — HEPARIN SODIUM 5000 UNIT(S): 5000 INJECTION INTRAVENOUS; SUBCUTANEOUS at 21:08

## 2017-10-07 NOTE — OCCUPATIONAL THERAPY INITIAL EVALUATION ADULT - GENERAL OBSERVATIONS, REHAB EVAL
Communicated with JENN Mendoza prior to session. Patient received prior to dialysis, supine with HOB elevated, +TPN, +EKG, +PICC, +wound vac, +compression stockings, +dressing to sacrum

## 2017-10-07 NOTE — OCCUPATIONAL THERAPY INITIAL EVALUATION ADULT - MANUAL MUSCLE TESTING RESULTS, REHAB EVAL
Not formally assessed secondary to pain, patient with at least 3+/5 strength in BUE; able to incorporate BUE meaningfully within bed mobility/transfer with RW. Decreased strength in L knee, patient reports episodes of buckling, most recently this morning when performing mobility with RN.

## 2017-10-07 NOTE — PROGRESS NOTE ADULT - SUBJECTIVE AND OBJECTIVE BOX
Patient transferred to ICU. she is currently being dialyzed. Of note she is hyponatremia/hypochloremic and anemic/She is alert and seems comfortable now. glucoses earlier today show good control with fasting glucose 121 and 144 this PM. Will continue currentregiemen of 15 units Lantus and 4 units lispro premeals.

## 2017-10-07 NOTE — OCCUPATIONAL THERAPY INITIAL EVALUATION ADULT - PERSONAL SAFETY AND JUDGMENT, REHAB EVAL
impaired/Patient states she will be able to be discharged home without understanding of current level of care or functional deficits

## 2017-10-07 NOTE — PROGRESS NOTE ADULT - ASSESSMENT
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 Nephrology consult called for HD treatment. Patient underwent Tricuspid valve replacement on 10/02/2017. Patient had HD treatment on 10/05/2014 with UF goal of 2.5L and tolerated procedure well. Patient had left arm AV fistula for past 4 months which never worked or mature. Left sided permacath placed on (09/25) present. Patient had last HD treatment on 10/05/2017 with UF goal of 2.6 kg.

## 2017-10-07 NOTE — OCCUPATIONAL THERAPY INITIAL EVALUATION ADULT - PERTINENT HX OF CURRENT PROBLEM, REHAB EVAL
Patient is a 46 yo female with PMH significant for hypertension, hyperlipidemia, DM II, ESRD on HD, MRSA tricuspid valve endocarditis, L foot osteomyelitis presenting s/p tricuspid valve replacement on 10/2/17.

## 2017-10-07 NOTE — PROGRESS NOTE ADULT - SUBJECTIVE AND OBJECTIVE BOX
Patient was seen and evaluated on dialysis.   Patient is tolerating the procedure well.   HR: 44 (10-07-17 @ 16:00)  BP: 110/51 (10-07-17 @ 16:00)  Continue dialysis:   Dialyzer: 180 Optiflux       QB:  400      QD: 500  3K+  Goal UF 2 to 2.5 over 3 Hours     Patient had minor fall without any injury prior to dialysis treatment probably due to her knee locked out while walking with nurse. Telemetry with bradycardia and 2nd degree Av block. Pacemaker readjusted by primary team. Patient tolerating HD treatment well. BP range low at present. Will decrease UF to 2 to 2.5 Kg for now.

## 2017-10-07 NOTE — PROGRESS NOTE ADULT - PROBLEM SELECTOR PLAN 2
Anemia of chronic disease with acutely low Hgb likely due to recent CT surgery for Tricuspid valve.   Iron: 36, Ferritin: 1036  no IV iron due to active infection/endocarditis.  Will give epo during HD treatment.  Transfuse to keep Hct>25-30%.  Monitor serial hemoglobin for now.

## 2017-10-07 NOTE — PROGRESS NOTE ADULT - PROBLEM SELECTOR PLAN 4
SBP elevated in range of 150 to 170's at present.  Will do HD treatment today with UF goal of 2.5 to 3 kg.  Consider resume BP medications for now with betablocker for now.

## 2017-10-07 NOTE — PROGRESS NOTE ADULT - SUBJECTIVE AND OBJECTIVE BOX
Patient is a 47y Female seen and evaluated at bedside. Patient stats that she feels fine at present. Denies any chest pain, shortness of breath, palpitations, dizziness at present. Sitting in chair at present.       acetaminophen    Suspension. 650 every 6 hours PRN  aspirin enteric coated 81 daily  DAPTOmycin IVPB 700 every 48 hours  docusate sodium 100 three times a day  epoetin fransisca Injectable 03535 once  gabapentin 300 at bedtime  heparin  Injectable 5000 every 8 hours  insulin glargine Injectable (LANTUS) 15 every morning  insulin lispro (HumaLOG) corrective regimen sliding scale  Before meals and at bedtime  insulin lispro Injectable (HumaLOG) 4 three times a day before meals  nystatin Ointment 1 two times a day  oxyCODONE    5 mG/acetaminophen 325 mG 1 every 4 hours PRN  pantoprazole    Tablet 40 before breakfast  polyethylene glycol 3350 17 daily  rifampin 600 daily  senna 2 at bedtime  sertraline 25 daily  simvastatin 20 at bedtime  sodium chloride 0.45%. 1000 <Continuous>  sodium chloride 0.9% lock flush 3 every 8 hours  traZODone 25 at bedtime      Allergies    penicillin (Unknown)  Sular (Unknown)    Intolerances        T(C): , Max: 36.5 (10-06-17 @ 18:10)  T(F): , Max: 97.7 (10-06-17 @ 18:10)  HR: 82 (10-07-17 @ 11:25)  BP: 164/77 (10-07-17 @ 11:25)  BP(mean): 109 (10-07-17 @ 11:25)  RR: 14 (10-07-17 @ 11:25)  SpO2: 97% (10-07-17 @ 11:25)  Wt(kg): --    10-06 @ 07:01  -  10-07 @ 07:00  --------------------------------------------------------  IN: 50 mL / OUT: 251 mL / NET: -201 mL    10-07 @ 07:01  -  10-07 @ 13:26  --------------------------------------------------------  IN: 60 mL / OUT: 125 mL / NET: -65 mL          Review of Systems:  CONSTITUTIONAL: No fever or chills, No fatigue or tiredness.  EYES: No blurred or double vision.  RESPIRATORY: No shortness of breath, cough, hemoptysis  CARDIOVASCULAR: No Chest pain or shortness of breath  GASTROINTESTINAL: NO abdominal or flank pain, No nausea or vomiting, No diarrhea  GENITOURINARY: No dysuria or urinary burning, No difficulty passing urine, No hematuria  NEUROLOGICAL: No headaches or blurred vision  SKIN: No skin rashes   MUSCULOSKELETAL: No arthralgia, Joint pain, leg edema, No muscle pains      PHYSICAL EXAM:  GENERAL: NAD, well-developed, well nourished, alert, awake, no acute distress at present  HEAD:  Atraumatic, Normocephalic,   EYES: Bilateral conjuctiva and sclera normal   Oral cavity: Oral mucosa dry and pink  NECK: Neck supple, No JVD, Left sided permacath present.  CHEST/LUNG: Clear to auscultation bilaterally; No wheeze, no rales, no crepitations, Surgical scar+nt, No bleeding or inflammation.  HEART: Regular rate and rhythm. ALBARO II/VI at LPSB, No gallop, no rub   ABDOMEN: Soft, Nontender, BS+nt, No flank tenderness.   EXTREMITIES: Trace pedal edema+nt, No clubbing, cyanosis  Neurology: AAOx3, no focal neurological deficit  SKIN: No rashes or lesions          ACCESS:     LABS:                        7.9    9.6   )-----------( 257      ( 07 Oct 2017 03:32 )             25.2     10-07    129<L>  |  91<L>  |  24<H>  ----------------------------<  149<H>  4.6   |  26  |  3.93<H>    Ca    8.8      07 Oct 2017 03:32  Phos  4.1     10-07  Mg     2.2     10-07    TPro  7.6  /  Alb  2.1<L>  /  TBili  0.5  /  DBili  x   /  AST  11  /  ALT  7<L>  /  AlkPhos  357<H>  10-06    Uric Acid, Serum: 6.9 mg/dL [2.5 - 7.0] (10-07 @ 03:32)    PT/INR - ( 07 Oct 2017 03:32 )   PT: 14.8 sec;   INR: 1.33          PTT - ( 07 Oct 2017 03:32 )  PTT:33.5 sec          RADIOLOGY & ADDITIONAL STUDIES:  < from: Xray Chest 1 View AP -PORTABLE-Routine (10.07.17 @ 03:20) >    EXAM:  XR CHEST 1 VIEW PORT ROUTINE                          PROCEDURE DATE:  10/07/2017                     INTERPRETATION:  Clinical History: This of breath    Portable examination the chest demonstrates patient to be status post   sternotomy. Nointerval change is remaining support devices in comparison   to prior examination of the chest 10/6/2016. Discoid change and/or   infiltrate right lung base.    Impression: Discoid change and/or infiltrate right lung base            "Thank you for the opportunity to participate in the care of this   patient."        SHEILA TAYLOR M.D., ATTENDING RADIOLOGIST  This document has been electronically signed. Oct  7 2017 10:02AM                  < end of copied text >

## 2017-10-08 LAB
24R-OH-CALCIDIOL SERPL-MCNC: 11.6 NG/ML — LOW (ref 30–80)
ANION GAP SERPL CALC-SCNC: 13 MMOL/L — SIGNIFICANT CHANGE UP (ref 5–17)
APTT BLD: 42.2 SEC — HIGH (ref 27.5–37.4)
BUN SERPL-MCNC: 15 MG/DL — SIGNIFICANT CHANGE UP (ref 7–23)
CALCIUM SERPL-MCNC: 8.6 MG/DL — SIGNIFICANT CHANGE UP (ref 8.4–10.5)
CHLORIDE SERPL-SCNC: 94 MMOL/L — LOW (ref 96–108)
CO2 SERPL-SCNC: 26 MMOL/L — SIGNIFICANT CHANGE UP (ref 22–31)
CREAT SERPL-MCNC: 3 MG/DL — HIGH (ref 0.5–1.3)
FOLATE SERPL-MCNC: 3 NG/ML — LOW (ref 4.8–24.2)
GLUCOSE SERPL-MCNC: 149 MG/DL — HIGH (ref 70–99)
HCT VFR BLD CALC: 28.8 % — LOW (ref 34.5–45)
HGB BLD-MCNC: 9 G/DL — LOW (ref 11.5–15.5)
INR BLD: 2.1 — HIGH (ref 0.88–1.16)
MAGNESIUM SERPL-MCNC: 2 MG/DL — SIGNIFICANT CHANGE UP (ref 1.6–2.6)
MCHC RBC-ENTMCNC: 26.9 PG — LOW (ref 27–34)
MCHC RBC-ENTMCNC: 31.3 G/DL — LOW (ref 32–36)
MCV RBC AUTO: 86.2 FL — SIGNIFICANT CHANGE UP (ref 80–100)
PHOSPHATE SERPL-MCNC: 3.3 MG/DL — SIGNIFICANT CHANGE UP (ref 2.5–4.5)
PLATELET # BLD AUTO: 299 K/UL — SIGNIFICANT CHANGE UP (ref 150–400)
POTASSIUM SERPL-MCNC: 4.3 MMOL/L — SIGNIFICANT CHANGE UP (ref 3.5–5.3)
POTASSIUM SERPL-SCNC: 4.3 MMOL/L — SIGNIFICANT CHANGE UP (ref 3.5–5.3)
PROTHROM AB SERPL-ACNC: 23.7 SEC — HIGH (ref 9.8–12.7)
PTH-INTACT FLD-MCNC: 125 PG/ML — HIGH (ref 15–65)
RBC # BLD: 3.34 M/UL — LOW (ref 3.8–5.2)
RBC # FLD: 17.9 % — HIGH (ref 10.3–16.9)
SODIUM SERPL-SCNC: 133 MMOL/L — LOW (ref 135–145)
VIT B12 SERPL-MCNC: >2000 PG/ML — HIGH (ref 243–894)
VIT D25+D1,25 OH+D1,25 PNL SERPL-MCNC: <5 PG/ML — LOW (ref 19.9–79.3)
WBC # BLD: 9.3 K/UL — SIGNIFICANT CHANGE UP (ref 3.8–10.5)
WBC # FLD AUTO: 9.3 K/UL — SIGNIFICANT CHANGE UP (ref 3.8–10.5)

## 2017-10-08 PROCEDURE — 93010 ELECTROCARDIOGRAM REPORT: CPT

## 2017-10-08 PROCEDURE — 71010: CPT | Mod: 26

## 2017-10-08 PROCEDURE — 99232 SBSQ HOSP IP/OBS MODERATE 35: CPT | Mod: GC

## 2017-10-08 PROCEDURE — 99232 SBSQ HOSP IP/OBS MODERATE 35: CPT

## 2017-10-08 RX ADMIN — NYSTATIN CREAM 1 APPLICATION(S): 100000 CREAM TOPICAL at 06:49

## 2017-10-08 RX ADMIN — OXYCODONE AND ACETAMINOPHEN 1 TABLET(S): 5; 325 TABLET ORAL at 23:47

## 2017-10-08 RX ADMIN — Medication: at 22:30

## 2017-10-08 RX ADMIN — SIMVASTATIN 20 MILLIGRAM(S): 20 TABLET, FILM COATED ORAL at 22:23

## 2017-10-08 RX ADMIN — GABAPENTIN 300 MILLIGRAM(S): 400 CAPSULE ORAL at 22:23

## 2017-10-08 RX ADMIN — SODIUM CHLORIDE 3 MILLILITER(S): 9 INJECTION INTRAMUSCULAR; INTRAVENOUS; SUBCUTANEOUS at 22:18

## 2017-10-08 RX ADMIN — Medication 4 UNIT(S): at 17:00

## 2017-10-08 RX ADMIN — Medication 25 MILLIGRAM(S): at 22:23

## 2017-10-08 RX ADMIN — Medication: at 12:53

## 2017-10-08 RX ADMIN — NYSTATIN CREAM 1 APPLICATION(S): 100000 CREAM TOPICAL at 18:00

## 2017-10-08 RX ADMIN — Medication 4 UNIT(S): at 12:41

## 2017-10-08 RX ADMIN — SODIUM CHLORIDE 3 MILLILITER(S): 9 INJECTION INTRAMUSCULAR; INTRAVENOUS; SUBCUTANEOUS at 06:48

## 2017-10-08 RX ADMIN — Medication 650 MILLIGRAM(S): at 21:00

## 2017-10-08 RX ADMIN — Medication 650 MILLIGRAM(S): at 22:24

## 2017-10-08 RX ADMIN — INSULIN GLARGINE 15 UNIT(S): 100 INJECTION, SOLUTION SUBCUTANEOUS at 07:50

## 2017-10-08 RX ADMIN — Medication 81 MILLIGRAM(S): at 12:43

## 2017-10-08 RX ADMIN — PANTOPRAZOLE SODIUM 40 MILLIGRAM(S): 20 TABLET, DELAYED RELEASE ORAL at 06:50

## 2017-10-08 RX ADMIN — OXYCODONE AND ACETAMINOPHEN 1 TABLET(S): 5; 325 TABLET ORAL at 22:25

## 2017-10-08 RX ADMIN — SERTRALINE 25 MILLIGRAM(S): 25 TABLET, FILM COATED ORAL at 12:43

## 2017-10-08 RX ADMIN — SODIUM CHLORIDE 3 MILLILITER(S): 9 INJECTION INTRAMUSCULAR; INTRAVENOUS; SUBCUTANEOUS at 12:44

## 2017-10-08 NOTE — PROGRESS NOTE ADULT - PROBLEM SELECTOR PLAN 4
SBP stable in range of 120 to 130's at present.  Consider resume BP medications for now per primary team.

## 2017-10-08 NOTE — PROGRESS NOTE ADULT - ASSESSMENT
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 Nephrology consult called for HD treatment. Patient underwent Tricuspid valve replacement on 10/02/2017. Patient had HD treatment on 10/05/2014 with UF goal of 2.5L and tolerated procedure well. Patient had left arm AV fistula for past 4 months which never worked or mature. Left sided permacath placed on (09/25) present. Patient had last HD treatment on 10/07/2017 with UF  of 2.3kg.

## 2017-10-08 NOTE — PROGRESS NOTE ADULT - SUBJECTIVE AND OBJECTIVE BOX
Patient seen at bedside. Treatment for MRSA bacteremia continues as does intermittent hemodialysis-next treatment 10/10. Fingerstick glucoses  today: 162(fasting)- 160 prelunch and 99 at 5 PM. Will continue Lantus 15 units QHS and Lispro Insulin 4 units premeals.

## 2017-10-08 NOTE — PROGRESS NOTE ADULT - SUBJECTIVE AND OBJECTIVE BOX
CTICU  CRITICAL  CARE  attending     Hand off received 					   Pertinent clinical, laboratory, radiographic, hemodynamic, echocardiographic, respiratory data, microbiologic data and chart were reviewed and analyzed frequently throughout the course of the day and night  Patient seen and examined with CTS/ SH attending at bedside    Pt is a 47y , Female, POD # 6 s/p Tricuspid valve replacement for right sided MRSA endocarditis    Pt admitted with complicated MRSA bacteremia.  Had SVC infected clot with right sided endocarditis, septic emboli to lung and Cervical and thoracic level osteomyelitis.       underwent corpectomyC6 and C5-7 anterior plating for osteo/epidural abscess-- OR cultures positive for MRSA.     Adjustment disorder with anxious mood: Adjustment disorder with anxious mood  Hyponatremia: Hyponatremia  Hypertension: Hypertension  Endocarditis: Endocarditis  Anemia: Anemia  End stage renal disease: End stage renal disease      , FAMILY HISTORY:  PAST MEDICAL & SURGICAL HISTORY:    Patient is a 47y old  Female who presents with a chief complaint of TV IE / MRSA Bacteremia. (13 Sep 2017 22:57)      14 system review was unremarkable  acute changes include acute respiratory failure  Vital signs, hemodynamic and respiratory parameters were reviewed from the bedside nursing flowsheet.  ICU Vital Signs Last 24 Hrs  T(C): 37.2 (08 Oct 2017 14:06), Max: 37.2 (08 Oct 2017 14:06)  T(F): 98.9 (08 Oct 2017 14:06), Max: 98.9 (08 Oct 2017 14:06)  HR: 66 (08 Oct 2017 13:00) (44 - 90)  BP: 132/74 (08 Oct 2017 13:00) (103/33 - 163/72)  BP(mean): 93 (08 Oct 2017 13:00) (41 - 151)  ABP: --  ABP(mean): --  RR: 22 (08 Oct 2017 13:00) (9 - 26)  SpO2: 96% (08 Oct 2017 13:00) (94% - 99%)    Adult Advanced Hemodynamics Last 24 Hrs  CVP(mm Hg): --  CVP(cm H2O): --  CO: --  CI: --  PA: --  PA(mean): --  PCWP: --  SVR: --  SVRI: --  PVR: --  PVRI: --,     Intake and output was reviewed and the fluid balance was calculated  Daily     Daily Weight in k.5 (07 Oct 2017 19:15)  I&O's Summary    07 Oct 2017 07:01  -  08 Oct 2017 07:00  --------------------------------------------------------  IN: 410 mL / OUT: 2875 mL / NET: -2465 mL    08 Oct 2017 07:  -  08 Oct 2017 14:23  --------------------------------------------------------  IN: 100 mL / OUT: 1 mL / NET: 99 mL        All lines and drain sites were assessed  Glycemic trend was reviewedCAPILLARY BLOOD GLUCOSE  160 (08 Oct 2017 11:00)        No acute change in mental status  Auscultation of the chest reveals equal bs  Abdomen is soft  Extremities are warm and well perfused  Wounds appear clean and unremarkable  Antibiotics are periop    labs  CBC Full  -  ( 08 Oct 2017 03:09 )  WBC Count : 9.3 K/uL  Hemoglobin : 9.0 g/dL  Hematocrit : 28.8 %  Platelet Count - Automated : 299 K/uL  Mean Cell Volume : 86.2 fL  Mean Cell Hemoglobin : 26.9 pg  Mean Cell Hemoglobin Concentration : 31.3 g/dL  Auto Neutrophil # : x  Auto Lymphocyte # : x  Auto Monocyte # : x  Auto Eosinophil # : x  Auto Basophil # : x  Auto Neutrophil % : x  Auto Lymphocyte % : x  Auto Monocyte % : x  Auto Eosinophil % : x  Auto Basophil % : x    10-08    133<L>  |  94<L>  |  15  ----------------------------<  149<H>  4.3   |  26  |  3.00<H>    Ca    8.6      08 Oct 2017 03:09  Phos  3.3     10-08  Mg     2.0     10-08    TPro  7.6  /  Alb  2.1<L>  /  TBili  0.5  /  DBili  x   /  AST  11  /  ALT  7<L>  /  AlkPhos  357<H>  10-06    PT/INR - ( 08 Oct 2017 03:09 )   PT: 23.7 sec;   INR: 2.10          PTT - ( 08 Oct 2017 03:09 )  PTT:42.2 sec  The current medications were reviewed   MEDICATIONS  (STANDING):  aspirin enteric coated 81 milliGRAM(s) Oral daily  DAPTOmycin IVPB 700 milliGRAM(s) IV Intermittent every 48 hours  docusate sodium 100 milliGRAM(s) Oral three times a day  gabapentin 300 milliGRAM(s) Oral at bedtime  insulin glargine Injectable (LANTUS) 15 Unit(s) SubCutaneous every morning  insulin lispro (HumaLOG) corrective regimen sliding scale   SubCutaneous Before meals and at bedtime  insulin lispro Injectable (HumaLOG) 4 Unit(s) SubCutaneous three times a day before meals  nystatin Ointment 1 Application(s) Topical two times a day  pantoprazole    Tablet 40 milliGRAM(s) Oral before breakfast  polyethylene glycol 3350 17 Gram(s) Oral daily  rifampin 600 milliGRAM(s) Oral daily  senna 2 Tablet(s) Oral at bedtime  sertraline 25 milliGRAM(s) Oral daily  simvastatin 20 milliGRAM(s) Oral at bedtime  sodium chloride 0.45%. 1000 milliLiter(s) (10 mL/Hr) IV Continuous <Continuous>  sodium chloride 0.9% lock flush 3 milliLiter(s) IV Push every 8 hours  traZODone 25 milliGRAM(s) Oral at bedtime    MEDICATIONS  (PRN):  acetaminophen    Suspension. 650 milliGRAM(s) Oral every 6 hours PRN Mild Pain (1 - 3)  oxyCODONE    5 mG/acetaminophen 325 mG 1 Tablet(s) Oral every 4 hours PRN Moderate Pain (4 - 6)       PROBLEM LIST/ ASSESSMENT:  HEALTH ISSUES - PROBLEM Dx:    1.  Complicated MRSA Bacteremia with right sided endocarditis, C5-7 small epidural abscess, discitis/osteo of T7/8  2. S/p TVR 10/4  3. S/p C6 corpectomy/ C5-7 anterior plating on       Adjustment disorder with anxious mood: Adjustment disorder with anxious mood  Hyponatremia: Hyponatremia  Hypertension: Hypertension  Endocarditis: Endocarditis  Anemia: Anemia  End stage renal disease: End stage renal disease      ,   Patient is a 47y old  Female who presents with a chief complaint of TV IE / MRSA Bacteremia. (13 Sep 2017 22:57)     s/p   acute changes include acute respiratory failure    My plan includes :  close hemodynamic, ventilatory and drain monitoring and management per post op routine  Monitor for arrhythmias and monitor parameters for organ perfusion  monitor neurologic status  Head of the bed should remain elevated to 45 deg .   chest PT and IS will be encouraged  monitor adequacy of oxygenation and ventilation and attempt to wean oxygen  Nutritional goals will be met using po eventually , ensure adequate caloric intake and montior the same  Stress ulcer and VTE prophylaxis will be achieved    Glycemic control is satisfactory  Electrolytes have been repleted as necessary and wound care has been carried out. Pain control has been achieved.   agressive physical therapy and early mobility and ambulation goals will be met   The family was updated about the course and plan  CRITICAL CARE TIME SPENT in evaluation and management, reassessments, review and interpretation of labs and x-rays, ventilator and hemodynamic management, formulating a plan and coordinating care: __30___ MIN.  Time does not include procedural time.  CTICU ATTENDING     					    Carlos Ortiz MD

## 2017-10-08 NOTE — PROGRESS NOTE ADULT - PROBLEM SELECTOR PLAN 2
Anemia of chronic disease with hemoglobin 9.0 at present.   Iron: 36, Ferritin: 1036  no IV iron due to active infection/endocarditis.  Will give epo during HD treatment.  Transfuse to keep Hct>25-30%.  Monitor serial hemoglobin for now.

## 2017-10-08 NOTE — PROGRESS NOTE ADULT - SUBJECTIVE AND OBJECTIVE BOX
Patient is a 47y Female seen and evaluated at bedside. Patient complaints of mild shortness of breath. Denies any chest pain, palpitations, dizzienss at present. Denies any other complaints at present      acetaminophen    Suspension. 650 every 6 hours PRN  aspirin enteric coated 81 daily  DAPTOmycin IVPB 700 every 48 hours  docusate sodium 100 three times a day  gabapentin 300 at bedtime  insulin glargine Injectable (LANTUS) 15 every morning  insulin lispro (HumaLOG) corrective regimen sliding scale  Before meals and at bedtime  insulin lispro Injectable (HumaLOG) 4 three times a day before meals  nystatin Ointment 1 two times a day  oxyCODONE    5 mG/acetaminophen 325 mG 1 every 4 hours PRN  pantoprazole    Tablet 40 before breakfast  polyethylene glycol 3350 17 daily  rifampin 600 daily  senna 2 at bedtime  sertraline 25 daily  simvastatin 20 at bedtime  sodium chloride 0.45%. 1000 <Continuous>  sodium chloride 0.9% lock flush 3 every 8 hours  traZODone 25 at bedtime      Allergies    penicillin (Unknown)  Sular (Unknown)    Intolerances        T(C): , Max: 36.8 (10-08-17 @ 10:08)  T(F): , Max: 98.3 (10-08-17 @ 10:08)  HR: 66 (10-08-17 @ 11:00)  BP: 135/99 (10-08-17 @ 11:00)  BP(mean): 123 (10-08-17 @ 11:00)  RR: 23 (10-08-17 @ 11:00)  SpO2: 97% (10-08-17 @ 11:00)  Wt(kg): --    10-07 @ 07:01  -  10-08 @ 07:00  --------------------------------------------------------  IN: 410 mL / OUT: 2875 mL / NET: -2465 mL    10-08 @ 07:01  -  10-08 @ 12:48  --------------------------------------------------------  IN: 100 mL / OUT: 1 mL / NET: 99 mL          Review of Systems:  CONSTITUTIONAL: No fever or chills, No fatigue or tiredness.  EYES: No blurred or double vision.  RESPIRATORY: Mild shortness of breath, Denies any cough, hemoptysis  CARDIOVASCULAR: No Chest pain, palpitations, dizziness or syncope  GASTROINTESTINAL: NO abdominal or flank pain, No nausea or vomiting, No diarrhea  GENITOURINARY: No dysuria or urinary burning, No difficulty passing urine, No hematuria  NEUROLOGICAL: No headaches or blurred vision  SKIN: No skin rashes   MUSCULOSKELETAL: No arthralgia, Joint pain, leg edema, No muscle pains      PHYSICAL EXAM:  GENERAL: NAD, well-developed, well nourished, alert, awake, no acute distress at present  HEAD:  Atraumatic, Normocephalic,   EYES: Bilateral conjuctiva and sclera normal  Oral cavity: Oral mucosa dry and pale  NECK: Neck supple, No JVD, Left IJ permacath present. No bleeding or signs of inflammation  CHEST/LUNG: Bilateral minimal decreased breath sounds at bases, No significant rales or crepitations, No wheezing  HEART: Regular rate and rhythm. ALBARO II/VI at LPSB, No gallop, no rub   ABDOMEN: Soft, Nontender, BS+nt, No flank tenderness.   EXTREMITIES: No clubbing, cyanosis, or edema  Neurology: AAOx3, no focal neurological deficit  SKIN: No rashes or lesions          ACCESS:     LABS:                        9.0    9.3   )-----------( 299      ( 08 Oct 2017 03:09 )             28.8     10-08    133<L>  |  94<L>  |  15  ----------------------------<  149<H>  4.3   |  26  |  3.00<H>    Ca    8.6      08 Oct 2017 03:09  Phos  3.3     10-08  Mg     2.0     10-08    TPro  7.6  /  Alb  2.1<L>  /  TBili  0.5  /  DBili  x   /  AST  11  /  ALT  7<L>  /  AlkPhos  357<H>  10-06      PT/INR - ( 08 Oct 2017 03:09 )   PT: 23.7 sec;   INR: 2.10          PTT - ( 08 Oct 2017 03:09 )  PTT:42.2 sec          RADIOLOGY & ADDITIONAL STUDIES:  < from: Xray Chest 1 View AP -PORTABLE-Routine (10.08.17 @ 03:45) >    EXAM:  XR CHEST 1 VIEW PORT ROUTINE                          PROCEDURE DATE:  10/08/2017                     INTERPRETATION:  Clinical History: Shortness of breath    Portable examination chest demonstrates patient is status post   sternotomy. No interval change position remaining support devices in   comparison to prior examination of the chest 10/7/2017. Discoid change   and/or infiltrate right lung base  Impression: Discoid change and/or infiltrate right lung base.            "Thank you for the opportunity to participate in the care of this   patient."        SHEILA TAYLOR M.D., ATTENDING RADIOLOGIST  This document has been electronically signed. Oct  8 2017 10:08AM                  < end of copied text >

## 2017-10-08 NOTE — PROGRESS NOTE ADULT - ATTENDING COMMENTS
tolerated dialysis well yesterday.  No SOB today  BP, volume status and electrolytes stable and acceptable today.  anticipate need for next dialysis 10/10- or tomorrow 10/9 if SOB

## 2017-10-08 NOTE — PROGRESS NOTE ADULT - PROBLEM SELECTOR PLAN 3
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  Anticoagulant treatment per primary/cardiology/CT surgery team for TVR.  Continue IV antibiotics as per ID team.  Adjust antibiotics per renal clearance and monitor Drug level as indicated.

## 2017-10-09 ENCOUNTER — TRANSCRIPTION ENCOUNTER (OUTPATIENT)
Age: 47
End: 2017-10-09

## 2017-10-09 DIAGNOSIS — N18.6 END STAGE RENAL DISEASE: ICD-10-CM

## 2017-10-09 DIAGNOSIS — A49.02 METHICILLIN RESISTANT STAPHYLOCOCCUS AUREUS INFECTION, UNSPECIFIED SITE: ICD-10-CM

## 2017-10-09 DIAGNOSIS — Z86.39 PERSONAL HISTORY OF OTHER ENDOCRINE, NUTRITIONAL AND METABOLIC DISEASE: ICD-10-CM

## 2017-10-09 DIAGNOSIS — R78.81 BACTEREMIA: ICD-10-CM

## 2017-10-09 DIAGNOSIS — Z09 ENCOUNTER FOR FOLLOW-UP EXAMINATION AFTER COMPLETED TREATMENT FOR CONDITIONS OTHER THAN MALIGNANT NEOPLASM: ICD-10-CM

## 2017-10-09 DIAGNOSIS — Z95.0 PRESENCE OF CARDIAC PACEMAKER: ICD-10-CM

## 2017-10-09 DIAGNOSIS — Z99.2 END STAGE RENAL DISEASE: ICD-10-CM

## 2017-10-09 DIAGNOSIS — Z95.2 PRESENCE OF PROSTHETIC HEART VALVE: ICD-10-CM

## 2017-10-09 DIAGNOSIS — Z86.79 PERSONAL HISTORY OF OTHER DISEASES OF THE CIRCULATORY SYSTEM: ICD-10-CM

## 2017-10-09 LAB
ALBUMIN SERPL ELPH-MCNC: 2.1 G/DL — LOW (ref 3.3–5)
ALBUMIN SERPL ELPH-MCNC: 2.2 G/DL — LOW (ref 3.3–5)
ALP SERPL-CCNC: 295 U/L — HIGH (ref 40–120)
ALP SERPL-CCNC: 299 U/L — HIGH (ref 40–120)
ALT FLD-CCNC: 6 U/L — LOW (ref 10–45)
ALT FLD-CCNC: 6 U/L — LOW (ref 10–45)
ANION GAP SERPL CALC-SCNC: 12 MMOL/L — SIGNIFICANT CHANGE UP (ref 5–17)
ANION GAP SERPL CALC-SCNC: 12 MMOL/L — SIGNIFICANT CHANGE UP (ref 5–17)
APTT BLD: 45.1 SEC — HIGH (ref 27.5–37.4)
APTT BLD: 53.4 SEC — HIGH (ref 27.5–37.4)
APTT BLD: 54.2 SEC — HIGH (ref 27.5–37.4)
AST SERPL-CCNC: 8 U/L — LOW (ref 10–40)
AST SERPL-CCNC: 8 U/L — LOW (ref 10–40)
BILIRUB SERPL-MCNC: 0.3 MG/DL — SIGNIFICANT CHANGE UP (ref 0.2–1.2)
BILIRUB SERPL-MCNC: 0.4 MG/DL — SIGNIFICANT CHANGE UP (ref 0.2–1.2)
BUN SERPL-MCNC: 22 MG/DL — SIGNIFICANT CHANGE UP (ref 7–23)
BUN SERPL-MCNC: 23 MG/DL — SIGNIFICANT CHANGE UP (ref 7–23)
CALCIUM SERPL-MCNC: 8.8 MG/DL — SIGNIFICANT CHANGE UP (ref 8.4–10.5)
CALCIUM SERPL-MCNC: 8.9 MG/DL — SIGNIFICANT CHANGE UP (ref 8.4–10.5)
CHLORIDE SERPL-SCNC: 91 MMOL/L — LOW (ref 96–108)
CHLORIDE SERPL-SCNC: 93 MMOL/L — LOW (ref 96–108)
CO2 SERPL-SCNC: 25 MMOL/L — SIGNIFICANT CHANGE UP (ref 22–31)
CO2 SERPL-SCNC: 27 MMOL/L — SIGNIFICANT CHANGE UP (ref 22–31)
CREAT SERPL-MCNC: 3.99 MG/DL — HIGH (ref 0.5–1.3)
CREAT SERPL-MCNC: 4.07 MG/DL — HIGH (ref 0.5–1.3)
GLUCOSE SERPL-MCNC: 110 MG/DL — HIGH (ref 70–99)
GLUCOSE SERPL-MCNC: 117 MG/DL — HIGH (ref 70–99)
HCT VFR BLD CALC: 28.1 % — LOW (ref 34.5–45)
HCT VFR BLD CALC: 30.9 % — LOW (ref 34.5–45)
HGB BLD-MCNC: 8.8 G/DL — LOW (ref 11.5–15.5)
HGB BLD-MCNC: 9.6 G/DL — LOW (ref 11.5–15.5)
INR BLD: 2.79 — HIGH (ref 0.88–1.16)
INR BLD: 3.02 — HIGH (ref 0.88–1.16)
INR BLD: 3.38 — HIGH (ref 0.88–1.16)
LACTATE SERPL-SCNC: 0.8 MMOL/L — SIGNIFICANT CHANGE UP (ref 0.5–2)
MAGNESIUM SERPL-MCNC: 2 MG/DL — SIGNIFICANT CHANGE UP (ref 1.6–2.6)
MAGNESIUM SERPL-MCNC: 2 MG/DL — SIGNIFICANT CHANGE UP (ref 1.6–2.6)
MCHC RBC-ENTMCNC: 26.5 PG — LOW (ref 27–34)
MCHC RBC-ENTMCNC: 27.1 PG — SIGNIFICANT CHANGE UP (ref 27–34)
MCHC RBC-ENTMCNC: 31.1 G/DL — LOW (ref 32–36)
MCHC RBC-ENTMCNC: 31.3 G/DL — LOW (ref 32–36)
MCV RBC AUTO: 85.4 FL — SIGNIFICANT CHANGE UP (ref 80–100)
MCV RBC AUTO: 86.5 FL — SIGNIFICANT CHANGE UP (ref 80–100)
PHOSPHATE SERPL-MCNC: 4.2 MG/DL — SIGNIFICANT CHANGE UP (ref 2.5–4.5)
PHOSPHATE SERPL-MCNC: 4.5 MG/DL — SIGNIFICANT CHANGE UP (ref 2.5–4.5)
PLATELET # BLD AUTO: 315 K/UL — SIGNIFICANT CHANGE UP (ref 150–400)
PLATELET # BLD AUTO: 369 K/UL — SIGNIFICANT CHANGE UP (ref 150–400)
POTASSIUM SERPL-MCNC: 4.4 MMOL/L — SIGNIFICANT CHANGE UP (ref 3.5–5.3)
POTASSIUM SERPL-MCNC: 4.6 MMOL/L — SIGNIFICANT CHANGE UP (ref 3.5–5.3)
POTASSIUM SERPL-SCNC: 4.4 MMOL/L — SIGNIFICANT CHANGE UP (ref 3.5–5.3)
POTASSIUM SERPL-SCNC: 4.6 MMOL/L — SIGNIFICANT CHANGE UP (ref 3.5–5.3)
PROT SERPL-MCNC: 7.4 G/DL — SIGNIFICANT CHANGE UP (ref 6–8.3)
PROT SERPL-MCNC: 8 G/DL — SIGNIFICANT CHANGE UP (ref 6–8.3)
PROTHROM AB SERPL-ACNC: 31.6 SEC — HIGH (ref 9.8–12.7)
PROTHROM AB SERPL-ACNC: 34.3 SEC — HIGH (ref 9.8–12.7)
PROTHROM AB SERPL-ACNC: 38.5 SEC — HIGH (ref 9.8–12.7)
RBC # BLD: 3.25 M/UL — LOW (ref 3.8–5.2)
RBC # BLD: 3.62 M/UL — LOW (ref 3.8–5.2)
RBC # FLD: 17.8 % — HIGH (ref 10.3–16.9)
RBC # FLD: 17.8 % — HIGH (ref 10.3–16.9)
SODIUM SERPL-SCNC: 128 MMOL/L — LOW (ref 135–145)
SODIUM SERPL-SCNC: 132 MMOL/L — LOW (ref 135–145)
WBC # BLD: 10.8 K/UL — HIGH (ref 3.8–10.5)
WBC # BLD: 9.8 K/UL — SIGNIFICANT CHANGE UP (ref 3.8–10.5)
WBC # FLD AUTO: 10.8 K/UL — HIGH (ref 3.8–10.5)
WBC # FLD AUTO: 9.8 K/UL — SIGNIFICANT CHANGE UP (ref 3.8–10.5)

## 2017-10-09 PROCEDURE — 71010: CPT | Mod: 26

## 2017-10-09 PROCEDURE — 33225 L VENTRIC PACING LEAD ADD-ON: CPT

## 2017-10-09 PROCEDURE — 99291 CRITICAL CARE FIRST HOUR: CPT

## 2017-10-09 PROCEDURE — 93010 ELECTROCARDIOGRAM REPORT: CPT

## 2017-10-09 PROCEDURE — 33208 INSRT HEART PM ATRIAL & VENT: CPT | Mod: KX,59

## 2017-10-09 PROCEDURE — 99232 SBSQ HOSP IP/OBS MODERATE 35: CPT | Mod: GC

## 2017-10-09 RX ORDER — WARFARIN SODIUM 2.5 MG/1
1 TABLET ORAL ONCE
Qty: 0 | Refills: 0 | Status: COMPLETED | OUTPATIENT
Start: 2017-10-09 | End: 2017-10-09

## 2017-10-09 RX ORDER — SODIUM CHLORIDE 9 MG/ML
3 INJECTION INTRAMUSCULAR; INTRAVENOUS; SUBCUTANEOUS EVERY 8 HOURS
Qty: 0 | Refills: 0 | Status: DISCONTINUED | OUTPATIENT
Start: 2017-10-09 | End: 2017-10-10

## 2017-10-09 RX ORDER — VANCOMYCIN HCL 1 G
1000 VIAL (EA) INTRAVENOUS ONCE
Qty: 0 | Refills: 0 | Status: COMPLETED | OUTPATIENT
Start: 2017-10-09 | End: 2017-10-09

## 2017-10-09 RX ADMIN — INSULIN GLARGINE 15 UNIT(S): 100 INJECTION, SOLUTION SUBCUTANEOUS at 07:16

## 2017-10-09 RX ADMIN — SODIUM CHLORIDE 3 MILLILITER(S): 9 INJECTION INTRAMUSCULAR; INTRAVENOUS; SUBCUTANEOUS at 22:00

## 2017-10-09 RX ADMIN — Medication 81 MILLIGRAM(S): at 11:56

## 2017-10-09 RX ADMIN — SENNA PLUS 2 TABLET(S): 8.6 TABLET ORAL at 21:54

## 2017-10-09 RX ADMIN — SODIUM CHLORIDE 3 MILLILITER(S): 9 INJECTION INTRAMUSCULAR; INTRAVENOUS; SUBCUTANEOUS at 13:32

## 2017-10-09 RX ADMIN — NYSTATIN CREAM 1 APPLICATION(S): 100000 CREAM TOPICAL at 07:17

## 2017-10-09 RX ADMIN — SODIUM CHLORIDE 3 MILLILITER(S): 9 INJECTION INTRAMUSCULAR; INTRAVENOUS; SUBCUTANEOUS at 07:15

## 2017-10-09 RX ADMIN — OXYCODONE AND ACETAMINOPHEN 1 TABLET(S): 5; 325 TABLET ORAL at 07:55

## 2017-10-09 RX ADMIN — SIMVASTATIN 20 MILLIGRAM(S): 20 TABLET, FILM COATED ORAL at 21:54

## 2017-10-09 RX ADMIN — Medication 100 MILLIGRAM(S): at 21:54

## 2017-10-09 RX ADMIN — GABAPENTIN 300 MILLIGRAM(S): 400 CAPSULE ORAL at 21:54

## 2017-10-09 RX ADMIN — Medication 25 MILLIGRAM(S): at 21:56

## 2017-10-09 RX ADMIN — OXYCODONE AND ACETAMINOPHEN 1 TABLET(S): 5; 325 TABLET ORAL at 20:57

## 2017-10-09 RX ADMIN — SERTRALINE 25 MILLIGRAM(S): 25 TABLET, FILM COATED ORAL at 11:55

## 2017-10-09 RX ADMIN — Medication 250 MILLIGRAM(S): at 16:00

## 2017-10-09 RX ADMIN — OXYCODONE AND ACETAMINOPHEN 1 TABLET(S): 5; 325 TABLET ORAL at 07:16

## 2017-10-09 RX ADMIN — Medication 4: at 23:17

## 2017-10-09 RX ADMIN — DAPTOMYCIN 128 MILLIGRAM(S): 500 INJECTION, POWDER, LYOPHILIZED, FOR SOLUTION INTRAVENOUS at 10:55

## 2017-10-09 RX ADMIN — OXYCODONE AND ACETAMINOPHEN 1 TABLET(S): 5; 325 TABLET ORAL at 21:30

## 2017-10-09 NOTE — DISCHARGE NOTE ADULT - HOSPITAL COURSE
Patient is a 47 year old female with history of HTN, HLD, DM II, ESRD on HD (MWF), and chronic left foot OM who is being transferred to [facility] from [facility] with known TV IE (MRSA – Bacteremia.) who has failed medical MGMT with aggressive antibiotics (Last Blood Cultures < 24 Hours = MRSA. TTE / ALCIDES completed 09/12/2017 and 09/13/2017 with questionable worsening / enlarging TV vegetation.). Patient was also found to have large intraluminal thrombus in the SVC extending into the right atrium. Patient was readmitted on 9/13 for reevaluation and surgical evaluation. At time of transfer, pt noted to have supra therapeutic INR, treated with FFP and now back on heparin gtt. Preop workup showing 75% m-dRCA disease. Ortho/ Vascular teams consulted for chronic osteomyelitis and possible surgical intervention. Patient was continued on IV abx, and last positive cultures where found to be on 8/28. She was continued on heparin gtt for an SVC clot, and line change for dialysis access where being performed. Patient had a CT/MRI for  new lower extremity neuro deficit. MRI of brain and spine  revealed C5/6 osteomyelitis with and overlying abscess as well as T7/8 osteomyletis without abscess. On 9/22 patient underwent an anterior corpectomy/discectomy C5-6, anterior interbody fusion C5-6 with Dr. Kuo. Patient was brought to the CCU under CT surgery care and on 9/23 was found to have bradycardic HR 40s with non conducted P waves which converted to NSR the same day. On 9/26 she had a PICC line and permacath placed by IR. Patient continued to recover from her spine surgery. Drain fell out upon transfer to [facility], so heparin was held and ortho continued to monitor. Patient was then taken to the OR on 10/2 and TV replacement was performed. No intervention on the RCA lesion was required at that time. Post op patient arrived to the ICU and was extubated on POD#0. Pre op fistulogram showed no patent fistula so patient had vein mapping performed for chronic management /access for HD. Patient is a 47 year old female with history of HTN, HLD, DM II, ESRD on HD (MWF), and chronic left foot OM who is being transferred to [facility] from [facility] with known TV IE (MRSA – Bacteremia.) who has failed medical MGMT with aggressive antibiotics (Last Blood Cultures < 24 Hours = MRSA. TTE / ALCIDES completed 09/12/2017 and 09/13/2017 with questionable worsening / enlarging TV vegetation.). Patient was also found to have large intraluminal thrombus in the SVC extending into the right atrium. Patient was readmitted on 9/13 for reevaluation and surgical evaluation. At time of transfer, pt noted to have supra therapeutic INR, treated with FFP and now back on heparin gtt. Preop workup showing 75% m-dRCA disease. Ortho/ Vascular teams consulted for chronic osteomyelitis and possible surgical intervention. Patient was continued on IV abx, and last positive cultures where found to be on 8/28. She was continued on heparin gtt for an SVC clot, and line change for dialysis access where being performed. Patient had a CT/MRI for  new lower extremity neuro deficit. MRI of brain and spine  revealed C5/6 osteomyelitis with and overlying abscess as well as T7/8 osteomyletis without abscess. On 9/22 patient underwent an anterior corpectomy/discectomy C5-6, anterior interbody fusion C5-6 with Dr. Kuo. Patient was brought to the CCU under CT surgery care and on 9/23 was found to have bradycardic HR 40s with non conducted P waves which converted to NSR the same day. On 9/26 she had a PICC line and permacath placed by IR. Patient continued to recover from her spine surgery. Drain fell out upon transfer to [facility], so heparin was held and ortho continued to monitor. Patient was then taken to the OR on 10/2 and TV replacement was performed. No intervention on the RCA lesion was required at that time. Post op patient arrived to the ICU and was extubated on POD#0.  POD#1, underwent HD, vascular srugery consulted for fistula planning, 1PRBC given. POD#3, ABX regimen adjusted. POD#4, coumadin restarted. POD#6, AVB noted on telemetry, EPS team consulted. POD#7, underwent PPM,     Of Note, pre-op fistulogram showed no patent fistula so patient had vein mapping performed for chronic management /access for HD. Patient is a 47 year old female with history of HTN, HLD, DM II, ESRD on HD (MWF), and chronic left foot OM who is being transferred to [facility] from [facility] with known TV IE (MRSA – Bacteremia.) who has failed medical MGMT with aggressive antibiotics (Last Blood Cultures < 24 Hours = MRSA. TTE / ALCIDES completed 09/12/2017 and 09/13/2017 with questionable worsening / enlarging TV vegetation.). Patient was also found to have large intraluminal thrombus in the SVC extending into the right atrium. Patient was readmitted on 9/13 for reevaluation and surgical evaluation. At time of transfer, pt noted to have supra therapeutic INR, treated with FFP and now back on heparin gtt. Preop workup showing 75% m-dRCA disease. Ortho/ Vascular teams consulted for chronic osteomyelitis and possible surgical intervention. Patient was continued on IV abx, and last positive cultures where found to be on 8/28. She was continued on heparin gtt for an SVC clot, and line change for dialysis access where being performed. Patient had a CT/MRI for  new lower extremity neuro deficit. MRI of brain and spine  revealed C5/6 osteomyelitis with and overlying abscess as well as T7/8 osteomyletis without abscess. On 9/22 patient underwent an anterior corpectomy/discectomy C5-6, anterior interbody fusion C5-6 with Dr. Kuo. Patient was brought to the CCU under CT surgery care and on 9/23 was found to have bradycardic HR 40s with non conducted P waves which converted to NSR the same day. On 9/26 she had a PICC line and permacath placed by IR. Patient continued to recover from her spine surgery. Drain fell out upon transfer to [facility], so heparin was held and ortho continued to monitor. Patient was then taken to the OR on 10/2 and TV replacement was performed. No intervention on the RCA lesion was required at that time. Post op patient arrived to the ICU and was extubated on POD#0.  POD#1, underwent HD, vascular srugery consulted for fistula planning, 1PRBC given. POD#3, ABX regimen adjusted. POD#4, coumadin restarted. POD#6, AVB noted on telemetry, EPS team consulted. POD#7, underwent PPM. POD#8, as per Dr. Escamilla and Dr. Kingston pt stable and ready for discharge home.     Of Note, pre-op fistulogram showed no patent fistula so patient had vein mapping performed for chronic management /access for HD.

## 2017-10-09 NOTE — DISCHARGE NOTE ADULT - MEDICATION SUMMARY - MEDICATIONS TO TAKE
I will START or STAY ON the medications listed below when I get home from the hospital:    lancets  -- 1 application injectable 3 times a day   dispense one month supply  -- Indication: For use as directed    alcohol swabs  -- Apply on skin to affected area 3 times a day  Use as directed prior to using lancet and checking glucose levels  -- Indication: For use as directed    pen needles   -- 1 each injectable 3 times a day (before meals)   5mm by 31 gauge    dispense one month supply  -- Indication: For use as directed    glucometer test strips  -- use as directed   Dispense one month supply  -- Indication: For use as directed    acetaminophen 325 mg oral tablet  -- 2 tab(s) by mouth every 6 hours, As needed, Mild Pain (1 - 3)  -- Indication: For As needed for mild pain    oxyCODONE-acetaminophen 5 mg-325 mg oral tablet  -- 1 tab(s) by mouth every 4 hours, As needed, Moderate Pain (4 - 6) MDD:6 tablets  -- Indication: For As needed for severe pain    aspirin 81 mg oral delayed release tablet  -- 1 tab(s) by mouth once a day  -- Indication: For Heart disease    gabapentin 300 mg oral capsule  -- 1 cap(s) by mouth once a day (at bedtime)  -- Indication: For chronic pain    sertraline 25 mg oral tablet  -- 1 tab(s) by mouth once a day  -- Indication: For depression    traZODone 50 mg oral tablet  -- 0.5 tab(s) by mouth once a day (at bedtime)   -- It is very important that you take or use this exactly as directed.  Do not skip doses or discontinue unless directed by your doctor.  May cause drowsiness.  Alcohol may intensify this effect.  Use care when operating dangerous machinery.  Obtain medical advice before taking any non-prescription drugs as some may affect the action of this medication.  Take with food or milk.    -- Indication: For depression    Basaglar KwikPen 100 units/mL subcutaneous solution  -- 15 unit(s) subcutaneous once a day  in the morning    Dispense one month supply  -- Do not drink alcoholic beverages when taking this medication.  It is very important that you take or use this exactly as directed.  Do not skip doses or discontinue unless directed by your doctor.  Keep in refrigerator.  Do not freeze.    -- Indication: For diabetes    HumaLOG KwikPen 100 units/mL subcutaneous solution  -- 4 unit(s) subcutaneous 3 times a day (before meals)   -- Do not drink alcoholic beverages when taking this medication.  It is very important that you take or use this exactly as directed.  Do not skip doses or discontinue unless directed by your doctor.  Keep in refrigerator.  Do not freeze.    -- Indication: For diabetes    simvastatin 20 mg oral tablet  -- 1 tab(s) by mouth once a day (at bedtime)  -- Indication: For cholesterol    rifAMPin 300 mg oral capsule  -- 2 cap(s) by mouth once a day  -- Indication: For Antibiotic    metoprolol tartrate 25 mg oral tablet  -- 0.5 tab(s) by mouth 2 times a day   -- It is very important that you take or use this exactly as directed.  Do not skip doses or discontinue unless directed by your doctor.  May cause drowsiness.  Alcohol may intensify this effect.  Use care when operating dangerous machinery.  Some non-prescription drugs may aggravate your condition.  Read all labels carefully.  If a warning appears, check with your doctor before taking.  Take with food or milk.  This drug may impair the ability to drive or operate machinery.  Use care until you become familiar with its effects.    -- Indication: For blood pressure    nystatin 100,000 units/g topical ointment  -- 1 application on skin 2 times a day    dispense one month supply  -- Indication: For fungal infection    polyethylene glycol 3350 oral powder for reconstitution  -- 17 gram(s) by mouth once a day, As Needed  dispense one month supply   -- Indication: For As needed for constipation    DAPTOmycin 500 mg intravenous injection  -- 700 milligram(s) intravenous every 48 hours  -- Indication: For Antibiotics     pantoprazole 40 mg oral delayed release tablet  -- 1 tab(s) by mouth once a day (before a meal)  -- Indication: For GERD I will START or STAY ON the medications listed below when I get home from the hospital:    lancets  -- 1 application injectable 3 times a day   dispense one month supply  -- Indication: For use as directed    alcohol swabs  -- Apply on skin to affected area 3 times a day  Use as directed prior to using lancet and checking glucose levels  -- Indication: For use as directed    pen needles   -- 1 each injectable 3 times a day (before meals)   5mm by 31 gauge    dispense one month supply  -- Indication: For use as directed    glucometer test strips  -- use as directed   Dispense one month supply  -- Indication: For use as directed    acetaminophen 325 mg oral tablet  -- 2 tab(s) by mouth every 6 hours, As needed, Mild Pain (1 - 3)  -- Indication: For As needed for mild pain    oxyCODONE-acetaminophen 5 mg-325 mg oral tablet  -- 1 tab(s) by mouth every 4 hours, As needed, Moderate Pain (4 - 6) MDD:6 tablets  -- Indication: For As needed for severe pain    aspirin 81 mg oral delayed release tablet  -- 1 tab(s) by mouth once a day  -- Indication: For Heart disease    Coumadin 1 mg oral tablet  -- 1 tab(s) by mouth once a day   -- Do not take this drug if you are pregnant.  It is very important that you take or use this exactly as directed.  Do not skip doses or discontinue unless directed by your doctor.  Obtain medical advice before taking any non-prescription drugs as some may affect the action of this medication.    -- Indication: For Anticoagulation    gabapentin 300 mg oral capsule  -- 1 cap(s) by mouth once a day (at bedtime)  -- Indication: For chronic pain    sertraline 25 mg oral tablet  -- 1 tab(s) by mouth once a day  -- Indication: For depression    traZODone 50 mg oral tablet  -- 0.5 tab(s) by mouth once a day (at bedtime)   -- It is very important that you take or use this exactly as directed.  Do not skip doses or discontinue unless directed by your doctor.  May cause drowsiness.  Alcohol may intensify this effect.  Use care when operating dangerous machinery.  Obtain medical advice before taking any non-prescription drugs as some may affect the action of this medication.  Take with food or milk.    -- Indication: For depression    Basaglar KwikPen 100 units/mL subcutaneous solution  -- 15 unit(s) subcutaneous once a day  in the morning    Dispense one month supply  -- Do not drink alcoholic beverages when taking this medication.  It is very important that you take or use this exactly as directed.  Do not skip doses or discontinue unless directed by your doctor.  Keep in refrigerator.  Do not freeze.    -- Indication: For diabetes    HumaLOG KwikPen 100 units/mL subcutaneous solution  -- 4 unit(s) subcutaneous 3 times a day (before meals)   -- Do not drink alcoholic beverages when taking this medication.  It is very important that you take or use this exactly as directed.  Do not skip doses or discontinue unless directed by your doctor.  Keep in refrigerator.  Do not freeze.    -- Indication: For diabetes    simvastatin 20 mg oral tablet  -- 1 tab(s) by mouth once a day (at bedtime)  -- Indication: For cholesterol    rifAMPin 300 mg oral capsule  -- 2 cap(s) by mouth once a day  -- Indication: For Antibiotic    metoprolol tartrate 25 mg oral tablet  -- 0.5 tab(s) by mouth 2 times a day   -- It is very important that you take or use this exactly as directed.  Do not skip doses or discontinue unless directed by your doctor.  May cause drowsiness.  Alcohol may intensify this effect.  Use care when operating dangerous machinery.  Some non-prescription drugs may aggravate your condition.  Read all labels carefully.  If a warning appears, check with your doctor before taking.  Take with food or milk.  This drug may impair the ability to drive or operate machinery.  Use care until you become familiar with its effects.    -- Indication: For blood pressure    nystatin 100,000 units/g topical ointment  -- 1 application on skin 2 times a day    dispense one month supply  -- Indication: For fungal infection    polyethylene glycol 3350 oral powder for reconstitution  -- 17 gram(s) by mouth once a day, As Needed  dispense one month supply   -- Indication: For As needed for constipation    DAPTOmycin 500 mg intravenous injection  -- 700 milligram(s) intravenous every 48 hours  -- Indication: For Antibiotics     pantoprazole 40 mg oral delayed release tablet  -- 1 tab(s) by mouth once a day (before a meal)  -- Indication: For GERD I will START or STAY ON the medications listed below when I get home from the hospital:    lancets  -- 1 application injectable 3 times a day   dispense one month supply  -- Indication: For use as directed    alcohol swabs  -- Apply on skin to affected area 3 times a day  Use as directed prior to using lancet and checking glucose levels  -- Indication: For use as directed    pen needles   -- 1 each injectable 3 times a day (before meals)   5mm by 31 gauge    dispense one month supply  -- Indication: For use as directed    glucometer test strips  -- use as directed   Dispense one month supply  -- Indication: For use as directed    acetaminophen 325 mg oral tablet  -- 2 tab(s) by mouth every 6 hours, As needed, Mild Pain (1 - 3)  -- Indication: For As needed for mild pain    oxyCODONE-acetaminophen 5 mg-325 mg oral tablet  -- 1 tab(s) by mouth every 4 hours, As needed, Moderate Pain (4 - 6) MDD:6 tablets  -- Indication: For As needed for severe pain    aspirin 81 mg oral delayed release tablet  -- 1 tab(s) by mouth once a day  -- Indication: For Heart disease    Coumadin 1 mg oral tablet  -- 1 tab(s) by mouth once a day   -- Do not take this drug if you are pregnant.  It is very important that you take or use this exactly as directed.  Do not skip doses or discontinue unless directed by your doctor.  Obtain medical advice before taking any non-prescription drugs as some may affect the action of this medication.    -- Indication: For Anticoagulation    gabapentin 300 mg oral capsule  -- 1 cap(s) by mouth once a day (at bedtime)  -- Indication: For chronic pain    sertraline 25 mg oral tablet  -- 1 tab(s) by mouth once a day  -- Indication: For depression    traZODone 50 mg oral tablet  -- 0.5 tab(s) by mouth once a day (at bedtime)   -- It is very important that you take or use this exactly as directed.  Do not skip doses or discontinue unless directed by your doctor.  May cause drowsiness.  Alcohol may intensify this effect.  Use care when operating dangerous machinery.  Obtain medical advice before taking any non-prescription drugs as some may affect the action of this medication.  Take with food or milk.    -- Indication: For depression    HumaLOG KwikPen 100 units/mL subcutaneous solution  -- 4 unit(s) subcutaneous 3 times a day (before meals)   -- Do not drink alcoholic beverages when taking this medication.  It is very important that you take or use this exactly as directed.  Do not skip doses or discontinue unless directed by your doctor.  Keep in refrigerator.  Do not freeze.    -- Indication: For diabetes    Basaglar KwikPen 100 units/mL subcutaneous solution  -- 15 unit(s) subcutaneous once a day  in the morning    Dispense one month supply  -- Do not drink alcoholic beverages when taking this medication.  It is very important that you take or use this exactly as directed.  Do not skip doses or discontinue unless directed by your doctor.  Keep in refrigerator.  Do not freeze.    -- Indication: For diabetes    simvastatin 20 mg oral tablet  -- 1 tab(s) by mouth once a day (at bedtime)  -- Indication: For cholesterol    rifAMPin 300 mg oral capsule  -- 2 cap(s) by mouth once a day  -- Indication: For Antibiotic    metoprolol tartrate 25 mg oral tablet  -- 0.5 tab(s) by mouth 2 times a day   -- It is very important that you take or use this exactly as directed.  Do not skip doses or discontinue unless directed by your doctor.  May cause drowsiness.  Alcohol may intensify this effect.  Use care when operating dangerous machinery.  Some non-prescription drugs may aggravate your condition.  Read all labels carefully.  If a warning appears, check with your doctor before taking.  Take with food or milk.  This drug may impair the ability to drive or operate machinery.  Use care until you become familiar with its effects.    -- Indication: For blood pressure    nystatin 100,000 units/g topical ointment  -- 1 application on skin 2 times a day    dispense one month supply  -- Indication: For fungal infection    polyethylene glycol 3350 oral powder for reconstitution  -- 17 gram(s) by mouth once a day, As Needed  dispense one month supply   -- Indication: For As needed for constipation    DAPTOmycin 500 mg intravenous injection  -- 700 milligram(s) intravenous every 48 hours  -- Indication: For Antibiotics     pantoprazole 40 mg oral delayed release tablet  -- 1 tab(s) by mouth once a day (before a meal)  -- Indication: For GERD I will START or STAY ON the medications listed below when I get home from the hospital:    lancets  -- 1 application injectable 3 times a day   dispense one month supply  -- Indication: For use as directed    alcohol swabs  -- Apply on skin to affected area 3 times a day  Use as directed prior to using lancet and checking glucose levels  -- Indication: For use as directed    pen needles   -- 1 each injectable 3 times a day (before meals)   5mm by 31 gauge    dispense one month supply  -- Indication: For use as directed    glucometer test strips  -- use as directed   Dispense one month supply  -- Indication: For use as directed    acetaminophen 325 mg oral tablet  -- 2 tab(s) by mouth every 6 hours, As needed, Mild Pain (1 - 3)  -- Indication: For As needed for mild pain    oxyCODONE-acetaminophen 5 mg-325 mg oral tablet  -- 1 tab(s) by mouth every 4 hours, As needed, Moderate Pain (4 - 6) MDD:6 tablets  -- Indication: For As needed for severe pain    aspirin 81 mg oral delayed release tablet  -- 1 tab(s) by mouth once a day  -- Indication: For Heart disease    Coumadin 1 mg oral tablet  -- 1 tab(s) by mouth once a day   -- Do not take this drug if you are pregnant.  It is very important that you take or use this exactly as directed.  Do not skip doses or discontinue unless directed by your doctor.  Obtain medical advice before taking any non-prescription drugs as some may affect the action of this medication.    -- Indication: For Anticoagulation    gabapentin 300 mg oral capsule  -- 1 cap(s) by mouth once a day (at bedtime)  -- Indication: For chronic pain    sertraline 25 mg oral tablet  -- 1 tab(s) by mouth once a day  -- Indication: For depression    traZODone 50 mg oral tablet  -- 0.5 tab(s) by mouth once a day (at bedtime)   -- It is very important that you take or use this exactly as directed.  Do not skip doses or discontinue unless directed by your doctor.  May cause drowsiness.  Alcohol may intensify this effect.  Use care when operating dangerous machinery.  Obtain medical advice before taking any non-prescription drugs as some may affect the action of this medication.  Take with food or milk.    -- Indication: For depression    HumaLOG KwikPen 100 units/mL subcutaneous solution  -- 4 unit(s) subcutaneous 3 times a day (before meals)   -- Do not drink alcoholic beverages when taking this medication.  It is very important that you take or use this exactly as directed.  Do not skip doses or discontinue unless directed by your doctor.  Keep in refrigerator.  Do not freeze.    -- Indication: For diabetes    Levemir FlexTouch 100 units/mL subcutaneous solution  -- 15 unit(s) subcutaneous once a day in the morning     dispense one month supply  -- Check with your doctor before becoming pregnant.  Do not drink alcoholic beverages when taking this medication.  Keep in refrigerator.  Do not freeze.    -- Indication: For diabetes    simvastatin 20 mg oral tablet  -- 1 tab(s) by mouth once a day (at bedtime)  -- Indication: For cholesterol    rifAMPin 300 mg oral capsule  -- 2 cap(s) by mouth once a day  -- Indication: For Antibiotic    metoprolol tartrate 25 mg oral tablet  -- 0.5 tab(s) by mouth 2 times a day   -- It is very important that you take or use this exactly as directed.  Do not skip doses or discontinue unless directed by your doctor.  May cause drowsiness.  Alcohol may intensify this effect.  Use care when operating dangerous machinery.  Some non-prescription drugs may aggravate your condition.  Read all labels carefully.  If a warning appears, check with your doctor before taking.  Take with food or milk.  This drug may impair the ability to drive or operate machinery.  Use care until you become familiar with its effects.    -- Indication: For blood pressure    nystatin 100,000 units/g topical ointment  -- 1 application on skin 2 times a day    dispense one month supply  -- Indication: For fungal infection    polyethylene glycol 3350 oral powder for reconstitution  -- 17 gram(s) by mouth once a day, As Needed  dispense one month supply   -- Indication: For As needed for constipation    DAPTOmycin 500 mg intravenous injection  -- 700 milligram(s) intravenous every 48 hours  -- Indication: For Antibiotics     pantoprazole 40 mg oral delayed release tablet  -- 1 tab(s) by mouth once a day (before a meal)  -- Indication: For GERD

## 2017-10-09 NOTE — PROGRESS NOTE ADULT - PROBLEM SELECTOR PLAN 4
SBP stable in range of 160 to 180's range  at present.  Consider resume BP medications for now per primary team. SBP stable in range of 160 to 180's range  at present.  Consider resume BP medications for now per primary team. Will adjust meds after fluid removal with  next dialysis. Fluid will be a major  determinant of BP response.

## 2017-10-09 NOTE — DISCHARGE NOTE ADULT - NSFTFSERV1RD_GEN_ALL_CORE
teaching and training/medication teaching and assessment/central venous access care/observation and assessment/wound care and assessment

## 2017-10-09 NOTE — DISCHARGE NOTE ADULT - CARE PROVIDERS DIRECT ADDRESSES
[email or web address]

## 2017-10-09 NOTE — PROGRESS NOTE ADULT - SUBJECTIVE AND OBJECTIVE BOX
CTICU  CRITICAL  CARE  attending     Hand off received 					   Pertinent clinical, laboratory, radiographic, hemodynamic, echocardiographic, respiratory data, microbiologic data and chart were reviewed and analyzed frequently throughout the course of the day and night  Patient seen and examined with CTS/ SH attending at bedside  Pt is a 47y , Female, HEALTH ISSUES - PROBLEM Dx:  Adjustment disorder with anxious mood: Adjustment disorder with anxious mood  Hyponatremia: Hyponatremia  Hypertension: Hypertension  Endocarditis: Endocarditis  Anemia: Anemia  End stage renal disease: End stage renal disease      , FAMILY HISTORY:  PAST MEDICAL & SURGICAL HISTORY:    Patient is a 47y old  Female who presents with a chief complaint of TV IE / MRSA Bacteremia. (13 Sep 2017 22:57)      14 system review was unremarkable  acute changes include acute respiratory failure  Vital signs, hemodynamic and respiratory parameters were reviewed from the bedside nursing flowsheet.  ICU Vital Signs Last 24 Hrs  T(C): 35.3 (09 Oct 2017 05:00), Max: 37.2 (08 Oct 2017 14:06)  T(F): 95.5 (09 Oct 2017 05:00), Max: 98.9 (08 Oct 2017 14:06)  HR: 66 (09 Oct 2017 09:00) (46 - 86)  BP: 161/84 (09 Oct 2017 09:00) (105/47 - 173/71)  BP(mean): 107 (09 Oct 2017 09:00) (43 - 123)  ABP: --  ABP(mean): --  RR: 20 (09 Oct 2017 09:00) (9 - 28)  SpO2: 89% (09 Oct 2017 09:00) (89% - 100%)    Adult Advanced Hemodynamics Last 24 Hrs  CVP(mm Hg): --  CVP(cm H2O): --  CO: --  CI: --  PA: --  PA(mean): --  PCWP: --  SVR: --  SVRI: --  PVR: --  PVRI: --,     Intake and output was reviewed and the fluid balance was calculated  Daily     Daily   I&O's Summary    08 Oct 2017 07:01  -  09 Oct 2017 07:00  --------------------------------------------------------  IN: 220 mL / OUT: 102 mL / NET: 118 mL        All lines and drain sites were assessed  Glycemic trend was reviewedCAPILLARY BLOOD GLUCOSE  108 (09 Oct 2017 05:00)        No acute change in mental status  Auscultation of the chest reveals equal bs  Abdomen is soft  Extremities are warm and well perfused  Wounds appear clean and unremarkable  Antibiotics are periop    labs  CBC Full  -  ( 09 Oct 2017 03:38 )  WBC Count : 9.8 K/uL  Hemoglobin : 8.8 g/dL  Hematocrit : 28.1 %  Platelet Count - Automated : 315 K/uL  Mean Cell Volume : 86.5 fL  Mean Cell Hemoglobin : 27.1 pg  Mean Cell Hemoglobin Concentration : 31.3 g/dL  Auto Neutrophil # : x  Auto Lymphocyte # : x  Auto Monocyte # : x  Auto Eosinophil # : x  Auto Basophil # : x  Auto Neutrophil % : x  Auto Lymphocyte % : x  Auto Monocyte % : x  Auto Eosinophil % : x  Auto Basophil % : x    10-09    132<L>  |  93<L>  |  22  ----------------------------<  110<H>  4.4   |  27  |  3.99<H>    Ca    8.8      09 Oct 2017 03:38  Phos  4.2     10-09  Mg     2.0     10-09    TPro  7.4  /  Alb  2.1<L>  /  TBili  0.3  /  DBili  x   /  AST  8<L>  /  ALT  6<L>  /  AlkPhos  295<H>  10-09    PT/INR - ( 09 Oct 2017 03:38 )   PT: 31.6 sec;   INR: 2.79          PTT - ( 09 Oct 2017 03:38 )  PTT:45.1 sec  The current medications were reviewed   MEDICATIONS  (STANDING):  aspirin enteric coated 81 milliGRAM(s) Oral daily  DAPTOmycin IVPB 700 milliGRAM(s) IV Intermittent every 48 hours  docusate sodium 100 milliGRAM(s) Oral three times a day  gabapentin 300 milliGRAM(s) Oral at bedtime  insulin glargine Injectable (LANTUS) 15 Unit(s) SubCutaneous every morning  insulin lispro (HumaLOG) corrective regimen sliding scale   SubCutaneous Before meals and at bedtime  insulin lispro Injectable (HumaLOG) 4 Unit(s) SubCutaneous three times a day before meals  nystatin Ointment 1 Application(s) Topical two times a day  pantoprazole    Tablet 40 milliGRAM(s) Oral before breakfast  polyethylene glycol 3350 17 Gram(s) Oral daily  rifampin 600 milliGRAM(s) Oral daily  senna 2 Tablet(s) Oral at bedtime  sertraline 25 milliGRAM(s) Oral daily  simvastatin 20 milliGRAM(s) Oral at bedtime  sodium chloride 0.45%. 1000 milliLiter(s) (10 mL/Hr) IV Continuous <Continuous>  sodium chloride 0.9% lock flush 3 milliLiter(s) IV Push every 8 hours  traZODone 25 milliGRAM(s) Oral at bedtime    MEDICATIONS  (PRN):  acetaminophen    Suspension. 650 milliGRAM(s) Oral every 6 hours PRN Mild Pain (1 - 3)  oxyCODONE    5 mG/acetaminophen 325 mG 1 Tablet(s) Oral every 4 hours PRN Moderate Pain (4 - 6)       PROBLEM LIST/ ASSESSMENT:  HEALTH ISSUES - PROBLEM Dx:  Adjustment disorder with anxious mood: Adjustment disorder with anxious mood  Hyponatremia: Hyponatremia  Hypertension: Hypertension  Endocarditis: Endocarditis  Anemia: Anemia  End stage renal disease: End stage renal disease      ,   Patient is a 47y old  Female who presents with a chief complaint of TV IE / MRSA Bacteremia. (13 Sep 2017 22:57)     s/p tvr  acute changes include acute respiratory failure    My plan includes :  close hemodynamic, ventilatory and drain monitoring and management per post op routine    Monitor for arrhythmias and monitor parameters for organ perfusion  monitor neurologic status  Head of the bed should remain elevated to 45 deg .   chest PT and IS will be encouraged  monitor adequacy of oxygenation and ventilation and attempt to wean oxygen  Nutritional goals will be met using po eventually , ensure adequate caloric intake and montior the same  Stress ulcer and VTE prophylaxis will be achieved    Glycemic control is satisfactory  Electrolytes have been repleted as necessary and wound care has been carried out. Pain control has been achieved.   agressive physical therapy and early mobility and ambulation goals will be met   The family was updated about the course and plan  CRITICAL CARE TIME SPENT in evaluation and management, reassessments, review and interpretation of labs and x-rays, ventilator and hemodynamic management, formulating a plan and coordinating care: ___90____ MIN.  Time does not include procedural time.  CTICU ATTENDING     					    Farhat Aguillon MD

## 2017-10-09 NOTE — DISCHARGE NOTE ADULT - MEDICATION SUMMARY - MEDICATIONS TO CHANGE
I will SWITCH the dose or number of times a day I take the medications listed below when I get home from the hospital:    metoprolol tartrate 100 mg oral tablet  -- 1 tab(s) by mouth 2 times a day    simvastatin 40 mg oral tablet  -- 1 tab(s) by mouth once a day (at bedtime)    DAPTOmycin  -- 1000 milligram(s) intravenous 3 times a week. After HD.    rifAMPin 150 mg oral capsule  -- 1 cap(s) by mouth once a day    Metoprolol Tartrate 50 mg oral tablet  -- 1 tab(s) by mouth every 6 to 8 hours

## 2017-10-09 NOTE — DISCHARGE NOTE ADULT - CARE PROVIDER_API CALL
Ermias Gee (MD), Cardiovascular Surgery  [address]  [address]  [place], [place] [address]  Phone: [phone]  Fax: [phone] Ermias Gee (MD), Cardiovascular Surgery  [address]  [address] [place] [address]  Phone: [phone]  Fax: [phone]    Rema Fisher (MD), Internal Medicine; Nephrology  [address]  [address] [place] [address]  Phone: [phone]  Fax: [phone]    Meek Kuo), Orthopaedic Surgery  [address]  [address] [place] [address]  Phone: [phone]  Fax: [phone]    Gideon Pierson), Surgery; Vascular Surgery  [address]  [address]  [place] [address]  Phone: [phone]  Fax: [phone]

## 2017-10-09 NOTE — CHART NOTE - NSCHARTNOTESELECT_GEN_ALL_CORE
Nutrition Follow-Up Note/Nutrition Services
Event Note
Event Note
Nutrition Follow-Up Note/Nutrition Services
Nutrition Follow-Up Note/Nutrition Services
Nutrition Services
Nutrition Services
Nutrition Services/Nutrition Follow-Up Note
d/c CTs/Event Note

## 2017-10-09 NOTE — DISCHARGE NOTE ADULT - HOME CARE AGENCY
[facility] [phone] for RN and PT follow up. [facility] [phone]  [phone]  [phone]  [phone]  [facility] [phone] for IV infusion. [facility] for RN/PT follow up [phone]

## 2017-10-09 NOTE — DISCHARGE NOTE ADULT - MEDICATION SUMMARY - MEDICATIONS TO STOP TAKING
I will STOP taking the medications listed below when I get home from the hospital:    valsartan 320 mg oral tablet  -- 1 tab(s) by mouth once a day    insulin glargine 100 units/mL subcutaneous solution  -- 30 unit(s) subcutaneous once a day (at bedtime)    insulin lispro 100 units/mL subcutaneous solution  -- 15 unit(s) subcutaneous 3 times a day (before meals)    acetaminophen 325 mg oral tablet  -- 2 tab(s) by mouth every 6 hours, As needed, For Temp greater than 38 C (100.4 F)    heparin  -- heparin drip at a rate of 21 cc/hour    calcium acetate 667 mg oral tablet  --  by mouth    ceftaroline  --   ceftaraline 200 mg IV q12h    linezolid 2 mg/mL-D5% intravenous solution  --  600 mg IV q12 h intravenous    epoetin fransisca  -- 8000 unit(s) injectable 3 times a week, As Needed    imipenem-cilastatin 500 mg injection  -- 500 milligram(s) injectable every 12 hours    insulin glargine 100 units/mL subcutaneous solution  -- 42 unit(s) subcutaneous once a day (at bedtime)    insulin lispro 100 units/mL subcutaneous solution  -- 18 unit(s) subcutaneous 3 times a day (before meals)    amLODIPine 2.5 mg oral tablet  -- 1 tab(s) by mouth once a day    gabapentin 600 mg/24 hours oral tablet, extended release  -- 1 tab(s) by mouth once a day    HYDROmorphone 2 mg oral tablet  -- 1 tab(s) by mouth every 4 hours, As Needed    Tylenol 325 mg oral tablet  -- 2 tab(s) by mouth every 4 hours, As Needed    sevelamer carbonate 800 mg oral tablet  -- 1 tab(s) by mouth 3 times a day (with meals)    calcium acetate 667 mg oral tablet  -- 3 tab(s) by mouth 3 times a day    aspirin 81 mg oral tablet  -- 1 tab(s) by mouth once a day    pantoprazole 40 mg oral granule, enteric coated  -- 1 each by mouth once a day    Senna 8.6 mg oral tablet  -- 1 tab(s) by mouth once a day (at bedtime)    valsartan 320 mg oral tablet  -- 1 tab(s) by mouth once a day    insulin regular (concentrated) 500 units/mL human recombinant subcutaneous solution  -- 1 unit(s) subcutaneous 4 times a day (after meals and at bedtime). SSI.

## 2017-10-09 NOTE — PROGRESS NOTE ADULT - ASSESSMENT
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 Nephrology consult called for HD treatment. Patient underwent Tricuspid valve replacement on 10/02/2017. Patient had HD treatment on 10/05/2014 with UF goal of 2.5L and tolerated procedure well. Patient had left arm AV fistula for past 4 months which never worked or mature. Left sided permacath placed on (09/25) present. Patient had last HD treatment on 10/07/2017 with UF  of 2.3kg.

## 2017-10-09 NOTE — PROGRESS NOTE ADULT - SUBJECTIVE AND OBJECTIVE BOX
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 (13 Sep 2017 22:57). On this admission, patient was also found to have septic emboli, cervical osteomyelitis and abscess s/p corpectomy and anterior plating POD #4. Patient is s/p TVR on 10/2/2017.     Yesterday, she had 2 boiled eggs for breakfast; vegetable soup for lunch; sweet potato, green beans, and steak for dinner (from outside hospital). Patient is NPO today for scheduled PPM placement at 5 pm.     CONSTITUTIONAL:  Negative fever or chills, feels well, good appetite  EYES:  Negative  blurry vision or double vision  CARDIOVASCULAR:  Negative for chest pain or palpitations  RESPIRATORY:  Negative for cough, wheezing, or SOB   GASTROINTESTINAL:  Negative for nausea, vomiting, diarrhea, constipation, or abdominal pain  GENITOURINARY:  Negative frequency, urgency or dysuria  NEUROLOGIC:  No headache, confusion, dizziness, lightheadedness    MEDICATIONS  (STANDING):  aspirin enteric coated 81 milliGRAM(s) Oral daily  DAPTOmycin IVPB 700 milliGRAM(s) IV Intermittent every 48 hours  docusate sodium 100 milliGRAM(s) Oral three times a day  gabapentin 300 milliGRAM(s) Oral at bedtime  insulin glargine Injectable (LANTUS) 15 Unit(s) SubCutaneous every morning  insulin lispro (HumaLOG) corrective regimen sliding scale   SubCutaneous Before meals and at bedtime  insulin lispro Injectable (HumaLOG) 4 Unit(s) SubCutaneous three times a day before meals  nystatin Ointment 1 Application(s) Topical two times a day  pantoprazole    Tablet 40 milliGRAM(s) Oral before breakfast  polyethylene glycol 3350 17 Gram(s) Oral daily  rifampin 600 milliGRAM(s) Oral daily  senna 2 Tablet(s) Oral at bedtime  sertraline 25 milliGRAM(s) Oral daily  simvastatin 20 milliGRAM(s) Oral at bedtime  sodium chloride 0.45%. 1000 milliLiter(s) (10 mL/Hr) IV Continuous <Continuous>  sodium chloride 0.9% lock flush 3 milliLiter(s) IV Push every 8 hours  traZODone 25 milliGRAM(s) Oral at bedtime    MEDICATIONS  (PRN):  acetaminophen    Suspension. 650 milliGRAM(s) Oral every 6 hours PRN Mild Pain (1 - 3)  oxyCODONE    5 mG/acetaminophen 325 mG 1 Tablet(s) Oral every 4 hours PRN Moderate Pain (4 - 6)      PHYSICAL EXAM  Vital Signs Last 24 Hrs  T(C): 35.4 (09 Oct 2017 10:23), Max: 37.2 (08 Oct 2017 14:06)  T(F): 95.8 (09 Oct 2017 10:23), Max: 98.9 (08 Oct 2017 14:06)  HR: 88 (09 Oct 2017 10:00) (46 - 88)  BP: 179/87 (09 Oct 2017 10:00) (105/47 - 179/87)  BP(mean): 121 (09 Oct 2017 10:00) (43 - 121)  RR: 19 (09 Oct 2017 10:00) (9 - 28)  SpO2: 98% (09 Oct 2017 10:00) (89% - 100%)    Constitutional: wn/wd in NAD.   HEENT: NCAT, MMM, OP clear, EOMI, no proptosis or lid retraction  Neck: no thyromegaly or palpable thyroid nodules   Respiratory: lungs CTAB.  Cardiovascular: regular rhythm, normal S1 and S2, no audible murmurs, no peripheral edema  GI: soft, NT/ND, no masses/HSM appreciated.  Neurology: no tremors, DTR 2+  Skin: no visible rashes/lesions  Psychiatric: AAO x 3, normal affect/mood.    LABS:                        9.6    10.8  )-----------( 369      ( 09 Oct 2017 10:39 )             30.9     10-09    132<L>  |  93<L>  |  22  ----------------------------<  110<H>  4.4   |  27  |  3.99<H>    Ca    8.8      09 Oct 2017 03:38  Phos  4.2     10-09  Mg     2.0     10-09    TPro  7.4  /  Alb  2.1<L>  /  TBili  0.3  /  DBili  x   /  AST  8<L>  /  ALT  6<L>  /  AlkPhos  295<H>  10-09    PT/INR - ( 09 Oct 2017 10:39 )   PT: 34.3 sec;   INR: 3.02          PTT - ( 09 Oct 2017 10:39 )  PTT:54.2 sec    Thyroid Stimulating Hormone, Serum: 2.447 uIU/mL (09-13 @ 22:51)  Thyroid Stimulating Hormone, Serum: 1.251 uIU/mL (08-24 @ 01:12)      HbA1C: 8.2 % (09-13 @ 22:51)  8.2 % (08-24 @ 01:11)    CAPILLARY BLOOD GLUCOSE  108 (09 Oct 2017 05:00)  154 (08 Oct 2017 22:00)  99 (08 Oct 2017 17:00)      Insulin Sliding Scale requirements X 24 Hours:    RADIOLOGY & ADDITIONAL TESTS:    A/P: 47y Female with history of DM type II presenting for DM type II presenting for MRSA bacteremia and TV endocarditis s/p cervical corpectomy, s/p TVR on 10/2/2017. Patient also has infected SVC clot. Patient is NPO today for PPM at 5 pm. Patient received 15 units this morning    1.  DM 2, uncontrolled, complicated - patient with poor diabetic history and multiple diabetic complications. A1c is likely higher given that patient is on Procrit and has high cell turnover. Patient also seems to be non compliance with her diabetes diet.   - Recommended to provide 1 amp D50% if FS <100.   - Continue Lantus 15 units daily. Continue Lispro 4 units with meals only if patient eats.    -  Please continue moderate dose sliding scale lispro coverage    Goal FSG is 120 - 180  Will continue to monitor   For discharge, pt can continue    Pt can follow up at discharge with [facility] by calling  to make an appointment.   Will discuss case with Dr. Ross, Dr. Yoon and update primary team

## 2017-10-09 NOTE — PROGRESS NOTE ADULT - PROBLEM SELECTOR PLAN 3
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  Anticoagulant treatment per primary/cardiology/CT surgery team for TVR.  Continue IV antibiotics as per ID team.  Adjust antibiotics per renal clearance and monitor Drug level as indicated.  Possible pacemaker insertion for AV block as per primary team.

## 2017-10-09 NOTE — PROGRESS NOTE ADULT - ATTENDING COMMENTS
Pt had already left for surgery when we attempted to see her on afternoon rounds.  Recent fingersticks and weekend events discussed with Dr. Marie and Karrie.  To continue the current insulin regimen for now.

## 2017-10-09 NOTE — DISCHARGE NOTE ADULT - CARE PLAN
Principal Discharge DX:	Endocarditis  Goal:	s/p TV replacement  Instructions for follow-up, activity and diet:	-Please follow up with Dr. Gee on 10/17/17 at 9:30AM.  The office is located at [facility], [facility], 4th floor. Call us with any questions #[phone].  -Please also follow-up with your cardiologist Dr. Duarte on 10/23/17 at 2:45PM at the [facility]. For any questions or concerns please call that Office at [phone].  -Please also follow-up with Dr. Garcia your nephrologist within 1-2 weeks of discharge to discuss your [facility]. Her contact information is located in your discharge paperwork.  -Please also make a follow-up appointment   - For your outpatient diabetes management please follow-up at [facility] by calling  to make an appointment.  -Walk daily as tolerated and use your incentive spirometer every hour.    -No driving or strenuous activity/exercise for 6 weeks, or until cleared by your surgeon.    -Gently clean your incisions with anti-bacterial soap and water, pat dry.  You may leave them open to air.    -Call your doctor if you have shortness of breath, chest pain not relieved by pain medication, dizziness, fever >101.5, or increased redness or drainage from incisions. Principal Discharge DX:	Endocarditis  Goal:	s/p TV replacement  Instructions for follow-up, activity and diet:	-Please follow up with Dr. Gee on 10/17/17 at 9:30AM.  The office is located at [facility], [facility], 4th floor. Call us with any questions #[phone].  -Please also follow-up with your cardiologist Dr. Duarte on 10/23/17 at 2:45PM at the [facility]. For any questions or concerns please call that Office at [phone].  -Please also follow-up with Dr. Garcia your nephrologist on . Her contact information is located in your discharge paperwork.  - For your outpatient diabetes management please follow-up at [facility] by calling  to make an appointment.  -You will be discharged on IV antibiotics for your MRSA infection. An infusion company will come to your house to administer the antibiotic. Every Friday your blood will be drawn to monitor your anitbiotic regimen and coumadin regimen. Dr. Kingston will be managing your Antibiotic regimen and Dr. Duarte your cardiologist will be monitoring your INR levels and coumadin dosing.   -Walk daily as tolerated and use your incentive spirometer every hour.    -No driving or strenuous activity/exercise for 6 weeks, or until cleared by your surgeon.    -Gently clean your incisions with anti-bacterial soap and water, pat dry.  You may leave them open to air.    -Call your doctor if you have shortness of breath, chest pain not relieved by pain medication, dizziness, fever >101.5, or increased redness or drainage from incisions. Principal Discharge DX:	Endocarditis  Goal:	s/p TV replacement  Instructions for follow-up, activity and diet:	-Please follow up with Dr. Gee on 10/17/17 at 9:30AM.  The office is located at [facility], [facility], 4th floor. Call us with any questions #[phone].  -Please make a follow-up appointment with your primary care provider within 1-2 weeks of discharge to discuss your recent surgery.   -Please also follow-up with your cardiologist Dr. Duarte on 10/23/17 at 2:45PM at the [facility]. For any questions or concerns please call that Office at [phone].  -Please also follow-up with Dr. Garcia your nephrologist within 1-2 weeks of discharge. Her office will call you to schedule a follow-up appointment. Her contact information is located in your discharge paperwork. For any questions of concerns please call her office at [phone].  - For your outpatient diabetes management please follow-up at [facility] by calling  to make an appointment.  -You will be discharged on IV antibiotics for your MRSA infection. An infusion company will come to your house to administer the antibiotic. Every Friday your blood will be drawn to monitor your anitbiotic regimen and coumadin regimen. Dr. Kingston will be managing your Antibiotic regimen and Dr. Duarte your cardiologist will be monitoring your INR levels and coumadin dosing.   -Dr. Duarte will be monitoring your INR levels and coumadin dosing until you follow-up at the coumadin clinic, please do not take coumadin until instructed by a medical doctor. When instructed that it is safe to resume coumadin start with 1mg orally once daily. For any questions or concerns please call Dr. Duarte at [phone].  -You will also follow-up at the [facility] Cou[facility] clinic starting 10/16/17 at 8:45AM on the 8th floor. For any questions or concerns please call [phone].   -Please follow-up with Dr. Kuo your Orthopedic surgeon on at. For any questions or concerns her contact information is located in your discharge paperwork.  Goal:	s/p TV replacement  Instructions for follow-up, activity and diet:	-Walk daily as tolerated and use your incentive spirometer ten times every hour.    -No driving or strenuous activity/exercise for 6 weeks, or until cleared by your surgeon. Please do not lift anything great than ten pounds.     -Gently clean your incisions with anti-bacterial soap and water, pat dry.  You may leave them open to air.    -Call your doctor if you have shortness of breath, chest pain not relieved by pain medication, dizziness, fever >101.5, or increased redness or drainage from incisions. Principal Discharge DX:	Endocarditis  Goal:	s/p TV replacement  Instructions for follow-up, activity and diet:	-Please follow up with Dr. Gee on 10/17/17 at 9:30AM.  The office is located at [facility], [facility], 4th floor. Call us with any questions #[phone].  -Please make a follow-up appointment with your primary care provider within 1-2 weeks of discharge to discuss your recent surgery.   -Please also follow-up with your cardiologist Dr. Duarte on 10/23/17 at 2:45PM at the [facility]. For any questions or concerns please call that Office at [phone].  -Please also follow-up with Dr. Garcia your nephrologist within 1-2 weeks of discharge. Her office will call you to schedule a follow-up appointment. Her contact information is located in your discharge paperwork. For any questions of concerns please call her office at [phone].  - For your outpatient diabetes management please follow-up at [facility] by calling  to make an appointment.  -You will be discharged on IV antibiotics for your MRSA infection. An infusion company will come to your house to administer the antibiotic. Every Friday your blood will be drawn to monitor your anitbiotic regimen and coumadin regimen. Dr. Kingston will be managing your Antibiotic regimen and Dr. Duarte your cardiologist will be monitoring your INR levels and coumadin dosing.   -Dr. Duarte will be monitoring your INR levels and coumadin dosing until you follow-up at the coumadin clinic, please do not take coumadin until instructed by a medical doctor. When instructed that it is safe to resume coumadin start with 1mg orally once daily. For any questions or concerns please call Dr. Duarte at [phone].  -You will also follow-up at the [facility] Coumadin clinic starting 10/16/17 at 8:45AM on the 8th floor. For any questions or concerns please call [phone].   -Please follow-up with Dr. Kuo your Orthopedic surgeon on 10/16/17 at 10:15AM. For any questions or concerns his contact information is located in your discharge paperwork.  Goal:	s/p TV replacement  Instructions for follow-up, activity and diet:	-Walk daily as tolerated and use your incentive spirometer ten times every hour.    -No driving or strenuous activity/exercise for 6 weeks, or until cleared by your surgeon. Please do not lift anything great than ten pounds.     -Gently clean your incisions with anti-bacterial soap and water, pat dry.  You may leave them open to air.    -Call your doctor if you have shortness of breath, chest pain not relieved by pain medication, dizziness, fever >101.5, or increased redness or drainage from incisions.

## 2017-10-09 NOTE — CHART NOTE - NSCHARTNOTEFT_GEN_A_CORE
Admitting Diagnosis:   Patient is a 47y old  Female who presents with a chief complaint of TV IE / MRSA Bacteremia. (13 Sep 2017 22:57)      PAST MEDICAL & SURGICAL HISTORY:  PMH:  HTN, HLD, DM II, ESRD on HD (MWF), and chronic left foot OM     Current Nutrition Order:  No diet currently ordered; NPO signing hanging on pt's door    PO Intake:   None at this time 2/2 NPO    GI Issues:   Denies N/V/D/C  Reports improvement in swallow    Pain:  Denies current     Skin Integrity:  IAD perianal area, MSI with wound vac, R chest ASW, b/l buttock fungal rash      Labs:   10-09    132<L>  |  93<L>  |  22  ----------------------------<  110<H>  4.4   |  27  |  3.99<H>    Ca    8.8      09 Oct 2017 03:38  Phos  4.2     10-09  Mg     2.0     10-09    TPro  7.4  /  Alb  2.1<L>  /  TBili  0.3  /  DBili  x   /  AST  8<L>  /  ALT  6<L>  /  AlkPhos  295<H>  10-09    CAPILLARY BLOOD GLUCOSE  108 (09 Oct 2017 05:00)  154 (08 Oct 2017 22:00)  99 (08 Oct 2017 17:00)  160 (08 Oct 2017 11:00)          Medications:  MEDICATIONS  (STANDING):  aspirin enteric coated 81 milliGRAM(s) Oral daily  DAPTOmycin IVPB 700 milliGRAM(s) IV Intermittent every 48 hours  docusate sodium 100 milliGRAM(s) Oral three times a day  gabapentin 300 milliGRAM(s) Oral at bedtime  insulin glargine Injectable (LANTUS) 15 Unit(s) SubCutaneous every morning  insulin lispro (HumaLOG) corrective regimen sliding scale   SubCutaneous Before meals and at bedtime  insulin lispro Injectable (HumaLOG) 4 Unit(s) SubCutaneous three times a day before meals  nystatin Ointment 1 Application(s) Topical two times a day  pantoprazole    Tablet 40 milliGRAM(s) Oral before breakfast  polyethylene glycol 3350 17 Gram(s) Oral daily  rifampin 600 milliGRAM(s) Oral daily  senna 2 Tablet(s) Oral at bedtime  sertraline 25 milliGRAM(s) Oral daily  simvastatin 20 milliGRAM(s) Oral at bedtime  sodium chloride 0.45%. 1000 milliLiter(s) (10 mL/Hr) IV Continuous <Continuous>  sodium chloride 0.9% lock flush 3 milliLiter(s) IV Push every 8 hours  traZODone 25 milliGRAM(s) Oral at bedtime    MEDICATIONS  (PRN):  acetaminophen    Suspension. 650 milliGRAM(s) Oral every 6 hours PRN Mild Pain (1 - 3)  oxyCODONE    5 mG/acetaminophen 325 mG 1 Tablet(s) Oral every 4 hours PRN Moderate Pain (4 - 6)      Weight:  87kg (9/13)--> 89.5kg (9/17)--> 82.9kg (10/2)--> 85.5kg (10/7)    Weight Change:   Continue to trend dry wts    Estimated energy needs:   IBW 68.2kg used for EER and adjusted for HD and post-op.   30-35kcal/kg (2046-2387kcal), 1.4-1.6g/kg (95-109g), fluids per MD    Subjective:   46y/o F with tricuspid valve endocarditis, MRSA bacteremia, and CAD. S/p corpectomy; s/p TVR. Pt seen resting in chair. No diet ordered as pt for PPM today. Restart diet as medically feasible after procedure per appropriate diet consistency. Pt denies GI distress and reports improvement in swallow. Discussed diet advancement with ONS per team.     Previous Nutrition Diagnosis:  Food - and nutrition - related knowledge deficit; Limited adherence to nutrition - related recommendations RT pt is not compliant with diet restrictions appropriate for her medical status AEB pt reports not following Renal restrictions and A1C (9/13) 8.2    Active [X]  Resolved [   ]    If resolved, new PES:   Inadequate oral intake RT NPO at present AEB pt meeting 0% of EER    Goal:  Diet advancement within 24hr per medical team    Recommendations:  Change diet to dysphagia 2 mech/thin liquid, CST CHO, renal diet.  Add glucerna TID.  Monitor lytes and replete prn.   Trend dry wts.    Education:   Encouraged intake and ONS; Reinforced DM, Renal Diet    Risk Level: High [X] Moderate [   ] Low [   ]. Admitting Diagnosis:   Patient is a 47y old  Female who presents with a chief complaint of TV IE / MRSA Bacteremia. (13 Sep 2017 22:57)      PAST MEDICAL & SURGICAL HISTORY:  PMH:  HTN, HLD, DM II, ESRD on HD (MWF), and chronic left foot OM     Current Nutrition Order:  No diet currently ordered; NPO signing hanging on pt's door    PO Intake:   None at this time 2/2 NPO    GI Issues:   Denies N/V/D/C  Reports improvement in swallow    Pain:  Denies current     Skin Integrity:  IAD perianal area, MSI with wound vac, R chest ASW, b/l buttock fungal rash      Labs:   10-09    132<L>  |  93<L>  |  22  ----------------------------<  110<H>  4.4   |  27  |  3.99<H>    Ca    8.8      09 Oct 2017 03:38  Phos  4.2     10-09  Mg     2.0     10-09    TPro  7.4  /  Alb  2.1<L>  /  TBili  0.3  /  DBili  x   /  AST  8<L>  /  ALT  6<L>  /  AlkPhos  295<H>  10-09    CAPILLARY BLOOD GLUCOSE  108 (09 Oct 2017 05:00)  154 (08 Oct 2017 22:00)  99 (08 Oct 2017 17:00)  160 (08 Oct 2017 11:00)          Medications:  MEDICATIONS  (STANDING):  aspirin enteric coated 81 milliGRAM(s) Oral daily  DAPTOmycin IVPB 700 milliGRAM(s) IV Intermittent every 48 hours  docusate sodium 100 milliGRAM(s) Oral three times a day  gabapentin 300 milliGRAM(s) Oral at bedtime  insulin glargine Injectable (LANTUS) 15 Unit(s) SubCutaneous every morning  insulin lispro (HumaLOG) corrective regimen sliding scale   SubCutaneous Before meals and at bedtime  insulin lispro Injectable (HumaLOG) 4 Unit(s) SubCutaneous three times a day before meals  nystatin Ointment 1 Application(s) Topical two times a day  pantoprazole    Tablet 40 milliGRAM(s) Oral before breakfast  polyethylene glycol 3350 17 Gram(s) Oral daily  rifampin 600 milliGRAM(s) Oral daily  senna 2 Tablet(s) Oral at bedtime  sertraline 25 milliGRAM(s) Oral daily  simvastatin 20 milliGRAM(s) Oral at bedtime  sodium chloride 0.45%. 1000 milliLiter(s) (10 mL/Hr) IV Continuous <Continuous>  sodium chloride 0.9% lock flush 3 milliLiter(s) IV Push every 8 hours  traZODone 25 milliGRAM(s) Oral at bedtime    MEDICATIONS  (PRN):  acetaminophen    Suspension. 650 milliGRAM(s) Oral every 6 hours PRN Mild Pain (1 - 3)  oxyCODONE    5 mG/acetaminophen 325 mG 1 Tablet(s) Oral every 4 hours PRN Moderate Pain (4 - 6)      Weight:  87kg (9/13)--> 89.5kg (9/17)--> 82.9kg (10/2)--> 85.5kg (10/7)    Weight Change:   Continue to trend dry wts    Estimated energy needs:   IBW 68.2kg used for EER and adjusted for HD and post-op.   30-35kcal/kg (2046-2387kcal), 1.4-1.6g/kg (95-109g), fluids per MD    Subjective:   46y/o F with tricuspid valve endocarditis, MRSA bacteremia, and CAD. S/p corpectomy; s/p TVR. Pt seen resting in chair. No diet ordered as pt for PPM today. Restart diet as medically feasible after procedure per appropriate diet consistency. Pt denies GI distress and reports improvement in swallow. Discussed diet advancement with ONS per team.     Previous Nutrition Diagnosis:  Food - and nutrition - related knowledge deficit; Limited adherence to nutrition - related recommendations RT pt is not compliant with diet restrictions appropriate for her medical status AEB pt reports not following Renal restrictions and A1C (9/13) 8.2    Active [X]  Resolved [   ]    If resolved, new PES:   Inadequate oral intake RT NPO at present AEB pt meeting 0% of EER    Goal:  Diet advancement within 24hr per medical team    Recommendations:  Restart dysphagia 2 mech/thin liquid, CST CHO, renal diet + glucerna TID when able.  Adjust  diet consistency per SLP.   Monitor lytes and replete prn.   Trend dry wts.    Education:   Discussed diet advancement; Reinforced DM, Renal Diet    Risk Level: High [X] Moderate [   ] Low [   ]. Admitting Diagnosis:   Patient is a 47y old  Female who presents with a chief complaint of TV IE / MRSA Bacteremia. (13 Sep 2017 22:57)      PAST MEDICAL & SURGICAL HISTORY:  PMH:  HTN, HLD, DM II, ESRD on HD (MWF), and chronic left foot OM     Current Nutrition Order:  No diet currently ordered; NPO signing hanging on pt's door    PO Intake:   None at this time 2/2 NPO    GI Issues:   Denies N/V/D/C  Reports improvement in swallow    Pain:  Denies current     Skin Integrity:  IAD perianal area, MSI with wound vac, R chest ASW, b/l buttock fungal rash      Labs:   10-09    132<L>  |  93<L>  |  22  ----------------------------<  110<H>  4.4   |  27  |  3.99<H>    Ca    8.8      09 Oct 2017 03:38  Phos  4.2     10-09  Mg     2.0     10-09    TPro  7.4  /  Alb  2.1<L>  /  TBili  0.3  /  DBili  x   /  AST  8<L>  /  ALT  6<L>  /  AlkPhos  295<H>  10-09    CAPILLARY BLOOD GLUCOSE  108 (09 Oct 2017 05:00)  154 (08 Oct 2017 22:00)  99 (08 Oct 2017 17:00)  160 (08 Oct 2017 11:00)          Medications:  MEDICATIONS  (STANDING):  aspirin enteric coated 81 milliGRAM(s) Oral daily  DAPTOmycin IVPB 700 milliGRAM(s) IV Intermittent every 48 hours  docusate sodium 100 milliGRAM(s) Oral three times a day  gabapentin 300 milliGRAM(s) Oral at bedtime  insulin glargine Injectable (LANTUS) 15 Unit(s) SubCutaneous every morning  insulin lispro (HumaLOG) corrective regimen sliding scale   SubCutaneous Before meals and at bedtime  insulin lispro Injectable (HumaLOG) 4 Unit(s) SubCutaneous three times a day before meals  nystatin Ointment 1 Application(s) Topical two times a day  pantoprazole    Tablet 40 milliGRAM(s) Oral before breakfast  polyethylene glycol 3350 17 Gram(s) Oral daily  rifampin 600 milliGRAM(s) Oral daily  senna 2 Tablet(s) Oral at bedtime  sertraline 25 milliGRAM(s) Oral daily  simvastatin 20 milliGRAM(s) Oral at bedtime  sodium chloride 0.45%. 1000 milliLiter(s) (10 mL/Hr) IV Continuous <Continuous>  sodium chloride 0.9% lock flush 3 milliLiter(s) IV Push every 8 hours  traZODone 25 milliGRAM(s) Oral at bedtime    MEDICATIONS  (PRN):  acetaminophen    Suspension. 650 milliGRAM(s) Oral every 6 hours PRN Mild Pain (1 - 3)  oxyCODONE    5 mG/acetaminophen 325 mG 1 Tablet(s) Oral every 4 hours PRN Moderate Pain (4 - 6)      Weight:  87kg (9/13)--> 89.5kg (9/17)--> 82.9kg (10/2)--> 85.5kg (10/7)    Weight Change:   Continue to trend dry wts    Estimated energy needs:   IBW 68.2kg used for EER and adjusted for HD and post-op.   30-35kcal/kg (2046-2387kcal), 1.4-1.6g/kg (95-109g), fluids per MD    Subjective:   46y/o F with tricuspid valve endocarditis, MRSA bacteremia, and CAD. S/p corpectomy; s/p TVR. Pt seen resting in chair. No diet ordered as pt for PPM today. Pt reports she is hungry today and wants to eat. Restart diet as medically feasible after procedure per appropriate diet consistency; d/w MD in rounds. Pt denies GI distress and reports improvement in swallow. Discussed diet advancement with ONS per team.     Previous Nutrition Diagnosis:  Food - and nutrition - related knowledge deficit; Limited adherence to nutrition - related recommendations RT pt is not compliant with diet restrictions appropriate for her medical status AEB pt reports not following Renal restrictions and A1C (9/13) 8.2    Active [X]  Resolved [   ]    If resolved, new PES:   Inadequate oral intake RT NPO at present AEB pt meeting 0% of EER    Goal:  Diet advancement within 24hr per medical team    Recommendations:  Restart dysphagia 2 mechanical soft/thin liquids, CST CHO, renal diet + glucerna TID when able.  Adjust diet consistency per SLP.   Monitor lytes and replete prn.   Trend dry wts.    Education:   Discussed diet advancement; Reinforced DM, Renal Diet    Risk Level: High [X] Moderate [   ] Low [   ].

## 2017-10-09 NOTE — DISCHARGE NOTE ADULT - PROVIDER TOKENS
ELLA:'[id number]' TOKEN:'[id number]:'[id number]:'[id number]:'[id number]'

## 2017-10-09 NOTE — DISCHARGE NOTE ADULT - PATIENT PORTAL LINK FT
“You can access the [facility] Patient Portal, offered by [facility], by registering with the following website: [email or web address]”

## 2017-10-09 NOTE — PROGRESS NOTE ADULT - SUBJECTIVE AND OBJECTIVE BOX
Patient is a 47y Female seen and evaluated at bedside.  Patient still complaints of mild shortness of breath at present. Denies any chest pain, palpitations, dizziness or syncope. No other complaints at present.     acetaminophen    Suspension. 650 every 6 hours PRN  aspirin enteric coated 81 daily  DAPTOmycin IVPB 700 every 48 hours  docusate sodium 100 three times a day  gabapentin 300 at bedtime  insulin glargine Injectable (LANTUS) 15 every morning  insulin lispro (HumaLOG) corrective regimen sliding scale  Before meals and at bedtime  insulin lispro Injectable (HumaLOG) 4 three times a day before meals  nystatin Ointment 1 two times a day  oxyCODONE    5 mG/acetaminophen 325 mG 1 every 4 hours PRN  pantoprazole    Tablet 40 before breakfast  polyethylene glycol 3350 17 daily  rifampin 600 daily  senna 2 at bedtime  sertraline 25 daily  simvastatin 20 at bedtime  sodium chloride 0.45%. 1000 <Continuous>  sodium chloride 0.9% lock flush 3 every 8 hours  traZODone 25 at bedtime      Allergies    penicillin (Unknown)  Sular (Unknown)    Intolerances        T(C): , Max: 37.2 (10-08-17 @ 14:06)  T(F): , Max: 98.9 (10-08-17 @ 14:06)  HR: 88 (10-09-17 @ 12:00)  BP: 170/75 (10-09-17 @ 12:00)  BP(mean): 112 (10-09-17 @ 12:00)  RR: 9 (10-09-17 @ 12:00)  SpO2: 100% (10-09-17 @ 12:00)  Wt(kg): --    10-08 @ 07:01  -  10-09 @ 07:00  --------------------------------------------------------  IN: 220 mL / OUT: 102 mL / NET: 118 mL    10-09 @ 07:01  -  10-09 @ 12:15  --------------------------------------------------------  IN: 0 mL / OUT: 250 mL / NET: -250 mL          Review of Systems:  CONSTITUTIONAL: No fever or chills, No fatigue or tiredness.  EYES: No blurred or double vision.  RESPIRATORY: Mild shortness of breath, Denies any cough, hemoptysis  CARDIOVASCULAR: No Chest pain , palpitations, dizziness or syncope  GASTROINTESTINAL: No abdominal or flank pain, No nausea or vomiting, No diarrhea  GENITOURINARY: No dysuria or urinary burning, No difficulty passing urine, No hematuria  NEUROLOGICAL: No headaches or blurred vision  SKIN: No skin rashes   MUSCULOSKELETAL: No arthralgia, Joint pain, leg edema, No muscle pains      PHYSICAL EXAM:  GENERAL: NAD, well-developed, well nourished, alert, awake, no acute distress at present  HEAD:  Atraumatic, Normocephalic,   EYES: Bilateral conjuctiva and sclera normal   Oral cavity: Oral mucosa dry and pale  NECK: Neck supple, No JVD  CHEST/LUNG: Clear to auscultation bilaterally; No wheeze, no rales, no crepitations  HEART: Regular rate and rhythm. ALBARO II/VI at LPSB, No gallop, no rub   ABDOMEN: Soft, Nontender, BS+nt, No flank tenderness.   EXTREMITIES: No clubbing, cyanosis, or edema  Neurology: AAOx3, no focal neurological deficit  SKIN: No rashes or lesions          ACCESS: Left sided permacath present.    LABS:                        9.6    10.8  )-----------( 369      ( 09 Oct 2017 10:39 )             30.9     10-09    128<L>  |  91<L>  |  23  ----------------------------<  117<H>  4.6   |  25  |  4.07<H>    Ca    8.9      09 Oct 2017 10:39  Phos  4.5     10-09  Mg     2.0     10-09    TPro  8.0  /  Alb  2.2<L>  /  TBili  0.4  /  DBili  x   /  AST  8<L>  /  ALT  6<L>  /  AlkPhos  299<H>  10-09      PT/INR - ( 09 Oct 2017 10:39 )   PT: 34.3 sec;   INR: 3.02          PTT - ( 09 Oct 2017 10:39 )  PTT:54.2 sec          RADIOLOGY & ADDITIONAL STUDIES:  < from: Xray Chest 1 View AP -PORTABLE-Routine (10.09.17 @ 04:11) >    EXAM:  XR CHEST 1 VIEW PORT ROUTINE                          PROCEDURE DATE:  10/09/2017                     INTERPRETATION:  CLINICAL INDICATION: 47-year-old with shortness of   breath.      FINDINGS: Portable view of the chest is compared to 10/8/2017 and   demonstrates low lung volumes. No interval change in left-sided central   venous catheter with the tip in the right atrium. Median sternotomy. No   interval change in central pulmonary venous congestion and interstitial   pulmonary edema. Right-sided venous catheter with tip in the SVC. No   kaylynn consolidation or pleural effusion.            "Thank you for the opportunity to participate in the care of this   patient."        SAMIA GREY M.D., ATTENDING RADIOLOGIST  This document has been electronically signed. Oct  9 2017  8:36AM                  < end of copied text >

## 2017-10-09 NOTE — DISCHARGE NOTE ADULT - PLAN OF CARE
s/p TV replacement -Please follow up with Dr. Gee on 10/17/17 at 9:30AM.  The office is located at [facility], 4th floor. Call us with any questions #[phone].  -Please also follow-up with your cardiologist Dr. Duarte on 10/23/17 at 2:45PM at the [facility]. For any questions or concerns please call that Office at [phone].  -Please also follow-up with Dr. Garcia your nephrologist within 1-2 weeks of discharge to discuss your [facility]. Her contact information is located in your discharge paperwork.  -Please also make a follow-up appointment   - For your outpatient diabetes management please follow-up at [facility] by calling  to make an appointment.  -Walk daily as tolerated and use your incentive spirometer every hour.    -No driving or strenuous activity/exercise for 6 weeks, or until cleared by your surgeon.    -Gently clean your incisions with anti-bacterial soap and water, pat dry.  You may leave them open to air.    -Call your doctor if you have shortness of breath, chest pain not relieved by pain medication, dizziness, fever >101.5, or increased redness or drainage from incisions. -Please follow up with Dr. Gee on 10/17/17 at 9:30AM.  The office is located at [facility], 4th floor. Call us with any questions #[phone].  -Please also follow-up with your cardiologist Dr. Duarte on 10/23/17 at 2:45PM at the [facility]. For any questions or concerns please call that Office at [phone].  -Please also follow-up with Dr. Garcia your nephrologist on . Her contact information is located in your discharge paperwork.  - For your outpatient diabetes management please follow-up at [facility] by calling  to make an appointment.  -You will be discharged on IV antibiotics for your MRSA infection. An infusion company will come to your house to administer the antibiotic. Every Friday your blood will be drawn to monitor your anitbiotic regimen and coumadin regimen. Dr. Kingston will be managing your Antibiotic regimen and Dr. Duarte your cardiologist will be monitoring your INR levels and coumadin dosing.   -Walk daily as tolerated and use your incentive spirometer every hour.    -No driving or strenuous activity/exercise for 6 weeks, or until cleared by your surgeon.    -Gently clean your incisions with anti-bacterial soap and water, pat dry.  You may leave them open to air.    -Call your doctor if you have shortness of breath, chest pain not relieved by pain medication, dizziness, fever >101.5, or increased redness or drainage from incisions. -Please follow up with Dr. Gee on 10/17/17 at 9:30AM.  The office is located at [facility], 4th floor. Call us with any questions #[phone].  -Please make a follow-up appointment with your primary care provider within 1-2 weeks of discharge to discuss your recent surgery.   -Please also follow-up with your cardiologist Dr. Duarte on 10/23/17 at 2:45PM at the [facility]. For any questions or concerns please call that Office at [phone].  -Please also follow-up with Dr. Garcia your nephrologist within 1-2 weeks of discharge. Her office will call you to schedule a follow-up appointment. Her contact information is located in your discharge paperwork. For any questions of concerns please call her office at [phone].  - For your outpatient diabetes management please follow-up at [facility] by calling  to make an appointment.  -You will be discharged on IV antibiotics for your MRSA infection. An infusion company will come to your house to administer the antibiotic. Every Friday your blood will be drawn to monitor your anitbiotic regimen and coumadin regimen. Dr. Kingston will be managing your Antibiotic regimen and Dr. Duarte your cardiologist will be monitoring your INR levels and coumadin dosing.   -Dr. Duarte will be monitoring your INR levels and coumadin dosing until you follow-up at the coumadin clinic, please do not take coumadin until instructed by a medical doctor. When instructed that it is safe to resume coumadin start with 1mg orally once daily. For any questions or concerns please call Dr. Duarte at [phone].  -You will also follow-up at the [facility] Coumadin clinic starting 10/16/17 at 8:45AM on the 8th floor. For any questions or concerns please call [phone].   -Please follow-up with Dr. Kuo your Orthopedic surgeon on at. For any questions or concerns her contact information is located in your discharge paperwork. -Walk daily as tolerated and use your incentive spirometer ten times every hour.    -No driving or strenuous activity/exercise for 6 weeks, or until cleared by your surgeon. Please do not lift anything great than ten pounds.     -Gently clean your incisions with anti-bacterial soap and water, pat dry.  You may leave them open to air.    -Call your doctor if you have shortness of breath, chest pain not relieved by pain medication, dizziness, fever >101.5, or increased redness or drainage from incisions. -Please follow up with Dr. Gee on 10/17/17 at 9:30AM.  The office is located at [facility], 4th floor. Call us with any questions #[phone].  -Please make a follow-up appointment with your primary care provider within 1-2 weeks of discharge to discuss your recent surgery.   -Please also follow-up with your cardiologist Dr. Duarte on 10/23/17 at 2:45PM at the [facility]. For any questions or concerns please call that Office at [phone].  -Please also follow-up with Dr. Garcia your nephrologist within 1-2 weeks of discharge. Her office will call you to schedule a follow-up appointment. Her contact information is located in your discharge paperwork. For any questions of concerns please call her office at [phone].  - For your outpatient diabetes management please follow-up at [facility] by calling  to make an appointment.  -You will be discharged on IV antibiotics for your MRSA infection. An infusion company will come to your house to administer the antibiotic. Every Friday your blood will be drawn to monitor your anitbiotic regimen and coumadin regimen. Dr. Kingston will be managing your Antibiotic regimen and Dr. Duarte your cardiologist will be monitoring your INR levels and coumadin dosing.   -Dr. Duarte will be monitoring your INR levels and coumadin dosing until you follow-up at the coumadin clinic, please do not take coumadin until instructed by a medical doctor. When instructed that it is safe to resume coumadin start with 1mg orally once daily. For any questions or concerns please call Dr. Duarte at [phone].  -You will also follow-up at the [facility] Coumadin clinic starting 10/16/17 at 8:45AM on the 8th floor. For any questions or concerns please call [phone].   -Please follow-up with Dr. Kuo your Orthopedic surgeon on 10/16/17 at 10:15AM. For any questions or concerns his contact information is located in your discharge paperwork.

## 2017-10-10 VITALS — WEIGHT: 191.8 LBS | BODY MASS INDEX: 28.41 KG/M2 | HEIGHT: 68.9 IN

## 2017-10-10 VITALS
OXYGEN SATURATION: 98 % | SYSTOLIC BLOOD PRESSURE: 129 MMHG | HEART RATE: 98 BPM | RESPIRATION RATE: 16 BRPM | DIASTOLIC BLOOD PRESSURE: 63 MMHG

## 2017-10-10 PROBLEM — M46.22 ACUTE OSTEOMYELITIS OF CERVICAL SPINE: Status: ACTIVE | Noted: 2017-10-10

## 2017-10-10 LAB
ALBUMIN SERPL ELPH-MCNC: 2 G/DL — LOW (ref 3.3–5)
ALBUMIN SERPL ELPH-MCNC: 2.3 G/DL — LOW (ref 3.3–5)
ALP SERPL-CCNC: 285 U/L — HIGH (ref 40–120)
ALP SERPL-CCNC: 291 U/L — HIGH (ref 40–120)
ALT FLD-CCNC: 5 U/L — LOW (ref 10–45)
ALT FLD-CCNC: <5 U/L — LOW (ref 10–45)
ANION GAP SERPL CALC-SCNC: 14 MMOL/L — SIGNIFICANT CHANGE UP (ref 5–17)
ANION GAP SERPL CALC-SCNC: 15 MMOL/L — SIGNIFICANT CHANGE UP (ref 5–17)
APTT BLD: 55.9 SEC — HIGH (ref 27.5–37.4)
APTT BLD: 57.9 SEC — HIGH (ref 27.5–37.4)
AST SERPL-CCNC: 7 U/L — LOW (ref 10–40)
AST SERPL-CCNC: 8 U/L — LOW (ref 10–40)
BILIRUB SERPL-MCNC: 0.4 MG/DL — SIGNIFICANT CHANGE UP (ref 0.2–1.2)
BILIRUB SERPL-MCNC: 0.5 MG/DL — SIGNIFICANT CHANGE UP (ref 0.2–1.2)
BUN SERPL-MCNC: 27 MG/DL — HIGH (ref 7–23)
BUN SERPL-MCNC: 28 MG/DL — HIGH (ref 7–23)
CALCIUM SERPL-MCNC: 8.6 MG/DL — SIGNIFICANT CHANGE UP (ref 8.4–10.5)
CALCIUM SERPL-MCNC: 8.7 MG/DL — SIGNIFICANT CHANGE UP (ref 8.4–10.5)
CHLORIDE SERPL-SCNC: 90 MMOL/L — LOW (ref 96–108)
CHLORIDE SERPL-SCNC: 92 MMOL/L — LOW (ref 96–108)
CO2 SERPL-SCNC: 22 MMOL/L — SIGNIFICANT CHANGE UP (ref 22–31)
CO2 SERPL-SCNC: 24 MMOL/L — SIGNIFICANT CHANGE UP (ref 22–31)
CREAT SERPL-MCNC: 4.36 MG/DL — HIGH (ref 0.5–1.3)
CREAT SERPL-MCNC: 4.41 MG/DL — HIGH (ref 0.5–1.3)
GLUCOSE BLDC GLUCOMTR-MCNC: 137 MG/DL — HIGH (ref 70–99)
GLUCOSE SERPL-MCNC: 132 MG/DL — HIGH (ref 70–99)
GLUCOSE SERPL-MCNC: 218 MG/DL — HIGH (ref 70–99)
HCT VFR BLD CALC: 28.1 % — LOW (ref 34.5–45)
HCT VFR BLD CALC: 29.9 % — LOW (ref 34.5–45)
HGB BLD-MCNC: 8.9 G/DL — LOW (ref 11.5–15.5)
HGB BLD-MCNC: 9.4 G/DL — LOW (ref 11.5–15.5)
INR BLD: 3.06 — HIGH (ref 0.88–1.16)
INR BLD: 3.67 — HIGH (ref 0.88–1.16)
MAGNESIUM SERPL-MCNC: 1.9 MG/DL — SIGNIFICANT CHANGE UP (ref 1.6–2.6)
MAGNESIUM SERPL-MCNC: 2 MG/DL — SIGNIFICANT CHANGE UP (ref 1.6–2.6)
MCHC RBC-ENTMCNC: 26.6 PG — LOW (ref 27–34)
MCHC RBC-ENTMCNC: 27.1 PG — SIGNIFICANT CHANGE UP (ref 27–34)
MCHC RBC-ENTMCNC: 31.4 G/DL — LOW (ref 32–36)
MCHC RBC-ENTMCNC: 31.7 G/DL — LOW (ref 32–36)
MCV RBC AUTO: 84.7 FL — SIGNIFICANT CHANGE UP (ref 80–100)
MCV RBC AUTO: 85.4 FL — SIGNIFICANT CHANGE UP (ref 80–100)
PHOSPHATE SERPL-MCNC: 5 MG/DL — HIGH (ref 2.5–4.5)
PHOSPHATE SERPL-MCNC: 5 MG/DL — HIGH (ref 2.5–4.5)
PLATELET # BLD AUTO: 335 K/UL — SIGNIFICANT CHANGE UP (ref 150–400)
PLATELET # BLD AUTO: 373 K/UL — SIGNIFICANT CHANGE UP (ref 150–400)
POTASSIUM SERPL-MCNC: 4.8 MMOL/L — SIGNIFICANT CHANGE UP (ref 3.5–5.3)
POTASSIUM SERPL-MCNC: 5 MMOL/L — SIGNIFICANT CHANGE UP (ref 3.5–5.3)
POTASSIUM SERPL-SCNC: 4.8 MMOL/L — SIGNIFICANT CHANGE UP (ref 3.5–5.3)
POTASSIUM SERPL-SCNC: 5 MMOL/L — SIGNIFICANT CHANGE UP (ref 3.5–5.3)
PROT SERPL-MCNC: 7.3 G/DL — SIGNIFICANT CHANGE UP (ref 6–8.3)
PROT SERPL-MCNC: 7.8 G/DL — SIGNIFICANT CHANGE UP (ref 6–8.3)
PROTHROM AB SERPL-ACNC: 34.8 SEC — HIGH (ref 9.8–12.7)
PROTHROM AB SERPL-ACNC: 41.8 SEC — HIGH (ref 9.8–12.7)
RBC # BLD: 3.29 M/UL — LOW (ref 3.8–5.2)
RBC # BLD: 3.53 M/UL — LOW (ref 3.8–5.2)
RBC # FLD: 17.7 % — HIGH (ref 10.3–16.9)
RBC # FLD: 17.9 % — HIGH (ref 10.3–16.9)
SODIUM SERPL-SCNC: 127 MMOL/L — LOW (ref 135–145)
SODIUM SERPL-SCNC: 130 MMOL/L — LOW (ref 135–145)
WBC # BLD: 10.5 K/UL — SIGNIFICANT CHANGE UP (ref 3.8–10.5)
WBC # BLD: 11.8 K/UL — HIGH (ref 3.8–10.5)
WBC # FLD AUTO: 10.5 K/UL — SIGNIFICANT CHANGE UP (ref 3.8–10.5)
WBC # FLD AUTO: 11.8 K/UL — HIGH (ref 3.8–10.5)

## 2017-10-10 PROCEDURE — 71010: CPT | Mod: 26

## 2017-10-10 PROCEDURE — 99232 SBSQ HOSP IP/OBS MODERATE 35: CPT | Mod: GC

## 2017-10-10 PROCEDURE — 90937 HEMODIALYSIS REPEATED EVAL: CPT | Mod: GC

## 2017-10-10 PROCEDURE — 93010 ELECTROCARDIOGRAM REPORT: CPT

## 2017-10-10 RX ORDER — ISOPROPYL ALCOHOL, BENZOCAINE .7; .06 ML/ML; ML/ML
1 SWAB TOPICAL
Qty: 1 | Refills: 0 | OUTPATIENT
Start: 2017-10-10

## 2017-10-10 RX ORDER — DAPTOMYCIN 500 MG/10ML
500 INJECTION, POWDER, LYOPHILIZED, FOR SOLUTION INTRAVENOUS EVERY OTHER DAY
Refills: 0 | Status: ACTIVE | COMMUNITY

## 2017-10-10 RX ORDER — INSULIN GLARGINE 100 [IU]/ML
15 INJECTION, SOLUTION SUBCUTANEOUS
Qty: 1 | Refills: 0 | OUTPATIENT
Start: 2017-10-10

## 2017-10-10 RX ORDER — AMLODIPINE BESYLATE 2.5 MG/1
1 TABLET ORAL
Qty: 0 | Refills: 0 | COMMUNITY

## 2017-10-10 RX ORDER — PANTOPRAZOLE 40 MG/1
40 TABLET, DELAYED RELEASE ORAL
Qty: 1 | Refills: 2 | Status: ACTIVE | COMMUNITY

## 2017-10-10 RX ORDER — METOPROLOL TARTRATE 50 MG
1 TABLET ORAL
Qty: 0 | Refills: 0 | COMMUNITY

## 2017-10-10 RX ORDER — SEVELAMER CARBONATE 2400 MG/1
1 POWDER, FOR SUSPENSION ORAL
Qty: 0 | Refills: 0 | COMMUNITY

## 2017-10-10 RX ORDER — PANTOPRAZOLE SODIUM 20 MG/1
1 TABLET, DELAYED RELEASE ORAL
Qty: 30 | Refills: 0 | OUTPATIENT
Start: 2017-10-10 | End: 2017-11-09

## 2017-10-10 RX ORDER — GABAPENTIN 400 MG/1
1 CAPSULE ORAL
Qty: 30 | Refills: 0 | OUTPATIENT
Start: 2017-10-10 | End: 2017-11-09

## 2017-10-10 RX ORDER — INSULIN LISPRO 100/ML
18 VIAL (ML) SUBCUTANEOUS
Qty: 0 | Refills: 0 | COMMUNITY

## 2017-10-10 RX ORDER — ACETAMINOPHEN 500 MG
2 TABLET ORAL
Qty: 0 | Refills: 0 | COMMUNITY

## 2017-10-10 RX ORDER — ASPIRIN/CALCIUM CARB/MAGNESIUM 324 MG
1 TABLET ORAL
Qty: 30 | Refills: 0 | OUTPATIENT
Start: 2017-10-10 | End: 2017-11-09

## 2017-10-10 RX ORDER — INSULIN LISPRO 100/ML
4 VIAL (ML) SUBCUTANEOUS
Qty: 1 | Refills: 0 | OUTPATIENT
Start: 2017-10-10

## 2017-10-10 RX ORDER — INSULIN DETEMIR 100/ML (3)
15 INSULIN PEN (ML) SUBCUTANEOUS
Qty: 1 | Refills: 0 | OUTPATIENT
Start: 2017-10-10

## 2017-10-10 RX ORDER — SIMVASTATIN 20 MG/1
1 TABLET, FILM COATED ORAL
Qty: 0 | Refills: 0 | COMMUNITY

## 2017-10-10 RX ORDER — IMIPENEM AND CILASTATIN 250; 250 MG/100ML; MG/100ML
500 INJECTION, POWDER, FOR SOLUTION INTRAVENOUS
Qty: 0 | Refills: 0 | COMMUNITY

## 2017-10-10 RX ORDER — ERYTHROPOIETIN 10000 [IU]/ML
10000 INJECTION, SOLUTION INTRAVENOUS; SUBCUTANEOUS ONCE
Qty: 0 | Refills: 0 | Status: COMPLETED | OUTPATIENT
Start: 2017-10-10 | End: 2017-10-10

## 2017-10-10 RX ORDER — METOPROLOL TARTRATE 50 MG
0.5 TABLET ORAL
Qty: 30 | Refills: 0 | OUTPATIENT
Start: 2017-10-10 | End: 2017-11-09

## 2017-10-10 RX ORDER — TRAZODONE HYDROCHLORIDE 50 MG/1
50 TABLET ORAL AT BEDTIME
Refills: 0 | Status: ACTIVE | COMMUNITY

## 2017-10-10 RX ORDER — INSULIN LISPRO 100 [IU]/ML
100 INJECTION, SOLUTION INTRAVENOUS; SUBCUTANEOUS
Refills: 0 | Status: ACTIVE | COMMUNITY

## 2017-10-10 RX ORDER — CALCIUM ACETATE 667 MG
3 TABLET ORAL
Qty: 0 | Refills: 0 | COMMUNITY

## 2017-10-10 RX ORDER — VALSARTAN 80 MG/1
1 TABLET ORAL
Qty: 0 | Refills: 0 | COMMUNITY

## 2017-10-10 RX ORDER — GABAPENTIN 300 MG/1
300 CAPSULE ORAL
Refills: 0 | Status: ACTIVE | COMMUNITY

## 2017-10-10 RX ORDER — HYDROMORPHONE HYDROCHLORIDE 2 MG/ML
1 INJECTION INTRAMUSCULAR; INTRAVENOUS; SUBCUTANEOUS
Qty: 0 | Refills: 0 | COMMUNITY

## 2017-10-10 RX ORDER — METOPROLOL TARTRATE 25 MG/1
25 TABLET, FILM COATED ORAL
Qty: 30 | Refills: 6 | Status: ACTIVE | COMMUNITY

## 2017-10-10 RX ORDER — INSULIN GLARGINE 100 [IU]/ML
30 INJECTION, SOLUTION SUBCUTANEOUS
Qty: 0 | Refills: 0 | COMMUNITY

## 2017-10-10 RX ORDER — METOPROLOL TARTRATE 50 MG
12.5 TABLET ORAL EVERY 12 HOURS
Qty: 0 | Refills: 0 | Status: DISCONTINUED | OUTPATIENT
Start: 2017-10-10 | End: 2017-10-10

## 2017-10-10 RX ORDER — SERTRALINE 25 MG/1
1 TABLET, FILM COATED ORAL
Qty: 30 | Refills: 0 | OUTPATIENT
Start: 2017-10-10 | End: 2017-11-09

## 2017-10-10 RX ORDER — DAPTOMYCIN 500 MG/10ML
700 INJECTION, POWDER, LYOPHILIZED, FOR SOLUTION INTRAVENOUS
Qty: 0 | Refills: 0 | COMMUNITY
Start: 2017-10-10

## 2017-10-10 RX ORDER — OXYCODONE HYDROCHLORIDE AND ACETAMINOPHEN 5; 325 MG/1; MG/1
5-325 TABLET ORAL
Refills: 0 | Status: ACTIVE | COMMUNITY

## 2017-10-10 RX ORDER — TRAZODONE HCL 50 MG
0.5 TABLET ORAL
Qty: 15 | Refills: 0 | OUTPATIENT
Start: 2017-10-10 | End: 2017-11-09

## 2017-10-10 RX ORDER — INSULIN GLARGINE 100 [IU]/ML
INJECTION, SOLUTION SUBCUTANEOUS DAILY
Refills: 0 | Status: ACTIVE | COMMUNITY

## 2017-10-10 RX ORDER — RIFAMPIN 300 MG/1
300 CAPSULE ORAL DAILY
Qty: 42 | Refills: 0 | Status: ACTIVE | COMMUNITY

## 2017-10-10 RX ORDER — ASPIRIN/CALCIUM CARB/MAGNESIUM 324 MG
1 TABLET ORAL
Qty: 0 | Refills: 0 | COMMUNITY

## 2017-10-10 RX ORDER — POLYETHYLENE GLYCOL 3350 17 G/17G
17 POWDER, FOR SOLUTION ORAL
Qty: 1 | Refills: 0 | OUTPATIENT
Start: 2017-10-10

## 2017-10-10 RX ORDER — DAPTOMYCIN 500 MG/10ML
1000 INJECTION, POWDER, LYOPHILIZED, FOR SOLUTION INTRAVENOUS
Qty: 0 | Refills: 0 | COMMUNITY

## 2017-10-10 RX ORDER — GABAPENTIN 400 MG/1
300 CAPSULE ORAL
Qty: 0 | Refills: 0 | COMMUNITY

## 2017-10-10 RX ORDER — ASPIRIN 81 MG
81 TABLET, DELAYED RELEASE (ENTERIC COATED) ORAL DAILY
Qty: 30 | Refills: 5 | Status: ACTIVE | COMMUNITY

## 2017-10-10 RX ORDER — SIMVASTATIN 20 MG/1
1 TABLET, FILM COATED ORAL
Qty: 30 | Refills: 0 | OUTPATIENT
Start: 2017-10-10 | End: 2017-11-09

## 2017-10-10 RX ORDER — NYSTATIN 100000 U/G
100000 OINTMENT TOPICAL
Refills: 0 | Status: ACTIVE | COMMUNITY

## 2017-10-10 RX ORDER — ERYTHROPOIETIN 10000 [IU]/ML
8000 INJECTION, SOLUTION INTRAVENOUS; SUBCUTANEOUS
Qty: 0 | Refills: 0 | COMMUNITY

## 2017-10-10 RX ORDER — INSULIN LISPRO 100/ML
15 VIAL (ML) SUBCUTANEOUS
Qty: 0 | Refills: 0 | COMMUNITY

## 2017-10-10 RX ORDER — SIMVASTATIN 20 MG/1
20 TABLET, FILM COATED ORAL
Qty: 30 | Refills: 3 | Status: ACTIVE | COMMUNITY

## 2017-10-10 RX ORDER — INSULIN GLARGINE 100 [IU]/ML
42 INJECTION, SOLUTION SUBCUTANEOUS
Qty: 0 | Refills: 0 | COMMUNITY

## 2017-10-10 RX ORDER — PANTOPRAZOLE SODIUM 20 MG/1
1 TABLET, DELAYED RELEASE ORAL
Qty: 0 | Refills: 0 | COMMUNITY

## 2017-10-10 RX ORDER — INSULIN HUMAN 100 [IU]/ML
1 INJECTION, SOLUTION SUBCUTANEOUS
Qty: 0 | Refills: 0 | COMMUNITY

## 2017-10-10 RX ORDER — GABAPENTIN 400 MG/1
1 CAPSULE ORAL
Qty: 0 | Refills: 0 | COMMUNITY

## 2017-10-10 RX ORDER — SENNA PLUS 8.6 MG/1
1 TABLET ORAL
Qty: 0 | Refills: 0 | COMMUNITY

## 2017-10-10 RX ORDER — NYSTATIN CREAM 100000 [USP'U]/G
1 CREAM TOPICAL
Qty: 1 | Refills: 0 | OUTPATIENT
Start: 2017-10-10

## 2017-10-10 RX ORDER — SERTRALINE 25 MG/1
25 TABLET, FILM COATED ORAL DAILY
Refills: 0 | Status: ACTIVE | COMMUNITY

## 2017-10-10 RX ADMIN — OXYCODONE AND ACETAMINOPHEN 1 TABLET(S): 5; 325 TABLET ORAL at 11:30

## 2017-10-10 RX ADMIN — Medication 100 MILLIGRAM(S): at 06:22

## 2017-10-10 RX ADMIN — SODIUM CHLORIDE 3 MILLILITER(S): 9 INJECTION INTRAMUSCULAR; INTRAVENOUS; SUBCUTANEOUS at 06:29

## 2017-10-10 RX ADMIN — INSULIN GLARGINE 15 UNIT(S): 100 INJECTION, SOLUTION SUBCUTANEOUS at 06:21

## 2017-10-10 RX ADMIN — SERTRALINE 25 MILLIGRAM(S): 25 TABLET, FILM COATED ORAL at 11:49

## 2017-10-10 RX ADMIN — Medication 81 MILLIGRAM(S): at 11:49

## 2017-10-10 RX ADMIN — OXYCODONE AND ACETAMINOPHEN 1 TABLET(S): 5; 325 TABLET ORAL at 12:03

## 2017-10-10 RX ADMIN — ERYTHROPOIETIN 10000 UNIT(S): 10000 INJECTION, SOLUTION INTRAVENOUS; SUBCUTANEOUS at 11:08

## 2017-10-10 RX ADMIN — Medication 12.5 MILLIGRAM(S): at 09:34

## 2017-10-10 RX ADMIN — PANTOPRAZOLE SODIUM 40 MILLIGRAM(S): 20 TABLET, DELAYED RELEASE ORAL at 06:22

## 2017-10-10 RX ADMIN — Medication 4 UNIT(S): at 12:00

## 2017-10-10 RX ADMIN — SODIUM CHLORIDE 3 MILLILITER(S): 9 INJECTION INTRAMUSCULAR; INTRAVENOUS; SUBCUTANEOUS at 12:02

## 2017-10-10 NOTE — PROGRESS NOTE ADULT - PROBLEM SELECTOR PROBLEM 1
End stage renal disease
Hyponatremia
End stage renal disease
Hyponatremia

## 2017-10-10 NOTE — PROGRESS NOTE ADULT - NSHPATTENDINGPLANDISCUSS_GEN_ALL_CORE
CT Surgery team
Dr. Balderas
Dr. Yoon
primary team.
primary team.
Dr. Balderas
Dr. Balderas
Dr. Yoon
Dr. Yoon and pt
Dr. Balderas
Dr. Balderas
Dr. Balderas, and CTS staff
Dr. Marie
Dr. Munoz
Dr. Yoon
Paramjit Marie and Karrie, and pt
Dr. Balderas
Dr. Yoon
Dr. Yoon

## 2017-10-10 NOTE — PROGRESS NOTE ADULT - SUBJECTIVE AND OBJECTIVE BOX
Patient discussed on morning rounds with Dr. Gee     Operation / Date: TV replacement 10/3/17    Surgeon: Dr. Gee     SUBJECTIVE ASSESSMENT:  47y Female seen and examined at the bedside. No acute events overnight. Pt feels well denies CP or SOB.     Hospital Course:  47 year old female with history of HTN, HLD, DM II, ESRD on HD (MWF), and chronic left foot OM who is being transferred to [facility] from [facility] with known TV IE (MRSA – Bacteremia.) who has failed medical MGMT with aggressive antibiotics (Last Blood Cultures < 24 Hours = MRSA. TTE / ALCIDES completed 09/12/2017 and 09/13/2017 with questionable worsening / enlarging TV vegetation.). Patient was also found to have large intraluminal thrombus in the SVC extending into the right atrium. Patient was readmitted on 9/13 for reevaluation and surgical evaluation. At time of transfer, pt noted to have supra therapeutic INR, treated with FFP and now back on heparin gtt. Preop workup showing 75% m-dRCA disease. Ortho/ Vascular teams consulted for chronic osteomyelitis and possible surgical intervention. Patient was continued on IV abx, and last positive cultures where found to be on 8/28. She was continued on heparin gtt for an SVC clot, and line change for dialysis access where being performed. Patient had a CT/MRI for  new lower extremity neuro deficit. MRI of brain and spine  revealed C5/6 osteomyelitis with and overlying abscess as well as T7/8 osteomyletis without abscess. On 9/22 patient underwent an anterior corpectomy/discectomy C5-6, anterior interbody fusion C5-6 with Dr. Kuo. Patient was brought to the CCU under CT surgery care and on 9/23 was found to have bradycardic HR 40s with non conducted P waves which converted to NSR the same day. On 9/26 she had a PICC line and permacath placed by IR. Patient continued to recover from her spine surgery. Drain fell out upon transfer to [facility], so heparin was held and ortho continued to monitor. Patient was then taken to the OR on 10/2 and TV replacement was performed. No intervention on the RCA lesion was required at that time. Post op patient arrived to the ICU and was extubated on POD#0.  POD#1, underwent HD, vascular srugery consulted for fistula planning, 1PRBC given. POD#3, ABX regimen adjusted. POD#4, coumadin restarted. POD#6, AVB noted on telemetry, EPS team consulted. POD#7, underwent PPM. POD#8, as per Dr. Escamilla and Dr. Kingston pt stable and ready for discharge home.     Of Note, pre-op fistulogram showed no patent fistula so patient had vein mapping performed for chronic management /access for HD.         Vital Signs Last 24 Hrs  T(C): 36.3 (10 Oct 2017 13:00), Max: 36.6 (09 Oct 2017 21:04)  T(F): 97.4 (10 Oct 2017 13:00), Max: 97.9 (09 Oct 2017 21:04)  HR: 100 (10 Oct 2017 13:00) (52 - 114)  BP: 134/65 (10 Oct 2017 13:00) (120/72 - 164/68)  BP(mean): 105 (10 Oct 2017 12:00) (76 - 110)  RR: 15 (10 Oct 2017 13:00) (8 - 24)  SpO2: 100% (10 Oct 2017 13:00) (94% - 100%)  I&O's Detail    09 Oct 2017 07:01  -  10 Oct 2017 07:00  --------------------------------------------------------  IN:    Oral Fluid: 480 mL  Total IN: 480 mL    OUT:    Voided: 500 mL  Total OUT: 500 mL    Total NET: -20 mL      10 Oct 2017 07:01  -  10 Oct 2017 14:58  --------------------------------------------------------  IN:  Total IN: 0 mL    OUT:    Other: 3000 mL  Total OUT: 3000 mL    Total NET: -3000 mL      EPICARDIAL WIRES REMOVED: Yes  TIE DOWNS REMOVED: Yes    PHYSICAL EXAM:    General: NAD, sitting upright in bed    Neurological: moving all extremities.     Cardiovascular: RRR, no m/r/g    Respiratory: CTA b/l     Gastrointestinal: NT-ND, soft     Extremities: warm and well perfused, no edema or calf tenderness b/l     Incision Sites: sternotomy CDI, no erythema, ecchymosis or drainage     LABS:                        9.4    11.8  )-----------( 373      ( 10 Oct 2017 09:26 )             29.9       COUMADIN:  Yes        DOSE:     1mg             INDICATION:   SVC clot             GOAL INR: 2-3      PT/INR - ( 10 Oct 2017 09:26 )   PT: 34.8 sec;   INR: 3.06      PTT - ( 10 Oct 2017 09:26 )  PTT:57.9 sec    10-10    127<L>  |  90<L>  |  28<H>  ----------------------------<  218<H>  5.0   |  22  |  4.41<H>    Ca    8.7      10 Oct 2017 09:26  Phos  5.0     10-10  Mg     1.9     10-10    TPro  7.8  /  Alb  2.3<L>  /  TBili  0.4  /  DBili  x   /  AST  8<L>  /  ALT  <5<L>  /  AlkPhos  285<H>  10-10    MEDICATIONS  (STANDING):  aspirin enteric coated 81 milliGRAM(s) Oral daily  DAPTOmycin IVPB 700 milliGRAM(s) IV Intermittent every 48 hours  docusate sodium 100 milliGRAM(s) Oral three times a day  gabapentin 300 milliGRAM(s) Oral at bedtime  insulin glargine Injectable (LANTUS) 15 Unit(s) SubCutaneous every morning  insulin lispro (HumaLOG) corrective regimen sliding scale   SubCutaneous Before meals and at bedtime  insulin lispro Injectable (HumaLOG) 4 Unit(s) SubCutaneous three times a day before meals  metoprolol 12.5 milliGRAM(s) Oral every 12 hours  nystatin Ointment 1 Application(s) Topical two times a day  pantoprazole    Tablet 40 milliGRAM(s) Oral before breakfast  polyethylene glycol 3350 17 Gram(s) Oral daily  rifampin 600 milliGRAM(s) Oral daily  senna 2 Tablet(s) Oral at bedtime  sertraline 25 milliGRAM(s) Oral daily  simvastatin 20 milliGRAM(s) Oral at bedtime  sodium chloride 0.45%. 1000 milliLiter(s) (10 mL/Hr) IV Continuous <Continuous>  sodium chloride 0.9% lock flush 3 milliLiter(s) IV Push every 8 hours  traZODone 25 milliGRAM(s) Oral at bedtime      Discharge CXR: < from: Xray Chest 1 View AP -PORTABLE-Routine (10.10.17 @ 04:22) >  Improved pulmonary vascular congestion. Patchy infiltrates versus   atelectasis at the right base.      D/C Instructions:   -Please follow up with Dr. Gee on 10/17/17 at 9:30AM.  The office is located at [facility], [facility], 4th floor. Call us with any questions #[phone].  -Please make a follow-up appointment with your primary care provider within 1-2 weeks of discharge to discuss your recent surgery.   -Please also follow-up with your cardiologist Dr. Duarte on 10/23/17 at 2:45PM at the [facility]. For any questions or concerns please call that Office at [phone].  -Please also follow-up with Dr. Garcia your nephrologist within 1-2 weeks of discharge. Her office will call you to schedule a follow-up appointment. Her contact information is located in your discharge paperwork. For any questions of concerns please call her office at [phone].  - For your outpatient diabetes management please follow-up at [facility] by calling  to make an appointment.  -You will be discharged on IV antibiotics for your MRSA infection. An infusion company will come to your house to administer the antibiotic. Every Friday your blood will be drawn to monitor your anitbiotic regimen and coumadin regimen. Dr. Kingston will be managing your Antibiotic regimen and Dr. Duarte your cardiologist will be monitoring your INR levels and coumadin dosing.   -Dr. Duarte will be monitoring your INR levels and coumadin dosing until you follow-up at the coumadin clinic, please do not take coumadin until instructed by a medical doctor. When instructed that it is safe to resume coumadin start with 1mg orally once daily. For any questions or concerns please call Dr. Duarte at [phone].  -You will also follow-up at the [facility] Coumadin clinic starting 10/16/17 at 8:45AM on the 8th floor. For any questions or concerns please call [phone].   -Please follow-up with Dr. Kuo your Orthopedic surgeon on 10/16/17 at 10:15AM. For any questions or concerns his contact information is located in your discharge paperwork.

## 2017-10-10 NOTE — PROGRESS NOTE ADULT - SUBJECTIVE AND OBJECTIVE BOX
Patient was seen and evaluated on dialysis.   Patient is tolerating the procedure well.   HR: 100 (10-10-17 @ 09:45)  BP: 149/70 (10-10-17 @ 09:45)  Continue dialysis:   Dialyzer:  180 Optiflux        QB: 400        QD: 500   2K+  Goal UF 2.5 to 3 Kg over 3.25 hours.

## 2017-10-10 NOTE — PROGRESS NOTE ADULT - PROBLEM SELECTOR PLAN 1
ESRD on HD ( M-W-F). Patient had HD done on 9/15. Tolerated procedure well.   No clinical signs of overt fluid overload.  K :3.9, Mag 1.9, Derrek: 8.4 No metabolic acidosis at present.  No need for HD today.  Will do dialysis treatment on Monday on her schedule day.
place all meds, including Teflaro in NS, if possible  Ulytes if patient makes urine  doubt caused by Zoloft which rarely may cause an SIADH  continue 1 liter fluid restriction
-her usual scheduled is M,W, F   - last HD on  9/25- 3.0 liters off   - she will be dialyzed today - 9/27  - has a new permcath placed on 9/25 on the left chest   - sodium - 132  - potassium - 4.9  - phosphate acceptable   - calcium acceptable - 8.7  - renal diet   - in and outs please.
-her usual scheduled is M,W, F   - on 9/18 - 3.0 liters off   - no need to be  dialyzed today   - next HD will be done on 9/20  - femoral line was removed on 9/17 and new line placed on the left IJ   - sodium - 129 from free water intake. Again encourage the patient to limit the free water intake and the primary team informed for that   - potassium - 4.0  - phosphate acceptable   - calcium acceptable   - renal diet   - in and outs please.
-her usual scheduled is M,W, F   - on 9/18 - 3.0 liters off   - no need to be  dialyzed today   - we will do HD today  9/20  - femoral line was removed on 9/17 and new line placed on the left IJ   - sodium - 127 from free water intake.   - potassium - 4.0  - phosphate acceptable   - calcium acceptable - 8.2 hypoalbumenic   - renal diet   - in and outs please.
-her usual scheduled is M,W, F   - on 9/20 - 3.0 liters off   - no need to be  dialyzed today   - we will do HD today  9/22  - femoral line was removed on 9/17 and new line placed on the left IJ   - sodium - 130  - potassium - 4.0  - phosphate acceptable   - renal diet   - in and outs please.  - the patient in pain a good medications will be Dilaudid 0.5 mg
-her usual scheduled is M,W, F   - on 9/23- 2.0 liters off   - we will do HD today  9/25 - after placement of new line   - femoral line was removed on 9/17 and new line placed on the left IJ - during the last HD on friday poor blood flow on arterial side   - sodium - 129 from free water intake.   - potassium - 4.6  - phosphate acceptable   - calcium acceptable - 8.6  - renal diet   - in and outs please.
-her usual scheduled is M,W, F   - on 9/25- 3.0 liters off   - no HD for today - 9/26   - has a new permcath placed on 9/25 on the left chest   - sodium - 131  - potassium - 4.3  - phosphate acceptable   - calcium acceptable - 8.1  - renal diet   - in and outs please.
-her usual scheduled is m,W, F   - on 9/14 0- an extra session was done because of FFP and blood were given   - 1.8 liters off    - the femoral line exchange on 9/13   - has an av fistula on the left   - we will do HD - 9/15 -   - sodium 129  - potassium - 3.9  - phosphate acceptable   - calcium acceptable   - renal diet   - in and outs please.
-her usual scheduled is m,W, F   - on 9/15 - 2.6 liter off   - she will be dialyzed today   - femoral line was removed on 9/17 and new line placed on the left IJ   - sodium - 125 most likely from fluid intake - please restric her to 1 liter iv fluids chacha day   - potassium - 4.2  - phosphate acceptable   - calcium acceptable   - renal diet   - phosphate - 5.1   - in and outs please.
ESRD on HD ( M-W-F). Patient had HD done on 9/15. Tolerated procedure well.   No clinical signs of overt fluid overload.  K :3.9, Mag 1.9, Derrek: 8.4 No metabolic acidosis at present.  No need for HD today.  Will do dialysis treatment on Monday on her schedule day.
ESRD on HD for past 2 weeks duration since 09/25 throught left sided permacath.  Patient underwent HD treatment on 10/02 and 10/03 pre and post tricuspid valve surgery. Tolerated procedure well.  Will not do HD treatment today.  Will reassess for HD tomorrow AM.  Vascular surgery consult for vein mapping and possible need for new AV fistula once clinically stable from CT surgery.  Patient with still some residual renal function and making urine at present.  Monitor Strict I/o for now.  Check Intact PTH, Vitamin D25, Vitamin D 1,25 level, Uric acid, phosphorus level.
ESRD on HD via Left side permacath (Tue-Th-Sat) for now.  Patient underwent HD treatment on 10/07 with UF goal of 2.3 L.  Will do HD treatment with UF goal of 2.5 to 3 Kg today.  Monitor Strict I/o for now.  Avoid nephrotoxic agents  Low K/Low phosphorus renal diet for now.
ESRD on HD via Left side permacath now with mild shortness of breath and leg edema today.   Patient underwent HD treatment on 10/02 and 10/03 pre and post tricuspid valve surgery. Tolerated procedure well.  Will  do HD treatment today with UF goal of 2.5 Kg  Vascular surgery consult for vein mapping and possible need for new AV fistula once clinically stable from CT surgery.  Monitor Strict I/o for now.  Check Intact PTH, Vitamin D25, Vitamin D 1,25 level, Uric acid, phosphorus level.
ESRD on HD via Left side permacath with mild SOB and Trace pedal edema.  Patient underwent HD treatment on 10/05 with UF goal of 2.5 L.  No need for HD treatment today.  Patient advised to decrease fluid intake to <1 to 1.2 L/day.  Vascular surgery consult for vein mapping and possible need for new AV fistula once clinically stable from CT surgery.  Monitor Strict I/o for now.  Repeat Urinalysis and Urine protein/creatinine ratio, Total protein.  Check Intact PTH, Vitamin D25, Vitamin D 1,25 level, Uric acid, phosphorus level, Vitamin b12, Folate level  Increase Mobilization as tolerated.
ESRD on HD via Left side permacath with mild SOB and Trace pedal edema.  Patient underwent HD treatment on 10/07 with UF goal of 2.3 L.  Next HD treatment 10/10/2017.  Monitor Strict I/o for now.  Avoid nephrotoxic agents
ESRD on HD via Left side permacath with mild SOB and Trace pedal edema.  Patient underwent HD treatment on 10/07 with UF goal of 2.3 L.  No HD treatment today. Next HD treatment 10/10/2017.  Monitor Strict I/o for now.  Avoid nephrotoxic agents
Patient is a 47 year old female with ESRD on HD M/W/F. Patient was last dialyzed 10/02 with UF goal of 3.2 kg. Patient tolerated procedure well. Patient underwent CT surgery procedure with tricuspid valve replacement yesterday with 2 L RL during surgical procedure.  Will do HD treatment today due to hyperkalemia  and possible UF goal of 1 to 1.5 Kg today post CT surgery procedure.  Renal/Low K diet
Patient is a 47 year old female with ESRD on HD M/W/F. Patient was last dialyzed 9/29 with UF of 3.2L   Will do HD treatment as per ordered with UF goal of 3 Kg prior to surgery   Renal diet
-her usual scheduled is M,W, F   - last HD on  9/27- 3.0 liters off   - next HD will be 9/29   - has a new permcath placed on 9/25 on the left chest   - sodium - 129 - please try to reduce the free water intake   - potassium - 5.3  - phosphate acceptable   - calcium acceptable - 8.4  - renal diet   - in and outs please.  - calcijex with HD
-her usual scheduled is M,W, F   - last HD on  9/27- 3.0 liters off   - next HD will be 9/29   - has a new permcath placed on 9/25 on the left chest   - sodium - 130 - please try to reduce the free water intake   - potassium - 4.9  - phosphate acceptable   - calcium acceptable - 8.6  - renal diet   - in and outs please.
ESRD on HD via Left side permacath with mild SOB and Trace pedal edema.  Patient underwent HD treatment on 10/05 with UF goal of 2.5 L.  Will do HD treatment today as ordered.  Patient advised to decrease fluid intake to <1 to 1.2 L/day.  Monitor Strict I/o for now.  Avoid nephrotoxic agents  Increase Mobilization as tolerated.
-her usual scheduled is M,W, F   - on 9/20 - 3.0 liters off   - we will do HD today  9/22  - femoral line was removed on 9/17 and new line placed on the left IJ   - sodium - 129 from free water intake.   - potassium - 4.2  - phosphate acceptable   - calcium acceptable - 8.7   - renal diet   - in and outs please.  phosphate - 8.2
Patient is a 47 year old female with ESRD on HD M/W/F. Patient was last dialyzed 9/29 with UF of 3.2L     P - patient does not require emergent dialysis today as volume status and electrolytes are stable  Anticipate next dialysis tomorrow 10/2   Renal diet
Patient is a 47 year old female with ESRD on HD M/W/F. Patient was last dialyzed 9/29 with UF of 3.2L     P - patient does not require emergent dialysis today as volume status and electrolytes are stable  Patient's next dialysis will be Monday 10/1   Renal diet
place all meds, including Teflaro in NS, if possible  doubt caused by Zoloft which rarely may cause an SIADH and patient is ESRD And unclear if ADH effect still pertains in this patient  continue 1 liter fluid restriction  Inefficient clearance from temporary HD catheter may play arole.   Can try sodium modeling on next HD on Monday

## 2017-10-10 NOTE — PROGRESS NOTE ADULT - SUBJECTIVE AND OBJECTIVE BOX
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 (13 Sep 2017 22:57). On this admission, patient was also found to have septic emboli, cervical osteomyelitis and abscess s/p corpectomy and anterior plating POD #4. Patient is s/p TVR on 10/2/2017. Patient had PPM yesterday.    She had sausage soup for dinner at 7 pm last night, and did not receive premeal lispro. This morning, she had 2 boiled eggs and a cream of wheat for breakfast; fried rice for lunch.      Pt reports the following symptoms:    CONSTITUTIONAL:  Negative fever or chills, feels well, good appetite  EYES:  Negative  blurry vision or double vision  CARDIOVASCULAR:  Negative for chest pain or palpitations  RESPIRATORY:  Negative for cough, wheezing, or SOB   GASTROINTESTINAL:  Negative for nausea, vomiting, diarrhea, constipation, or abdominal pain  GENITOURINARY:  Negative frequency, urgency or dysuria  NEUROLOGIC:  No headache, confusion, dizziness, lightheadedness    MEDICATIONS  (STANDING):  aspirin enteric coated 81 milliGRAM(s) Oral daily  DAPTOmycin IVPB 700 milliGRAM(s) IV Intermittent every 48 hours  docusate sodium 100 milliGRAM(s) Oral three times a day  gabapentin 300 milliGRAM(s) Oral at bedtime  insulin glargine Injectable (LANTUS) 15 Unit(s) SubCutaneous every morning  insulin lispro (HumaLOG) corrective regimen sliding scale   SubCutaneous Before meals and at bedtime  insulin lispro Injectable (HumaLOG) 4 Unit(s) SubCutaneous three times a day before meals  metoprolol 12.5 milliGRAM(s) Oral every 12 hours  nystatin Ointment 1 Application(s) Topical two times a day  pantoprazole    Tablet 40 milliGRAM(s) Oral before breakfast  polyethylene glycol 3350 17 Gram(s) Oral daily  rifampin 600 milliGRAM(s) Oral daily  senna 2 Tablet(s) Oral at bedtime  sertraline 25 milliGRAM(s) Oral daily  simvastatin 20 milliGRAM(s) Oral at bedtime  sodium chloride 0.45%. 1000 milliLiter(s) (10 mL/Hr) IV Continuous <Continuous>  sodium chloride 0.9% lock flush 3 milliLiter(s) IV Push every 8 hours  traZODone 25 milliGRAM(s) Oral at bedtime    MEDICATIONS  (PRN):  acetaminophen    Suspension. 650 milliGRAM(s) Oral every 6 hours PRN Mild Pain (1 - 3)  oxyCODONE    5 mG/acetaminophen 325 mG 1 Tablet(s) Oral every 4 hours PRN Moderate Pain (4 - 6)      PHYSICAL EXAM  Vital Signs Last 24 Hrs  T(C): 36.4 (10 Oct 2017 09:45), Max: 36.6 (09 Oct 2017 21:04)  T(F): 97.6 (10 Oct 2017 09:45), Max: 97.9 (09 Oct 2017 21:04)  HR: 94 (10 Oct 2017 12:00) (52 - 114)  BP: 164/68 (10 Oct 2017 12:00) (120/72 - 182/91)  BP(mean): 105 (10 Oct 2017 12:00) (76 - 121)  RR: 17 (10 Oct 2017 12:00) (8 - 24)  SpO2: 100% (10 Oct 2017 12:00) (94% - 100%)    Constitutional: wn/wd in NAD.   HEENT: NCAT, MMM, OP clear, EOMI, no proptosis or lid retraction  Neck: no thyromegaly or palpable thyroid nodules   Respiratory: lungs CTAB.  Cardiovascular: regular rhythm, normal S1 and S2, no audible murmurs, no peripheral edema  GI: soft, NT/ND, no masses/HSM appreciated.  Neurology: no tremors, DTR 2+  Skin: no visible rashes/lesions  Psychiatric: AAO x 3, normal affect/mood.    LABS:                        9.4    11.8  )-----------( 373      ( 10 Oct 2017 09:26 )             29.9     10-10    127<L>  |  90<L>  |  28<H>  ----------------------------<  218<H>  5.0   |  22  |  4.41<H>    Ca    8.7      10 Oct 2017 09:26  Phos  5.0     10-10  Mg     1.9     10-10    TPro  7.8  /  Alb  2.3<L>  /  TBili  0.4  /  DBili  x   /  AST  8<L>  /  ALT  <5<L>  /  AlkPhos  285<H>  10-10    PT/INR - ( 10 Oct 2017 09:26 )   PT: 34.8 sec;   INR: 3.06          PTT - ( 10 Oct 2017 09:26 )  PTT:57.9 sec    Thyroid Stimulating Hormone, Serum: 2.447 uIU/mL (09-13 @ 22:51)  Thyroid Stimulating Hormone, Serum: 1.251 uIU/mL (08-24 @ 01:12)      HbA1C: 8.2 % (09-13 @ 22:51)  8.2 % (08-24 @ 01:11)    CAPILLARY BLOOD GLUCOSE  138 (10 Oct 2017 06:00)  215 (09 Oct 2017 23:25)      Insulin Sliding Scale requirements X 24 Hours:    RADIOLOGY & ADDITIONAL TESTS:    A/P: 47y Female with history of DM type II presenting for DM type II presenting for MRSA bacteremia and TV endocarditis s/p cervical corpectomy, s/p TVR on 10/2/2017. Patient also has infected SVC clot. Patient had dinner at 7 pm without premeal lispro, which explained the high FS at bedtime.     1.  DM 2, uncontrolled, complicated - patient with poor diabetic history and multiple diabetic complications. A1c is likely higher given that patient is on Procrit and has high cell turnover. Patient also seems to be non compliance with her diabetes diet.   - Continue Lantus 15 units daily. Continue Lispro 4 units with meals only if patient eats.    -  Please continue moderate dose sliding scale lispro coverage    Goal FSG is 120 - 180  Will continue to monitor   For discharge, pt can continue with basaglar 15 units daily and Lispro 4 units with meals.     Pt can follow up at discharge with [facility] by calling  to make an appointment.   Will discuss case with Dr. Ross, Dr. Yoon and update primary team INTERVAL HPI/OVERNIGHT EVENTS:    Patient is a 47y old  Female who presents with a chief complaint of TV IE / MRSA Bacteremia. (13 Sep 2017 22:57). On this admission, patient was also found to have septic emboli, cervical osteomyelitis and abscess s/p corpectomy and anterior plating POD #4. Patient is s/p TVR on 10/2/2017. Patient had PPM yesterday.    She had sausage soup for dinner at 7 pm last night, and did not receive premeal lispro. This morning, she had 2 boiled eggs and a cream of wheat for breakfast; fried rice for lunch.      Pt reports the following symptoms:    CONSTITUTIONAL:  Negative fever or chills, feels well, good appetite  EYES:  Negative  blurry vision or double vision  CARDIOVASCULAR:  Negative for chest pain or palpitations  RESPIRATORY:  Negative for cough, wheezing, or SOB   GASTROINTESTINAL:  Negative for nausea, vomiting, diarrhea, constipation, or abdominal pain  GENITOURINARY:  Negative frequency, urgency or dysuria  NEUROLOGIC:  No headache, confusion, dizziness, lightheadedness    MEDICATIONS  (STANDING):  aspirin enteric coated 81 milliGRAM(s) Oral daily  DAPTOmycin IVPB 700 milliGRAM(s) IV Intermittent every 48 hours  docusate sodium 100 milliGRAM(s) Oral three times a day  gabapentin 300 milliGRAM(s) Oral at bedtime  insulin glargine Injectable (LANTUS) 15 Unit(s) SubCutaneous every morning  insulin lispro (HumaLOG) corrective regimen sliding scale   SubCutaneous Before meals and at bedtime  insulin lispro Injectable (HumaLOG) 4 Unit(s) SubCutaneous three times a day before meals  metoprolol 12.5 milliGRAM(s) Oral every 12 hours  nystatin Ointment 1 Application(s) Topical two times a day  pantoprazole    Tablet 40 milliGRAM(s) Oral before breakfast  polyethylene glycol 3350 17 Gram(s) Oral daily  rifampin 600 milliGRAM(s) Oral daily  senna 2 Tablet(s) Oral at bedtime  sertraline 25 milliGRAM(s) Oral daily  simvastatin 20 milliGRAM(s) Oral at bedtime  sodium chloride 0.45%. 1000 milliLiter(s) (10 mL/Hr) IV Continuous <Continuous>  sodium chloride 0.9% lock flush 3 milliLiter(s) IV Push every 8 hours  traZODone 25 milliGRAM(s) Oral at bedtime    MEDICATIONS  (PRN):  acetaminophen    Suspension. 650 milliGRAM(s) Oral every 6 hours PRN Mild Pain (1 - 3)  oxyCODONE    5 mG/acetaminophen 325 mG 1 Tablet(s) Oral every 4 hours PRN Moderate Pain (4 - 6)      PHYSICAL EXAM  Vital Signs Last 24 Hrs  T(C): 36.4 (10 Oct 2017 09:45), Max: 36.6 (09 Oct 2017 21:04)  T(F): 97.6 (10 Oct 2017 09:45), Max: 97.9 (09 Oct 2017 21:04)  HR: 94 (10 Oct 2017 12:00) (52 - 114)  BP: 164/68 (10 Oct 2017 12:00) (120/72 - 182/91)  BP(mean): 105 (10 Oct 2017 12:00) (76 - 121)  RR: 17 (10 Oct 2017 12:00) (8 - 24)  SpO2: 100% (10 Oct 2017 12:00) (94% - 100%)    Constitutional: wn/wd in NAD.   HEENT: NCAT, MMM, OP clear, EOMI, no proptosis or lid retraction  Neck: no thyromegaly or palpable thyroid nodules   Respiratory: lungs CTAB.  Cardiovascular: regular rhythm, normal S1 and S2, no audible murmurs, no peripheral edema  GI: soft, NT/ND, no masses/HSM appreciated.  Neurology: no tremors, DTR 2+  Skin: no visible rashes/lesions  Psychiatric: AAO x 3, normal affect/mood.    LABS:                        9.4    11.8  )-----------( 373      ( 10 Oct 2017 09:26 )             29.9     10-10    127<L>  |  90<L>  |  28<H>  ----------------------------<  218<H>  5.0   |  22  |  4.41<H>    Ca    8.7      10 Oct 2017 09:26  Phos  5.0     10-10  Mg     1.9     10-10    TPro  7.8  /  Alb  2.3<L>  /  TBili  0.4  /  DBili  x   /  AST  8<L>  /  ALT  <5<L>  /  AlkPhos  285<H>  10-10    PT/INR - ( 10 Oct 2017 09:26 )   PT: 34.8 sec;   INR: 3.06          PTT - ( 10 Oct 2017 09:26 )  PTT:57.9 sec    Thyroid Stimulating Hormone, Serum: 2.447 uIU/mL (09-13 @ 22:51)  Thyroid Stimulating Hormone, Serum: 1.251 uIU/mL (08-24 @ 01:12)      HbA1C: 8.2 % (09-13 @ 22:51)  8.2 % (08-24 @ 01:11)    CAPILLARY BLOOD GLUCOSE  138 (10 Oct 2017 06:00)  215 (09 Oct 2017 23:25)      Insulin Sliding Scale requirements X 24 Hours:    RADIOLOGY & ADDITIONAL TESTS:    A/P: 47y Female with history of DM type II presenting for DM type II presenting for MRSA bacteremia and TV endocarditis s/p cervical corpectomy, s/p TVR on 10/2/2017. Patient also has infected SVC clot. Patient had dinner at 7 pm without premeal lispro, which explained the high FS at bedtime.     1.  DM 2, uncontrolled, complicated - patient with poor diabetic history and multiple diabetic complications. A1c is likely higher given that patient is on Procrit and has high cell turnover. Patient also seems to be non compliance with her diabetes diet.   - Continue Lantus 15 units daily. Continue Lispro 4 units with meals only if patient eats.    -  Please continue moderate dose sliding scale lispro coverage    Goal FSG is 120 - 180  Will continue to monitor   As Lantus is not covered by her insurance, for discharge, pt can continue with Levemir 15 units daily and Lispro 4 units with meals.     Pt can follow up at discharge with [facility] by calling  to make an appointment.   Will discuss case with Dr. Ross, Dr. Yoon and update primary team

## 2017-10-10 NOTE — PROGRESS NOTE ADULT - PROBLEM SELECTOR PLAN 4
SBP stable in range of 130 to 150's range at present likely volume dependent and cardiac stress.  Continue metoprolol 12.5 mg po q 12 hrly for now  Monitor vital signs post HD treatment

## 2017-10-10 NOTE — PROGRESS NOTE ADULT - SUBJECTIVE AND OBJECTIVE BOX
Patient is a 47y Female seen and evaluated at bedside. Patient feels better at present. Denies any chest pain, shortness of breath, palpitations, dizziness at present. Denies any other complaints at present.    acetaminophen    Suspension. 650 every 6 hours PRN  aspirin enteric coated 81 daily  DAPTOmycin IVPB 700 every 48 hours  docusate sodium 100 three times a day  epoetin fransisca Injectable 08522 once  gabapentin 300 at bedtime  insulin glargine Injectable (LANTUS) 15 every morning  insulin lispro (HumaLOG) corrective regimen sliding scale  Before meals and at bedtime  insulin lispro Injectable (HumaLOG) 4 three times a day before meals  metoprolol 12.5 every 12 hours  nystatin Ointment 1 two times a day  oxyCODONE    5 mG/acetaminophen 325 mG 1 every 4 hours PRN  pantoprazole    Tablet 40 before breakfast  polyethylene glycol 3350 17 daily  rifampin 600 daily  senna 2 at bedtime  sertraline 25 daily  simvastatin 20 at bedtime  sodium chloride 0.45%. 1000 <Continuous>  sodium chloride 0.9% lock flush 3 every 8 hours  traZODone 25 at bedtime      Allergies    penicillin (Unknown)  Sular (Unknown)    Intolerances        T(C): , Max: 36.6 (10-09-17 @ 21:04)  T(F): , Max: 97.9 (10-09-17 @ 21:04)  HR: 100 (10-10-17 @ 09:45)  BP: 149/70 (10-10-17 @ 09:45)  BP(mean): 106 (10-10-17 @ 08:00)  RR: 24 (10-10-17 @ 09:45)  SpO2: 100% (10-10-17 @ 09:45)  Wt(kg): --    10-09 @ 07:01  -  10-10 @ 07:00  --------------------------------------------------------  IN: 480 mL / OUT: 500 mL / NET: -20 mL          Review of Systems:  CONSTITUTIONAL: No fever or chills, No fatigue or tiredness.  EYES: No blurred or double vision.  RESPIRATORY: No shortness of breath, cough, hemoptysis  CARDIOVASCULAR: No Chest pain or shortness of breath  GASTROINTESTINAL: NO abdominal or flank pain, No nausea or vomiting, No diarrhea  GENITOURINARY: No dysuria or urinary burning, No difficulty passing urine, No hematuria  NEUROLOGICAL: No headaches or blurred vision  SKIN: No skin rashes   MUSCULOSKELETAL: No arthralgia, Joint pain, leg edema, No muscle pains      PHYSICAL EXAM:  GENERAL: NAD, well-developed, well nourished, alert, awake, no acute distress at present  HEAD:  Atraumatic, Normocephalic,   EYES: Bilateral conjuctiva and sclera normal   Oral cavity: Oral mucosa dry and pink  NECK: Neck supple, No JVD  CHEST/LUNG: Bilateral decreased breath sounds, Bibasilar minimal rales and crepitations+nt, no wheezing, Surgical scar present.  HEART: Regular rate and rhythm. ALBARO II/VI at LPSB, No gallop, no rub   ABDOMEN: Soft, Nontender, BS+nt, No flank tenderness.   EXTREMITIES: No clubbing, cyanosis, or edema  Neurology: AAOx3, no focal neurological deficit  SKIN: No rashes or lesions          ACCESS:  Left sided permacath present wtihout bleeding or inflammation    LABS:                        9.4    11.8  )-----------( 373      ( 10 Oct 2017 09:26 )             29.9     10-10    127<L>  |  90<L>  |  28<H>  ----------------------------<  218<H>  5.0   |  22  |  4.41<H>    Ca    8.7      10 Oct 2017 09:26  Phos  5.0     10-10  Mg     1.9     10-10    TPro  7.8  /  Alb  2.3<L>  /  TBili  0.4  /  DBili  x   /  AST  8<L>  /  ALT  <5<L>  /  AlkPhos  285<H>  10-10      PT/INR - ( 10 Oct 2017 09:26 )   PT: 34.8 sec;   INR: 3.06          PTT - ( 10 Oct 2017 09:26 )  PTT:57.9 sec          RADIOLOGY & ADDITIONAL STUDIES:    < from: Xray Chest 1 View AP -PORTABLE-Routine (10.10.17 @ 04:22) >    EXAM:  XR CHEST 1 VIEW PORT ROUTINE                          PROCEDURE DATE:  10/10/2017                     INTERPRETATION:    Portable AP Radiograph dated 10/10/2017 4:22 AM    CLINICAL INFORMATION: 47 years, Female, Follow Up.    PRIOR STUDIES: October 9, 2017    FINDINGS: Left chest tunneled dialysis catheter and right arm PICC line   are unchanged. Median sternotomy wires are again seen. Heart size is   stable. Pulmonary vascular congestion has improved. Patchy infiltrate   versus atelectasis at the right base.    IMPRESSION:  Improved pulmonary vascular congestion. Patchy infiltrates versus   atelectasis at the right base.            "Thank you for the opportunity to participate in the care of this   patient."        MEGHAN PERRY, RADIOLOGY ATTENDING  This document has been electronically signed. Oct 10 2017  8:35AM                  < end of copied text >

## 2017-10-10 NOTE — PROGRESS NOTE ADULT - ATTENDING COMMENTS
Pt seen with Paramjit Marie and Karrie on rounds this afternoon.  Was OOB in a chair awaiting discharge.  Looked much better.  Appetite his improved, still somewhat below normal.  The Lantus dose of 15 units appears appropriate, but glargine is not covered on her insurance.  Will need to switch to Levemir  Will leave the Humalog doses at 4 units, but instructed her to increase to 6 units if her post-prandials were clearly elevated.  Nonetheless, warned her that she needs to be cautious about maintaining an appropriate carb intake, and also warned her about her excessive intake of apple juice at home.

## 2017-10-10 NOTE — PROGRESS NOTE ADULT - ASSESSMENT
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 Nephrology consult called for HD treatment. Patient underwent Tricuspid valve replacement on 10/02/2017. Patient had left arm AV fistula for past 4 months which never worked or mature. Left sided permacath placed on (09/25) present. Patient had last HD treatment on 10/07/2017 with UF  of 2.3kg.  Interval placement of Permanent pacemaker yesterday.

## 2017-10-10 NOTE — PROGRESS NOTE ADULT - SUBJECTIVE AND OBJECTIVE BOX
EPS Device interrogation    Indication: post implant    Device model: 	DAYANNA Lozano 				Implanting Physician: Dr. Sunshine    Functioning Mode: DDD			    Underlying Rhythm: AS/    Pacemaker dependency:  No    Battery status: 100% (ELOISE)  Interrogating parameters:   				RA			RV			LV    Sense:                                        4.0   mV                          8.0 mV    Threshold:                             0.75 V @ 0.4 ms            0.5  V@ 0.4 ms    Pacing Impedance:                           532om                          472  om    Shock Impedance:                                                    n/a    Events/Alert:  none    Parameter change: 	none      Normal function PPM  implant site dry/clean, intact,  no bleeding, no swelling, no hematoma  [ ]EPS attending: Interrogation reviewed. Agree with above.

## 2017-10-10 NOTE — PROGRESS NOTE ADULT - SUBJECTIVE AND OBJECTIVE BOX
SUBJECTIVE: Pt seen and examined at bedside. No overnight events.     Vital Signs Last 24 Hrs  T(C): 36.3 (10 Oct 2017 13:00), Max: 36.6 (09 Oct 2017 21:04)  T(F): 97.4 (10 Oct 2017 13:00), Max: 97.9 (09 Oct 2017 21:04)  HR: 100 (10 Oct 2017 13:00) (92 - 114)  BP: 134/65 (10 Oct 2017 13:00) (120/72 - 164/68)  BP(mean): 105 (10 Oct 2017 12:00) (76 - 110)  RR: 15 (10 Oct 2017 13:00) (12 - 24)  SpO2: 100% (10 Oct 2017 13:00) (94% - 100%)    PHYSICAL EXAM    Gen: NAD  Pulm: no resp distress  CV: Regular rate and rhythm. ALBARO II/VI at LPSB, No gallop, no rub   Abdomen: Soft, Nontender, nondistended.   Extremities: WWP  Vasc: b/l DP/PT pulses palpable        I&O's Detail    09 Oct 2017 07:01  -  10 Oct 2017 07:00  --------------------------------------------------------  IN:    Oral Fluid: 480 mL  Total IN: 480 mL    OUT:    Voided: 500 mL  Total OUT: 500 mL    Total NET: -20 mL      10 Oct 2017 07:01  -  10 Oct 2017 16:02  --------------------------------------------------------  IN:  Total IN: 0 mL    OUT:    Other: 3000 mL  Total OUT: 3000 mL    Total NET: -3000 mL          LABS:                        9.4    11.8  )-----------( 373      ( 10 Oct 2017 09:26 )             29.9     10-10    127<L>  |  90<L>  |  28<H>  ----------------------------<  218<H>  5.0   |  22  |  4.41<H>    Ca    8.7      10 Oct 2017 09:26  Phos  5.0     10-10  Mg     1.9     10-10    TPro  7.8  /  Alb  2.3<L>  /  TBili  0.4  /  DBili  x   /  AST  8<L>  /  ALT  <5<L>  /  AlkPhos  285<H>  10-10    PT/INR - ( 10 Oct 2017 09:26 )   PT: 34.8 sec;   INR: 3.06          PTT - ( 10 Oct 2017 09:26 )  PTT:57.9 sec      MEDICATIONS  (STANDING):  aspirin enteric coated 81 milliGRAM(s) Oral daily  DAPTOmycin IVPB 700 milliGRAM(s) IV Intermittent every 48 hours  docusate sodium 100 milliGRAM(s) Oral three times a day  gabapentin 300 milliGRAM(s) Oral at bedtime  insulin glargine Injectable (LANTUS) 15 Unit(s) SubCutaneous every morning  insulin lispro (HumaLOG) corrective regimen sliding scale   SubCutaneous Before meals and at bedtime  insulin lispro Injectable (HumaLOG) 4 Unit(s) SubCutaneous three times a day before meals  metoprolol 12.5 milliGRAM(s) Oral every 12 hours  nystatin Ointment 1 Application(s) Topical two times a day  pantoprazole    Tablet 40 milliGRAM(s) Oral before breakfast  polyethylene glycol 3350 17 Gram(s) Oral daily  rifampin 600 milliGRAM(s) Oral daily  senna 2 Tablet(s) Oral at bedtime  sertraline 25 milliGRAM(s) Oral daily  simvastatin 20 milliGRAM(s) Oral at bedtime  sodium chloride 0.45%. 1000 milliLiter(s) (10 mL/Hr) IV Continuous <Continuous>  sodium chloride 0.9% lock flush 3 milliLiter(s) IV Push every 8 hours  traZODone 25 milliGRAM(s) Oral at bedtime    MEDICATIONS  (PRN):  acetaminophen    Suspension. 650 milliGRAM(s) Oral every 6 hours PRN Mild Pain (1 - 3)  oxyCODONE    5 mG/acetaminophen 325 mG 1 Tablet(s) Oral every 4 hours PRN Moderate Pain (4 - 6)      RADIOLOGY & ADDITIONAL STUDIES:    ASSESSMENT AND PLAN  46 y/o F h/o  HTN, HLD, DM II, ESRD on HD via left permcath, s/p left AVF that failed to develop admitted with IE TV now s/p TVR. Vascular consulted for new AVF creation.   -b/l vein mapping performed  -please instruct pt not to allow use of R arm for BP measurement, blood draws, IVs, etc  -please have pt follow up with Dr Pierson outpatient for scheduling AVF creation

## 2017-10-13 RX ORDER — WARFARIN SODIUM 2.5 MG/1
1 TABLET ORAL
Qty: 30 | Refills: 0 | OUTPATIENT
Start: 2017-10-13 | End: 2017-11-12

## 2017-10-16 DIAGNOSIS — K29.70 GASTRITIS, UNSPECIFIED, WITHOUT BLEEDING: ICD-10-CM

## 2017-10-16 DIAGNOSIS — I44.1 ATRIOVENTRICULAR BLOCK, SECOND DEGREE: ICD-10-CM

## 2017-10-16 DIAGNOSIS — I33.0 ACUTE AND SUBACUTE INFECTIVE ENDOCARDITIS: ICD-10-CM

## 2017-10-16 DIAGNOSIS — E11.22 TYPE 2 DIABETES MELLITUS WITH DIABETIC CHRONIC KIDNEY DISEASE: ICD-10-CM

## 2017-10-16 DIAGNOSIS — Z88.0 ALLERGY STATUS TO PENICILLIN: ICD-10-CM

## 2017-10-16 DIAGNOSIS — I51.3 INTRACARDIAC THROMBOSIS, NOT ELSEWHERE CLASSIFIED: ICD-10-CM

## 2017-10-16 DIAGNOSIS — B95.62 METHICILLIN RESISTANT STAPHYLOCOCCUS AUREUS INFECTION AS THE CAUSE OF DISEASES CLASSIFIED ELSEWHERE: ICD-10-CM

## 2017-10-16 DIAGNOSIS — D64.9 ANEMIA, UNSPECIFIED: ICD-10-CM

## 2017-10-16 DIAGNOSIS — E78.5 HYPERLIPIDEMIA, UNSPECIFIED: ICD-10-CM

## 2017-10-16 DIAGNOSIS — I07.9 RHEUMATIC TRICUSPID VALVE DISEASE, UNSPECIFIED: ICD-10-CM

## 2017-10-16 DIAGNOSIS — F32.9 MAJOR DEPRESSIVE DISORDER, SINGLE EPISODE, UNSPECIFIED: ICD-10-CM

## 2017-10-16 DIAGNOSIS — M46.44 DISCITIS, UNSPECIFIED, THORACIC REGION: ICD-10-CM

## 2017-10-16 DIAGNOSIS — I82.210 ACUTE EMBOLISM AND THROMBOSIS OF SUPERIOR VENA CAVA: ICD-10-CM

## 2017-10-16 DIAGNOSIS — R00.1 BRADYCARDIA, UNSPECIFIED: ICD-10-CM

## 2017-10-16 DIAGNOSIS — E87.1 HYPO-OSMOLALITY AND HYPONATREMIA: ICD-10-CM

## 2017-10-16 DIAGNOSIS — G06.2 EXTRADURAL AND SUBDURAL ABSCESS, UNSPECIFIED: ICD-10-CM

## 2017-10-16 DIAGNOSIS — N18.6 END STAGE RENAL DISEASE: ICD-10-CM

## 2017-10-16 DIAGNOSIS — I26.90 SEPTIC PULMONARY EMBOLISM WITHOUT ACUTE COR PULMONALE: ICD-10-CM

## 2017-10-16 DIAGNOSIS — I25.10 ATHEROSCLEROTIC HEART DISEASE OF NATIVE CORONARY ARTERY WITHOUT ANGINA PECTORIS: ICD-10-CM

## 2017-10-16 DIAGNOSIS — Z99.2 DEPENDENCE ON RENAL DIALYSIS: ICD-10-CM

## 2017-10-16 DIAGNOSIS — M46.42 DISCITIS, UNSPECIFIED, CERVICAL REGION: ICD-10-CM

## 2017-10-16 DIAGNOSIS — M86.672 OTHER CHRONIC OSTEOMYELITIS, LEFT ANKLE AND FOOT: ICD-10-CM

## 2017-10-16 DIAGNOSIS — I27.20 PULMONARY HYPERTENSION, UNSPECIFIED: ICD-10-CM

## 2017-10-16 DIAGNOSIS — F43.22 ADJUSTMENT DISORDER WITH ANXIETY: ICD-10-CM

## 2017-10-16 DIAGNOSIS — M46.24 OSTEOMYELITIS OF VERTEBRA, THORACIC REGION: ICD-10-CM

## 2017-10-16 DIAGNOSIS — I12.0 HYPERTENSIVE CHRONIC KIDNEY DISEASE WITH STAGE 5 CHRONIC KIDNEY DISEASE OR END STAGE RENAL DISEASE: ICD-10-CM

## 2017-10-16 DIAGNOSIS — Z88.2 ALLERGY STATUS TO SULFONAMIDES: ICD-10-CM

## 2017-10-16 DIAGNOSIS — M46.22 OSTEOMYELITIS OF VERTEBRA, CERVICAL REGION: ICD-10-CM

## 2017-10-16 DIAGNOSIS — R78.81 BACTEREMIA: ICD-10-CM

## 2017-10-16 DIAGNOSIS — E11.65 TYPE 2 DIABETES MELLITUS WITH HYPERGLYCEMIA: ICD-10-CM

## 2017-10-17 ENCOUNTER — APPOINTMENT (OUTPATIENT)
Dept: CARDIOTHORACIC SURGERY | Facility: CLINIC | Age: 47
End: 2017-10-17

## 2017-10-17 DIAGNOSIS — M46.22 OSTEOMYELITIS OF VERTEBRA, CERVICAL REGION: ICD-10-CM

## 2017-10-24 ENCOUNTER — APPOINTMENT (OUTPATIENT)
Dept: VASCULAR SURGERY | Facility: CLINIC | Age: 47
End: 2017-10-24

## 2017-10-30 ENCOUNTER — APPOINTMENT (OUTPATIENT)
Dept: ENDOCRINOLOGY | Facility: CLINIC | Age: 47
End: 2017-10-30

## 2017-11-01 LAB
CULTURE RESULTS: SIGNIFICANT CHANGE UP
SPECIMEN SOURCE: SIGNIFICANT CHANGE UP

## 2017-11-05 PROCEDURE — 87070 CULTURE OTHR SPECIMN AEROBIC: CPT

## 2017-11-05 PROCEDURE — 86706 HEP B SURFACE ANTIBODY: CPT

## 2017-11-05 PROCEDURE — A9503: CPT

## 2017-11-05 PROCEDURE — 84484 ASSAY OF TROPONIN QUANT: CPT

## 2017-11-05 PROCEDURE — 82962 GLUCOSE BLOOD TEST: CPT

## 2017-11-05 PROCEDURE — 93306 TTE W/DOPPLER COMPLETE: CPT

## 2017-11-05 PROCEDURE — 70450 CT HEAD/BRAIN W/O DYE: CPT

## 2017-11-05 PROCEDURE — 86850 RBC ANTIBODY SCREEN: CPT

## 2017-11-05 PROCEDURE — 93970 EXTREMITY STUDY: CPT

## 2017-11-05 PROCEDURE — 87075 CULTR BACTERIA EXCEPT BLOOD: CPT

## 2017-11-05 PROCEDURE — 87102 FUNGUS ISOLATION CULTURE: CPT

## 2017-11-05 PROCEDURE — C1785: CPT

## 2017-11-05 PROCEDURE — 36569 INSJ PICC 5 YR+ W/O IMAGING: CPT

## 2017-11-05 PROCEDURE — 86901 BLOOD TYPING SEROLOGIC RH(D): CPT

## 2017-11-05 PROCEDURE — 82310 ASSAY OF CALCIUM: CPT

## 2017-11-05 PROCEDURE — 71045 X-RAY EXAM CHEST 1 VIEW: CPT

## 2017-11-05 PROCEDURE — 84443 ASSAY THYROID STIM HORMONE: CPT

## 2017-11-05 PROCEDURE — 87206 SMEAR FLUORESCENT/ACID STAI: CPT

## 2017-11-05 PROCEDURE — C1889: CPT

## 2017-11-05 PROCEDURE — 82550 ASSAY OF CK (CPK): CPT

## 2017-11-05 PROCEDURE — 70491 CT SOFT TISSUE NECK W/DYE: CPT

## 2017-11-05 PROCEDURE — 36430 TRANSFUSION BLD/BLD COMPNT: CPT

## 2017-11-05 PROCEDURE — 85610 PROTHROMBIN TIME: CPT

## 2017-11-05 PROCEDURE — 85027 COMPLETE CBC AUTOMATED: CPT

## 2017-11-05 PROCEDURE — 82330 ASSAY OF CALCIUM: CPT

## 2017-11-05 PROCEDURE — 83605 ASSAY OF LACTIC ACID: CPT

## 2017-11-05 PROCEDURE — 94150 VITAL CAPACITY TEST: CPT

## 2017-11-05 PROCEDURE — 76937 US GUIDE VASCULAR ACCESS: CPT

## 2017-11-05 PROCEDURE — 88304 TISSUE EXAM BY PATHOLOGIST: CPT

## 2017-11-05 PROCEDURE — 87040 BLOOD CULTURE FOR BACTERIA: CPT

## 2017-11-05 PROCEDURE — 87015 SPECIMEN INFECT AGNT CONCNTJ: CPT

## 2017-11-05 PROCEDURE — 78803 RP LOCLZJ TUM SPECT 1 AREA: CPT

## 2017-11-05 PROCEDURE — 97530 THERAPEUTIC ACTIVITIES: CPT

## 2017-11-05 PROCEDURE — 81001 URINALYSIS AUTO W/SCOPE: CPT

## 2017-11-05 PROCEDURE — P9017: CPT

## 2017-11-05 PROCEDURE — 95940 IONM IN OPERATNG ROOM 15 MIN: CPT

## 2017-11-05 PROCEDURE — 80048 BASIC METABOLIC PNL TOTAL CA: CPT

## 2017-11-05 PROCEDURE — 84132 ASSAY OF SERUM POTASSIUM: CPT

## 2017-11-05 PROCEDURE — 97116 GAIT TRAINING THERAPY: CPT

## 2017-11-05 PROCEDURE — 84100 ASSAY OF PHOSPHORUS: CPT

## 2017-11-05 PROCEDURE — 36558 INSERT TUNNELED CV CATH: CPT

## 2017-11-05 PROCEDURE — 73630 X-RAY EXAM OF FOOT: CPT

## 2017-11-05 PROCEDURE — 83735 ASSAY OF MAGNESIUM: CPT

## 2017-11-05 PROCEDURE — 82607 VITAMIN B-12: CPT

## 2017-11-05 PROCEDURE — 70551 MRI BRAIN STEM W/O DYE: CPT

## 2017-11-05 PROCEDURE — 82746 ASSAY OF FOLIC ACID SERUM: CPT

## 2017-11-05 PROCEDURE — 84295 ASSAY OF SERUM SODIUM: CPT

## 2017-11-05 PROCEDURE — 88305 TISSUE EXAM BY PATHOLOGIST: CPT

## 2017-11-05 PROCEDURE — 82728 ASSAY OF FERRITIN: CPT

## 2017-11-05 PROCEDURE — 78802 RP LOCLZJ TUM WHBDY 1 D IMG: CPT

## 2017-11-05 PROCEDURE — 90935 HEMODIALYSIS ONE EVALUATION: CPT

## 2017-11-05 PROCEDURE — 83036 HEMOGLOBIN GLYCOSYLATED A1C: CPT

## 2017-11-05 PROCEDURE — 97164 PT RE-EVAL EST PLAN CARE: CPT

## 2017-11-05 PROCEDURE — C1894: CPT

## 2017-11-05 PROCEDURE — 72141 MRI NECK SPINE W/O DYE: CPT

## 2017-11-05 PROCEDURE — 94002 VENT MGMT INPAT INIT DAY: CPT

## 2017-11-05 PROCEDURE — 77001 FLUOROGUIDE FOR VEIN DEVICE: CPT

## 2017-11-05 PROCEDURE — 85018 HEMOGLOBIN: CPT

## 2017-11-05 PROCEDURE — 93005 ELECTROCARDIOGRAM TRACING: CPT

## 2017-11-05 PROCEDURE — 85730 THROMBOPLASTIN TIME PARTIAL: CPT

## 2017-11-05 PROCEDURE — 87116 MYCOBACTERIA CULTURE: CPT

## 2017-11-05 PROCEDURE — 87186 SC STD MICRODIL/AGAR DIL: CPT

## 2017-11-05 PROCEDURE — 85025 COMPLETE CBC W/AUTO DIFF WBC: CPT

## 2017-11-05 PROCEDURE — 97535 SELF CARE MNGMENT TRAINING: CPT

## 2017-11-05 PROCEDURE — 86923 COMPATIBILITY TEST ELECTRIC: CPT

## 2017-11-05 PROCEDURE — C1898: CPT

## 2017-11-05 PROCEDURE — 36415 COLL VENOUS BLD VENIPUNCTURE: CPT

## 2017-11-05 PROCEDURE — 97110 THERAPEUTIC EXERCISES: CPT

## 2017-11-05 PROCEDURE — C1751: CPT

## 2017-11-05 PROCEDURE — C1769: CPT

## 2017-11-05 PROCEDURE — C1887: CPT

## 2017-11-05 PROCEDURE — 97162 PT EVAL MOD COMPLEX 30 MIN: CPT

## 2017-11-05 PROCEDURE — 84702 CHORIONIC GONADOTROPIN TEST: CPT

## 2017-11-05 PROCEDURE — 83880 ASSAY OF NATRIURETIC PEPTIDE: CPT

## 2017-11-05 PROCEDURE — C1713: CPT

## 2017-11-05 PROCEDURE — 86704 HEP B CORE ANTIBODY TOTAL: CPT

## 2017-11-05 PROCEDURE — 84550 ASSAY OF BLOOD/URIC ACID: CPT

## 2017-11-05 PROCEDURE — G0365: CPT

## 2017-11-05 PROCEDURE — 71250 CT THORAX DX C-: CPT

## 2017-11-05 PROCEDURE — C1750: CPT

## 2017-11-05 PROCEDURE — 86803 HEPATITIS C AB TEST: CPT

## 2017-11-05 PROCEDURE — 83550 IRON BINDING TEST: CPT

## 2017-11-05 PROCEDURE — 83540 ASSAY OF IRON: CPT

## 2017-11-05 PROCEDURE — 72146 MRI CHEST SPINE W/O DYE: CPT

## 2017-11-05 PROCEDURE — 86705 HEP B CORE ANTIBODY IGM: CPT

## 2017-11-05 PROCEDURE — 80053 COMPREHEN METABOLIC PANEL: CPT

## 2017-11-05 PROCEDURE — 82553 CREATINE MB FRACTION: CPT

## 2017-11-05 PROCEDURE — 76000 FLUOROSCOPY <1 HR PHYS/QHP: CPT

## 2017-11-05 PROCEDURE — 80061 LIPID PANEL: CPT

## 2017-11-05 PROCEDURE — 73030 X-RAY EXAM OF SHOULDER: CPT

## 2017-11-05 PROCEDURE — 86900 BLOOD TYPING SEROLOGIC ABO: CPT

## 2017-11-05 PROCEDURE — 86709 HEPATITIS A IGM ANTIBODY: CPT

## 2017-11-05 PROCEDURE — 72148 MRI LUMBAR SPINE W/O DYE: CPT

## 2017-11-05 PROCEDURE — 82803 BLOOD GASES ANY COMBINATION: CPT

## 2017-11-05 PROCEDURE — P9016: CPT

## 2017-11-05 PROCEDURE — A9556: CPT

## 2017-11-05 PROCEDURE — 80076 HEPATIC FUNCTION PANEL: CPT

## 2017-11-05 PROCEDURE — 87340 HEPATITIS B SURFACE AG IA: CPT

## 2017-11-09 ENCOUNTER — APPOINTMENT (OUTPATIENT)
Dept: HEART AND VASCULAR | Facility: CLINIC | Age: 47
End: 2017-11-09

## 2017-11-22 LAB
CULTURE RESULTS: SIGNIFICANT CHANGE UP
SPECIMEN SOURCE: SIGNIFICANT CHANGE UP

## 2018-06-04 NOTE — PROGRESS NOTE BEHAVIORAL HEALTH - NS ED BHA MSE SPEECH SPONTANEITY
Heart Rate: 66

RR Interval: 909

P-R Interval: 172

QRSD Interval: 80

QT Interval: 416

QTC Interval: 436

P Axis: 77

QRS Axis: 66

T Wave Axis: 63

EKG Severity - ABNORMAL ECG -

EKG Impression: SINUS RHYTHM

EKG Impression: CONSIDER ANTEROSEPTAL INFARCT

Electronically Signed By: David Schwartz 04-Jun-2018 07:17:30
Normal

## 2018-11-01 NOTE — PROGRESS NOTE ADULT - PROBLEM SELECTOR PLAN 3
Tricuspid valve endocarditis s/p TV replacement  Continue IV antibiotics as per renal clearance  Monitor Drug level 20

## 2019-01-20 NOTE — PROGRESS NOTE ADULT - PROBLEM SELECTOR PLAN 3
CARDIOLOGY     PROGRESS  NOTE   ________________________________________________    CHIEF COMPLAINT:Patient is a 74y old  Male who presents with a chief complaint of flank pain (19 Jan 2019 23:33)    	  REVIEW OF SYSTEMS:  CONSTITUTIONAL: No fever, weight loss, or fatigue  EYES: No eye pain, visual disturbances, or discharge  ENT:  No difficulty hearing, tinnitus, vertigo; No sinus or throat pain  NECK: No pain or stiffness  RESPIRATORY: No cough, wheezing, chills or hemoptysis; No Shortness of Breath  CARDIOVASCULAR: No chest pain, palpitations, passing out, dizziness, or leg swelling  GASTROINTESTINAL: No abdominal or epigastric pain. No nausea, vomiting, or hematemesis; No diarrhea or constipation. No melena or hematochezia.  GENITOURINARY: No dysuria, frequency, hematuria, or incontinence  NEUROLOGICAL: No headaches, memory loss, loss of strength, numbness, or tremors  SKIN: No itching, burning, rashes, or lesions   LYMPH Nodes: No enlarged glands  ENDOCRINE: No heat or cold intolerance; No hair loss  MUSCULOSKELETAL: No joint pain or swelling; No muscle, back, or extremity pain  PSYCHIATRIC: No depression, anxiety, mood swings, or difficulty sleeping  HEME/LYMPH: No easy bruising, or bleeding gums  ALLERGY AND IMMUNOLOGIC: No hives or eczema	    [ ] All others negative	  [ ] Unable to obtain    PHYSICAL EXAM:  T(C): 36.5 (01-19-19 @ 21:37), Max: 37.5 (01-19-19 @ 19:27)  HR: 53 (01-19-19 @ 21:37) (53 - 69)  BP: 170/82 (01-19-19 @ 21:37) (170/82 - 234/109)  RR: 18 (01-19-19 @ 21:37) (17 - 18)  SpO2: 95% (01-19-19 @ 21:37) (94% - 96%)  Wt(kg): --  I&O's Summary      Appearance: Normal	  HEENT:   Normal oral mucosa, PERRL, EOMI	  Lymphatic: No lymphadenopathy  Cardiovascular: Normal S1 S2, No JVD, + murmurs, No edema  Respiratory: Lungs clear to auscultation	  Psychiatry: A & O x 3, Mood & affect appropriate  Gastrointestinal:  Soft, Non-tender, + BS	  Skin: No rashes, No ecchymoses, No cyanosis	  Neurologic: Non-focal  Extremities: Normal range of motion, No clubbing, cyanosis or edema  Vascular: Peripheral pulses palpable 2+ bilaterally    MEDICATIONS  (STANDING):  amLODIPine   Tablet 5 milliGRAM(s) Oral daily  aspirin enteric coated 81 milliGRAM(s) Oral daily  atorvastatin 40 milliGRAM(s) Oral at bedtime  carvedilol 6.25 milliGRAM(s) Oral every 12 hours  dextrose 5%. 1000 milliLiter(s) (50 mL/Hr) IV Continuous <Continuous>  dextrose 50% Injectable 12.5 Gram(s) IV Push once  dextrose 50% Injectable 25 Gram(s) IV Push once  dextrose 50% Injectable 25 Gram(s) IV Push once  fenofibrate Tablet 145 milliGRAM(s) Oral daily  heparin  Injectable 5000 Unit(s) SubCutaneous every 12 hours  insulin lispro (HumaLOG) corrective regimen sliding scale   SubCutaneous three times a day before meals  insulin lispro (HumaLOG) corrective regimen sliding scale   SubCutaneous at bedtime  losartan 50 milliGRAM(s) Oral daily  sodium chloride 0.9%. 1000 milliLiter(s) (50 mL/Hr) IV Continuous <Continuous>  tamsulosin 0.4 milliGRAM(s) Oral at bedtime  zolpidem 5 milliGRAM(s) Oral at bedtime      TELEMETRY: 	    ECG:  	  RADIOLOGY:  OTHER: 	  	  LABS:	 	    CARDIAC MARKERS:                                11.6   12.4  )-----------( 214      ( 19 Jan 2019 07:49 )             34.7     01-19    145  |  108  |  30<H>  ----------------------------<  185<H>  3.9   |  20<L>  |  1.51<H>    Ca    9.1      19 Jan 2019 07:49    TPro  7.5  /  Alb  4.1  /  TBili  0.3  /  DBili  x   /  AST  20  /  ALT  28  /  AlkPhos  58  01-19    proBNP:   Lipid Profile:   HgA1c:   TSH:         Assessment and plan  ---------------------------  bp is better controlled  continue current meds  dc losartan if increase renal function  increase norvasc as tolerated - under treatment with Daptomycin as per primary team and Ceftaroline

## 2019-12-13 NOTE — PROVIDER CONTACT NOTE (CRITICAL VALUE NOTIFICATION) - PERSON GIVING RESULT:
Mother said patient cut herself with scissors when she was at   "She was there yesterday and the doctor said if she bleeds through her dressing I should take her to the emergency room "  "It looked like it was yesterday so I put more guaze on it "  Mother is not a good historian  Patient went to  and the  called mom  Mother asked if she should come to the [address] [place] [address] or should she return to the emergency room  RN encouraged mother to follow the recommendations of the provider  Mother was in agreement with this plan and will take patient to the ED 
Neelima Kevin
lab

## 2020-12-02 NOTE — OCCUPATIONAL THERAPY INITIAL EVALUATION ADULT - ADDITIONAL COMMENTS
Pre-Operative:  1. Patient/Caregiver identifies - states name and date of birth. 2.  The patient is free from signs and symptoms of injury. 3.  The patient receives appropriate medication(s), safely administered during the Perioperative period. 4.  The patient is free from signs and symptoms of infection. 5.  The patient has wound / tissue perfusion. 6.  The patients's fluid, electrolyte, and acid-base balances are established preoperatively. 7.  The patient's pulmonary function is established preoperatively. 8.  The patient's cardiovascular status is established preoperatively. 9.  The patient / caregiver demonstrates knowledge of nutritional management related to the operative or other invasive procedure. 10. The patient/caregiver demonstrates knowledge of medication management. 11. The patient/caregiver demonstrates knowledge of pain management. 12.  The patient participates in the rehabilitation process as applicable. 13.  The patient/caregiver participates in decisions affection his or her Perioperative plan of care. 14.  The patient's care is consistent with the individualized Perioperative plan of care. 15.  The patient's right to privacy is maintained. 16.  The patient is the recipient of competent and ethical care within legal standards of practice. 17.  The patient's value system, lifestyle, ethnicity, and culture are considered, respected, and incorporated in the Perioperative plan of care and understands special services available. 18.  The patient demonstrates and/or reports adequate pain control throughout the the Perioperative period. 19. The patient's neurological status is established preoperatively. 20. The patient/caregiver demonstrates knowledge of the expected responses to the operative or invasive procedure. 21.  Patient/Caregiver has reduced anxiety. Interventions- Familiarize with environment and equipment.   22. Patient/Caregiver verbalizes understanding of Phase I and Phase II process. 23.  Patient pain level is established preoperatively using age appropriate pain scale. 24.  The patient will move to fall risk upon sedation- during and through the recovery phase. Interventions- orient the patient to the environment, especially the location of the bathroom; provide treaded socks/non-skid footwear; demonstrate and teach back use of the nurse's call system; instruct the patient to call for help before getting out of bed; lock all movable equipment before transferring patient; keep bed in lowest position possible.  25.  Other: Patient transferred from outside facility  and prior to admission was bedbound x1 month and required assistance for all ADLs

## 2022-05-20 NOTE — PATIENT PROFILE ADULT. - BLOOD AVOIDANCE/RESTRICTIONS, PROFILE
Expected Date Of Service: 03/10/2020 Billing Type: Third-Party Bill Bill For Surgical Tray: no Performing Laboratory: [id number] none 20-May-2021

## 2022-06-16 NOTE — BEHAVIORAL HEALTH ASSESSMENT NOTE - NSBHSOCIALHXDETAILSFT_PSY_A_CORE
Pt called to get her appt for 6/16/22 with DR Vela switched to a virtual visit due to being sick  Writer switched appt  and check in pt would like text link send to [phone]   Thank you
Pt denies any psych history except for consulting with a psychiatrist in 2015 at a clinic in [place] at time she was told her left foot might have to be amputated; she went three times. She refused to have foot amputated.

## 2023-05-08 NOTE — OCCUPATIONAL THERAPY INITIAL EVALUATION ADULT - RANGE OF MOTION EXAMINATION
Judah Carrasco is here today as a new patient for an evaluation of the bottom of both feet. She reports peeling of skin to both feet. She started having this issue for the past week. PCP is Dr. Carmen Nuñez, last seen 12/20/2022. Functional AROM of BUE during session, limited from full ROM secondary to pain at wound vac site

## 2024-02-10 NOTE — PATIENT PROFILE ADULT. - MEDICATION ADMINISTRATION INFO, PROFILE
1. -Take acetaminophen 500 to 1000 mg by mouth every 4 to 6 hours as needed for pain or fever.  Do not take more than 4000 mg in 24 hours.  Do not take within 6 hours of another acetaminophen containing medication such as norco (vicodin) or percocet.  - Take ibuprofen 600 to 800 mg by mouth every 6 to 8 hours as needed for pain or fever  2. Use ice as needed for pain and swelling.  3. Elevate extremity to help with swelling.  4. Follow-up with orthopedics this week.  5. You may return to the ED as needed for new or worsening symptoms such as reinjury, severe and uncontrollable pain, focal weakness, severe numbness and tingling, any other concerning symptoms.      Discharge Instructions  Splint Care    You had a splint put on today to help protect your injury and help it heal.  Splints are used to treat things like strains, sprains, large cuts, and fractures (broken bones). Splints are temporary and are designed to allow for swelling.    Be sure your splint is not too tight!  If you splint is too tight, it may cause loss of blood supply.  Signs of your splint being too tight include: your arm or leg hurting a lot more; your fingers or toes getting numb, cold, pale or blue; or your child is crying, fussing or seeming restless.    Generally, every Emergency Department visit should have a follow-up clinic visit with either a primary or a specialty clinic/provider. Please follow-up as instructed by your emergency provider today.  Return to the Emergency Department right away if:  You have increased pain or pressure around the injury.  You have numbness, tingling, or cool, pale, or blue toes or fingers past the injury.  Your child is more fussy than normal, crying a lot, or restless.  Your splint becomes soft, breaks, or is wet.  Your splint begins to smell bad.  Your splint is cutting into your skin.    Home care:  Keep the injured area above the level of your heart while laying or sitting down.  This will help decrease  the swelling and the pain.  Keep the splint dry.  Do not put objects down or inside the splint.  If there is an elastic bandage (Ace  wrap) holding the splint on this may be loosened slightly to relieve pressure or pain.  If pain continues return to the Emergency Department right away.  Do not remove your splint by yourself unless told to by your provider.    Follow-up:  Sometimes the splint put on in the Emergency Department needs to be changed once the swelling has gone down and a more permanent cast needs to be placed.  This is usually done by a bone specialist provider (Orthopedist).  Follow the instructions given to you by your provider today.    If you were given a prescription for medicine here today, be sure to read all of the information (including the package insert) that comes with your prescription.  This will include important information about the medicine, its side effects, and any warnings that you need to know about.  The pharmacist who fills the prescription can provide more information and answer questions you may have about the medicine.  If you have questions or concerns that the pharmacist cannot address, please call or return to the Emergency Department.     Remember that you can always come back to the Emergency Department if you are not able to see your regular provider in the amount of time listed above, if you get any new symptoms, or if there is anything that worries you.     no concerns

## 2024-03-25 NOTE — PROGRESS NOTE ADULT - SUBJECTIVE AND OBJECTIVE BOX
Occupational Therapy Visit    Visit Type: Daily Treatment Note  Visit: 9  Referring Provider: Manuel Griffin MD  Next referring provider visit: 4/11/2024  Medical Diagnosis (from order): M65.4 - Radial styloid tenosynovitis  M25.531, M25.532 - Bilateral wrist pain  M79.632, M79.631 - Pain in both forearms     SUBJECTIVE                                                                                                               Patient arrives from work with both of her wrist hurting because she was doing some tasks that she probably shouldn't be doing. Wore splint on right wrist entire shift and wants to continue wearing it at least for another week.   Functional Change: No changes noted    Pain / Symptoms  - Pain rating (out of 10): Current: 3 (Left wrist)     Location:   - Pain rating (out of 10):   Current: 5 (Right wrist)     OBJECTIVE                                                                                                                                        Treatment      Moist Hot Pack  - Location: Right and left hand/wrist  - Position: sitting  - Temperature: 165° F  - Duration: 10 minutes    Paraffin  - Location: Right and left hand/wrist  - Position: sitting  - Temperature: 130° F  - Duration: 10 minutes    Results: decreased pain and improved range of motion  Reaction: no adverse reaction to treatment      Therapeutic Exercise  Active range of motion of left wrist x 10:  Wrist flexion/extension    Yellow flexbar with left wrist x 15 each:  Pronation to neutral  Supination to neutral  Wringing    2 lb. left wrist flexion, wrist extension, and radial deviation over bolster x 15 each  3 lb. left wrist flexion, wrist extension, and radial deviation over bolster x 15 each    Skilled input: verbal instruction/cues    Writer verbally educated and received verbal consent for hand placement, positioning of patient, and techniques to be performed today from patient for therapist position for techniques  Objective and Subjective   The patient is in her chair. Does not endorse any complains today - no shortness of breath, no chest pain.     Patient is a 47 year old female with history of HTN, HLD, DM II, ESRD on HD (TTS. Last HD unknown. Receives HD via Right Femoral HD catheter placed on 08/22/2017 [facility] and chronic left foot OM. 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 Dialyzed on 8/24  with 2.1 liters off.         Patient seen and examined at bedside.     sodium chloride 0.9% lock flush 3 milliLiter(s) every 8 hours  pantoprazole    Tablet 40 milliGRAM(s) before breakfast  aspirin enteric coated 81 milliGRAM(s) daily  valsartan 320 milliGRAM(s) daily  atorvastatin 40 milliGRAM(s) at bedtime  insulin lispro (HumaLOG) corrective regimen sliding scale   every 6 hours  dextrose 5%. 1000 milliLiter(s) <Continuous>  dextrose Gel 1 Dose(s) once PRN  dextrose 50% Injectable 12.5 Gram(s) once  dextrose 50% Injectable 25 Gram(s) once  dextrose 50% Injectable 25 Gram(s) once  glucagon  Injectable 1 milliGRAM(s) once PRN  acetaminophen   Tablet. 650 milliGRAM(s) every 6 hours PRN  metoprolol 50 milliGRAM(s) every 6 hours  insulin lispro Injectable (HumaLOG) 5 Unit(s) three times a day before meals  heparin  Infusion. 1500 Unit(s)/Hr <Continuous>  insulin glargine Injectable (LANTUS) 23 Unit(s) at bedtime  vancomycin  IVPB 750 milliGRAM(s) once      Allergies    penicillin (Unknown)  Sular (Unknown)    Intolerances        T(C): , Max: 38.3 (08-26-17 @ 05:27)  T(F): , Max: 101 (08-26-17 @ 05:27)  HR: 83 (08-26-17 @ 10:00)  BP: 150/62 (08-26-17 @ 09:01)  BP(mean): 86 (08-26-17 @ 09:01)  RR: 18 (08-26-17 @ 10:00)  SpO2: 96% (08-26-17 @ 10:00)  Wt(kg): --    08-25 @ 07:01  -  08-26 @ 07:00  --------------------------------------------------------  IN:    heparin Infusion: 10 mL    heparin Infusion: 56 mL  Total IN: 66 mL    OUT:  Total OUT: 0 mL    Total NET: 66 mL        PHYSICAL exam  Alert and oriented  No JVD   Normal air entry in the lungs, no wheezing, no crackles, no rales   RRR, normal s1/s2, no murmurs, rubs or gallops   Abdomen - soft, non-tender, non-distended, normal bowel sounds   extremities - mild peripheral edema   HAs a triple lumen in the right femoral side              LABS:                        7.9    24.5  )-----------( 230      ( 26 Aug 2017 07:15 )             24.8     08-26    124<L>  |  82<L>  |  55<H>  ----------------------------<  313<H>  3.5   |  19<L>  |  7.90<H>    Ca    7.6<L>      26 Aug 2017 07:15  Phos  7.5     08-26  Mg     2.3     08-26    TPro  7.2  /  Alb  2.0<L>  /  TBili  0.4  /  DBili  x   /  AST  17  /  ALT  17  /  AlkPhos  294<H>  08-25      PT/INR - ( 26 Aug 2017 07:15 )   PT: 15.3 sec;   INR: 1.37          PTT - ( 26 Aug 2017 07:15 )  PTT:42.2 sec          RADIOLOGY & ADDITIONAL STUDIES:      < from: Xray Chest 1 View AP-PORTABLE IMMEDIATE (08.25.17 @ 20:53) >      INTERPRETATION:  Clinical history: Fever    Limited inspiration. Prominent bronchovascular markings. Congestive   change cannot be excluded. Cardiomegaly .Degenerative changes thoracic   spine.    Impression: Limited inspiration. Prominent bronchovascular markings.   Congestive change cannot be excluded    < end of copied text > and modality application as described above and how they are pertinent to the patient's plan of care.  Home Exercise Program  Access Code: [email or web address]  URL: [email or web address]  Date: 03/19/2024  Prepared by: Birdie Damon    Exercises  - Seated Wrist Flexion Extension PROM  - 3 x daily - 7 x weekly - 10 reps  - Wrist Prayer Stretch  - 1 x daily - 7 x weekly - 3 sets - 10 reps  - Seated Composite Thumb Flexion PROM  - 3 x daily - 7 x weekly - 10 reps  - Seated Thumb IP Flexion PROM  - 3 x daily - 7 x weekly - 10 reps  - Putty Squeezes  - 3 x daily - 7 x weekly - 10 reps  - Key Pinch with Putty  - 3 x daily - 7 x weekly - 10 reps  - Wrist Flexion with Resistance  - 3 x daily - 7 x weekly - 10 reps  - Wrist Extension with Resistance  - 3 x daily - 7 x weekly - 10 reps  - Seated Wrist Flexion with Dumbbell  - 3 x daily - 7 x weekly - 10 reps  - Seated Wrist Extension with Dumbbell  - 3 x daily - 7 x weekly - 10 reps  - Seated Wrist Radial Deviation with Dumbbell  - 3 x daily - 7 x weekly - 10 reps      ASSESSMENT                                                                                                            Patient tolerated treatment session well today. Tolerated paraffin/heat, Active range of motion, and strengthening of left wrist. She continues to make good progress with left wrist strengthening, tolerating increased resistance and repetitions. Patient says she is still wearing splint for right wrist, especially at work, and wants to continue doing so for the next week. Anticipate beginning gentle Active range of motion with right wrist next session. Would continue to benefit from therapy to increase Range of motion and strength of bilateral wrists to maximize independence and overall function.   Pain/symptoms after session (out of 10): 2 (Left wrist)  Pain/symptoms after session location 2 (out of 10): 5 (Right wrist)  Education:   - Results of above outlined education: Verbalizes  understanding and Demonstrates understanding    PLAN                                                                                                                           Suggestions for next session as indicated: Progress per plan of care, paraffin/hot pack, wrist Passive/active range of motion, strengthening per tolerance       Therapy procedure time and total treatment time can be found documented on the Time Entry flowsheet

## 2024-06-07 NOTE — CONSULT NOTE ADULT - PROBLEM SELECTOR PROBLEM 1
You can access the FollowMyHealth Patient Portal offered by [facility] by registering at the following website: [email or web address]. By joining [facility]’s FollowMyHealth portal, you will also be able to view your health information using other applications (apps) compatible with our system. ESRD (end stage renal disease)

## 2024-12-13 NOTE — PROGRESS NOTE ADULT - PROVIDER SPECIALTY LIST ADULT
----- Message from Estela Ewing DO sent at 12/12/2024  9:15 AM CST -----  Schedule OV with me in the next 2-3 months to discuss EoE.   Vascular Surgery